# Patient Record
Sex: MALE | Race: WHITE | Employment: OTHER | URBAN - METROPOLITAN AREA
[De-identification: names, ages, dates, MRNs, and addresses within clinical notes are randomized per-mention and may not be internally consistent; named-entity substitution may affect disease eponyms.]

---

## 2018-04-30 ENCOUNTER — OFFICE VISIT (OUTPATIENT)
Dept: FAMILY MEDICINE | Facility: CLINIC | Age: 83
End: 2018-04-30
Payer: COMMERCIAL

## 2018-04-30 ENCOUNTER — TELEPHONE (OUTPATIENT)
Dept: FAMILY MEDICINE | Facility: CLINIC | Age: 83
End: 2018-04-30

## 2018-04-30 ENCOUNTER — RADIANT APPOINTMENT (OUTPATIENT)
Dept: GENERAL RADIOLOGY | Facility: CLINIC | Age: 83
End: 2018-04-30
Attending: INTERNAL MEDICINE
Payer: COMMERCIAL

## 2018-04-30 VITALS
OXYGEN SATURATION: 96 % | HEIGHT: 75 IN | TEMPERATURE: 97.8 F | SYSTOLIC BLOOD PRESSURE: 101 MMHG | BODY MASS INDEX: 21.26 KG/M2 | DIASTOLIC BLOOD PRESSURE: 58 MMHG | WEIGHT: 171 LBS | HEART RATE: 74 BPM

## 2018-04-30 DIAGNOSIS — Z95.0 STATUS CARDIAC PACEMAKER: ICD-10-CM

## 2018-04-30 DIAGNOSIS — I48.20 CHRONIC ATRIAL FIBRILLATION (H): ICD-10-CM

## 2018-04-30 DIAGNOSIS — E53.8 VITAMIN B12 DEFICIENCY (NON ANEMIC): ICD-10-CM

## 2018-04-30 DIAGNOSIS — Z79.01 LONG TERM CURRENT USE OF ANTICOAGULANT THERAPY: ICD-10-CM

## 2018-04-30 DIAGNOSIS — R07.81 RIB PAIN ON LEFT SIDE: ICD-10-CM

## 2018-04-30 DIAGNOSIS — F33.9 EPISODE OF RECURRENT MAJOR DEPRESSIVE DISORDER, UNSPECIFIED DEPRESSION EPISODE SEVERITY (H): ICD-10-CM

## 2018-04-30 DIAGNOSIS — Z76.89 ESTABLISHING CARE WITH NEW DOCTOR, ENCOUNTER FOR: ICD-10-CM
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
## 2018-04-30 DIAGNOSIS — E78.5 HYPERLIPIDEMIA LDL GOAL <100: ICD-10-CM

## 2018-04-30 DIAGNOSIS — K21.9 GASTROESOPHAGEAL REFLUX DISEASE, ESOPHAGITIS PRESENCE NOT SPECIFIED: Primary | ICD-10-CM

## 2018-04-30 DIAGNOSIS — I10 BENIGN ESSENTIAL HYPERTENSION: ICD-10-CM

## 2018-04-30 PROCEDURE — 99204 OFFICE O/P NEW MOD 45 MIN: CPT | Performed by: INTERNAL MEDICINE

## 2018-04-30 PROCEDURE — 71101 X-RAY EXAM UNILAT RIBS/CHEST: CPT | Mod: LT

## 2018-04-30 RX ORDER — SIMVASTATIN 5 MG
10 TABLET ORAL DAILY
Qty: 90 TABLET | Refills: 3 | Status: SHIPPED | OUTPATIENT
Start: 2018-04-30 | End: 2019-03-04

## 2018-04-30 RX ORDER — LISINOPRIL 20 MG/1
20 TABLET ORAL 2 TIMES DAILY
Qty: 180 TABLET | Refills: 3 | Status: SHIPPED | OUTPATIENT
Start: 2018-04-30 | End: 2018-06-15

## 2018-04-30 RX ORDER — HYDROCHLOROTHIAZIDE 25 MG/1
25 TABLET ORAL DAILY
Qty: 90 TABLET | Refills: 3 | Status: ON HOLD | OUTPATIENT
Start: 2018-04-30 | End: 2018-05-29

## 2018-04-30 RX ORDER — SERTRALINE HYDROCHLORIDE 25 MG/1
50 TABLET, FILM COATED ORAL DAILY
Qty: 180 TABLET | Refills: 3 | Status: SHIPPED | OUTPATIENT
Start: 2018-04-30 | End: 2018-05-02

## 2018-04-30 NOTE — PROGRESS NOTES
SUBJECTIVE:   Santosh Robledo is a 88 year old male who presents to clinic today for the following health issues:      Gastrointestinal symptoms      Duration: ongoing HX of GI issues    Description:           REFLUX SYMPTOMS - belching, nausea and loose stools      Intensity:  moderate    Accompanying signs and symptoms:  loose stools, bloating and poor appetite    History  Previous similar problem: YES - HX of colitis   Previous evaluation:  Nothing recently    Aggravating factors: unsure    Alleviating factors: omeprazole slight relief.    Other Therapies tried: None    Fall      Duration: x 6 weeks ago    Description (location/character/radiation): left sided pain and left sided back pain     Intensity:  mild    Accompanying signs and symptoms: spasming    History (similar episodes/previous evaluation): None    Precipitating or alleviating factors: None    Therapies tried and outcome: rest     Current Medications:     Current Outpatient Prescriptions   Medication Sig Dispense Refill     Cyanocobalamin (B-12) 1000 MCG CAPS Take 1 tablet by mouth daily       HYDROCHLOROTHIAZIDE PO Take 25 mg by mouth daily       LISINOPRIL PO Take 40 mg by mouth daily       OMEPRAZOLE PO Take 20 mg by mouth every morning       SERTRALINE HCL PO Take 50 mg by mouth daily       SIMVASTATIN PO Take 10 mg by mouth daily       Warfarin Sodium (COUMADIN PO) Take 5 mg by mouth daily Varies based on INR           Allergies:      Allergies   Allergen Reactions     Penicillins             Past Medical History:     Past Medical History:   Diagnosis Date     Atrial fibrillation (H)      Cardiac pacemaker      GERD (gastroesophageal reflux disease)          Past Surgical History:   No past surgical history on file.      Family Medical History:     Family History   Problem Relation Age of Onset     Influenza/Pneumonia Mother      Prostate Cancer Father          Social History:     Social History     Social History     Marital status:  "     Spouse name: N/A     Number of children: N/A     Years of education: N/A     Occupational History     Not on file.     Social History Main Topics     Smoking status: Never Smoker     Smokeless tobacco: Never Used     Alcohol use Yes     Drug use: No     Sexual activity: Yes     Partners: Female     Other Topics Concern     Not on file     Social History Narrative     No narrative on file           Review of System:     Constitutional: Negative for fever or chills  Skin: Negative for rashes  Ears/Nose/Throat: Negative for nasal congestion, sore throat  Respiratory: No shortness of breath, dyspnea on exertion, cough, or hemoptysis  Cardiovascular: Negative for chest pain  Gastrointestinal: Negative for nausea, vomiting  Genitourinary: Negative for dysuria, hematuria  Musculoskeletal: Positive for left sided rib pains after recent mechanical fall  Neurologic: Negative for headaches  Psychiatric: Negative for depression, anxiety  Hematologic/Lymphatic/Immunologic: Negative  Endocrine: Negative  Behavioral: Negative for tobacco use       Physical Exam:   /58 (BP Location: Right arm, Patient Position: Sitting, Cuff Size: Adult Regular)  Pulse 74  Temp 97.8  F (36.6  C) (Tympanic)  Ht 6' 3\" (1.905 m)  Wt 171 lb (77.6 kg)  SpO2 96%  BMI 21.37 kg/m2    GENERAL: alert and no distress  EYES: eyes grossly normal to inspection, and conjunctivae and sclerae normal  HENT: Normocephalic atraumatic. Nose and mouth without ulcers or lesions  NECK: supple  RESP: lungs clear to auscultation   CV: regular rate and rhythm, normal S1 S2  LYMPH: no peripheral edema   ABDOMEN: nondistended  MS: left sided rib pains noted  SKIN: no suspicious lesions or rashes  NEURO: Alert & Oriented x 3.   PSYCH: mentation appears normal, affect normal        Diagnostic Test Results:     I have ordered XR Ribs Left 2 Views today to rule out acute rib fractures after recent mechanical fall. Xray result is pending at this " time.      ASSESSMENT/PLAN:     (Z76.89) Establishing care with new doctor, encounter for  (K21.9) Gastroesophageal reflux disease, esophagitis presence not specified  (primary encounter diagnosis)  Comment: patient presents to clinic to establish care. Patient's GERD is currently stable on Omeprazole. He is due for a refill of his Omeprazole medication.  Plan: I have refilled the patient's omeprazole (PRILOSEC) 20 MG CR capsule GERD medication today.      (I48.2) Chronic atrial fibrillation (H)  (Z95.0) Status cardiac pacemaker  (Z79.01) Long term current use of anticoagulant therapy  Comment: chronic atrial fibrillation status post previous cardiac pacemaker implantation.  Plan: I have ordered CARDIO  ADULT REFERRAL, INR CLINIC REFERRAL for the patient to establish cardiology and anticoagulation clinic follow up going forward.      (W19.XXXA) Fall, initial encounter  (R07.81) Rib pain on left side  Comment: recent new mechanical fall resulting in left rib pains  Plan: I have ordered XR Ribs Left 2 Views to screen for acute fracture.      (I10) Benign essential hypertension  Comment: chronic HTN, patient's BP is at an acceptable range for his age.  Plan: I have refilled the patient's hydrochlorothiazide (HYDRODIURIL) 25 MG tablet, lisinopril (PRINIVIL/ZESTRIL) 20 MG tablet BP medications today.      (E78.5) Hyperlipidemia LDL goal <100  Comment: stable on Simvastatin medication.  Plan: I have ordered refill of simvastatin (ZOCOR) 5 MG tablet medication today.      (F33.9) Episode of recurrent major depressive disorder, unspecified depression episode severity (H)  Comment: stable on chronic Zoloft antidepressant medication.  Plan: I have refilled the patient's sertraline (ZOLOFT) 25 MG tablet medication today.      (E53.8) Vitamin B12 deficiency (non anemic)  Comment: stable on chronic Vitamin B12 supplementation medication  Plan: I have refilled the patient's Cyanocobalamin (B-12) 1000 MCG CAPS      Follow  Up Plan:     Patient is instructed to return to Internal Medicine clinic for follow-up visit in 1 month.        Lilly Zepeda MD  Internal Medicine  [facility]

## 2018-04-30 NOTE — TELEPHONE ENCOUNTER
Fax received from [facility] pharmacy stating: Plan limits to 1.5 tablets per day. Please send in new RX for 50MG tabs taking 1 tablet daily.      Last Written Prescription Date:  4/30/18  Last Fill Quantity: 180 tablet,  # refills: 3   Last office visit: 4/30/2018 with prescribing provider:  Duane German Office Visit:     [facility]     Tel: [phone]      Fax: [phone]      Debbie ALEXR)

## 2018-04-30 NOTE — PATIENT INSTRUCTIONS
At your visit today, we discussed your risk for falls and preventive options.    Fall Prevention  Falls often occur due to slipping, tripping or losing your balance. Millions of people fall every year and injure themselves. Here are ways to reduce your risk of falling again.    Think about your fall, was there anything that caused your fall that can be fixed, removed, or replaced?    Make your home safe by keeping walkways clear of objects you may trip over.    Use non-slip pads under rugs. Do not use area rugs or small throw rugs.    Use non-slip mats in bathtubs and showers.    Install handrails and lights on staircases.    Do not walk in poorly lit areas.    Do not stand on chairs or wobbly ladders.    Use caution when reaching overhead or looking upward. This position can cause a loss of balance.    Be sure your shoes fit properly, have non-slip bottoms and are in good condition.     Wear shoes both inside and out. Avoid going barefoot or wearing slippers.    Be cautious when going up and down stairs, curbs, and when walking on uneven sidewalks.    If your balance is poor, consider using a cane or walker.    If your fall was related to alcohol use, stop or limit alcohol intake.     If your fall was related to use of sleeping medicines, talk to your doctor about this. You may need to reduce your dosage at bedtime if you awaken during the night to go to the bathroom.      To reduce the need for nighttime bathroom trips:  ? Avoid drinking fluids for several hours before going to bed  ? Empty your bladder before going to bed  ? Men can keep a urinal at the bedside    Stay as active as you can. Balance, flexibility, strength, and endurance all come from exercise. They all play a role in preventing falls. Ask your healthcare provider which types of activity are right for you.    Get your vision checked on a regular basis.    If you have pets, know where they are before you stand up or walk so you don't trip over  them.    Use night lights.  Date Last Reviewed: 11/5/2015 2000-2017 [employer]. [address] [place] [address], [place] [address]. All rights reserved. This information is not intended as a substitute for professional medical care. Always follow your healthcare professional's instructions.

## 2018-04-30 NOTE — MR AVS SNAPSHOT
After Visit Summary   4/30/2018    Santosh Robledo    MRN: [id number]           Patient Information     Date Of Birth          7/20/1929        Visit Information        Provider Department      4/30/2018 2:00 PM Lilly Zepeda MD [facility]        Today's Diagnoses     Gastroesophageal reflux disease, esophagitis presence not specified    -  1    Chronic atrial fibrillation (H)        Status cardiac pacemaker        Long term current use of anticoagulant therapy        Establishing care with new doctor, encounter for        Fall, initial encounter        Rib pain on left side          Care Instructions      At your visit today, we discussed your risk for falls and preventive options.    Fall Prevention  Falls often occur due to slipping, tripping or losing your balance. Millions of people fall every year and injure themselves. Here are ways to reduce your risk of falling again.    Think about your fall, was there anything that caused your fall that can be fixed, removed, or replaced?    Make your home safe by keeping walkways clear of objects you may trip over.    Use non-slip pads under rugs. Do not use area rugs or small throw rugs.    Use non-slip mats in bathtubs and showers.    Install handrails and lights on staircases.    Do not walk in poorly lit areas.    Do not stand on chairs or wobbly ladders.    Use caution when reaching overhead or looking upward. This position can cause a loss of balance.    Be sure your shoes fit properly, have non-slip bottoms and are in good condition.     Wear shoes both inside and out. Avoid going barefoot or wearing slippers.    Be cautious when going up and down stairs, curbs, and when walking on uneven sidewalks.    If your balance is poor, consider using a cane or walker.    If your fall was related to alcohol use, stop or limit alcohol intake.     If your fall was related to use of sleeping medicines, talk to your doctor about this. You may  need to reduce your dosage at bedtime if you awaken during the night to go to the bathroom.      To reduce the need for nighttime bathroom trips:  ? Avoid drinking fluids for several hours before going to bed  ? Empty your bladder before going to bed  ? Men can keep a urinal at the bedside    Stay as active as you can. Balance, flexibility, strength, and endurance all come from exercise. They all play a role in preventing falls. Ask your healthcare provider which types of activity are right for you.    Get your vision checked on a regular basis.    If you have pets, know where they are before you stand up or walk so you don't trip over them.    Use night lights.  Date Last Reviewed: 11/5/2015 2000-2017 [employer]. [address] [place] [address], [place] [address]. All rights reserved. This information is not intended as a substitute for professional medical care. Always follow your healthcare professional's instructions.                Follow-ups after your visit        Additional Services     CARDIO [facility] ADULT REFERRAL       [facility] is referring you to Cardiology Services.       The  Representative will assist you in the coordination of your Cardiology care as prescribed by your physician.    The  Representative will call you within 24 hours to help schedule your appointment, or you may contact the  Representative at: [phone].         Type of Referral: New Cardiology Referral            Timeframe requested: within 4 weeks       Coverage of these services is subject to the terms and limitations of your health insurance plan.  Please call member services at your health plan with any benefit or coverage questions.      If X-rays, CT or MRI's have been performed, please contact the facility where they were done to arrange for , prior to your scheduled appointment.  Please bring this referral request to your appointment and present it to your  "specialist.            INR CLINIC REFERRAL       Your provider has referred you to INR Services.    Please be aware that coverage of these services is subject to the terms and limitations of your health insurance plan.  Call member services at your health plan with any benefit or coverage questions.    Indication for Anticoagulation: Atrial Fibrillation  If nonstandard INR is desired, indicate goal range and explanation:   Expected Duration of Therapy: Lifetime                  Future tests that were ordered for you today     Open Future Orders        Priority Expected Expires Ordered    XR Ribs Left 2 Views Routine 4/30/2018 4/30/2019 4/30/2018            Who to contact     If you have questions or need follow up information about today's clinic visit or your schedule please contact [facility] directly at [phone].  Normal or non-critical lab and imaging results will be communicated to you by [employer], letter or phone within 4 business days after the clinic has received the results. If you do not hear from us within 7 days, please contact the clinic through [employer] or phone. If you have a critical or abnormal lab result, we will notify you by phone as soon as possible.  Submit refill requests through [facility] or call your pharmacy and they will forward the refill request to us. Please allow 3 business days for your refill to be completed.          Additional Information About Your Visit        [employer] Information     [facility] lets you send messages to your doctor, view your test results, renew your prescriptions, schedule appointments and more. To sign up, go to [email or web address] . Click on \"Log in\" on the left side of the screen, which will take you to the Welcome page. Then click on \"Sign up Now\" on the right side of the page.     You will be asked to enter the access code listed below, as well as some personal information. Please follow the directions to create your username and password.   " "  Your access code is: [email or web address]  Expires: 2018  2:26 PM     Your access code will  in 90 days. If you need help or a new code, please call your [place] clinic or [phone].        Care EveryWhere ID     This is your Care EveryWhere ID. This could be used by other organizations to access your [place] medical records  [id number]        Your Vitals Were     Pulse Temperature Height Pulse Oximetry BMI (Body Mass Index)       74 97.8  F (36.6  C) (Tympanic) 6' 3\" (1.905 m) 96% 21.37 kg/m2        Blood Pressure from Last 3 Encounters:   18 101/58    Weight from Last 3 Encounters:   18 171 lb (77.6 kg)              We Performed the Following     CARDIO  ADULT REFERRAL     [facility] REFERRAL        Primary Care Provider Office Phone # Fax #    Lilly Leonel Zepeda [phone] [address] [facility] [address]  [place] [address]        Equal Access to Services     [facility]: Hadii aad ku hadasho Soomaali, waaxda luqadaha, qaybta kaalmada adeegyada, waxay idiin hayaan minna garcia . So [facility] [phone].    ATENCIÓN: Si habla español, tiene a gonzalez disposición servicios gratuitos de asistencia lingüística. Llame al [phone].    We comply with applicable federal civil rights laws and [place] laws. We do not discriminate on the basis of race, color, national origin, age, disability, sex, sexual orientation, or gender identity.            Thank you!     Thank you for choosing [facility]  for your care. Our goal is always to provide you with excellent care. Hearing back from our patients is one way we can continue to improve our services. Please take a few minutes to complete the written survey that you may receive in the mail after your visit with us. Thank you!             Your Updated Medication List - Protect others around you: Learn how to safely use, store and throw away your medicines at www.disposemymeds.org.          This list is accurate as " of 4/30/18  2:26 PM.  Always use your most recent med list.                   Brand Name Dispense Instructions for use Diagnosis    B-12 1000 MCG Caps      Take 1 tablet by mouth daily        COUMADIN PO      Take 5 mg by mouth daily Varies based on INR        HYDROCHLOROTHIAZIDE PO      Take 25 mg by mouth daily        LISINOPRIL PO      Take 40 mg by mouth daily        OMEPRAZOLE PO      Take 20 mg by mouth every morning        SERTRALINE HCL PO      Take 50 mg by mouth daily        SIMVASTATIN PO      Take 10 mg by mouth daily

## 2018-04-30 NOTE — NURSING NOTE
"Chief Complaint   Patient presents with     Fall     Gastrointestinal Problem       Initial /58 (BP Location: Right arm, Patient Position: Sitting, Cuff Size: Adult Regular)  Pulse 74  Temp 97.8  F (36.6  C) (Tympanic)  Ht 6' 3\" (1.905 m)  Wt 171 lb (77.6 kg)  SpO2 96%  BMI 21.37 kg/m2 Estimated body mass index is 21.37 kg/(m^2) as calculated from the following:    Height as of this encounter: 6' 3\" (1.905 m).    Weight as of this encounter: 171 lb (77.6 kg).  Medication Reconciliation: complete   Dilma Lawrenceing- CMA      "

## 2018-05-02 ENCOUNTER — ANTICOAGULATION THERAPY VISIT (OUTPATIENT)
Dept: NURSING | Facility: CLINIC | Age: 83
End: 2018-05-02
Payer: COMMERCIAL

## 2018-05-02 DIAGNOSIS — Z79.01 LONG TERM CURRENT USE OF ANTICOAGULANT THERAPY: ICD-10-CM

## 2018-05-02 DIAGNOSIS — I48.20 CHRONIC ATRIAL FIBRILLATION (H): ICD-10-CM

## 2018-05-02 LAB — INR POINT OF CARE: 6.2 (ref 0.86–1.14)

## 2018-05-02 PROCEDURE — 85610 PROTHROMBIN TIME: CPT | Mod: QW

## 2018-05-02 PROCEDURE — 36416 COLLJ CAPILLARY BLOOD SPEC: CPT

## 2018-05-02 PROCEDURE — 99207 ZZC NO CHARGE NURSE ONLY: CPT

## 2018-05-02 NOTE — PROGRESS NOTES
"  ANTICOAGULATION FOLLOW-UP CLINIC VISIT    Patient Name:  Santosh KENNY Hjelmstad  Date:  5/2/2018  Contact Type:  Face to Face    SUBJECTIVE:     Patient Findings     Comments New to [place]. Long time warfarin; previously managed in [place]           OBJECTIVE    INR Protime   Date Value Ref Range Status   05/02/2018 6.2 (A) 0.86 - 1.14 Final       ASSESSMENT / PLAN  INR assessment SUPRA    Recheck INR In: 5 DAYS    INR Location Clinic      Anticoagulation Summary as of 5/2/2018     INR goal 2.0-3.0   Today's INR 6.2!   Maintenance plan 7.5 mg (5 mg x 1.5) on Mon, Wed, Sat; 5 mg (5 mg x 1) all other days   Full instructions 5/2: Hold; 5/3: Hold; Otherwise 7.5 mg on Mon, Wed, Sat; 5 mg all other days   Weekly total 42.5 mg   Plan last modified Bryanna Heredia RN (5/2/2018)   Next INR check 5/7/2018   Target end date     Indications   Chronic atrial fibrillation (H) [I48.2]  Long term current use of anticoagulant therapy [Z79.01]         Anticoagulation Episode Summary     INR check location     Preferred lab     Send INR reminders to [facility] ANTICOAGULATION    Comments       Anticoagulation Care Providers     Provider Role Specialty Phone number    Duane Lilly Castaneda MD Responsible Internal Medicine [phone]            See the Encounter Report to view Anticoagulation Flowsheet and Dosing Calendar (Go to Encounters tab in chart review, and find the Anticoagulation Therapy Visit)    Dosage adjustment made based on physician directed care plan. Patient is not new to warfarin therapy, but is new to [facility].  Saw  on 4/30/18.  Has been having GI issues and very surprised that his INR today is 6.2.  Wife present. States his appetite has been \"terrible\". Now improving.  Hold warfarin W,Th, then resume previous dosing and recheck in 2 days. Patient and wife confirmed that patient has 5 mg tablets. His current dosing regimen is 2.5 mg MWSa, 5 mg ROW. After discussion, patient has appt in 5 days with PCP.  Rescheduled next " INR for 5/7/18.    Bryanna Heredia RN

## 2018-05-02 NOTE — MR AVS SNAPSHOT
Santosh KENNY Hjelmstad   5/2/2018 9:45 AM   Anticoagulation Therapy Visit    Description:  88 year old male   Provider:   ANTICOAGULATION CLINIC   Department:   Nurse           INR as of 5/2/2018     Today's INR 6.2!      Anticoagulation Summary as of 5/2/2018     INR goal 2.0-3.0   Today's INR 6.2!   Full instructions 5/2: Hold; 5/3: Hold; Otherwise 2.5 mg on Mon, Wed, Sat; 5 mg all other days   Next INR check 5/7/2018    Indications   Chronic atrial fibrillation (H) [I48.2]  Long term current use of anticoagulant therapy [Z79.01]         Your next Anticoagulation Clinic appointment(s)     May 07, 2018  1:45 PM CDT   Anticoagulation Visit with  ANTICOAGULATION CLINIC   [facility] ([facility])    [address]  [place] [address]   [phone]              Contact Numbers     Clinic Number:         May 2018 Details    Sun Mon Tue Wed Thu Fri Sat       1               2      Hold   See details      3      Hold         4      5 mg         5      2.5 mg           6      5 mg         7            8               9               10               11               12                 13               14               15               16               17               18               19                 20               21               22               23               24               25               26                 27               28               29               30               31                  Date Details   05/02 This INR check       Date of next INR:  5/7/2018         How to take your warfarin dose     To take:  2.5 mg Take 0.5 of a 5 mg tablet.    To take:  5 mg Take 1 of the 5 mg tablets.    Hold Do not take your warfarin dose. See the Details table to the right for additional instructions.

## 2018-05-07 ENCOUNTER — ANTICOAGULATION THERAPY VISIT (OUTPATIENT)
Dept: NURSING | Facility: CLINIC | Age: 83
End: 2018-05-07
Payer: COMMERCIAL

## 2018-05-07 ENCOUNTER — OFFICE VISIT (OUTPATIENT)
Dept: FAMILY MEDICINE | Facility: CLINIC | Age: 83
End: 2018-05-07
Payer: COMMERCIAL
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
## 2018-05-07 DIAGNOSIS — Z79.01 LONG TERM CURRENT USE OF ANTICOAGULANT THERAPY: ICD-10-CM

## 2018-05-07 DIAGNOSIS — R14.3 FLATULENCE, ERUCTATION AND GAS PAIN: Primary | ICD-10-CM

## 2018-05-07 DIAGNOSIS — R14.2 FLATULENCE, ERUCTATION AND GAS PAIN: Primary | ICD-10-CM

## 2018-05-07 DIAGNOSIS — I48.20 CHRONIC ATRIAL FIBRILLATION (H): ICD-10-CM

## 2018-05-07 DIAGNOSIS — R07.81 RIB PAIN ON LEFT SIDE: ICD-10-CM

## 2018-05-07 DIAGNOSIS — E78.5 HYPERLIPIDEMIA LDL GOAL <100: ICD-10-CM

## 2018-05-07 DIAGNOSIS — R14.1 FLATULENCE, ERUCTATION AND GAS PAIN: Primary | ICD-10-CM

## 2018-05-07 LAB — INR POINT OF CARE: 3.1 (ref 0.86–1.14)

## 2018-05-07 PROCEDURE — 99214 OFFICE O/P EST MOD 30 MIN: CPT | Performed by: INTERNAL MEDICINE

## 2018-05-07 PROCEDURE — 99207 ZZC NO CHARGE NURSE ONLY: CPT

## 2018-05-07 PROCEDURE — 85610 PROTHROMBIN TIME: CPT | Mod: QW

## 2018-05-07 PROCEDURE — 36416 COLLJ CAPILLARY BLOOD SPEC: CPT

## 2018-05-07 RX ORDER — SIMETHICONE 125 MG
1 CAPSULE ORAL 2 TIMES DAILY PRN
Qty: 60 CAPSULE | Refills: 11 | Status: SHIPPED | OUTPATIENT
Start: 2018-05-07 | End: 2020-05-20

## 2018-05-07 NOTE — PROGRESS NOTES
SUBJECTIVE:   Santosh Robledo is a 88 year old male who presents to clinic today for the following health issues:    Chief Complaint   Patient presents with     Follow Up For     Fall, GI issues - 1 week follow up       HPI:   Patient Santosh Robledo is a very pleasant 88 year old male with history of chronic flatulence, burping and abdominal gas who presents to Internal Medicine clinic today brought in by his wife from home for follow up of multiple concerns including recent mechanical fall resulting in left rib pains. Regarding the patient's fall resulting in left rib pains, the patient denies any new falls since last clinic visit. He reports that his left rib pains have fully resolved at this time. His chronic flatulence, burping and abdominal gas symptoms are still not well controlled despite the omeprazole medication. He denies any chest pain, headaches, fever or chills.          Current Medications:     Current Outpatient Prescriptions   Medication Sig Dispense Refill     Cyanocobalamin (B-12) 1000 MCG CAPS Take 1 tablet by mouth daily 90 capsule 3     hydrochlorothiazide (HYDRODIURIL) 25 MG tablet Take 1 tablet (25 mg) by mouth daily 90 tablet 3     lisinopril (PRINIVIL/ZESTRIL) 20 MG tablet Take 1 tablet (20 mg) by mouth 2 times daily 180 tablet 3     omeprazole (PRILOSEC) 20 MG CR capsule Take 1 capsule (20 mg) by mouth daily 90 capsule 3     sertraline (ZOLOFT) 50 MG tablet Take 1 tablet (50 mg) by mouth daily 90 tablet 3     Simethicone (GAS-X EXTRA STRENGTH) 125 MG CAPS Take 1 capsule by mouth 2 times daily as needed 60 capsule 11     simvastatin (ZOCOR) 5 MG tablet Take 2 tablets (10 mg) by mouth daily 90 tablet 3     Warfarin Sodium (COUMADIN PO) Take 5 mg by mouth daily Varies based on INR           Allergies:      Allergies   Allergen Reactions     Penicillins Rash            Past Medical History:     Past Medical History:   Diagnosis Date     Atrial fibrillation (H)      Cardiac pacemaker   "    GERD (gastroesophageal reflux disease)          Past Surgical History:   No past surgical history on file.      Family Medical History:     Family History   Problem Relation Age of Onset     Influenza/Pneumonia Mother      Prostate Cancer Father          Social History:     Social History     Social History     Marital status:      Spouse name: N/A     Number of children: N/A     Years of education: N/A     Occupational History     Not on file.     Social History Main Topics     Smoking status: Never Smoker     Smokeless tobacco: Never Used     Alcohol use Yes     Drug use: No     Sexual activity: Yes     Partners: Female     Other Topics Concern     Not on file     Social History Narrative           Review of System:     Constitutional: Negative for fever or chills  Skin: Negative for rashes  Ears/Nose/Throat: Negative for nasal congestion, sore throat  Respiratory: No shortness of breath, dyspnea on exertion, cough, or hemoptysis  Cardiovascular: Negative for chest pain  Gastrointestinal: Negative for nausea, vomiting. Positive for GERD, chronic flatulence, burping due to abdominal gas  Genitourinary: Negative for dysuria, hematuria  Musculoskeletal: Negative for myalgias, positive for resolution of previous left rib pains  Neurologic: Negative for headaches  Psychiatric: Negative for depression, anxiety  Hematologic/Lymphatic/Immunologic: Negative  Endocrine: Negative  Behavioral: Negative for tobacco use       Physical Exam:   BP (P) 129/69 (BP Location: Left arm, Cuff Size: Adult Regular)  Pulse (P) 64  Temp (P) 97.9  F (36.6  C) (Tympanic)  Ht (P) 6' 3\" (1.905 m)  Wt (P) 176 lb (79.8 kg)  SpO2 (P) 100%  BMI (P) 22 kg/m2    GENERAL: chronically ill appearing elderly male, alert and no distress  EYES: eyes grossly normal to inspection, and conjunctivae and sclerae normal  HENT: Normocephalic atraumatic. Nose and mouth without ulcers or lesions  NECK: supple  RESP: lungs clear to auscultation "   CV: regular rate and rhythm, normal S1 S2  LYMPH: no peripheral edema   ABDOMEN: nondistended, frequent burping and flatulence symptoms present, unchanged from prior baseline  MS: no gross musculoskeletal defects noted, previous left rib pains have resolved since last clinic visit  SKIN: no suspicious lesions or rashes  NEURO: Alert & Oriented x 3.   PSYCH: mentation appears normal, affect normal        Diagnostic Test Results:     RIBS UNILATERAL THREE VIEWS LEFT April 30, 2018 2:50 PM     HISTORY: Fall, initial encounter. Rib pain on left side.     COMPARISON: None.     FINDINGS: Oblique views of the left hemithorax demonstrate a possible  nondisplaced fracture of the left seventh rib. This could be an  overlying lucency. Otherwise no pneumothorax. Moderately extensive  interstitial changes and/or infiltrate are seen on the left.     DARRICK VASQUEZ MD    ASSESSMENT/PLAN:       (R14.3,  R14.1,  R14.2) Flatulence, eructation and gas pain  (primary encounter diagnosis)  Comment: chronic flatulence, burping due to abdominal gas.  Plan: I have prescribed Simethicone (GAS-X EXTRA STRENGTH) 125 MG CAPS for treatment.      (R07.81) Rib pain on left side  (W19.XXXD) Fall, subsequent encounter  Comment: recent left sided rib pains have resolved at this time. No new falls recently since his last clinic visit.  Plan: No further Xray imaging is necessary at this time.      (E78.5) Hyperlipidemia LDL goal <100  Comment: stable on current Zocor medication  Plan: Continue current Zocor medication for hyperlipidemia treatment going forward.        Follow Up Plan:     Patient is instructed to return to Internal Medicine clinic for follow-up visit in 1 month.        Lilly Zepeda MD  Internal Medicine  [facility]

## 2018-05-07 NOTE — MR AVS SNAPSHOT
Santosh KENNY Hjelmstad   5/7/2018 1:45 PM   Anticoagulation Therapy Visit    Description:  88 year old male   Provider:   ANTICOAGULATION CLINIC   Department:   Nurse           INR as of 5/7/2018     Today's INR 3.1!      Anticoagulation Summary as of 5/7/2018     INR goal 2.0-3.0   Today's INR 3.1!   Full instructions 2.5 mg on Mon, Wed, Sat; 5 mg all other days   Next INR check 5/14/2018    Indications   Chronic atrial fibrillation (H) [I48.2]  Long term current use of anticoagulant therapy [Z79.01]         Your next Anticoagulation Clinic appointment(s)     May 14, 2018 10:15 AM CDT   Anticoagulation Visit with  ANTICOAGULATION CLINIC   [facility] ([facility]    [address]  [place] [address]   [phone]              Contact Numbers     Clinic Number:         May 2018 Details    Sun Mon Tue Wed Thu Fri Sat       1               2               3               4               5                 6               7      2.5 mg   See details      8      5 mg         9      2.5 mg         10      5 mg         11      5 mg         12      2.5 mg           13      5 mg         14            15               16               17               18               19                 20               21               22               23               24               25               26                 27               28               29               30               31                  Date Details   05/07 This INR check       Date of next INR:  5/14/2018         How to take your warfarin dose     To take:  2.5 mg Take 0.5 of a 5 mg tablet.    To take:  5 mg Take 1 of the 5 mg tablets.

## 2018-05-07 NOTE — MR AVS SNAPSHOT
After Visit Summary   5/7/2018    Santosh Robledo    MRN: [id number]           Patient Information     Date Of Birth          7/20/1929        Visit Information        Provider Department      5/7/2018 2:00 PM Lilly Zepeda MD [facility]        Today's Diagnoses     Flatulence, eructation and gas pain    -  1    Rib pain on left side        Fall, subsequent encounter        Hyperlipidemia LDL goal <100           Follow-ups after your visit        Your next 10 appointments already scheduled     May 14, 2018 10:15 AM CDT   Anticoagulation Visit with [facility]   [facility] ([facility])    [address] [facility] [address]   [phone]            May 23, 2018 10:15 AM CDT   EP NEW with Francis Block MD   [facility] ([facility])    [address] [place] [address]   [phone] OPT 2            Jul 30, 2018 11:00 AM CDT   Office Visit with Lilly Zepeda MD   [facility] ([facility])    [address] [facility] [address] [phone]           Bring a current list of meds and any records pertaining to this visit. For Physicals, please bring immunization records and any forms needing to be filled out. Please arrive 10 minutes early to complete paperwork.              Who to contact     If you have questions or need follow up information about today's clinic visit or your schedule please contact [facility] directly at [phone].  Normal or non-critical lab and imaging results will be communicated to you by MyChart, letter or phone within 4 business days after the clinic has received the results. If you do not hear from us within 7 days, please contact the clinic through MyChart or phone. If you have a critical or abnormal lab result, we will notify you by phone as soon as possible.  Submit refill requests through [employer] or  "call your pharmacy and they will forward the refill request to us. Please allow 3 business days for your refill to be completed.          Additional Information About Your Visit        MyChart Information     [employer] lets you send messages to your doctor, view your test results, renew your prescriptions, schedule appointments and more. To sign up, go to [email or web address] . Click on \"Log in\" on the left side of the screen, which will take you to the Welcome page. Then click on \"Sign up Now\" on the right side of the page.     You will be asked to enter the access code listed below, as well as some personal information. Please follow the directions to create your username and password.     Your access code is: [email or web address]  Expires: 2018  2:26 PM     Your access code will  in 90 days. If you need help or a new code, please call your [place] clinic or [phone].        Care EveryWhere ID     This is your Care EveryWhere ID. This could be used by other organizations to access your [place] medical records  [id number]         Blood Pressure from Last 3 Encounters:   18 (P) 129/69   18 101/58    Weight from Last 3 Encounters:   18 (P) 176 lb (79.8 kg)   18 171 lb (77.6 kg)              Today, you had the following     No orders found for display         Today's Medication Changes          These changes are accurate as of 18  3:53 PM.  If you have any questions, ask your nurse or doctor.               Start taking these medicines.        Dose/Directions    Simethicone 125 MG Caps   Commonly known as:  GAS-X EXTRA STRENGTH   Used for:  Flatulence, eructation and gas pain   Started by:  Lilly Zepeda MD        Dose:  1 capsule   Take 1 capsule by mouth 2 times daily as needed   Quantity:  60 capsule   Refills:  11            Where to get your medicines      These medications were sent to [facility] [id number] - [place], [place] - [address]  [address] [place] [address] "     Phone:  [phone]     Simethicone 125 MG Caps                Primary Care Provider Office Phone # Fax #    Lilly Leonel Zepeda [phone] [address] [address]        Equal Access to Services     [facility] : Hadii aad ku hadasho Soomaali, waaxda luqadaha, qaybta kaalmada adeegyada, waxay naein haythuyn minna wooddarcyjoby garcia . So [facility] [phone].    ATENCIÓN: Si habla español, tiene a gonzalez disposición servicios gratuitos de asistencia lingüística. Llame al [phone].    We comply with applicable federal civil rights laws and [place] laws. We do not discriminate on the basis of race, color, national origin, age, disability, sex, sexual orientation, or gender identity.            Thank you!     Thank you for choosing [facility]  for your care. Our goal is always to provide you with excellent care. Hearing back from our patients is one way we can continue to improve our services. Please take a few minutes to complete the written survey that you may receive in the mail after your visit with us. Thank you!             Your Updated Medication List - Protect others around you: Learn how to safely use, store and throw away your medicines at www.disposemymeds.org.          This list is accurate as of 5/7/18  3:53 PM.  Always use your most recent med list.                   Brand Name Dispense Instructions for use Diagnosis    B-12 1000 MCG Caps     90 capsule    Take 1 tablet by mouth daily    Vitamin B12 deficiency (non anemic)       COUMADIN PO      Take 5 mg by mouth daily Varies based on INR        hydrochlorothiazide 25 MG tablet    HYDRODIURIL    90 tablet    Take 1 tablet (25 mg) by mouth daily    Benign essential hypertension       lisinopril 20 MG tablet    PRINIVIL/ZESTRIL    180 tablet    Take 1 tablet (20 mg) by mouth 2 times daily    Benign essential hypertension       omeprazole 20 MG CR capsule    priLOSEC    90 capsule    Take 1 capsule (20 mg)  by mouth daily    Gastroesophageal reflux disease, esophagitis presence not specified       sertraline 50 MG tablet    ZOLOFT    90 tablet    Take 1 tablet (50 mg) by mouth daily    Episode of recurrent major depressive disorder, unspecified depression episode severity (H)       Simethicone 125 MG Caps    GAS-X EXTRA STRENGTH    60 capsule    Take 1 capsule by mouth 2 times daily as needed    Flatulence, eructation and gas pain       simvastatin 5 MG tablet    ZOCOR    90 tablet    Take 2 tablets (10 mg) by mouth daily    Hyperlipidemia LDL goal <100

## 2018-05-07 NOTE — PROGRESS NOTES
ANTICOAGULATION FOLLOW-UP CLINIC VISIT    Patient Name:  Santosh KENNY Hjelmstad  Date:  5/7/2018  Contact Type:  Face to Face    SUBJECTIVE:        OBJECTIVE    INR Protime   Date Value Ref Range Status   05/07/2018 3.1 (A) 0.86 - 1.14 Final       ASSESSMENT / PLAN  INR assessment THER    Recheck INR In: 1 WEEK    INR Location Clinic      Anticoagulation Summary as of 5/7/2018     INR goal 2.0-3.0   Today's INR 3.1!   Maintenance plan 2.5 mg (5 mg x 0.5) on Mon, Wed, Sat; 5 mg (5 mg x 1) all other days   Full instructions 2.5 mg on Mon, Wed, Sat; 5 mg all other days   Weekly total 27.5 mg   Plan last modified Bryanna Heredia RN (5/2/2018)   Next INR check 5/14/2018   Target end date     Indications   Chronic atrial fibrillation (H) [I48.2]  Long term current use of anticoagulant therapy [Z79.01]         Anticoagulation Episode Summary     INR check location     Preferred lab     Send INR reminders to [facility] ANTICOAGULATION    Comments       Anticoagulation Care Providers     Provider Role Specialty Phone number    Lilly Zepeda MD [facility] Internal Medicine [phone]            See the Encounter Report to view Anticoagulation Flowsheet and Dosing Calendar (Go to Encounters tab in chart review, and find the Anticoagulation Therapy Visit)    Patient is here with his wife.  He is new to clinic but not new to INR.  His diet is better but not back to his baseline yet. Dosing based on [facility] Protocol and Provider directed care plan.      Rosemary Sauceda RN

## 2018-05-08 ASSESSMENT — PATIENT HEALTH QUESTIONNAIRE - PHQ9: SUM OF ALL RESPONSES TO PHQ QUESTIONS 1-9: 7

## 2018-05-13 ENCOUNTER — HOSPITAL ENCOUNTER (INPATIENT)
Facility: CLINIC | Age: 83
LOS: 2 days | Discharge: HOME | DRG: 373 | End: 2018-05-15
Attending: EMERGENCY MEDICINE | Admitting: INTERNAL MEDICINE
Payer: MEDICARE

## 2018-05-13 ENCOUNTER — APPOINTMENT (OUTPATIENT)
Dept: CT IMAGING | Facility: CLINIC | Age: 83
DRG: 373 | End: 2018-05-13
Attending: EMERGENCY MEDICINE
Payer: MEDICARE

## 2018-05-13 DIAGNOSIS — K52.9 COLITIS: ICD-10-CM

## 2018-05-13 LAB
ANION GAP SERPL CALCULATED.3IONS-SCNC: 8 MMOL/L (ref 3–14)
BASOPHILS # BLD AUTO: 0 10E9/L (ref 0–0.2)
BASOPHILS NFR BLD AUTO: 0.1 %
BUN SERPL-MCNC: 22 MG/DL (ref 7–30)
C DIFF TOX B STL QL: POSITIVE
CALCIUM SERPL-MCNC: 8.5 MG/DL (ref 8.5–10.1)
CHLORIDE SERPL-SCNC: 102 MMOL/L (ref 94–109)
CO2 SERPL-SCNC: 25 MMOL/L (ref 20–32)
CREAT SERPL-MCNC: 0.99 MG/DL (ref 0.66–1.25)
CRP SERPL-MCNC: 135 MG/L (ref 0–8)
DIFFERENTIAL METHOD BLD: ABNORMAL
EOSINOPHIL # BLD AUTO: 0.1 10E9/L (ref 0–0.7)
EOSINOPHIL NFR BLD AUTO: 0.2 %
ERYTHROCYTE [DISTWIDTH] IN BLOOD BY AUTOMATED COUNT: 14.8 % (ref 10–15)
GFR SERPL CREATININE-BSD FRML MDRD: 71 ML/MIN/1.7M2
GLUCOSE SERPL-MCNC: 124 MG/DL (ref 70–99)
HCT VFR BLD AUTO: 38.1 % (ref 40–53)
HGB BLD-MCNC: 12.8 G/DL (ref 13.3–17.7)
IMM GRANULOCYTES # BLD: 0.1 10E9/L (ref 0–0.4)
IMM GRANULOCYTES NFR BLD: 0.4 %
INR PPP: 1.77 (ref 0.86–1.14)
LACTATE BLD-SCNC: 1.1 MMOL/L (ref 0.7–2)
LYMPHOCYTES # BLD AUTO: 0.8 10E9/L (ref 0.8–5.3)
LYMPHOCYTES NFR BLD AUTO: 2.6 %
MCH RBC QN AUTO: 27.2 PG (ref 26.5–33)
MCHC RBC AUTO-ENTMCNC: 33.6 G/DL (ref 31.5–36.5)
MCV RBC AUTO: 81 FL (ref 78–100)
MONOCYTES # BLD AUTO: 2.8 10E9/L (ref 0–1.3)
MONOCYTES NFR BLD AUTO: 9.4 %
NEUTROPHILS # BLD AUTO: 26 10E9/L (ref 1.6–8.3)
NEUTROPHILS NFR BLD AUTO: 87.3 %
NRBC # BLD AUTO: 0 10*3/UL
NRBC BLD AUTO-RTO: 0 /100
PLATELET # BLD AUTO: 375 10E9/L (ref 150–450)
POTASSIUM SERPL-SCNC: 3.4 MMOL/L (ref 3.4–5.3)
RBC # BLD AUTO: 4.71 10E12/L (ref 4.4–5.9)
SODIUM SERPL-SCNC: 135 MMOL/L (ref 133–144)
SPECIMEN SOURCE: ABNORMAL
WBC # BLD AUTO: 29.8 10E9/L (ref 4–11)

## 2018-05-13 PROCEDURE — 85610 PROTHROMBIN TIME: CPT | Performed by: EMERGENCY MEDICINE

## 2018-05-13 PROCEDURE — 25000132 ZZH RX MED GY IP 250 OP 250 PS 637: Mod: GY | Performed by: EMERGENCY MEDICINE

## 2018-05-13 PROCEDURE — 87493 C DIFF AMPLIFIED PROBE: CPT | Performed by: EMERGENCY MEDICINE

## 2018-05-13 PROCEDURE — 96360 HYDRATION IV INFUSION INIT: CPT

## 2018-05-13 PROCEDURE — 74177 CT ABD & PELVIS W/CONTRAST: CPT

## 2018-05-13 PROCEDURE — 85025 COMPLETE CBC W/AUTO DIFF WBC: CPT | Performed by: EMERGENCY MEDICINE

## 2018-05-13 PROCEDURE — 80048 BASIC METABOLIC PNL TOTAL CA: CPT | Performed by: EMERGENCY MEDICINE

## 2018-05-13 PROCEDURE — 99223 1ST HOSP IP/OBS HIGH 75: CPT | Mod: AI | Performed by: INTERNAL MEDICINE

## 2018-05-13 PROCEDURE — 25000128 H RX IP 250 OP 636: Performed by: EMERGENCY MEDICINE

## 2018-05-13 PROCEDURE — 96361 HYDRATE IV INFUSION ADD-ON: CPT

## 2018-05-13 PROCEDURE — 87506 IADNA-DNA/RNA PROBE TQ 6-11: CPT | Performed by: EMERGENCY MEDICINE

## 2018-05-13 PROCEDURE — 25000128 H RX IP 250 OP 636: Performed by: INTERNAL MEDICINE

## 2018-05-13 PROCEDURE — 25000132 ZZH RX MED GY IP 250 OP 250 PS 637: Mod: GY | Performed by: INTERNAL MEDICINE

## 2018-05-13 PROCEDURE — A9270 NON-COVERED ITEM OR SERVICE: HCPCS | Mod: GY | Performed by: INTERNAL MEDICINE

## 2018-05-13 PROCEDURE — 25000125 ZZHC RX 250: Performed by: EMERGENCY MEDICINE

## 2018-05-13 PROCEDURE — 99285 EMERGENCY DEPT VISIT HI MDM: CPT | Mod: 25

## 2018-05-13 PROCEDURE — 83605 ASSAY OF LACTIC ACID: CPT | Performed by: EMERGENCY MEDICINE

## 2018-05-13 PROCEDURE — 12000000 ZZH R&B MED SURG/OB

## 2018-05-13 PROCEDURE — 86140 C-REACTIVE PROTEIN: CPT | Performed by: EMERGENCY MEDICINE

## 2018-05-13 RX ORDER — LISINOPRIL 20 MG/1
20 TABLET ORAL 2 TIMES DAILY
Status: DISCONTINUED | OUTPATIENT
Start: 2018-05-13 | End: 2018-05-15 | Stop reason: HOSPADM

## 2018-05-13 RX ORDER — POTASSIUM CHLORIDE 7.45 MG/ML
10 INJECTION INTRAVENOUS
Status: DISCONTINUED | OUTPATIENT
Start: 2018-05-13 | End: 2018-05-15 | Stop reason: HOSPADM

## 2018-05-13 RX ORDER — SERTRALINE HYDROCHLORIDE 100 MG/1
100 TABLET, FILM COATED ORAL DAILY
Status: DISCONTINUED | OUTPATIENT
Start: 2018-05-14 | End: 2018-05-15 | Stop reason: HOSPADM

## 2018-05-13 RX ORDER — POTASSIUM CHLORIDE 29.8 MG/ML
20 INJECTION INTRAVENOUS
Status: DISCONTINUED | OUTPATIENT
Start: 2018-05-13 | End: 2018-05-15 | Stop reason: HOSPADM

## 2018-05-13 RX ORDER — ACETAMINOPHEN 650 MG/1
650 SUPPOSITORY RECTAL EVERY 4 HOURS PRN
Status: DISCONTINUED | OUTPATIENT
Start: 2018-05-13 | End: 2018-05-15 | Stop reason: HOSPADM

## 2018-05-13 RX ORDER — ONDANSETRON 2 MG/ML
4 INJECTION INTRAMUSCULAR; INTRAVENOUS EVERY 6 HOURS PRN
Status: DISCONTINUED | OUTPATIENT
Start: 2018-05-13 | End: 2018-05-15 | Stop reason: HOSPADM

## 2018-05-13 RX ORDER — SIMETHICONE 80 MG
80 TABLET,CHEWABLE ORAL 2 TIMES DAILY PRN
Status: DISCONTINUED | OUTPATIENT
Start: 2018-05-13 | End: 2018-05-15 | Stop reason: HOSPADM

## 2018-05-13 RX ORDER — POTASSIUM CHLORIDE 1.5 G/1.58G
20-40 POWDER, FOR SOLUTION ORAL
Status: DISCONTINUED | OUTPATIENT
Start: 2018-05-13 | End: 2018-05-15 | Stop reason: HOSPADM

## 2018-05-13 RX ORDER — NALOXONE HYDROCHLORIDE 0.4 MG/ML
.1-.4 INJECTION, SOLUTION INTRAMUSCULAR; INTRAVENOUS; SUBCUTANEOUS
Status: DISCONTINUED | OUTPATIENT
Start: 2018-05-13 | End: 2018-05-15 | Stop reason: HOSPADM

## 2018-05-13 RX ORDER — POTASSIUM CHLORIDE 1500 MG/1
20-40 TABLET, EXTENDED RELEASE ORAL
Status: DISCONTINUED | OUTPATIENT
Start: 2018-05-13 | End: 2018-05-15 | Stop reason: HOSPADM

## 2018-05-13 RX ORDER — LIDOCAINE 40 MG/G
CREAM TOPICAL
Status: DISCONTINUED | OUTPATIENT
Start: 2018-05-13 | End: 2018-05-15 | Stop reason: HOSPADM

## 2018-05-13 RX ORDER — SODIUM CHLORIDE 9 MG/ML
1000 INJECTION, SOLUTION INTRAVENOUS CONTINUOUS
Status: DISCONTINUED | OUTPATIENT
Start: 2018-05-13 | End: 2018-05-13

## 2018-05-13 RX ORDER — HYDROCODONE BITARTRATE AND ACETAMINOPHEN 5; 325 MG/1; MG/1
1 TABLET ORAL EVERY 4 HOURS PRN
Status: DISCONTINUED | OUTPATIENT
Start: 2018-05-13 | End: 2018-05-15 | Stop reason: HOSPADM

## 2018-05-13 RX ORDER — SODIUM CHLORIDE 9 MG/ML
INJECTION, SOLUTION INTRAVENOUS CONTINUOUS
Status: DISCONTINUED | OUTPATIENT
Start: 2018-05-13 | End: 2018-05-15 | Stop reason: HOSPADM

## 2018-05-13 RX ORDER — ONDANSETRON 4 MG/1
4 TABLET, ORALLY DISINTEGRATING ORAL EVERY 6 HOURS PRN
Status: DISCONTINUED | OUTPATIENT
Start: 2018-05-13 | End: 2018-05-15 | Stop reason: HOSPADM

## 2018-05-13 RX ORDER — POTASSIUM CL/LIDO/0.9 % NACL 10MEQ/0.1L
10 INTRAVENOUS SOLUTION, PIGGYBACK (ML) INTRAVENOUS
Status: DISCONTINUED | OUTPATIENT
Start: 2018-05-13 | End: 2018-05-15 | Stop reason: HOSPADM

## 2018-05-13 RX ORDER — SIMVASTATIN 10 MG
10 TABLET ORAL AT BEDTIME
Status: DISCONTINUED | OUTPATIENT
Start: 2018-05-13 | End: 2018-05-15 | Stop reason: HOSPADM

## 2018-05-13 RX ORDER — WARFARIN SODIUM 5 MG/1
5 TABLET ORAL ONCE
Status: COMPLETED | OUTPATIENT
Start: 2018-05-13 | End: 2018-05-13

## 2018-05-13 RX ORDER — IOPAMIDOL 755 MG/ML
86 INJECTION, SOLUTION INTRAVASCULAR ONCE
Status: COMPLETED | OUTPATIENT
Start: 2018-05-13 | End: 2018-05-13

## 2018-05-13 RX ORDER — HYDRALAZINE HYDROCHLORIDE 20 MG/ML
10 INJECTION INTRAMUSCULAR; INTRAVENOUS EVERY 4 HOURS PRN
Status: DISCONTINUED | OUTPATIENT
Start: 2018-05-13 | End: 2018-05-15 | Stop reason: HOSPADM

## 2018-05-13 RX ORDER — ACETAMINOPHEN 325 MG/1
650 TABLET ORAL EVERY 4 HOURS PRN
Status: DISCONTINUED | OUTPATIENT
Start: 2018-05-13 | End: 2018-05-15 | Stop reason: HOSPADM

## 2018-05-13 RX ADMIN — LISINOPRIL 20 MG: 20 TABLET ORAL at 21:34

## 2018-05-13 RX ADMIN — SODIUM CHLORIDE 1000 ML: 9 INJECTION, SOLUTION INTRAVENOUS at 20:30

## 2018-05-13 RX ADMIN — IOPAMIDOL 86 ML: 755 INJECTION, SOLUTION INTRAVENOUS at 17:52

## 2018-05-13 RX ADMIN — SODIUM CHLORIDE 1000 ML: 9 INJECTION, SOLUTION INTRAVENOUS at 17:15

## 2018-05-13 RX ADMIN — Medication 125 MG: at 20:31

## 2018-05-13 RX ADMIN — SODIUM CHLORIDE 66 ML: 9 INJECTION, SOLUTION INTRAVENOUS at 17:52

## 2018-05-13 RX ADMIN — OMEPRAZOLE 20 MG: 20 CAPSULE, DELAYED RELEASE ORAL at 21:34

## 2018-05-13 RX ADMIN — WARFARIN SODIUM 5 MG: 5 TABLET ORAL at 21:35

## 2018-05-13 RX ADMIN — SODIUM CHLORIDE: 9 INJECTION, SOLUTION INTRAVENOUS at 20:38

## 2018-05-13 RX ADMIN — SIMVASTATIN 10 MG: 10 TABLET, FILM COATED ORAL at 21:35

## 2018-05-13 ASSESSMENT — ACTIVITIES OF DAILY LIVING (ADL)
RETIRED_COMMUNICATION: 0-->UNDERSTANDS/COMMUNICATES WITHOUT DIFFICULTY
DRESS: 0-->INDEPENDENT
COGNITION: 0 - NO COGNITION ISSUES REPORTED
TOILETING: 1-->ASSISTIVE EQUIPMENT
NUMBER_OF_TIMES_PATIENT_HAS_FALLEN_WITHIN_LAST_SIX_MONTHS: 1
RETIRED_EATING: 0-->INDEPENDENT
BATHING: 1-->ASSISTIVE EQUIPMENT
AMBULATION: 1-->ASSISTIVE EQUIPMENT
FALL_HISTORY_WITHIN_LAST_SIX_MONTHS: YES
TRANSFERRING: 1-->ASSISTIVE EQUIPMENT
SWALLOWING: 0-->SWALLOWS FOODS/LIQUIDS WITHOUT DIFFICULTY

## 2018-05-13 ASSESSMENT — ENCOUNTER SYMPTOMS
NAUSEA: 0
RHINORRHEA: 1
BLOOD IN STOOL: 0
FEVER: 0
DIARRHEA: 1
VOMITING: 0
APPETITE CHANGE: 1
ABDOMINAL PAIN: 0

## 2018-05-13 NOTE — IP AVS SNAPSHOT
[facility] [address] [facility]    [address] [place] [address]    Phone:  [phone]                                       After Visit Summary   5/13/2018    Santosh Robledo    MRN: [id number]           After Visit Summary Signature Page     I have received my discharge instructions, and my questions have been answered. I have discussed any challenges I see with this plan with the nurse or doctor.    ..........................................................................................................................................  Patient/Patient Representative Signature      ..........................................................................................................................................  Patient Representative Print Name and Relationship to Patient    ..................................................               ................................................  Date                                            Time    ..........................................................................................................................................  Reviewed by Signature/Title    ...................................................              ..............................................  Date                                                            Time

## 2018-05-13 NOTE — ED NOTES
"[facility]  ED Nurse Handoff Report    ED Chief complaint: Diarrhea (recent bout of colitis when they were in [place], having diarrhea again per wife and not eating or drinking. )      ED Diagnosis:   Final diagnoses:   Colitis       Code Status: Full Code    Allergies:   Allergies   Allergen Reactions     Penicillins Rash       Activity level - Baseline/Home:  Independent with cane    Activity Level - Current:   Stand with Assist     Needed?: No    Isolation: Yes  Infection: C-Diff Ordered (patient has not provided sample in ED)    Bariatric?: No    Vital Signs:   Vitals:    05/13/18 1654 05/13/18 1715 05/13/18 1730 05/13/18 1800   BP: 144/69  120/60 131/60   Resp: 16      Temp: 98.3  F (36.8  C)      TempSrc: Oral      SpO2: 94% 93% 93% 93%   Weight: 78 kg (172 lb)      Height: 1.905 m (6' 3\")          Cardiac Rhythm: ,        Pain level: 0-10 Pain Scale: 0    Is this patient confused?: No     Patient Report: Initial Complaint: diarrhea   Focused Assessment: Pt presents to ED with loose stools since Feb.  Reports was dx with colitis and treated but continues having stools without blood.  Spouse concerned for dehydration.  WBC elevated. CRP elevated.  1L nS bolus given.  CT obtained, no new changes.  C diff ordered - patient has not provided stool sample in ED.  On coumadin for a fib.  A/o x3.   Tests Performed: blood work/CT  Abnormal Results:  INR 1.77, , WBC 29.8  Treatments provided: 1L NS bolus    Family Comments: spouse @ bedside    OBS brochure/video discussed/provided to patient: N/A    ED Medications:   Medications   0.9% sodium chloride BOLUS (1,000 mLs Intravenous New Bag 5/13/18 1715)     Followed by   sodium chloride 0.9% infusion (not administered)   100mL saline flush (66 mLs Intravenous Given 5/13/18 1752)   iopamidol (ISOVUE-370) solution 86 mL (86 mLs Intravenous Given 5/13/18 1752)       Drips infusing?:  Yes    For the majority of the shift this patient was Green. "   Interventions performed were comfort measures.    Severe Sepsis OR Septic Shock Diagnosis Present: No      ED NURSE PHONE NUMBER: *[phone]

## 2018-05-13 NOTE — IP AVS SNAPSHOT
MRN:[id number]                      After Visit Summary   5/13/2018    Santosh Robledo    MRN: [id number]           Thank you!     Thank you for choosing [facility] for your care. Our goal is always to provide you with excellent care. Hearing back from our patients is one way we can continue to improve our services. Please take a few minutes to complete the written survey that you may receive in the mail after you visit with us. Thank you!        Patient Information     Date Of Birth          7/20/1929        Designated Caregiver       Most Recent Value    Caregiver    Will someone help with your care after discharge? yes    Name of designated caregiver Candida Robledo    Phone number of caregiver [phone]    Caregiver address [address] [place] [address] [address], [place] [address]      About your hospital stay     You were admitted on:  May 13, 2018 [facility]    You were discharged on:  May 15, 2018        Reason for your hospital stay       c diff colitis                  Who to Call     For medical emergencies, please call 911.  For non-urgent questions about your medical care, please call your primary care provider or clinic, [phone]          Attending Provider     Provider Specialty    Tru Chakraborty MD Emergency Medicine    Nistor, Jennie Arenas MD Internal Medicine       Primary Care Provider Office Phone # Fax #    Lilly Leonel Zepeda [phone]      After Care Instructions     Activity       Your activity upon discharge: activity as tolerated            Diet       Follow this diet upon discharge:   Ensure Clear at 10, Geltatein at 2; Between Meals.  Regular Diet Adult                  Follow-up Appointments     Follow-up and recommended labs and tests        Follow up with primary care provider, Lilly Zepeda, within 7 days.  Check CBC in clinic            Follow-up and recommended labs and tests         Appointment with   Marilynn Srivastava   ([place] [facility]) 5/29 at 2;55pm  Address : info will be sent to you                  Your next 10 appointments already scheduled     May 18, 2018  9:40 AM CDT   Office Visit with Lilly Zepeda MD   [facility] ([facility]    [address] [facility] [facility] [address] [phone]           Bring a current list of meds and any records pertaining to this visit. For Physicals, please bring immunization records and any forms needing to be filled out. Please arrive 10 minutes early to complete paperwork.            May 23, 2018 10:15 AM CDT   EP NEW with Francis Block MD   [facility] ([facility])    [address] [place] [id number] [phone] OPT 2            May 23, 2018 11:15 AM CDT   Pacemaker Check with NAYELY CHICAS   [facility] ([facility])    [address] [place] [id number] [phone] OPT 2            Jul 30, 2018 11:00 AM CDT   Office Visit with Lilly Zepeda MD   [facility] ([facility])    [address] [address] [phone]           Bring a current list of meds and any records pertaining to this visit. For Physicals, please bring immunization records and any forms needing to be filled out. Please arrive 10 minutes early to complete paperwork.              Warfarin Instruction     You have started taking a medicine called warfarin. This is a blood-thinning medicine (anticoagulant). It helps prevent and treat blood clots.      Before leaving the hospital, make sure you know how much to take and how long to take it.      You will need regular blood tests to make sure your blood is clotting safely. It is very important to see your doctor for regular blood tests.    Talk to your doctor before taking any new medicine (this includes over-the-counter drugs and herbal  "products). Many medicines can interact with warfarin. This may cause more bleeding or too much clotting.     Eating a lot of vitamin K--found in green, leafy vegetables--can change the way warfarin works in your body. Do NOT avoid these foods. Instead, try to eat the same amount each day.     Bleeding is the most common side-effect of warfarin. You may notice bleeding gums, a bloody nose, bruises and bleeding longer when you cut yourself. See a doctor at once if:   o You cough up blood  o You find blood in your stool (poop)  o You have a deep cut, or a cut that bleeds longer than 10 minutes   o You have a bad cut, hard fall, accident or hit your head (go to urgent care or the emergency room).    For women who can get pregnant: This medicine can harm an unborn baby. Be very careful not to get pregnant while taking this medicine. If you think you might be pregnant, call your doctor right away.    For more information, read \"Guide to Warfarin Therapy,  the booklet you received in the hospital.        Pending Results     No orders found from 5/11/2018 to 5/14/2018.            Statement of Approval     Ordered          05/15/18 1443  I have reviewed and agree with all the recommendations and orders detailed in this document.  EFFECTIVE NOW     Approved and electronically signed by:  Kasie Merchant MD             Admission Information     Date & Time Provider Department Dept. Phone    5/13/2018 Jennie Perry MD [place] [address] [facility] [phone]      Your Vitals Were     Blood Pressure Temperature Respirations Height Weight Pulse Oximetry    134/60 (BP Location: Left arm) 97.4  F (36.3  C) (Oral) 16 1.905 m (6' 3\") 81.7 kg (180 lb 1.9 oz) 97%    BMI (Body Mass Index)                   22.51 kg/m2           MyChart Information     [employer] lets you send messages to your doctor, view your test results, renew your prescriptions, schedule appointments and more. To sign up, go to " "[email or web address] . Click on \"Log in\" on the left side of the screen, which will take you to the Welcome page. Then click on \"Sign up Now\" on the right side of the page.     You will be asked to enter the access code listed below, as well as some personal information. Please follow the directions to create your username and password.     Your access code is: [email or web address]  Expires: 2018  2:26 PM     Your access code will  in 90 days. If you need help or a new code, please call your [facility] clinic or [phone].        Care EveryWhere ID     This is your Care EveryWhere ID. This could be used by other organizations to access your [facility] medical records  [id number]        Equal Access to Services     MAGALY CASTANEDA : Lila Mata, tank rucker, bertram coughlin, kingston garcia . So [facility] [phone].    ATENCIÓN: Si habla español, tiene a gonzalez disposición servicios gratuitos de asistencia lingüística. Serena al [phone].    We comply with applicable federal civil rights laws and [place] laws. We do not discriminate on the basis of race, color, national origin, age, disability, sex, sexual orientation, or gender identity.               Review of your medicines      START taking        Dose / Directions    order for DME   Notes to Patient:  Given at hospital        Equipment being ordered: Walker Wheels () and Walker () Treatment Diagnosis: Difficulty ambulating   Quantity:  1 each   Refills:  0       order for DME        Equipment being ordered: Walker Wheels () Treatment Diagnosis:  Weakness,colitis.   Quantity:  1 Device   Refills:  0       vancomycin 50 MG/ML Soln   Indication:  Clostridium difficile Bacteria        Dose:  125 mg   Take 2.5 mLs (125 mg) by mouth 4 times daily   Quantity:  120 mL   Refills:  0         CONTINUE these medicines which have NOT CHANGED        Dose / Directions    B-12 1000 MCG Caps   Used for:  " Vitamin B12 deficiency (non anemic)        Dose:  1 tablet   Take 1 tablet by mouth daily   Quantity:  90 capsule   Refills:  3       hydrochlorothiazide 25 MG tablet   Commonly known as:  HYDRODIURIL   Used for:  Benign essential hypertension        Dose:  25 mg   Take 1 tablet (25 mg) by mouth daily   Quantity:  90 tablet   Refills:  3       lisinopril 20 MG tablet   Commonly known as:  PRINIVIL/ZESTRIL   Used for:  Benign essential hypertension        Dose:  20 mg   Take 1 tablet (20 mg) by mouth 2 times daily   Quantity:  180 tablet   Refills:  3       omeprazole 20 MG CR capsule   Commonly known as:  priLOSEC   Indication:  Gastroesophageal Reflux Disease   Used for:  Gastroesophageal reflux disease, esophagitis presence not specified        Dose:  20 mg   Take 1 capsule (20 mg) by mouth daily   Quantity:  90 capsule   Refills:  3       Simethicone 125 MG Caps   Commonly known as:  GAS-X EXTRA STRENGTH   Used for:  Flatulence, eructation and gas pain        Dose:  1 capsule   Take 1 capsule by mouth 2 times daily as needed   Quantity:  60 capsule   Refills:  11       simvastatin 5 MG tablet   Commonly known as:  ZOCOR   Indication:  Cerebrovascular Accident or Stroke   Used for:  Hyperlipidemia LDL goal <100        Dose:  10 mg   Take 2 tablets (10 mg) by mouth daily   Quantity:  90 tablet   Refills:  3       * WARFARIN SODIUM PO        Dose:  5 mg   Take 5 mg by mouth daily On Tuesday, Thursday, Friday and Sunday   Refills:  0       * WARFARIN SODIUM PO        Dose:  2.5 mg   Take 2.5 mg by mouth daily On Monday, Wednesday, and Saturday   Refills:  0       ZOLOFT PO        Dose:  100 mg   Take 100 mg by mouth daily   Refills:  0       * Notice:  This list has 2 medication(s) that are the same as other medications prescribed for you. Read the directions carefully, and ask your doctor or other care provider to review them with you.         Where to get your medicines      These medications were sent to [facility] [place], [place] - [address]  [address], [place] [address]     Phone:  [phone]     vancomycin 50 MG/ML Soln         Some of these will need a paper prescription and others can be bought over the counter. Ask your nurse if you have questions.     Bring a paper prescription for each of these medications     order for DME    order for DME                Protect others around you: Learn how to safely use, store and throw away your medicines at www.disposemymeds.org.        ANTIBIOTIC INSTRUCTION     You've Been Prescribed an Antibiotic - Now What?  Your healthcare team thinks that you or your loved one might have an infection. Some infections can be treated with antibiotics, which are powerful, life-saving drugs. Like all medications, antibiotics have side effects and should only be used when necessary. There are some important things you should know about your antibiotic treatment.      Your healthcare team may run tests before you start taking an antibiotic.    Your team may take samples (e.g., from your blood, urine or other areas) to run tests to look for bacteria. These test can be important to determine if you need an antibiotic at all and, if you do, which antibiotic will work best.      Within a few days, your healthcare team might change or even stop your antibiotic.    Your team may start you on an antibiotic while they are working to find out what is making you sick.    Your team might change your antibiotic because test results show that a different antibiotic would be better to treat your infection.    In some cases, once your team has more information, they learn that you do not need an antibiotic at all. They may find out that you don't have an infection, or that the antibiotic you're taking won't work against your infection. For example, an infection caused by a virus can't be treated with antibiotics. Staying on an antibiotic when you don't need it is more likely  to be harmful than helpful.      You may experience side effects from your antibiotic.    Like all medications, antibiotics have side effects. Some of these can be serious.    Let you healthcare team know if you have any known allergies when you are admitted to the hospital.    One significant side effect of nearly all antibiotics is the risk of severe and sometimes deadly diarrhea caused by Clostridium difficile (C. Difficile). This occurs when a person takes antibiotics because some good germs are destroyed. Antibiotic use allows C. diificile to take over, putting patients at high risk for this serious infection.    As a patient or caregiver, it is important to understand your or your loved one's antibiotic treatment. It is especially important for caregivers to speak up when patients can't speak for themselves. Here are some important questions to ask your healthcare team.    What infection is this antibiotic treating and how do you know I have that infection?    What side effects might occur from this antibiotic?    How long will I need to take this antibiotic?    Is it safe to take this antibiotic with other medications or supplements (e.g., vitamins) that I am taking?     Are there any special directions I need to know about taking this antibiotic? For example, should I take it with food?    How will I be monitored to know whether my infection is responding to the antibiotic?    What tests may help to make sure the right antibiotic is prescribed for me?      Information provided by:  www.cdc.gov/getsmart  U.S. Department of Health and Human Services  Centers for disease Control and Prevention  National Center for Emerging and Zoonotic Infectious Diseases  Division of Healthcare Quality Promotion             Medication List: This is a list of all your medications and when to take them. Check marks below indicate your daily home schedule. Keep this list as a reference.      Medications           Morning  Afternoon Evening Bedtime As Needed    B-12 1000 MCG Caps   Take 1 tablet by mouth daily                                   hydrochlorothiazide 25 MG tablet   Commonly known as:  HYDRODIURIL   Take 1 tablet (25 mg) by mouth daily   Last time this was given:  25 mg on 5/15/2018  8:49 AM   Next Dose Due:  5/16/18                                   lisinopril 20 MG tablet   Commonly known as:  PRINIVIL/ZESTRIL   Take 1 tablet (20 mg) by mouth 2 times daily   Last time this was given:  20 mg on 5/15/2018  8:49 AM                                   omeprazole 20 MG CR capsule   Commonly known as:  priLOSEC   Take 1 capsule (20 mg) by mouth daily   Last time this was given:  20 mg on 5/14/2018  7:39 PM                                   order for DME   Equipment being ordered: Walker Wheels () and Walker () Treatment Diagnosis: Difficulty ambulating   Notes to Patient:  Given at hospital                                order for DME   Equipment being ordered: Walker Wheels () Treatment Diagnosis:  Weakness,colitis.                                Simethicone 125 MG Caps   Commonly known as:  GAS-X EXTRA STRENGTH   Take 1 capsule by mouth 2 times daily as needed                                   simvastatin 5 MG tablet   Commonly known as:  ZOCOR   Take 2 tablets (10 mg) by mouth daily   Last time this was given:  10 mg on 5/14/2018  9:41 PM                                   vancomycin 50 MG/ML Soln   Take 2.5 mLs (125 mg) by mouth 4 times daily   Last time this was given:  125 mg on 5/15/2018  1:22 PM                                      * WARFARIN SODIUM PO   Take 5 mg by mouth daily On Tuesday, Thursday, Friday and Sunday   Last time this was given:  2.5 mg on 5/14/2018  6:26 PM                                   * WARFARIN SODIUM PO   Take 2.5 mg by mouth daily On Monday, Wednesday, and Saturday   Last time this was given:  2.5 mg on 5/14/2018  6:26 PM                                   ZOLOFT PO   Take 100 mg  by mouth daily   Last time this was given:  100 mg on 5/15/2018  8:49 AM   Next Dose Due:  5/16/18                                   * Notice:  This list has 2 medication(s) that are the same as other medications prescribed for you. Read the directions carefully, and ask your doctor or other care provider to review them with you.

## 2018-05-13 NOTE — LETTER
Transition Communication Hand-off for Care Transitions to Next Level of Care Provider    Name: Santosh KENNY Hjelmstad  : 1929  MRN #: [id number]  Primary Care Provider: Lilly Zepeda     Primary Clinic: [address] [place] [address] [facility] [address]  [place] [address]     Reason for Hospitalization:  Colitis [K52.9]  Admit Date/Time: 2018  4:52 PM  Discharge Date: 5/15  Payor Source: Payor: BCBS / Plan: BCBS PLATINUM BLUE / Product Type: PPO /     Readmission Assessment Measure (DEBORAH) Risk Score/category: Average but escalating to High       Reason for Communication Hand-off Referral: Fragility Avoidable readmission    Discharge Plan: Home  Concern for non-adherence with plan of care:   Y/N No  Discharge Needs Assessment:  Needs       Most Recent Value    Equipment Currently Used at Home cane, straight      Follow-up plan:  Future Appointments  Date Time Provider Department Center   2018 9:40 AM [facility]   2018 10:15 AM Francis Negron MD [facility]   2018 11:15 AM NAYELY CHICAS [facility]   2018 11:00 AM Lilly Zepeda MD [facility]   [place] Recommendations:   Admitted with Cdiff Colitis,stools lessened,insistent on discharge today,appetite poor,wbc count still elevated. Patient was instructed to stay 1 more night but declined. Decline HC services stating they are not home bound,appointment with primary made for this week and GI in 2 weeks,was instructed on cdiff precautions. Wife capable of caring for him. Patient would benefit from a follow up call.     Zandra Hernandez    AVS/Discharge Summary is the source of truth; this is a helpful guide for improved communication of patient story

## 2018-05-13 NOTE — ED PROVIDER NOTES
"  History     Chief Complaint:  Diarrhea     HPI   Santosh Robledo is a 88 year old male with a history of atrial fibrillation with a pacemaker anticoagulated on Coumadin who presents accompanied by his wife for evaluation of diarrhea. Approximately a month ago the patient started to develop diarrhea, and since then he has had a reduced appetite with about 10 pounds of unintentional weight loss. He describes his bowel movements as watery and somewhat formed and \"ribbon-like\" in appearance. Over the past three days, the patient's appetite has been significantly reduced and he has not eaten much. Due to his worsening appetite and diarrhea, he came into the ED today for evaluation. He also complains of some rhinorrhea and post nasal drip, which he attributes to seasonal allergies. Otherwise, the patient has not had any fever, abdominal pain, nausea, vomiting, or bloody stools recently. His wife adds that he was diagnosed with colitis while in [place] in February. They report that he had a CT scan at that time and was treated with a course of antibiotics, which he only partially completed, but they do not think that any stool cultures were done.     Allergies:  Penicillins      Medications:    Cyanocobalamin (B-12) 1000 MCG CAPS  hydrochlorothiazide (HYDRODIURIL) 25 MG tablet  lisinopril (PRINIVIL/ZESTRIL) 20 MG tablet  omeprazole (PRILOSEC) 20 MG CR capsule  sertraline (ZOLOFT) 50 MG tablet  Simethicone (GAS-X EXTRA STRENGTH) 125 MG CAPS  simvastatin (ZOCOR) 5 MG tablet  Warfarin Sodium (COUMADIN PO)    Past Medical History:    Atrial fibrillation  Cardiac pacemaker   GERD   Hypertension  Hyperlipidemia     Past Surgical History:    Pacemaker placement     Family History:    Cancer, prostate - Father     Social History:  Tobacco use:    Never smoker  Alcohol use:    Positive  Marital status:       Accompanied to ED by:  Wife      Review of Systems   Constitutional: Positive for appetite change. Negative for " "fever.   HENT: Positive for postnasal drip and rhinorrhea.    Gastrointestinal: Positive for diarrhea. Negative for abdominal pain, blood in stool, nausea and vomiting.   All other systems reviewed and are negative.    Physical Exam   First Vitals:  BP: 144/69  Heart Rate: 104  Temp: 98.3  F (36.8  C)  Resp: 16  Height: 190.5 cm (6' 3\")  Weight: 78 kg (172 lb)  SpO2: 94 %      Physical Exam   Constitutional: He is oriented to person, place, and time. He appears well-developed.   HENT:   Head: Normocephalic and atraumatic.   Right Ear: External ear normal.   Mouth/Throat: Oropharynx is clear and moist.   Eyes: Conjunctivae and EOM are normal. Pupils are equal, round, and reactive to light.   Neck: Normal range of motion. Neck supple. No JVD present.   Cardiovascular: Normal rate, regular rhythm and normal heart sounds.    Pulmonary/Chest: Effort normal and breath sounds normal.   Abdominal: Soft. Bowel sounds are normal. He exhibits no distension. There is tenderness in the right lower quadrant and left lower quadrant. There is no rebound and no CVA tenderness. No hernia.   Musculoskeletal: Normal range of motion.   Lymphadenopathy:     He has no cervical adenopathy.   Neurological: He is alert and oriented to person, place, and time. He displays normal reflexes. No cranial nerve deficit. He exhibits normal muscle tone. Coordination normal.   Skin: Skin is warm and dry.   Psychiatric: He has a normal mood and affect. His behavior is normal. Judgment normal.   Nursing note and vitals reviewed.      Emergency Department Course     Imaging:  Radiographic findings were communicated with the patient and family who voiced understanding of the findings.    CT Abdomen Pelvis w Contrast:  IMPRESSION: Acute uncomplicated left colitis. Infectious or inflammatory etiologies are favored.    Preliminary report per radiology.     Laboratory:  CBC: WBC 29.8 high, HGB 12.8 low, o/w WNL ()  BMP: Glucose 124 high, o/w WNL " (Creatinine 0.99)  CRP inflammation: 135.0 high   Lactic acid whole blood: 1.1   INR: 1.77 high     Clostridium difficile toxin B PCR: Pending  Enteric bacteria and virus panel by [facility] stool: Pending     Interventions:  1715 NS 1,000 mL IV     Emergency Department Course:  Nursing notes and vitals reviewed.  1659: I performed an exam of the patient as documented above.     1813: I updated and reassessed the patient.     1834: I spoke with Dr. Perry of the hospitalist service regarding patient's presentation, findings, and plan of care.     Findings and plan explained to the Patient and spouse who consents to admission. Discussed the patient with Dr. Perry, who will admit the patient to a med bed for further monitoring, evaluation, and treatment.     Impression & Plan      Medical Decision Making:  Patient presents with ongoing diarrhea and abdominal pain.  Due to age extensive evaluation was undertaken.  CT does show colitis again.  Patient's lab tests are quite concerning for infectious colitis due to elevated white blood cell count of 29,000 and elevated CRP.  Due to patient's age will recommend admission for IV hydration and stool testing.  Patient is likely been on antibiotics and this could be C. difficile.  C. difficile precautions were taken.  Cared it was discussed with Dr. Huntley will admit to a medical bed.  Diagnosis:    ICD-10-CM   1. Colitis K52.9       Disposition:  Admitted to Dr. Perry.       I, Jack Connell, am serving as a scribe at 4:59 PM on 5/13/2018 to document services personally performed by Dr. Chakraborty, based on my observations and the provider's statements to me.     EMERGENCY DEPARTMENT       Tru Chakraborty MD  05/20/18 0959

## 2018-05-14 LAB
ALBUMIN SERPL-MCNC: 2.3 G/DL (ref 3.4–5)
ALP SERPL-CCNC: 64 U/L (ref 40–150)
ALT SERPL W P-5'-P-CCNC: 13 U/L (ref 0–70)
ANION GAP SERPL CALCULATED.3IONS-SCNC: 9 MMOL/L (ref 3–14)
AST SERPL W P-5'-P-CCNC: 10 U/L (ref 0–45)
BILIRUB DIRECT SERPL-MCNC: 0.2 MG/DL (ref 0–0.2)
BILIRUB SERPL-MCNC: 0.7 MG/DL (ref 0.2–1.3)
BUN SERPL-MCNC: 19 MG/DL (ref 7–30)
C COLI+JEJUNI+LARI FUSA STL QL NAA+PROBE: NOT DETECTED
CALCIUM SERPL-MCNC: 8.2 MG/DL (ref 8.5–10.1)
CHLORIDE SERPL-SCNC: 104 MMOL/L (ref 94–109)
CO2 SERPL-SCNC: 24 MMOL/L (ref 20–32)
CREAT SERPL-MCNC: 0.88 MG/DL (ref 0.66–1.25)
EC STX1 GENE STL QL NAA+PROBE: NOT DETECTED
EC STX2 GENE STL QL NAA+PROBE: NOT DETECTED
ENTERIC PATHOGEN COMMENT: NORMAL
ERYTHROCYTE [DISTWIDTH] IN BLOOD BY AUTOMATED COUNT: 14.9 % (ref 10–15)
GFR SERPL CREATININE-BSD FRML MDRD: 82 ML/MIN/1.7M2
GLUCOSE SERPL-MCNC: 109 MG/DL (ref 70–99)
HCT VFR BLD AUTO: 34.3 % (ref 40–53)
HGB BLD-MCNC: 11.3 G/DL (ref 13.3–17.7)
INR PPP: 2.06 (ref 0.86–1.14)
MCH RBC QN AUTO: 26.6 PG (ref 26.5–33)
MCHC RBC AUTO-ENTMCNC: 32.9 G/DL (ref 31.5–36.5)
MCV RBC AUTO: 81 FL (ref 78–100)
NOROV GI+II ORF1-ORF2 JNC STL QL NAA+PR: NOT DETECTED
PLATELET # BLD AUTO: 327 10E9/L (ref 150–450)
POTASSIUM SERPL-SCNC: 3 MMOL/L (ref 3.4–5.3)
POTASSIUM SERPL-SCNC: 3.5 MMOL/L (ref 3.4–5.3)
PROT SERPL-MCNC: 6.2 G/DL (ref 6.8–8.8)
RBC # BLD AUTO: 4.25 10E12/L (ref 4.4–5.9)
RVA NSP5 STL QL NAA+PROBE: NOT DETECTED
SALMONELLA SP RPOD STL QL NAA+PROBE: NOT DETECTED
SHIGELLA SP+EIEC IPAH STL QL NAA+PROBE: NOT DETECTED
SODIUM SERPL-SCNC: 137 MMOL/L (ref 133–144)
V CHOL+PARA RFBL+TRKH+TNAA STL QL NAA+PR: NOT DETECTED
WBC # BLD AUTO: 27 10E9/L (ref 4–11)
Y ENTERO RECN STL QL NAA+PROBE: NOT DETECTED

## 2018-05-14 PROCEDURE — 36415 COLL VENOUS BLD VENIPUNCTURE: CPT | Performed by: INTERNAL MEDICINE

## 2018-05-14 PROCEDURE — 85027 COMPLETE CBC AUTOMATED: CPT | Performed by: INTERNAL MEDICINE

## 2018-05-14 PROCEDURE — A9270 NON-COVERED ITEM OR SERVICE: HCPCS | Mod: GY | Performed by: INTERNAL MEDICINE

## 2018-05-14 PROCEDURE — 25000128 H RX IP 250 OP 636: Performed by: INTERNAL MEDICINE

## 2018-05-14 PROCEDURE — 25000128 H RX IP 250 OP 636: Performed by: PHYSICIAN ASSISTANT

## 2018-05-14 PROCEDURE — 80048 BASIC METABOLIC PNL TOTAL CA: CPT | Performed by: INTERNAL MEDICINE

## 2018-05-14 PROCEDURE — 85610 PROTHROMBIN TIME: CPT | Performed by: INTERNAL MEDICINE

## 2018-05-14 PROCEDURE — 12000000 ZZH R&B MED SURG/OB

## 2018-05-14 PROCEDURE — 99232 SBSQ HOSP IP/OBS MODERATE 35: CPT | Performed by: INTERNAL MEDICINE

## 2018-05-14 PROCEDURE — 80076 HEPATIC FUNCTION PANEL: CPT | Performed by: INTERNAL MEDICINE

## 2018-05-14 PROCEDURE — 25000132 ZZH RX MED GY IP 250 OP 250 PS 637: Mod: GY | Performed by: INTERNAL MEDICINE

## 2018-05-14 PROCEDURE — 84132 ASSAY OF SERUM POTASSIUM: CPT | Performed by: INTERNAL MEDICINE

## 2018-05-14 RX ORDER — HYDROCHLOROTHIAZIDE 25 MG/1
25 TABLET ORAL DAILY
Status: DISCONTINUED | OUTPATIENT
Start: 2018-05-14 | End: 2018-05-15 | Stop reason: HOSPADM

## 2018-05-14 RX ORDER — WARFARIN SODIUM 2.5 MG/1
2.5 TABLET ORAL
Status: COMPLETED | OUTPATIENT
Start: 2018-05-14 | End: 2018-05-14

## 2018-05-14 RX ADMIN — Medication 125 MG: at 08:30

## 2018-05-14 RX ADMIN — OMEPRAZOLE 20 MG: 20 CAPSULE, DELAYED RELEASE ORAL at 19:39

## 2018-05-14 RX ADMIN — WARFARIN SODIUM 2.5 MG: 2.5 TABLET ORAL at 18:26

## 2018-05-14 RX ADMIN — LISINOPRIL 20 MG: 20 TABLET ORAL at 08:24

## 2018-05-14 RX ADMIN — SIMETHICONE CHEW TAB 80 MG 80 MG: 80 TABLET ORAL at 02:35

## 2018-05-14 RX ADMIN — SERTRALINE HYDROCHLORIDE 100 MG: 100 TABLET ORAL at 08:24

## 2018-05-14 RX ADMIN — HYDROCHLOROTHIAZIDE 25 MG: 25 TABLET ORAL at 15:41

## 2018-05-14 RX ADMIN — SODIUM CHLORIDE: 9 INJECTION, SOLUTION INTRAVENOUS at 20:02

## 2018-05-14 RX ADMIN — SODIUM CHLORIDE: 9 INJECTION, SOLUTION INTRAVENOUS at 09:46

## 2018-05-14 RX ADMIN — Medication 125 MG: at 13:23

## 2018-05-14 RX ADMIN — POTASSIUM CHLORIDE 20 MEQ: 1500 TABLET, EXTENDED RELEASE ORAL at 15:41

## 2018-05-14 RX ADMIN — SIMETHICONE CHEW TAB 80 MG 80 MG: 80 TABLET ORAL at 19:57

## 2018-05-14 RX ADMIN — LISINOPRIL 20 MG: 20 TABLET ORAL at 20:00

## 2018-05-14 RX ADMIN — POTASSIUM CHLORIDE 40 MEQ: 1500 TABLET, EXTENDED RELEASE ORAL at 14:01

## 2018-05-14 RX ADMIN — Medication 125 MG: at 18:26

## 2018-05-14 RX ADMIN — SIMVASTATIN 10 MG: 10 TABLET, FILM COATED ORAL at 21:41

## 2018-05-14 NOTE — PLAN OF CARE
Problem: Patient Care Overview  Goal: Plan of Care/Patient Progress Review  Pt alert and oriented x 4. VSS on RA. LS clear bilaterally. Up with assist of 1, GB, and cane. Denies pain, nausea, sob. Enteric isolation to r/o c,diff. Stool sample sent; results pending. Diarrhea x 1 this shift. Tolerating clear liquid diet. Gastroenterology consult ordered. NS infusing at 75ml/hr. Nursing will continue to monitor.

## 2018-05-14 NOTE — PLAN OF CARE
Problem: Patient Care Overview  Goal: Plan of Care/Patient Progress Review  Outcome: No Change  A+Ox4 up with 1 assist GB + cane. Tolerating regular diet, poor appetite. K+ 3.0,  replaced x1 dose orally, 20meq due @ 1600. PO vanco. IVF increased to 150. VSS, on RA, denies pain, nursing will continue to monitor. C.Diff +, Enteric precautions maintained. GI following. Discharge pending. MD has yet to round.

## 2018-05-14 NOTE — H&P
Admitted:     05/13/2018      PRIMARY CARE PHYSICIAN:  Dr. Zepeda.      CHIEF COMPLAINT:  Diarrhea.      HISTORY OF PRESENT ILLNESS:  Had been obtained from the patient who is a relatively good historian, and I also discussed with his wife who was present at the time of my examination and with the ER attending.      Mr. Santosh Robledo is a very pleasant 88-year-old gentleman with past medical history of hypertension, dyslipidemia, atrial fibrillation, status post pacemaker, on chronic anticoagulation with warfarin, history of gastroesophageal reflux disease, small-bowel obstruction who came in for evaluation of ongoing diarrhea.      He states he started having diarrhea back in 02/2018.  At that time he was in [place] in [place].  Apparently he did use some antibiotics for some sinus problems before he started having diarrhea.  Apparently he did not even finish the antibiotics course.  Anyway, he started having this diarrhea in February.  He thinks that he was having 4-5 bowel movements daily.  He went to [facility].  In the ER apparently he did have a CAT scan of the abdomen.  We do not have records of that ER visit.  The wife states that he was put on some antibiotics.  She does not remember exactly the name, but Cipro might sound familiar.  He did finish the antibiotic course at that time with apparently some improvement of diarrhea, but did not resolve completely.  He returned back to [place].  He states that starting in April, he started having poor appetite and he actually lost 10 pounds in the last month and a half.  He started having worsening diarrhea in the last week, now having 3-4 watery bowel movements daily.  He denies seeing any blood in his stool.  He also reports a feeling that his abdomen is distended.  He passes a lot of gas and he is burping a lot.  He reports that for the last few days he felt slightly nauseated and he even had an episode of emesis yesterday.  He denies any  fevers, although he does report having chills sometimes.  Otherwise, he denies any headache, no chest pain or shortness of breath.  No real abdominal pain.  He denies leg swelling to me, although I do notice some 1+ pitting edema bilateral lower extremities, more obvious on right lower extremity though.      In ER, the patient was seen by Dr. Chakraborty.  His initial vitals showed a blood pressure of 144/69.  He was mildly tachycardic initially, heart rate 104, temperature 98.3, respiratory rate 16, oxygen saturation 94% on room air.  He did have basic blood work done in the ER which showed unremarkable BMP.  His CRP was elevated at 135.  Lactic acid 1.1.  Glucose was 124.  CBC with elevated white blood cells of 29.8, hemoglobin was 12.8, hematocrit 38.1, normal platelet count.  INR was 1.77.  Stool sample for C. diff and stool culture were sent to the lab.  CT of the abdomen and pelvis was done in the ER, which showed evidence of acute uncomplicated left colitis, infectious or inflammatory etiologies are favored.      In the ER, the patient received a bolus of normal saline and Hospitalist Service was called regarding the admission.      PAST MEDICAL HISTORY:   1. Hypertension.   2. Dyslipidemia.   3. Atrial fibrillation on chronic anticoagulation with warfarin.   4. History of small-bowel obstruction.   5. Gastroesophageal reflux disease.      PAST SURGICAL HISTORY:   1. Status post pacemaker placement in 08/2015.   2. Cholecystectomy.   3. Prostate surgery.      SOCIAL HISTORY:  The patient never smoked.  He denies alcohol drinking and he denies illicit drug abuse.      FAMILY HISTORY:  Reviewed with the patient and is noncontributory to the current admission.      PRIOR TO ADMISSION MEDICATIONS:   1. Hydrochlorothiazide 25 mg p.o. daily.   2. Vitamin B12 1 tablet p.o. daily.   3. Lisinopril 20 mg p.o. twice daily.   4. Omeprazole 20 mg p.o. daily.   5. Sertraline 100 mg p.o. daily.   6. Zocor 10 mg p.o. daily.   7.  Warfarin.   8. Simethicone 1 capsule by mouth twice daily p.r.n.      ALLERGIES:  HE IS ALLERGIC TO PENICILLIN.      REVIEW OF SYSTEMS:  A 10-point review of systems was conducted and it was negative except for pertinent positives mentioned in history of present illness.      PHYSICAL EXAMINATION:   VITAL SIGNS:  Blood pressure 127/56, heart rate 93, respiratory rate 16, oxygen saturation 93% on room air, temperature 99 degrees Fahrenheit.   GENERAL:  The patient is awake, alert, no acute distress at the time of my examination.   HEENT:  Head is normocephalic, atraumatic.  Pupils are equally round and reactive to light.  Oral mucosa is mildly dry.   NECK:  Supple.  No cervical lymphadenopathy, no thyromegaly.   CHEST:  There is bilateral air entry, no wheezing, no rales, no crackles.   CARDIOVASCULAR:  There is normal S1, S2, regular rate and rhythm, no murmurs, no rubs.   ABDOMEN:  Soft, mildly distended, nontender to palpation, no rebound, no guarding.  Bowel sounds are present.   EXTREMITIES:  There is 1+ pitting edema bilateral lower extremities, more obvious on right lower extremity, no calf tenderness, 2+ peripheral pulses are palpable.   SKIN:  Intact, no rash, no cyanosis.   NEUROLOGIC:  The patient is awake, alert, oriented to self, place and time.  There are no focal neurological deficits.   PSYCHIATRIC:  Normal mood, normal affect.      LABORATORY DATA:  BMP with a sodium 135, potassium 3.4, chloride 102, bicarb 25, BUN 22, creatinine 0.99, calcium is 8.5, anion gap of 8.  Lactic acid 1.1.  .  Glucose 124.  White blood cells 21.8, hemoglobin 12.8, hematocrit 38.1, platelet count 375.  INR 1.77.      IMAGING:  CT of the abdomen and pelvis showed evidence of acute uncomplicated left colitis, infectious or inflammatory etiologies are in differential.      ASSESSMENT:  Mr. Santosh Robledo is a very pleasant 88-year-old gentleman with past medical history of hypertension, dyslipidemia, atrial  fibrillation on chronic anticoagulation with warfarin, gastroesophageal reflux disease and history of small-bowel obstruction who came in for evaluation of ongoing diarrhea.      PLAN:   1.  Persistent diarrhea associated with more recent poor appetite and weight loss of 10 pounds in the last 1 month and a half.  He states that he started having diarrhea back in 02/2018.  Apparently he did use some antibiotics before that for some sinus problems.  He was seen in [facility] sometime in February, apparently had a CAT scan of the abdomen.  He was given some antibiotics to take by mouth.  He was not admitted to the hospital.  One antibiotic might by Flagyl, the other one might be Cipro.  Apparently diarrhea improved for a while, although not completely resolved, but started again for the last week or so.  He did not think he had fever at home, although he did report having chills.  His CT of the abdomen and pelvis showed a left-sided colitis.  He has elevated inflammatory markers with white blood cells of 29.8 and a CRP of 135.  His vitals are otherwise stable.  Lactic acid is 1.1.  Wondering if this might represent a C. diff colitis given the fact that he was on antibiotics prior to onset of diarrhea.  He was also apparently on Cipro and Flagyl only with temporary relief of his diarrhea.  Plan for now is to start him empirically on oral vancomycin 125 mg p.o. 4 times daily.  Stool sample for C. diff and stool cultures are pending at this time.  We will let him have clear liquids for tonight.  We will mildly hydrate him with normal saline at 75 mL per hour, and a GI consult was requested.   2.  History of hypertension.  His blood pressure seems to be reasonably controlled.  Will continue his prior to admission lisinopril 10 mg p.o. twice daily.  His prior to admission hydrochlorothiazide is on hold given his ongoing diarrhea in order to avoid dehydration.  Hydralazine IV p.r.n. had been ordered for elevated  blood pressures.   3.  History of dyslipidemia:  We will continue his prior to admission Zocor for now.   4.  History of atrial fibrillation, status post pacemaker placement, on anticoagulation with warfarin.  His INR is slightly subtherapeutic at 1.77.  Will order warfarin as per Pharmacy dosing.   5.  History of gastroesophageal reflux disease.  We will continue his prior to admission omeprazole.   6.  DVT prophylaxis:  The patient is on warfarin.      CODE STATUS:  Discussed with the patient and his wife.  The patient is DNR/DNI.      DISPOSITION:  Admit inpatient.  Anticipate at least 2 days of hospitalization.         SKY BENAVIDES MD             D: 2018   T: 2018   MT: CHARLY      Name:     TRUE MATTHEWS   MRN:      [id number]        Account:      [id number]   :      1929        Admitted:     2018                   Document: [id number]

## 2018-05-14 NOTE — PHARMACY-ANTICOAGULATION SERVICE
Clinical Pharmacy - Warfarin Dosing Consult     Pharmacy has been consulted to manage this patient s warfarin therapy.  Indication: Atrial Fibrillation  Therapy Goal: INR 2-3  Warfarin Prior to Admission: Yes  Warfarin PTA Regimen: 2.5mg MW Sat,  5mg ROW  Dose Comments: Sub therapeutic     INR   Date Value Ref Range Status   05/13/2018 1.77 (H) 0.86 - 1.14 Final     INR Protime   Date Value Ref Range Status   05/07/2018 3.1 (A) 0.86 - 1.14 Final       Recommend warfarin 5 mg today.  Pharmacy will monitor Santosh Robledo daily and order warfarin doses to achieve specified goal.      Please contact pharmacy as soon as possible if the warfarin needs to be held for a procedure or if the warfarin goals change.

## 2018-05-14 NOTE — PROVIDER NOTIFICATION
MD Notification    Notified Person: MD     Notified Person Name: Dr. Saba     Notification Date/Time: 5/14/18 at 0013     Notification Interaction: spoke with MD     Purpose of Notification: positive C. Diff      Orders Received:      Comments:

## 2018-05-14 NOTE — PLAN OF CARE
Admission    Patient arrives to room 608 via cart from ED.  Care plan note: Pt alert and oriented x 4. VSS on RA. LS clear bilaterally. Up with assist of 1, GB, and cane. Denies pain, nausea, sob. Enteric isolation to r/o c,diff. Stool sample sent; results pending. Diarrhea x 1 this shift. Tolerating clear liquid diet. Gastroenterology consult ordered. NS infusing at 75ml/hr. Nursing will continue to monitor.    Inpatient nursing criteria listed below were met:    PCD's Documented: Yes  Skin issues/needs documented :Yes  Isolation education started/completed Yes  Fall Prevention: Care plan updated, Education given and documented Yes  Care Plan initiated: Yes  Home medications documented in belongings flowsheet: NA  Patient belongings documented in belongings flowsheet: Yes  Reminder note (belongings/ medications) placed in discharge instructions:Yes  Admission profile/ required documentation complete: Yes

## 2018-05-14 NOTE — PROGRESS NOTES
"[facility]  [facility]  Gastroenterology Progress note    Interval History:      Patient seen.  Feels slightly better today.  I discussed that he has C. Difficile colitis and that is causing his symptoms.        Vital Signs:      /61 (BP Location: Left arm)  Temp 97.8  F (36.6  C) (Oral)  Resp 16  Ht 1.905 m (6' 3\")  Wt 78 kg (172 lb)  SpO2 94%  BMI 21.5 kg/m2  Temp (24hrs), Av.2  F (36.8  C), Min:97.7  F (36.5  C), Max:99  F (37.2  C)    Patient Vitals for the past 72 hrs:   Weight   18 1654 78 kg (172 lb)       Intake/Output Summary (Last 24 hours) at 18 1156  Last data filed at 18 1140   Gross per 24 hour   Intake            777.5 ml   Output              400 ml   Net            377.5 ml         Constitutional: NAD, comfortable      Additional Comments:  ROS, FH, SH: See initial GI consult for details.    Laboratory Data:  Recent Labs   Lab Test  18   0830  18   1709 18   WBC  27.0*  29.8*   --    HGB  11.3*  12.8*   --    MCV  81  81   --    PLT  327  375   --    INR  2.06*  1.77*  3.1*     Recent Labs   Lab Test  18   0830  18   1709   NA  137  135   POTASSIUM  3.0*  3.4   CHLORIDE  104  102   CO2  24  25   BUN  19  22   CR  0.88  0.99   ANIONGAP  9  8   ANGELITA  8.2*  8.5     Recent Labs   Lab Test  18   0830   ALBUMIN  2.3*   BILITOTAL  0.7   DBIL  0.2   ALT  13   AST  10   ALKPHOS  64         Assessment:    1. Diarrhea  2.  C. Difficile colitis  Plan:    -Continue Vancomycin 125mg QID x 14 days  -Outpatient follow up at our [facility] in 14 days to see if patient improving.    -Regular diet    Call if questions.            JOSE Joshua  [facility]  Office:  [phone] call if needed after 5PM  Cell:  [phone], not available after 5PM at this number    "

## 2018-05-14 NOTE — CONSULTS
"CLINICAL NUTRITION SERVICES  -  ASSESSMENT NOTE      Recommendations Ordered by Registered Dietitian (RD):   Will send Boost Breeze and Gelatein supplements between meals     Malnutrition:   % Weight Loss:  Up to 5% in 1 month (non-severe malnutrition)  % Intake:  </= 50% for >/= 1 month (severe malnutrition)  Subcutaneous Fat Loss:  None observed  Muscle Loss:  None observed  Fluid Retention:  Moderate - unsure if nutrition-related    Malnutrition Diagnosis: Non-Severe malnutrition  In Context of:  Acute illness or injury        REASON FOR ASSESSMENT  Santosh Robledo is a 88 year old male seen by Registered Dietitian for Admission Nutrition Risk Screen - Unintentional weight loss of 10# or more in past 2 months    NUTRITION HISTORY  - Information obtained from the EMR and pt's wife - pt is a poor historian.  Pt has had diarrhea on and off since February. His intake has been poor for the past 1.5 months, see wt loss below.  He's eaten ice cream, dry cereal, \"a little dinner\". He also took supplements such as Lancing and Boost but only once/day.  Pt had nausea for a few days PTA      CURRENT NUTRITION ORDERS  Diet Order:     Clear Liquid     Current Intake/Tolerance:  Pt has not ordered anything today, had a few bites of jello. I instructed his wife on how to order meals and we also discussed supplements.      PHYSICAL FINDINGS  Observed  No nutrition-related physical findings observed  Obtained from Chart/Interdisciplinary Team  Edema - per H&P, 1+ pitting edema BLE    ANTHROPOMETRICS  Height: 6' 3\"  Weight: 172 lbs 0 oz (78 kg)  Body mass index is 21.5 kg/(m^2).  Weight Status:  Normal BMI  IBW: 89.1 kg +/- 10%  % IBW: 87%  Weight History: Wife reports 10# ((5%) wt loss in the past 1.5 months, says his usual wt is>182#.  However, the records below do not support wt loss.   Wt Readings from Last 10 Encounters:   05/13/18 78 kg (172 lb)   05/07/18 (P) 79.8 kg (176 lb)   04/30/18 77.6 kg (171 lb) "         LABS  Labs reviewed    MEDICATIONS  Medications reviewed      ASSESSED NUTRITION NEEDS PER APPROVED PRACTICE GUIDELINES:  Dosing Weight 78 kg - admit wt  Estimated Energy Needs: 9317-3102 kcals (25-30 Kcal/Kg)  Justification: maintenance  Estimated Protein Needs:  grams protein (1.2-1.5 g pro/Kg)  Justification: Repletion    MALNUTRITION:  % Weight Loss:  Up to 5% in 1 month (non-severe malnutrition)  % Intake:  </= 50% for >/= 1 month (severe malnutrition)  Subcutaneous Fat Loss:  None observed  Muscle Loss:  None observed  Fluid Retention:  Moderate - unsure if nutrition-related    Malnutrition Diagnosis: Non-Severe malnutrition  In Context of:  Acute illness or injury    NUTRITION DIAGNOSIS:  Inadequate oral intake related to altered GI function as evidenced by diet hx and 5% wt loss x 1.5 months      NUTRITION INTERVENTIONS  Recommendations / Nutrition Prescription  ADAT per MD.      Implementation  Nutrition education: Provided education on nutritional supplements and menu ordering  Medical Food Supplement - will send Boost Breeze and Gelatein supplements between meals      Nutrition Goals  Pt will advance to regular diet in 2-3 days.      MONITORING AND EVALUATION:  Progress towards goals will be monitored and evaluated per protocol and Practice Guidelines    Alvina Mason RD  Pager [phone] (M-F)            [phone] (W/[facility] Hol)

## 2018-05-14 NOTE — PROGRESS NOTES
RECEIVING UNIT ED HANDOFF REVIEW    ED Nurse Handoff Report was reviewed by: Chika Short on May 13, 2018 at 7:22 PM

## 2018-05-14 NOTE — PLAN OF CARE
Problem: Patient Care Overview  Goal: Plan of Care/Patient Progress Review  Outcome: No Change  AAOx4. VSS. Up w/ 1-assist w/ cane. Pt c/o bloating w/ relief with simethicone. Enteric precautions maintained for + C diff. PO vanco ordered. NSS 75 mL/hr. GI consult ordered. Continue to monitor.

## 2018-05-14 NOTE — PHARMACY-ADMISSION MEDICATION HISTORY
Admission medication history interview status for the 5/13/2018  admission is complete. See EPIC admission navigator for prior to admission medications     Medication history source reliability:Good    Actions taken by pharmacist (provider contacted, etc):None     Additional medication history information not noted on PTA med list :None    Medication reconciliation/reorder completed by provider prior to medication history? No    Time spent in this activity: 20 minutes    He was prescribed for sertraline 50 mg QD, but he takes 100 mg QD.  He was prescribed simethicone on 5/7/18, has not started yet.  INR was checked one week ago.     Prior to Admission medications    Medication Sig Last Dose Taking? Auth Provider   Cyanocobalamin (B-12) 1000 MCG CAPS Take 1 tablet by mouth daily 5/13/2018 at am Yes Lilly Zepeda MD   hydrochlorothiazide (HYDRODIURIL) 25 MG tablet Take 1 tablet (25 mg) by mouth daily 5/12/2018 at pm Yes Lilly Zepeda MD   lisinopril (PRINIVIL/ZESTRIL) 20 MG tablet Take 1 tablet (20 mg) by mouth 2 times daily 5/13/2018 at am Yes Lilly Zepeda MD   omeprazole (PRILOSEC) 20 MG CR capsule Take 1 capsule (20 mg) by mouth daily 5/12/2018 at pm Yes Lilly Zepeda MD   Sertraline HCl (ZOLOFT PO) Take 100 mg by mouth daily 5/13/2018 at am Yes Unknown, Entered By History   simvastatin (ZOCOR) 5 MG tablet Take 2 tablets (10 mg) by mouth daily 5/12/2018 at pm Yes Lilly Zepeda MD   WARFARIN SODIUM PO Take 5 mg by mouth daily On Tuesday, Thursday, Friday and Sunday 5/11/2018 at pm Yes Unknown, Entered By History   WARFARIN SODIUM PO Take 2.5 mg by mouth daily On Monday, Wednesday, and Saturday 5/12/2018 at pm Yes Unknown, Entered By History   Simethicone (GAS-X EXTRA STRENGTH) 125 MG CAPS Take 1 capsule by mouth 2 times daily as needed   Lilly Zepeda MD

## 2018-05-14 NOTE — PROGRESS NOTES
[facility]    Hospitalist Progress Note    Date of Service (when I saw the patient): 05/14/2018    Assessment & Plan   Santosh Robledo is a 88 year old male who was admitted on 5/13/2018.  87 yo gentleman w/ h/o HTN, HLD, PAF, PM insitu and GERD.  He was exposed to abx in 2/18 after which he developed intermittent diarrhea.  He presented to [facility] ED on 5/13 w/ a week h/o diarrhea.  CT showed left sided colitis.  Stool c diff toxin +.      1)   C diff colitis:   H/o abx exposure twice over the last 3 months.  He presented w/ a one wk h/o diarrhea.  Stool c diff toxin +.  CT abd/pel showed left sided colitis.  Continue vanco 125 mg po qid x 14 days.      2)   HTN:   Controlled.  C/w  PTA lisinopril and HCTZ.  contine prn hydralazine.    3)   PAF:   Has PM in situ.  In NSR during this eval.  Continue coumadin for stroke prophylaxis.  INR  2.06.    4)   Leukocytosis:   Due to c diff colitis.  Trending down.    DVT Prophylaxis:  Coumadin.  Code Status:  DNR/DNI    Disposition: Expected discharge in am if diarrhea improves.      Kasie Merchant MD.   Hospitalist.  [phone], pager.      Interval History   Diarrhea better but abd feels distended.  Denied N/V.  Denied abd pain.    -Data reviewed today: I reviewed all new labs and imaging results over the last 24 hours.     Physical Exam   Temp: 97.8  F (36.6  C) Temp src: Oral BP: 135/61   Heart Rate: 78 Resp: 16 SpO2: 94 % O2 Device: None (Room air)    Vitals:    05/13/18 1654   Weight: 78 kg (172 lb)     Vital Signs with Ranges  Temp:  [97.7  F (36.5  C)-99  F (37.2  C)] 97.8  F (36.6  C)  Heart Rate:  [] 78  Resp:  [16] 16  BP: (117-144)/(53-88) 135/61  SpO2:  [92 %-95 %] 94 %  I/O last 3 completed shifts:  In: -   Out: 200 [Urine:200]    General: aao x 3, NAD.  HEENT:  NC/AT, PERRL, EOMI, neck supple, no thyromegaly, op clear, mmm.  CVS:  NL s 1 and s2, no m/r/g.  Lungs:  CTA B/L.   Abd:  Soft, distended, + bs, NT, no rebound or gaurding, no  fluid shift.  Ext:  Chronic venous stasis dermatitis skin change noted.  Lymph:  No edema.  Neuro:  Nonfocal.  Musculoskeletal: No calf tenderness to palpation.    Skin:  No rash.  Psychiatry:  Mood and affect appropriate.    Medications     - MEDICATION INSTRUCTIONS -       sodium chloride 150 mL/hr at 05/14/18 1318     Warfarin Therapy Reminder         lisinopril  20 mg Oral BID     omeprazole  20 mg Oral QPM     sertraline (ZOLOFT) tablet 100 mg  100 mg Oral Daily     simvastatin  10 mg Oral At Bedtime     sodium chloride (PF)  3 mL Intracatheter Q8H     vancomycin  125 mg Oral 4x Daily     warfarin  2.5 mg Oral ONCE at 18:00       Data     Recent Labs  Lab 05/14/18  0830 05/13/18  1709   WBC 27.0* 29.8*   HGB 11.3* 12.8*   MCV 81 81    375   INR 2.06* 1.77*    135   POTASSIUM 3.0* 3.4   CHLORIDE 104 102   CO2 24 25   BUN 19 22   CR 0.88 0.99   ANIONGAP 9 8   ANGELITA 8.2* 8.5   * 124*   ALBUMIN 2.3*  --    PROTTOTAL 6.2*  --    BILITOTAL 0.7  --    ALKPHOS 64  --    ALT 13  --    AST 10  --        Recent Results (from the past 24 hour(s))   CT Abdomen Pelvis w Contrast    Narrative    CT ABDOMEN AND PELVIS WITH CONTRAST 5/13/2018 5:59 PM     HISTORY: Weight loss, abdominal pain, diarrhea, history of  questionable colitis, WBC 29.8, .     COMPARISON: None.    TECHNIQUE: Volumetric helical acquisition of CT images from the lung  bases through the symphysis pubis after the administration of 86mL  Isouve-370  intravenous contrast. Radiation dose for this scan was  reduced using automated exposure control, adjustment of the mA and/or  kV according to patient size, or iterative reconstruction technique.    FINDINGS: Moderate to severe inflammatory changes of the colon  extending from the distal transverse colon to the rectum. This would  be an atypical distribution of ischemic colitis, infectious or  inflammatory etiologies are favored. Low-attenuation subcentimeter  liver lesion(s) compatible  with benign cysts or other benign lesions.  No specific evaluation or follow-up is recommended in a low risk  patient. Nonobstructing stone measuring 5 mm seen in the upper left  kidney. No other renal, ureteral, or bladder stones. No  hydronephrosis. Adrenal glands, spleen, pancreas unremarkable. There  are no dilated loops of small intestine or large bowel to suggest  ileus or obstruction. No free fluid. No free air. There are no lymph  nodes that are abnormal by size criteria. Mild prostatomegaly.  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 Minimal scattered peripheral atelectasis and/or fibrosis at  the lung bases.      Impression    IMPRESSION: Acute uncomplicated left colitis. Infectious or  inflammatory etiologies are favored.      DARRICK VASQUEZ MD

## 2018-05-15 ENCOUNTER — APPOINTMENT (OUTPATIENT)
Dept: PHYSICAL THERAPY | Facility: CLINIC | Age: 83
DRG: 373 | End: 2018-05-15
Attending: INTERNAL MEDICINE
Payer: MEDICARE

## 2018-05-15 VITALS
RESPIRATION RATE: 16 BRPM | HEIGHT: 75 IN | OXYGEN SATURATION: 97 % | SYSTOLIC BLOOD PRESSURE: 134 MMHG | BODY MASS INDEX: 22.4 KG/M2 | DIASTOLIC BLOOD PRESSURE: 60 MMHG | WEIGHT: 180.12 LBS | TEMPERATURE: 97.4 F

## 2018-05-15 LAB
ANION GAP SERPL CALCULATED.3IONS-SCNC: 8 MMOL/L (ref 3–14)
BUN SERPL-MCNC: 18 MG/DL (ref 7–30)
CALCIUM SERPL-MCNC: 8.2 MG/DL (ref 8.5–10.1)
CHLORIDE SERPL-SCNC: 106 MMOL/L (ref 94–109)
CO2 SERPL-SCNC: 24 MMOL/L (ref 20–32)
CREAT SERPL-MCNC: 0.82 MG/DL (ref 0.66–1.25)
ERYTHROCYTE [DISTWIDTH] IN BLOOD BY AUTOMATED COUNT: 15 % (ref 10–15)
GFR SERPL CREATININE-BSD FRML MDRD: 88 ML/MIN/1.7M2
GLUCOSE SERPL-MCNC: 112 MG/DL (ref 70–99)
HCT VFR BLD AUTO: 35.6 % (ref 40–53)
HGB BLD-MCNC: 11.9 G/DL (ref 13.3–17.7)
INR PPP: 2.72 (ref 0.86–1.14)
MAGNESIUM SERPL-MCNC: 1.8 MG/DL (ref 1.6–2.3)
MCH RBC QN AUTO: 27.2 PG (ref 26.5–33)
MCHC RBC AUTO-ENTMCNC: 33.4 G/DL (ref 31.5–36.5)
MCV RBC AUTO: 81 FL (ref 78–100)
PLATELET # BLD AUTO: 361 10E9/L (ref 150–450)
POTASSIUM SERPL-SCNC: 3.2 MMOL/L (ref 3.4–5.3)
POTASSIUM SERPL-SCNC: 4.1 MMOL/L (ref 3.4–5.3)
RBC # BLD AUTO: 4.38 10E12/L (ref 4.4–5.9)
SODIUM SERPL-SCNC: 138 MMOL/L (ref 133–144)
WBC # BLD AUTO: 26.5 10E9/L (ref 4–11)

## 2018-05-15 PROCEDURE — A9270 NON-COVERED ITEM OR SERVICE: HCPCS | Mod: GY | Performed by: INTERNAL MEDICINE

## 2018-05-15 PROCEDURE — 40000193 ZZH STATISTIC PT WARD VISIT

## 2018-05-15 PROCEDURE — 80048 BASIC METABOLIC PNL TOTAL CA: CPT | Performed by: INTERNAL MEDICINE

## 2018-05-15 PROCEDURE — 25000132 ZZH RX MED GY IP 250 OP 250 PS 637: Mod: GY | Performed by: INTERNAL MEDICINE

## 2018-05-15 PROCEDURE — 36415 COLL VENOUS BLD VENIPUNCTURE: CPT | Performed by: INTERNAL MEDICINE

## 2018-05-15 PROCEDURE — 83735 ASSAY OF MAGNESIUM: CPT | Performed by: INTERNAL MEDICINE

## 2018-05-15 PROCEDURE — 25000128 H RX IP 250 OP 636: Performed by: PHYSICIAN ASSISTANT

## 2018-05-15 PROCEDURE — 99239 HOSP IP/OBS DSCHRG MGMT >30: CPT | Performed by: INTERNAL MEDICINE

## 2018-05-15 PROCEDURE — 85027 COMPLETE CBC AUTOMATED: CPT | Performed by: INTERNAL MEDICINE

## 2018-05-15 PROCEDURE — 84132 ASSAY OF SERUM POTASSIUM: CPT | Performed by: INTERNAL MEDICINE

## 2018-05-15 PROCEDURE — 85610 PROTHROMBIN TIME: CPT | Performed by: INTERNAL MEDICINE

## 2018-05-15 PROCEDURE — 97161 PT EVAL LOW COMPLEX 20 MIN: CPT | Mod: GP

## 2018-05-15 RX ADMIN — SODIUM CHLORIDE: 9 INJECTION, SOLUTION INTRAVENOUS at 02:47

## 2018-05-15 RX ADMIN — LISINOPRIL 20 MG: 20 TABLET ORAL at 08:49

## 2018-05-15 RX ADMIN — Medication 125 MG: at 02:50

## 2018-05-15 RX ADMIN — HYDROCHLOROTHIAZIDE 25 MG: 25 TABLET ORAL at 08:49

## 2018-05-15 RX ADMIN — POTASSIUM CHLORIDE 20 MEQ: 1500 TABLET, EXTENDED RELEASE ORAL at 11:52

## 2018-05-15 RX ADMIN — SODIUM CHLORIDE: 9 INJECTION, SOLUTION INTRAVENOUS at 09:35

## 2018-05-15 RX ADMIN — POTASSIUM CHLORIDE 20 MEQ: 1500 TABLET, EXTENDED RELEASE ORAL at 16:28

## 2018-05-15 RX ADMIN — POTASSIUM CHLORIDE 40 MEQ: 1500 TABLET, EXTENDED RELEASE ORAL at 09:41

## 2018-05-15 RX ADMIN — Medication 125 MG: at 13:22

## 2018-05-15 RX ADMIN — SERTRALINE HYDROCHLORIDE 100 MG: 100 TABLET ORAL at 08:49

## 2018-05-15 RX ADMIN — Medication 125 MG: at 08:49

## 2018-05-15 NOTE — PROGRESS NOTES
05/15/18 1424   Quick Adds   Type of Visit Initial PT Evaluation   Living Environment   Lives With spouse   Living Arrangements apartment;independent living facility   Home Accessibility no concerns   Number of Stairs to Enter Home 0   Number of Stairs Within Home 0   Stair Railings at Home none   Self-Care   Usual Activity Tolerance moderate   Current Activity Tolerance fair   Regular Exercise no   Equipment Currently Used at Home cane, straight   Functional Level Prior   Ambulation 1-->assistive equipment   Transferring 1-->assistive equipment   Fall history within last six months yes   Number of times patient has fallen within last six months 1   Which of the above functional risks had a recent onset or change? ambulation;transferring   General Information   Onset of Illness/Injury or Date of Surgery - Date 05/14/18   Referring Physician Kasie Merchant MD   Patient/Family Goals Statement return home   Pertinent History of Current Problem (include personal factors and/or comorbidities that impact the POC) Admitted with diarrhea, cdiff colitis. PMH: HTN, afib, pacemaker, SBO.   Precautions/Limitations fall precautions   Weight-Bearing Status - LLE full weight-bearing   Weight-Bearing Status - RLE full weight-bearing   General Observations Spouse present   Cognitive Status Examination   Orientation orientation to person, place and time   Level of Consciousness alert   Follows Commands and Answers Questions 100% of the time;able to follow multistep instructions   Personal Safety and Judgment intact   Memory intact   Pain Assessment   Patient Currently in Pain No   Posture    Posture Forward head position;Protracted shoulders   Range of Motion (ROM)   ROM Quick Adds No deficits were identified   Strength   Manual Muscle Testing Quick Adds No deficits were identified   Bed Mobility   Bed Mobility Comments independent   Transfer Skills   Transfer Comments Sit to stand wtih SBA and SEC   Gait   Gait Comments Pt amb 550  "ft with SEC and SBA with fast pace and quick steps at times. Cues to slow down for safety and pt demos improved balance.    Balance   Balance Comments Balance mildly unsteady with fast gait but improved to normal with normal pace of gait   General Therapy Interventions   Intervention Comments None needed   Clinical Impression   Criteria for Skilled Therapeutic Intervention evaluation only   Clinical Presentation Stable/Uncomplicated   Clinical Presentation Rationale medically stable   Clinical Decision Making (Complexity) Low complexity   Predicted Duration of Therapy Intervention (days/wks) eval only   Anticipated Discharge Disposition Home   Risk & Benefits of therapy have been explained Yes   Patient, Family & other staff in agreement with plan of care Yes   [facility] TM \"6 Clicks\"   2016, Trustees of [employer], under license to [employer].  All rights reserved.   6 Clicks Short Forms Basic Mobility Inpatient Short Form   [facility]  \"6 Clicks\" V.2 Basic Mobility Inpatient Short Form   1. Turning from your back to your side while in a flat bed without using bedrails? 4 - None   2. Moving from lying on your back to sitting on the side of a flat bed without using bedrails? 4 - None   3. Moving to and from a bed to a chair (including a wheelchair)? 4 - None   4. Standing up from a chair using your arms (e.g., wheelchair, or bedside chair)? 4 - None   5. To walk in hospital room? 4 - None   6. Climbing 3-5 steps with a railing? 3 - A Little   Basic Mobility Raw Score (Score out of 24.Lower scores equate to lower levels of function) 23   Total Evaluation Time   Total Evaluation Time (Minutes) 15     "

## 2018-05-15 NOTE — PLAN OF CARE
Problem: Patient Care Overview  Goal: Plan of Care/Patient Progress Review  Outcome: No Change  3-7p. Pt A/O#4, calm/cooperative, denies pain, up w/1/GB/cane to BR, having loose stools, voiding per urinal, VSS on RA, afibrile, fair po, K-3.0-replaced, recheck at 2000, IVF infusing at 150cc/h, continues on Vanco po, Enteric ISO maintained, will monitor.

## 2018-05-15 NOTE — PLAN OF CARE
Problem: Patient Care Overview  Goal: Plan of Care/Patient Progress Review  Outcome: Improving  A+Ox4 up with SBA + cane in room and halls. Tolerating regular diet, appetite improving. IVF @ 150. K+ 3.2, orally replaced recheck @ 1600. WBC trending down. PO vanco, Enteric precautions maintained. VSS, on RA, denies pain. Anticipate possible discharge tomorrow 5/16/18.    1440: Will discharge today 5/15. Verbal order from MD to schedule K+ recheck for 1530 and give 20 meq before discharge.

## 2018-05-15 NOTE — PLAN OF CARE
Problem: Patient Care Overview  Goal: Plan of Care/Patient Progress Review  Outcome: Improving  Pt A&Ox4, VSS, up with assist of 1 & cane/gb. Voiding in urinal. Reports loose stool, none on this shift so far. IVF running 150ml/hr, abx tx tolerated well. Enteric precautions maintained for C Diff. Discharge plans TBD.     [id number] update: pt slept well, IVF running 150ml, 1 episode of incontinence of bowel, denies pain. PO abx tolerated well.

## 2018-05-15 NOTE — PLAN OF CARE
Problem: Patient Care Overview  Goal: Plan of Care/Patient Progress Review  PT:  Discharge Planner PT   Patient plan for discharge: Return home  Current status: Orders received, eval completed. Pt admitted with diarrhea and cdiff colitis. Prior to admit pt was living with his wife in an independent apartment, uses a cane, independent with mobility. Currently requires SBA for bed mobility, transfers, gait of 550 ft with SEC with fast pace and dec balance at times when ambulating quickly. When pt slowed down, balance improved back to normal and was steadier. Pt does not have any skilled PT needs at this time and is safe to return home.  Barriers to return to prior living situation: None  Recommendations for discharge: Return home  Rationale for recommendations: Pt has no skilled PT needs at this time, safe to return home.       Entered by: Michelle Pina 05/15/2018 2:54 PM

## 2018-05-16 ENCOUNTER — TELEPHONE (OUTPATIENT)
Dept: FAMILY MEDICINE | Facility: CLINIC | Age: 83
End: 2018-05-16

## 2018-05-16 ENCOUNTER — PATIENT OUTREACH (OUTPATIENT)
Dept: CARE COORDINATION | Facility: CLINIC | Age: 83
End: 2018-05-16

## 2018-05-16 ENCOUNTER — DOCUMENTATION ONLY (OUTPATIENT)
Dept: OTHER | Facility: CLINIC | Age: 83
End: 2018-05-16

## 2018-05-16 PROBLEM — Z71.89 ADVANCED DIRECTIVES, COUNSELING/DISCUSSION: Chronic | Status: ACTIVE | Noted: 2018-05-16

## 2018-05-16 NOTE — LETTER
[facility]  [address]  [place] [address]    May 16, 2018    Santosh KENNY Hjelmstad  [address]   [facility] [address]      Dear Santosh,    I am a clinic care coordinator who works with Lilly Zepeda MD at [facility]. I wanted to thank you for spending the time to talk with me.  I wanted to provide you with my contact information so that you can call me with questions or concerns about your health care. Below is a description of clinic care coordination and how I can further assist you.     The clinic care coordinator is a registered nurse and/or  who understand the health care system. The goal of clinic care coordination is to help you manage your health and improve access to the [facility] system in the most efficient manner. The registered nurse can assist you in meeting your health care goals by providing education, coordinating services, and strengthening the communication among your providers. The  can assist you with financial, behavioral, psychosocial, chemical dependency, counseling, and/or psychiatric resources.    Please feel free to contact me at [phone], with any questions or concerns. We at [facility] are focused on providing you with the highest-quality healthcare experience possible and that all starts with you.     Sincerely,     Carmenza Khan RN  Clinic Care Coordinator  [phone]  [email or web address]

## 2018-05-16 NOTE — TELEPHONE ENCOUNTER
"Hospital/TCU/ED for chronic condition Discharge Protocol    \"Hi, my name is Lynda Kwon, a registered nurse, and I am calling from [facility].  I am calling to follow up and see how things are going for you after your recent emergency visit/hospital/TCU stay.\"    Tell me how you are doing now that you are home?\" Feeling much better      Discharge Instructions    \"Let's review your discharge instructions.  What is/are the follow-up recommendations?  Pt. Response: OV with [facility]/Abx    \"Has an appointment with your primary care provider been scheduled?\"   Yes. (confirm)    \"When you see the provider, I would recommend that you bring your medications with you.\"    Medications    \"Tell me what changed about your medicines when you discharged?\"    Changes to chronic meds?    0-1    \"What questions do you have about your medications?\"    None     New diagnoses of heart failure, COPD, diabetes, or MI?    No          On warfarin: \"Were you given any recommendations for follow-up with the anticoagulation clinic?\" Yes - need to schedule/reschedule Anticoagulation clinic appointment Scheduled for after [facility] visit    Medication reconciliation completed? Yes  Was MTM referral placed (*Make sure to put transitions as reason for referral)?   No    Call Summary    \"What questions or concerns do you have about your recent visit and your follow-up care?\"     none    \"If you have questions or things don't continue to improve, we encourage you contact us through the main clinic number (give number).  Even if the clinic is not open, triage nurses are available 24/7 to help you.     We would like you to know that our clinic has extended hours (provide information).  We also have urgent care (provide details on closest location and hours/contact info)\"      \"Thank you for your time and take care!\"             "

## 2018-05-18 ENCOUNTER — OFFICE VISIT (OUTPATIENT)
Dept: FAMILY MEDICINE | Facility: CLINIC | Age: 83
End: 2018-05-18
Payer: COMMERCIAL

## 2018-05-18 ENCOUNTER — ANTICOAGULATION THERAPY VISIT (OUTPATIENT)
Dept: NURSING | Facility: CLINIC | Age: 83
End: 2018-05-18
Payer: COMMERCIAL

## 2018-05-18 VITALS
WEIGHT: 183.5 LBS | HEIGHT: 75 IN | HEART RATE: 78 BPM | SYSTOLIC BLOOD PRESSURE: 138 MMHG | OXYGEN SATURATION: 100 % | BODY MASS INDEX: 22.81 KG/M2 | DIASTOLIC BLOOD PRESSURE: 67 MMHG | TEMPERATURE: 96.9 F

## 2018-05-18 DIAGNOSIS — Z79.01 LONG TERM CURRENT USE OF ANTICOAGULANT THERAPY: ICD-10-CM

## 2018-05-18 DIAGNOSIS — I10 BENIGN ESSENTIAL HYPERTENSION: ICD-10-CM

## 2018-05-18 DIAGNOSIS — E78.5 HYPERLIPIDEMIA LDL GOAL <100: ICD-10-CM

## 2018-05-18 DIAGNOSIS — D72.829 LEUKOCYTOSIS, UNSPECIFIED TYPE: ICD-10-CM

## 2018-05-18 DIAGNOSIS — A04.72 C. DIFFICILE COLITIS: Primary | ICD-10-CM

## 2018-05-18 DIAGNOSIS — K52.9 COLITIS: ICD-10-CM

## 2018-05-18 DIAGNOSIS — I48.20 CHRONIC ATRIAL FIBRILLATION (H): ICD-10-CM

## 2018-05-18 LAB
BASOPHILS # BLD AUTO: 0 10E9/L (ref 0–0.2)
BASOPHILS NFR BLD AUTO: 0.3 %
DIFFERENTIAL METHOD BLD: ABNORMAL
EOSINOPHIL # BLD AUTO: 0.5 10E9/L (ref 0–0.7)
EOSINOPHIL NFR BLD AUTO: 3 %
ERYTHROCYTE [DISTWIDTH] IN BLOOD BY AUTOMATED COUNT: 14.9 % (ref 10–15)
HCT VFR BLD AUTO: 38.5 % (ref 40–53)
HGB BLD-MCNC: 12.5 G/DL (ref 13.3–17.7)
INR POINT OF CARE: 5.1 (ref 0.86–1.14)
LYMPHOCYTES # BLD AUTO: 0.8 10E9/L (ref 0.8–5.3)
LYMPHOCYTES NFR BLD AUTO: 5.2 %
MCH RBC QN AUTO: 26.8 PG (ref 26.5–33)
MCHC RBC AUTO-ENTMCNC: 32.5 G/DL (ref 31.5–36.5)
MCV RBC AUTO: 83 FL (ref 78–100)
MONOCYTES # BLD AUTO: 2.2 10E9/L (ref 0–1.3)
MONOCYTES NFR BLD AUTO: 15 %
NEUTROPHILS # BLD AUTO: 11.3 10E9/L (ref 1.6–8.3)
NEUTROPHILS NFR BLD AUTO: 76.5 %
PLATELET # BLD AUTO: 499 10E9/L (ref 150–450)
RBC # BLD AUTO: 4.66 10E12/L (ref 4.4–5.9)
WBC # BLD AUTO: 14.8 10E9/L (ref 4–11)

## 2018-05-18 PROCEDURE — 99207 ZZC NO CHARGE NURSE ONLY: CPT

## 2018-05-18 PROCEDURE — 85025 COMPLETE CBC W/AUTO DIFF WBC: CPT | Performed by: INTERNAL MEDICINE

## 2018-05-18 PROCEDURE — 99214 OFFICE O/P EST MOD 30 MIN: CPT | Performed by: INTERNAL MEDICINE

## 2018-05-18 PROCEDURE — 36416 COLLJ CAPILLARY BLOOD SPEC: CPT

## 2018-05-18 PROCEDURE — 85610 PROTHROMBIN TIME: CPT | Mod: QW

## 2018-05-18 RX ORDER — WARFARIN SODIUM 5 MG/1
2.5-5 TABLET ORAL DAILY
Qty: 90 TABLET | Refills: 0 | Status: SHIPPED | OUTPATIENT
Start: 2018-05-18 | End: 2018-05-23

## 2018-05-18 NOTE — PROGRESS NOTES
SUBJECTIVE:   Santosh Robledo is a 88 year old male who presents to clinic today for the following health issues:    Hospital Follow-up Visit:    Hospital/Nursing Home/IP Rehab Facility: [facility]  Date of Admission: 05/13/2018  Date of Discharge: 05/15/2018  Reason(s) for Admission: Colitis             Problems taking medications regularly:  None       Medication changes since discharge: None       Problems adhering to non-medication therapy:  None    Summary of hospitalization:  [facility] discharge summary reviewed  Diagnostic Tests/Treatments reviewed.  Follow up needed: cardiology clinic  Other Healthcare Providers Involved in Patient s Care:         Specialist appointment - cardiology  Update since discharge: improved.     Post Discharge Medication Reconciliation: discharge medications reconciled and changed, per note/orders (see AVS).  Plan of care communicated with patient and family     Coding guidelines for this visit:  Type of Medical   Decision Making Face-to-Face Visit       within 7 Days of discharge Face-to-Face Visit        within 14 days of discharge   Moderate Complexity [id number]   High Complexity [id number]            HPI:   Patient Santosh Robledo is a very pleasant 88 year old male with history of atrial fibrillation who presents to Internal Medicine clinic today for post hospital discharge follow up of multiple concerns including C diff colitis. Regarding the patient's recent hospitalization for c. diff colitis infection, the patient has been treated with Vancomycin antibiotics therapy. He reports that his diarrhea symptoms have improved significantly although not fully resolved. He is also due for a follow up repeat CBC lab to monitor for improvement of his recent leukocytosis symptoms due to C diff colitis. Regarding the patient's chronic atrial fibrilaltion, the patient is due for follow up with his cardiology referral appointment currently scheduled  for 5/23/2018 at the [facility]. Patient denies any abdominal pains, chest pain, fever or chills.             Current Medications:     Current Outpatient Prescriptions   Medication Sig Dispense Refill     Cyanocobalamin (B-12) 1000 MCG CAPS Take 1 tablet by mouth daily 90 capsule 3     hydrochlorothiazide (HYDRODIURIL) 25 MG tablet Take 1 tablet (25 mg) by mouth daily 90 tablet 3     lisinopril (PRINIVIL/ZESTRIL) 20 MG tablet Take 1 tablet (20 mg) by mouth 2 times daily 180 tablet 3     omeprazole (PRILOSEC) 20 MG CR capsule Take 1 capsule (20 mg) by mouth daily 90 capsule 3     Sertraline HCl (ZOLOFT PO) Take 100 mg by mouth daily       simvastatin (ZOCOR) 5 MG tablet Take 2 tablets (10 mg) by mouth daily 90 tablet 3     vancomycin (FIRST) 50 MG/ML SOLN Take 2.5 mLs (125 mg) by mouth 4 times daily 120 mL 0     WARFARIN SODIUM PO Take 2.5 mg by mouth daily On Monday, Wednesday, and Saturday       [DISCONTINUED] WARFARIN SODIUM PO Take 5 mg by mouth daily On Tuesday, Thursday, Friday and Sunday       order for DME Equipment being ordered: Walker Wheels () and Walker ()  Treatment Diagnosis: Difficulty ambulating 1 each 0     order for DME Equipment being ordered: Walker Wheels ()  Treatment Diagnosis:  Weakness,colitis. 1 Device 0     Simethicone (GAS-X EXTRA STRENGTH) 125 MG CAPS Take 1 capsule by mouth 2 times daily as needed 60 capsule 11     warfarin (COUMADIN) 5 MG tablet Take 0.5-1 tablets (2.5-5 mg) by mouth daily As directed by your INR clinic 90 tablet 0         Allergies:      Allergies   Allergen Reactions     Penicillins Rash            Past Medical History:     Past Medical History:   Diagnosis Date     Atrial fibrillation (H)      Cardiac pacemaker      GERD (gastroesophageal reflux disease)          Past Surgical History:   No past surgical history on file.      Family Medical History:     Family History   Problem Relation Age of Onset     Influenza/Pneumonia Mother      Prostate  "Cancer Father          Social History:     Social History     Social History     Marital status:      Spouse name: N/A     Number of children: N/A     Years of education: N/A     Occupational History     Not on file.     Social History Main Topics     Smoking status: Never Smoker     Smokeless tobacco: Never Used     Alcohol use Yes     Drug use: No     Sexual activity: Yes     Partners: Female     Other Topics Concern     Not on file     Social History Narrative           Review of System:     Constitutional: Negative for fever or chills  Skin: Negative for rashes  Ears/Nose/Throat: Negative for nasal congestion, sore throat  Respiratory: No shortness of breath, dyspnea on exertion, cough, or hemoptysis  Cardiovascular: Negative for chest pain, positive for cardiac pacemaker status  Gastrointestinal: Positive for diarrhea due to c. Diff colitis infection.  Genitourinary: Negative for dysuria, hematuria  Musculoskeletal: Negative for myalgias  Neurologic: Negative for headaches  Psychiatric: Negative for depression, anxiety  Hematologic/Lymphatic/Immunologic: Negative  Endocrine: Negative  Behavioral: Negative for tobacco use       Physical Exam:   /67  Pulse 78  Temp 96.9  F (36.1  C) (Oral)  Ht 6' 3\" (1.905 m)  Wt 183 lb 8 oz (83.2 kg)  SpO2 100%  BMI 22.94 kg/m2    GENERAL: chronically ill appearing elderly male, alert and no acute distress  EYES: eyes grossly normal to inspection, and conjunctivae and sclerae normal  HENT: Normocephalic atraumatic. Nose and mouth without ulcers or lesions  NECK: supple  RESP: lungs clear to auscultation   CV: cardiac pacemaker rhythm  LYMPH: no peripheral edema   ABDOMEN: nondistended  MS: no gross musculoskeletal defects noted  SKIN: no suspicious lesions or rashes  NEURO: Alert & Oriented x 3.   PSYCH: mentation appears normal, affect normal        Diagnostic Test Results:     Diagnostic Test Results:  Results for orders placed or performed in visit on " 05/18/18   CBC with platelets and differential   Result Value Ref Range    WBC 14.8 (H) 4.0 - 11.0 10e9/L    RBC Count 4.66 4.4 - 5.9 10e12/L    Hemoglobin 12.5 (L) 13.3 - 17.7 g/dL    Hematocrit 38.5 (L) 40.0 - 53.0 %    MCV 83 78 - 100 fl    MCH 26.8 26.5 - 33.0 pg    MCHC 32.5 31.5 - 36.5 g/dL    RDW 14.9 10.0 - 15.0 %    Platelet Count 499 (H) 150 - 450 10e9/L    Diff Method Automated Method     % Neutrophils 76.5 %    % Lymphocytes 5.2 %    % Monocytes 15.0 %    % Eosinophils 3.0 %    % Basophils 0.3 %    Absolute Neutrophil 11.3 (H) 1.6 - 8.3 10e9/L    Absolute Lymphocytes 0.8 0.8 - 5.3 10e9/L    Absolute Monocytes 2.2 (H) 0.0 - 1.3 10e9/L    Absolute Eosinophils 0.5 0.0 - 0.7 10e9/L    Absolute Basophils 0.0 0.0 - 0.2 10e9/L       ASSESSMENT/PLAN:       (A04.72) C. difficile colitis  (primary encounter diagnosis)  (D72.829) Leukocytosis, unspecified type  Comment: post hospital ER follow up for C diff colitis. Diarrhea symptoms have improved. Leukocytosis has improved, although not fully resolved.  Plan: I have ordered CBC with platelets and differential lab which shows improved in previous leukocytosis symtpoms. I have extended and refilled the patient's vancomycin (FIRST) 50 MG/ML SOLN medicaiton for 1 more week.       (I10) Benign essential hypertension  Comment: chronic HTN, well controlled  Plan: continue current BP medication regimen.      (E78.5) Hyperlipidemia LDL goal <100  Comment: stable on current simvastatin medication  Plan: continue simvastatin medication going forward.      (I48.2) Chronic atrial fibrillation (H)  (Z79.01) Long term current use of anticoagulant therapy  Comment: patient has been compliant with his warfarin therapy.  Plan: patient is schedued for a repeat INR check with the Anticoagulation clinic later today. He is also scheduled for a follow up appointment with cardiology clinic on 5/23/2018.            Follow Up Plan:     Patient is instructed to return to Internal Medicine  clinic for follow-up visit in 1 month.        Lilly Zepeda MD  Internal Medicine  [facility]

## 2018-05-18 NOTE — PROGRESS NOTES
ANTICOAGULATION FOLLOW-UP CLINIC VISIT    Patient Name:  Santosh KENNY Hjelmstad  Date:  5/18/2018  Contact Type:  Face to Face    SUBJECTIVE:     Patient Findings     Positives Change in diet/appetite           OBJECTIVE    INR Protime   Date Value Ref Range Status   05/18/2018 5.1 (A) 0.86 - 1.14 Final       ASSESSMENT / PLAN  INR assessment SUPRA    Recheck INR In: 5 DAYS    INR Location Clinic      Anticoagulation Summary as of 5/18/2018     INR goal 2.0-3.0   Today's INR 5.1!   Maintenance plan 2.5 mg (5 mg x 0.5) on Mon, Wed, Sat; 5 mg (5 mg x 1) all other days   Full instructions 5/18: Hold; 5/20: 2.5 mg; Otherwise 2.5 mg on Mon, Wed, Sat; 5 mg all other days   Weekly total 27.5 mg   Plan last modified Bryanna Heredia RN (5/2/2018)   Next INR check 5/23/2018   Target end date     Indications   Chronic atrial fibrillation (H) [I48.2]  Long term current use of anticoagulant therapy [Z79.01]         Anticoagulation Episode Summary     INR check location     Preferred lab     Send INR reminders to [facility]    Comments       Anticoagulation Care Providers     Provider Role Specialty Phone number    Lilly Zepeda MD Responsible Internal Medicine [phone]            See the Encounter Report to view Anticoagulation Flowsheet and Dosing Calendar (Go to Encounters tab in chart review, and find the Anticoagulation Therapy Visit)    Patient was here seeing PCP for hospital follow up.  His wife reports that patient is still not eating well.  Activity level is still reduced.  He denies signs of increased bruising or bleeding. He will have INR drawn at cardiology appt for ease.  Dosing based on [facility] Protocol and Provider directed care plan.      Rosemary Sauceda RN

## 2018-05-18 NOTE — MR AVS SNAPSHOT
Santosh KENNY Hjelmstad   5/18/2018 10:00 AM   Anticoagulation Therapy Visit    Description:  88 year old male   Provider:  [facility]   Department:  [facility]           INR as of 5/18/2018     Today's INR 5.1!      Anticoagulation Summary as of 5/18/2018     INR goal 2.0-3.0   Today's INR 5.1!   Full instructions 5/18: Hold; 5/20: 2.5 mg; Otherwise 2.5 mg on Mon, Wed, Sat; 5 mg all other days   Next INR check 5/23/2018    Indications   Chronic atrial fibrillation (H) [I48.2]  Long term current use of anticoagulant therapy [Z79.01]         Description     Will have INR drawn at Cardiology      Contact Numbers     Clinic Number:         May 2018 Details    Sun Mon Tue Wed Thu Fri Sat       1               2               3               4               5                 6               7               8               9               10               11               12                 13               14               15               16               17               18      Hold   See details      19      2.5 mg           20      2.5 mg         21      2.5 mg         22      5 mg         23            24               25               26                 27               28               29               30               31                  Date Details   05/18 This INR check       Date of next INR:  5/23/2018         How to take your warfarin dose     To take:  2.5 mg Take 0.5 of a 5 mg tablet.    To take:  5 mg Take 1 of the 5 mg tablets.    Hold Do not take your warfarin dose. See the Details table to the right for additional instructions.

## 2018-05-18 NOTE — MR AVS SNAPSHOT
After Visit Summary   5/18/2018    Santosh Robledo    MRN: [id number]           Patient Information     Date Of Birth          7/20/1929        Visit Information        Provider Department      5/18/2018 9:40 AM Lilly Zepeda MD [facility]        Today's Diagnoses     C. difficile colitis    -  1    Colitis        Leukocytosis, unspecified type        Benign essential hypertension        Hyperlipidemia LDL goal <100        Chronic atrial fibrillation (H)        Long term current use of anticoagulant therapy           Follow-ups after your visit        Your next 10 appointments already scheduled     May 23, 2018 10:15 AM CDT   EP NEW with Francis Block MD   [facility] ([facility])    [id number]  [facility] [address] [phone] OPT 2            May 23, 2018 11:15 AM CDT   Pacemaker Check with NAYELY CHICAS   [facility] ([facility])    [address] [id number] [phone] OPT 2            Jul 30, 2018 11:00 AM CDT   Office Visit with Lilly Zepeda MD   [facility] ([facility])    [address] [facility] [id number] [phone]           Bring a current list of meds and any records pertaining to this visit. For Physicals, please bring immunization records and any forms needing to be filled out. Please arrive 10 minutes early to complete paperwork.              Future tests that were ordered for you today     Open Future Orders        Priority Expected Expires Ordered    INR Routine 5/23/2018 5/18/2019 5/18/2018            Who to contact     If you have questions or need follow up information about today's clinic visit or your schedule please contact [facility] directly at [phone].  Normal or non-critical lab and imaging results will be communicated to you by MyChart, letter or phone  "within 4 business days after the clinic has received the results. If you do not hear from us within 7 days, please contact the clinic through [employer] or phone. If you have a critical or abnormal lab result, we will notify you by phone as soon as possible.  Submit refill requests through [employer] or call your pharmacy and they will forward the refill request to us. Please allow 3 business days for your refill to be completed.          Additional Information About Your Visit        [employer] Information     [employer] lets you send messages to your doctor, view your test results, renew your prescriptions, schedule appointments and more. To sign up, go to [email or web address] . Click on \"Log in\" on the left side of the screen, which will take you to the Welcome page. Then click on \"Sign up Now\" on the right side of the page.     You will be asked to enter the access code listed below, as well as some personal information. Please follow the directions to create your username and password.     Your access code is: [email or web address]  Expires: 2018  2:26 PM     Your access code will  in 90 days. If you need help or a new code, please call your [place] clinic or [phone].        Care EveryWhere ID     This is your Care EveryWhere ID. This could be used by other organizations to access your [place] medical records  [id number]        Your Vitals Were     Pulse Temperature Height Pulse Oximetry BMI (Body Mass Index)       78 96.9  F (36.1  C) (Oral) 6' 3\" (1.905 m) 100% 22.94 kg/m2        Blood Pressure from Last 3 Encounters:   18 138/67   05/15/18 134/60   18 (P) 129/69    Weight from Last 3 Encounters:   18 183 lb 8 oz (83.2 kg)   05/15/18 180 lb 1.9 oz (81.7 kg)   18 (P) 176 lb (79.8 kg)              We Performed the Following     CBC with platelets and differential          Today's Medication Changes          These changes are accurate as of 18  3:01 PM.  If you have any questions, " ask your nurse or doctor.               These medicines have changed or have updated prescriptions.        Dose/Directions    * WARFARIN SODIUM PO   This may have changed:  Another medication with the same name was changed. Make sure you understand how and when to take each.        Dose:  2.5 mg   Take 2.5 mg by mouth daily On Monday, Wednesday, and Saturday   Refills:  0       * warfarin 5 MG tablet   Commonly known as:  COUMADIN   This may have changed:    - medication strength  - how much to take  - additional instructions   Used for:  Chronic atrial fibrillation (H), Long term current use of anticoagulant therapy        Dose:  2.5-5 mg   Take 0.5-1 tablets (2.5-5 mg) by mouth daily As directed by your INR clinic   Quantity:  90 tablet   Refills:  0       * Notice:  This list has 2 medication(s) that are the same as other medications prescribed for you. Read the directions carefully, and ask your doctor or other care provider to review them with you.         Where to get your medicines      These medications were sent to [place] [id number] - [place], [place] - [address]  [address] [facility] [address]     Phone:  [phone]     warfarin 5 MG tablet         Some of these will need a paper prescription and others can be bought over the counter.  Ask your nurse if you have questions.     Bring a paper prescription for each of these medications     vancomycin 50 MG/ML Soln                Primary Care Provider Office Phone # Fax #    Lilly Leonel Zepeda [phone] [address] [facility] [address]  [facility] [address]        Equal Access to Services     TIFF CASTANEDA : Hadcindy carroll Sosarah, waaxda luqadaha, qaybta kaalmada madyson, [place] maradiaga. So [facility] [phone].    ATENCIÓN: Si habla español, tiene a gonzalez disposición servicios gratuitos de asistencia lingüística. Llame al [phone].    We comply with applicable federal civil rights laws and [place] laws. We do  not discriminate on the basis of race, color, national origin, age, disability, sex, sexual orientation, or gender identity.            Thank you!     Thank you for choosing [facility]  for your care. Our goal is always to provide you with excellent care. Hearing back from our patients is one way we can continue to improve our services. Please take a few minutes to complete the written survey that you may receive in the mail after your visit with us. Thank you!             Your Updated Medication List - Protect others around you: Learn how to safely use, store and throw away your medicines at www.disposemymeds.org.          This list is accurate as of 5/18/18  3:01 PM.  Always use your most recent med list.                   Brand Name Dispense Instructions for use Diagnosis    B-12 1000 MCG Caps     90 capsule    Take 1 tablet by mouth daily    Vitamin B12 deficiency (non anemic)       hydrochlorothiazide 25 MG tablet    HYDRODIURIL    90 tablet    Take 1 tablet (25 mg) by mouth daily    Benign essential hypertension       lisinopril 20 MG tablet    PRINIVIL/ZESTRIL    180 tablet    Take 1 tablet (20 mg) by mouth 2 times daily    Benign essential hypertension       omeprazole 20 MG CR capsule    priLOSEC    90 capsule    Take 1 capsule (20 mg) by mouth daily    Gastroesophageal reflux disease, esophagitis presence not specified       order for DME     1 each    Equipment being ordered: Walker Wheels () and Walker () Treatment Diagnosis: Difficulty ambulating    Colitis       order for DME     1 Device    Equipment being ordered: Walker Wheels () Treatment Diagnosis:  Weakness,colitis.    Colitis       Simethicone 125 MG Caps    GAS-X EXTRA STRENGTH    60 capsule    Take 1 capsule by mouth 2 times daily as needed    Flatulence, eructation and gas pain       simvastatin 5 MG tablet    ZOCOR    90 tablet    Take 2 tablets (10 mg) by mouth daily    Hyperlipidemia LDL goal <100        vancomycin 50 MG/ML Soln     120 mL    Take 2.5 mLs (125 mg) by mouth 4 times daily    Colitis       * WARFARIN SODIUM PO      Take 2.5 mg by mouth daily On Monday, Wednesday, and Saturday        * warfarin 5 MG tablet    COUMADIN    90 tablet    Take 0.5-1 tablets (2.5-5 mg) by mouth daily As directed by your INR clinic    Chronic atrial fibrillation (H), Long term current use of anticoagulant therapy       ZOLOFT PO      Take 100 mg by mouth daily        * Notice:  This list has 2 medication(s) that are the same as other medications prescribed for you. Read the directions carefully, and ask your doctor or other care provider to review them with you.

## 2018-05-23 ENCOUNTER — APPOINTMENT (OUTPATIENT)
Dept: GENERAL RADIOLOGY | Facility: CLINIC | Age: 83
DRG: 291 | End: 2018-05-23
Attending: EMERGENCY MEDICINE
Payer: MEDICARE

## 2018-05-23 ENCOUNTER — HOSPITAL ENCOUNTER (INPATIENT)
Facility: CLINIC | Age: 83
LOS: 6 days | Discharge: NURSING, REHAB OR LONG-TERM CARE | DRG: 291 | End: 2018-05-29
Attending: EMERGENCY MEDICINE | Admitting: INTERNAL MEDICINE
Payer: MEDICARE

## 2018-05-23 ENCOUNTER — TELEPHONE (OUTPATIENT)
Dept: FAMILY MEDICINE | Facility: CLINIC | Age: 83
End: 2018-05-23

## 2018-05-23 ENCOUNTER — APPOINTMENT (OUTPATIENT)
Dept: CARDIOLOGY | Facility: CLINIC | Age: 83
DRG: 291 | End: 2018-05-23
Attending: PHYSICIAN ASSISTANT
Payer: MEDICARE

## 2018-05-23 DIAGNOSIS — Z79.01 LONG TERM CURRENT USE OF ANTICOAGULANT THERAPY: ICD-10-CM

## 2018-05-23 DIAGNOSIS — D72.825 BANDEMIA: ICD-10-CM

## 2018-05-23 DIAGNOSIS — J18.9 COMMUNITY ACQUIRED PNEUMONIA OF RIGHT LOWER LOBE OF LUNG: ICD-10-CM

## 2018-05-23 DIAGNOSIS — I48.20 CHRONIC ATRIAL FIBRILLATION (H): ICD-10-CM

## 2018-05-23 DIAGNOSIS — I10 BENIGN ESSENTIAL HYPERTENSION: ICD-10-CM

## 2018-05-23 DIAGNOSIS — E87.6 HYPOKALEMIA: ICD-10-CM

## 2018-05-23 DIAGNOSIS — B37.0 ORAL THRUSH: Primary | ICD-10-CM

## 2018-05-23 DIAGNOSIS — R06.02 SOB (SHORTNESS OF BREATH): ICD-10-CM

## 2018-05-23 DIAGNOSIS — J96.01 ACUTE RESPIRATORY FAILURE WITH HYPOXIA (H): ICD-10-CM

## 2018-05-23 DIAGNOSIS — I50.9 ACUTE CONGESTIVE HEART FAILURE, UNSPECIFIED CONGESTIVE HEART FAILURE TYPE: ICD-10-CM

## 2018-05-23 DIAGNOSIS — K52.9 COLITIS: ICD-10-CM

## 2018-05-23 LAB
ANION GAP SERPL CALCULATED.3IONS-SCNC: 10 MMOL/L (ref 3–14)
BASE EXCESS BLDA CALC-SCNC: 3.1 MMOL/L
BASOPHILS # BLD AUTO: 0 10E9/L (ref 0–0.2)
BASOPHILS NFR BLD AUTO: 0.1 %
BUN SERPL-MCNC: 13 MG/DL (ref 7–30)
CALCIUM SERPL-MCNC: 8.5 MG/DL (ref 8.5–10.1)
CHLORIDE SERPL-SCNC: 103 MMOL/L (ref 94–109)
CO2 SERPL-SCNC: 26 MMOL/L (ref 20–32)
CREAT SERPL-MCNC: 0.76 MG/DL (ref 0.66–1.25)
DIFFERENTIAL METHOD BLD: ABNORMAL
EOSINOPHIL # BLD AUTO: 0 10E9/L (ref 0–0.7)
EOSINOPHIL NFR BLD AUTO: 0.1 %
ERYTHROCYTE [DISTWIDTH] IN BLOOD BY AUTOMATED COUNT: 14.7 % (ref 10–15)
GFR SERPL CREATININE-BSD FRML MDRD: >90 ML/MIN/1.7M2
GLUCOSE SERPL-MCNC: 143 MG/DL (ref 70–99)
HCO3 BLD-SCNC: 26 MMOL/L (ref 21–28)
HCT VFR BLD AUTO: 36.1 % (ref 40–53)
HGB BLD-MCNC: 12.2 G/DL (ref 13.3–17.7)
IMM GRANULOCYTES # BLD: 0.5 10E9/L (ref 0–0.4)
IMM GRANULOCYTES NFR BLD: 2 %
INR PPP: 6.86 (ref 0.86–1.14)
INTERPRETATION ECG - MUSE: NORMAL
LACTATE BLD-SCNC: 0.9 MMOL/L (ref 0.4–1.9)
LACTATE SERPL-SCNC: 1 MMOL/L (ref 0.4–2)
LYMPHOCYTES # BLD AUTO: 1 10E9/L (ref 0.8–5.3)
LYMPHOCYTES NFR BLD AUTO: 4.4 %
MCH RBC QN AUTO: 26.6 PG (ref 26.5–33)
MCHC RBC AUTO-ENTMCNC: 33.8 G/DL (ref 31.5–36.5)
MCV RBC AUTO: 79 FL (ref 78–100)
MONOCYTES # BLD AUTO: 1.3 10E9/L (ref 0–1.3)
MONOCYTES NFR BLD AUTO: 5.5 %
NEUTROPHILS # BLD AUTO: 19.8 10E9/L (ref 1.6–8.3)
NEUTROPHILS NFR BLD AUTO: 87.9 %
NRBC # BLD AUTO: 0 10*3/UL
NRBC BLD AUTO-RTO: 0 /100
NT-PROBNP SERPL-MCNC: ABNORMAL PG/ML (ref 0–1800)
OXYHGB MFR BLD: 98 % (ref 92–100)
PCO2 BLD: 34 MM HG (ref 35–45)
PH BLD: 7.5 PH (ref 7.35–7.45)
PLATELET # BLD AUTO: 517 10E9/L (ref 150–450)
PO2 BLD: 108 MM HG (ref 80–105)
POTASSIUM SERPL-SCNC: 2.9 MMOL/L (ref 3.4–5.3)
POTASSIUM SERPL-SCNC: 3.1 MMOL/L (ref 3.4–5.3)
POTASSIUM SERPL-SCNC: 3.3 MMOL/L (ref 3.4–5.3)
PROCALCITONIN SERPL-MCNC: 0.12 NG/ML
RBC # BLD AUTO: 4.59 10E12/L (ref 4.4–5.9)
SODIUM SERPL-SCNC: 139 MMOL/L (ref 133–144)
TROPONIN I SERPL-MCNC: 0.02 UG/L (ref 0–0.04)
TROPONIN I SERPL-MCNC: <0.015 UG/L (ref 0–0.04)
WBC # BLD AUTO: 22.5 10E9/L (ref 4–11)

## 2018-05-23 PROCEDURE — 80048 BASIC METABOLIC PNL TOTAL CA: CPT | Performed by: EMERGENCY MEDICINE

## 2018-05-23 PROCEDURE — 96374 THER/PROPH/DIAG INJ IV PUSH: CPT

## 2018-05-23 PROCEDURE — 84484 ASSAY OF TROPONIN QUANT: CPT | Performed by: EMERGENCY MEDICINE

## 2018-05-23 PROCEDURE — 84132 ASSAY OF SERUM POTASSIUM: CPT | Performed by: INTERNAL MEDICINE

## 2018-05-23 PROCEDURE — 93306 TTE W/DOPPLER COMPLETE: CPT | Mod: 26 | Performed by: INTERNAL MEDICINE

## 2018-05-23 PROCEDURE — 25000132 ZZH RX MED GY IP 250 OP 250 PS 637: Mod: GY | Performed by: EMERGENCY MEDICINE

## 2018-05-23 PROCEDURE — A9270 NON-COVERED ITEM OR SERVICE: HCPCS | Mod: GY | Performed by: EMERGENCY MEDICINE

## 2018-05-23 PROCEDURE — 36415 COLL VENOUS BLD VENIPUNCTURE: CPT | Performed by: PHYSICIAN ASSISTANT

## 2018-05-23 PROCEDURE — 40000275 ZZH STATISTIC RCP TIME EA 10 MIN

## 2018-05-23 PROCEDURE — 99285 EMERGENCY DEPT VISIT HI MDM: CPT | Mod: 25

## 2018-05-23 PROCEDURE — 82805 BLOOD GASES W/O2 SATURATION: CPT | Performed by: PHYSICIAN ASSISTANT

## 2018-05-23 PROCEDURE — 94660 CPAP INITIATION&MGMT: CPT

## 2018-05-23 PROCEDURE — 40000885 ZZH STATISTIC STEP DOWN HRS EVENING

## 2018-05-23 PROCEDURE — 93005 ELECTROCARDIOGRAM TRACING: CPT

## 2018-05-23 PROCEDURE — 21000000 ZZH R&B IMCU HEART CARE

## 2018-05-23 PROCEDURE — 84484 ASSAY OF TROPONIN QUANT: CPT | Performed by: PHYSICIAN ASSISTANT

## 2018-05-23 PROCEDURE — 40000884 ZZH STATISTIC STEP DOWN HRS NIGHT

## 2018-05-23 PROCEDURE — 25500064 ZZH RX 255 OP 636: Performed by: INTERNAL MEDICINE

## 2018-05-23 PROCEDURE — 93306 TTE W/DOPPLER COMPLETE: CPT

## 2018-05-23 PROCEDURE — 83880 ASSAY OF NATRIURETIC PEPTIDE: CPT | Performed by: EMERGENCY MEDICINE

## 2018-05-23 PROCEDURE — 83605 ASSAY OF LACTIC ACID: CPT | Performed by: EMERGENCY MEDICINE

## 2018-05-23 PROCEDURE — 71046 X-RAY EXAM CHEST 2 VIEWS: CPT

## 2018-05-23 PROCEDURE — 84132 ASSAY OF SERUM POTASSIUM: CPT | Performed by: PHYSICIAN ASSISTANT

## 2018-05-23 PROCEDURE — 36600 WITHDRAWAL OF ARTERIAL BLOOD: CPT

## 2018-05-23 PROCEDURE — 85610 PROTHROMBIN TIME: CPT | Performed by: EMERGENCY MEDICINE

## 2018-05-23 PROCEDURE — 25000128 H RX IP 250 OP 636: Performed by: PHYSICIAN ASSISTANT

## 2018-05-23 PROCEDURE — 83605 ASSAY OF LACTIC ACID: CPT | Performed by: PHYSICIAN ASSISTANT

## 2018-05-23 PROCEDURE — 84145 PROCALCITONIN (PCT): CPT | Performed by: EMERGENCY MEDICINE

## 2018-05-23 PROCEDURE — 36415 COLL VENOUS BLD VENIPUNCTURE: CPT | Performed by: INTERNAL MEDICINE

## 2018-05-23 PROCEDURE — 25000128 H RX IP 250 OP 636: Performed by: EMERGENCY MEDICINE

## 2018-05-23 PROCEDURE — 99223 1ST HOSP IP/OBS HIGH 75: CPT | Mod: AI | Performed by: PHYSICIAN ASSISTANT

## 2018-05-23 PROCEDURE — 85025 COMPLETE CBC W/AUTO DIFF WBC: CPT | Performed by: EMERGENCY MEDICINE

## 2018-05-23 RX ORDER — LISINOPRIL 20 MG/1
20 TABLET ORAL 2 TIMES DAILY
Status: DISCONTINUED | OUTPATIENT
Start: 2018-05-23 | End: 2018-05-26

## 2018-05-23 RX ORDER — ACETAMINOPHEN 650 MG/1
650 SUPPOSITORY RECTAL EVERY 4 HOURS PRN
Status: DISCONTINUED | OUTPATIENT
Start: 2018-05-23 | End: 2018-05-29 | Stop reason: HOSPADM

## 2018-05-23 RX ORDER — ACETAMINOPHEN 325 MG/1
650 TABLET ORAL EVERY 4 HOURS PRN
Status: DISCONTINUED | OUTPATIENT
Start: 2018-05-23 | End: 2018-05-29 | Stop reason: HOSPADM

## 2018-05-23 RX ORDER — POTASSIUM CHLORIDE 1500 MG/1
40 TABLET, EXTENDED RELEASE ORAL ONCE
Status: COMPLETED | OUTPATIENT
Start: 2018-05-23 | End: 2018-05-23

## 2018-05-23 RX ORDER — ONDANSETRON 4 MG/1
4 TABLET, ORALLY DISINTEGRATING ORAL EVERY 6 HOURS PRN
Status: DISCONTINUED | OUTPATIENT
Start: 2018-05-23 | End: 2018-05-29 | Stop reason: HOSPADM

## 2018-05-23 RX ORDER — POTASSIUM CHLORIDE 7.45 MG/ML
10 INJECTION INTRAVENOUS
Status: DISCONTINUED | OUTPATIENT
Start: 2018-05-23 | End: 2018-05-29 | Stop reason: HOSPADM

## 2018-05-23 RX ORDER — PROCHLORPERAZINE MALEATE 5 MG
5 TABLET ORAL EVERY 6 HOURS PRN
Status: DISCONTINUED | OUTPATIENT
Start: 2018-05-23 | End: 2018-05-29 | Stop reason: HOSPADM

## 2018-05-23 RX ORDER — POTASSIUM CHLORIDE 1.5 G/1.58G
20-40 POWDER, FOR SOLUTION ORAL
Status: DISCONTINUED | OUTPATIENT
Start: 2018-05-23 | End: 2018-05-29 | Stop reason: HOSPADM

## 2018-05-23 RX ORDER — LIDOCAINE 40 MG/G
CREAM TOPICAL
Status: DISCONTINUED | OUTPATIENT
Start: 2018-05-23 | End: 2018-05-29 | Stop reason: HOSPADM

## 2018-05-23 RX ORDER — ALUMINA, MAGNESIA, AND SIMETHICONE 2400; 2400; 240 MG/30ML; MG/30ML; MG/30ML
30 SUSPENSION ORAL EVERY 4 HOURS PRN
Status: DISCONTINUED | OUTPATIENT
Start: 2018-05-23 | End: 2018-05-29 | Stop reason: HOSPADM

## 2018-05-23 RX ORDER — POTASSIUM CHLORIDE 29.8 MG/ML
20 INJECTION INTRAVENOUS
Status: DISCONTINUED | OUTPATIENT
Start: 2018-05-23 | End: 2018-05-29 | Stop reason: HOSPADM

## 2018-05-23 RX ORDER — PROCHLORPERAZINE 25 MG
12.5 SUPPOSITORY, RECTAL RECTAL EVERY 12 HOURS PRN
Status: DISCONTINUED | OUTPATIENT
Start: 2018-05-23 | End: 2018-05-29 | Stop reason: HOSPADM

## 2018-05-23 RX ORDER — FUROSEMIDE 10 MG/ML
40 INJECTION INTRAMUSCULAR; INTRAVENOUS ONCE
Status: COMPLETED | OUTPATIENT
Start: 2018-05-23 | End: 2018-05-23

## 2018-05-23 RX ORDER — MAGNESIUM SULFATE HEPTAHYDRATE 40 MG/ML
4 INJECTION, SOLUTION INTRAVENOUS EVERY 4 HOURS PRN
Status: DISCONTINUED | OUTPATIENT
Start: 2018-05-23 | End: 2018-05-29 | Stop reason: HOSPADM

## 2018-05-23 RX ORDER — SIMVASTATIN 10 MG
10 TABLET ORAL AT BEDTIME
Status: DISCONTINUED | OUTPATIENT
Start: 2018-05-24 | End: 2018-05-29 | Stop reason: HOSPADM

## 2018-05-23 RX ORDER — POTASSIUM CHLORIDE 1500 MG/1
20-40 TABLET, EXTENDED RELEASE ORAL
Status: DISCONTINUED | OUTPATIENT
Start: 2018-05-23 | End: 2018-05-29 | Stop reason: HOSPADM

## 2018-05-23 RX ORDER — NALOXONE HYDROCHLORIDE 0.4 MG/ML
.1-.4 INJECTION, SOLUTION INTRAMUSCULAR; INTRAVENOUS; SUBCUTANEOUS
Status: DISCONTINUED | OUTPATIENT
Start: 2018-05-23 | End: 2018-05-29 | Stop reason: HOSPADM

## 2018-05-23 RX ORDER — NITROGLYCERIN 0.4 MG/1
0.4 TABLET SUBLINGUAL EVERY 5 MIN PRN
Status: DISCONTINUED | OUTPATIENT
Start: 2018-05-23 | End: 2018-05-23

## 2018-05-23 RX ORDER — POTASSIUM CL/LIDO/0.9 % NACL 10MEQ/0.1L
10 INTRAVENOUS SOLUTION, PIGGYBACK (ML) INTRAVENOUS
Status: DISCONTINUED | OUTPATIENT
Start: 2018-05-23 | End: 2018-05-29 | Stop reason: HOSPADM

## 2018-05-23 RX ORDER — SERTRALINE HYDROCHLORIDE 100 MG/1
100 TABLET, FILM COATED ORAL DAILY
Status: DISCONTINUED | OUTPATIENT
Start: 2018-05-24 | End: 2018-05-29 | Stop reason: HOSPADM

## 2018-05-23 RX ORDER — ONDANSETRON 2 MG/ML
4 INJECTION INTRAMUSCULAR; INTRAVENOUS EVERY 6 HOURS PRN
Status: DISCONTINUED | OUTPATIENT
Start: 2018-05-23 | End: 2018-05-29 | Stop reason: HOSPADM

## 2018-05-23 RX ORDER — POTASSIUM CL/LIDO/0.9 % NACL 10MEQ/0.1L
10 INTRAVENOUS SOLUTION, PIGGYBACK (ML) INTRAVENOUS
Status: DISCONTINUED | OUTPATIENT
Start: 2018-05-23 | End: 2018-05-23

## 2018-05-23 RX ORDER — NITROGLYCERIN 0.4 MG/1
0.4 TABLET SUBLINGUAL EVERY 5 MIN PRN
Status: DISCONTINUED | OUTPATIENT
Start: 2018-05-23 | End: 2018-05-29 | Stop reason: HOSPADM

## 2018-05-23 RX ORDER — HYDRALAZINE HYDROCHLORIDE 20 MG/ML
10 INJECTION INTRAMUSCULAR; INTRAVENOUS EVERY 4 HOURS PRN
Status: DISCONTINUED | OUTPATIENT
Start: 2018-05-23 | End: 2018-05-29 | Stop reason: HOSPADM

## 2018-05-23 RX ADMIN — Medication 10 MEQ: at 22:26

## 2018-05-23 RX ADMIN — HUMAN ALBUMIN MICROSPHERES AND PERFLUTREN 3 ML: 10; .22 INJECTION, SOLUTION INTRAVENOUS at 13:15

## 2018-05-23 RX ADMIN — Medication 10 MEQ: at 11:38

## 2018-05-23 RX ADMIN — Medication 10 MEQ: at 12:49

## 2018-05-23 RX ADMIN — Medication 10 MEQ: at 23:36

## 2018-05-23 RX ADMIN — NITROGLYCERIN 0.4 MG: 0.4 TABLET SUBLINGUAL at 09:49

## 2018-05-23 RX ADMIN — FUROSEMIDE 40 MG: 10 INJECTION, SOLUTION INTRAVENOUS at 17:47

## 2018-05-23 RX ADMIN — Medication 10 MEQ: at 21:11

## 2018-05-23 RX ADMIN — POTASSIUM CHLORIDE 40 MEQ: 1500 TABLET, EXTENDED RELEASE ORAL at 10:36

## 2018-05-23 ASSESSMENT — ENCOUNTER SYMPTOMS
VOMITING: 0
DIARRHEA: 0
CHILLS: 1
COUGH: 1
FEVER: 0
SHORTNESS OF BREATH: 1

## 2018-05-23 NOTE — IP AVS SNAPSHOT
` `     [facility] CARDIAC SPECIALTY CARE: [phone]                 INTERAGENCY TRANSFER FORM - NOTES (H&P, Discharge Summary, Consults, Procedures, Therapies)   2018                    Hospital Admission Date: 2018  TRUE MATTHEWS   : 1929  Sex: Male        Patient PCP Information     Provider PCP Type    Lilly Zepeda MD General         History & Physicals      H&P signed by Tru Chakraborty PA at 2018  3:14 PM      Author:  Tru Chakraborty PA Service:  Hospitalist Author Type:  Physician Assistant    Filed:  2018  3:14 PM Date of Service:  2018 12:59 PM Creation Time:  2018  1:54 PM    Status:  Cosign Needed :  Tru Chakraborty PA (Physician Assistant)    Cosign Required:  Yes             Admitted:     2018      PRIMARY CARE PROVIDER:  Lilly Zepdea MD      CHIEF COMPLAINT:  Shortness of breath.      HISTORY OF PRESENT ILLNESS:  True Matthews is a pleasant 88-year-old male with a past medical history of chronic atrial fibrillation on warfarin, hypertension, hyperlipidemia, obstructive sleep apnea and recent C. diff infection who presented to the Emergency Department due to complaints of worsening shortness of breath.  The patient began having progressively worsening shortness of breath starting about 4 days ago with audible breath sounds and increased productive cough.  His sputum has been white to yellowish.  Today, he had some blood-tinged sputum.  He has also had subjective fevers last night but none measured.  He was recently hospitalized here from -05/15 with Clostridium difficile colitis and is on oral vancomycin.  The patient denies any chest pain and denies any history of MI or CHF.  He does have increased bilateral leg swelling which has been present for a few days.  He denies any abdominal pain, nausea, vomiting.  His diarrhea is improving.  The patient also reports that he has had many adjustments to  his warfarin dose recently due to variable INR levels.      The patient was evaluated in the Emergency Department by Dr. Mckeon.  There he was found to have oxygen saturation of 83% on room air.  His temperature was normal at 98.6, blood pressure 185/86 and pulse 99.  He was mildly tachypneic.  EKG showed atrial fibrillation with nonspecific ST and T-wave changes.  His chest x-ray showed mildly enlarged cardiac silhouette with new mixed interstitial and airspace opacities in both lungs, most confluent in the right perihilar region with small right pleural effusion.  His lab work showed an elevated WBC of 22.5 with hemoglobin 12.2 and platelet count 517.  His troponin was 0.017, potassium was 2.9.  BMP was otherwise unremarkable.  His proBNP was very elevated at 12,789.  Procalcitonin 0.12 and lactic acid 1.0.  It is suspected he has a CHF exacerbation, but he was not yet given diuretics as they were trying to get his potassium up first in the ER.  He was placed on BiPAP while in the ER with oxygen saturations improving above 90%.  He is being admitted to the Intermediate Care Unit for further management.      PAST MEDICAL HISTORY:   1.  Hypertension.   2.  Hyperlipidemia.   3.  Chronic atrial fibrillation on warfarin.   4.  Status post pacemaker placement.   5.  History of small-bowel obstruction.   6.  GERD.   7.  Sleep apnea.      PAST SURGICAL HISTORY:   1.  Pacemaker placement in 08/2015.   2.  Cholecystectomy.   3.  Prostate surgery.      FAMILY HISTORY:  This is reviewed with the patient and noncontributory to this presentation.      SOCIAL HISTORY:  The patient is a lifelong nonsmoker.  He does not drink alcohol.  He is  and his wife is present with him here in the ER.      PRIOR TO ADMISSION MEDICATIONS:[BG1.1]    Prior to Admission medications    Medication Sig Last Dose Taking? Auth Provider   Cyanocobalamin (B-12) 1000 MCG CAPS Take 1 tablet by mouth daily 5/23/2018 at am Yes Lilly Zepeda,  MD   hydrochlorothiazide (HYDRODIURIL) 25 MG tablet Take 1 tablet (25 mg) by mouth daily 5/23/2018 at am Yes Lilly Zepeda MD   lisinopril (PRINIVIL/ZESTRIL) 20 MG tablet Take 1 tablet (20 mg) by mouth 2 times daily 5/23/2018 at am Yes Lilly Zepeda MD   omeprazole (PRILOSEC) 20 MG CR capsule Take 1 capsule (20 mg) by mouth daily 5/22/2018 at pm Yes Lilly Zepeda MD   Sertraline HCl (ZOLOFT PO) Take 100 mg by mouth daily 5/23/2018 at am Yes Unknown, Entered By History   Simethicone (GAS-X EXTRA STRENGTH) 125 MG CAPS Take 1 capsule by mouth 2 times daily as needed  at prn Yes Lilly Zepeda MD   simvastatin (ZOCOR) 5 MG tablet Take 2 tablets (10 mg) by mouth daily 5/22/2018 at pm Yes Lilly Zepeda MD   vancomycin (FIRST) 50 MG/ML SOLN Take 2.5 mLs (125 mg) by mouth 4 times daily 5/23/2018 at x 1 dose Yes Lilly Zepeda MD   WARFARIN SODIUM PO Take by mouth daily Held 5/18/18-5/20/18.  2.5 mg M/W/Sa  5 mg AOD 5/22/2018 at pm Yes Unknown, Entered By History   order for DME Equipment being ordered: Walker Wheels () and Walker ()  Treatment Diagnosis: Difficulty ambulating   Kasie Merchant MD   order for DME Equipment being ordered: Walker Wheels ()  Treatment Diagnosis:  Weakness,colitis.   Kasie Merchant MD[BG1.2]     ALLERGIES:  PENICILLINS.      REVIEW OF SYSTEMS:  A complete 10-point review of systems was performed and is negative other than the items previously mentioned above in the HPI.      PHYSICAL EXAMINATION:   VITAL SIGNS:  Blood pressure 185/86, heart rate 99, temperature 98.6, respiratory rate 24, oxygen saturation 83% on room air, improved to 99% on BiPAP.   GENERAL:  The patient is alert, oriented to person, place and situation.  He is calm but in a mild amount of distress, currently on BiPAP.   EYES:  Pupils equal and round.  Extraocular movements grossly intact.     HENT:  Head normocephalic, atraumatic.  Throat not examined as he is on BiPAP.   NECK:   Supple, no cervical adenopathy.   CARDIOVASCULAR:  Heart:  Irregular heart rate, nontachycardic, no murmurs, rubs or gallops.  Distal pulses are intact.  He has 1+ bilateral lower extremity edema to shins.   PULMONARY:  Coarse lung sounds bilaterally with more consolidation on the right side in the base.  No wheezes.  Breathing is mildly labored.  He is on BiPAP.   GASTROINTESTINAL:  Abdomen soft, nontender, nondistended with normoactive bowel sounds.   MUSCULOSKELETAL:  The patient moves all 4 extremities with normal strength.   NEUROLOGIC:  Alert, cranial nerves II-XII are grossly intact.  Motor function is grossly intact.   SKIN:  Warm, dry, nondiaphoretic.  No rashes seen on limited exam.   PSYCHIATRIC:  Normal mood and affect.      LABORATORY DATA:  CBC:  WBC 22.5, hemoglobin 12.2, hematocrit 36.1, platelet count 517, absolute neutrophil count 19.8.  BMP:  Potassium 2.9, creatinine 0.76, glucose 143, otherwise within normal limits.  ProBNP 12,789.  Lactic acid 1.0.  Procalcitonin 0.12.  Troponin 0.017.  INR 6.86.      IMAGING:  Chest x-ray personally reviewed.  Left subclavian cardiac device in place.  The cardiac silhouette is mildly enlarged.  There are new mixed interstitial and airspace opacities in both lungs, most confluent in the right perihilar region.  Small right pleural effusion.  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      EKG:  Atrial fibrillation with nonspecific ST-T wave changes.      ASSESSMENT AND PLAN:  Santosh Robledo is a pleasant 88-year-old male with a past medical history of C. difficile colitis, hypertension, hyperlipidemia, chronic atrial fibrillation on warfarin, who presented to the Emergency Department with progressive shortness of breath and leg edema beginning about 4 days prior.  He is being admitted to the hospital for acute hypoxic respiratory failure, most likely due to a CHF exacerbation.     1.  Acute hypoxic respiratory failure  secondary to possible acute congestive heart failure exacerbation:  The patient has had 4 days of progressive worsening shortness of breath with increased leg edema and productive cough.  He had some blood-tinged sputum earlier.  No chest pain.  He has no CHF history.  No recent echocardiograms.  His chest x-ray shows a mixed picture with interstitial and airspace opacities in both lungs, most confluent on the right perihilar region.  Pneumonia is not excluded, although Procalcitonin is low risk for systemic infection.   Plan to give IV diuresis once his potassium has been replaced.  He was started on BiPAP in the ER for oxygen saturation in the low 80s on room air, requiring up to 6 liters of oxygen to get him above 90%.  Will continue with BiPAP and place him in the IMC.  We will obtain an echocardiogram.  His proBNP was elevated at 12,789.  Will monitor on telemetry.  Consider Cardiology consultation.  Monitor electrolytes and renal function while being diuresed.      2.  Clostridium difficile colitis:  The patient was admitted to the hospital here from 05/13-05/15 for this.  He was started on vancomycin 125 mg q.i.d.  This will be continued once he is able to take p.o.  If he is unable to come off the BiPAP, may need to start IV Flagyl.  His diarrhea is improving.  His significant leukocytosis[BG1.1] is likely[BG1.3] from C. diff but will trend CBC daily.     3.  Chronic atrial fibrillation, status post pacemaker placement:  The patient is chronically anticoagulated with warfarin.  Reportedly, he has had variable INRs recently with warfarin dose adjustments being made.  INR today is supratherapeutic at 6.86.  He did have some blood-tinged sputum but otherwise no active signs of bleeding.  Will hold warfarin now and monitor INR daily, allowing this to drift down.  Currently, no indication for rapid reversal.  Will monitor on telemetry.     4.  Essential hypertension:  Blood pressures here are hypertensive,  150s-160s currently.  Prior to admission on hydrochlorothiazide 25 mg daily and lisinopril 20 mg b.i.d.  Will continue with lisinopril once able to take p.o.  Hold hydrochlorothiazide.  We will have IV hydralazine available p.r.n.     5.  Hypokalemia:  The patient's potassium is 2.9.  This is likely due to reduced p.o. intake lately along with his C. diff diarrhea and hydrochlorothiazide use.  We will replace per protocol.  Also have magnesium replacement protocol.  Monitor closely while undergoing diuresis.     6.  Hyperlipidemia:  Resume simvastatin when able.     7.  Gastroesophageal reflux disease:  Resume PPI when able.     8.  Deep venous thrombosis prophylaxis:  He is on warfarin.     9.  Code status:  The patient is DNR but now is stating that he would want a trial of intubation if necessary, but nothing prolonged.  This was also confirmed with his wife at time of admission.     10.  Disposition:  Admit inpatient to [facility] and will likely require at least 2 more days in the hospital.      This patient was discussed with Dr. Michelle Montgomery of the [facility].  She is in agreement with my assessment and plan of care.         MICHELLE MONTGOMERY MD       As dictated by JYOTSNA QUINTANILLA PA-C            D: 2018   T: 2018   MT: SARAH      Name:     TRUE MATTHEWS   MRN:      [id number]        Account:      [id number]   :      1929        Admitted:     2018                   Document: [id number][BG1.1]         Revision History        User Key Date/Time User Provider Type Action    > BG1.3 2018  3:14 PM Jyotsna Quintanilla PA Physician Assistant Sign     BG1.1 2018  3:12 PM Jyotsna Quintanilla PA Physician Assistant Sign     BG1.2 2018  3:09 PM Jyotsna Quintanilla PA Physician Assistant      [N/A] 2018  1:54 PM Jyotsna Quintanilla PA Physician Assistant Edit                  Discharge Summaries     No notes of this type  exist for this encounter.         Consult Notes      Consults by Jonelle Bah LICSW at 5/29/2018 12:41 PM     Author:  Jonelle Bah LICSW Service:  Social Work Author Type:      Filed:  5/29/2018 12:41 PM Date of Service:  5/29/2018 12:41 PM Creation Time:  5/29/2018 12:09 PM    Status:  Signed :  Jonelle Bah LICSW ()     Consult Orders:    1. Social Work IP Consult [[id number]] ordered by Tru Chakraborty PA at 05/28/18 1236                Care Transition Initial Assessment -   Reason For Consult: discharge planning  Met with: Patient and Family  (wife Candida)[SJ1.1]  Active Problems:    Acute exacerbation of CHF (congestive heart failure) (H)[SJ1.2]       DATA  Lives With: spouse  Living Arrangements: apartment ([address] [place] [address])  Description of Support System: Supportive, Involved  Who is your support system?: Wife  Support Assessment: Adequate family and caregiver support.   Identified issues/concerns regarding health management:      Resources List: Skilled Nursing Facility     Quality Of Family Relationships: supportive, involved  Transportation Available: family or friend will provide    Per social service protocol for discharge planning.  Patient was admitted on 5-23-18 with CHF exacerbation.  The tentative date of discharge is 5-29-18.  Reviewed chart and spoke with patient and wife regarding discharge plans.  Per patient and wife report, they live in a senior apartment at [address] [place] [address].  Patient has a straight cane, a quad cane, and a standard walker at baseline.  Patient typically uses a cane at baseline.  Patient has grab bars and a raised toilet seat in the bathroom.  Patient is independent with ADL's and IAD's.  Patient manages his own meds.  Patient's wife does most of the cooking, laundry, and cleaning.  Reviewed the therapy discharge recommendations of tcu placement on discharge and patient and wife are in agreement.  Patient's wife is asking for a  referral to be sent to [facility].  They are interested in a double room.  Referrals sent, via discharge on the double, to check bed availability.  Received a call back from [facility].  They can accept patient today.  Patient's wife has to leave the hospital for an appointment and will be back later this afternoon.  She would like to be here when patient discharges.  She is asking for a 15:00 discharge.  Updated [place] and they are in agreement.    ASSESSMENT  Cognitive Status:  awake and alert  Concerns to be addressed: discharge planning.     PLAN  Financial costs for the patient includes N/A.  Patient given options and choices for discharge TCU choices.  Patient/family is agreeable to the plan?  Yes  Patient Goals and Preferences: TCU on discharge.  Patient anticipates discharging to:  [facility].    Will continue to follow.[SJ1.1]    Jonelle Bah[SJ1.3], [facility]  [phone][SJ1.1]           Revision History        User Key Date/Time User Provider Type Action    > SJ1.3 5/29/2018 12:41 PM Jonelle Bah [facility]  Sign     SJ1.2 5/29/2018 12:10 PM Jonelle Bah, [facility]       SJ1.1 5/29/2018 12:09 PM Jonelle Bah [facility]              Consults by Lise Stephenson RN at 5/29/2018 12:07 PM     Author:  Lise Stephenson RN Service:  C.O.R.EEd Author Type:  Registered Nurse    Filed:  5/29/2018 12:07 PM Date of Service:  5/29/2018 12:07 PM Creation Time:  5/29/2018 12:02 PM    Status:  Signed :  Lise Stephenson RN (Registered Nurse)     Consult Orders:    1. [facility] Evaluation IP Consult: Patient to be seen: Routine - within 24 hours; Consultant may enter orders: Yes [[id number]] ordered by Tru Chakraborty PA at 05/23/18 1143                [facility] Referral received from EVARISTO Ellis. Patient is not currently established in the [facility]. Pt has HFpEF. HF education to be given by hospital nurse. Spoke with zeeshan Earl  coordinator at [facility]. Pt was seen by Cardiology this admission, but no follow up orders placed. Reviewed chart, pt was to see EP for new consult as outpatient on 5/23 per PCP Dr. Zepeda to establish care, as he also has PPM. Appointment ended up being canceled as patient admitted. Per Mady, plan is for patient to discharge to [facility]. Scheduled pt to see Dr. Lynn as new EP consult on 6/15 per original orders from PCP and message EP RN's to see if device check needed prior. Will have Dr. Lynn address at that visit if [facility] clinic needed. Discussed plan with Mady. ADRIANA Wilson 12:07 PM 05/29/18       Lise Stephenson RN  [facility] nurse  [phone]      [facility]: Cardiomyopathy, Optimization, Rehabilitation, Education   The [facility] is a heart failure specialty clinic within the [facility] where you will work with your cardiologist, nurse practitioners, physician assistants and registered nurses who specialize in heart failure care. They are dedicated to helping patients with heart failure to carefully adjust medications, receive education, and learn who and when to call if symptoms develop. They specialize in helping you better understand your condition, slow the progression of your disease, improve the length and quality of your life, help you detect future heart problems before they become life threatening, and avoid hospitalizations.[TM1.1]       Revision History        User Key Date/Time User Provider Type Action    > TM1.1 5/29/2018 12:07 PM Lise Stephenson RN Registered Nurse Sign            Consults by Mary Carpenter RD, LD at 5/24/2018  2:32 PM     Author:  Mary Carpenter RD, LD Service:  Nutrition Author Type:  Registered Dietitian    Filed:  5/24/2018  2:52 PM Date of Service:  5/24/2018  2:32 PM Creation Time:  5/24/2018  2:23 PM    Status:  Signed :  Mary Carpenter RD, LD (Registered Dietitian)         CLINICAL NUTRITION SERVICES  -  " ASSESSMENT NOTE      Recommendations Ordered by Registered Dietitian (RD):     Nutrition education[SJ1.1] ---> CHF diet teaching not appropriate at this time - will plan to provide prior to dc (with wife present)    Medical Food Supplement ---> will send a nutrition supplement with meals for added cals/pro[SJ1.2]     Malnutrition:[SJ1.1] (5/24)[SJ1.2]  % Weight Loss:[SJ1.1]  Up to 7.5% in 3 months (non-severe malnutrition)[SJ1.2]  % Intake:[SJ1.1]  </= 75% for >/= 1 month (severe malnutrition)[SJ1.2]  Subcutaneous Fat Loss:[SJ1.1]  None observed[SJ1.2]  Muscle Loss:[SJ1.1]  Clavicle bone region  - mild depletion and Dorsal hand region  - mild depletion[SJ1.2]  Fluid Retention:[SJ1.1]  Mild (LE edema)[SJ1.2]    Malnutrition Diagnosis:[SJ1.1] Non-Severe malnutrition[SJ1.2]  In Context of:[SJ1.1]  Acute illness or injury[SJ1.2]        REASON FOR ASSESSMENT  Santosh Robledo is a 88 year old male seen by Registered Dietitian for Admission Nutrition Risk Screen - Unintentional weight loss of 10# or more in past 2 months and Standard CHF consult for 2gm Na diet education      NUTRITION HISTORY[SJ1.1]  Pt is known from recent admit  Nutrition assessment done on (5/14):  - Information obtained from the EMR and pt's wife - pt is a poor historian.  Pt has had diarrhea on and off since February. His intake has been poor for the past 1.5 months  He's eaten ice cream, dry cereal, \"a little dinner\". He also took supplements such as [place] and Boost but only once/day.      Visited with pt this afternoon (wife and dtr gone for the day)  Pt notes that his appetite has been decreased for the past 1-2 months  Confirms that he has been drinking 1 nutrition supplement per day  Tells me that he loves ice cream!  Unable to give me a more detailed diet history[SJ1.2]      CURRENT NUTRITION ORDERS  Diet Order:[SJ1.1]     Per MD, pt may start clear liquid diet this afternoon    Pt asking for a milkshake - will send a Boost Breeze and " "Gelatein PLUS instead[SJ1.2]      PHYSICAL FINDINGS  Observed[SJ1.1]  Muscle Wasting  - clavicles, hands[SJ1.2]  Obtained from Chart/Interdisciplinary Team   1+ right foot and lower ankle edema, trace to 1+ left foot edema.[SJ1.1]    Per Cards:  \"INR is high, he is having hemoptysis, chest x-ray findings are worse greatly on the right in the face of diuresis overnight.  I would be concerned also about infection and pulmonary hemorrhage and would have low threshold for addressing these in addition to continuing diuresis.\"[SJ1.2]    ANTHROPOMETRICS  Height: 6' 3\"  Weight:(5/24) 79.8 kg / 175 lbs 14.4 oz  Body mass index is 21.99 kg/(m^2).  Weight Status:  Normal BMI  IBW: 89.1 kg  % IBW: 90%  Weight History: wife has reported that pt's usual wt is ~182# - he had recently lost ~10#[SJ1.1] (5%)[SJ1.2], wt is back up likely from fluid as po intake has been decreased[SJ1.1]  Wt Readings from Last 10 Encounters:   05/24/18 79.8 kg (175 lb 14.4 oz)   05/18/18 83.2 kg (183 lb 8 oz)   05/15/18 81.7 kg (180 lb 1.9 oz)   05/07/18 (P) 79.8 kg (176 lb)   04/30/18 77.6 kg (171 lb)[SJ1.3]     1/7/18  184 lbs    LABS  Labs reviewed    MEDICATIONS  Medications reviewed      ASSESSED NUTRITION NEEDS PER APPROVED PRACTICE GUIDELINES:    Dosing Weight: (based on 4/30 wt of 77.6 kg - wt was down and then pt gained fluid wt)    Estimated Energy Needs: 4308-8065 kcals (25-30 Kcal/Kg)  Justification: maintenance  Estimated Protein Needs:  grams protein (1.2-1.5 g pro/Kg)  Justification: preservation of lean body mass      MALNUTRITION:  % Weight Loss:[SJ1.1]  Up to 7.5% in 3 months (non-severe malnutrition)[SJ1.2]  % Intake:[SJ1.1]  </= 75% for >/= 1 month (severe malnutrition)[SJ1.2]  Subcutaneous Fat Loss:[SJ1.1]  None observed[SJ1.2]  Muscle Loss:[SJ1.1]  Clavicle bone region  - mild depletion and Dorsal hand region  - mild depletion[SJ1.2]  Fluid Retention:[SJ1.1]  Mild (LE edema)[SJ1.2]    Malnutrition Diagnosis:[SJ1.1] " Non-Severe malnutrition[SJ1.2]  In Context of:[SJ1.1]  Acute illness or injury[SJ1.2]    NUTRITION DIAGNOSIS:[SJ1.1]  Inadequate protein-energy intake[SJ1.2] related to[SJ1.1] NPO status[SJ1.2] as evidenced by[SJ1.1] pt meeting 0% est needs so far today[SJ1.2]      NUTRITION INTERVENTIONS  Recommendations / Nutrition Prescription[SJ1.1]  NPO ---> clear liquid diet  Nutrition supplements[SJ1.2]  .      Implementation  Nutrition education[SJ1.1] ---> CHF diet teaching not appropriate at this time - will plan to provide prior to dc (with wife present)  Medical Food Supplement ---> will send a nutrition supplement with meals for added cals/pro[SJ1.2]  .      Nutrition Goals[SJ1.1]  Pt to have diet advanced beyond clear liquid in the next 48 hrs[SJ1.2]  .      MONITORING AND EVALUATION:[SJ1.1]  Progress towards goals will be monitored and evaluated per protocol and Practice Guidelines[SJ1.2]                   Revision History        User Key Date/Time User Provider Type Action    > SJ1.2 5/24/2018  2:52 PM Mary Carpenter RD, [place] Registered Dietitian Sign     SJ1.3 5/24/2018  2:25 PM Mary Carpenter RD, [place] Registered Dietitian      SJ1.1 5/24/2018  2:23 PM Mary Carpenter RD, [place] Registered Dietitian             Consults by Russell Gage MD at 5/24/2018 11:52 AM     Author:  Russell Gage MD Service:  Cardiology Author Type:  Physician    Filed:  5/24/2018 12:07 PM Date of Service:  5/24/2018 11:52 AM Creation Time:  5/24/2018 11:52 AM    Status:  Signed :  Russell Gage MD (Physician)     Consult Orders:    1. Cardiology IP Consult: Patient to be seen: Routine - within 24 hours; acute chf exacerbation.; Consultant may enter orders: Yes [[id number]] ordered by Anette Montgomery MD at 05/24/18 1039                [facility]    Cardiology Consultation     Date of Admission:  5/23/2018  Date of Consult (When I saw the patient): 05/24/18    Assessment & Plan    Santosh Robledo is a 88 year old male who was admitted on 5/23/2018.    1-acute hypoxic respiratory failure.  Need to treat for a component of volume overload.  However this seems unusually prominent for just volume overload from fluid loading from his last admission.  INR is high, he is having hemoptysis, chest x-ray findings are worse greatly on the right in the face of diuresis overnight.  I would be concerned also about infection and pulmonary hemorrhage and would have low threshold for addressing these in addition to continuing diuresis.    2-congestive heart failure, possible acute diastolic.  According to his wife he received vigorous IV hydration at recent admission for C. difficile colitis.  He had lost over 10 pounds during the month prior to admission and she thinks at discharge his weight was actually up about 10 pounds which would most likely represent fluid.  In addition his weight was significantly higher at this presentation even though she states he's been eating so poorly at home that his INR has been steadily rising despite adjusting the warfarin dose.      Recommend-continue with IV diuresis until symptoms resolve, other etiology becomes the most prominent issue, or renal function starts demonstrate prerenal volume status    Russell Gage M.D.    Primary Care Physician   Lilly Zepeda    Reason for Consult   Reason for consult: I was asked by Dr. Montgomery to evaluate this patient for acute hypoxic respiratory failure.    History of Present Illness   Santosh Robledo is a 88 year old male who presents with a week of increasing dyspnea on exertion with some edema.  History is from him but also from his wife was a better historian.  He was recently admitted with C. difficile colitis.  Wife notes that for over a month before that he had chronic diarrhea and poor appetite and probably lost at least 10 pounds.  There is a report of a weight of around 170 pounds at that time and she states his  home weight used to be 180 pounds.  He was treated for C. difficile colitis and she states he got vigorous IV hydration during that time.  It appears at this presentation at home weights he went up to 178 280 pounds during that time despite eating poorly so could easily been up 10 or more pounds of fluid.  She notes shortly after discharge he was complaining of shortness of breath with any activity and did not want to do activities because he was tired and short of breath.  However he was lying down without any worsening shortness of breath and was not having PND or    Orthopnea.  He eventually developed some pedal edema just that his feet.  Because of worsening symptoms he came to the emergency room where he was found to be quite hypoxic and in respiratory distress and actually required BiPAP for a while although he did not like it.  He is back on 5 L nasal prongs as morning but his respiratory rate is significantly elevated.  He has a productive cough and is now producing bright red sputum.  INRs presentation almost 6, and giovanny to almost 10 last night.  Some IV vitamin K was given.  Repeat INR pending.    Chest x-ray reported bilateral interstitial and airspace opacities more prominent right perihilar region as well as right pleural effusion.  Atypical pneumonia versus heart failure included in the differential.  I personally reviewed this x-ray and there certainly are some diffuse airspace opacity suggesting heart failure, however there is an unusual pattern prominence in the right perihilar and right lateral lung.  On follow-up chest x-ray this morning with a left sided alveolar infiltrates appears improved but there is marked worsening on the right side.  Overall this appears very atypical to only be explained by volume overload, especially since he is diuresed some overnight and is urinating frequently.    Current line exam has quite marked coarse crackles everywhere but worse in the right anterior and upper  posterior regions.  There is diffuse crackles however everywhere.  Respiratory rate is elevated at in the 30s although somewhat shallow.  He is saturating on 5 L.  Again exam seems atypical for just heart failure/pulmonary edema.    An echocardiogram done in presentation yesterday and personally reviewed by me and shows normal left and right ventricular systolic function.  IVC size 2.3 cm is within normal limits.  E/E prime was indeterminate but relatively low at around 11.  He is in atrial fibrillation so no further evaluation of diastolic function accurate.    Patient Active Problem List   Diagnosis     Gastroesophageal reflux disease, esophagitis presence not specified     Chronic atrial fibrillation (H)     Status cardiac pacemaker     Long term current use of anticoagulant therapy     Benign essential hypertension     Hyperlipidemia LDL goal <100     Vitamin B12 deficiency (non anemic)     Episode of recurrent major depressive disorder, unspecified depression episode severity (H)     Colitis     Advance Care Planning     Near syncope     AV node dysfunction     Hyperlipidemia     MARILU (obstructive sleep apnea)     Acute exacerbation of CHF (congestive heart failure) (H)       Past Medical History   I have reviewed this patient's medical history and updated it with pertinent information if needed.[DL1.1]   Past Medical History:   Diagnosis Date     AV node dysfunction      Chronic atrial fibrillation (H) 2004     GERD (gastroesophageal reflux disease)      Hyperlipidemia      Near syncope      MARILU (obstructive sleep apnea)[DL1.2]        Past Surgical History   I have reviewed this patient's surgical history and updated it with pertinent information if needed.[DL1.1]  Past Surgical History:   Procedure Laterality Date     IMPLANT PACEMAKER  08/10/2015[DL1.2]       Prior to Admission Medications   Prior to Admission Medications   Prescriptions Last Dose Informant Patient Reported? Taking?   Cyanocobalamin (B-12)  1000 MCG CAPS 5/23/2018 at am Spouse/Significant Other No Yes   Sig: Take 1 tablet by mouth daily   Sertraline HCl (ZOLOFT PO) 5/23/2018 at am Spouse/Significant Other Yes Yes   Sig: Take 100 mg by mouth daily   Simethicone (GAS-X EXTRA STRENGTH) 125 MG CAPS  at prn Spouse/Significant Other No Yes   Sig: Take 1 capsule by mouth 2 times daily as needed   WARFARIN SODIUM PO 5/22/2018 at pm Spouse/Significant Other Yes Yes   Sig: Take by mouth daily Held 5/18/18-5/20/18.  2.5 mg M/W/Sa  5 mg AOD   hydrochlorothiazide (HYDRODIURIL) 25 MG tablet 5/23/2018 at am Spouse/Significant Other No Yes   Sig: Take 1 tablet (25 mg) by mouth daily   lisinopril (PRINIVIL/ZESTRIL) 20 MG tablet 5/23/2018 at am Spouse/Significant Other No Yes   Sig: Take 1 tablet (20 mg) by mouth 2 times daily   omeprazole (PRILOSEC) 20 MG CR capsule 5/22/2018 at pm Spouse/Significant Other No Yes   Sig: Take 1 capsule (20 mg) by mouth daily   order for DME   No No   Sig: Equipment being ordered: Walker Wheels () and Walker ()  Treatment Diagnosis: Difficulty ambulating   order for DME   No No   Sig: Equipment being ordered: Walker Wheels ()  Treatment Diagnosis:  Weakness,colitis.   simvastatin (ZOCOR) 5 MG tablet 5/22/2018 at pm Spouse/Significant Other No Yes   Sig: Take 2 tablets (10 mg) by mouth daily   vancomycin (FIRST) 50 MG/ML SOLN 5/23/2018 at x 1 dose Spouse/Significant Other No Yes   Sig: Take 2.5 mLs (125 mg) by mouth 4 times daily      Facility-Administered Medications: None     Current Facility-Administered Medications   Medication Dose Route Frequency     furosemide  40 mg Intravenous BID     lisinopril  20 mg Oral BID     metoprolol tartrate  25 mg Oral BID     nystatin  500,000 Units Swish & Spit 4x Daily     omeprazole  20 mg Oral Daily     potassium chloride SA  20 mEq Oral BID     sertraline (ZOLOFT) tablet 100 mg  100 mg Oral Daily     simvastatin  10 mg Oral At Bedtime     sodium chloride (PF)  10 mL Intracatheter  "Q8H     sodium chloride (PF)  3 mL Intracatheter Q8H     vancomycin  125 mg Oral 4x Daily     Current Facility-Administered Medications   Medication Last Rate     Continuing ACE inhibitor/ARB/ARNI from home medication list OR ACE inhibitor/ARB order already placed during this visit       - MEDICATION INSTRUCTIONS -       - MEDICATION INSTRUCTIONS -       Reason beta blocker order not selected       Allergies   Allergies   Allergen Reactions     Penicillins Rash       Social History    reports that he has never smoked. He has never used smokeless tobacco. He reports that he drinks alcohol. He reports that he does not use illicit drugs.    Family History   Family History   Problem Relation Age of Onset     Influenza/Pneumonia Mother      Prostate Cancer Father        Review of Systems   The 10 point Review of Systems is negative other than noted in the HPI or here.     Physical Exam   Vital Signs with Ranges  Temp:  [97.7  F (36.5  C)-98.9  F (37.2  C)] 98.9  F (37.2  C)  Heart Rate:  [56-85] 65  Resp:  [8-41] 41  BP: (133-192)/() 179/89  FiO2 (%):  [40 %] 40 %  SpO2:  [92 %-100 %] 95 %  Vitals:    05/23/18 1300 05/24/18 0500 05/24/18 1120   Weight: 81.6 kg (179 lb 14.4 oz) 92.5 kg (204 lb) 79.8 kg (175 lb 14.4 oz)     I/O last 3 completed shifts:  In: 400 [I.V.:400]  Out: 1050 [Urine:1050]    Vitals: /89  Pulse 99  Temp 98.9  F (37.2  C) (Oral)  Resp (!) 41  Ht 1.905 m (6' 3\")  Wt 79.8 kg (175 lb 14.4 oz)  SpO2 95%  BMI 21.99 kg/m2    Constitutional: Normally developed gentleman.  Increased respiratory rates and some effort.  Appears chronically ill    Skin: Grossly clear.  Pacer site intact and healed    Head/Eyes/ENT:  No cyanosis or icterus.  Throat grossly clear.    Neck:  Supple with normal carotid upstrokes.  No mass    Chest:  Diffuse very coarse crackles particularly right anterior more than left anterior and right mid posterior.  Respiratory rate and effort increased.  Sputum is bright red " and thick.    Cardiac: Distant heart sounds obscured by respiratory noise.  Irregular rhythm.  Difficult to assess JVD because of rapid respiratory rate does not appear significantly elevated    Abdomen:  Nontender, nondistended, no hepatomegaly or bruit    Vascular: Carotid radial and femoral pulses intact.  Pedal pulses diminished.  There is mild pedal edema    Extremities and Back:  1+ right foot and lower ankle edema, trace to 1+ left foot edema.  No cellulitis erythema cyanosis trauma     Neurological:  Alert and cooperative.  Follows requests appropriately.  Extraocular motions intact, face is symmetrical, speech lucid.  Grossly intact upper and lower motor tone and strength and on symmetrical      Recent Labs  Lab 05/23/18  1415 05/23/18  0944   TROPI <0.015 0.017         Recent Labs  Lab 05/24/18  0525 05/23/18  1835 05/23/18  1415 05/23/18  0944  05/18/18  1003 05/18/18   WBC 13.9*  --   --  22.5*  --  14.8*  --    HGB 11.4*  --   --  12.2*  --  12.5*  --    MCV 80  --   --  79  --  83  --    *  --   --  517*  --  499*  --    INR 9.55*  --   --  6.86*  --   --  5.1*     --   --  139  --   --   --    POTASSIUM 3.2* 3.1* 3.3* 2.9*  < >  --   --    CHLORIDE 105  --   --  103  --   --   --    CO2 28  --   --  26  --   --   --    BUN 14  --   --  13  --   --   --    CR 0.78  --   --  0.76  --   --   --    GFRESTIMATED >90  --   --  >90  --   --   --    GFRESTBLACK >90  --   --  >90  --   --   --    ANIONGAP 10  --   --  10  --   --   --    ANGELITA 8.1*  --   --  8.5  --   --   --    *  --   --  143*  --   --   --    TROPI  --   --  <0.015 0.017  --   --   --    < > = values in this interval not displayed.    Imaging:  Recent Results (from the past 48 hour(s))   XR Chest 2 Views    Narrative    XR CHEST 2 VW 5/23/2018 10:03 AM     HISTORY: Shortness of breath    COMPARISON: 4/30/2018      Impression    IMPRESSION: Left subclavian cardiac device in place. The cardiac  silhouette is mildly enlarged.  There are new mixed interstitial and  airspace opacities in both lungs, most confluent in the right  perihilar region. Small right pleural effusion. 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    RAQUEL COVINGTON MD   XR Chest Port 1 View    Narrative    CHEST PORTABLE ONE VIEW  2018 10:44 AM     COMPARISON: 2-view chest x-ray 2018.    HISTORY: Follow up pneumonia versus congestive heart failure.      Impression    IMPRESSION: A single lead pacemaker module implanted over the left  chest with the lead in the right ventricle is again noted without  identifiable change.    Patchy airspace opacity in the mid and upper right lung has increased  in density and extent since the comparison study consistent with  worsening of pneumonia. Small right pleural effusion again noted.  Emphysematous and fibrotic changes throughout both lungs again noted.  There is no pneumothorax on either side. There is no pleural effusion  on the left. Heart size remains within normal limits.    JHONY MAYBERRY MD       Echo:  Recent Results (from the past 4320 hour(s))   ECHO COMPLETE WITH OPTISON    Narrative    [id number]  [facility]  [id number]  [id number]^^JYOTSNA^ANGELA           [facility]  Echocardiography Laboratory  [address] [place] [address]        Name: TRUE MATTHEWS  MRN: [id number]  : 1929  Study Date: 2018 12:51 PM  Age: 88 yrs  Gender: Male  Patient Location: [facility]  Reason For Study: CHF  Ordering Physician: JYOTSNA QUINTANILLA  Referring Physician: Lilly Zepeda Johsua  Performed By: Migdalia Merlos     BSA: 2.0 m2  Height: 75 in  Weight: 170 lb  HR: 71  BP: 164/90 mmHg  _____________________________________________________________________________  __        Procedure  Complete Portable Echo Adult. Contrast Optison.  _____________________________________________________________________________  __        Interpretation Summary     Left ventricular  systolic function is normal.  The visual ejection fraction is estimated at 55-60%.  Dilated inferior vena cava  The study was technically difficult.  _____________________________________________________________________________  __        Left Ventricle  The left ventricle is normal in size. Left ventricular systolic function is  normal. The visual ejection fraction is estimated at 55-60%. Diastolic  function not assessed due to atrial fibrillation. No regional wall motion  abnormalities noted.     Right Ventricle  Borderline right ventricular enlargement. The right ventricular systolic  function is normal. The right ventricle is not well visualized. There is a  pacemaker lead in the right ventricle.     Atria  The left atrium is not well visualized. Right atrium not well visualized.     Mitral Valve  There is mild mitral annular calcification. There is trace mitral  regurgitation. There is no mitral valve stenosis.        Tricuspid Valve  The tricuspid valve is not well visualized. The right ventricular systolic  pressure is approximated at 27.0 mmHg plus the right atrial pressure.     Aortic Valve  The aortic valve is trileaflet with aortic valve sclerosis. There is trace  aortic regurgitation. No hemodynamically significant valvular aortic stenosis.     Pulmonic Valve  The pulmonic valve is not well visualized.     Vessels  Borderline aortic root dilatation. Dilated inferior vena cava.     Pericardium  There is no pericardial effusion.     _____________________________________________________________________________  __  MMode/2D Measurements & Calculations  IVSd: 1.0 cm  LVIDd: 4.4 cm  LVIDs: 3.5 cm  LVPWd: 0.89 cm  FS: 21.5 %  LV mass(C)d: 139.2 grams  LV mass(C)dI: 68.0 grams/m2     Ao root diam: 3.9 cm  LA dimension: 3.6 cm  asc Aorta Diam: 3.5 cm  LA/Ao: 0.93  LA Volume (BP): 57.3 ml  LA Volume Index (BP): 28.0 ml/m2  RWT: 0.40        Doppler Measurements & Calculations  MV E max heidy: 104.5 cm/sec  Ao V2  max: 137.7 cm/sec  Ao max P.0 mmHg     PA acc time: 0.12 sec  TR max heidy: 259.9 cm/sec  TR max P.0 mmHg  E/E' av.8  Lateral E/e': 9.9  Medial E/e': 13.7           _____________________________________________________________________________  __           Report approved by: Shy Jama 2018 01:49 PM[DL1.1]           Revision History        User Key Date/Time User Provider Type Action    > DL1.2 2018 12:07 PM Russell Gage MD Physician Sign     DL1.1 2018 11:52 AM Russell Gage MD Physician                      Progress Notes - Physician (Notes from 18 through 18)      Progress Notes by Mady Pacheco RN at 2018 11:57 AM     Author:  Mady Pacheco RN Service:  Care Coordinator Author Type:      Filed:  2018 11:59 AM Date of Service:  2018 11:57 AM Creation Time:  2018 11:57 AM    Status:  Signed :  Mday Pacheco RN ()         Contacted Lise at [facility] re: order for consult.   Lise assisted in scheduling f/u with EP MD and at that appointment the MD will decide if patient should see [facility] clinic.  Appointment added to AVS.[SS1.1]     Revision History        User Key Date/Time User Provider Type Action    > SS1.1 2018 11:59 AM Mady Pacheco RN Case Manager Sign            Progress Notes by Dora Roy at 2018 11:11 AM     Author:  Dora Roy Service:  Spiritual Health Author Type:      Filed:  2018 11:16 AM Date of Service:  2018 11:11 AM Creation Time:  2018 11:11 AM    Status:  Signed :  Dora Roy ()         SPIRITUAL HEALTH SERVICES Progress Note  [facility]    Visited pt per length of stay. Pt's wife was present and knitting hats for molly. Pt's daughter had visited earlier this morning. Pt is anticipating discharge today or tomorrow, whenever TCU can be arranged. Pt and wife attend [employer]  [employer] [facility] [employer]. Pt's wife is the daughter of a Hindu . Pt and wife have several children and grandchildren, and they feel well supported by family. Family stated no SH needs at this time. SH has no plans to follow but is available upon request.      Dora Farnsworth Resident  Pager: [phone]  Office: [phone][EW1.1]     Revision History        User Key Date/Time User Provider Type Action    > EW1.1 5/29/2018 11:16 AM Dora Roy Sign            Progress Notes by Mary Carpenter RD, LD at 5/29/2018  9:05 AM     Author:  Mary Carpenter RD, LD Service:  Nutrition Author Type:  Registered Dietitian    Filed:  5/29/2018  9:12 AM Date of Service:  5/29/2018  9:05 AM Creation Time:  5/29/2018  9:05 AM    Status:  Signed :  Mary Carpenter RD, LD (Registered Dietitian)         CLINICAL NUTRITION SERVICES - REASSESSMENT NOTE      Malnutrition: (5/24)  % Weight Loss:  Up to 7.5% in 3 months (non-severe malnutrition)  % Intake:  </= 75% for >/= 1 month (severe malnutrition)  Subcutaneous Fat Loss:  None observed  Muscle Loss:  Clavicle bone region  - mild depletion and Dorsal hand region  - mild depletion  Fluid Retention:  Mild (LE edema)     Malnutrition Diagnosis: Non-Severe malnutrition  In Context of:  Acute illness or injury       EVALUATION OF PROGRESS TOWARD GOALS   Diet:  (5/28) 2gm Na, 1500 mL fluid restriction              Strawberry PLUS2 milkshake with meals (625 stefani, 19 gm pro)    (5/25) 2400 mg Na, LSF    Chart reviewed   Visited with pt this morning as he was eating breakfast - oatmeal and the PLUS2 milkshake  Pt tells me that he is tolerating po - consuming % meals  Likes the PLUS2 shakes and is drinking 100%      ASSESSED NUTRITION NEEDS PER APPROVED PRACTICE GUIDELINES:     Dosing Weight: (based on 4/30 wt of 77.6 kg - wt was down and then pt gained fluid wt)     Estimated Energy Needs: 8666-7081 kcals (25-30 Kcal/Kg)  Justification:  maintenance  Estimated Protein Needs:  grams protein (1.2-1.5 g pro/Kg)  Justification: preservation of lean body mass      NEW FINDINGS:[SJ1.1]   Vitals:    05/23/18 0925 05/23/18 1300 05/24/18 0500 05/24/18 1120   Weight: 77.1 kg (170 lb) 81.6 kg (179 lb 14.4 oz) 92.5 kg (204 lb) 79.8 kg (175 lb 14.4 oz)    05/25/18 0500 05/26/18 0500 05/27/18 0215 05/27/18 0600   Weight: 79.2 kg (174 lb 9.7 oz) 76.5 kg (168 lb 10.4 oz) 77 kg (169 lb 12.1 oz) 77.1 kg (169 lb 14.4 oz)    05/28/18 0100 05/29/18 0554   Weight: 77.3 kg (170 lb 6.7 oz) 76.5 kg (168 lb 9.6 oz)[SJ1.2]         Previous Goals (5/24):   Pt to have diet advanced beyond clear liquid in the next 48 hrs  Evaluation: Met    Previous Nutrition Diagnosis (5/24):   Inadequate protein-energy intake related to NPO status as evidenced by pt meeting 0% est needs so far today  Evaluation: Improving          CURRENT NUTRITION DIAGNOSIS  No nutrition diagnosis identified at this time       INTERVENTIONS  Recommendations / Nutrition Prescription  2gm Na, 1500 mL fluid restriction  Nutrition supplement    Implementation  Will continue to send a strawberry PLUS2 milkshake with meals for added cals/pro    Goals  Pt to consume >50% meals      MONITORING AND EVALUATION:  Progress towards goals will be monitored and evaluated per protocol and Practice Guidelines[SJ1.1]           Revision History        User Key Date/Time User Provider Type Action    > SJ1.2 5/29/2018  9:12 AM Mary Carpenter RD, [place] Registered Dietitian Sign     SJ1.1 5/29/2018  9:05 AM Mary Carpenter RD, [place] Registered Dietitian             Progress Notes by Tru Chakraborty PA at 5/28/2018  7:55 AM     Author:  Tru Chakraborty PA Service:  Hospitalist Author Type:  Physician Assistant    Filed:  5/28/2018 12:51 PM Date of Service:  5/28/2018  7:55 AM Creation Time:  5/28/2018  7:55 AM    Status:  Attested :  Tru Chakraborty PA (Physician Assistant)    Cosigner:   Rachid Fried,  at 5/28/2018  1:59 PM        Attestation signed by Rachid Fried,  at 5/28/2018  1:59 PM        Physician Attestation   I, Rachid Fried, have reviewed and discussed with the advanced practice provider their history, physical and plan for Santosh Robledo. I did not participate in a shared visit by interviewing or examining the patient and this should be billed as an advanced practice provider only visit.    Rachid Fried  Date of Service (when I saw the patient): I did not personally see this patient today.                               [facility]    Hospitalist Progress Note    Date of Service (when I saw the patient): 05/28/2018    Assessment & Plan   Santosh Robledo is a 88 year old male who was admitted on 5/23/2018 for acute hypoxic respiratory failure.  He has a past medical history significant for C. difficile colitis, hypertension, hyperlipidemia, chronic atrial fibrillation on warfarin.     Acute hypoxic respiratory failure   - Multifactorial: Pneumonia, chronic illness (Cdiff on admission) and HFpEF  - Presented with 4 days of progressive worsening shortness of breath with increased leg edema and productive cough with some blood-tinged sputum earlier  - Admission CXR on admission showed interstitial and airspace opacities in both lungs, most confluent on the right perihilar region.   - s/p BiPAP for oxygen saturation in the low 80s on room air and a tachypnea with increased work of breathing.   - proBNP was elevated at 12,789  --ECHO: normal LVEF, diastolic function could not be assessed, no significant valvular abnormality or pericardial effusion  --Lasix 40 mg IV twice daily changed to Lasix 40mg PO BID starting 5/26 PM  --Continue strict I&O, daily weights, and fluid restriction of 1.5 L per 24 hour    --Weaned off oxygen 5/27, O2 sat 92-94%  -[BG1.1]-[BG1.2]Cardiology consulted and[BG1.1] have signed off.[BG1.2]      Right upper  and lower lobe pneumonia    - Admission chest x-ray showed bilateral interstitial prominence but has marked perihilar opacity on right side.   - Intermittent hemoptysis, but markedly supratherapeutic INR  - CT chest with contrast showed significant infiltrates right upper lobe and lower lobe, started on Rocephin and doxycycline.  - Sputum for Gram stain and culture[BG1.1] if able[BG1.2]  - Continue Ceftriaxone for 5 days and Doxy for 10 days      Clostridium difficile colitis  - s/p hospital admission 5/13-5/15.   - Vancomycin 125 mg q.i.d, continued.  Needs to be continued for 7 additional days after completing doxycycline      Chronic atrial fibrillation, status post pacemaker placement  Anticoagulation with warfarin   - Supratherapeutic INR on admission (6.86, peaked at 9.55)  - s/p 2 mg vitamin K IV given markedly elevated INR and hemoptysis. No other signs of bleeding noted.  - INR trended down, pharmacy to manage warfarin.  - Rate control A fib: Metoprolol 25 mg p.o. twice daily  - Telemetry        Essential hypertension:    - Home medications: hydrochlorothiazide 25 mg p.o. daily and lisinopril 20 mg p.o. twice daily   - Metoprolol 25 mg p.o. twice daily added       Hypokalemia (2.9 on admission)  - Likely due to reduced p.o. intake lately along with his C. diff diarrhea and hydrochlorothiazide use   - Started on scheduled potassium chloride 20 mEq p.o. twice daily  - Potassium and magnesium replacement protocol      Oral thrush[BG1.1]  -[BG1.2] Nystatin swish and swallow.      Hyperlipidemia  - Continue simvastatin        DEAN  - PPI when able.[BG1.1]     # Pain Assessment:  Current Pain Score 5/28/2018   Patient currently in pain? denies   Pain score (0-10) -   Santosh dudley pain level was assessed and he currently denies pain.[BG1.3]       DVT Prophylaxis: Warfarin  Code Status: DNR     Disposition: Expected discharge[BG1.1] 5/29 if able to remain off oxygen.  TCU placement pending.  Social work  consulted.[BG1.2]    [facility]    Interval History[BG1.1]   Patient feeling well.  He is breathing more comfortably.  Denies any fever, chills, chest pain, abdominal pain, nausea/vomiting.  He has some mild shortness of breath and generalized weakness.  Wife is present at bedside and updated.[BG1.2]    -Data reviewed today: I reviewed all new labs and imaging results over the last 24 hours.    Physical Exam   Temp: 96.5  F (35.8  C) Temp src: Oral BP: 146/74   Heart Rate: 67 Resp: 28 SpO2: 95 % O2 Device: Nasal cannula Oxygen Delivery: 2 LPM  Vitals:    05/27/18 0215 05/27/18 0600 05/28/18 0100   Weight: 77 kg (169 lb 12.1 oz) 77.1 kg (169 lb 14.4 oz) 77.3 kg (170 lb 6.7 oz)     Vital Signs with Ranges  Temp:  [96.3  F (35.7  C)-97.6  F (36.4  C)] 96.5  F (35.8  C)  Heart Rate:  [62-76] 67  Resp:  [18-30] 28  BP: (123-158)/(53-74) 146/74  SpO2:  [89 %-96 %] 95 %  I/O last 3 completed shifts:  In: 914 [P.O.:804; I.V.:110]  Out: 650 [Urine:650]    Constitutional:[BG1.1] Elderly, a[BG1.2]lert, oriented to person, place, situation.  Cooperative, lying in bed in NAD.    Respiratory:  Lungs[BG1.1] with coarse lung sounds on the right, clear on the left[BG1.2],[BG1.1] no wheezing, coughing,[BG1.2] no labored breathing.  Cardiovascular:  Heart RRR, no MRG, no edema.  GI:  Abdomen soft, NT/ND and with normoactive BS  Skin/Integumen:  Warm, dry, non-diaphoretic.  MSK: CMS x4 intact.    Medications     Continuing ACE inhibitor/ARB/ARNI from home medication list OR ACE inhibitor/ARB order already placed during this visit       - MEDICATION INSTRUCTIONS -       - MEDICATION INSTRUCTIONS -       - MEDICATION INSTRUCTIONS -       Reason beta blocker order not selected       Warfarin Therapy Reminder         cefTRIAXone  2 g Intravenous Q24H     doxycycline (VIBRAMYCIN) IV  100 mg Intravenous Q12H     furosemide  40 mg Oral BID     hydrALAZINE  25 mg Oral Q8H     lisinopril  40 mg Oral BID     metoprolol tartrate  25 mg  Oral BID     nystatin  500,000 Units Swish & Spit 4x Daily     omeprazole  20 mg Oral Daily     potassium chloride SA  20 mEq Oral BID     sertraline (ZOLOFT) tablet 100 mg  100 mg Oral Daily     simvastatin  10 mg Oral At Bedtime     sodium chloride (PF)  10 mL Intracatheter Q8H     sodium chloride (PF)  3 mL Intracatheter Q8H     vancomycin  125 mg Oral 4x Daily       Data     Recent Labs  Lab 05/28/18  0551 05/27/18  0528 05/26/18  0545  05/23/18  1415 05/23/18  0944   WBC 13.1* 13.5* 13.8*  < >  --  22.5*   HGB 11.2* 11.3* 11.5*  < >  --  12.2*   MCV 80 80 80  < >  --  79   * 450 440  < >  --  517*   INR 2.07* 1.96* 1.88*  < >  --  6.86*    141 142  < >  --  139   POTASSIUM 3.9 3.7 3.4  < > 3.3* 2.9*   CHLORIDE 107 106 105  < >  --  103   CO2 27 28 32  < >  --  26   BUN 25 32* 30  < >  --  13   CR 0.81 0.85 0.86  < >  --  0.76   ANIONGAP 7 7 5  < >  --  10   ANGELITA 8.3* 8.1* 8.2*  < >  --  8.5   * 113* 126*  < >  --  143*   TROPI  --   --   --   --  <0.015 0.017   < > = values in this interval not displayed.    No results found for this or any previous visit (from the past 24 hour(s)).[BG1.1]       Revision History        User Key Date/Time User Provider Type Action    > BG1.3 5/28/2018 12:51 PM Tru Chakraborty PA Physician Assistant Sign     BG1.2 5/28/2018 12:50 PM Tru Chakraborty PA Physician Assistant Sign     BG1.1 5/28/2018  7:55 AM Tru Chakraborty PA Physician Assistant             Progress Notes by Tara Blake MD at 5/27/2018  7:20 PM     Author:  Tara Blake MD Service:  Hospitalist Author Type:  Physician    Filed:  5/27/2018  7:22 PM Date of Service:  5/27/2018  7:20 PM Creation Time:  5/27/2018  7:20 PM    Status:  Signed :  Tara Blake MD (Physician)         [facility]    Hospitalist Progress Note    Assessment & Plan   Santosh D Venkat is a 88 year old male who was admitted on 5/23/2018 for acute hypoxic  respiratory failure.  He has a past medical history significant for C. difficile colitis, hypertension, hyperlipidemia, chronic atrial fibrillation on warfarin.        Acute hypoxic respiratory failure   - Multifactorial: Pneumonia, chronic illness (Cdiff on admission) and HFpEF  - Presented with 4 days of progressive worsening shortness of breath with increased leg edema and productive cough with some blood-tinged sputum earlier  - Admission CXR on admission showed interstitial and airspace opacities in both lungs, most confluent on the right perihilar region.   - s/p BiPAP for oxygen saturation in the low 80s on room air and a tachypnea with increased work of breathing.   - proBNP was elevated at 12,789  --ECHO: normal LVEF, diastolic function could not be assessed, no significant valvular abnormality or pericardial effusion  --Lasix 40 mg IV twice daily changed to Lasix 40mg PO BID to start 3/26 PM  -- Continue strict I&O, daily weights, and fluid restriction of 1.5 L per 24 hour    --Weaned off oxygen 5/27, O2 sat 92-94%  - Cardiology consulted and following     Right upper and lower lobe pneumonia    - Admission chest x-ray showed bilateral interstitial prominence but has marked perihilar opacity on right side.   - Intermittent hemoptysis, but markedly supratherapeutic INR  -CT chest with contrast showed significant infiltrates right upper lobe and lower lobe, started on Rocephin and doxycycline.  -Sputum for Gram stain and culture  - Continue Ceftriaxone for 5 days and Doxy for 10 days      Clostridium difficile colitis  - s/p hospital admission 5/13-05/15.   - Vancomycin 125 mg q.i.d,  continued.  Needs to be continued for 7 additional days after completing doxycycline      Chronic atrial fibrillation, status post pacemaker placement  Anticoagulation with warfarin   -Supratherapeutic INR on admission (6.86, peaked at 9.55)  - s/p 2 mg vitamin K IV given markedly elevated INR and hemoptysis. No other signs of  bleeding noted.  - INR trended down, pharmacy to manage warfarin.  - Rate control A fib: Metoprolol 25 mg p.o. twice daily  - Telemetry        Essential hypertension:    - Home medications: hydrochlorothiazide 25 mg p.o. daily and lisinopril 20 mg p.o. twice daily   - Metoprolol 25 mg p.o. twice daily added       Hypokalemia (2.9 on admission)  - Likely due to reduced p.o. intake lately along with his C. diff diarrhea and hydrochlorothiazide use   -Started on scheduled potassium chloride 20 mEq p.o. twice daily  -Potassium and magnesium replacement protocol      Oral thrush  Nystatin swish and swallow.     Hyperlipidemia  - Continue simvastatin        DEAN  - PPI when able.     DVT Prophylaxis: Warfarin  Code Status: DNR    Disposition: Expected discharge in 2 days once weaned off oxygen.    Tara Blake MD, PhD   Text Page     Interval History   Feels weak and possibly depressed and frustrated with medical situation. Encouraged him he is improving and to work with PT.     -Data reviewed today: I reviewed all new labs and imaging results over the last 24 hours.    Physical Exam   Temp: 97.6  F (36.4  C) Temp src: Oral BP: 143/54 Pulse: 94 Heart Rate: 62 Resp: 18 SpO2: 94 % O2 Device: None (Room air) Oxygen Delivery: 3 LPM  Vitals:    05/26/18 0500 05/27/18 0215 05/27/18 0600   Weight: 76.5 kg (168 lb 10.4 oz) 77 kg (169 lb 12.1 oz) 77.1 kg (169 lb 14.4 oz)     Vital Signs with Ranges  Temp:  [96.3  F (35.7  C)-97.6  F (36.4  C)] 97.6  F (36.4  C)  Pulse:  [68-94] 94  Heart Rate:  [62-75] 62  Resp:  [18-30] 18  BP: (123-149)/(53-75) 143/54  SpO2:  [88 %-96 %] 94 %  I/O last 3 completed shifts:  In: 1294 [P.O.:1294]  Out: 985 [Urine:985]    Constitutional: Awake, alert, cooperative, no apparent distress.  Eyes: Lids and lashes normal, pupils equal, extra ocular muscles intact, sclera clear, conjunctiva normal.  ENT: Normocephalic, without obvious abnormality, atraumatic, sinuses nontender on palpation, external  ears without lesions, oral pharynx with moist mucus membranes  Respiratory: No increased work of breathing, improved air exchange, + coarse breath sounds  Cardiovascular: Normal apical impulse, regular rate and rhythm, normal S1 and S2, no murmur noted.  GI: Normal bowel sounds, soft, non-distended, non-tender  Skin: No bruising or bleeding, normal skin color, texture, turgor, no redness, warmth, or swelling, no rashes, no lesions  Musculoskeletal: There is no redness, warmth, or swelling of the joints.  Full range of motion noted.    Neurologic: Awake, alert, oriented to name, place and time.  Cranial nerves II-XII are grossly intact.    Neuropsychiatric: Calm, normal eye contact, alert, normal affect, oriented to self, place, time and situation, memory for past and recent events intact and thought process normal.     Medications     Continuing ACE inhibitor/ARB/ARNI from home medication list OR ACE inhibitor/ARB order already placed during this visit       - MEDICATION INSTRUCTIONS -       - MEDICATION INSTRUCTIONS -       - MEDICATION INSTRUCTIONS -       Reason beta blocker order not selected       Warfarin Therapy Reminder         cefTRIAXone  2 g Intravenous Q24H     doxycycline (VIBRAMYCIN) IV  100 mg Intravenous Q12H     furosemide  40 mg Oral BID     hydrALAZINE  25 mg Oral Q8H     lisinopril  40 mg Oral BID     metoprolol tartrate  25 mg Oral BID     nystatin  500,000 Units Swish & Spit 4x Daily     omeprazole  20 mg Oral Daily     potassium chloride SA  20 mEq Oral BID     sertraline (ZOLOFT) tablet 100 mg  100 mg Oral Daily     simvastatin  10 mg Oral At Bedtime     sodium chloride (PF)  10 mL Intracatheter Q8H     sodium chloride (PF)  3 mL Intracatheter Q8H     vancomycin  125 mg Oral 4x Daily       Data     Recent Labs  Lab 05/27/18  0528 05/26/18  0545 05/25/18  1725 05/25/18  0510  05/23/18  1415 05/23/18  0944   WBC 13.5* 13.8*  --  14.5*  < >  --  22.5*   HGB 11.3* 11.5*  --  11.5*  < >  --  12.2*    MCV 80 80  --  80  < >  --  79    440  --  425  < >  --  517*   INR 1.96* 1.88*  --  1.64*  < >  --  6.86*    142  --  141  < >  --  139   POTASSIUM 3.7 3.4 3.5 3.2*  < > 3.3* 2.9*   CHLORIDE 106 105  --  103  < >  --  103   CO2 28 32  --  30  < >  --  26   BUN 32* 30  --  19  < >  --  13   CR 0.85 0.86  --  0.79  < >  --  0.76   ANIONGAP 7 5  --  8  < >  --  10   ANGELITA 8.1* 8.2*  --  8.3*  < >  --  8.5   * 126*  --  107*  < >  --  143*   TROPI  --   --   --   --   --  <0.015 0.017   < > = values in this interval not displayed.    Imaging:  No results found for this or any previous visit (from the past 24 hour(s)).[DP1.1]     Revision History        User Key Date/Time User Provider Type Action    > DP1.1 5/27/2018  7:22 PM Tara Blake MD Physician Sign            Progress Notes by Louise Rodas RN at 5/27/2018  6:43 PM     Author:  Louise Rodas RN Service:  (none) Author Type:  Registered Nurse    Filed:  5/27/2018  6:45 PM Date of Service:  5/27/2018  6:43 PM Creation Time:  5/27/2018  6:43 PM    Status:  Signed :  Louise Rodas RN (Registered Nurse)         Exhibited more of his shallow breathing pattern today and seems more fatigued than yesterday.  Still has infrequent but productive cough.  Needs encouragement to use IS.  Frequent oximeter checks show oxygen sats 94-95% on RA.  Voids in 50cc amounts.[JU1.1]     Revision History        User Key Date/Time User Provider Type Action    > JU1.1 5/27/2018  6:45 PM Louise Rodas RN Registered Nurse Sign            Progress Notes by Tara Blake MD at 5/26/2018  1:12 PM     Author:  Tara Blake MD Service:  Hospitalist Author Type:  Physician    Filed:  5/26/2018  1:27 PM Date of Service:  5/26/2018  1:12 PM Creation Time:  5/26/2018  1:12 PM    Status:  Signed :  Tara Blake MD (Physician)         [facility]    Hospitalist Progress Note    Assessment & Plan   Santosh KENNY  Venkat is a 88 year old male who was admitted on 5/23/2018 for acute hypoxic respiratory failure.  He has a past medical history significant for C. difficile colitis, hypertension, hyperlipidemia, chronic atrial fibrillation on warfarin.        Acute hypoxic respiratory failure   - Multifactorial: Pneumonia, chronic illness (Cdiff on admission) and HFpEF  - Presented with 4 days of progressive worsening shortness of breath with increased leg edema and productive cough with some blood-tinged sputum earlier  - Admission CXR on admission showed interstitial and airspace opacities in both lungs, most confluent on the right perihilar region.   - s/p BiPAP for oxygen saturation in the low 80s on room air and a tachypnea with increased work of breathing.   - proBNP was elevated at 12,789  --Lasix 40 mg IV twice daily changed to Lasix 40mg PO BID to start tonight  -- Continue strict I&O, daily weights, and fluid restriction of 1.5 L per 24 hour    --ECHO: normal LVEF, diastolic function could not be assessed, no significant valvular abnormality or pericardial effusion  --Still requiring oxygen  --Needs to have walking test with and without oxygen and records the sats  - Cardiology consulted and following     Right upper and lower lobe pneumonia    - Admission chest x-ray showed bilateral interstitial prominence but has marked perihilar opacity on right side.   - Intermittent hemoptysis, but markedly supratherapeutic INR  -CT chest with contrast showed significant infiltrates right upper lobe and lower lobe, started on Rocephin and doxycycline.  -Sputum for Gram stain and culture      Clostridium difficile colitis  - s/p hospital admission 5/13-05/15.   - Vancomycin 125 mg q.i.d,  continued.  Needs to be continued for 7 additional days after completing antibiotic for pneumonia.      Chronic atrial fibrillation, status post pacemaker placement  Anticoagulation with warfarin   -Supratherapeutic INR on admission (6.86,  peaked at 9.55)  - s/p 2 mg vitamin K IV given markedly elevated INR and hemoptysis. No other signs of bleeding noted.  - INR trended down, pharmacy to manage warfarin.  - Rate control A fib: Metoprolol 25 mg p.o. twice daily  - Telemetry        Essential hypertension:    - Home medications: hydrochlorothiazide 25 mg p.o. daily and lisinopril 20 mg p.o. twice daily   - Metoprolol 25 mg p.o. twice daily added       Hypokalemia (2.9 on admission)  - Likely due to reduced p.o. intake lately along with his C. diff diarrhea and hydrochlorothiazide use   -Started on scheduled potassium chloride 20 mEq p.o. twice daily  -Potassium and magnesium replacement protocol      Oral thrush  Nystatin swish and swallow.     Hyperlipidemia  - Continue simvastatin        DEAN  - PPI when able.     DVT Prophylaxis: Warfarin  Code Status: DNR    Disposition: Expected discharge in 2 days once weaned off oxygen.    Tara Blake MD, PhD   Text Page     Interval History   Feels weak and possibly depressed and frustrated with medical situation. Encouraged him he is improving and to work with PT.     -Data reviewed today: I reviewed all new labs and imaging results over the last 24 hours.    Physical Exam   Temp: 98.3  F (36.8  C) Temp src: Axillary BP: 113/56   Heart Rate: 78 Resp: 20 SpO2: 97 % O2 Device: Nasal cannula Oxygen Delivery: 2 LPM  Vitals:    05/24/18 1120 05/25/18 0500 05/26/18 0500   Weight: 79.8 kg (175 lb 14.4 oz) 79.2 kg (174 lb 9.7 oz) 76.5 kg (168 lb 10.4 oz)     Vital Signs with Ranges  Temp:  [97.9  F (36.6  C)-98.3  F (36.8  C)] 98.3  F (36.8  C)  Heart Rate:  [71-83] 78  Resp:  [20-22] 20  BP: (113-143)/(52-75) 113/56  SpO2:  [92 %-97 %] 97 %  I/O last 3 completed shifts:  In: 1630 [P.O.:1630]  Out: 875 [Urine:875]    Constitutional: Awake, alert, cooperative, no apparent distress.  Eyes: Lids and lashes normal, pupils equal, extra ocular muscles intact, sclera clear, conjunctiva normal.  ENT: Normocephalic,  without obvious abnormality, atraumatic, sinuses nontender on palpation, external ears without lesions, oral pharynx with moist mucus membranes  Respiratory: No increased work of breathing, poor air exchange, + coarse breath sounds  Cardiovascular: Normal apical impulse, regular rate and rhythm, normal S1 and S2, no murmur noted.  GI: Normal bowel sounds, soft, non-distended, non-tender  Skin: No bruising or bleeding, normal skin color, texture, turgor, no redness, warmth, or swelling, no rashes, no lesions  Musculoskeletal: There is no redness, warmth, or swelling of the joints.  Full range of motion noted.    Neurologic: Awake, alert, oriented to name, place and time.  Cranial nerves II-XII are grossly intact.    Neuropsychiatric: Calm, normal eye contact, alert, normal affect, oriented to self, place, time and situation, memory for past and recent events intact and thought process normal.     Medications     Continuing ACE inhibitor/ARB/ARNI from home medication list OR ACE inhibitor/ARB order already placed during this visit       - MEDICATION INSTRUCTIONS -       - MEDICATION INSTRUCTIONS -       - MEDICATION INSTRUCTIONS -       Reason beta blocker order not selected       Warfarin Therapy Reminder         cefTRIAXone  2 g Intravenous Q24H     doxycycline (VIBRAMYCIN) IV  100 mg Intravenous Q12H     furosemide  40 mg Oral BID     hydrALAZINE  25 mg Oral Q8H     lisinopril  40 mg Oral BID     metoprolol tartrate  25 mg Oral BID     nystatin  500,000 Units Swish & Spit 4x Daily     omeprazole  20 mg Oral Daily     potassium chloride SA  20 mEq Oral BID     sertraline (ZOLOFT) tablet 100 mg  100 mg Oral Daily     simvastatin  10 mg Oral At Bedtime     sodium chloride (PF)  10 mL Intracatheter Q8H     sodium chloride (PF)  3 mL Intracatheter Q8H     vancomycin  125 mg Oral 4x Daily     warfarin  2.5 mg Oral ONCE at 18:00       Data     Recent Labs  Lab 05/26/18  0545 05/25/18  1725 05/25/18  0510 05/24/18  1250  05/24/18  0525  05/23/18  1415 05/23/18  0944   WBC 13.8*  --  14.5*  --  13.9*  --   --  22.5*   HGB 11.5*  --  11.5*  --  11.4*  --   --  12.2*   MCV 80  --  80  --  80  --   --  79     --  425  --  463*  --   --  517*   INR 1.88*  --  1.64* 2.76* 9.55*  --   --  6.86*     --  141  --  143  --   --  139   POTASSIUM 3.4 3.5 3.2* 3.5 3.2*  < > 3.3* 2.9*   CHLORIDE 105  --  103  --  105  --   --  103   CO2 32  --  30  --  28  --   --  26   BUN 30  --  19  --  14  --   --  13   CR 0.86  --  0.79  --  0.78  --   --  0.76   ANIONGAP 5  --  8  --  10  --   --  10   ANGELITA 8.2*  --  8.3*  --  8.1*  --   --  8.5   *  --  107*  --  103*  --   --  143*   TROPI  --   --   --   --   --   --  <0.015 0.017   < > = values in this interval not displayed.    Imaging:  No results found for this or any previous visit (from the past 24 hour(s)).[DP1.1]     Revision History        User Key Date/Time User Provider Type Action    > DP1.1 5/26/2018  1:27 PM Tara Blake MD Physician Sign            Progress Notes by Gregoria Clements MD at 5/26/2018 10:44 AM     Author:  Gregoria Clements MD Service:  Cardiology Author Type:  Physician    Filed:  5/26/2018 10:55 AM Date of Service:  5/26/2018 10:44 AM Creation Time:  5/26/2018 10:44 AM    Status:  Signed :  Gregoria Clements MD (Physician)         [facility]    Cardiology Progress Note    Date of Service (when I saw the patient):[MP1.1] 05/26/2018     Assessment & Plan[MP1.2]   Santosh Robledo is a 88 year old male who was admitted on 5/23/2018.     1-acute hypoxic respiratory failure.  Pulmonary infection with possible/likely component of volume overload causing diastolic heart failure .  Significant improvement after diuresis and antibiotics.  Left lung is clearing by chest x-ray and exam, right lung continues to have prominent middle and lower lobe abnormalities on x-ray CT and exam.  White count improved from over 22K at admission,  "afebrile    2-congestive heart failure, possible acute diastolic.   received marked volume resuscitation at recent admission with C. difficile colitis with weight up, most likely mainly due to fluid, significantly.  Now diuresing with partial improvement in his hypoxia as per #1.   normal systolic function on echo this admission       At this point, blood pressure remains elevated, will attempt to reduce this. Adidtionally, he has diuresed another -620 cc yesterday. Can transition to lasix 40 mg po BID. . Multifactorial SOB from likely volume overload and underlying pneumonida.     Recommend-  1-changed to oral diuretics. Lasix 40 mg po BID, uptitrate as needed  2-continue antibiotics per hospitalist service.  3-aggressive potassium replacement    Will sign off now.     Gregoria Clements MD[MP1.1]    Interval History[MP1.2]   Needed BiPAP again last night.  Feels better today and in much less respiratory distress.  No chest discomfort.  Still coughing up some bloody sputum.  INR went almost 10, reversed with vitamin K yesterday, now slightly subtherapeutic.  No diarrhea.  Eating better at breakfast today.  Denied orthopnea last night.  But was on BiPAP.[MP1.1]    Physical Exam[MP1.2]   Vital Signs with Ranges[MP1.1]  Temp:  [97.9  F (36.6  C)-98.2  F (36.8  C)] 98.1  F (36.7  C)  Heart Rate:  [71-83] 74  Resp:  [22] 22  BP: (121-143)/(52-75) 143/75  SpO2:  [92 %-94 %] 94 %  Vitals:    05/24/18 1120 05/25/18 0500 05/26/18 0500   Weight: 79.8 kg (175 lb 14.4 oz) 79.2 kg (174 lb 9.7 oz) 76.5 kg (168 lb 10.4 oz)     I/O last 3 completed shifts:  In: 1630 [P.O.:1630]  Out: 875 [Urine:875][MP1.2]    Vitals:[MP1.1] /75 (BP Location: Left arm)  Pulse 99  Temp 98.1  F (36.7  C) (Axillary)  Resp 22  Ht 1.905 m (6' 3\")  Wt 76.5 kg (168 lb 10.4 oz)  SpO2 94%  BMI 21.08 kg/m2[MP1.2]    Constitutional: On 4 L oxygen has normal respiratory effort today.  No acute distress    Chest:  Good inspiratory effort, no wheezes " appreciated    Cardiac: No significant murmur.  No JVD or HJR.  No heave    Abdomen:  Non-distended.  Nontender    Vascular/Extremities: Now without edema.  Warm and dry.[MP1.1]      Recent Labs  Lab 05/23/18  1415 05/23/18  0944   TROPI <0.015 0.017         Recent Labs  Lab 05/26/18  0545 05/25/18  1725 05/25/18  0510 05/24/18  1250 05/24/18  0525  05/23/18  1415 05/23/18  0944   WBC 13.8*  --  14.5*  --  13.9*  --   --  22.5*   HGB 11.5*  --  11.5*  --  11.4*  --   --  12.2*   MCV 80  --  80  --  80  --   --  79     --  425  --  463*  --   --  517*   INR 1.88*  --  1.64* 2.76* 9.55*  --   --  6.86*     --  141  --  143  --   --  139   POTASSIUM 3.4 3.5 3.2* 3.5 3.2*  < > 3.3* 2.9*   CHLORIDE 105  --  103  --  105  --   --  103   CO2 32  --  30  --  28  --   --  26   BUN 30  --  19  --  14  --   --  13   CR 0.86  --  0.79  --  0.78  --   --  0.76   GFRESTIMATED 83  --  >90  --  >90  --   --  >90   GFRESTBLACK >90  --  >90  --  >90  --   --  >90   ANIONGAP 5  --  8  --  10  --   --  10   ANGELITA 8.2*  --  8.3*  --  8.1*  --   --  8.5   *  --  107*  --  103*  --   --  143*   TROPI  --   --   --   --   --   --  <0.015 0.017   < > = values in this interval not displayed.[MP1.2]    Recent Results (from the past 48 hour(s))   XR Chest Port 1 View    Narrative    CHEST PORTABLE ONE VIEW  5/24/2018 10:44 AM     COMPARISON: 2-view chest x-ray 5/23/2018.    HISTORY: Follow up pneumonia versus congestive heart failure.      Impression    IMPRESSION: A single lead pacemaker module implanted over the left  chest with the lead in the right ventricle is again noted without  identifiable change.    Patchy airspace opacity in the mid and upper right lung has increased  in density and extent since the comparison study consistent with  worsening of pneumonia. Small right pleural effusion again noted.  Emphysematous and fibrotic changes throughout both lungs again noted.  There is no pneumothorax on either side. There  is no pleural effusion  on the left. Heart size remains within normal limits.    JHONY MAYBERRY MD   CT Chest w Contrast    Narrative    CT CHEST WITH CONTRAST 5/24/2018 3:43 PM     HISTORY: Multifocal pneumonia, specially right hilar, question lung  mass.    COMPARISON: None.    TECHNIQUE: Volumetric helical acquisition of CT images of the chest  from the clavicles to the kidneys were acquired after the  administration of IV contrast. Radiation dose for this scan was  reduced using automated exposure control, adjustment of the mA and/or  kV according to patient size, or iterative reconstruction technique.    FINDINGS: Bilateral calcified pleural plaques and small partially  loculated pleural effusions are seen bilaterally. Extensive  interstitial and airspace infiltrate in the right upper lobe and to a  lesser extent the right lower lobe. Areas of bronchial wall thickening  are evident. No definite discrete masses are seen within the lungs,  although these could be partially obscured by the infiltrates. The  findings in the visualized upper abdomen. Cardiomegaly, particularly  the right side of the heart. There are moderate atherosclerotic  changes of the visualized aorta and its branches. There is no evidence  of aortic dissection or aneurysm. Small hiatal hernia.      Impression    IMPRESSION: Extensive interstitial and airspace infiltrates in the  right upper lobe and to a lesser extent the right lower lobe. Small  partially loculated pleural effusions bilaterally, some of these  collections of loculated fluid could appear masslike on chest x-ray.  No definite pulmonary masses are seen.    DARRICK VASQUEZ MD       Recent Results (from the past 4320 hour(s))   ECHO COMPLETE WITH OPTISON    Narrative    [id number]  [id number]  [id number]  [id number]^^JYOTSNA^ANGELA           [facility]  Echocardiography Laboratory  [address] [place] [address]        Name: TRUE MATTHEWS  MRN:  [id number]  : 1929  Study Date: 2018 12:51 PM  Age: 88 yrs  Gender: Male  Patient Location: [facility]  Reason For Study: CHF  Ordering Physician: JYOTSNA QUINTANILLA  Referring Physician: Lilly Zepeda Johsua  Performed By: Migdalia Merlos     BSA: 2.0 m2  Height: 75 in  Weight: 170 lb  HR: 71  BP: 164/90 mmHg  _____________________________________________________________________________  __        Procedure  Complete Portable Echo Adult. Contrast Optison.  _____________________________________________________________________________  __        Interpretation Summary     Left ventricular systolic function is normal.  The visual ejection fraction is estimated at 55-60%.  Dilated inferior vena cava  The study was technically difficult.  _____________________________________________________________________________  __        Left Ventricle  The left ventricle is normal in size. Left ventricular systolic function is  normal. The visual ejection fraction is estimated at 55-60%. Diastolic  function not assessed due to atrial fibrillation. No regional wall motion  abnormalities noted.     Right Ventricle  Borderline right ventricular enlargement. The right ventricular systolic  function is normal. The right ventricle is not well visualized. There is a  pacemaker lead in the right ventricle.     Atria  The left atrium is not well visualized. Right atrium not well visualized.     Mitral Valve  There is mild mitral annular calcification. There is trace mitral  regurgitation. There is no mitral valve stenosis.        Tricuspid Valve  The tricuspid valve is not well visualized. The right ventricular systolic  pressure is approximated at 27.0 mmHg plus the right atrial pressure.     Aortic Valve  The aortic valve is trileaflet with aortic valve sclerosis. There is trace  aortic regurgitation. No hemodynamically significant valvular aortic stenosis.     Pulmonic Valve  The pulmonic valve is not well visualized.      Vessels  Borderline aortic root dilatation. Dilated inferior vena cava.     Pericardium  There is no pericardial effusion.     _____________________________________________________________________________  __  MMode/2D Measurements & Calculations  IVSd: 1.0 cm  LVIDd: 4.4 cm  LVIDs: 3.5 cm  LVPWd: 0.89 cm  FS: 21.5 %  LV mass(C)d: 139.2 grams  LV mass(C)dI: 68.0 grams/m2     Ao root diam: 3.9 cm  LA dimension: 3.6 cm  asc Aorta Diam: 3.5 cm  LA/Ao: 0.93  LA Volume (BP): 57.3 ml  LA Volume Index (BP): 28.0 ml/m2  RWT: 0.40        Doppler Measurements & Calculations  MV E max heidy: 104.5 cm/sec  Ao V2 max: 137.7 cm/sec  Ao max P.0 mmHg     PA acc time: 0.12 sec  TR max heidy: 259.9 cm/sec  TR max P.0 mmHg  E/E' av.8  Lateral E/e': 9.9  Medial E/e': 13.7           _____________________________________________________________________________  __           Report approved by: Shy Jama 2018 01:49 PM[MP1.1]          Medications     Current Facility-Administered Medications   Medication Dose Route Frequency     cefTRIAXone  2 g Intravenous Q24H     doxycycline (VIBRAMYCIN) IV  100 mg Intravenous Q12H     furosemide  40 mg Intravenous BID     hydrALAZINE  25 mg Oral Q8H     lisinopril  20 mg Oral BID     metoprolol tartrate  25 mg Oral BID     nystatin  500,000 Units Swish & Spit 4x Daily     omeprazole  20 mg Oral Daily     potassium chloride SA  20 mEq Oral BID     sertraline (ZOLOFT) tablet 100 mg  100 mg Oral Daily     simvastatin  10 mg Oral At Bedtime     sodium chloride (PF)  10 mL Intracatheter Q8H     sodium chloride (PF)  3 mL Intracatheter Q8H     vancomycin  125 mg Oral 4x Daily         Current Facility-Administered Medications   Medication Last Rate     Continuing ACE inhibitor/ARB/ARNI from home medication list OR ACE inhibitor/ARB order already placed during this visit       - MEDICATION INSTRUCTIONS -       - MEDICATION INSTRUCTIONS -       - MEDICATION INSTRUCTIONS -        Reason beta blocker order not selected       Warfarin Therapy Reminder[MP1.2]             Revision History        User Key Date/Time User Provider Type Action    > MP1.2 5/26/2018 10:55 AM Gregoria Clements MD Physician Sign     MP1.1 5/26/2018 10:44 AM Gregoria Clements MD Physician                   Procedure Notes     No notes of this type exist for this encounter.      Progress Notes - Therapies (Notes from 05/26/18 through 05/29/18)     No notes of this type exist for this encounter.

## 2018-05-23 NOTE — ED NOTES
"[facility]  ED Nurse Handoff Report    ED Chief complaint: Shortness of Breath (SOB - hearing crackles/gurgles in throat the last couple days - pt concern about pneumonia - pt was here 4-5 days ago for c-diff. )      ED Diagnosis:   Final diagnoses:   SOB (shortness of breath)   Acute respiratory failure with hypoxia (H)   Chronic atrial fibrillation (H)   Long term current use of anticoagulant therapy   Acute congestive heart failure, unspecified congestive heart failure type (H)   Benign essential hypertension   Bandemia   Hypokalemia       Code Status: DNR / DNI    Allergies:   Allergies   Allergen Reactions     Penicillins Rash       Activity level - Baseline/Home:  Independent    Activity Level - Current:   Stand with Assist     Needed?: No    Isolation: Yes  Infection: Not Applicable  C-Diff (Clostridium Difficile) diagnosis    Bariatric?: No    Vital Signs:   Vitals:    05/23/18 0925 05/23/18 0931   BP: 185/86    Pulse: 99    Resp: 24    Temp: 98.6  F (37  C)    TempSrc: Oral    SpO2: (!) 88% (!) 83%   Weight: 77.1 kg (170 lb)    Height: 1.905 m (6' 3\")        Cardiac Rhythm: ,        Pain level: 0-10 Pain Scale: 0    Is this patient confused?: No   Suicidality: Screened negative    Patient Report: Initial Complaint: SOB  Focused Assessment: Pt came in today complaining of SOB for the last 4 days.  He was also recently diagnosised with C-diff. I have him on Enteric Iso.   He has history of A-Fib. He said that he has recently had a cough, chills and leg swelling. He is alert and oriented.  Had him on O2 per NC but his sats were not good with that so we started him on B-pap.    Tests Performed: Labs and xray.    Abnormal Results: >  Results for orders placed or performed during the hospital encounter of 05/23/18 (from the past 24 hour(s))   CBC with platelets differential   Result Value Ref Range    WBC 22.5 (H) 4.0 - 11.0 10e9/L    RBC Count 4.59 4.4 - 5.9 10e12/L    Hemoglobin 12.2 " (L) 13.3 - 17.7 g/dL    Hematocrit 36.1 (L) 40.0 - 53.0 %    MCV 79 78 - 100 fl    MCH 26.6 26.5 - 33.0 pg    MCHC 33.8 31.5 - 36.5 g/dL    RDW 14.7 10.0 - 15.0 %    Platelet Count 517 (H) 150 - 450 10e9/L    Diff Method Automated Method     % Neutrophils 87.9 %    % Lymphocytes 4.4 %    % Monocytes 5.5 %    % Eosinophils 0.1 %    % Basophils 0.1 %    % Immature Granulocytes 2.0 %    Nucleated RBCs 0 0 /100    Absolute Neutrophil 19.8 (H) 1.6 - 8.3 10e9/L    Absolute Lymphocytes 1.0 0.8 - 5.3 10e9/L    Absolute Monocytes 1.3 0.0 - 1.3 10e9/L    Absolute Eosinophils 0.0 0.0 - 0.7 10e9/L    Absolute Basophils 0.0 0.0 - 0.2 10e9/L    Abs Immature Granulocytes 0.5 (H) 0 - 0.4 10e9/L    Absolute Nucleated RBC 0.0    Basic metabolic panel   Result Value Ref Range    Sodium 139 133 - 144 mmol/L    Potassium 2.9 (L) 3.4 - 5.3 mmol/L    Chloride 103 94 - 109 mmol/L    Carbon Dioxide 26 20 - 32 mmol/L    Anion Gap 10 3 - 14 mmol/L    Glucose 143 (H) 70 - 99 mg/dL    Urea Nitrogen 13 7 - 30 mg/dL    Creatinine 0.76 0.66 - 1.25 mg/dL    GFR Estimate >90 >60 mL/min/1.7m2    GFR Estimate If Black >90 >60 mL/min/1.7m2    Calcium 8.5 8.5 - 10.1 mg/dL   Troponin I   Result Value Ref Range    Troponin I ES 0.017 0.000 - 0.045 ug/L   INR   Result Value Ref Range    INR 6.86 (HH) 0.86 - 1.14   Nt probnp inpatient (BNP)   Result Value Ref Range    N-Terminal Pro BNP Inpatient 33005 (H) 0 - 1800 pg/mL   Procalcitonin   Result Value Ref Range    Procalcitonin 0.12 ng/ml   XR Chest 2 Views    Narrative    XR CHEST 2 VW 5/23/2018 10:03 AM     HISTORY: Shortness of breath    COMPARISON: 4/30/2018      Impression    IMPRESSION: Left subclavian cardiac device in place. The cardiac  silhouette is mildly enlarged. There are new mixed interstitial and  airspace opacities in both lungs, most confluent in the right  perihilar region. Small right pleural effusion. 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    RAQUEL  MD NADYA   EKG 12-lead, tracing only   Result Value Ref Range    Interpretation ECG Click View Image link to view waveform and result    Lactic acid   Result Value Ref Range    Lactic Acid 1.0 0.4 - 2.0 mmol/L     Treatments provided: Oral potassium and will start IV potassium    Family Comments: Wife at bedside    OBS brochure/video discussed/provided to patient: No    ED Medications:   Medications   nitroGLYcerin (NITROSTAT) sublingual tablet 0.4 mg (0.4 mg Sublingual Given 5/23/18 8975)   potassium chloride 10 mEq in 100 mL intermittent infusion with 10 mg lidocaine (not administered)   potassium chloride SA (K-DUR/KLOR-CON M) CR tablet 40 mEq (40 mEq Oral Given 5/23/18 1036)       Drips infusing?:  Yes    For the majority of the shift this patient was Green.   Interventions performed were none.    Severe Sepsis OR Septic Shock Diagnosis Present: No      ED NURSE PHONE NUMBER: [phone]

## 2018-05-23 NOTE — IP AVS SNAPSHOT
` ` Patient Information     Patient Name Sex     Santosh Robledo ([id number]) Male 1929       Room Bed    252      Patient Demographics     Address Phone    [address]  [place] [address] [phone] (Home) *Preferred*  none (Work)  [phone] (Mobile)      Patient Ethnicity & Race     Ethnic Group Patient Race    American White      Emergency Contact(s)     Name Relation Home Work Mobile    Candida Robledo Spouse [phone] none [phone]    Severt, Kathryn Daughter none none [phone]      Documents on File        Status Date Received Description       Documents for the Patient    Affiliate Privacy placeholder   phase3    Consent for EHR Access Received 18     Insurance Card Received 18 [employer]    Patient ID Received 18 [place] 2020    [facility] Specified Other       Privacy Notice - [place] Received 18     Consent for Services - Hospital and Clinic Received 18     HIE Auth Received 18     Consent to Communicate Received 18     Advance Directives and Living Will Not Received  Validation of AD 2017    Advance Directives and Living Will Received 18 Health Care Directive 2017    Advance Directives and Living Will Not Received  Invalid Directive dated 2006 received- RESOLVED       Documents for the Encounter    CMS IM for Patient Signature Received 18     CMS IM for Patient Signature Received 18 2MM      Admission Information     Attending Provider Admitting Provider Admission Type Admission Date/Time    Michelle [facility] MD Lindeke, Elizabeth A, MD Emergency 18  09    Discharge Date Hospital Service Auth/Cert Status Service Area     Hospitalist Incomplete [facility] SERVICES    Unit Room/Bed Admission Status       SH CARDIAC SPEC CARE  Admission (Confirmed)       Admission     Complaint    Acute exacerbation of CHF (congestive heart failure) (H)      Hospital Account      Name Acct ID Class Status Primary Coverage    Santosh Robledo [id number] Inpatient Open MEDICARE - MEDICARE FOR HB SUPPLEMENT            Guarantor Account (for Hospital Account #[id number])     Name Relation to Pt Service Area Active? Acct Type    Santosh Robledo  FCS Yes Personal/Family    Address Phone          [address]  [place] [address] [phone](H)              Coverage Information (for Hospital Account #[id number])     1. MEDICARE/MEDICARE FOR HB SUPPLEMENT     F/O Payor/Plan Precert #    MEDICARE/MEDICARE FOR HB SUPPLEMENT     Subscriber Subscriber #    Santosh Robledo [id number]    Address Phone    [employer]  [address]  [place] [address] [phone]          2. BCBS/BCBS PLATINUM BLUE     F/O Payor/Plan Precert #    BCBS/BCBS PLATINUM BLUE     Subscriber Subscriber #    Santosh Robledo [id number]    Address Phone    [address] [address]  [place] [address] [phone]

## 2018-05-23 NOTE — IP AVS SNAPSHOT
[facility] CARDIAC SPECIALTY CARE: [phone]                                              INTERAGENCY TRANSFER FORM - PHYSICIAN ORDERS   5/23/2018                   CHF Orders/Instructions for Nursing Home/TCU     CHF Orders and Instructions for Nursing Home/TCU  1.  Have the patient get a scale to put in their room and then use at home.  2.  Post a weight chart or calendar in room next to the scale.  3.  Ask patient to weigh themselves daily first thing when they get up in the morning, before breakfast, with only their night gown on and record on the chart/calendar (if able).  4.  Record NH/TCU admission weight.  5.  Record daily am weight, with same clothes every day. If patient is in a wheelchair, note weight of wheel chair.   6.  Provide patient CHF education booklet and review with patient and family.  7.  Vital signs twice daily and prn. Check oxygen sats prn dyspnea.  8.  Apply Oxygen 2 or 3 liters/min by nasal cannula prn O2 Sat < 90%.   9.  Notify NP/MD:   1. If HR <50 bpm, SBP <90mmHg, or O2 Sat < 90%.   2. If weight is up more than 2 lbs. in one day or 5 lbs. in one week from their admission weight OR if patient has signs or symptoms of CHF, such as worsening dyspnea or leg edema.  10.  ACE-wraps or support stockings (knee high) to bilateral lower extremities prn edema. On in am and off at HS.   11.  Diet: 2 gm sodium cardiac diet, with additional diet modifications if ordered elsewhere.  12.  Fluid restriction:  1500cc/day, if hospital discharge sodium < 134.  13.  Dietician: CHF education  14.  PT/OT to Evaluate and Treat for deconditioning and CHF.  12.  Activity as tolerated   13.  PT to notify RN if wheelchair equipment has been modified (change in weight should also be noted on the patients weight calendar).    Heart Failure Follow-up Appointments and Instructions for TCU Discharge   _____An appointment to enroll the patient into [facility] may already have been made (see section  " Your next appointments already scheduled ).  If there is a question about the appointment or you would like to speak with a C.O.R.E. nurse, please call one of the following locations:     [facility]):                                                    [phone]    [facility] ([place]):                                                   [phone]    [facility] ([place]):                   [phone]    _____If no C.O.R.E. appointment was made, please call one of the locations and schedule:    NP/PA appointment 14 days after hospital discharge if still in TCU    NP/PA appointment for 3-5 days after discharge from TCU    _____Have patient bring their med list and daily vitals/weight chart with them to appointment.    _____At discharge from the TCU give the patient the \"General Recommendations to Control Heart Failure When You Get Home  information for them to take home and their scale.  _____Upon discharge from the TCU reinforce the importance of home management of CHF including follow up in [facility] Clinic.  What is the [facility] Clinic?  Cardiomyopathy  Optimization  Rehabilitation  Education  The [facility] Clinic is a heart failure specialty clinic within [facility].  It is an outpatient disease management program that is based on a phase-by-phase approach, which is tailored to each patient s individual needs.  The cardiologist, nurse practitioner, physician assistant and nurses provide an ongoing outpatient care and treatment plan that guides heart failure and cardiomyopathy patients from evaluation and education to stabilization. This team works with the current primary care doctor and cardiologist to help patients:    Avoid hospitalizations    Slow the progression of the disease    Improve length and quality of life    Know who and when to call if heart failure symptoms appear    Receive easy access to quality health care and " "advice    Better understand your condition and treatment    Decrease the tremendous cost burden of heart failure care    Detect future heart problems before they become life threatening         Hospital Admission Date: 2018  TRUE KENNY HJELMSTAD   : 1929  Sex: Male        Attending Provider: Michelle Montgomery MD     Allergies:  Penicillins    Infection:  None   Service:  HOSPITALIST    Ht:  1.905 m (6' 3\")   Wt:  76.5 kg (168 lb 9.6 oz)   Admission Wt:  77.1 kg (170 lb)    BMI:  21.07 kg/m 2   BSA:  2.01 m 2            Patient PCP Information     Provider PCP Type    Lilly Zepeda MD General      ED Clinical Impression     Diagnosis Description Comment Added By Time Added    SOB (shortness of breath) [R06.02] SOB (shortness of breath) [R06.02]  Flower Mckeon MD 2018 10:43 AM    Acute respiratory failure with hypoxia (H) [J96.01] Acute respiratory failure with hypoxia (H) [J96.01]  Flower Mckeon MD 2018 10:43 AM    Chronic atrial fibrillation (H) [I48.2] Chronic atrial fibrillation (H) [I48.2]  Flower Mckeon MD 2018 10:43 AM    Long term current use of anticoagulant therapy [Z79.01] Long term current use of anticoagulant therapy [Z79.01]  Flower Mckeon MD 2018 10:43 AM    Acute congestive heart failure, unspecified congestive heart failure type (H) [I50.9] Acute congestive heart failure, unspecified congestive heart failure type (H) [I50.9]  Flower Mckeon MD 2018 10:43 AM    Benign essential hypertension [I10] Benign essential hypertension [I10]  Flower Mckeon MD 2018 10:43 AM    Bandemia [D72.825] Bandemia [D72.825]  Flower Mckeon MD 2018 10:43 AM    Hypokalemia [E87.6] Hypokalemia [E87.6]  Flower Mckeon MD 2018 11:03 AM      Hospital Problems as of 2018              Priority Class Noted POA    Acute exacerbation of CHF (congestive heart failure) (H) Medium  2018 Yes      Non-Hospital Problems " as of 5/29/2018              Priority Class Noted    Gastroesophageal reflux disease, esophagitis presence not specified Medium  4/30/2018    Chronic atrial fibrillation (H) Medium  4/30/2018    Status cardiac pacemaker Medium  4/30/2018    Long term current use of anticoagulant therapy Medium  4/30/2018    Benign essential hypertension Medium  4/30/2018    Hyperlipidemia LDL goal <100 Medium  4/30/2018    Vitamin B12 deficiency (non anemic) Medium  4/30/2018    Episode of recurrent major depressive disorder, unspecified depression episode severity (H) Medium  4/30/2018    Colitis Medium  5/13/2018    Advance Care Planning Medium  5/16/2018    Near syncope Medium  Unknown    AV node dysfunction Medium  Unknown    Hyperlipidemia Medium  Unknown    MARILU (obstructive sleep apnea) Medium  Unknown      Code Status History     Date Active Date Inactive Code Status Order ID Comments User Context    5/29/2018 11:40 AM  DNR [id number]  Tru Chakraborty PA Outpatient    5/23/2018 12:46 PM 5/29/2018 11:40 AM DNR [id number]  Tru Chakraborty PA ED    5/13/2018  8:36 PM 5/15/2018  6:58 PM DNR/DNI [id number]  Jennie Perry MD Inpatient         Medication Review      START taking        Dose / Directions Comments    cefuroxime 500 MG tablet   Commonly known as:  CEFTIN   Used for:  Community acquired pneumonia of right lower lobe of lung (H)   Notes to Patient:  You were receiving an IV antibiotic in the hospital        Dose:  500 mg   Take 1 tablet (500 mg) by mouth 2 times daily for 5 days   Quantity:  10 tablet   Refills:  0        furosemide 40 MG tablet   Commonly known as:  LASIX   Used for:  Acute congestive heart failure, unspecified congestive heart failure type (H)        Dose:  40 mg   Take 1 tablet (40 mg) by mouth 2 times daily   Refills:  0        hydrALAZINE 25 MG tablet   Commonly known as:  APRESOLINE   Used for:  Benign essential hypertension        Dose:  25 mg   Take 1 tablet (25 mg) by mouth  3 times daily   Refills:  0        metoprolol tartrate 25 MG tablet   Commonly known as:  LOPRESSOR   Used for:  Acute congestive heart failure, unspecified congestive heart failure type (H)        Dose:  25 mg   Take 1 tablet (25 mg) by mouth 2 times daily   Refills:  0        nystatin 409571 UNIT/ML suspension   Commonly known as:  MYCOSTATIN   Used for:  Oral thrush        Dose:  904238 Units   Swish and spit 5 mLs (500,000 Units) in mouth 4 times daily   Refills:  0        potassium chloride SA 20 MEQ CR tablet   Commonly known as:  K-DUR/KLOR-CON M   Used for:  Acute congestive heart failure, unspecified congestive heart failure type (H)        Dose:  20 mEq   Take 1 tablet (20 mEq) by mouth 2 times daily   Refills:  0          CONTINUE these medications which may have CHANGED, or have new prescriptions. If we are uncertain of the size of tablets/capsules you have at home, strength may be listed as something that might have changed.        Dose / Directions Comments    vancomycin 50 MG/ML Soln   Indication:  Clostridium difficile Bacteria   This may have changed:  additional instructions   Used for:  Colitis        Dose:  125 mg   Take 2.5 mLs (125 mg) by mouth 4 times daily Continue for 7 days after Ceftin complete   Refills:  0          CONTINUE these medications which have NOT CHANGED        Dose / Directions Comments    B-12 1000 MCG Caps   Used for:  Vitamin B12 deficiency (non anemic)   Notes to Patient:  Not given in hospital        Dose:  1 tablet   Take 1 tablet by mouth daily   Quantity:  90 capsule   Refills:  3        lisinopril 20 MG tablet   Commonly known as:  PRINIVIL/ZESTRIL   Used for:  Benign essential hypertension        Dose:  20 mg   Take 1 tablet (20 mg) by mouth 2 times daily   Quantity:  180 tablet   Refills:  3        omeprazole 20 MG CR capsule   Commonly known as:  priLOSEC   Indication:  Gastroesophageal Reflux Disease   Used for:  Gastroesophageal reflux disease, esophagitis  presence not specified        Dose:  20 mg   Take 1 capsule (20 mg) by mouth daily   Quantity:  90 capsule   Refills:  3        order for DME   Used for:  Colitis        Equipment being ordered: Walker Wheels () and Walker () Treatment Diagnosis: Difficulty ambulating   Quantity:  1 each   Refills:  0        order for DME   Used for:  Colitis        Equipment being ordered: Walker Wheels () Treatment Diagnosis:  Weakness,colitis.   Quantity:  1 Device   Refills:  0        Simethicone 125 MG Caps   Commonly known as:  GAS-X EXTRA STRENGTH   Used for:  Flatulence, eructation and gas pain   Notes to Patient:  Not given in hospital        Dose:  1 capsule   Take 1 capsule by mouth 2 times daily as needed   Quantity:  60 capsule   Refills:  11        simvastatin 5 MG tablet   Commonly known as:  ZOCOR   Indication:  Cerebrovascular Accident or Stroke   Used for:  Hyperlipidemia LDL goal <100        Dose:  10 mg   Take 2 tablets (10 mg) by mouth daily   Quantity:  90 tablet   Refills:  3        WARFARIN SODIUM PO        Take by mouth daily Held 5/18/18-5/20/18. 2.5 mg M/W/Sa 5 mg AOD   Refills:  0        ZOLOFT PO        Dose:  100 mg   Take 100 mg by mouth daily   Refills:  0          STOP taking     hydrochlorothiazide 25 MG tablet   Commonly known as:  HYDRODIURIL                   Summary of Visit     Reason for your hospital stay       Acute respiratory failure due to pneumonia and heart failure.             After Care     Activity - Up with nursing assistance           Advance Diet as Tolerated       Follow this diet upon discharge: 2g salt       Daily weights       Call Provider for weight gain of more than 2 pounds per day or 5 pounds per week.       Fall precautions           General info for SNF       Length of Stay Estimate: Short Term Care: Estimated # of Days <30  Condition at Discharge: Stable  Level of care:skilled   Rehabilitation Potential: Good  Admission H&P remains valid and up-to-date:  Yes  Recent Chemotherapy: N/A  Use Nursing Home Standing Orders: Yes       Mantoux instructions       Give two-step Mantoux (PPD) Per Facility Policy Yes             Referrals     Occupational Therapy Adult Consult       Evaluate and treat as clinically indicated.    Reason:  Physical deconditioning       Physical Therapy Adult Consult       Evaluate and treat as clinically indicated.    Reason:  Physical deconditioning             Your next 10 appointments already scheduled     Juvenal 15, 2018 12:45 PM CDT   EP NEW with London Lynn MD   [facility] ([facility])    [address]  [place] [address]   [phone] OPT 2            Jul 30, 2018 11:00 AM CDT   [facility]    [address] [id number] [phone]           Bring a current list of meds and any records pertaining to this visit. For Physicals, please bring immunization records and any forms needing to be filled out. Please arrive 10 minutes early to complete paperwork.              Follow-Up Appointment Instructions     Future Labs/Procedures    Follow Up and recommended labs and tests     Comments:    Follow up with Nursing home physician within 2-3 days.  Recommend repeat INR and BMP.  Follow up with Cardiology in clinic per their recommendations.      Follow-Up Appointment Instructions     Follow Up and recommended labs and tests       Follow up with Nursing home physician within 2-3 days.  Recommend repeat INR and BMP.  Follow up with Cardiology in clinic per their recommendations.             Statement of Approval     Ordered          05/29/18 1145  I have reviewed and agree with all the recommendations and orders detailed in this document.  EFFECTIVE NOW     Approved and electronically signed by:  Tru Chakraborty PA               General Recommendations To Control Heart  Failure When You Get Home (Give at DC from [facility])       Heart Failure Instructions for Patients and Families: Please read and check off each of these important instructions as you do them when you get home.          Weight and Symptoms       ___ Put a scale in your bathroom.    ___ Post a weight chart or calendar next to your scale.    ___ Weigh yourself everyday as soon as you get up in the morning (before breakfast).  You should only be wearing your pajamas.  Write your weight on the chart/calendar.  ___ Bring your weight chart/calendar with you to all appointments.  ___ Call your doctor or nurse practitioner if you gain 2 pounds (in 1 day) or 5 pounds in (1 week) from your goal  good  weight.  Your good weight is also called your  dry  weight.  Your doctor or nurse will tell you what your good weight should be.    ___ Call your doctor or nurse practitioner if you have shortness of breath that gets worse over time, leg swelling or fatigue.       Medications and Diet       ___ Make sure to take your medication as prescribed.    ___ Bring a current list of your medication and all of your medicine bottles with you to all appointments.    ___ Limit fluids if you still have swelling or shortness of breath, or if your doctor tells you to do so.    ___ Eat less than 2000 mg of sodium (salt) every day. Read food labels, and do not add salt to meals. Remember, if you eat less salt you retain less fluid.  ___ Follow a heart healthy diet that is low in saturated fat.        Activity and Suggested Lifestyle Changes      ___ Stay active. Talk to your doctor about an exercise program that is safe for your heart.  ___ Stop smoking. Reduce alcohol use.      ___ Lose weight if you are overweight. Extra weight puts a lot of stress on the heart.                 Control for Leg Swelling     ___ Keep your legs elevated (raised) as needed for swelling. If swelling is uncomfortable or elevation doesn t help, ask your doctor about using  ACE wraps or support stockings.        What is the [facility] Clinic?  Cardiomyopathy  Optimization  Rehabilitation  Education    The [facility] Clinic is a heart failure specialty clinic within [facility].  It is an outpatient disease management program that is based on a phase-by-phase approach, which is tailored to each patient s individual needs.  The cardiologist, nurse practitioner, physician assistant and nurses provide an ongoing outpatient care and treatment plan that guides heart failure and cardiomyopathy patients from evaluation and education to stabilization. This team works with your current primary care doctor and cardiologist to help you:    - Avoid hospitalizations  - Slow the progression of the disease  - Improve length and quality of life  - Know who and when to call if heart failure symptoms appear  - Receive easy access to quality health care and advice  - Better understand your condition and treatment  - Decrease the tremendous cost burden of heart failure care  - Detect future heart problems before they become life threatening                                 Follow-up Appointments     ___ An appointment with the [facility] may already have been made for you (see section   Your next appointments already scheduled ).  If you have a question about your appointment, would like to speak with a C.O.R.E. nurse, or would like to become a [facility] Clinic patient, please call one of the following locations:     - [facility]):                                             [phone]  - [facility]):                                            [phone]  - [facility] ([place]):                 [phone]      ___ Your C.O.R[facility]E. Clinic Team will continue to educate you on your heart failure and may adjust medications based on your vital signs, lab work, and how you are feeling.  Therefore, it is very important to  bring the following to all C.O.R.E. appointments:    - An accurate list of your medications  - Your medicine bottles  - Your weight chart/calendar

## 2018-05-23 NOTE — PROGRESS NOTES
RECEIVING UNIT ED HANDOFF REVIEW    ED Nurse Handoff Report was reviewed by: Damion Rooney on May 23, 2018 at 11:42 AM

## 2018-05-23 NOTE — TELEPHONE ENCOUNTER
"Transferred from Pt Rep:     S: Pt's wife calls, she has noticed in increase in difficulty breathing since returning home from hospital     Pt has an appt with Cardiology today    B: Hospitalized for C-Diff colitis 5/13-5/15    A:   Pt's wife states she has noticed the pt has been breathing fast and raspy at night, he finally fell asleep sitting up this morning.     Weakness all over since hospital stay, denies unilateral weakness, denies facial drooping \"I don't think he has had a stroke\"     Has had a lot of post-nasal drip lately, she thought this was the cause and now is not so sure.     Feet are swollen \"they are puffy on top\" and did not notice this PTA to the hospital     Fevers- possibly last night, could not get an accurate temp    Cough- yes  Productive- at times     Denies blueness to lips/fingertips   Denies gasping for air      Candida states the pt got up and down to the BR multiple times a night without problems     Pt received \"lots\" of IV fluids in the hospital, was dehydrated on admission d/t stools.     Had Candida count his breaths for one full minute. 2 episodes of apnea, 31 total breaths in 60 seconds.     She is unsure if he has rapid breathing with activity     R: Pt was currently sleeping when Candida called clinic, advised she see how he is doing when he wakes and has to walk to the bathroom, etc. If he is struggling to breath with little activity, 911 should be called. Candida believed she could get him to him to his Cardiology appt today. Advised that if he was too weak to go, then again, 911 should be called.       Called Dr. Negron's office and spoke with an RN with above information to determine if ER now or OK to wait for appt at 10:15 with Dr. Negron. Rn confirmed that if pt can make it into the clinic, this would be an appropriate first step, and pt could be taken to ER from clinic, if necessary. But because he would need to f/u with Cardiology any ways, the clinic first is appropriate, if " pt can physically make it.       Called Candida back- was VERY clear, that if patient struggled to breath, especially with gasping breaths, 911 should be called right away as this would be unsafe breathing. Discussed symptoms (cough, inability to lie down flat, and swelling feet) which may be a concern for too much fluid. Candida states that pt would become very upset with a 911 call and ER visit, but she would call if she can not safely get him to the clinic.

## 2018-05-23 NOTE — TELEPHONE ENCOUNTER
Recalled to check on patient. No answer, checked chart and patient is in the Emergency Room.     Deepika GALICIA RN

## 2018-05-23 NOTE — IP AVS SNAPSHOT
MRN:[id number]                      After Visit Summary   5/23/2018    Santosh Robledo    MRN: [id number]           Thank you!     Thank you for choosing [facility] for your care. Our goal is always to provide you with excellent care. Hearing back from our patients is one way we can continue to improve our services. Please take a few minutes to complete the written survey that you may receive in the mail after you visit with us. Thank you!        Patient Information     Date Of Birth          7/20/1929        Designated Caregiver       Most Recent Value    Caregiver    Will someone help with your care after discharge? yes    Name of designated caregiver Candida Robledo    Phone number of caregiver [phone]    Caregiver address see demographics      About your hospital stay     You were admitted on:  May 23, 2018 You last received care in the:  [facility]    You were discharged on:  May 29, 2018        Reason for your hospital stay       Acute respiratory failure due to pneumonia and heart failure.                  Who to Call     For medical emergencies, please call 911.  For non-urgent questions about your medical care, please call your primary care provider or clinic, [phone]          Attending Provider     Provider Specialty    Flower Mckeon MD Emergency Medicine    [facility]       Primary Care Provider Office Phone # Fax #    Lilly Leonel Zepeda [phone]      After Care Instructions     Activity - Up with nursing assistance           Advance Diet as Tolerated       Follow this diet upon discharge: 2g salt            Daily weights       Call Provider for weight gain of more than 2 pounds per day or 5 pounds per week.            Fall precautions           General info for SNF       Length of Stay Estimate: Short Term Care: Estimated # of Days <30  Condition at Discharge: Stable  Level of  care:skilled   Rehabilitation Potential: Good  Admission H&P remains valid and up-to-date: Yes  Recent Chemotherapy: N/A  Use Nursing Home Standing Orders: Yes            Mantoux instructions       Give two-step Mantoux (PPD) Per Facility Policy Yes                  Follow-up Appointments     Follow Up and recommended labs and tests       Follow up with Nursing home physician within 2-3 days.  Recommend repeat INR and BMP.  Follow up with Cardiology in clinic per their recommendations.                  Your next 10 appointments already scheduled     May 30, 2018  8:30 AM CDT   Nursing Home with EUGENIO Smith CNP   [facility]    [address] [place] [address]   [phone]            Juvenal 15, 2018 12:45 PM CDT   EP NEW with London Lynn MD   [facility] ([facility])    [address] [place] [address]   [phone] OPT 2            Jul 30, 2018 11:00 AM CDT   [facility]    [address] [place] [address]   [phone]           Bring a current list of meds and any records pertaining to this visit. For Physicals, please bring immunization records and any forms needing to be filled out. Please arrive 10 minutes early to complete paperwork.              Additional Services     Occupational Therapy Adult Consult       Evaluate and treat as clinically indicated.    Reason:  Physical deconditioning            Physical Therapy Adult Consult       Evaluate and treat as clinically indicated.    Reason:  Physical deconditioning                  General Recommendations To Control Heart Failure When You Get Home       Heart Failure Instructions for Patients and Families: Please read and check off each of these important instructions as you do them when you get home.          Weight and Symptoms        ___ Put a scale in your bathroom.    ___ Post a weight chart or calendar next to your scale.    ___ Weigh yourself everyday as soon as you get up in the morning (before breakfast).  You should only be wearing your pajamas.  Write your weight on the chart/calendar.  ___ Bring your weight chart/calendar with you to all appointments.  ___ Call your doctor or nurse practitioner if you gain 2 pounds (in 1 day) or 5 pounds in (1 week) from your goal  good  weight.  Your good weight is also called your  dry  weight.  Your doctor or nurse will tell you what your good weight should be.    ___ Call your doctor or nurse practitioner if you have shortness of breath that gets worse over time, leg swelling or fatigue.       Medications and Diet       ___ Make sure to take your medication as prescribed.    ___ Bring a current list of your medication and all of your medicine bottles with you to all appointments.    ___ Limit fluids if you still have swelling or shortness of breath, or if your doctor tells you to do so.    ___ Eat less than 2000 mg of sodium (salt) every day. Read food labels, and do not add salt to meals. Remember, if you eat less salt you retain less fluid.  ___ Follow a heart healthy diet that is low in saturated fat.        Activity and Suggested Lifestyle Changes      ___ Stay active. Talk to your doctor about an exercise program that is safe for your heart.  ___ Stop smoking. Reduce alcohol use.      ___ Lose weight if you are overweight. Extra weight puts a lot of stress on the heart.                 Control for Leg Swelling     ___ Keep your legs elevated (raised) as needed for swelling. If swelling is uncomfortable or elevation doesn t help, ask your doctor about using ACE wraps or support stockings.        What is the [facility]?  Cardiomyopathy  Optimization  Rehabilitation  Education    The [facility] is a heart failure specialty clinic within [facility].  It is an outpatient  disease management program that is based on a phase-by-phase approach, which is tailored to each patient s individual needs.  The cardiologist, nurse practitioner, physician assistant and nurses provide an ongoing outpatient care and treatment plan that guides heart failure and cardiomyopathy patients from evaluation and education to stabilization. This team works with your current primary care doctor and cardiologist to help you:    - Avoid hospitalizations  - Slow the progression of the disease  - Improve length and quality of life  - Know who and when to call if heart failure symptoms appear  - Receive easy access to quality health care and advice  - Better understand your condition and treatment  - Decrease the tremendous cost burden of heart failure care  - Detect future heart problems before they become life threatening                                 Follow-up Appointments     ___ An appointment with the [facility] may already have been made for you (see section   Your next appointments already scheduled ).  If you have a question about your appointment, would like to speak with a C.O.R.E[facility] nurse, or would like to become a [facility] patient, please call one of the following locations:     - [facility]):                                             [phone]  - [facility]):                                            [phone]  - [facility]):                 [phone]      ___ Your [facility]O.R[facility] Clinic Team will continue to educate you on your heart failure and may adjust medications based on your vital signs, lab work, and how you are feeling.  Therefore, it is very important to bring the following to all C.O.R.E[facility] appointments:    - An accurate list of your medications  - Your medicine bottles  - Your weight chart/calendar                   Pending Results     No orders found from 5/21/2018 to 5/24/2018.     "        Statement of Approval     Ordered          18 1145  I have reviewed and agree with all the recommendations and orders detailed in this document.  EFFECTIVE NOW     Approved and electronically signed by:  Tru Chakraborty PA             Admission Information     Date & Time Provider Department Dept. Phone    2018 Michelle Montgomery MD [facility] [phone]      Your Vitals Were     Blood Pressure Pulse Temperature Respirations Height Weight    121/50 (BP Location: Left arm) 94 97.3  F (36.3  C) (Oral) 24 1.905 m (6' 3\") 76.5 kg (168 lb 9.6 oz)    Pulse Oximetry BMI (Body Mass Index)                95% 21.07 kg/m2          [employer] Information     [employer] lets you send messages to your doctor, view your test results, renew your prescriptions, schedule appointments and more. To sign up, go to [email or web address] . Click on \"Log in\" on the left side of the screen, which will take you to the Welcome page. Then click on \"Sign up Now\" on the right side of the page.     You will be asked to enter the access code listed below, as well as some personal information. Please follow the directions to create your username and password.     Your access code is: [email or web address]  Expires: 2018  2:26 PM     Your access code will  in 90 days. If you need help or a new code, please call your [facility] clinic or [phone].        Care EveryWhere ID     This is your Care EveryWhere ID. This could be used by other organizations to access your [facility] medical records  [id number]        Equal Access to Services     [facility] HospitalSEAN : Hadii amelie Mata, wajordon rucker, qakingston talley. So [facility] [phone].    ATENCIÓN: Si habla español, tiene a gonzalez disposición servicios gratuitos de asistencia lingüística. Llame al [phone].    We comply with applicable federal civil rights laws and [place] laws. We " do not discriminate on the basis of race, color, national origin, age, disability, sex, sexual orientation, or gender identity.               Review of your medicines      START taking        Dose / Directions    cefuroxime 500 MG tablet   Commonly known as:  CEFTIN   Used for:  Community acquired pneumonia of right lower lobe of lung (H)   Notes to Patient:  You were receiving an IV antibiotic in the hospital        Dose:  500 mg   Take 1 tablet (500 mg) by mouth 2 times daily for 5 days   Quantity:  10 tablet   Refills:  0       furosemide 40 MG tablet   Commonly known as:  LASIX   Used for:  Acute congestive heart failure, unspecified congestive heart failure type (H)        Dose:  40 mg   Take 1 tablet (40 mg) by mouth 2 times daily   Refills:  0       hydrALAZINE 25 MG tablet   Commonly known as:  APRESOLINE   Used for:  Benign essential hypertension        Dose:  25 mg   Take 1 tablet (25 mg) by mouth 3 times daily   Refills:  0       metoprolol tartrate 25 MG tablet   Commonly known as:  LOPRESSOR   Used for:  Acute congestive heart failure, unspecified congestive heart failure type (H)        Dose:  25 mg   Take 1 tablet (25 mg) by mouth 2 times daily   Refills:  0       nystatin 012725 UNIT/ML suspension   Commonly known as:  MYCOSTATIN   Used for:  Oral thrush        Dose:  689573 Units   Swish and spit 5 mLs (500,000 Units) in mouth 4 times daily   Refills:  0       potassium chloride SA 20 MEQ CR tablet   Commonly known as:  K-DUR/KLOR-CON M   Used for:  Acute congestive heart failure, unspecified congestive heart failure type (H)        Dose:  20 mEq   Take 1 tablet (20 mEq) by mouth 2 times daily   Refills:  0         CONTINUE these medicines which may have CHANGED, or have new prescriptions. If we are uncertain of the size of tablets/capsules you have at home, strength may be listed as something that might have changed.        Dose / Directions    vancomycin 50 MG/ML Soln   Indication:  Clostridium  difficile Bacteria   This may have changed:  additional instructions   Used for:  Colitis        Dose:  125 mg   Take 2.5 mLs (125 mg) by mouth 4 times daily Continue for 7 days after Ceftin complete   Refills:  0         CONTINUE these medicines which have NOT CHANGED        Dose / Directions    B-12 1000 MCG Caps   Used for:  Vitamin B12 deficiency (non anemic)   Notes to Patient:  Not given in hospital        Dose:  1 tablet   Take 1 tablet by mouth daily   Quantity:  90 capsule   Refills:  3       lisinopril 20 MG tablet   Commonly known as:  PRINIVIL/ZESTRIL   Used for:  Benign essential hypertension        Dose:  20 mg   Take 1 tablet (20 mg) by mouth 2 times daily   Quantity:  180 tablet   Refills:  3       omeprazole 20 MG CR capsule   Commonly known as:  priLOSEC   Indication:  Gastroesophageal Reflux Disease   Used for:  Gastroesophageal reflux disease, esophagitis presence not specified        Dose:  20 mg   Take 1 capsule (20 mg) by mouth daily   Quantity:  90 capsule   Refills:  3       order for DME   Used for:  Colitis        Equipment being ordered: Walker Wheels () and Walker () Treatment Diagnosis: Difficulty ambulating   Quantity:  1 each   Refills:  0       order for DME   Used for:  Colitis        Equipment being ordered: Walker Wheels () Treatment Diagnosis:  Weakness,colitis.   Quantity:  1 Device   Refills:  0       Simethicone 125 MG Caps   Commonly known as:  GAS-X EXTRA STRENGTH   Used for:  Flatulence, eructation and gas pain   Notes to Patient:  Not given in hospital        Dose:  1 capsule   Take 1 capsule by mouth 2 times daily as needed   Quantity:  60 capsule   Refills:  11       simvastatin 5 MG tablet   Commonly known as:  ZOCOR   Indication:  Cerebrovascular Accident or Stroke   Used for:  Hyperlipidemia LDL goal <100        Dose:  10 mg   Take 2 tablets (10 mg) by mouth daily   Quantity:  90 tablet   Refills:  3       WARFARIN SODIUM PO        Take by mouth daily  Held 5/18/18-5/20/18. 2.5 mg M/W/Sa 5 mg AOD   Refills:  0       ZOLOFT PO        Dose:  100 mg   Take 100 mg by mouth daily   Refills:  0         STOP taking     hydrochlorothiazide 25 MG tablet   Commonly known as:  HYDRODIURIL                Where to get your medicines      Some of these will need a paper prescription and others can be bought over the counter. Ask your nurse if you have questions.     You don't need a prescription for these medications     cefuroxime 500 MG tablet    furosemide 40 MG tablet    hydrALAZINE 25 MG tablet    metoprolol tartrate 25 MG tablet    nystatin 942566 UNIT/ML suspension    potassium chloride SA 20 MEQ CR tablet    vancomycin 50 MG/ML Soln                Protect others around you: Learn how to safely use, store and throw away your medicines at www.disposemymeds.org.        ANTIBIOTIC INSTRUCTION     You've Been Prescribed an Antibiotic - Now What?  Your healthcare team thinks that you or your loved one might have an infection. Some infections can be treated with antibiotics, which are powerful, life-saving drugs. Like all medications, antibiotics have side effects and should only be used when necessary. There are some important things you should know about your antibiotic treatment.      Your healthcare team may run tests before you start taking an antibiotic.    Your team may take samples (e.g., from your blood, urine or other areas) to run tests to look for bacteria. These test can be important to determine if you need an antibiotic at all and, if you do, which antibiotic will work best.      Within a few days, your healthcare team might change or even stop your antibiotic.    Your team may start you on an antibiotic while they are working to find out what is making you sick.    Your team might change your antibiotic because test results show that a different antibiotic would be better to treat your infection.    In some cases, once your team has more information, they learn  that you do not need an antibiotic at all. They may find out that you don't have an infection, or that the antibiotic you're taking won't work against your infection. For example, an infection caused by a virus can't be treated with antibiotics. Staying on an antibiotic when you don't need it is more likely to be harmful than helpful.      You may experience side effects from your antibiotic.    Like all medications, antibiotics have side effects. Some of these can be serious.    Let you healthcare team know if you have any known allergies when you are admitted to the hospital.    One significant side effect of nearly all antibiotics is the risk of severe and sometimes deadly diarrhea caused by Clostridium difficile (C. Difficile). This occurs when a person takes antibiotics because some good germs are destroyed. Antibiotic use allows C. diificile to take over, putting patients at high risk for this serious infection.    As a patient or caregiver, it is important to understand your or your loved one's antibiotic treatment. It is especially important for caregivers to speak up when patients can't speak for themselves. Here are some important questions to ask your healthcare team.    What infection is this antibiotic treating and how do you know I have that infection?    What side effects might occur from this antibiotic?    How long will I need to take this antibiotic?    Is it safe to take this antibiotic with other medications or supplements (e.g., vitamins) that I am taking?     Are there any special directions I need to know about taking this antibiotic? For example, should I take it with food?    How will I be monitored to know whether my infection is responding to the antibiotic?    What tests may help to make sure the right antibiotic is prescribed for me?      Information provided by:  www.cdc.gov/getsmart  U.S. Department of Health and Human Services  Centers for disease Control and Prevention  National  Center for Emerging and Zoonotic Infectious Diseases  Division of Healthcare Quality Promotion             Medication List: This is a list of all your medications and when to take them. Check marks below indicate your daily home schedule. Keep this list as a reference.      Medications           Morning Afternoon Evening Bedtime As Needed    B-12 1000 MCG Caps   Take 1 tablet by mouth daily   Notes to Patient:  Not given in hospital                                   cefuroxime 500 MG tablet   Commonly known as:  CEFTIN   Take 1 tablet (500 mg) by mouth 2 times daily for 5 days   Notes to Patient:  You were receiving an IV antibiotic in the hospital                                      furosemide 40 MG tablet   Commonly known as:  LASIX   Take 1 tablet (40 mg) by mouth 2 times daily   Last time this was given:  40 mg on 5/29/2018  8:41 AM   Next Dose Due:  Evening 5/29/18                                      hydrALAZINE 25 MG tablet   Commonly known as:  APRESOLINE   Take 1 tablet (25 mg) by mouth 3 times daily   Last time this was given:  25 mg on 5/29/2018 10:38 AM                                         lisinopril 20 MG tablet   Commonly known as:  PRINIVIL/ZESTRIL   Take 1 tablet (20 mg) by mouth 2 times daily   Last time this was given:  40 mg on 5/29/2018  8:40 AM   Next Dose Due:  5/30/18                                   metoprolol tartrate 25 MG tablet   Commonly known as:  LOPRESSOR   Take 1 tablet (25 mg) by mouth 2 times daily   Last time this was given:  25 mg on 5/29/2018  8:40 AM                                      nystatin 482061 UNIT/ML suspension   Commonly known as:  MYCOSTATIN   Swish and spit 5 mLs (500,000 Units) in mouth 4 times daily   Last time this was given:  500,000 Units on 5/29/2018  8:41 AM   Next Dose Due:  1600 5/29/18                                            omeprazole 20 MG CR capsule   Commonly known as:  priLOSEC   Take 1 capsule (20 mg) by mouth daily   Last time this was  given:  20 mg on 5/29/2018  8:39 AM   Next Dose Due:  5/30/18                                   order for DME   Equipment being ordered: Walker Wheels () and Walker () Treatment Diagnosis: Difficulty ambulating                                order for DME   Equipment being ordered: Walker Wheels () Treatment Diagnosis:  Weakness,colitis.                                potassium chloride SA 20 MEQ CR tablet   Commonly known as:  K-DUR/KLOR-CON M   Take 1 tablet (20 mEq) by mouth 2 times daily   Last time this was given:  20 mEq on 5/29/2018  8:40 AM   Next Dose Due:  Evening 5/29/18                                      Simethicone 125 MG Caps   Commonly known as:  GAS-X EXTRA STRENGTH   Take 1 capsule by mouth 2 times daily as needed   Notes to Patient:  Not given in hospital                                      simvastatin 5 MG tablet   Commonly known as:  ZOCOR   Take 2 tablets (10 mg) by mouth daily   Last time this was given:  10 mg on 5/28/2018  9:11 PM   Next Dose Due:  Evening 5/29/18                                   vancomycin 50 MG/ML Soln   Take 2.5 mLs (125 mg) by mouth 4 times daily Continue for 7 days after Ceftin complete   Last time this was given:  125 mg on 5/29/2018 12:51 PM   Next Dose Due:  Evening 5/29/18                                            WARFARIN SODIUM PO   Take by mouth daily Held 5/18/18-5/20/18. 2.5 mg M/W/Sa 5 mg AOD   Last time this was given:  2.5 mg on 5/28/2018  6:49 PM   Next Dose Due:  5/29/18                    5 mg               ZOLOFT PO   Take 100 mg by mouth daily   Last time this was given:  100 mg on 5/29/2018  8:40 AM   Next Dose Due:  5/30/18

## 2018-05-23 NOTE — PROGRESS NOTES
Pt was placed on BiPAP 10/5 and 60%, alarm setting was set at 10. RT will continue to monitor the pt.     Rambo Landeros RT.

## 2018-05-23 NOTE — H&P
Admitted:     05/23/2018      PRIMARY CARE PROVIDER:  Lilly Zepeda MD      CHIEF COMPLAINT:  Shortness of breath.      HISTORY OF PRESENT ILLNESS:  Santosh Robledo is a pleasant 88-year-old male with a past medical history of chronic atrial fibrillation on warfarin, hypertension, hyperlipidemia, obstructive sleep apnea and recent C. diff infection who presented to the Emergency Department due to complaints of worsening shortness of breath.  The patient began having progressively worsening shortness of breath starting about 4 days ago with audible breath sounds and increased productive cough.  His sputum has been white to yellowish.  Today, he had some blood-tinged sputum.  He has also had subjective fevers last night but none measured.  He was recently hospitalized here from 05/13-05/15 with Clostridium difficile colitis and is on oral vancomycin.  The patient denies any chest pain and denies any history of MI or CHF.  He does have increased bilateral leg swelling which has been present for a few days.  He denies any abdominal pain, nausea, vomiting.  His diarrhea is improving.  The patient also reports that he has had many adjustments to his warfarin dose recently due to variable INR levels.      The patient was evaluated in the Emergency Department by Dr. Mckeon.  There he was found to have oxygen saturation of 83% on room air.  His temperature was normal at 98.6, blood pressure 185/86 and pulse 99.  He was mildly tachypneic.  EKG showed atrial fibrillation with nonspecific ST and T-wave changes.  His chest x-ray showed mildly enlarged cardiac silhouette with new mixed interstitial and airspace opacities in both lungs, most confluent in the right perihilar region with small right pleural effusion.  His lab work showed an elevated WBC of 22.5 with hemoglobin 12.2 and platelet count 517.  His troponin was 0.017, potassium was 2.9.  BMP was otherwise unremarkable.  His proBNP was very elevated at 12,789.   Procalcitonin 0.12 and lactic acid 1.0.  It is suspected he has a CHF exacerbation, but he was not yet given diuretics as they were trying to get his potassium up first in the ER.  He was placed on BiPAP while in the ER with oxygen saturations improving above 90%.  He is being admitted to the Intermediate Care Unit for further management.      PAST MEDICAL HISTORY:   1.  Hypertension.   2.  Hyperlipidemia.   3.  Chronic atrial fibrillation on warfarin.   4.  Status post pacemaker placement.   5.  History of small-bowel obstruction.   6.  GERD.   7.  Sleep apnea.      PAST SURGICAL HISTORY:   1.  Pacemaker placement in 08/2015.   2.  Cholecystectomy.   3.  Prostate surgery.      FAMILY HISTORY:  This is reviewed with the patient and noncontributory to this presentation.      SOCIAL HISTORY:  The patient is a lifelong nonsmoker.  He does not drink alcohol.  He is  and his wife is present with him here in the ER.      PRIOR TO ADMISSION MEDICATIONS:    Prior to Admission medications    Medication Sig Last Dose Taking? Auth Provider   Cyanocobalamin (B-12) 1000 MCG CAPS Take 1 tablet by mouth daily 5/23/2018 at am Yes Lilly Zepeda MD   hydrochlorothiazide (HYDRODIURIL) 25 MG tablet Take 1 tablet (25 mg) by mouth daily 5/23/2018 at am Yes Lilly Zepeda MD   lisinopril (PRINIVIL/ZESTRIL) 20 MG tablet Take 1 tablet (20 mg) by mouth 2 times daily 5/23/2018 at am Yes Lilly Zepeda MD   omeprazole (PRILOSEC) 20 MG CR capsule Take 1 capsule (20 mg) by mouth daily 5/22/2018 at pm Yes Lilly Zepeda MD   Sertraline HCl (ZOLOFT PO) Take 100 mg by mouth daily 5/23/2018 at am Yes Unknown, Entered By History   Simethicone (GAS-X EXTRA STRENGTH) 125 MG CAPS Take 1 capsule by mouth 2 times daily as needed  at prn Yes Lilly Zepeda MD   simvastatin (ZOCOR) 5 MG tablet Take 2 tablets (10 mg) by mouth daily 5/22/2018 at pm Yes Lilly Zepeda MD   vancomycin (FIRST) 50 MG/ML SOLN Take 2.5 mLs (125  mg) by mouth 4 times daily 5/23/2018 at x 1 dose Yes Lilly Zepeda MD   WARFARIN SODIUM PO Take by mouth daily Held 5/18/18-5/20/18.  2.5 mg M/W/Sa  5 mg AOD 5/22/2018 at pm Yes Unknown, Entered By History   order for DME Equipment being ordered: Walker Wheels () and Walker ()  Treatment Diagnosis: Difficulty ambulating   Kasie Merchant MD   order for DME Equipment being ordered: Walker Wheels ()  Treatment Diagnosis:  Weakness,colitis.   Kasie Merchant MD     ALLERGIES:  PENICILLINS.      REVIEW OF SYSTEMS:  A complete 10-point review of systems was performed and is negative other than the items previously mentioned above in the HPI.      PHYSICAL EXAMINATION:   VITAL SIGNS:  Blood pressure 185/86, heart rate 99, temperature 98.6, respiratory rate 24, oxygen saturation 83% on room air, improved to 99% on BiPAP.   GENERAL:  The patient is alert, oriented to person, place and situation.  He is calm but in a mild amount of distress, currently on BiPAP.   EYES:  Pupils equal and round.  Extraocular movements grossly intact.     HENT:  Head normocephalic, atraumatic.  Throat not examined as he is on BiPAP.   NECK:  Supple, no cervical adenopathy.   CARDIOVASCULAR:  Heart:  Irregular heart rate, nontachycardic, no murmurs, rubs or gallops.  Distal pulses are intact.  He has 1+ bilateral lower extremity edema to shins.   PULMONARY:  Coarse lung sounds bilaterally with more consolidation on the right side in the base.  No wheezes.  Breathing is mildly labored.  He is on BiPAP.   GASTROINTESTINAL:  Abdomen soft, nontender, nondistended with normoactive bowel sounds.   MUSCULOSKELETAL:  The patient moves all 4 extremities with normal strength.   NEUROLOGIC:  Alert, cranial nerves II-XII are grossly intact.  Motor function is grossly intact.   SKIN:  Warm, dry, nondiaphoretic.  No rashes seen on limited exam.   PSYCHIATRIC:  Normal mood and affect.      LABORATORY DATA:  CBC:  WBC 22.5, hemoglobin 12.2,  hematocrit 36.1, platelet count 517, absolute neutrophil count 19.8.  BMP:  Potassium 2.9, creatinine 0.76, glucose 143, otherwise within normal limits.  ProBNP 12,789.  Lactic acid 1.0.  Procalcitonin 0.12.  Troponin 0.017.  INR 6.86.      IMAGING:  Chest x-ray personally reviewed.  Left subclavian cardiac device in place.  The cardiac silhouette is mildly enlarged.  There are new mixed interstitial and airspace opacities in both lungs, most confluent in the right perihilar region.  Small right pleural effusion.  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      EKG:  Atrial fibrillation with nonspecific ST-T wave changes.      ASSESSMENT AND PLAN:  Santosh Robledo is a pleasant 88-year-old male with a past medical history of C. difficile colitis, hypertension, hyperlipidemia, chronic atrial fibrillation on warfarin, who presented to the Emergency Department with progressive shortness of breath and leg edema beginning about 4 days prior.  He is being admitted to the hospital for acute hypoxic respiratory failure, most likely due to a CHF exacerbation.     1.  Acute hypoxic respiratory failure secondary to possible acute congestive heart failure exacerbation:  The patient has had 4 days of progressive worsening shortness of breath with increased leg edema and productive cough.  He had some blood-tinged sputum earlier.  No chest pain.  He has no CHF history.  No recent echocardiograms.  His chest x-ray shows a mixed picture with interstitial and airspace opacities in both lungs, most confluent on the right perihilar region.  Pneumonia is not excluded, although Procalcitonin is low risk for systemic infection.   Plan to give IV diuresis once his potassium has been replaced.  He was started on BiPAP in the ER for oxygen saturation in the low 80s on room air, requiring up to 6 liters of oxygen to get him above 90%.  Will continue with BiPAP and place him in the IMC.  We will obtain an  echocardiogram.  His proBNP was elevated at 12,789.  Will monitor on telemetry.  Consider Cardiology consultation.  Monitor electrolytes and renal function while being diuresed.      2.  Clostridium difficile colitis:  The patient was admitted to the hospital here from 05/13-05/15 for this.  He was started on vancomycin 125 mg q.i.d.  This will be continued once he is able to take p.o.  If he is unable to come off the BiPAP, may need to start IV Flagyl.  His diarrhea is improving.  His significant leukocytosis is likely from C. diff but will trend CBC daily.     3.  Chronic atrial fibrillation, status post pacemaker placement:  The patient is chronically anticoagulated with warfarin.  Reportedly, he has had variable INRs recently with warfarin dose adjustments being made.  INR today is supratherapeutic at 6.86.  He did have some blood-tinged sputum but otherwise no active signs of bleeding.  Will hold warfarin now and monitor INR daily, allowing this to drift down.  Currently, no indication for rapid reversal.  Will monitor on telemetry.     4.  Essential hypertension:  Blood pressures here are hypertensive, 150s-160s currently.  Prior to admission on hydrochlorothiazide 25 mg daily and lisinopril 20 mg b.i.d.  Will continue with lisinopril once able to take p.o.  Hold hydrochlorothiazide.  We will have IV hydralazine available p.r.n.     5.  Hypokalemia:  The patient's potassium is 2.9.  This is likely due to reduced p.o. intake lately along with his C. diff diarrhea and hydrochlorothiazide use.  We will replace per protocol.  Also have magnesium replacement protocol.  Monitor closely while undergoing diuresis.     6.  Hyperlipidemia:  Resume simvastatin when able.     7.  Gastroesophageal reflux disease:  Resume PPI when able.     8.  Deep venous thrombosis prophylaxis:  He is on warfarin.     9.  Code status:  The patient is DNR but now is stating that he would want a trial of intubation if necessary, but nothing  prolonged.  This was also confirmed with his wife at time of admission.     10.  Disposition:  Admit inpatient to [facility] and will likely require at least 2 more days in the hospital.      This patient [facility]  She is in agreement with my assessment and plan of care.         MICHELLE MONTGOMERY MD       As dictated by JYOTSNA QUINTANILLA PA-C            D: 2018   T: 2018   MT: SARAH      Name:     TRUE MATTHEWS   MRN:      [id number]        Account:      [id number]   :      1929        Admitted:     2018                   Document: [id number]

## 2018-05-23 NOTE — TELEPHONE ENCOUNTER
Reason for call:  Patient reporting a symptom    Symptom or request: Trouble breathing after coming home from hospital for c diff    Duration (how long have symptoms been present): transferred to triage before able to ask    Have you been treated for this before? No    Additional comments: sent to triage    Phone Number patient can be reached at:  Home number on file [phone] (home)    Best Time:  any    Can we leave a detailed message on this number:  YES    Call taken on 5/23/2018 at 7:22 AM by Briana Rios

## 2018-05-23 NOTE — IP AVS SNAPSHOT
"` `           [facility] CARDIAC SPECIALTY CARE: [phone]                                              INTERAGENCY TRANSFER FORM - NURSING   2018                    Hospital Admission Date: 2018  TRUE MATTHEWS   : 1929  Sex: Male        Attending Provider: Michelle Montgomery MD     Allergies:  Penicillins    Infection:  None   Service:  HOSPITALIST    Ht:  1.905 m (6' 3\")   Wt:  76.5 kg (168 lb 9.6 oz)   Admission Wt:  77.1 kg (170 lb)    BMI:  21.07 kg/m 2   BSA:  2.01 m 2            Patient PCP Information     Provider PCP Type    Lilly Zepeda MD General      Current Code Status     Date Active Code Status Order ID Comments User Context       Prior      Code Status History     Date Active Date Inactive Code Status Order ID Comments User Context    2018 11:40 AM  DNR [id number]  Tru Chakraborty PA Outpatient    2018 12:46 PM 2018 11:40 AM DNR [id number]  Tru Chakraborty PA ED    2018  8:36 PM 5/15/2018  6:58 PM DNR/DNI [id number]  Jennie Perry MD Inpatient      Advance Directives        Scanned docmt in ACP Activity?           Yes, scanned ACP docmt        Hospital Problems as of 2018              Priority Class Noted POA    Acute exacerbation of CHF (congestive heart failure) (H) Medium  2018 Yes      Non-Hospital Problems as of 2018              Priority Class Noted    Gastroesophageal reflux disease, esophagitis presence not specified Medium  2018    Chronic atrial fibrillation (H) Medium  2018    Status cardiac pacemaker Medium  2018    Long term current use of anticoagulant therapy Medium  2018    Benign essential hypertension Medium  2018    Hyperlipidemia LDL goal <100 Medium  2018    Vitamin B12 deficiency (non anemic) Medium  2018    Episode of recurrent major depressive disorder, unspecified depression episode severity (H) Medium  2018    Colitis Medium  2018    " Advance Care Planning Medium  5/16/2018    Near syncope Medium  Unknown    AV node dysfunction Medium  Unknown    Hyperlipidemia Medium  Unknown    MARILU (obstructive sleep apnea) Medium  Unknown      Immunizations     Name Date      Influenza (H1N1) 10/01/14     Influenza (H1N1) 10/01/11     Influenza Vaccine IM 3yrs+ 4 Valent IIV4 10/03/17     Influenza Vaccine IM 3yrs+ 4 Valent IIV4 11/01/13     Pneumo Conj 13-V (2010&after) 04/28/17     Pneumococcal 23 valent 05/01/13     Pneumococcal 23 valent 05/01/00          END      ASSESSMENT     Discharge Profile Flowsheet     EXPECTED DISCHARGE     Resources List  Skilled Nursing Facility 05/29/18 1209    Expected Discharge Date  05/28/18 05/25/18 1053   Skilled Nursing Facility  [facility] ([facility]) [phone], Fax:[phone] 05/29/18 1209    DISCHARGE NEEDS ASSESSMENT     SKIN      Equipment Currently Used at Home  cane, straight (has FWW if needed) 05/25/18 1326   Inspection of bony prominences  Full 05/29/18 1213    Transportation Available  family or friend will provide 05/25/18 1326   Skin WDL  ex 05/29/18 1213    # of Referrals Placed by CTS  Post Acute Facilities 05/29/18 1209   Skin Color/Characteristics  bruised (ecchymotic) 05/29/18 1213    [facility] Clinic Name  [facility] 05/15/18 1618   Skin Temperature  warm 05/29/18 0047    Primary Care MD Name  [facility] 05/15/18 1618   Skin Moisture  dry 05/29/18 0047    GASTROINTESTINAL (ADULT,PEDIATRIC,OB)     Skin Elasticity  slow return to original state 05/29/18 0047    GI WDL  ex (frequent burping) 05/29/18 1213   Skin Integrity  bruise(s) 05/29/18 0047    Last Bowel Movement  05/29/18 05/29/18 0404   Inspection under devices  Full 05/29/18 0047    Passing flatus  yes 05/28/18 1633   Full except areas not inspected   Hip, left;Hip, right;Buttock, left;Buttock, right;Sacrum;Coccyx 05/26/18 0314    COMMUNICATION ASSESSMENT     SAFETY      Patient's communication style  spoken language (English or Bilingual) 05/23/18  "0932   All Alarms  alarm(s) activated and audible 05/29/18 0047    FINAL RESOURCES     Safety WDL  WDL 05/29/18 1213                 Assessment WDL (Within Defined Limits) Definitions           Safety WDL     Effective: 09/28/15    Row Information: <b>WDL Definition:</b> Bed in low position, wheels locked; call light in reach; upper side rails up x 2; ID band on<br> <font color=\"gray\"><i>Item=AS safety wdl>>List=AS safety wdl>>Version=F14</i></font>      Skin WDL     Effective: 09/28/15    Row Information: <b>WDL Definition:</b> Warm; dry; intact; elastic; without discoloration; pressure points without redness<br> <font color=\"gray\"><i>Item=AS skin wdl>>List=AS skin wdl>>Version=F14</i></font>      Vitals     Vital Signs Flowsheet     VITAL SIGNS     Height  1.905 m (6' 3\") 05/23/18 0925    Temp  97.3  F (36.3  C) 05/28/18 1929   Height Method  Stated 05/23/18 0925    Temp src  Oral 05/28/18 1929   Weight  76.5 kg (168 lb 9.6 oz) 05/29/18 0554    Resp  24 05/29/18 1208   Weight Method  Standing scale 05/29/18 0554    Pulse  94 05/27/18 0740   Bed Scale  Standard (fitted sheet, draw sheet/ pad, cover/flat sheet, blanket, two pillows) 05/27/18 0222    Heart Rate  65 05/29/18 1208   BSA (Calculated - sq m)  2.02 05/23/18 0925    Pulse/Heart Rate Source  Monitor 05/29/18 1208   BMI (Calculated)  21.29 05/23/18 0925    BP  121/50 05/29/18 1208   [place] COMA SCALE      BP Location  Left arm 05/29/18 1208   Best Eye Response  4-->(E4) spontaneous 05/29/18 0043    OXYGEN THERAPY     Best Motor Response  6-->(M6) obeys commands 05/29/18 0043    SpO2  95 % 05/29/18 1208   Best Verbal Response  5-->(V5) oriented 05/29/18 0043    O2 Device  None (Room air) 05/29/18 1208   [place] Coma Scale Score  15 05/29/18 0043    FiO2 (%)  40 % 05/25/18 0544   POSITIONING      Oxygen Delivery  2 LPM 05/28/18 0808   Body Position  independently positioning 05/29/18 0809    PAIN/COMFORT     Head of Bed (HOB)  HOB at 30-45 degrees " 05/29/18 0043    Patient Currently in Pain  denies 05/29/18 1201   Positioning/Transfer Devices  pillows 05/29/18 0043    0-10 Pain Scale  0 05/26/18 0253   Chair  Upright in chair 05/29/18 1213    ANALGESIA SIDE EFFECTS MONITORING     DAILY CARE      Side Effects Monitoring: Respiratory Quality  R 05/28/18 1852   Activity Management  ambulated to bathroom;up in chair 05/29/18 0809    Side Effects Monitoring: Respiratory Depth  N 05/28/18 1852   Activity Assistance Provided  assistance, 1 person 05/29/18 0809    Side Effects Monitoring: Sedation Level  1 05/28/18 1852   ECG      HEIGHT AND WEIGHT     ECG Rhythm  Atrial fibrillation 05/29/18 0043            Patient Lines/Drains/Airways Status    Active LINES/DRAINS/AIRWAYS     Name: Placement date: Placement time: Site: Days: Last dressing change:    Peripheral IV 05/28/18 Right Lower forearm 05/28/18   0253   Lower forearm   1             Patient Lines/Drains/Airways Status    Active PICC/CVC     None            Intake/Output Detail Report     Date Intake     Output Net    Shift P.O. I.V. IV Piggyback Total Urine Total       Noc 05/27/18 2300 - 05/28/18 0659 50 100 -- 150 275 275 -125    Day 05/28/18 0700 - 05/28/18 1459 220 -- -- 220 445 445 -225    Sara 05/28/18 1500 - 05/28/18 2259 200 -- -- 200 300 300 -100    Noc 05/28/18 2300 - 05/29/18 0659 -- -- -- -- 600 600 -600    Day 05/29/18 0700 - 05/29/18 1459 -- -- -- -- 100 100 -100      Last Void/BM       Most Recent Value    Urine Occurrence 1 at 05/29/2018 0203    Stool Occurrence 1 at 05/29/2018 0245      Case Management/Discharge Planning     Case Management/Discharge Planning Flowsheet     REFERRAL INFORMATION     Description of Support System  Supportive;Involved 05/29/18 1209    Did the Initial Social Work Assessment result in a Social Work Case?  Yes 05/29/18 1209   Support Assessment  Adequate family and caregiver support 05/29/18 1209    Admission Type  inpatient 05/29/18 1209   EMPLOYMENT      Arrived  From  home or self-care 05/29/18 1209   Do you work full or part-time?  no 05/29/18 1209    Referral Source  physician 05/29/18 1209   COPING/STRESS      # of Referrals Placed by CTS  Post Acute Facilities 05/29/18 1209   Major Change/Loss/Stressor  none 05/23/18 1302    Post Acute Facilities  TCU 05/29/18 1209   EXPECTED DISCHARGE      Reason For Consult  discharge planning 05/29/18 1209   Expected Discharge Date  05/28/18 05/25/18 1053    Record Reviewed  medical record 05/29/18 1209   DISCHARGE PLANNING      CTS Assigned to Case  Jonelle Bah 05/29/18 1209   Transportation Available  family or friend will provide 05/25/18 1326    Primary [facility] Clinic Name  [facility] 05/15/18 1618   FINAL RESOURCES      Primary Care MD Name  [facility] 05/15/18 1618   Equipment Currently Used at Home  cane, straight (has FWW if needed) 05/25/18 1326    LIVING ENVIRONMENT     Resources List  Skilled Nursing Facility 05/29/18 1209    Lives With  spouse 05/29/18 1209   Skilled Nursing Facility  [facility] ([facility]) [phone], Fax:[phone] 05/29/18 1209    Living Arrangements  apartment ([address] [place] [address]) 05/29/18 1209   ABUSE RISK SCREEN      Provides Primary Care For  no one 05/29/18 1209   QUESTION TO PATIENT:  Has a member of your family or a partner(now or in the past) intimidated, hurt, manipulated, or controlled you in any way?  patient declined to answer or is unable to answer 05/23/18 0934    Quality Of Family Relationships  supportive;involved 05/29/18 1209   QUESTION TO PATIENT: Do you feel safe going back to the place where you are living?  patient declined to answer or is unable to answer 05/23/18 0934    ASSESSMENT OF FAMILY/SOCIAL SUPPORT     OBSERVATION: Is there reason to believe there has been maltreatment of a vulnerable adult (ie. Physical/Sexual/Emotional abuse, self neglect, lack of adequate food, shelter, medical care, or financial exploitation)?  no 05/23/18 0934    Marital Status   05/29/18 1209   OTHER       Who is your support system?  Wife 05/29/18 8997   Are you depressed or being treated for depression?  No 05/24/18 0458    Spouse's Name  Candida 05/29/18 1200

## 2018-05-23 NOTE — LETTER
Transition Communication Hand-off for Care Transitions to Next Level of Care Provider    Name: Santosh KENNY Hjelmstad  : 1929  MRN #: [id number]  Primary Care Provider: Lilly Zepeda     Primary Clinic: [address] [place] [address]  [facility] [place] [address]     Reason for Hospitalization:  Benign essential hypertension [I10]  Hypokalemia [E87.6]  SOB (shortness of breath) [R06.02]  Chronic atrial fibrillation (H) [I48.2]  Bandemia [D72.825]  Acute respiratory failure with hypoxia (H) [J96.01]  Long term current use of anticoagulant therapy [Z79.01]  Acute congestive heart failure, unspecified congestive heart failure type (H) [I50.9]  Admit Date/Time: 2018  9:26 AM  Discharge Date: 2018  Payor Source: Payor: BCBS / Plan: BCBS PLATINUM BLUE / Product Type: PPO /     Readmission Assessment Measure (DEBORAH) Risk Score/category: Elevated         Reason for Communication Hand-off Referral: Admission diagnoses: CHF  Avoidable readmission within 30 days    Discharge Plan:       Concern for non-adherence with plan of care:   Y/N No  Discharge Needs Assessment:  Needs       Most Recent Value    Equipment Currently Used at Home cane, straight [has FWW if needed]    Transportation Available family or friend will provide    # of Referrals Placed by [facility] Line    Skilled Nursing Facility [place] [facility] ([facility]) [phone], Fax:[phone]    PAS Number [id number]          Follow-up specialty is recommended: Yes    Follow-up plan:  Future Appointments  Date Time Provider Department Center   2018 4:30 PM Vanessa Valle, PT SHPT [facility]   2018 8:30 AM Reg Gerard, EUGENIO CNP [facility] [place] [facility]   6[facility]15/2018 12:45 PM [facility]   2018 11:00 AM Lilly Zepeda MD [facility]       Any outstanding tests or procedures:        Referrals     Future Labs/Procedures    Occupational Therapy Adult Consult     Comments:    Evaluate and treat as clinically  indicated.    Reason:  Physical deconditioning    Physical Therapy Adult Consult     Comments:    Evaluate and treat as clinically indicated.    Reason:  Physical deconditioning            Mady Pacheco    AVS/Discharge Summary is the source of truth; this is a helpful guide for improved communication of patient story

## 2018-05-23 NOTE — ED PROVIDER NOTES
History     Chief Complaint:  Shortness of breath    HPI   Santosh Robledo is a 88 year old male with a history of chronic atrial fibrillation and recently diagnosed with Clostridium difficile colitis who presents with shortness of breath.  Patient says that 4 days ago he began having shortness of breath and audible breath sounds.  He also notes that he becomes short of breath with movement and activity quicker than at baseline.  He has become more SOB over the last few days.  He also notes leg swelling worsening since his discharge for c dif.  Therefore, the patient presents to the ED for further evaluation.  Here in the ED, the patient says that he has had a cough, chills, leg swelling bilaterally, and feels warm.  However, the patient denies any vomiting, diarrhea, known fever, or chest pain.    Allergies:  Penicillins    Medications:    Cyanocobalamin (B-12)  hydrochlorothiazide (HYDRODIURIL)   lisinopril (PRINIVIL/ZESTRIL)   omeprazole (PRILOSEC)   Sertraline HCl (ZOLOFT PO)  Simethicone (GAS-X EXTRA STRENGTH)   simvastatin (ZOCOR)   vancomycin (FIRST)   warfarin (COUMADIN)     Past Medical History:    AV node dysfunction   Chronic atrial fibrillation (H)   GERD (gastroesophageal reflux disease)   Hyperlipidemia   Near syncope   MARILU (obstructive sleep apnea)  MDD  C Diff    Past Surgical History:    Implant pacemaker     Family History:    Pneumonia/influenza   Prostate cancer    Social History:  Smoking Status: Never Smoker  Alcohol Use: Yes  Patient presents with wife.  Marital Status:   [2]    Review of Systems   Constitutional: Positive for chills. Negative for fever.   Respiratory: Positive for cough and shortness of breath.    Cardiovascular: Negative for chest pain.   Gastrointestinal: Negative for diarrhea and vomiting.   Musculoskeletal:        Leg swelling   All other systems reviewed and are negative.      Physical Exam     Patient Vitals for the past 24 hrs:   BP Temp Temp src Pulse  "Heart Rate Resp SpO2 Height Weight   05/23/18 1618 - 98.3  F (36.8  C) Axillary - - - - - -   05/23/18 1300 159/75 - - - 75 (!) 37 99 % - 81.6 kg (179 lb 14.4 oz)   05/23/18 1200 164/90 - - - 75 28 98 % - -   05/23/18 1145 169/88 - - - 70 19 99 % - -   05/23/18 1130 163/90 - - - 67 (!) 32 99 % - -   05/23/18 1115 157/75 - - - 73 27 98 % - -   05/23/18 1100 161/75 - - - 73 22 98 % - -   05/23/18 1045 154/80 - - - 83 28 (!) 89 % - -   05/23/18 1030 149/74 - - - 78 27 - - -   05/23/18 1015 (!) 146/107 - - - 79 28 94 % - -   05/23/18 0931 - - - - - - (!) 83 % - -   05/23/18 0925 185/86 98.6  F (37  C) Oral 99 - 24 (!) 88 % 1.905 m (6' 3\") 77.1 kg (170 lb)       Physical Exam  Physical Exam   General: Resting on the bed.  Ears, Nose, Throat:  External ears normal.  Nose normal.    Eyes:  Conjunctivae clear.  Pupils are equal, round, and reactive.   Neck: Normal range of motion.  Neck supple.   CV: Regular rate and rhythm.  No murmurs.      Respiratory: Tachypneic with coarse breath sounds throughout, increased work of breathing, no focal crackles  Gastrointestinal: Soft.  No distension. There is no tenderness.    Musculoskeletal: pitting bilaterally edema to lower extremities   Neuro: Alert. Moving all extremities appropriately.  Normal speech.    Skin: Skin is warm and dry.  No rash noted.     Emergency Department Course   ECG (10:06:59):  Rate 78 bpm. MA interval *. QRS duration 92. QT/QTc 392/446. P-R-T axes * 38 24.  Atrial fibrillation, nonspecific ST and T-wave abnormality, abnormal ECG interpreted at 1010 by Flower Mckeon MD.    Imaging:  Radiographic findings were communicated with the patient who voiced understanding of the findings.  X-ray chest, 2 views:  Left subclavian cardiac device in place. The cardiac  silhouette is mildly enlarged. There are new mixed interstitial and  airspace opacities in both lungs, most confluent in the right  perihilar region. Small right pleural effusion. There is " calcified  pleural plaque likely due to previous asbestos exposure.  Result per radiology.     Laboratory:  Lactic Acid: 1.0  CBC: WBC: 22.5 high, HGB: 12.2 low, PLT: 517 high  BMP: Glucose 143 high, potassium 2.9 low, o/w WNL (Creatinine: 0.76)  944 troponin: 0.017  INR: 6.86 high  Nt probnp inpatient (BNP): 12,789 high  Procalcitonin: 0.12    Interventions:  0949 Nitrostat 0.4 mg sublingual  1036 potassium chloride SA 40 mEq p.o.  1249 potassium chloride SA 40 mEq p.o.  1315 Optison 3 mL IV    Emergency Department Course:  Nursing notes and vitals reviewed. 0938 I performed an exam of the patient as documented above.     IV inserted. Medicine administered as documented above. Blood drawn. This was sent to the lab for further testing, results above.    Patient had an EKG, results above.    The patient was sent for a XR while in the emergency department, findings above.     1030 I rechecked the patient.     1107  I consulted with Dr. Montgomery of the hospitalist services. They are in agreement to accept the patient for admission.    Findings and plan explained to the Patient who consents to admission. Discussed the patient with Dr. Montgomery, who will admit the patient to a [facility] bed for further monitoring, evaluation, and treatment.    Impression & Plan      Medical Decision Makin-year-old male with a history of chronic A. fib, status post pacemaker presenting with shortness of breath.  Vital signs notable for significant hypoxia to the 80s.  He also appears to be rather tachypneic.  Remainder vital signs are unremarkable.  Broad differential pursued including but not limited to CHF, PE, pneumonia, fluid overload, renal, electrolyte or metabolic abnl, acute coronary syndrome, etc.  Overall patient is well-appearing but appears uncomfortable and tachypneic.  He has coarse breath sounds throughout.  He is quite hypoxic.  Attempted to up titrate his nasal cannula but he continued to be hypoxic.  With his coarse breath  sounds and his tachypnea and hypoxia opted to start BiPAP.  Patient looks significantly improved on BiPAP.  Chest x-ray is obtained showing mildly enlarged cardiac silhouette in addition to mixed interstitial airspace opacities in both lungs.  Also small right pleural effusion.  In the setting of bilateral pitting edema to the lower extremities, BNP markedly elevated at 12,000; therefore I felt that CHF is most likely diagnosis.  His troponin was within normal range.  EKG shows atrial fibrillation without any acute changes, rate controlled.  He denies any chest pain.  Low concern for acute coronary syndrome.  INR is supratherapeutic at 6.86.  No active signs of bleeding.  Will hold off on further doses, no reversal indicated at present.  Pro-calcitonin is minimally elevated 0.12, suspect this is likely secondary to C. difficile infection.  Low suspicion for any pneumonia at this time.  He does have a leukocytosis of 22, this is likely related to his C. difficile.  Also suspect some element of stress demargination.  His lactate is normal.  Do not suspect that this is pneumonia at this time.  Will hold off on any antibiotics in the setting of his C. Difficile as do not want to worsen his condition.  Hemoglobin is stable at 12.2.  BMP was markable for hypokalemia 2.9.  Oral and IV placement started.  Held off on diuresis as patient's potassium is still low.  Patient was transferred to the [facility] secondary to his respiratory failure requiring BiPAP.   He looks markedly better after BiPAP therefore further interventions were held off at this time until he is able to be more stabilized.  No other acute events under my care.  Transfer to the floor in stable condition.    Critical Care time:  was 30 minutes for this patient excluding procedures.    Diagnosis:    ICD-10-CM   1. SOB (shortness of breath) R06.02   2. Acute respiratory failure with hypoxia (H) J96.01   3. Chronic atrial fibrillation (H) I48.2   4. Long term  current use of anticoagulant therapy Z79.01   5. Acute congestive heart failure, unspecified congestive heart failure type (H) I50.9   6. Benign essential hypertension I10   7. Bandemia D72.825   8. Hypokalemia E87.6       Disposition:  Admitted to [facility]    Hi Fuentes  5/23/2018    EMERGENCY DEPARTMENT  Hi BUTT, mey serving as a scribe at 9:38 AM on 5/23/2018 to document services personally performed by Flower Mckeon MD based on my observations and the provider's statements to me.        Flower Mckeon MD  05/23/18 [id number]

## 2018-05-23 NOTE — PHARMACY-ADMISSION MEDICATION HISTORY
Admission medication history interview status for the 5/23/2018  admission is complete. See EPIC admission navigator for prior to admission medications     Medication history source reliability:Moderate    Actions taken by pharmacist (provider contacted, etc):  Spoke w/ patient and his wife.      Additional medication history information not noted on PTA med list :None    Medication reconciliation/reorder completed by provider prior to medication history? No    Time spent in this activity: 20 minutes    Prior to Admission medications    Medication Sig Last Dose Taking? Auth Provider   Cyanocobalamin (B-12) 1000 MCG CAPS Take 1 tablet by mouth daily 5/23/2018 at am Yes Lilly Zepeda MD   hydrochlorothiazide (HYDRODIURIL) 25 MG tablet Take 1 tablet (25 mg) by mouth daily 5/23/2018 at am Yes Lilly Zepeda MD   lisinopril (PRINIVIL/ZESTRIL) 20 MG tablet Take 1 tablet (20 mg) by mouth 2 times daily 5/23/2018 at am Yes Lilly Zepeda MD   omeprazole (PRILOSEC) 20 MG CR capsule Take 1 capsule (20 mg) by mouth daily 5/22/2018 at pm Yes Lilly Zepeda MD   Sertraline HCl (ZOLOFT PO) Take 100 mg by mouth daily 5/23/2018 at am Yes Unknown, Entered By History   Simethicone (GAS-X EXTRA STRENGTH) 125 MG CAPS Take 1 capsule by mouth 2 times daily as needed  at prn Yes Lilly Zepeda MD   simvastatin (ZOCOR) 5 MG tablet Take 2 tablets (10 mg) by mouth daily 5/22/2018 at pm Yes Lilly Zeepda MD   vancomycin (FIRST) 50 MG/ML SOLN Take 2.5 mLs (125 mg) by mouth 4 times daily 5/23/2018 at x 1 dose Yes Lilly Zepeda MD   WARFARIN SODIUM PO Take by mouth daily Held 5/18/18-5/20/18.  2.5 mg M/W/Sa  5 mg AOD 5/22/2018 at pm Yes Unknown, Entered By History   order for DME Equipment being ordered: Walker Wheels () and Walker ()  Treatment Diagnosis: Difficulty ambulating   Kasie Merchant MD   order for DME Equipment being ordered: Walker Wheels ()  Treatment Diagnosis:  Weakness,colitis.    Kasie Merchant MD Carolyn Dahlman, PharmD, BCPS

## 2018-05-23 NOTE — TELEPHONE ENCOUNTER
FYI PCP:     Please see telephone conversations below. Pt is now in ED.     Thank you,   Deepika GALICIA RN

## 2018-05-23 NOTE — PROGRESS NOTES
Gamal's test performed prior to radial ABG draw. Collateral circulation confirmed.       Rambo Landeros, RT.

## 2018-05-23 NOTE — IP AVS SNAPSHOT
` `     [facility] CARDIAC SPECIALTY CARE: [phone]            Medication Administration Report for Santosh Robledo as of 05/29/18 1429   Legend:    Given Hold Not Given Due Canceled Entry Other Actions    Time Time (Time) Time  Time-Action       Inactive    Active    Linked        Medications 05/23/18 05/24/18 05/25/18 05/26/18 05/27/18 05/28/18 05/29/18    acetaminophen (TYLENOL) Suppository 650 mg  Dose: 650 mg  Freq: EVERY 4 HOURS PRN Route: RE  PRN Reason: mild pain  Start: 05/23/18 1246   Admin Instructions: Alternate ibuprofen (if ordered) with acetaminophen.  Maximum acetaminophen dose from all sources = 75 mg/kg/day not to exceed 4 grams/day.    Admin. Amount: 1 suppository (1 × 650 mg suppository)  Dispense Loc: [facility]               acetaminophen (TYLENOL) tablet 650 mg  Dose: 650 mg  Freq: EVERY 4 HOURS PRN Route: PO  PRN Reason: mild pain  Start: 05/23/18 1246   Admin Instructions: Alternate ibuprofen (if ordered) with acetaminophen.  Maximum acetaminophen dose from all sources = 75 mg/kg/day not to exceed 4 grams/day.    Admin. Amount: 2 tablet (2 × 325 mg tablet)  Dispense Loc: [facility]               alum & mag hydroxide-simethicone (MYLANTA ES/MAALOX  ES) suspension 30 mL  Dose: 30 mL  Freq: EVERY 4 HOURS PRN Route: PO  PRN Reason: indigestion  Start: 05/23/18 1246   Admin Instructions: Shake well.    Admin. Amount: 30 mL  Last Admin: 05/29/18 0245  Dispense Loc: [facility]  Volume: 30 mL           0245 (30 mL)-Given           Continuing ACE inhibitor/ARB/ARNI from home medication list OR ACE inhibitor/ARB/ARNI order already placed during this visit  Freq: DOES NOT GO TO MAR Route: XX  PRN Reason: other  Start: 05/23/18 1246   Dispense Loc:  Main Pharmacy               doxycycline (VIBRAMYCIN) 100 mg vial to attach to  mL bag  Dose: 100 mg  Freq: EVERY 12 HOURS Route: IV  Indications of Use: COMMUNITY ACQUIRED PNEUMONIA  Last Dose: 100 mg (05/28/18 0251)  Start:  05/24/18 1445   Admin. Amount: 100 mg  Last Admin: 05/29/18 0222  Dispense Loc:  [facility]  Infused Over: 1-2 Hours  Volume: 10 mL   Current Line: Peripheral IV 05/28/18 Right Lower forearm     1711 (100 mg)-New Bag        0247 (100 mg)-New Bag       1437 (100 mg)-New Bag        0255 (100 mg)-New Bag       1500 (100 mg)-New Bag        0215 (100 mg)-New Bag       1455 (100 mg)-New Bag        0251 (100 mg)-New Bag       1445 (100 mg)-New Bag        0222 (100 mg)-New Bag       [ ] 1445           furosemide (LASIX) tablet 40 mg  Dose: 40 mg  Freq: 2 TIMES DAILY. Route: PO  Start: 05/26/18 1100   Admin. Amount: 1 tablet (1 × 40 mg tablet)  Last Admin: 05/29/18 0841  Dispense Loc:  [facility]        (1100)-Not Given [C]       1749 (40 mg)-Given        0632 (40 mg)-Given [C]       (0800)-Not Given [C]       1635 (40 mg)-Given        0844 (40 mg)-Given       1618 (40 mg)-Given        0841 (40 mg)-Given       [ ] 1600           HOLD: All Oral Medications  Freq: HOLD Route: XX  Start: 05/23/18 1246   Admin Instructions: I (provider) have reviewed/adjusted the medications and it is okay to hold all oral medication doses while on BIPAP/AVAPS/AVAPS AE until patient is able to be off BIPAP/AVAPS/AVAPS AE for 2 hours with patient off BIPAP/AVAPS/AVAPS AE  for at least 30 minutes before and 30 minutes after taking the medication.  No lozenges or gum should be given while patient on BIPAP/AVAPS/AVAPS AE .    Order specific questions:  Medication(s) to hold: All oral medications  Parameter for hold (doses,days,conditions) : Hold while NPO     Dispense Loc:  Main Pharmacy               hydrALAZINE (APRESOLINE) injection 10 mg  Dose: 10 mg  Freq: EVERY 4 HOURS PRN Route: IV  PRN Reason: high blood pressure  PRN Comment: give for SBP > 170  Start: 05/23/18 1259   Admin Instructions: For ordered doses up to 40 mg, give IV Push undiluted over 1 minute.    Admin. Amount: 10 mg = 0.5 mL Conc: 20 mg/mL  Last Admin: 05/24/18  "1248  Dispense Loc: [facility]  Volume: 0.5 mL      1248 (10 mg)-Given                hydrALAZINE (APRESOLINE) tablet 25 mg  Dose: 25 mg  Freq: EVERY 8 HOURS Route: PO  Start: 05/24/18 1800   Admin. Amount: 1 tablet (1 × 25 mg tablet)  Last Admin: 05/29/18 1038  Dispense Loc: [facility]      1711 (25 mg)-Given        (0200)-Not Given [C]       0954 (25 mg)-Given       1811 (25 mg)-Given        0255 (25 mg)-Given       1048 (25 mg)-Given       1749 (25 mg)-Given        0215 (25 mg)-Given       1005 (25 mg)-Given       1817 (25 mg)-Given        0228 (25 mg)-Given       1021 (25 mg)-Given       1848 (25 mg)-Given        0222 (25 mg)-Given       1038 (25 mg)-Given       [ ] 1800           hypromellose-dextran (ARTIFICAL TEARS) 0.1-0.3 % ophthalmic solution 2-3 drop  Dose: 2-3 drop  Freq: EVERY 1 HOUR PRN Route: OP  PRN Reason: dry eyes  Start: 05/25/18 0428   Admin Instructions: To affected eye(s)    Admin. Amount: 2-3 drop  Last Admin: 05/25/18 0543  Dispense Loc: [facility]  Volume: 15 mL       0543 (3 drop)-Given               lidocaine (LMX4) cream  Freq: EVERY 1 HOUR PRN Route: Top  PRN Reason: pain  PRN Comment: with VAD insertion or accessing implanted port.  Start: 05/23/18 1246   Admin Instructions: Do NOT give if patient has a history of allergy to any local anesthetic or any \"kye\" product.   Apply 30 minutes prior to VAD insertion or port access.  MAX Dose:  2.5 g (  of 5 g tube)    Dispense Loc: Contact Rx for dose               lidocaine 1 % 1 mL  Dose: 1 mL  Freq: EVERY 1 HOUR PRN Route: OTHER  PRN Comment: mild pain with VAD insertion or accessing implanted port  Start: 05/23/18 1246   Admin Instructions: Do NOT give if patient has a history of allergy to any local anesthetic or any \"kye\" product. MAX dose 1 mL subcutaneous OR intradermal in divided doses.    Admin. Amount: 1 mL  Dispense Loc: [facility]  Volume: 20 mL               lisinopril (PRINIVIL/ZESTRIL) tablet 40 mg  Dose: 40 " mg  Freq: 2 TIMES DAILY Route: PO  Start: 05/26/18 2000   Admin Instructions: Hold for SBP <110    Admin. Amount: 1 tablet (1 × 40 mg tablet)  Last Admin: 05/29/18 0840  Dispense Loc: [facility]        2054 (40 mg)-Given        1005 (40 mg)-Given       1930 (40 mg)-Given        0844 (40 mg)-Given       2111 (40 mg)-Given        0840 (40 mg)-Given       [ ] 2000           magnesium sulfate 4 g in 100 mL sterile water (premade)  Dose: 4 g  Freq: EVERY 4 HOURS PRN Route: IV  PRN Reason: magnesium supplementation  Start: 05/23/18 1246   Admin Instructions: For serum Mg++ less than 1.6 mg/dL  Give 4 g and recheck magnesium level 2 hours after dose, and next AM.    Admin. Amount: 4 g = 100 mL Conc: 4 g/100 mL  Dispense Loc: Contact Rx for dose  Infused Over: 120 Minutes  Volume: 100 mL               melatonin tablet 1 mg  Dose: 1 mg  Freq: AT BEDTIME PRN Route: PO  PRN Reason: sleep  Start: 05/23/18 1246   Admin Instructions: Do not give unless at least 6 hours of uninterrupted sleep is expected.    Admin. Amount: 1 tablet (1 × 1 mg tablet)  Last Admin: 05/27/18 2113  Dispense Loc: [facility]        0255 (1 mg)-Given       2230 (1 mg)-Given        2113 (1 mg)-Given             metoprolol (LOPRESSOR) injection 2.5 mg  Dose: 2.5 mg  Freq: EVERY 4 HOURS PRN Route: IV  PRN Reason: other  PRN Comment: HR > 120, hold for SBP < 90  Start: 05/24/18 1013   Admin Instructions: For ordered doses up to 15 mg, give IV Push undiluted over 5-10 minutes.    Admin. Amount: 2.5 mg = 2.5 mL Conc: 1 mg/mL  Dispense Loc: [facility]  Volume: 2.5 mL               metoprolol tartrate (LOPRESSOR) tablet 25 mg  Dose: 25 mg  Freq: 2 TIMES DAILY Route: PO  Start: 05/24/18 1015   Admin Instructions: Hold for SBP < 100 or HR < 60    Admin. Amount: 1 tablet (1 × 25 mg tablet)  Last Admin: 05/29/18 0840  Dispense Loc: [facility]      1042 (25 mg)-Given       2013 (25 mg)-Given        0955 (25 mg)-Given       2045 (25 mg)-Given        1048 (25  mg)-Given       2231 (25 mg)-Given        1006 (25 mg)-Given       2113 (25 mg)-Given        0844 (25 mg)-Given       2111 (25 mg)-Given        0840 (25 mg)-Given       [ ] 2100           naloxone (NARCAN) injection 0.1-0.4 mg  Dose: 0.1-0.4 mg  Freq: EVERY 2 MIN PRN Route: IV  PRN Reason: opioid reversal  Start: 05/23/18 1246   Admin Instructions: For respiratory rate LESS than or EQUAL to 8.  Partial reversal dose:  0.1 mg titrated q 2 minutes for Analgesia Side Effects Monitoring Sedation Level of 3 (frequently drowsy, arousable, drifts to sleep during conversation).Full reversal dose:  0.4 mg bolus for Analgesia Side Effects Monitoring Sedation Level of 4 (somnolent, minimal or no response to stimulation).  For ordered doses up to 2mg give IVP. Give each 0.4mg over 15 seconds in emergency situations. For non-emergent situations further dilute in 9mL of NS to facilitate titration of response.    Admin. Amount: 0.1-0.4 mg = 0.25-1 mL Conc: 0.4 mg/mL  Dispense Loc:  [facility]  Volume: 1 mL               nitroGLYcerin (NITROSTAT) sublingual tablet 0.4 mg  Dose: 0.4 mg  Freq: EVERY 5 MIN PRN Route: SL  PRN Reason: chest pain  Start: 05/23/18 1246   Admin Instructions: Maximum 3 doses in 15 minutes.  Notify provider if no relief after 3 doses.    Do NOT give nitroglycerin SL IF the patient has taken avanafil (STENDRA), sildenafil (VIAGRA) or (REVATIO) within the last 8 hours, vardenafil (LEVITRA) or (STAXYN) within the last 18 hours, tadalafil (CIALIS) or (ADCIRCA) within the last 36 hours. Inform provider if patient has taken one of these medications.  If patient is still having acute angina requiring treatment, an alternative treatment option may be used such as: IV beta-blocker [2.5 mg - 5 mg metoprolol (LOPRESSOR)] if ordered by a provider.    Admin. Amount: 1 tablet (1 × 0.4 mg tablet)  Dispense Loc: [facility]               No lozenges or gum should be given while patient on BIPAP/AVAPS/AVAPS AE  Freq:  CONTINUOUS PRN Route: XX  Start: 05/23/18 1246   Dispense Loc: [facility]               nystatin (MYCOSTATIN) suspension 500,000 Units  Dose: 500,000 Units  Freq: 4 TIMES DAILY Route: SWISH & SPIT  Start: 05/24/18 1030   Admin. Amount: 500,000 Units = 5 mL Conc: 100,000 Units/mL  Last Admin: 05/29/18 0841  Dispense Loc:  [facility]  Volume: 5 mL      (1030)-Not Given       1248 (500,000 Units)-Given       2014 (500,000 Units)-Given       (2200)-Not Given [C]        0956 (500,000 Units)-Given       1301 (500,000 Units)-Given       1811 (500,000 Units)-Given       2208 (500,000 Units)-Given        1049 (500,000 Units)-Given       1501 (500,000 Units)-Given       1840 (500,000 Units)-Given       2231 (500,000 Units)-Given        1007 (500,000 Units)-Given       1456 (500,000 Units)-Given       1930 (500,000 Units)-Given       2113 (500,000 Units)-Given        1024 (500,000 Units)-Given       1346 (500,000 Units)-Given       1849 (500,000 Units)-Given       2111 (500,000 Units)-Given        0841 (500,000 Units)-Given       (1315)-Not Given       [ ] 1800       [ ] 2200           omeprazole (priLOSEC) CR capsule 20 mg  Dose: 20 mg  Freq: DAILY Route: PO  Indications of Use: GASTROESOPHAGEAL REFLUX DISEASE  Start: 05/24/18 0800   Admin. Amount: 1 capsule (1 × 20 mg capsule)  Last Admin: 05/29/18 0839  Dispense Loc:  [facility]      0940 (20 mg)-Given        0954 (20 mg)-Given        0919 (20 mg)-Given        0631 (20 mg)-Given [C]       0800 (20 mg)-Given by Other Clinician        0844 (20 mg)-Given        0839 (20 mg)-Given           ondansetron (ZOFRAN-ODT) ODT tab 4 mg  Dose: 4 mg  Freq: EVERY 6 HOURS PRN Route: PO  PRN Reasons: nausea,vomiting  Start: 05/23/18 1246   Admin Instructions: This is Step 1 of nausea and vomiting management.  If nausea not resolved in 15 minutes, go to Step 2 prochlorperazine (COMPAZINE). Do not push through foil backing. Peel back foil and gently remove. Place on tongue immediately.  Administration with liquid unnecessary  With dry hands, peel back foil backing and gently remove tablet; do not push oral disintegrating tablet through foil backing; administer immediately on tongue and oral disintegrating tablet dissolves in seconds; then swallow with saliva; liquid not required.    Admin. Amount: 1 tablet (1 × 4 mg tablet)  Dispense Loc:  [facility]              Or  ondansetron (ZOFRAN) injection 4 mg  Dose: 4 mg  Freq: EVERY 6 HOURS PRN Route: IV  PRN Reasons: nausea,vomiting  Start: 05/23/18 1246   Admin Instructions: This is Step 1 of nausea and vomiting management.  If nausea not resolved in 15 minutes, go to Step 2 prochlorperazine (COMPAZINE).  Irritant. For ordered doses up to 4 mg, give IV Push undiluted over 2-5 minutes.    Admin. Amount: 4 mg = 2 mL Conc: 4 mg/2 mL  Dispense Loc:  [facility]  Infused Over: 2-5 Minutes  Volume: 2 mL               Patient is already receiving anticoagulation with heparin, enoxaparin (LOVENOX), warfarin (COUMADIN)  or other anticoagulant medication  Freq: CONTINUOUS PRN Route: XX  Start: 05/23/18 1246   Dispense Loc: [facility] Floor Stock               Patient may continue current oral medications  Freq: CONTINUOUS PRN Route: XX  Start: 05/24/18 3135   Dispense Loc: [facility] Floor Stock               potassium chloride (KLOR-CON) Packet 20-40 mEq  Dose: 20-40 mEq  Freq: EVERY 2 HOURS PRN Route: ORAL OR FEED  PRN Reason: potassium supplementation  Start: 05/23/18 1246   Admin Instructions: Use if unable to tolerate tablets.  If Serum K+ 3.0-3.3, dose = 60 mEq po total dose (40 mEq x1 followed in 2 hours by 20 mEq x1). Recheck K+ level 4 hours after dose and the next AM.  If Serum K+ 2.5-2.9, dose = 80 mEq po total dose (40 mEq Q2H x2). Recheck K+ level 4 hours after dose and the next AM.  If Serum K+ less than 2.5, See IV order.  Dissolve packet contents in 4-8 ounces of cold water or juice.    Admin. Amount: 20-40 mEq  Dispense Loc:  [facility]                potassium chloride 10 mEq in 100 mL intermittent infusion with 10 mg lidocaine  Dose: 10 mEq  Freq: EVERY 1 HOUR PRN Route: IV  PRN Reason: potassium supplementation  Last Dose: 10 mEq (05/24/18 0819)  Start: 05/23/18 1246   Admin Instructions: Infuse via PERIPHERAL LINE. Use potassium with lidocaine for pain with peripheral administration.  If Serum K+ 3.0-3.3, dose = 10 mEq/hr x4 doses (40 mEq IV total dose). Recheck K+ level 2 hours after dose and the next AM.  If Serum K+ less than 3.0, dose = 10 mEq/hr x6 doses (60 mEq IV total dose). Recheck K+ level 2 hours after dose and the next AM.    Admin. Amount: 10 mEq = 100 mL Conc: 10 mEq/100 mL  Last Admin: 05/25/18 1359  Dispense Loc: Contact Rx for dose  Infused Over: 1 Hours  Volume: 100 mL     1249 (10 mEq)-New Bag       2111 (10 mEq)-New Bag [C]       2226 (10 mEq)-New Bag       2336 (10 mEq)-New Bag        0041 (10 mEq)-New Bag [C]       0641 (10 mEq)-New Bag [C]       0819 (10 mEq)-New Bag       0940 (10 mEq)-New Bag        0805 (10 mEq)-New Bag       1052 (10 mEq)-New Bag       1302 (10 mEq)-New Bag       1359 (10 mEq)-New Bag               potassium chloride 10 mEq in 100 mL sterile water intermittent infusion (premix)  Dose: 10 mEq  Freq: EVERY 1 HOUR PRN Route: IV  PRN Reason: potassium supplementation  Start: 05/23/18 1246   Admin Instructions: Infuse via PERIPHERAL LINE or CENTRAL LINE. Use for central line replacement if patient weight less than 65 kg, if patient is on TPN with high potassium content or if unit does not stock 20 mEq bags.   If Serum K+ 3.0-3.3, dose = 10 mEq/hr x4 doses (40 mEq IV total dose). Recheck K+ level 2 hours after dose and the next AM.   If Serum K+ less than 3.0, dose = 10 mEq/hr x6 doses (60 mEq IV total dose). Recheck K+ level 2 hours after dose and the next AM.    Admin. Amount: 10 mEq = 100 mL Conc: 10 mEq/100 mL  Dispense Loc: Contact Rx for dose  Infused Over: 60 Minutes  Volume: 100 mL               potassium chloride  20 mEq in 50 mL intermittent infusion  Dose: 20 mEq  Freq: EVERY 1 HOUR PRN Route: IV  PRN Reason: potassium supplementation  Start: 05/23/18 1246   Admin Instructions: Infuse via CENTRAL LINE Only. May need EKG if less than 65 kg or on TPN - Max rate is 0.3 mEq/kg/hr for patients not on EKG monitoring.   If Serum K+ 3.0-3.3, dose = 20 mEq/hr x2 doses (40 mEq IV total dose). Recheck K+ level 2 hours after dose and the next AM.  If Serum K+ less than 3.0, dose = 20 mEq/hr x3 doses (60 mEq IV total dose). Recheck K+ level 2 hours after dose and the next AM.    Admin. Amount: 20 mEq = 50 mL Conc: 20 mEq/50 mL  Dispense Loc: Contact Rx for dose  Volume: 50 mL               potassium chloride SA (K-DUR/KLOR-CON M) CR tablet 20 mEq  Dose: 20 mEq  Freq: 2 TIMES DAILY Route: PO  Start: 05/24/18 1015   Admin Instructions: DO NOT CRUSH    Admin. Amount: 1 tablet (1 × 20 mEq tablet)  Last Admin: 05/29/18 0840  Dispense Loc: [facility]      1042 (20 mEq)-Given       2014 (20 mEq)-Given        0955 (20 mEq)-Given       2046 (20 mEq)-Given        1048 (20 mEq)-Given       2232 (20 mEq)-Given [C]        1005 (20 mEq)-Given       2114 (20 mEq)-Given        0847 (20 mEq)-Given       2110 (20 mEq)-Given        0840 (20 mEq)-Given       [ ] 2100           potassium chloride SA (K-DUR/KLOR-CON M) CR tablet 20-40 mEq  Dose: 20-40 mEq  Freq: EVERY 2 HOURS PRN Route: PO  PRN Reason: potassium supplementation  Start: 05/23/18 1246   Admin Instructions: Use if able to take PO.   If Serum K+ 3.0-3.3, dose = 60 mEq po total dose (40 mEq x1 followed in 2 hours by 20 mEq x1). Recheck K+ level 4 hours after dose and the next AM.  If Serum K+ 2.5-2.9, dose = 80 mEq po total dose (40 mEq Q2H x2). Recheck K+ level 4 hours after dose and the next AM.  If Serum K+ less than 2.5, See IV order.  DO NOT CRUSH    Admin. Amount: 1-2 tablet (1-2 × 20 mEq tablet)  Dispense Loc: [facility]               prochlorperazine (COMPAZINE) injection 5 mg  Dose:  5 mg  Freq: EVERY 6 HOURS PRN Route: IV  PRN Reasons: nausea,vomiting  Start: 05/23/18 1246   Admin Instructions: This is Step 2 of nausea and vomiting management. Give if nausea not resolved 15 minutes after giving ondansetron (ZOFRAN). If nausea not resolved in 15 minutes, go to Step 3 metoclopramide (REGLAN), if ordered.  For ordered doses up to 10 mg, give IV Push undiluted. Each 5mg over 1 minute.    Admin. Amount: 5 mg = 1 mL Conc: 5 mg/mL  Dispense Loc:  [facility]  Infused Over: 1-2 Minutes  Volume: 1 mL              Or  prochlorperazine (COMPAZINE) tablet 5 mg  Dose: 5 mg  Freq: EVERY 6 HOURS PRN Route: PO  PRN Reason: vomiting  Start: 05/23/18 1246   Admin Instructions: This is Step 2 of nausea and vomiting management. Give if nausea not resolved 15 minutes after giving ondansetron (ZOFRAN). If nausea not resolved in 15 minutes, go to Step 3 metoclopramide (REGLAN), if ordered.    Admin. Amount: 1 tablet (1 × 5 mg tablet)  Dispense Loc:  [facility]              Or  prochlorperazine (COMPAZINE) Suppository 12.5 mg  Dose: 12.5 mg  Freq: EVERY 12 HOURS PRN Route: RE  PRN Reasons: nausea,vomiting  Start: 05/23/18 1246   Admin Instructions: This is Step 2 of nausea and vomiting management. Give if nausea not resolved 15 minutes after giving ondansetron (ZOFRAN). If nausea not resolved in 15 minutes, go to Step 3 metoclopramide (REGLAN), if ordered.    Admin. Amount: 0.5 suppository (0.5 × 25 mg suppository)  Dispense Loc:  Main Pharmacy               Reason beta blocker not prescribed  Freq: DOES NOT GO TO MAR Route: OTHER  PRN Reason: other  Start: 05/23/18 1246   Order specific questions:  Reason not prescribed: evidence of fluid overload or volume depletion     Dispense Loc:  Main Pharmacy               sertraline (ZOLOFT) tablet 100 mg  Dose: 100 mg  Freq: DAILY Route: PO  Start: 05/24/18 0800   Admin. Amount: 1 tablet (1 × 100 mg tablet)  Last Admin: 05/29/18 0840  Dispense Loc:  [facility]       0940 (100 mg)-Given        0955 (100 mg)-Given        1048 (100 mg)-Given        1005 (100 mg)-Given        0843 (100 mg)-Given        0840 (100 mg)-Given           simvastatin (ZOCOR) tablet 10 mg  Dose: 10 mg  Freq: AT BEDTIME Route: PO  Indications of Use: CEREBROVASCULAR ACCIDENT  Start: 05/24/18 2200   Admin. Amount: 1 tablet (1 × 10 mg tablet)  Last Admin: 05/28/18 2111  Dispense Loc:  [facility]      2025 (10 mg)-Given               2204 (10 mg)-Given        2230 (10 mg)-Given        2113 (10 mg)-Given        2111 (10 mg)-Given        [ ] 2200           sodium chloride (PF) 0.9% PF flush 10 mL  Dose: 10 mL  Freq: EVERY 8 HOURS Route: IK  Start: 05/23/18 1315   Admin. Amount: 10 mL  Last Admin: 05/27/18 2114  Dispense Loc: [facility] Floor Stock  Volume: 10 mL   Current Line: Peripheral IV 05/28/18 Right Lower forearm    1748 (10 mL)-Given       2110 (10 mL)-Given        (0500)-Not Given       0642 (10 mL)-Given       1508 (10 mL)-Given       2015 (10 mL)-Given               0543 (10 mL)-Given       (1433)-Not Given       (2204)-Not Given [C]               2233 (10 mL)-Given       (2318)-Not Given [C]        0614 (10 mL)-Given       (1315)-Not Given       2114 (10 mL)-Given        (0529)-Not Given                             [ ] 1315       [ ] 2115           sodium chloride (PF) 0.9% PF flush 3 mL  Dose: 3 mL  Freq: EVERY 8 HOURS Route: IK  Start: 05/23/18 1247   Admin Instructions: And Q1H PRN, to lock peripheral IV dormant line.    Admin. Amount: 3 mL  Last Admin: 05/29/18 0256  Dispense Loc: [facility] Floor Stock  Volume: 3 mL   Current Line: Peripheral IV 05/28/18 Right Lower forearm    1248 (3 mL)-Given       2110 (3 mL)-Given        (0400)-Not Given       1412 (3 mL)-Given       2014 (3 mL)-Given        0248 (3 mL)-Given              (1412)-Not Given       2046 (3 mL)-Given        0442 (3 mL)-Given       0921 (3 mL)-Given       2231 (3 mL)-Given       2233 (3 mL)-Given        (0614)-Not Given [C]       (1247)-Not  Given       (2114)-Not Given [C]        (0528)-Not Given       1309 (3 mL)-Given       2115 (3 mL)-Given        0256 (3 mL)-Given              [ ] 1247       [ ] 2047           sodium chloride (PF) 0.9% PF flush 3 mL  Dose: 3 mL  Freq: EVERY 1 HOUR PRN Route: IK  PRN Reason: line flush  PRN Comment: for peripheral IV flush post IV meds  Start: 05/23/18 1246   Admin. Amount: 3 mL  Dispense Loc: [facility] Stock  Volume: 3 mL               vancomycin (FIRST) solution 125 mg  Dose: 125 mg  Freq: 4 TIMES DAILY Route: PO  Indications of Use: CLOSTRIDIUM DIFFICILE  Start: 05/23/18 1247   Admin. Amount: 125 mg = 2.5 mL Conc: 50 mg/mL  Last Admin: 05/29/18 1251  Dispense Loc:  Main Pharmacy  Volume: 2.5 mL     (1307)-Not Given       (1747)-Not Given       (2000)-Not Given        0941 (125 mg)-Given       1412 (125 mg)-Given       (1748)-Not Given [C]       2013 (125 mg)-Given        1009 (125 mg)-Given       1301 (125 mg)-Given       1819 (125 mg)-Given       2204 (125 mg)-Given        (0800)-Not Given [C]       1324 (125 mg)-Given       1838 (125 mg)-Given       2054 (125 mg)-Given        1007 (125 mg)-Given       1455 (125 mg)-Given       1930 (125 mg)-Given       2113 (125 mg)-Given        0800 (125 mg)-Given       1219 (125 mg)-Given       1849 (125 mg)-Given       2118 (125 mg)-Given        0840 (125 mg)-Given       1251 (125 mg)-Given       [ ] 1800       [ ] 2000           Warfarin Therapy Reminder (Check START DATE - warfarin may be starting in the FUTURE)  Dose: 1 each  Freq: CONTINUOUS PRN Route: XX  Start: 05/24/18 1430   Admin Instructions: *Note to reorder warfarin daily*  Pharmacy Warfarin Dosing Service  Patient is on Warfarin Therapy - check for daily order    Admin. Amount: 1 each  Dispense Loc:  Main Pharmacy              Future Medications  Medications 05/23/18 05/24/18 05/25/18 05/26/18 05/27/18 05/28/18 05/29/18       warfarin (COUMADIN) tablet 2.5 mg  Dose: 2.5 mg  Freq: ONCE AT 6PM Route:  PO  Start: 18 1800   Admin. Amount: 1 tablet (1 × 2.5 mg tablet)  Dispense Loc: [facility]  Administrations Remainin           [ ] 1800          Completed Medications  Medications 18         Dose: 2.5 mg  Freq: ONCE AT 6PM Route: PO  Start: 18 1800   End: 18   Admin. Amount: 1 tablet (1 × 2.5 mg tablet)  Last Admin: 18  Dispense Loc: [facility]  Administrations Remainin          1849 (2.5 mg)-Given              Dose: 2.5 mg  Freq: ONCE AT 6PM Route: PO  Start: 18 1800   End: 18   Admin. Amount: 1 tablet (1 × 2.5 mg tablet)  Last Admin: 18  Dispense Loc: [facility]  Administrations Remainin         1817 (2.5 mg)-Given               Dose: 2.5 mg  Freq: ONCE AT 6PM Route: PO  Start: 18 1800   End: 18   Admin. Amount: 1 tablet (1 × 2.5 mg tablet)  Last Admin: 18  Dispense Loc: [facility]  Administrations Remainin        1749 (2.5 mg)-Given             Discontinued Medications  Medications 18         Dose: 2 g  Freq: EVERY 24 HOURS Route: IV  Indications of Use: COMMUNITY ACQUIRED PNEUMONIA  Start: 18 1445   End: 18 0950   Admin Instructions: Patient had Rash >40 years ago, will monitor closely.    Admin. Amount: 2 g  Last Admin: 18 161  Dispense Loc: [facility]  Infused Over: 30 Minutes  Volume: 20 mL   Current Line: Peripheral IV 18 Right Lower forearm     1554 (2 g)-New Bag        1602 (2 g)-New Bag        1734 (2 g)-New Bag [C]        1635 (2 g)-New Bag               1615 (2 g)-New Bag        0950-Med Discontinued

## 2018-05-23 NOTE — IP AVS SNAPSHOT
"    [facility] CARDIAC SPECIALTY CARE: [phone]                                              INTERAGENCY TRANSFER FORM - LAB / IMAGING / EKG / EMG RESULTS   2018                    Hospital Admission Date: 2018  TRUE MATTHEWS   : 1929  Sex: Male        Attending Provider: Michelle Montgomery MD     Allergies:  Penicillins    Infection:  None   Service:  HOSPITALIST    Ht:  1.905 m (6' 3\")   Wt:  76.5 kg (168 lb 9.6 oz)   Admission Wt:  77.1 kg (170 lb)    BMI:  21.07 kg/m 2   BSA:  2.01 m 2            Patient PCP Information     Provider PCP Type    Lilly Zepeda MD General         Lab Results - 3 Days      INR [[id number]] (Abnormal)  Resulted: 18, Result status: Final result    Ordering provider: Tru Chakraborty PA  18 0000 Resulting lab: [facility]    Specimen Information    Type Source Collected On   Blood  18 0615          Components       Value Reference Range Flag Lab   INR 2.12 0.86 - 1.14 H FrStHsLb            Basic metabolic panel [[id number]] (Abnormal)  Resulted: 18 07, Result status: Final result    Ordering provider: Tru Chakraborty PA  18 0000 Resulting lab: [facility]    Specimen Information    Type Source Collected On   Blood  1815          Components       Value Reference Range Flag Lab   Sodium 140 133 - 144 mmol/L  FrStHsLb   Potassium 4.0 3.4 - 5.3 mmol/L  FrStHsLb   Chloride 107 94 - 109 mmol/L  FrStHsLb   Carbon Dioxide 26 20 - 32 mmol/L  FrStHsLb   Anion Gap 7 3 - 14 mmol/L  FrStHsLb   Glucose 98 70 - 99 mg/dL  FrStHsLb   Urea Nitrogen 22 7 - 30 mg/dL  FrStHsLb   Creatinine 0.85 0.66 - 1.25 mg/dL  FrStHsLb   GFR Estimate 85 >60 mL/min/1.7m2  FrStHsLb   Comment:  Non  GFR Calc   GFR Estimate If Black >90 >60 mL/min/1.7m2  FrStHsLb   Comment:  African American GFR Calc   Calcium 8.4 8.5 - 10.1 mg/dL L FrStHsLb            CBC with platelets " [[id number]] (Abnormal)  Resulted: 05/29/18 0641, Result status: Final result    Ordering provider: Tru Chakraborty PA  05/29/18 0000 Resulting lab: [facility]    Specimen Information    Type Source Collected On   Blood  05/29/18 0615          Components       Value Reference Range Flag Lab   WBC 14.3 4.0 - 11.0 10e9/L H FrStHsLb   RBC Count 4.57 4.4 - 5.9 10e12/L  FrStHsLb   Hemoglobin 11.8 13.3 - 17.7 g/dL L FrStHsLb   Hematocrit 36.6 40.0 - 53.0 % L FrStHsLb   MCV 80 78 - 100 fl  FrStHsLb   MCH 25.8 26.5 - 33.0 pg L FrStHsLb   MCHC 32.2 31.5 - 36.5 g/dL  FrStHsLb   RDW 15.2 10.0 - 15.0 % H FrStHsLb   Platelet Count 492 150 - 450 10e9/L H FrStHsLb            Lactic acid level STAT for sepsis protocol [[id number]]  Resulted: 05/28/18 0911, Result status: Final result    Ordering provider: Rachid Fried DO  05/28/18 0750 Resulting lab: [facility]    Specimen Information    Type Source Collected On   Blood  05/28/18 0850          Components       Value Reference Range Flag Lab   Lactate for Sepsis Protocol 1.2 0.4 - 1.9 mmol/L  FrStHsLb            Glucose by meter [[id number]] (Abnormal)  Resulted: 05/28/18 0801, Result status: Final result    Ordering provider: Michelle Montgomery MD  05/28/18 0747 Resulting lab: POINT OF CARE TEST, GLUCOSE    Specimen Information    Type Source Collected On     05/28/18 0747          Components       Value Reference Range Flag Lab   Glucose 100 70 - 99 mg/dL H 170            Basic metabolic panel [[id number]] (Abnormal)  Resulted: 05/28/18 0646, Result status: Final result    Ordering provider: Tru Chakraborty PA  05/28/18 0000 Resulting lab: [facility]    Specimen Information    Type Source Collected On   Blood  05/28/18 0551          Components       Value Reference Range Flag Lab   Sodium 141 133 - 144 mmol/L  FrStHsLb   Potassium 3.9 3.4 - 5.3 mmol/L  FrStHsLb   Chloride 107 94 - 109 mmol/L  FrStHsLb    Carbon Dioxide 27 20 - 32 mmol/L  FrStHsLb   Anion Gap 7 3 - 14 mmol/L  FrStHsLb   Glucose 111 70 - 99 mg/dL H FrStHsLb   Urea Nitrogen 25 7 - 30 mg/dL  FrStHsLb   Creatinine 0.81 0.66 - 1.25 mg/dL  FrStHsLb   GFR Estimate >90 >60 mL/min/1.7m2  FrStHsLb   Comment:  Non  GFR Calc   GFR Estimate If Black >90 >60 mL/min/1.7m2  FrStHsLb   Comment:  African American GFR Calc   Calcium 8.3 8.5 - 10.1 mg/dL L FrStHsLb            INR [[id number]] (Abnormal)  Resulted: 05/28/18 0616, Result status: Final result    Ordering provider: Tru Chakraborty PA  05/28/18 0000 Resulting lab: [facility]    Specimen Information    Type Source Collected On   Blood  05/28/18 0551          Components       Value Reference Range Flag Lab   INR 2.07 0.86 - 1.14 H FrStHsLb            CBC with platelets [[id number]] (Abnormal)  Resulted: 05/28/18 0605, Result status: Final result    Ordering provider: Tru Chakraborty PA  05/28/18 0000 Resulting lab: [facility]    Specimen Information    Type Source Collected On   Blood  05/28/18 0551          Components       Value Reference Range Flag Lab   WBC 13.1 4.0 - 11.0 10e9/L H FrStHsLb   RBC Count 4.34 4.4 - 5.9 10e12/L L FrStHsLb   Hemoglobin 11.2 13.3 - 17.7 g/dL L FrStHsLb   Hematocrit 34.8 40.0 - 53.0 % L FrStHsLb   MCV 80 78 - 100 fl  FrStHsLb   MCH 25.8 26.5 - 33.0 pg L FrStHsLb   MCHC 32.2 31.5 - 36.5 g/dL  FrStHsLb   RDW 15.0 10.0 - 15.0 %  FrStHsLb   Platelet Count 451 150 - 450 10e9/L H FrStHsLb            INR [[id number]] (Abnormal)  Resulted: 05/27/18 0610, Result status: Final result    Ordering provider: Tru Chakraborty PA  05/27/18 0000 Resulting lab: [facility]    Specimen Information    Type Source Collected On   Blood  05/27/18 0528          Components       Value Reference Range Flag Lab   INR 1.96 0.86 - 1.14 H FrStHsLb            Basic metabolic panel [[id number]] (Abnormal)  Resulted:  05/27/18 0610, Result status: Final result    Ordering provider: Tru Chakraborty PA  05/27/18 0000 Resulting lab: [facility]    Specimen Information    Type Source Collected On   Blood  05/27/18 0528          Components       Value Reference Range Flag Lab   Sodium 141 133 - 144 mmol/L  FrStHsLb   Potassium 3.7 3.4 - 5.3 mmol/L  FrStHsLb   Chloride 106 94 - 109 mmol/L  FrStHsLb   Carbon Dioxide 28 20 - 32 mmol/L  FrStHsLb   Anion Gap 7 3 - 14 mmol/L  FrStHsLb   Glucose 113 70 - 99 mg/dL H FrStHsLb   Urea Nitrogen 32 7 - 30 mg/dL H FrStHsLb   Creatinine 0.85 0.66 - 1.25 mg/dL  FrStHsLb   GFR Estimate 85 >60 mL/min/1.7m2  FrStHsLb   Comment:  Non  GFR Calc   GFR Estimate If Black >90 >60 mL/min/1.7m2  FrStHsLb   Comment:  African American GFR Calc   Calcium 8.1 8.5 - 10.1 mg/dL L FrStHsLb            CBC with platelets [[id number]] (Abnormal)  Resulted: 05/27/18 0542, Result status: Final result    Ordering provider: Tru Chakraborty PA  05/27/18 0000 Resulting lab: [facility]    Specimen Information    Type Source Collected On   Blood  05/27/18 0528          Components       Value Reference Range Flag Lab   WBC 13.5 4.0 - 11.0 10e9/L H FrStHsLb   RBC Count 4.34 4.4 - 5.9 10e12/L L FrStHsLb   Hemoglobin 11.3 13.3 - 17.7 g/dL L FrStHsLb   Hematocrit 34.9 40.0 - 53.0 % L FrStHsLb   MCV 80 78 - 100 fl  FrStHsLb   MCH 26.0 26.5 - 33.0 pg L FrStHsLb   MCHC 32.4 31.5 - 36.5 g/dL  FrStHsLb   RDW 14.9 10.0 - 15.0 %  FrStHsLb   Platelet Count 450 150 - 450 10e9/L  FrStHsLb            Lactic acid level STAT for sepsis protocol [[id number]]  Resulted: 05/26/18 2257, Result status: Final result    Ordering provider: Michelle Montgomery MD  05/26/18 2228 Resulting lab: [facility]    Specimen Information    Type Source Collected On   Blood  05/26/18 2240          Components       Value Reference Range Flag Lab   Lactate for Sepsis Protocol 1.4 0.4 -  1.9 mmol/L  FrStHsLb            Basic metabolic panel [[id number]] (Abnormal)  Resulted: 05/26/18 0613, Result status: Final result    Ordering provider: Tru Chakraborty PA  05/26/18 0000 Resulting lab: [facility]    Specimen Information    Type Source Collected On   Blood  05/26/18 0545          Components       Value Reference Range Flag Lab   Sodium 142 133 - 144 mmol/L  FrStHsLb   Potassium 3.4 3.4 - 5.3 mmol/L  FrStHsLb   Chloride 105 94 - 109 mmol/L  FrStHsLb   Carbon Dioxide 32 20 - 32 mmol/L  FrStHsLb   Anion Gap 5 3 - 14 mmol/L  FrStHsLb   Glucose 126 70 - 99 mg/dL H FrStHsLb   Urea Nitrogen 30 7 - 30 mg/dL  FrStHsLb   Creatinine 0.86 0.66 - 1.25 mg/dL  FrStHsLb   GFR Estimate 83 >60 mL/min/1.7m2  FrStHsLb   Comment:  Non  GFR Calc   GFR Estimate If Black >90 >60 mL/min/1.7m2  FrStHsLb   Comment:  African American GFR Calc   Calcium 8.2 8.5 - 10.1 mg/dL L FrStHsLb            INR [[id number]] (Abnormal)  Resulted: 05/26/18 0609, Result status: Final result    Ordering provider: Tru Chakraborty PA  05/26/18 0000 Resulting lab: [facility]    Specimen Information    Type Source Collected On   Blood  05/26/18 0545          Components       Value Reference Range Flag Lab   INR 1.88 0.86 - 1.14 H FrStHsLb            CBC with platelets [[id number]] (Abnormal)  Resulted: 05/26/18 0559, Result status: Final result    Ordering provider: Tru Chakraborty PA  05/26/18 0000 Resulting lab: [facility]    Specimen Information    Type Source Collected On   Blood  05/26/18 0545          Components       Value Reference Range Flag Lab   WBC 13.8 4.0 - 11.0 10e9/L H FrStHsLb   RBC Count 4.36 4.4 - 5.9 10e12/L L FrStHsLb   Hemoglobin 11.5 13.3 - 17.7 g/dL L FrStHsLb   Hematocrit 34.9 40.0 - 53.0 % L FrStHsLb   MCV 80 78 - 100 fl  FrStHsLb   MCH 26.4 26.5 - 33.0 pg L FrStHsLb   MCHC 33.0 31.5 - 36.5 g/dL  FrStHsLb   RDW 14.9 10.0 - 15.0 %   [facility]   Platelet Count 440 150 - 450 10e9/L  FrStHsLb            Testing Performed By     Lab - Abbreviation Name Director Address Valid Date Range    14 - FrStHsLb [facility] Unknown [address] [place] [address] 05/08/15 1057 - Present    170 - Unknown POINT OF CARE TEST, GLUCOSE Unknown Unknown 10/31/11 1114 - Present            Unresulted Labs (24h ago through future)    Start       Ordered    05/25/18 0600  INR  (warfarin (COUMADIN) Pharmacy Consult-INITIAL ORDER)  DAILY,   Routine      05/24/18 1431    05/24/18 0600  INR  (warfarin (COUMADIN) Pharmacy Consult-INITIAL ORDER)  DAILY,   Routine      05/23/18 1246    05/24/18 0600  CBC with platelets  DAILY,   Routine     Comments:  Last Lab Result: Hemoglobin (g/dL)       Date                     Value                 05/23/2018               12.2 (L)         ----------    05/23/18 1246    05/24/18 0600  Basic metabolic panel  DAILY,   Routine      05/23/18 1246    Unscheduled  Potassium  CONDITIONAL X 1 STAT,   STAT     Comments:  RN to release order IF the pronounced diuretic diuresics response is greater than 1000 mL or if there is new onset of arrhythmia.    05/23/18 1246    Unscheduled  Blood gas blood with oxy  CONDITIONAL X 3,   Routine     Comments:  Release order if patient in respiratory distress and Notify Provider.    05/23/18 1246    Unscheduled  Glucose  CONDITIONAL X 3,   Routine     Comments:  Release order if patient experiencing hyperglycemia or hypoglycemia symptoms and Notify Provider.    05/23/18 1246    Unscheduled  Hemoglobin  CONDITIONAL X 3,   Routine     Comments:  Release order if patient experiencing active bleeding and/or hypotension and Notify Provider.    05/23/18 1246    Unscheduled  Potassium  (Potassium Replacement - \"Standard\" - For K levels less than 3.4 mmol/L - UU,UR,UA,RH,SH,PH,WY )  CONDITIONAL (SPECIFY),   Routine     Comments:  Obtain Potassium Level for these conditions:  *IF no potassium result " "within 24 hours before initiation of order set, draw potassium level with next lab collect.    *2 HOURS AFTER last IV potassium replacement dose and 4 hours after an oral replacement dose.  *Next morning after potassium dose.     Repeat Potassium Replacement if necessary.    18 1246    Unscheduled  Magnesium  (Magnesium Replacement -  Adult - \"Standard\" - Replacement for all levels less than 1.6 mg/dL )  CONDITIONAL (SPECIFY),   Routine     Comments:  Obtain Magnesium Level for these conditions:  *IF no magnesium result within 24 hrs before initiation of order set, draw magnesium level with next lab collect.    *2 HOURS AFTER last magnesium replacement dose when magnesium replacement given for level less than 1.6   *Next morning after magnesium dose.     Repeat Magnesium Replacement if necessary.    18 1246    Unscheduled  Blood gas arterial and oxyhgb  CONDITIONAL (SPECIFY),   Routine     Comments:  STAT PRN at RT/RN discretion.    18 1246    Unscheduled  Blood gas venous and oxyhgb  CONDITIONAL (SPECIFY),   Routine     Comments:  STAT PRN as needed at  RT/RN discretion.  PICC line Draw preferred    18 124         ECG/EMG Results      ECHO COMPLETE WITH OPTISON [[id number]]  Resulted: 18 1251, Result status: Edited Result - FINAL    Ordering provider: Jyotsna Quintanilla PA  18 1246 Resulted by: Ely Recinos DO    Performed: 18 1311 - 18 1312 Resulting lab: RADIOLOGY RESULTS    Narrative:       [id number]  [id number]  [id number]  [id number]^^JYOTSNA^ANGELA           [facility]  Echocardiography Laboratory  [address] [place] [address]        Name: TRUE MATTHEWS  MRN: [id number]  : 1929  Study Date: 2018 12:51 PM  Age: 88 yrs  Gender: Male  Patient Location: [facility]  Reason For Study: CHF  Ordering Physician: JYOTSNA QUINTANILLA  Referring Physician: Lilly Zepeda Johsua  Performed By: Migdalia Merlos     BSA: 2.0 " m2  Height: 75 in  Weight: 170 lb  HR: 71  BP: 164/90 mmHg  _____________________________________________________________________________  __        Procedure  Complete Portable Echo Adult. Contrast Optison.  _____________________________________________________________________________  __        Interpretation Summary     Left ventricular systolic function is normal.  The visual ejection fraction is estimated at 55-60%.  Dilated inferior vena cava  The study was technically difficult.  _____________________________________________________________________________  __        Left Ventricle  The left ventricle is normal in size. Left ventricular systolic function is  normal. The visual ejection fraction is estimated at 55-60%. Diastolic  function not assessed due to atrial fibrillation. No regional wall motion  abnormalities noted.     Right Ventricle  Borderline right ventricular enlargement. The right ventricular systolic  function is normal. The right ventricle is not well visualized. There is a  pacemaker lead in the right ventricle.     Atria  The left atrium is not well visualized. Right atrium not well visualized.     Mitral Valve  There is mild mitral annular calcification. There is trace mitral  regurgitation. There is no mitral valve stenosis.        Tricuspid Valve  The tricuspid valve is not well visualized. The right ventricular systolic  pressure is approximated at 27.0 mmHg plus the right atrial pressure.     Aortic Valve  The aortic valve is trileaflet with aortic valve sclerosis. There is trace  aortic regurgitation. No hemodynamically significant valvular aortic stenosis.     Pulmonic Valve  The pulmonic valve is not well visualized.     Vessels  Borderline aortic root dilatation. Dilated inferior vena cava.     Pericardium  There is no pericardial effusion.     _____________________________________________________________________________  __  MMode/2D Measurements & Calculations  IVSd: 1.0  cm  LVIDd: 4.4 cm  LVIDs: 3.5 cm  LVPWd: 0.89 cm  FS: 21.5 %  LV mass(C)d: 139.2 grams  LV mass(C)dI: 68.0 grams/m2     Ao root diam: 3.9 cm  LA dimension: 3.6 cm  asc Aorta Diam: 3.5 cm  LA/Ao: 0.93  LA Volume (BP): 57.3 ml  LA Volume Index (BP): 28.0 ml/m2  RWT: 0.40        Doppler Measurements & Calculations  MV E max heidy: 104.5 cm/sec  Ao V2 max: 137.7 cm/sec  Ao max P.0 mmHg     PA acc time: 0.12 sec  TR max heidy: 259.9 cm/sec  TR max P.0 mmHg  E/E' av.8  Lateral E/e': 9.9  Medial E/e': 13.7           _____________________________________________________________________________  __           Report approved by: Shy Jama 2018 01:49 PM       1    Type Source Collected On     18 1251          View Image (below)        Echocardiogram - Routine [[id number]]  Resulted: 18 1311, Result status: In process    Ordering provider: Tru Chakraborty PA  18 1246 Performed: 18 1311 - 18 1311    Resulting lab: [facility]                   Encounter-Level Documents:     There are no encounter-level documents.      Order-Level Documents:     There are no order-level documents.

## 2018-05-23 NOTE — PHARMACY-ANTICOAGULATION SERVICE
Clinical Pharmacy - Warfarin Dosing Consult     Pharmacy has been consulted to manage this patient s warfarin therapy.  Indication: Atrial Fibrillation  Therapy Goal: INR 2-3  Warfarin Prior to Admission: Yes  Warfarin PTA Regimen: 2.5mg M,W,SA and 5mg ROW    INR   Date Value Ref Range Status   05/23/2018 6.86 (HH) 0.86 - 1.14 Final     Comment:     Critical Value called to and read back by  IDA TINOCO IN ER AT 1028 BK       INR Protime   Date Value Ref Range Status   05/18/2018 5.1 (A) 0.86 - 1.14 Final       Recommend: no warfarin today.  Pharmacy will monitor Santoshjenn Poonmssonal daily and order warfarin doses to achieve specified goal.      Please contact pharmacy as soon as possible if the warfarin needs to be held for a procedure or if the warfarin goals change.

## 2018-05-24 ENCOUNTER — APPOINTMENT (OUTPATIENT)
Dept: GENERAL RADIOLOGY | Facility: CLINIC | Age: 83
DRG: 291 | End: 2018-05-24
Attending: HOSPITALIST
Payer: MEDICARE

## 2018-05-24 ENCOUNTER — APPOINTMENT (OUTPATIENT)
Dept: CT IMAGING | Facility: CLINIC | Age: 83
DRG: 291 | End: 2018-05-24
Attending: HOSPITALIST
Payer: MEDICARE

## 2018-05-24 LAB
ANION GAP SERPL CALCULATED.3IONS-SCNC: 10 MMOL/L (ref 3–14)
BUN SERPL-MCNC: 14 MG/DL (ref 7–30)
CALCIUM SERPL-MCNC: 8.1 MG/DL (ref 8.5–10.1)
CHLORIDE SERPL-SCNC: 105 MMOL/L (ref 94–109)
CO2 SERPL-SCNC: 28 MMOL/L (ref 20–32)
CREAT SERPL-MCNC: 0.78 MG/DL (ref 0.66–1.25)
ERYTHROCYTE [DISTWIDTH] IN BLOOD BY AUTOMATED COUNT: 14.8 % (ref 10–15)
GFR SERPL CREATININE-BSD FRML MDRD: >90 ML/MIN/1.7M2
GLUCOSE SERPL-MCNC: 103 MG/DL (ref 70–99)
HCT VFR BLD AUTO: 34.7 % (ref 40–53)
HGB BLD-MCNC: 11.4 G/DL (ref 13.3–17.7)
INR PPP: 2.76 (ref 0.86–1.14)
INR PPP: 9.55 (ref 0.86–1.14)
LACTATE BLD-SCNC: 1.1 MMOL/L (ref 0.4–1.9)
MAGNESIUM SERPL-MCNC: 2 MG/DL (ref 1.6–2.3)
MCH RBC QN AUTO: 26.2 PG (ref 26.5–33)
MCHC RBC AUTO-ENTMCNC: 32.9 G/DL (ref 31.5–36.5)
MCV RBC AUTO: 80 FL (ref 78–100)
PLATELET # BLD AUTO: 463 10E9/L (ref 150–450)
POTASSIUM SERPL-SCNC: 3.2 MMOL/L (ref 3.4–5.3)
POTASSIUM SERPL-SCNC: 3.5 MMOL/L (ref 3.4–5.3)
RBC # BLD AUTO: 4.35 10E12/L (ref 4.4–5.9)
SODIUM SERPL-SCNC: 143 MMOL/L (ref 133–144)
WBC # BLD AUTO: 13.9 10E9/L (ref 4–11)

## 2018-05-24 PROCEDURE — 25000132 ZZH RX MED GY IP 250 OP 250 PS 637: Mod: GY | Performed by: HOSPITALIST

## 2018-05-24 PROCEDURE — 99233 SBSQ HOSP IP/OBS HIGH 50: CPT | Performed by: HOSPITALIST

## 2018-05-24 PROCEDURE — 99233 SBSQ HOSP IP/OBS HIGH 50: CPT | Performed by: INTERNAL MEDICINE

## 2018-05-24 PROCEDURE — 40000886 ZZH STATISTIC STEP DOWN HRS DAY

## 2018-05-24 PROCEDURE — 85027 COMPLETE CBC AUTOMATED: CPT | Performed by: PHYSICIAN ASSISTANT

## 2018-05-24 PROCEDURE — 84132 ASSAY OF SERUM POTASSIUM: CPT | Performed by: HOSPITALIST

## 2018-05-24 PROCEDURE — 71260 CT THORAX DX C+: CPT

## 2018-05-24 PROCEDURE — 85610 PROTHROMBIN TIME: CPT | Performed by: PHYSICIAN ASSISTANT

## 2018-05-24 PROCEDURE — 21000000 ZZH R&B IMCU HEART CARE

## 2018-05-24 PROCEDURE — 25000132 ZZH RX MED GY IP 250 OP 250 PS 637: Mod: GY | Performed by: PHYSICIAN ASSISTANT

## 2018-05-24 PROCEDURE — 99207 ZZC APP CREDIT; MD BILLING SHARED VISIT: CPT | Performed by: INTERNAL MEDICINE

## 2018-05-24 PROCEDURE — 25000128 H RX IP 250 OP 636: Performed by: INTERNAL MEDICINE

## 2018-05-24 PROCEDURE — 25000125 ZZHC RX 250: Performed by: INTERNAL MEDICINE

## 2018-05-24 PROCEDURE — 40000275 ZZH STATISTIC RCP TIME EA 10 MIN

## 2018-05-24 PROCEDURE — 25000128 H RX IP 250 OP 636: Performed by: HOSPITALIST

## 2018-05-24 PROCEDURE — A9270 NON-COVERED ITEM OR SERVICE: HCPCS | Mod: GY | Performed by: HOSPITALIST

## 2018-05-24 PROCEDURE — 94660 CPAP INITIATION&MGMT: CPT

## 2018-05-24 PROCEDURE — 25000128 H RX IP 250 OP 636

## 2018-05-24 PROCEDURE — 71045 X-RAY EXAM CHEST 1 VIEW: CPT

## 2018-05-24 PROCEDURE — 36415 COLL VENOUS BLD VENIPUNCTURE: CPT | Performed by: PHYSICIAN ASSISTANT

## 2018-05-24 PROCEDURE — 83735 ASSAY OF MAGNESIUM: CPT | Performed by: PHYSICIAN ASSISTANT

## 2018-05-24 PROCEDURE — 25000128 H RX IP 250 OP 636: Performed by: PHYSICIAN ASSISTANT

## 2018-05-24 PROCEDURE — 83605 ASSAY OF LACTIC ACID: CPT | Performed by: HOSPITALIST

## 2018-05-24 PROCEDURE — A9270 NON-COVERED ITEM OR SERVICE: HCPCS | Mod: GY | Performed by: PHYSICIAN ASSISTANT

## 2018-05-24 PROCEDURE — 36415 COLL VENOUS BLD VENIPUNCTURE: CPT | Performed by: HOSPITALIST

## 2018-05-24 PROCEDURE — 25000125 ZZHC RX 250: Performed by: HOSPITALIST

## 2018-05-24 PROCEDURE — 85610 PROTHROMBIN TIME: CPT | Performed by: HOSPITALIST

## 2018-05-24 PROCEDURE — 80048 BASIC METABOLIC PNL TOTAL CA: CPT | Performed by: PHYSICIAN ASSISTANT

## 2018-05-24 RX ORDER — DOXYCYCLINE 100 MG/10ML
100 INJECTION, POWDER, LYOPHILIZED, FOR SOLUTION INTRAVENOUS EVERY 12 HOURS
Status: DISCONTINUED | OUTPATIENT
Start: 2018-05-24 | End: 2018-05-29 | Stop reason: HOSPADM

## 2018-05-24 RX ORDER — IOPAMIDOL 755 MG/ML
80 INJECTION, SOLUTION INTRAVASCULAR ONCE
Status: COMPLETED | OUTPATIENT
Start: 2018-05-24 | End: 2018-05-24

## 2018-05-24 RX ORDER — CEFTRIAXONE 2 G/1
2 INJECTION, POWDER, FOR SOLUTION INTRAMUSCULAR; INTRAVENOUS EVERY 24 HOURS
Status: DISCONTINUED | OUTPATIENT
Start: 2018-05-24 | End: 2018-05-29

## 2018-05-24 RX ORDER — FUROSEMIDE 10 MG/ML
40 INJECTION INTRAMUSCULAR; INTRAVENOUS 2 TIMES DAILY
Status: DISCONTINUED | OUTPATIENT
Start: 2018-05-24 | End: 2018-05-26

## 2018-05-24 RX ORDER — FUROSEMIDE 10 MG/ML
INJECTION INTRAMUSCULAR; INTRAVENOUS
Status: COMPLETED
Start: 2018-05-24 | End: 2018-05-24

## 2018-05-24 RX ORDER — METOPROLOL TARTRATE 1 MG/ML
2.5 INJECTION, SOLUTION INTRAVENOUS EVERY 4 HOURS PRN
Status: DISCONTINUED | OUTPATIENT
Start: 2018-05-24 | End: 2018-05-29 | Stop reason: HOSPADM

## 2018-05-24 RX ORDER — NYSTATIN 100000/ML
500000 SUSPENSION, ORAL (FINAL DOSE FORM) ORAL 4 TIMES DAILY
Status: DISCONTINUED | OUTPATIENT
Start: 2018-05-24 | End: 2018-05-29 | Stop reason: HOSPADM

## 2018-05-24 RX ORDER — METOPROLOL TARTRATE 25 MG/1
25 TABLET, FILM COATED ORAL 2 TIMES DAILY
Status: DISCONTINUED | OUTPATIENT
Start: 2018-05-24 | End: 2018-05-29 | Stop reason: HOSPADM

## 2018-05-24 RX ORDER — POTASSIUM CHLORIDE 1500 MG/1
20 TABLET, EXTENDED RELEASE ORAL 2 TIMES DAILY
Status: DISCONTINUED | OUTPATIENT
Start: 2018-05-24 | End: 2018-05-29 | Stop reason: HOSPADM

## 2018-05-24 RX ORDER — HYDRALAZINE HYDROCHLORIDE 25 MG/1
25 TABLET, FILM COATED ORAL EVERY 8 HOURS
Status: DISCONTINUED | OUTPATIENT
Start: 2018-05-24 | End: 2018-05-29 | Stop reason: HOSPADM

## 2018-05-24 RX ADMIN — POTASSIUM CHLORIDE 20 MEQ: 1500 TABLET, EXTENDED RELEASE ORAL at 10:42

## 2018-05-24 RX ADMIN — IOPAMIDOL 80 ML: 755 INJECTION, SOLUTION INTRAVENOUS at 15:39

## 2018-05-24 RX ADMIN — Medication 125 MG: at 09:41

## 2018-05-24 RX ADMIN — HYDRALAZINE HYDROCHLORIDE 10 MG: 20 INJECTION INTRAMUSCULAR; INTRAVENOUS at 12:48

## 2018-05-24 RX ADMIN — SERTRALINE HYDROCHLORIDE 100 MG: 100 TABLET ORAL at 09:40

## 2018-05-24 RX ADMIN — FUROSEMIDE 40 MG: 10 INJECTION, SOLUTION INTRAVENOUS at 11:01

## 2018-05-24 RX ADMIN — FUROSEMIDE: 10 INJECTION, SOLUTION INTRAVENOUS at 11:00

## 2018-05-24 RX ADMIN — Medication 125 MG: at 14:12

## 2018-05-24 RX ADMIN — NYSTATIN 500000 UNITS: 500000 SUSPENSION ORAL at 20:14

## 2018-05-24 RX ADMIN — Medication 0.5 MG: at 20:12

## 2018-05-24 RX ADMIN — Medication 125 MG: at 20:13

## 2018-05-24 RX ADMIN — Medication 10 MEQ: at 08:19

## 2018-05-24 RX ADMIN — OMEPRAZOLE 20 MG: 20 CAPSULE, DELAYED RELEASE ORAL at 09:40

## 2018-05-24 RX ADMIN — LISINOPRIL 20 MG: 20 TABLET ORAL at 09:40

## 2018-05-24 RX ADMIN — SODIUM CHLORIDE 70 ML: 9 INJECTION, SOLUTION INTRAVENOUS at 15:41

## 2018-05-24 RX ADMIN — LISINOPRIL 20 MG: 20 TABLET ORAL at 20:13

## 2018-05-24 RX ADMIN — METOPROLOL TARTRATE 25 MG: 25 TABLET ORAL at 10:42

## 2018-05-24 RX ADMIN — PHYTONADIONE 2 MG: 10 INJECTION, EMULSION INTRAMUSCULAR; INTRAVENOUS; SUBCUTANEOUS at 06:54

## 2018-05-24 RX ADMIN — FUROSEMIDE 40 MG: 10 INJECTION, SOLUTION INTRAVENOUS at 15:08

## 2018-05-24 RX ADMIN — Medication 10 MEQ: at 00:41

## 2018-05-24 RX ADMIN — FUROSEMIDE 40 MG: 10 INJECTION, SOLUTION INTRAVENOUS at 11:00

## 2018-05-24 RX ADMIN — NYSTATIN 500000 UNITS: 500000 SUSPENSION ORAL at 12:48

## 2018-05-24 RX ADMIN — HYDRALAZINE HYDROCHLORIDE 25 MG: 25 TABLET ORAL at 17:11

## 2018-05-24 RX ADMIN — Medication 10 MEQ: at 09:40

## 2018-05-24 RX ADMIN — Medication 10 MEQ: at 06:41

## 2018-05-24 RX ADMIN — SIMVASTATIN 10 MG: 10 TABLET, FILM COATED ORAL at 20:25

## 2018-05-24 RX ADMIN — POTASSIUM CHLORIDE 20 MEQ: 1500 TABLET, EXTENDED RELEASE ORAL at 20:14

## 2018-05-24 RX ADMIN — DOXYCYCLINE 100 MG: 100 INJECTION, POWDER, LYOPHILIZED, FOR SOLUTION INTRAVENOUS at 17:11

## 2018-05-24 RX ADMIN — METOPROLOL TARTRATE 25 MG: 25 TABLET ORAL at 20:13

## 2018-05-24 RX ADMIN — CEFTRIAXONE 2 G: 2 INJECTION, POWDER, FOR SOLUTION INTRAMUSCULAR; INTRAVENOUS at 15:54

## 2018-05-24 NOTE — PROGRESS NOTES
Patient was placed on BiPAP 10/5 40% this afternoon, for a BiPAP treatment. BiPAP treatments now ordered QID. Total face mask in place. Alarm volume set at 10. Pt is on a 6L NC when off of BiPAP, with SpO2 in the mid 90's.  Will cont to monitor.  5/24/2018  Mary Carmen Alvarez RRT

## 2018-05-24 NOTE — PROVIDER NOTIFICATION
BP elevated after giving hydralazine.  Reported to Dr. Montgomery, no new orders to address BP given.  Continue to monitor.

## 2018-05-24 NOTE — PROGRESS NOTES
[facility]    Hospitalist Progress Note  Date of Service (when I saw the patient): 05/24/2018    Assessment & Plan   Santosh Robledo is a pleasant 88-year-old male with a past medical history of C. difficile colitis, hypertension, hyperlipidemia, chronic atrial fibrillation on warfarin, who presented to the ED with progressive shortness of breath and leg edema beginning about 4 days prior.  He was admitted for acute hypoxic respiratory failure, most likely due to a CHF exacerbation on 5/23/2018.     Acute hypoxic respiratory failure secondary to possible acute diastolic CHF: 4 days of progressive worsening shortness of breath with increased leg edema and productive cough.  He had some blood-tinged sputum earlier. No chest pain or fever.  He has no CHF by history.  No recent echocardiograms. CXR on admission showed interstitial and airspace opacities in both lungs, most confluent on the right perihilar region. Pneumonia is not excluded.  Though had marked leukocytosis, Procalcitonin on admission indicated low risk for systemic infection. He had to be started on BiPAP in the ER for oxygen saturation in the low 80s on room air and a tachypnea and increased work of breathing. His proBNP was elevated at 12,789.  --Can you Lasix 40 mg IV twice daily, closely monitor intake and output, daily weight, fluid restriction when p.o. resumed, Ni catheter when his INR comes down.  --2D echo showed normal LVEF, diastolic function could not be assessed, no significant valvular abnormality or pericardial effusion.  --Unclear etiology of CHF, troponin negative.  Denies chest pain.  His ongoing fatigue but  dealing with C. difficile infection.  -- Wonder if this could be rate related given his atrial fibrillation and mild RVR.  He is not on any moshe blocking agent.  Will start him on metoprolol 25 p.o. twice daily.  He has pacemaker in situ.  I will consult cardiology for any further recommendation.    Right  perihilar opacity; patient has bilateral interstitial prominence but has marked perihilar opacity on right side.  Could be consolidation, underlying mass could not be ruled out.  He reported, hemoptysis on admission which was not noted this morning.  Less likely to be pulmonary hemorrhage.   - Though he has mild leukocytosis, pro calcitonin in the low risk of systemic infection range.    - He is afebrile, and leukocytosis has improved without any antibiotic.   -Pneumonia does remain a concern, especially atypical given the x-ray finding and his clinical presentation.  -I will repeat chest x-ray today, if unchanged will proceed with noncontrast CT chest, also to evaluate for any lung mass, and will consider antibiotic. Avoid antibiotic given his recent C. Difficile infection.     Clostridium difficile colitis:  The patient was admitted to the hospital here from 05/13-05/15 for this.  He was started on vancomycin 125 mg q.i.d.  This will be continued.    -Since he is able to come off BiPAP this morning, will place him on intermittent BiPAP, okay for p.o. medications, will continue to hold diet for now.       Chronic atrial fibrillation, status post pacemaker placement  Ettrick anticoagulation with warfarin with supratherapeutic INR   The patient is chronically anticoagulated with warfarin.  Reportedly, he has had variable INRs recently with warfarin dose adjustments being made.  INR on admission was supratherapeutic at 6.86 and further went up to 9.55  -Received 2 mg vitamin K IV overnight, pending repeat INR  -Warfarin for another 24 hours.  -Monitor for any sign of bleeding, not noted now.  -Rate is occasionally elevated, suspect due to underlying illness, metoprolol 25 mg twice daily added since he is hypertensive as well.  -New to monitor on telemetry.      Essential hypertension:    -Recent remains hypertensive.  -Prior to admission hydrochlorothiazide 25 mg p.o. daily and lisinopril 20 mg p.o. twice daily  resumed  -metoprolol added as above.  -Add in hydralazine IV available for elevated blood pressures with parameters.      Hypokalemia: Potassium was 2.9 on admission. This is likely due to reduced p.o. intake lately along with his C. diff diarrhea and hydrochlorothiazide use.   -Started on scheduled potassium chloride 20 mEq p.o. twice daily  -Some replacement protocol in place  -Magnesium replacement protocol ordered.   -Daily BMP.     Oral thrush  Nystatin swish and swallow.    Hyperlipidemia:  Resume simvastatin when able.      GERD Resume PPI when able.      DVT Prophylaxis: Warfarin    Code Status: DNR    Disposition: Expected discharge: Pending clinical course, once respiratory status improves.  3-5 days.  Discussed with patient his wife and daughter in the room.  Care plan was reviewed with nursing staff.    Anette Montgomery MD  Hospitalist    Interval History   Patient remained on BiPAP overnight, about a liter of urine output with a dose of Lasix 40 mg on admission, dyspnea has improved and he is off BiPAP since 8 AM today.  -Has cough no hemoptysis currently.  He denies chest pain palpitation.  Remains afebrile overnight.  -Had bowel movement overnight, no diarrhea, no reported hematochezia or melena.  Denies abdominal pain.  Has ongoing decreased appetite and poor oral intake for weeks.    -Data reviewed today: I reviewed all new labs and imaging results over the last 24 hours. I personally reviewed the EKG tracing showing Atrial fibrillation rate variable currently from 80s-110.  I reviewed 2D echo from yesterday, normal LV function, diastolic function could not be assessed.    Physical Exam   Temp: 97.7  F (36.5  C) Temp src: Oral BP: 167/79   Heart Rate: 76 Resp: 24 SpO2: 92 % O2 Device: Nasal cannula Oxygen Delivery: 5 LPM  Vitals:    05/23/18 0925 05/23/18 1300 05/24/18 0500   Weight: 77.1 kg (170 lb) 81.6 kg (179 lb 14.4 oz) 92.5 kg (204 lb)     Vital Signs with Ranges  Temp:  [97.7  F (36.5   C)-98.4  F (36.9  C)] 97.7  F (36.5  C)  Heart Rate:  [56-85] 76  Resp:  [8-37] 24  BP: (133-175)/(67-97) 167/79  FiO2 (%):  [40 %] 40 %  SpO2:  [89 %-100 %] 92 %  I/O last 3 completed shifts:  In: 400 [I.V.:400]  Out: 1050 [Urine:1050]    Constitutional: Alert, awake. Not in distress.   HEENT; PERRLA, EOMI, oral thrush noted.  Respiratory: Bilateral equal air entry, coarse bilaterally both anteriorly and posteriorly, right worse than left.  He is tachypneic, shallow breaths, appears short of breath while talking short sentences.  On BiPAP and currently on 5 L nasal cannula.     Cardiovascular: Regular s1s2, no murmur, rub or gallop.  Mild tachycardia.  Bilateral leg edema.  GI: Soft, non distended, non tender, bowel tones active.  Skin/Integumen: No rash, no blister.  Neuro: Alert oriented cranial nerves II through XII intact.    Medications     Continuing ACE inhibitor/ARB/ARNI from home medication list OR ACE inhibitor/ARB order already placed during this visit       - MEDICATION INSTRUCTIONS -       - MEDICATION INSTRUCTIONS -       Reason beta blocker order not selected         furosemide         furosemide  40 mg Intravenous BID     lisinopril  20 mg Oral BID     metoprolol tartrate  25 mg Oral BID     omeprazole  20 mg Oral Daily     potassium chloride SA  20 mEq Oral BID     sertraline (ZOLOFT) tablet 100 mg  100 mg Oral Daily     simvastatin  10 mg Oral At Bedtime     sodium chloride (PF)  10 mL Intracatheter Q8H     sodium chloride (PF)  3 mL Intracatheter Q8H     vancomycin  125 mg Oral 4x Daily       Data     Recent Labs  Lab 05/24/18  0525 05/23/18  1835 05/23/18  1415 05/23/18  0944  05/18/18  1003 05/18/18   WBC 13.9*  --   --  22.5*  --  14.8*  --    HGB 11.4*  --   --  12.2*  --  12.5*  --    MCV 80  --   --  79  --  83  --    *  --   --  517*  --  499*  --    INR 9.55*  --   --  6.86*  --   --  5.1*     --   --  139  --   --   --    POTASSIUM 3.2* 3.1* 3.3* 2.9*  < >  --   --     CHLORIDE 105  --   --  103  --   --   --    CO2 28  --   --  26  --   --   --    BUN 14  --   --  13  --   --   --    CR 0.78  --   --  0.76  --   --   --    ANIONGAP 10  --   --  10  --   --   --    ANGELITA 8.1*  --   --  8.5  --   --   --    *  --   --  143*  --   --   --    TROPI  --   --  <0.015 0.017  --   --   --    < > = values in this interval not displayed.    No results found for this or any previous visit (from the past 24 hour(s)).

## 2018-05-24 NOTE — PLAN OF CARE
Problem: ARDS (Acute Resp Distress Syndrome) (Adult)  Goal: Signs and Symptoms of Listed Potential Problems Will be Absent, Minimized or Managed (ARDS)  Signs and symptoms of listed potential problems will be absent, minimized or managed by discharge/transition of care (reference ARDS (Acute Resp Distress Syndrome) (Adult) CPG).   Outcome: Improving  Continues on Bipap.  L.A fired and awaiting results.  Weaning off slowly.  VSS, a/o, makes needs known.  Turned q 2 hours.  No complaints of pain.  Continue to monitor and wean off of bipap.

## 2018-05-24 NOTE — PLAN OF CARE
Problem: Patient Care Overview  Goal: Plan of Care/Patient Progress Review  Outcome: No Change  Patient placed back on bipap after being on 6L for 9hours.  BP slightly hypertensive.  Turned q 2 hours, skin appears intact.  Makes needs known.  ABX infusing with no signs of allergic reactions.  CT of chest done.  Continue to monitor.

## 2018-05-24 NOTE — CONSULTS
"CLINICAL NUTRITION SERVICES  -  ASSESSMENT NOTE      Recommendations Ordered by Registered Dietitian (RD):     Nutrition education ---> CHF diet teaching not appropriate at this time - will plan to provide prior to dc (with wife present)    Medical Food Supplement ---> will send a nutrition supplement with meals for added cals/pro     Malnutrition: (5/24)  % Weight Loss:  Up to 7.5% in 3 months (non-severe malnutrition)  % Intake:  </= 75% for >/= 1 month (severe malnutrition)  Subcutaneous Fat Loss:  None observed  Muscle Loss:  Clavicle bone region  - mild depletion and Dorsal hand region  - mild depletion  Fluid Retention:  Mild (LE edema)    Malnutrition Diagnosis: Non-Severe malnutrition  In Context of:  Acute illness or injury        REASON FOR ASSESSMENT  Santosh Robledo is a 88 year old male seen by Registered Dietitian for Admission Nutrition Risk Screen - Unintentional weight loss of 10# or more in past 2 months and Standard CHF consult for 2gm Na diet education      NUTRITION HISTORY  Pt is known from recent admit  Nutrition assessment done on (5/14):  - Information obtained from the EMR and pt's wife - pt is a poor historian.  Pt has had diarrhea on and off since February. His intake has been poor for the past 1.5 months  He's eaten ice cream, dry cereal, \"a little dinner\". He also took supplements such as Unadilla and Boost but only once/day.      Visited with pt this afternoon (wife and dtr gone for the day)  Pt notes that his appetite has been decreased for the past 1-2 months  Confirms that he has been drinking 1 nutrition supplement per day  Tells me that he loves ice cream!  Unable to give me a more detailed diet history      CURRENT NUTRITION ORDERS  Diet Order:     Per MD, pt may start clear liquid diet this afternoon    Pt asking for a milkshake - will send a Boost Breeze and Gelatein PLUS instead      PHYSICAL FINDINGS  Observed  Muscle Wasting  - clavicles, hands  Obtained from " "Chart/Interdisciplinary Team   1+ right foot and lower ankle edema, trace to 1+ left foot edema.    Per Cards:  \"INR is high, he is having hemoptysis, chest x-ray findings are worse greatly on the right in the face of diuresis overnight.  I would be concerned also about infection and pulmonary hemorrhage and would have low threshold for addressing these in addition to continuing diuresis.\"    ANTHROPOMETRICS  Height: 6' 3\"  Weight:(5/24) 79.8 kg / 175 lbs 14.4 oz  Body mass index is 21.99 kg/(m^2).  Weight Status:  Normal BMI  IBW: 89.1 kg  % IBW: 90%  Weight History: wife has reported that pt's usual wt is ~182# - he had recently lost ~10# (5%), wt is back up likely from fluid as po intake has been decreased  Wt Readings from Last 10 Encounters:   05/24/18 79.8 kg (175 lb 14.4 oz)   05/18/18 83.2 kg (183 lb 8 oz)   05/15/18 81.7 kg (180 lb 1.9 oz)   05/07/18 (P) 79.8 kg (176 lb)   04/30/18 77.6 kg (171 lb)     1/7/18  184 lbs    LABS  Labs reviewed    MEDICATIONS  Medications reviewed      ASSESSED NUTRITION NEEDS PER APPROVED PRACTICE GUIDELINES:    Dosing Weight: (based on 4/30 wt of 77.6 kg - wt was down and then pt gained fluid wt)    Estimated Energy Needs: 3030-6164 kcals (25-30 Kcal/Kg)  Justification: maintenance  Estimated Protein Needs:  grams protein (1.2-1.5 g pro/Kg)  Justification: preservation of lean body mass      MALNUTRITION:  % Weight Loss:  Up to 7.5% in 3 months (non-severe malnutrition)  % Intake:  </= 75% for >/= 1 month (severe malnutrition)  Subcutaneous Fat Loss:  None observed  Muscle Loss:  Clavicle bone region  - mild depletion and Dorsal hand region  - mild depletion  Fluid Retention:  Mild (LE edema)    Malnutrition Diagnosis: Non-Severe malnutrition  In Context of:  Acute illness or injury    NUTRITION DIAGNOSIS:  Inadequate protein-energy intake related to NPO status as evidenced by pt meeting 0% est needs so far today      NUTRITION INTERVENTIONS  Recommendations / Nutrition " Prescription  NPO ---> clear liquid diet  Nutrition supplements  .      Implementation  Nutrition education ---> CHF diet teaching not appropriate at this time - will plan to provide prior to dc (with wife present)  Medical Food Supplement ---> will send a nutrition supplement with meals for added cals/pro  .      Nutrition Goals  Pt to have diet advanced beyond clear liquid in the next 48 hrs  .      MONITORING AND EVALUATION:  Progress towards goals will be monitored and evaluated per protocol and Practice Guidelines

## 2018-05-24 NOTE — PROVIDER NOTIFICATION
Notified Md regarding increased work of breathing and coughing up pink tinged sputum with crackles in right lung.  Given 40 IV lasix with slow relief of sob.  VSS, hypertensive, hr stable, on 5 l NC.  Will continue to monitor.

## 2018-05-24 NOTE — CONSULTS
[facility]    Cardiology Consultation     Date of Admission:  5/23/2018  Date of Consult (When I saw the patient): 05/24/18    Assessment & Plan   Santosh Robledo is a 88 year old male who was admitted on 5/23/2018.    1-acute hypoxic respiratory failure.  Need to treat for a component of volume overload.  However this seems unusually prominent for just volume overload from fluid loading from his last admission.  INR is high, he is having hemoptysis, chest x-ray findings are worse greatly on the right in the face of diuresis overnight.  I would be concerned also about infection and pulmonary hemorrhage and would have low threshold for addressing these in addition to continuing diuresis.    2-congestive heart failure, possible acute diastolic.  According to his wife he received vigorous IV hydration at recent admission for C. difficile colitis.  He had lost over 10 pounds during the month prior to admission and she thinks at discharge his weight was actually up about 10 pounds which would most likely represent fluid.  In addition his weight was significantly higher at this presentation even though she states he's been eating so poorly at home that his INR has been steadily rising despite adjusting the warfarin dose.      Recommend-continue with IV diuresis until symptoms resolve, other etiology becomes the most prominent issue, or renal function starts demonstrate prerenal volume status    Russell Gage M.D.    Primary Care Physician   Lilly Zepeda    Reason for Consult   Reason for consult: I was asked by Dr. Montgomery to evaluate this patient for acute hypoxic respiratory failure.    History of Present Illness   Santosh Robledo is a 88 year old male who presents with a week of increasing dyspnea on exertion with some edema.  History is from him but also from his wife was a better historian.  He was recently admitted with C. difficile colitis.  Wife notes that for over a month before that he  had chronic diarrhea and poor appetite and probably lost at least 10 pounds.  There is a report of a weight of around 170 pounds at that time and she states his home weight used to be 180 pounds.  He was treated for C. difficile colitis and she states he got vigorous IV hydration during that time.  It appears at this presentation at home weights he went up to 178 280 pounds during that time despite eating poorly so could easily been up 10 or more pounds of fluid.  She notes shortly after discharge he was complaining of shortness of breath with any activity and did not want to do activities because he was tired and short of breath.  However he was lying down without any worsening shortness of breath and was not having PND or    Orthopnea.  He eventually developed some pedal edema just that his feet.  Because of worsening symptoms he came to the emergency room where he was found to be quite hypoxic and in respiratory distress and actually required BiPAP for a while although he did not like it.  He is back on 5 L nasal prongs as morning but his respiratory rate is significantly elevated.  He has a productive cough and is now producing bright red sputum.  INRs presentation almost 6, and giovanny to almost 10 last night.  Some IV vitamin K was given.  Repeat INR pending.    Chest x-ray reported bilateral interstitial and airspace opacities more prominent right perihilar region as well as right pleural effusion.  Atypical pneumonia versus heart failure included in the differential.  I personally reviewed this x-ray and there certainly are some diffuse airspace opacity suggesting heart failure, however there is an unusual pattern prominence in the right perihilar and right lateral lung.  On follow-up chest x-ray this morning with a left sided alveolar infiltrates appears improved but there is marked worsening on the right side.  Overall this appears very atypical to only be explained by volume overload, especially since he is  diuresed some overnight and is urinating frequently.    Current line exam has quite marked coarse crackles everywhere but worse in the right anterior and upper posterior regions.  There is diffuse crackles however everywhere.  Respiratory rate is elevated at in the 30s although somewhat shallow.  He is saturating on 5 L.  Again exam seems atypical for just heart failure/pulmonary edema.    An echocardiogram done in presentation yesterday and personally reviewed by me and shows normal left and right ventricular systolic function.  IVC size 2.3 cm is within normal limits.  E/E prime was indeterminate but relatively low at around 11.  He is in atrial fibrillation so no further evaluation of diastolic function accurate.    Patient Active Problem List   Diagnosis     Gastroesophageal reflux disease, esophagitis presence not specified     Chronic atrial fibrillation (H)     Status cardiac pacemaker     Long term current use of anticoagulant therapy     Benign essential hypertension     Hyperlipidemia LDL goal <100     Vitamin B12 deficiency (non anemic)     Episode of recurrent major depressive disorder, unspecified depression episode severity (H)     Colitis     Advance Care Planning     Near syncope     AV node dysfunction     Hyperlipidemia     MARILU (obstructive sleep apnea)     Acute exacerbation of CHF (congestive heart failure) (H)       Past Medical History   I have reviewed this patient's medical history and updated it with pertinent information if needed.   Past Medical History:   Diagnosis Date     AV node dysfunction      Chronic atrial fibrillation (H) 2004     GERD (gastroesophageal reflux disease)      Hyperlipidemia      Near syncope      MARILU (obstructive sleep apnea)        Past Surgical History   I have reviewed this patient's surgical history and updated it with pertinent information if needed.  Past Surgical History:   Procedure Laterality Date     IMPLANT PACEMAKER  08/10/2015       Prior to Admission  Medications   Prior to Admission Medications   Prescriptions Last Dose Informant Patient Reported? Taking?   Cyanocobalamin (B-12) 1000 MCG CAPS 5/23/2018 at am Spouse/Significant Other No Yes   Sig: Take 1 tablet by mouth daily   Sertraline HCl (ZOLOFT PO) 5/23/2018 at am Spouse/Significant Other Yes Yes   Sig: Take 100 mg by mouth daily   Simethicone (GAS-X EXTRA STRENGTH) 125 MG CAPS  at prn Spouse/Significant Other No Yes   Sig: Take 1 capsule by mouth 2 times daily as needed   WARFARIN SODIUM PO 5/22/2018 at pm Spouse/Significant Other Yes Yes   Sig: Take by mouth daily Held 5/18/18-5/20/18.  2.5 mg M/W/Sa  5 mg AOD   hydrochlorothiazide (HYDRODIURIL) 25 MG tablet 5/23/2018 at am Spouse/Significant Other No Yes   Sig: Take 1 tablet (25 mg) by mouth daily   lisinopril (PRINIVIL/ZESTRIL) 20 MG tablet 5/23/2018 at am Spouse/Significant Other No Yes   Sig: Take 1 tablet (20 mg) by mouth 2 times daily   omeprazole (PRILOSEC) 20 MG CR capsule 5/22/2018 at pm Spouse/Significant Other No Yes   Sig: Take 1 capsule (20 mg) by mouth daily   order for DME   No No   Sig: Equipment being ordered: Walker Wheels () and Walker ()  Treatment Diagnosis: Difficulty ambulating   order for DME   No No   Sig: Equipment being ordered: Walker Wheels ()  Treatment Diagnosis:  Weakness,colitis.   simvastatin (ZOCOR) 5 MG tablet 5/22/2018 at pm Spouse/Significant Other No Yes   Sig: Take 2 tablets (10 mg) by mouth daily   vancomycin (FIRST) 50 MG/ML SOLN 5/23/2018 at x 1 dose Spouse/Significant Other No Yes   Sig: Take 2.5 mLs (125 mg) by mouth 4 times daily      Facility-Administered Medications: None     Current Facility-Administered Medications   Medication Dose Route Frequency     furosemide  40 mg Intravenous BID     lisinopril  20 mg Oral BID     metoprolol tartrate  25 mg Oral BID     nystatin  500,000 Units Swish & Spit 4x Daily     omeprazole  20 mg Oral Daily     potassium chloride SA  20 mEq Oral BID      "sertraline (ZOLOFT) tablet 100 mg  100 mg Oral Daily     simvastatin  10 mg Oral At Bedtime     sodium chloride (PF)  10 mL Intracatheter Q8H     sodium chloride (PF)  3 mL Intracatheter Q8H     vancomycin  125 mg Oral 4x Daily     Current Facility-Administered Medications   Medication Last Rate     Continuing ACE inhibitor/ARB/ARNI from home medication list OR ACE inhibitor/ARB order already placed during this visit       - MEDICATION INSTRUCTIONS -       - MEDICATION INSTRUCTIONS -       Reason beta blocker order not selected       Allergies   Allergies   Allergen Reactions     Penicillins Rash       Social History    reports that he has never smoked. He has never used smokeless tobacco. He reports that he drinks alcohol. He reports that he does not use illicit drugs.    Family History   Family History   Problem Relation Age of Onset     Influenza/Pneumonia Mother      Prostate Cancer Father        Review of Systems   The 10 point Review of Systems is negative other than noted in the HPI or here.     Physical Exam   Vital Signs with Ranges  Temp:  [97.7  F (36.5  C)-98.9  F (37.2  C)] 98.9  F (37.2  C)  Heart Rate:  [56-85] 65  Resp:  [8-41] 41  BP: (133-192)/() 179/89  FiO2 (%):  [40 %] 40 %  SpO2:  [92 %-100 %] 95 %  Vitals:    05/23/18 1300 05/24/18 0500 05/24/18 1120   Weight: 81.6 kg (179 lb 14.4 oz) 92.5 kg (204 lb) 79.8 kg (175 lb 14.4 oz)     I/O last 3 completed shifts:  In: 400 [I.V.:400]  Out: 1050 [Urine:1050]    Vitals: /89  Pulse 99  Temp 98.9  F (37.2  C) (Oral)  Resp (!) 41  Ht 1.905 m (6' 3\")  Wt 79.8 kg (175 lb 14.4 oz)  SpO2 95%  BMI 21.99 kg/m2    Constitutional: Normally developed gentleman.  Increased respiratory rates and some effort.  Appears chronically ill    Skin: Grossly clear.  Pacer site intact and healed    Head/Eyes/ENT:  No cyanosis or icterus.  Throat grossly clear.    Neck:  Supple with normal carotid upstrokes.  No mass    Chest:  Diffuse very coarse crackles " particularly right anterior more than left anterior and right mid posterior.  Respiratory rate and effort increased.  Sputum is bright red and thick.    Cardiac: Distant heart sounds obscured by respiratory noise.  Irregular rhythm.  Difficult to assess JVD because of rapid respiratory rate does not appear significantly elevated    Abdomen:  Nontender, nondistended, no hepatomegaly or bruit    Vascular: Carotid radial and femoral pulses intact.  Pedal pulses diminished.  There is mild pedal edema    Extremities and Back:  1+ right foot and lower ankle edema, trace to 1+ left foot edema.  No cellulitis erythema cyanosis trauma     Neurological:  Alert and cooperative.  Follows requests appropriately.  Extraocular motions intact, face is symmetrical, speech lucid.  Grossly intact upper and lower motor tone and strength and on symmetrical      Recent Labs  Lab 05/23/18  1415 05/23/18  0944   TROPI <0.015 0.017         Recent Labs  Lab 05/24/18  0525 05/23/18  1835 05/23/18  1415 05/23/18  0944  05/18/18  1003 05/18/18   WBC 13.9*  --   --  22.5*  --  14.8*  --    HGB 11.4*  --   --  12.2*  --  12.5*  --    MCV 80  --   --  79  --  83  --    *  --   --  517*  --  499*  --    INR 9.55*  --   --  6.86*  --   --  5.1*     --   --  139  --   --   --    POTASSIUM 3.2* 3.1* 3.3* 2.9*  < >  --   --    CHLORIDE 105  --   --  103  --   --   --    CO2 28  --   --  26  --   --   --    BUN 14  --   --  13  --   --   --    CR 0.78  --   --  0.76  --   --   --    GFRESTIMATED >90  --   --  >90  --   --   --    GFRESTBLACK >90  --   --  >90  --   --   --    ANIONGAP 10  --   --  10  --   --   --    ANGELITA 8.1*  --   --  8.5  --   --   --    *  --   --  143*  --   --   --    TROPI  --   --  <0.015 0.017  --   --   --    < > = values in this interval not displayed.    Imaging:  Recent Results (from the past 48 hour(s))   XR Chest 2 Views    Narrative    XR CHEST 2 VW 5/23/2018 10:03 AM     HISTORY: Shortness of  breath    COMPARISON: 2018      Impression    IMPRESSION: Left subclavian cardiac device in place. The cardiac  silhouette is mildly enlarged. There are new mixed interstitial and  airspace opacities in both lungs, most confluent in the right  perihilar region. Small right pleural effusion. 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    RAQUEL COVINGTON MD   XR Chest Port 1 View    Narrative    CHEST PORTABLE ONE VIEW  2018 10:44 AM     COMPARISON: 2-view chest x-ray 2018.    HISTORY: Follow up pneumonia versus congestive heart failure.      Impression    IMPRESSION: A single lead pacemaker module implanted over the left  chest with the lead in the right ventricle is again noted without  identifiable change.    Patchy airspace opacity in the mid and upper right lung has increased  in density and extent since the comparison study consistent with  worsening of pneumonia. Small right pleural effusion again noted.  Emphysematous and fibrotic changes throughout both lungs again noted.  There is no pneumothorax on either side. There is no pleural effusion  on the left. Heart size remains within normal limits.    JHONY MAYBERRY MD       Echo:  Recent Results (from the past 4320 hour(s))   ECHO COMPLETE WITH OPTISON    Narrative    [id number]  [facility]73  [id number]  [id number]^^JYOTSNA^ANGELA           [facility]  Echocardiography Laboratory  [address] [place] [address]        Name: TRUE MATTHEWS  MRN: [id number]  : 1929  Study Date: 2018 12:51 PM  Age: 88 yrs  Gender: Male  Patient Location: [facility]  Reason For Study: CHF  Ordering Physician: JYOTSNA QUINTANILLA  Referring Physician: Lilly Zepeda Johsua  Performed By: Migdalia Merlos     BSA: 2.0 m2  Height: 75 in  Weight: 170 lb  HR: 71  BP: 164/90 mmHg  _____________________________________________________________________________  __        Procedure  Complete Portable Echo Adult.  Contrast Optison.  _____________________________________________________________________________  __        Interpretation Summary     Left ventricular systolic function is normal.  The visual ejection fraction is estimated at 55-60%.  Dilated inferior vena cava  The study was technically difficult.  _____________________________________________________________________________  __        Left Ventricle  The left ventricle is normal in size. Left ventricular systolic function is  normal. The visual ejection fraction is estimated at 55-60%. Diastolic  function not assessed due to atrial fibrillation. No regional wall motion  abnormalities noted.     Right Ventricle  Borderline right ventricular enlargement. The right ventricular systolic  function is normal. The right ventricle is not well visualized. There is a  pacemaker lead in the right ventricle.     Atria  The left atrium is not well visualized. Right atrium not well visualized.     Mitral Valve  There is mild mitral annular calcification. There is trace mitral  regurgitation. There is no mitral valve stenosis.        Tricuspid Valve  The tricuspid valve is not well visualized. The right ventricular systolic  pressure is approximated at 27.0 mmHg plus the right atrial pressure.     Aortic Valve  The aortic valve is trileaflet with aortic valve sclerosis. There is trace  aortic regurgitation. No hemodynamically significant valvular aortic stenosis.     Pulmonic Valve  The pulmonic valve is not well visualized.     Vessels  Borderline aortic root dilatation. Dilated inferior vena cava.     Pericardium  There is no pericardial effusion.     _____________________________________________________________________________  __  MMode/2D Measurements & Calculations  IVSd: 1.0 cm  LVIDd: 4.4 cm  LVIDs: 3.5 cm  LVPWd: 0.89 cm  FS: 21.5 %  LV mass(C)d: 139.2 grams  LV mass(C)dI: 68.0 grams/m2     Ao root diam: 3.9 cm  LA dimension: 3.6 cm  asc Aorta Diam: 3.5 cm  LA/Ao:  0.93  LA Volume (BP): 57.3 ml  LA Volume Index (BP): 28.0 ml/m2  RWT: 0.40        Doppler Measurements & Calculations  MV E max heidy: 104.5 cm/sec  Ao V2 max: 137.7 cm/sec  Ao max P.0 mmHg     PA acc time: 0.12 sec  TR max heidy: 259.9 cm/sec  TR max P.0 mmHg  E/E' av.8  Lateral E/e': 9.9  Medial E/e': 13.7           _____________________________________________________________________________  __           Report approved by: Shy Jama 2018 01:49 PM

## 2018-05-24 NOTE — PLAN OF CARE
Problem: Patient Care Overview  Goal: Plan of Care/Patient Progress Review  OT/CR: orders rec'd and chart reviewed. Pt admitted due to CHF exacerbation. Pt with critical lab of INR 9.55 and pulmonary status not stable, spoke with nursing who agrees to hold OT/CR today. MD-please set parameters when OK to see pt with INR above 4 in [facility] for therapy.

## 2018-05-24 NOTE — PROGRESS NOTES
Pt was placed on BiPAP 10/5 and 40%, alarm setting was set at 10. Pt switched to total face mask due to redness developing over the nose bridge. RT will continue to monitor the pt.   5/24/2018  Andrea Fernandez

## 2018-05-24 NOTE — PLAN OF CARE
Problem: Patient Care Overview  Goal: Plan of Care/Patient Progress Review  Outcome: No Change  A&O, remains on Bipap 10/5 40%FiO2 overnight. Productive cough with thick blood tinged sputum. Hypertensive 140-160/70's, HR 70-80's. Afebrile. Inaccurate I&O d/t pt frequently missing urinal. External catheter placed, patent. K replaced, recheck with AM labs. Q2H turn/reposition. Slept between cares. Tele: A-fib w/CVR. Plan: continue diuresing, electrolyte replacement, and wean off bipap.     **Update: INR 9.55, IV Vit K 2mg given, recheck INR @ 11AM. Potassium 3.2 this AM, 1 of 4 replacement started.**

## 2018-05-24 NOTE — PROVIDER NOTIFICATION
MD Notification    Notified Person: MD    Notified Person Name: Nistor    Notification Date/Time: 0605    Purpose of Notification: Critical lab INR 9.55 (Pt NPO on Bipap)    Orders Received: Vit K 2mg STAT, repeat INR @ 11AM.    Comments:

## 2018-05-24 NOTE — PROGRESS NOTES
Xcoverage: paged by RN because critical INR 9.55 (INR yesterday was 6.86); he is on Coumadin for Afib; he is here for CHF exacerbation, still on BiPAP; no active bleeding except blood-tinged sputum; since he is on BiPAP and cannot have po mes- will give vit K 2 mg iv now and repeat INR at 11am.    Deepa Perry MD

## 2018-05-25 ENCOUNTER — APPOINTMENT (OUTPATIENT)
Dept: PHYSICAL THERAPY | Facility: CLINIC | Age: 83
DRG: 291 | End: 2018-05-25
Attending: HOSPITALIST
Payer: MEDICARE

## 2018-05-25 ENCOUNTER — APPOINTMENT (OUTPATIENT)
Dept: OCCUPATIONAL THERAPY | Facility: CLINIC | Age: 83
DRG: 291 | End: 2018-05-25
Attending: PHYSICIAN ASSISTANT
Payer: MEDICARE

## 2018-05-25 LAB
ANION GAP SERPL CALCULATED.3IONS-SCNC: 8 MMOL/L (ref 3–14)
BUN SERPL-MCNC: 19 MG/DL (ref 7–30)
CALCIUM SERPL-MCNC: 8.3 MG/DL (ref 8.5–10.1)
CHLORIDE SERPL-SCNC: 103 MMOL/L (ref 94–109)
CO2 SERPL-SCNC: 30 MMOL/L (ref 20–32)
CREAT SERPL-MCNC: 0.79 MG/DL (ref 0.66–1.25)
ERYTHROCYTE [DISTWIDTH] IN BLOOD BY AUTOMATED COUNT: 14.8 % (ref 10–15)
GFR SERPL CREATININE-BSD FRML MDRD: >90 ML/MIN/1.7M2
GLUCOSE SERPL-MCNC: 107 MG/DL (ref 70–99)
HCT VFR BLD AUTO: 35 % (ref 40–53)
HGB BLD-MCNC: 11.5 G/DL (ref 13.3–17.7)
INR PPP: 1.64 (ref 0.86–1.14)
LACTATE BLD-SCNC: 1.4 MMOL/L (ref 0.4–1.9)
MAGNESIUM SERPL-MCNC: 1.9 MG/DL (ref 1.6–2.3)
MCH RBC QN AUTO: 26.4 PG (ref 26.5–33)
MCHC RBC AUTO-ENTMCNC: 32.9 G/DL (ref 31.5–36.5)
MCV RBC AUTO: 80 FL (ref 78–100)
PLATELET # BLD AUTO: 425 10E9/L (ref 150–450)
POTASSIUM SERPL-SCNC: 3.2 MMOL/L (ref 3.4–5.3)
POTASSIUM SERPL-SCNC: 3.5 MMOL/L (ref 3.4–5.3)
RBC # BLD AUTO: 4.36 10E12/L (ref 4.4–5.9)
SODIUM SERPL-SCNC: 141 MMOL/L (ref 133–144)
WBC # BLD AUTO: 14.5 10E9/L (ref 4–11)

## 2018-05-25 PROCEDURE — 40000193 ZZH STATISTIC PT WARD VISIT: Performed by: PHYSICAL THERAPIST

## 2018-05-25 PROCEDURE — 97530 THERAPEUTIC ACTIVITIES: CPT | Mod: GO | Performed by: OCCUPATIONAL THERAPIST

## 2018-05-25 PROCEDURE — 25000132 ZZH RX MED GY IP 250 OP 250 PS 637: Mod: GY | Performed by: PHYSICIAN ASSISTANT

## 2018-05-25 PROCEDURE — 36415 COLL VENOUS BLD VENIPUNCTURE: CPT | Performed by: PHYSICIAN ASSISTANT

## 2018-05-25 PROCEDURE — 97161 PT EVAL LOW COMPLEX 20 MIN: CPT | Mod: GP | Performed by: PHYSICAL THERAPIST

## 2018-05-25 PROCEDURE — 25000125 ZZHC RX 250: Performed by: HOSPITALIST

## 2018-05-25 PROCEDURE — 85027 COMPLETE CBC AUTOMATED: CPT | Performed by: PHYSICIAN ASSISTANT

## 2018-05-25 PROCEDURE — 99232 SBSQ HOSP IP/OBS MODERATE 35: CPT | Performed by: INTERNAL MEDICINE

## 2018-05-25 PROCEDURE — 80048 BASIC METABOLIC PNL TOTAL CA: CPT | Performed by: PHYSICIAN ASSISTANT

## 2018-05-25 PROCEDURE — 97116 GAIT TRAINING THERAPY: CPT | Mod: GP | Performed by: PHYSICAL THERAPIST

## 2018-05-25 PROCEDURE — 83605 ASSAY OF LACTIC ACID: CPT | Performed by: HOSPITALIST

## 2018-05-25 PROCEDURE — 97166 OT EVAL MOD COMPLEX 45 MIN: CPT | Mod: GO | Performed by: OCCUPATIONAL THERAPIST

## 2018-05-25 PROCEDURE — 84132 ASSAY OF SERUM POTASSIUM: CPT | Performed by: HOSPITALIST

## 2018-05-25 PROCEDURE — A9270 NON-COVERED ITEM OR SERVICE: HCPCS | Mod: GY | Performed by: PHYSICIAN ASSISTANT

## 2018-05-25 PROCEDURE — 40000886 ZZH STATISTIC STEP DOWN HRS DAY

## 2018-05-25 PROCEDURE — 40000133 ZZH STATISTIC OT WARD VISIT: Performed by: OCCUPATIONAL THERAPIST

## 2018-05-25 PROCEDURE — 94660 CPAP INITIATION&MGMT: CPT

## 2018-05-25 PROCEDURE — 36415 COLL VENOUS BLD VENIPUNCTURE: CPT | Performed by: HOSPITALIST

## 2018-05-25 PROCEDURE — A9270 NON-COVERED ITEM OR SERVICE: HCPCS | Mod: GY | Performed by: HOSPITALIST

## 2018-05-25 PROCEDURE — 99233 SBSQ HOSP IP/OBS HIGH 50: CPT | Performed by: HOSPITALIST

## 2018-05-25 PROCEDURE — 25000128 H RX IP 250 OP 636: Performed by: PHYSICIAN ASSISTANT

## 2018-05-25 PROCEDURE — 25000132 ZZH RX MED GY IP 250 OP 250 PS 637: Mod: GY | Performed by: HOSPITALIST

## 2018-05-25 PROCEDURE — 40000884 ZZH STATISTIC STEP DOWN HRS NIGHT

## 2018-05-25 PROCEDURE — 97535 SELF CARE MNGMENT TRAINING: CPT | Mod: GO | Performed by: OCCUPATIONAL THERAPIST

## 2018-05-25 PROCEDURE — 40000275 ZZH STATISTIC RCP TIME EA 10 MIN

## 2018-05-25 PROCEDURE — A9270 NON-COVERED ITEM OR SERVICE: HCPCS | Mod: GY | Performed by: INTERNAL MEDICINE

## 2018-05-25 PROCEDURE — 21000001 ZZH R&B HEART CARE

## 2018-05-25 PROCEDURE — 25000132 ZZH RX MED GY IP 250 OP 250 PS 637: Mod: GY | Performed by: INTERNAL MEDICINE

## 2018-05-25 PROCEDURE — 25000128 H RX IP 250 OP 636: Performed by: HOSPITALIST

## 2018-05-25 PROCEDURE — 85610 PROTHROMBIN TIME: CPT | Performed by: PHYSICIAN ASSISTANT

## 2018-05-25 PROCEDURE — 97530 THERAPEUTIC ACTIVITIES: CPT | Mod: GP | Performed by: PHYSICAL THERAPIST

## 2018-05-25 PROCEDURE — 83735 ASSAY OF MAGNESIUM: CPT | Performed by: PHYSICIAN ASSISTANT

## 2018-05-25 RX ORDER — WARFARIN SODIUM 2.5 MG/1
2.5 TABLET ORAL
Status: COMPLETED | OUTPATIENT
Start: 2018-05-25 | End: 2018-05-25

## 2018-05-25 RX ADMIN — NYSTATIN 500000 UNITS: 500000 SUSPENSION ORAL at 22:08

## 2018-05-25 RX ADMIN — METOPROLOL TARTRATE 25 MG: 25 TABLET ORAL at 20:45

## 2018-05-25 RX ADMIN — Medication 10 MEQ: at 08:05

## 2018-05-25 RX ADMIN — Medication 125 MG: at 18:19

## 2018-05-25 RX ADMIN — LISINOPRIL 20 MG: 20 TABLET ORAL at 09:55

## 2018-05-25 RX ADMIN — Medication 10 MEQ: at 10:52

## 2018-05-25 RX ADMIN — HYDRALAZINE HYDROCHLORIDE 25 MG: 25 TABLET ORAL at 09:54

## 2018-05-25 RX ADMIN — METOPROLOL TARTRATE 25 MG: 25 TABLET ORAL at 09:55

## 2018-05-25 RX ADMIN — POTASSIUM CHLORIDE 20 MEQ: 1500 TABLET, EXTENDED RELEASE ORAL at 20:46

## 2018-05-25 RX ADMIN — NYSTATIN 500000 UNITS: 500000 SUSPENSION ORAL at 09:56

## 2018-05-25 RX ADMIN — FUROSEMIDE 40 MG: 10 INJECTION, SOLUTION INTRAVENOUS at 14:37

## 2018-05-25 RX ADMIN — Medication 10 MEQ: at 13:02

## 2018-05-25 RX ADMIN — Medication 125 MG: at 10:09

## 2018-05-25 RX ADMIN — POTASSIUM CHLORIDE 20 MEQ: 1500 TABLET, EXTENDED RELEASE ORAL at 09:55

## 2018-05-25 RX ADMIN — Medication 10 MEQ: at 13:59

## 2018-05-25 RX ADMIN — OMEPRAZOLE 20 MG: 20 CAPSULE, DELAYED RELEASE ORAL at 09:54

## 2018-05-25 RX ADMIN — DOXYCYCLINE 100 MG: 100 INJECTION, POWDER, LYOPHILIZED, FOR SOLUTION INTRAVENOUS at 02:47

## 2018-05-25 RX ADMIN — DEXTRAN 70, AND HYPROMELLOSE 2910 3 DROP: 1; 3 SOLUTION/ DROPS OPHTHALMIC at 05:43

## 2018-05-25 RX ADMIN — DOXYCYCLINE 100 MG: 100 INJECTION, POWDER, LYOPHILIZED, FOR SOLUTION INTRAVENOUS at 14:37

## 2018-05-25 RX ADMIN — LISINOPRIL 20 MG: 20 TABLET ORAL at 20:45

## 2018-05-25 RX ADMIN — NYSTATIN 500000 UNITS: 500000 SUSPENSION ORAL at 13:01

## 2018-05-25 RX ADMIN — Medication 125 MG: at 13:01

## 2018-05-25 RX ADMIN — SIMVASTATIN 10 MG: 10 TABLET, FILM COATED ORAL at 22:04

## 2018-05-25 RX ADMIN — FUROSEMIDE 40 MG: 10 INJECTION, SOLUTION INTRAVENOUS at 09:50

## 2018-05-25 RX ADMIN — HYDRALAZINE HYDROCHLORIDE 25 MG: 25 TABLET ORAL at 18:11

## 2018-05-25 RX ADMIN — Medication 125 MG: at 22:04

## 2018-05-25 RX ADMIN — CEFTRIAXONE 2 G: 2 INJECTION, POWDER, FOR SOLUTION INTRAMUSCULAR; INTRAVENOUS at 16:02

## 2018-05-25 RX ADMIN — SERTRALINE HYDROCHLORIDE 100 MG: 100 TABLET ORAL at 09:55

## 2018-05-25 RX ADMIN — WARFARIN SODIUM 2.5 MG: 2.5 TABLET ORAL at 18:11

## 2018-05-25 RX ADMIN — NYSTATIN 500000 UNITS: 500000 SUSPENSION ORAL at 18:11

## 2018-05-25 ASSESSMENT — ACTIVITIES OF DAILY LIVING (ADL): PREVIOUS_RESPONSIBILITIES: MEDICATION MANAGEMENT

## 2018-05-25 NOTE — PROGRESS NOTES
05/25/18 1120   Quick Adds   Type of Visit Initial PT Evaluation   Living Environment   Lives With spouse   Living Arrangements apartment   Number of Stairs to Enter Home 0   Number of Stairs Within Home 0   Transportation Available family or friend will provide   Self-Care   Dominant Hand right   Usual Activity Tolerance moderate   Current Activity Tolerance fair   Regular Exercise no   Equipment Currently Used at Home cane, straight  (has FWW if needed)   Functional Level Prior   Ambulation 1-->assistive equipment   Transferring 1-->assistive equipment   Toileting 1-->assistive equipment   Bathing 1-->assistive equipment   Dressing 0-->independent   Eating 0-->independent   Communication 0-->understands/communicates without difficulty   Swallowing 0-->swallows foods/liquids without difficulty   Cognition 0 - no cognition issues reported   Fall history within last six months yes   Number of times patient has fallen within last six months 1   Which of the above functional risks had a recent onset or change? ambulation;transferring;fall history   General Information   Onset of Illness/Injury or Date of Surgery - Date 05/23/18   Referring Physician Anette Montgomery MD   Patient/Family Goals Statement Return home   Pertinent History of Current Problem (include personal factors and/or comorbidities that impact the POC) Pt admitted with Acute hypoxic respiratory failure secondary to possible acute diastolic CHF as well as pneumonia and C diff.   Precautions/Limitations fall precautions;oxygen therapy device and L/min   General Info Comments Activity: up with assist   Cognitive Status Examination   Orientation person;place   Level of Consciousness alert   Follows Commands and Answers Questions 100% of the time   Personal Safety and Judgment intact   Memory intact   Pain Assessment   Patient Currently in Pain No   Posture    Posture Forward head position   Range of Motion (ROM)   ROM Comment Grossly WFL for AROM of B LEs  "  Strength   Strength Comments proximal weakness observed with decreased eccentric control on stand > sit   Bed Mobility   Bed Mobility Comments N/A   Transfer Skills   Transfer Comments CGA-min A x 1 sit <> stand with heavy reliance on UE support   Gait   Gait Comments CGA-min A for gait with FWW, slowed gait speed, posterior balance checks and quick to fatigue with fair activity tolerance   Balance   Balance Comments Impaired dynamic balance with transfers and gait requiring min A x 1 and B UE support to prevent overt LOB   Coordination   Coordination Comments WFL   General Therapy Interventions   Planned Therapy Interventions balance training;bed mobility training;gait training;transfer training;neuromuscular re-education   Clinical Impression   Criteria for Skilled Therapeutic Intervention yes, treatment indicated   PT Diagnosis unsteady gait and transfers   Influenced by the following impairments balance impairments, deconditioning, proximal weakness   Functional limitations due to impairments fall risk with household mobility   Clinical Presentation Stable/Uncomplicated   Clinical Presentation Rationale improving medical status   Clinical Decision Making (Complexity) Low complexity   Therapy Frequency` daily   Predicted Duration of Therapy Intervention (days/wks) 4 days   Anticipated Discharge Disposition Transitional Care Facility   Risk & Benefits of therapy have been explained Yes   Patient, Family & other staff in agreement with plan of care Yes   [facility] [employer] \"6 Clicks\"   2016, Trustees of [facility], under license to [employer].  All rights reserved.   6 Clicks Short Forms Basic Mobility Inpatient Short Form   [facility] [employer]  \"6 Clicks\" V.2 Basic Mobility Inpatient Short Form   1. Turning from your back to your side while in a flat bed without using bedrails? 3 - A Little   2. Moving from lying on your back to sitting on the side of a flat bed without using bedrails? 3 - " A Little   3. Moving to and from a bed to a chair (including a wheelchair)? 3 - A Little   4. Standing up from a chair using your arms (e.g., wheelchair, or bedside chair)? 3 - A Little   5. To walk in hospital room? 3 - A Little   6. Climbing 3-5 steps with a railing? 3 - A Little   Basic Mobility Raw Score (Score out of 24.Lower scores equate to lower levels of function) 18   Total Evaluation Time   Total Evaluation Time (Minutes) 8

## 2018-05-25 NOTE — PROGRESS NOTES
" 05/25/18 1012   Quick Adds   Type of Visit Initial Occupational Therapy Evaluation   Living Environment   Lives With spouse   Living Arrangements apartment   Home Accessibility (elevator access)   Transportation Available family or friend will provide  (wife drives, pt doesn't)   Self-Care   Dominant Hand right   Usual Activity Tolerance moderate   Current Activity Tolerance fair   Regular Exercise no  (hasn't walked all winter.)   Equipment Currently Used at Home cane, straight;cane, quad;walker, standard;grab bar   Activity/Exercise/Self-Care Comment uses a cane to get around, walk in shower with grab bar, higher toilet   Functional Level Prior   Ambulation 1-->assistive equipment   Transferring 1-->assistive equipment   Toileting 1-->assistive equipment   Bathing 1-->assistive equipment   Dressing 0-->independent   Eating 0-->independent   Communication 0-->understands/communicates without difficulty   Swallowing 0-->swallows foods/liquids without difficulty   Cognition 0 - no cognition issues reported   Fall history within last six months yes   Number of times patient has fallen within last six months 1   Prior Functional Level Comment Pt I with ADL/IADL\"s, pt manages own meds and wife does the cooking, laundry and helps wiht some cleaning.    General Information   Onset of Illness/Injury or Date of Surgery - Date 05/23/18   Referring Physician Tru Chakraborty PA   Patient/Family Goals Statement return home   Additional Occupational Profile Info/Pertinent History of Current Problem Santosh Robledo is a pleasant 88-year-old male with a past medical history of C. difficile colitis, hypertension, hyperlipidemia, chronic atrial fibrillation on warfarin, who presented to the ED with progressive shortness of breath and leg edema beginning about 4 days prior.  He was admitted for acute hypoxic respiratory failure, most likely due to a CHF exacerbation on 5/23/2018 and diagnosed with Right upper and lower " lobe pneumonia     Precautions/Limitations fall precautions   Cognitive Status Examination   Orientation orientation to person, place and time  (pt with some slow processing but able to recall)   Level of Consciousness alert   Able to Follow Commands WNL/WFL   Personal Safety (Cognitive) moderate impairment  (ww safety cues)   Cognitive Comment Will continue to monitor and screen as needed   Visual Perception   Visual Perception Wears glasses   Sensory Examination   Sensory Comments numbness in B feet due to neuropathy.   Pain Assessment   Patient Currently in Pain No   Range of Motion (ROM)   ROM Comment B UE AROM WFL   Strength   Strength Comments B UE MMT 4/5   Bed Mobility Skill: Rolling/Turning   Level of [place] - Bed Mobility Skill Rolling Turning contact guard   Assistive Device:  Rolling/Turning bed rails   Bed Mobility Skill: Scooting/Bridging   Level of [place]: Scooting/Bridging contact guard   Bed Mobility Skill: Supine to Sit   Level of [place]: Supine/Sit minimum assist (75% patients effort)   Assistive Device: Supine/Sit bedrail   Transfer Skill: Bed to Chair/Chair to Bed   Level of [place]: Bed to Chair contact guard   Assistive Device - Transfer Skill Bed to Chair Chair to Bed Rehab Eval standard walker   Transfer Skill: Sit to Stand   Level of [place]: Sit/Stand minimum assist (75% patients effort)   Assistive Device for Transfer: Sit/Stand standard walker   Lower Body Dressing   Level of [place]: Dress Lower Body minimum assist (75% patients effort)   Toileting   Level of [place]: Toilet maximum assist (25% patients effort)   Grooming   Level of [place]: Grooming contact guard   Eating/Self Feeding   Level of [place]: Eating independent   Instrumental Activities of Daily Living (IADL)   Previous Responsibilities medication management   Activities of Daily Living Analysis   Impairments Contributing to Impaired Activities of Daily Living balance  "impaired;cognition impaired;strength decreased  (decreased activity tolerancwe)   General Therapy Interventions   Planned Therapy Interventions ADL retraining;cognition;transfer training;home program guidelines;progressive activity/exercise   Clinical Impression   Criteria for Skilled Therapeutic Interventions Met yes, treatment indicated   OT Diagnosis decreased ADL/IADL's and functional mobility   Influenced by the following impairments impaired balance, overall weakness, activity tolernace and safety.   Assessment of Occupational Performance 3-5 Performance Deficits   Identified Performance Deficits impaired I with dressing, toilet and shower transfer and med mgmt   Clinical Decision Making (Complexity) Moderate complexity   Therapy Frequency daily   Predicted Duration of Therapy Intervention (days/wks) 4 days   Anticipated Discharge Disposition Transitional Care Facility;Home with Assist;Home with Home Therapy  (declines TCU, home with wife A with ADL./IADL's, home PT/OT)   Risks and Benefits of Treatment have been explained. Yes   Patient, Family & other staff in agreement with plan of care Yes   [facility] AM-PAC  \"6 Clicks\" Daily Activity Inpatient Short Form   1. Putting on and taking off regular lower body clothing? 2 - A Lot   2. Bathing (including washing, rinsing, drying)? 3 - A Little   3. Toileting, which includes using toilet, bedpan or urinal? 3 - A Little   4. Putting on and taking off regular upper body clothing? 3 - A Little   5. Taking care of personal grooming such as brushing teeth? 3 - A Little   6. Eating meals? 4 - None   Daily Activity Raw Score (Score out of 24.Lower scores equate to lower levels of function) 18   Total Evaluation Time   Total Evaluation Time (Minutes) 10     "

## 2018-05-25 NOTE — PLAN OF CARE
Problem: Patient Care Overview  Goal: Plan of Care/Patient Progress Review  PT: PT eval completed and treatment initiated. Pt admitted with Acute hypoxic respiratory failure secondary to possible acute diastolic CHF as well as pneumonia and C diff. At baseline pt ambulates indep with SPC and completes all ADLs indep however wife reports he has a FWW available as needed and prior to admission was able to ambulate very short distances bed <> bathroom only.     Discharge Planner PT   Patient plan for discharge: home with home services, declines TCU  Current status: Pt requires CGA for sit > stand, min A x 1 to control descent stand > sit with B UE support and verbal cues. Pt ambulated 20 ft x 1 and 10 ft x 1 with FWW and CGA, 2 posterior balance checks requiring min A to correct forward weight shift. Fatigues with activity but all VSS on 4L O2 throughout session.  Barriers to return to prior living situation: fall risk with household mobiltiy, level of assist with transfers and gait, fair activity tolerance, balance impairments  Recommendations for discharge: TCU; if pt refuses then recommend 24/7 assist with transfers and gait using FWW and HHPT/OT   Rationale for recommendations: for daily therapies to progress functional strength, balance and endurance for increased safety and indep with transfers and gait       Entered by: Larisa Garrison 05/25/2018 12:01 PM

## 2018-05-25 NOTE — PROGRESS NOTES
Patient was placed on continues BIPAP 10/5 40% with SpO2 98, BBS clear diminished. Patient tolerated well and alarm set at 10. Will continue to monitor.    Meghna Mcrae RT  5/25/2018

## 2018-05-25 NOTE — PROGRESS NOTES
[facility]    Hospitalist Progress Note  Date of Service (when I saw the patient): 05/25/2018    Assessment & Plan   Santosh Robledo is a pleasant 88-year-old male with a past medical history of C. difficile colitis, hypertension, hyperlipidemia, chronic atrial fibrillation on warfarin, who presented to the ED with progressive shortness of breath and leg edema beginning about 4 days prior.  He was admitted for acute hypoxic respiratory failure, most likely due to a CHF exacerbation on 5/23/2018.     Acute hypoxic respiratory failure secondary to possible acute diastolic CHF: 4 days of progressive worsening shortness of breath with increased leg edema and productive cough.  He had some blood-tinged sputum earlier. No chest pain or fever.  He has no CHF by history.  No recent echocardiograms. CXR on admission showed interstitial and airspace opacities in both lungs, most confluent on the right perihilar region. Pneumonia is not excluded.  Though had marked leukocytosis, Procalcitonin on admission indicated low risk for systemic infection. He had to be started on BiPAP in the ER for oxygen saturation in the low 80s on room air and a tachypnea and increased work of breathing. His proBNP was elevated at 12,789. Unclear etiology of CHF, troponin negative. ? Rate related. Denies chest pain.  His ongoing fatigue but dealing with C. difficile infection.  --Continue Lasix 40 mg IV twice daily, strict I&O- Neg 2.3 L since admission.  Follow daily weight, fluid restriction 1.5 L per 24 hour    --2D echo showed normal LVEF, diastolic function could not be assessed, no significant valvular abnormality or pericardial effusion.  --He also remained hypertensive, was restarted on metoprolol 25 p.o. twice daily, and hydralazine 25 p.o. 3 times daily better controlled, cardiology consulted and following, appreciate recommendation.  --Off BiPAP, started on cardiac diet with fluid restriction.    Right upper and lower  lobe pneumonia    Admission chest x-ray showed bilateral interstitial prominence but has marked perihilar opacity on right side. Could be consolidation, underlying mass could not be ruled out.  Intermittent hemoptysis, but markedly supratherapeutic INR at presentation.  Less likely to be pulmonary hemorrhage. Though he resented with mild leukocytosis, pro calcitonin in the low risk of systemic infection range and given his recent C. difficile infection, antibiotic not started on admission.  -CT chest with contrast showed significant infiltrates right upper lobe and lower lobe, started on Rocephin and doxycycline.  -Sputum for Gram stain and culture  -Repeat x-ray or CT to make sure his infiltrates clear and there is no underlying mass, though CT did not show obvious mass.     Clostridium difficile colitis:  The patient was admitted to the hospital here from 05/13-05/15 for this.  He was started on vancomycin 125 mg q.i.d,  continued.  This needs to be continued for 7 additional days after completing antibiotic for pneumonia.     Chronic atrial fibrillation, status post pacemaker placement  [place] anticoagulation with warfarin with supratherapeutic INR   The patient is chronically anticoagulated with warfarin.  Reportedly, he has had variable INRs recently with warfarin dose adjustments being made.  INR on admission was supratherapeutic at 6.86 and further went up to 9.55  -Received 2 mg vitamin K IV given markedly elevated INR and hemoptysis. No other signs of bleeding noted.  -INR trended down, 2.76 after vitamin K, resumed at that point.  He is subtherapeutic at 1.64 today, pharmacy to manage warfarin.  -Metoprolol 25 mg p.o. twice daily elevated, rate remains controlled. Continue to monitor on telemetry.       Essential hypertension:   PTA is on hydrochlorothiazide 25 mg p.o. daily and lisinopril 20 mg p.o. twice daily   -Lisinopril resumed, hydralazine 25 mg p.o. 3 times daily and metoprolol 25 mg p.o. twice  daily added   -Add in hydralazine IV available for elevated blood pressures with parameters.   -Blood pressure much better controlled today.     Hypokalemia: Potassium was 2.9 on admission. This is likely due to reduced p.o. intake lately along with his C. diff diarrhea and hydrochlorothiazide use.   -Started on scheduled potassium chloride 20 mEq p.o. twice daily  -Potassium and magnesium replacement protocol in place  -Daily BMP.     Oral thrush  Nystatin swish and swallow.    Hyperlipidemia:  Resume simvastatin       DEAN PPI when able.      DVT Prophylaxis: Warfarin    Code Status: DNR    Disposition: Expected discharge: Pending clinical course, once respiratory status improves.  2-3 days.  Discussed with patient and daughter in the room.  Also discussed with his son in law who is a radiologist at [facility].    -DC [facility] [place] and [place] [facility] [place] evals, care plan was reviewed with nursing staff.    Anette Montgomery MD  Hospitalist    Interval History    Patient remained on BiPAP overnight though he was off BiPAP for several hours prior to that.  Dyspnea has improved, oxygen requirement as well has improved and he is on 4 L nasal cannula at this morning.  Continues to have cough with slightly blood-tinged pinkish sputum.  No gross blood or clots.    -Denies chest pain palpitation nausea dizziness abdominal pain or diarrhea.  -Afebrile.   Good response with diuresis, negative 2.3 L since admission, attending maintained.    -Data reviewed today: I reviewed all new labs and imaging results over the last 24 hours. I personally reviewed intermittently paced rhythm, rate controlled.  I reviewed CT scan report, attached.    Physical Exam   Temp: 98.6  F (37  C) Temp src: Axillary BP: 123/59   Heart Rate: 60 Resp: 18 SpO2: 98 % O2 Device: BiPAP/CPAP Oxygen Delivery: 5 LPM  Vitals:    05/24/18 0500 05/24/18 1120 05/25/18 0500   Weight: 92.5 kg (204 lb) 79.8 kg (175 lb 14.4 oz) 79.2 kg (174 lb 9.7 oz)     Vital Signs  with Ranges  Temp:  [98.2  F (36.8  C)-98.9  F (37.2  C)] 98.6  F (37  C)  Heart Rate:  [59-87] 60  Resp:  [10-43] 18  BP: (123-192)/() 123/59  FiO2 (%):  [40 %] 40 %  SpO2:  [91 %-99 %] 98 %  I/O last 3 completed shifts:  In: 900 [P.O.:700; I.V.:200]  Out: 2600 [Urine:2600]    Constitutional: Alert, awake. Not in distress.   HEENT; PERRLA, EOMI, oral thrush improved.  Respiratory: Bilateral equal air entry, coarse bilaterally, no wheezing or tachypnea today.  Air entry is much improved.  On 5 L nasal cannula o2  Cardiovascular: Irregular s1s2, no murmur, rub or gallop. No tachycardia.  Bilateral trace leg edema, much improved.  GI: Soft, non distended, non tender, bowel tones active.  Skin/Integumen: No rash, no blister.  Neuro: Alert oriented cranial nerves II through XII intact.    Medications     Continuing ACE inhibitor/ARB/ARNI from home medication list OR ACE inhibitor/ARB order already placed during this visit       - MEDICATION INSTRUCTIONS -       - MEDICATION INSTRUCTIONS -       - MEDICATION INSTRUCTIONS -       Reason beta blocker order not selected       Warfarin Therapy Reminder         cefTRIAXone  2 g Intravenous Q24H     doxycycline (VIBRAMYCIN) IV  100 mg Intravenous Q12H     furosemide  40 mg Intravenous BID     hydrALAZINE  25 mg Oral Q8H     lisinopril  20 mg Oral BID     metoprolol tartrate  25 mg Oral BID     nystatin  500,000 Units Swish & Spit 4x Daily     omeprazole  20 mg Oral Daily     potassium chloride SA  20 mEq Oral BID     sertraline (ZOLOFT) tablet 100 mg  100 mg Oral Daily     simvastatin  10 mg Oral At Bedtime     sodium chloride (PF)  10 mL Intracatheter Q8H     sodium chloride (PF)  3 mL Intracatheter Q8H     vancomycin  125 mg Oral 4x Daily       Data     Recent Labs  Lab 05/25/18  0510 05/24/18  1250 05/24/18  0525  05/23/18  1415 05/23/18  0944   WBC 14.5*  --  13.9*  --   --  22.5*   HGB 11.5*  --  11.4*  --   --  12.2*   MCV 80  --  80  --   --  79     --   463*  --   --  517*   INR 1.64* 2.76* 9.55*  --   --  6.86*     --  143  --   --  139   POTASSIUM 3.2* 3.5 3.2*  < > 3.3* 2.9*   CHLORIDE 103  --  105  --   --  103   CO2 30  --  28  --   --  26   BUN 19  --  14  --   --  13   CR 0.79  --  0.78  --   --  0.76   ANIONGAP 8  --  10  --   --  10   ANGELITA 8.3*  --  8.1*  --   --  8.5   *  --  103*  --   --  143*   TROPI  --   --   --   --  <0.015 0.017   < > = values in this interval not displayed.    Recent Results (from the past 24 hour(s))   XR Chest Port 1 View    Narrative    CHEST PORTABLE ONE VIEW  5/24/2018 10:44 AM     COMPARISON: 2-view chest x-ray 5/23/2018.    HISTORY: Follow up pneumonia versus congestive heart failure.      Impression    IMPRESSION: A single lead pacemaker module implanted over the left  chest with the lead in the right ventricle is again noted without  identifiable change.    Patchy airspace opacity in the mid and upper right lung has increased  in density and extent since the comparison study consistent with  worsening of pneumonia. Small right pleural effusion again noted.  Emphysematous and fibrotic changes throughout both lungs again noted.  There is no pneumothorax on either side. There is no pleural effusion  on the left. Heart size remains within normal limits.    JHONY MAYBERRY MD   CT Chest w Contrast    Narrative    CT CHEST WITH CONTRAST 5/24/2018 3:43 PM     HISTORY: Multifocal pneumonia, specially right hilar, question lung  mass.    COMPARISON: None.    TECHNIQUE: Volumetric helical acquisition of CT images of the chest  from the clavicles to the kidneys were acquired after the  administration of IV contrast. Radiation dose for this scan was  reduced using automated exposure control, adjustment of the mA and/or  kV according to patient size, or iterative reconstruction technique.    FINDINGS: Bilateral calcified pleural plaques and small partially  loculated pleural effusions are seen bilaterally.  Extensive  interstitial and airspace infiltrate in the right upper lobe and to a  lesser extent the right lower lobe. Areas of bronchial wall thickening  are evident. No definite discrete masses are seen within the lungs,  although these could be partially obscured by the infiltrates. The  findings in the visualized upper abdomen. Cardiomegaly, particularly  the right side of the heart. There are moderate atherosclerotic  changes of the visualized aorta and its branches. There is no evidence  of aortic dissection or aneurysm. Small hiatal hernia.      Impression    IMPRESSION: Extensive interstitial and airspace infiltrates in the  right upper lobe and to a lesser extent the right lower lobe. Small  partially loculated pleural effusions bilaterally, some of these  collections of loculated fluid could appear masslike on chest x-ray.  No definite pulmonary masses are seen.    DARRICK VASQUEZ MD

## 2018-05-25 NOTE — PLAN OF CARE
Problem: Patient Care Overview  Goal: Plan of Care/Patient Progress Review  Outcome: No Change  A&O, denies pain. Off Bipap for 3hours, tolerated 5L NC sating in the mid 90's. Took oral meds w/o complication. On bipap overnight. HR 60-70's, BP improved. Afebrile. Slept between cares. Tele: Paced. Continue diuresing and weaning O2 as able.

## 2018-05-25 NOTE — PROGRESS NOTES
[facility]    Cardiology Progress Note    Date of Service (when I saw the patient): 05/25/2018     Assessment & Plan   Santosh Robledo is a 88 year old male who was admitted on 5/23/2018.     1-acute hypoxic respiratory failure.  Pulmonary infection with possible/likely component of volume overload causing diastolic heart failure .  Significant improvement after diuresis and antibiotics.  Left lung is clearing by chest x-ray and exam, right lung continues to have prominent middle and lower lobe abnormalities on x-ray CT and exam.  White count improved from over 22K at admission, still elevated however.  Afebrile.    2-congestive heart failure, possible acute diastolic.   received marked volume resuscitation at recent admission with C. difficile colitis with weight up, most likely mainly due to fluid, significantly.  Now diuresing with partial improvement in his hypoxia as per #1.   normal systolic function on echo this admission     Recommend-  1-changed to oral diuretics.  2-continue antibiotics per hospitalist service.    Russell Gage M.D.    Interval History   Needed BiPAP again last night.  Feels better today and in much less respiratory distress.  No chest discomfort.  Still coughing up some bloody sputum.  INR went almost 10, reversed with vitamin K yesterday, now slightly subtherapeutic.  No diarrhea.  Eating better at breakfast today.  Denied orthopnea last night.  But was on BiPAP.    Physical Exam   Vital Signs with Ranges  Temp:  [98.2  F (36.8  C)-98.6  F (37  C)] 98.6  F (37  C)  Heart Rate:  [59-87] 67  Resp:  [10-43] 22  BP: (123-182)/(53-93) 141/66  FiO2 (%):  [40 %] 40 %  SpO2:  [91 %-99 %] 97 %  Vitals:    05/24/18 0500 05/24/18 1120 05/25/18 0500   Weight: 92.5 kg (204 lb) 79.8 kg (175 lb 14.4 oz) 79.2 kg (174 lb 9.7 oz)     I/O last 3 completed shifts:  In: 900 [P.O.:700; I.V.:200]  Out: 2600 [Urine:2600]    Vitals: /66  Pulse 99  Temp 98.6  F (37  C) (Axillary)   "Resp 22  Ht 1.905 m (6' 3\")  Wt 79.2 kg (174 lb 9.7 oz)  SpO2 97%  BMI 21.82 kg/m2    Constitutional: On 4 L oxygen has normal respiratory effort today.  No acute distress    Chest:  Marked coarse crackles right middle and lower lobes.  Left lung mostly clear except for some pan inspiratory crackles at the left base.  This is improved.    Cardiac: No significant murmur.  No JVD or HJR.  No heave    Abdomen:  Non-distended.  Nontender    Vascular/Extremities: Now without edema.  Warm and dry.      Recent Labs  Lab 05/23/18  1415 05/23/18  0944   TROPI <0.015 0.017         Recent Labs  Lab 05/25/18  0510 05/24/18  1250 05/24/18  0525  05/23/18  1415 05/23/18  0944   WBC 14.5*  --  13.9*  --   --  22.5*   HGB 11.5*  --  11.4*  --   --  12.2*   MCV 80  --  80  --   --  79     --  463*  --   --  517*   INR 1.64* 2.76* 9.55*  --   --  6.86*     --  143  --   --  139   POTASSIUM 3.2* 3.5 3.2*  < > 3.3* 2.9*   CHLORIDE 103  --  105  --   --  103   CO2 30  --  28  --   --  26   BUN 19  --  14  --   --  13   CR 0.79  --  0.78  --   --  0.76   GFRESTIMATED >90  --  >90  --   --  >90   GFRESTBLACK >90  --  >90  --   --  >90   ANIONGAP 8  --  10  --   --  10   ANGELITA 8.3*  --  8.1*  --   --  8.5   *  --  103*  --   --  143*   TROPI  --   --   --   --  <0.015 0.017   < > = values in this interval not displayed.    Recent Results (from the past 48 hour(s))   XR Chest Port 1 View    Narrative    CHEST PORTABLE ONE VIEW  5/24/2018 10:44 AM     COMPARISON: 2-view chest x-ray 5/23/2018.    HISTORY: Follow up pneumonia versus congestive heart failure.      Impression    IMPRESSION: A single lead pacemaker module implanted over the left  chest with the lead in the right ventricle is again noted without  identifiable change.    Patchy airspace opacity in the mid and upper right lung has increased  in density and extent since the comparison study consistent with  worsening of pneumonia. Small right pleural effusion " again noted.  Emphysematous and fibrotic changes throughout both lungs again noted.  There is no pneumothorax on either side. There is no pleural effusion  on the left. Heart size remains within normal limits.    JHONY MAYBERRY MD   CT Chest w Contrast    Narrative    CT CHEST WITH CONTRAST 5/24/2018 3:43 PM     HISTORY: Multifocal pneumonia, specially right hilar, question lung  mass.    COMPARISON: None.    TECHNIQUE: Volumetric helical acquisition of CT images of the chest  from the clavicles to the kidneys were acquired after the  administration of IV contrast. Radiation dose for this scan was  reduced using automated exposure control, adjustment of the mA and/or  kV according to patient size, or iterative reconstruction technique.    FINDINGS: Bilateral calcified pleural plaques and small partially  loculated pleural effusions are seen bilaterally. Extensive  interstitial and airspace infiltrate in the right upper lobe and to a  lesser extent the right lower lobe. Areas of bronchial wall thickening  are evident. No definite discrete masses are seen within the lungs,  although these could be partially obscured by the infiltrates. The  findings in the visualized upper abdomen. Cardiomegaly, particularly  the right side of the heart. There are moderate atherosclerotic  changes of the visualized aorta and its branches. There is no evidence  of aortic dissection or aneurysm. Small hiatal hernia.      Impression    IMPRESSION: Extensive interstitial and airspace infiltrates in the  right upper lobe and to a lesser extent the right lower lobe. Small  partially loculated pleural effusions bilaterally, some of these  collections of loculated fluid could appear masslike on chest x-ray.  No definite pulmonary masses are seen.    DARRICK VASQUEZ MD       Recent Results (from the past 4320 hour(s))   ECHO COMPLETE WITH OPTISON    Narrative    [id number]  [id number]  [id number]  [id number]^^JYOTSNA^ANGELA           [place]  [facility]  Echocardiography Laboratory  [address] [place] [address]        Name: TRUE MATTHEWS  MRN: [id number]  : 1929  Study Date: 2018 12:51 PM  Age: 88 yrs  Gender: Male  Patient Location: [facility]  Reason For Study: CHF  Ordering Physician: JYOTSNA QUINTANILLA  Referring Physician: Lilly Zepeda Johsua  Performed By: Migdalia eMrlos     BSA: 2.0 m2  Height: 75 in  Weight: 170 lb  HR: 71  BP: 164/90 mmHg  _____________________________________________________________________________  __        Procedure  Complete Portable Echo Adult. Contrast Optison.  _____________________________________________________________________________  __        Interpretation Summary     Left ventricular systolic function is normal.  The visual ejection fraction is estimated at 55-60%.  Dilated inferior vena cava  The study was technically difficult.  _____________________________________________________________________________  __        Left Ventricle  The left ventricle is normal in size. Left ventricular systolic function is  normal. The visual ejection fraction is estimated at 55-60%. Diastolic  function not assessed due to atrial fibrillation. No regional wall motion  abnormalities noted.     Right Ventricle  Borderline right ventricular enlargement. The right ventricular systolic  function is normal. The right ventricle is not well visualized. There is a  pacemaker lead in the right ventricle.     Atria  The left atrium is not well visualized. Right atrium not well visualized.     Mitral Valve  There is mild mitral annular calcification. There is trace mitral  regurgitation. There is no mitral valve stenosis.        Tricuspid Valve  The tricuspid valve is not well visualized. The right ventricular systolic  pressure is approximated at 27.0 mmHg plus the right atrial pressure.     Aortic Valve  The aortic valve is trileaflet with aortic valve sclerosis. There is trace  aortic regurgitation. No  hemodynamically significant valvular aortic stenosis.     Pulmonic Valve  The pulmonic valve is not well visualized.     Vessels  Borderline aortic root dilatation. Dilated inferior vena cava.     Pericardium  There is no pericardial effusion.     _____________________________________________________________________________  __  MMode/2D Measurements & Calculations  IVSd: 1.0 cm  LVIDd: 4.4 cm  LVIDs: 3.5 cm  LVPWd: 0.89 cm  FS: 21.5 %  LV mass(C)d: 139.2 grams  LV mass(C)dI: 68.0 grams/m2     Ao root diam: 3.9 cm  LA dimension: 3.6 cm  asc Aorta Diam: 3.5 cm  LA/Ao: 0.93  LA Volume (BP): 57.3 ml  LA Volume Index (BP): 28.0 ml/m2  RWT: 0.40        Doppler Measurements & Calculations  MV E max heidy: 104.5 cm/sec  Ao V2 max: 137.7 cm/sec  Ao max P.0 mmHg     PA acc time: 0.12 sec  TR max heidy: 259.9 cm/sec  TR max P.0 mmHg  E/E' av.8  Lateral E/e': 9.9  Medial E/e': 13.7           _____________________________________________________________________________  __           Report approved by: Shy Jama 2018 01:49 PM          Medications     Current Facility-Administered Medications   Medication Dose Route Frequency     cefTRIAXone  2 g Intravenous Q24H     doxycycline (VIBRAMYCIN) IV  100 mg Intravenous Q12H     furosemide  40 mg Intravenous BID     hydrALAZINE  25 mg Oral Q8H     lisinopril  20 mg Oral BID     metoprolol tartrate  25 mg Oral BID     nystatin  500,000 Units Swish & Spit 4x Daily     omeprazole  20 mg Oral Daily     potassium chloride SA  20 mEq Oral BID     sertraline (ZOLOFT) tablet 100 mg  100 mg Oral Daily     simvastatin  10 mg Oral At Bedtime     sodium chloride (PF)  10 mL Intracatheter Q8H     sodium chloride (PF)  3 mL Intracatheter Q8H     vancomycin  125 mg Oral 4x Daily         Current Facility-Administered Medications   Medication Last Rate     Continuing ACE inhibitor/ARB/ARNI from home medication list OR ACE inhibitor/ARB order already placed during this  visit       - MEDICATION INSTRUCTIONS -       - MEDICATION INSTRUCTIONS -       - MEDICATION INSTRUCTIONS -       Reason beta blocker order not selected       Warfarin Therapy Reminder

## 2018-05-25 NOTE — PLAN OF CARE
Problem: Patient Care Overview  Goal: Plan of Care/Patient Progress Review  Outcome: Improving  Pt. A&O with forgetfulness. Tele Paced. Ax-1 with walker. Removed Bipap this AM and tolerated. Diet advanced per MD lara. 4L Oxygen nc.Dyspnea with activity. Lung sounds course. Productive cough with clear/ blood tinged sputum.  IV antibiotics. Potassium replaced. Redraw this evening. Trace LE edema but almost resolved. x1 formed stool today per pt. Report. Remains on enteric precautions. Transferred to [facility].     Heart Failure Care Pathway  GOALS TO BE MET BEFORE DISCHARGE:    1. Decrease congestion and/or edema with diuretic therapy to achieve near      optimal volume status.            Dyspnea improved:  Improving, continued dyspnea with activity. Denies SOB lying flat or at rest.             Edema improved:     Yes, almost resolved. Trace       Net I/O and Weights since admission:          04/25 2300 - 05/25 2259  In: 2280 [P.O.:1680; I.V.:600]  Out: 4050 [Urine:4050]  Net: -1770            Vitals:    05/23/18 0925 05/23/18 1300 05/24/18 0500 05/24/18 1120   Weight: 77.1 kg (170 lb) 81.6 kg (179 lb 14.4 oz) 92.5 kg (204 lb) 79.8 kg (175 lb 14.4 oz)    05/25/18 0500   Weight: 79.2 kg (174 lb 9.7 oz)       2.  O2 sats > 92% on RA or at prior home O2 therapy level.          Current oxygenation status:       SpO2: 97 %         O2 Device: Nasal cannula,  Oxygen Delivery: 4 LPM         Able to wean O2 this shift to keep sats > 92%:  Yes       Does patient use Home O2? No    3.  Tolerates ambulation and mobility near baseline: No, please explain: Ax-1 walker, dyspnea         How many times did the patient ambulate with nursing staff this shift? 0, up in chair x 2 hours     Please review the Heart Failure Care Pathway for additional HF goal outcomes.    Lizeth Lunsford RN  5/25/2018         .

## 2018-05-25 NOTE — PLAN OF CARE
Problem: Patient Care Overview  Goal: Plan of Care/Patient Progress Review  Discharge Planner OT   Patient plan for discharge: home  Current status: Eval completed and treatment initiated. Pt lives in a sr apt with his wife and reports I with ADL./IADL's and functional mobility, mostly with a SEC, pt manages his own meds and wife does most all the cooking, laundry and cleaning, pt helps with some cleaning. Wife drives. Pt does not drive. Pt admitted due to possible CHF exacerbation and pneumonia. Pt requires MIN A with supine to sit with cues for tech, sit <> stand and transfer to bedside chair with CGA/MIN A and ww with cues for safe use and tech. Pt requires MAX A for LE ADL's and pt managing urinal with SBA. Pt alert and oriented x3, will continue to monitor and screen cognition as needed.   Barriers to return to prior living situation: decreased balance, activity tolerance, overall weakness and safety. Pt's has good family support, pt's wife present.   Recommendations for discharge: TCU, but pt and wife would prefer home with home OT and PT and wife to A with ADL/IADL's as needed Ie LE dress, med mgmt, etc.   Rationale for recommendations: if home, home OT for home safety eval, ADL's and further cognitive assessment as needed and PT for balance and strengthening.        Entered by: Flower Pearson 05/25/2018 10:58 AM

## 2018-05-26 LAB
ANION GAP SERPL CALCULATED.3IONS-SCNC: 5 MMOL/L (ref 3–14)
BUN SERPL-MCNC: 30 MG/DL (ref 7–30)
CALCIUM SERPL-MCNC: 8.2 MG/DL (ref 8.5–10.1)
CHLORIDE SERPL-SCNC: 105 MMOL/L (ref 94–109)
CO2 SERPL-SCNC: 32 MMOL/L (ref 20–32)
CREAT SERPL-MCNC: 0.86 MG/DL (ref 0.66–1.25)
ERYTHROCYTE [DISTWIDTH] IN BLOOD BY AUTOMATED COUNT: 14.9 % (ref 10–15)
GFR SERPL CREATININE-BSD FRML MDRD: 83 ML/MIN/1.7M2
GLUCOSE SERPL-MCNC: 126 MG/DL (ref 70–99)
HCT VFR BLD AUTO: 34.9 % (ref 40–53)
HGB BLD-MCNC: 11.5 G/DL (ref 13.3–17.7)
INR PPP: 1.88 (ref 0.86–1.14)
LACTATE BLD-SCNC: 1.4 MMOL/L (ref 0.4–1.9)
MCH RBC QN AUTO: 26.4 PG (ref 26.5–33)
MCHC RBC AUTO-ENTMCNC: 33 G/DL (ref 31.5–36.5)
MCV RBC AUTO: 80 FL (ref 78–100)
PLATELET # BLD AUTO: 440 10E9/L (ref 150–450)
POTASSIUM SERPL-SCNC: 3.4 MMOL/L (ref 3.4–5.3)
RBC # BLD AUTO: 4.36 10E12/L (ref 4.4–5.9)
SODIUM SERPL-SCNC: 142 MMOL/L (ref 133–144)
WBC # BLD AUTO: 13.8 10E9/L (ref 4–11)

## 2018-05-26 PROCEDURE — A9270 NON-COVERED ITEM OR SERVICE: HCPCS | Mod: GY | Performed by: PHYSICIAN ASSISTANT

## 2018-05-26 PROCEDURE — 99207 ZZC CDG-CODE CATEGORY CHANGED: CPT | Performed by: INTERNAL MEDICINE

## 2018-05-26 PROCEDURE — 25000128 H RX IP 250 OP 636: Performed by: HOSPITALIST

## 2018-05-26 PROCEDURE — 99232 SBSQ HOSP IP/OBS MODERATE 35: CPT | Performed by: INTERNAL MEDICINE

## 2018-05-26 PROCEDURE — A9270 NON-COVERED ITEM OR SERVICE: HCPCS | Mod: GY | Performed by: INTERNAL MEDICINE

## 2018-05-26 PROCEDURE — A9270 NON-COVERED ITEM OR SERVICE: HCPCS | Mod: GY | Performed by: HOSPITALIST

## 2018-05-26 PROCEDURE — 25000132 ZZH RX MED GY IP 250 OP 250 PS 637: Mod: GY | Performed by: HOSPITALIST

## 2018-05-26 PROCEDURE — 85610 PROTHROMBIN TIME: CPT | Performed by: PHYSICIAN ASSISTANT

## 2018-05-26 PROCEDURE — 83605 ASSAY OF LACTIC ACID: CPT | Performed by: INTERNAL MEDICINE

## 2018-05-26 PROCEDURE — 21000001 ZZH R&B HEART CARE

## 2018-05-26 PROCEDURE — 25000132 ZZH RX MED GY IP 250 OP 250 PS 637: Mod: GY | Performed by: INTERNAL MEDICINE

## 2018-05-26 PROCEDURE — 99233 SBSQ HOSP IP/OBS HIGH 50: CPT | Performed by: INTERNAL MEDICINE

## 2018-05-26 PROCEDURE — 80048 BASIC METABOLIC PNL TOTAL CA: CPT | Performed by: PHYSICIAN ASSISTANT

## 2018-05-26 PROCEDURE — 25000125 ZZHC RX 250: Performed by: HOSPITALIST

## 2018-05-26 PROCEDURE — 36415 COLL VENOUS BLD VENIPUNCTURE: CPT | Performed by: INTERNAL MEDICINE

## 2018-05-26 PROCEDURE — 36415 COLL VENOUS BLD VENIPUNCTURE: CPT | Performed by: PHYSICIAN ASSISTANT

## 2018-05-26 PROCEDURE — 25000132 ZZH RX MED GY IP 250 OP 250 PS 637: Mod: GY | Performed by: PHYSICIAN ASSISTANT

## 2018-05-26 PROCEDURE — 85027 COMPLETE CBC AUTOMATED: CPT | Performed by: PHYSICIAN ASSISTANT

## 2018-05-26 RX ORDER — FUROSEMIDE 40 MG
40 TABLET ORAL
Status: DISCONTINUED | OUTPATIENT
Start: 2018-05-26 | End: 2018-05-29 | Stop reason: HOSPADM

## 2018-05-26 RX ORDER — LISINOPRIL 40 MG/1
40 TABLET ORAL 2 TIMES DAILY
Status: DISCONTINUED | OUTPATIENT
Start: 2018-05-26 | End: 2018-05-29 | Stop reason: HOSPADM

## 2018-05-26 RX ORDER — WARFARIN SODIUM 2.5 MG/1
2.5 TABLET ORAL
Status: COMPLETED | OUTPATIENT
Start: 2018-05-26 | End: 2018-05-26

## 2018-05-26 RX ADMIN — SERTRALINE HYDROCHLORIDE 100 MG: 100 TABLET ORAL at 10:48

## 2018-05-26 RX ADMIN — LISINOPRIL 20 MG: 20 TABLET ORAL at 10:48

## 2018-05-26 RX ADMIN — NYSTATIN 500000 UNITS: 500000 SUSPENSION ORAL at 15:01

## 2018-05-26 RX ADMIN — Medication 125 MG: at 18:38

## 2018-05-26 RX ADMIN — Medication 125 MG: at 13:24

## 2018-05-26 RX ADMIN — Medication 1 MG: at 22:30

## 2018-05-26 RX ADMIN — FUROSEMIDE 40 MG: 40 TABLET ORAL at 17:49

## 2018-05-26 RX ADMIN — METOPROLOL TARTRATE 25 MG: 25 TABLET ORAL at 22:31

## 2018-05-26 RX ADMIN — HYDRALAZINE HYDROCHLORIDE 25 MG: 25 TABLET ORAL at 10:48

## 2018-05-26 RX ADMIN — METOPROLOL TARTRATE 25 MG: 25 TABLET ORAL at 10:48

## 2018-05-26 RX ADMIN — Medication 1 MG: at 02:55

## 2018-05-26 RX ADMIN — POTASSIUM CHLORIDE 20 MEQ: 1500 TABLET, EXTENDED RELEASE ORAL at 22:32

## 2018-05-26 RX ADMIN — OMEPRAZOLE 20 MG: 20 CAPSULE, DELAYED RELEASE ORAL at 09:19

## 2018-05-26 RX ADMIN — DOXYCYCLINE 100 MG: 100 INJECTION, POWDER, LYOPHILIZED, FOR SOLUTION INTRAVENOUS at 15:00

## 2018-05-26 RX ADMIN — SIMVASTATIN 10 MG: 10 TABLET, FILM COATED ORAL at 22:30

## 2018-05-26 RX ADMIN — CEFTRIAXONE 2 G: 2 INJECTION, POWDER, FOR SOLUTION INTRAMUSCULAR; INTRAVENOUS at 17:34

## 2018-05-26 RX ADMIN — NYSTATIN 500000 UNITS: 500000 SUSPENSION ORAL at 18:40

## 2018-05-26 RX ADMIN — POTASSIUM CHLORIDE 20 MEQ: 1500 TABLET, EXTENDED RELEASE ORAL at 10:48

## 2018-05-26 RX ADMIN — HYDRALAZINE HYDROCHLORIDE 25 MG: 25 TABLET ORAL at 02:55

## 2018-05-26 RX ADMIN — LISINOPRIL 40 MG: 40 TABLET ORAL at 20:54

## 2018-05-26 RX ADMIN — FUROSEMIDE 40 MG: 10 INJECTION, SOLUTION INTRAVENOUS at 09:19

## 2018-05-26 RX ADMIN — DOXYCYCLINE 100 MG: 100 INJECTION, POWDER, LYOPHILIZED, FOR SOLUTION INTRAVENOUS at 02:55

## 2018-05-26 RX ADMIN — NYSTATIN 500000 UNITS: 500000 SUSPENSION ORAL at 22:31

## 2018-05-26 RX ADMIN — NYSTATIN 500000 UNITS: 500000 SUSPENSION ORAL at 10:49

## 2018-05-26 RX ADMIN — Medication 125 MG: at 20:54

## 2018-05-26 RX ADMIN — HYDRALAZINE HYDROCHLORIDE 25 MG: 25 TABLET ORAL at 17:49

## 2018-05-26 RX ADMIN — WARFARIN SODIUM 2.5 MG: 2.5 TABLET ORAL at 17:49

## 2018-05-26 NOTE — PLAN OF CARE
Problem: Patient Care Overview  Goal: Plan of Care/Patient Progress Review  Outcome: Improving  Heart Failure Care Pathway  GOALS TO BE MET BEFORE DISCHARGE:    1. Decrease congestion and/or edema with diuretic therapy to achieve near      optimal volume status.            Dyspnea improved:  No, please explain: CALDWELL no SOB reported             Edema improved:     Yes        Net I/O and Weights since admission:          04/26 0700 - 05/26 0659  In: 2780 [P.O.:2180; I.V.:600]  Out: 4325 [Urine:4325]  Net: -1545            Vitals:    05/23/18 0925 05/23/18 1300 05/24/18 0500 05/24/18 1120   Weight: 77.1 kg (170 lb) 81.6 kg (179 lb 14.4 oz) 92.5 kg (204 lb) 79.8 kg (175 lb 14.4 oz)    05/25/18 0500   Weight: 79.2 kg (174 lb 9.7 oz)       2.  O2 sats > 92% on RA or at prior home O2 therapy level.          Current oxygenation status:       SpO2: 92 %         O2 Device: Nasal cannula,  Oxygen Delivery: 2 LPM         Able to wean O2 this shift to keep sats > 92%:  No, please explain: 2 L O2 @ 92%        Does patient use Home O2? No    3.  Tolerates ambulation and mobility near baseline: No, please explain: unable to ambulate pt over last four hours        How many times did the patient ambulate with nursing staff this shift? 0 pt refused getting out of bed     Please review the Heart Failure Care Pathway for additional HF goal outcomes.    Thelma Martin RN  5/25/2018     A&O, forgetful. Tele: A fib CVR, HR 80's. VSS on 2 L O2. Denies SOB and CP. Up w/ assist of 1. Oral ABX given. Fluid restriction of 1.5 L. Will continue to monitor.

## 2018-05-26 NOTE — PROGRESS NOTES
[facility]    Hospitalist Progress Note    Assessment & Plan   Santosh Robledo is a 88 year old male who was admitted on 5/23/2018 for acute hypoxic respiratory failure.  He has a past medical history significant for C. difficile colitis, hypertension, hyperlipidemia, chronic atrial fibrillation on warfarin.        Acute hypoxic respiratory failure   - Multifactorial: Pneumonia, chronic illness (Cdiff on admission) and HFpEF  - Presented with 4 days of progressive worsening shortness of breath with increased leg edema and productive cough with some blood-tinged sputum earlier  - Admission CXR on admission showed interstitial and airspace opacities in both lungs, most confluent on the right perihilar region.   - s/p BiPAP for oxygen saturation in the low 80s on room air and a tachypnea with increased work of breathing.   - proBNP was elevated at 12,789  --Lasix 40 mg IV twice daily changed to Lasix 40mg PO BID to start tonight  -- Continue strict I&O, daily weights, and fluid restriction of 1.5 L per 24 hour    --ECHO: normal LVEF, diastolic function could not be assessed, no significant valvular abnormality or pericardial effusion  --Still requiring oxygen  --Needs to have walking test with and without oxygen and records the sats  - Cardiology consulted and following     Right upper and lower lobe pneumonia    - Admission chest x-ray showed bilateral interstitial prominence but has marked perihilar opacity on right side.   - Intermittent hemoptysis, but markedly supratherapeutic INR  -CT chest with contrast showed significant infiltrates right upper lobe and lower lobe, started on Rocephin and doxycycline.  -Sputum for Gram stain and culture      Clostridium difficile colitis  - s/p hospital admission 5/13-05/15.   - Vancomycin 125 mg q.i.d,  continued.  Needs to be continued for 7 additional days after completing antibiotic for pneumonia.      Chronic atrial fibrillation, status post pacemaker  placement  Anticoagulation with warfarin   -Supratherapeutic INR on admission (6.86, peaked at 9.55)  - s/p 2 mg vitamin K IV given markedly elevated INR and hemoptysis. No other signs of bleeding noted.  - INR trended down, pharmacy to manage warfarin.  - Rate control A fib: Metoprolol 25 mg p.o. twice daily  - Telemetry        Essential hypertension:    - Home medications: hydrochlorothiazide 25 mg p.o. daily and lisinopril 20 mg p.o. twice daily   - Metoprolol 25 mg p.o. twice daily added       Hypokalemia (2.9 on admission)  - Likely due to reduced p.o. intake lately along with his C. diff diarrhea and hydrochlorothiazide use   -Started on scheduled potassium chloride 20 mEq p.o. twice daily  -Potassium and magnesium replacement protocol      Oral thrush  Nystatin swish and swallow.     Hyperlipidemia  - Continue simvastatin        DEAN  - PPI when able.     DVT Prophylaxis: Warfarin  Code Status: DNR    Disposition: Expected discharge in 2 days once weaned off oxygen.    Tara Blake MD, PhD   Text Page     Interval History   Feels weak and possibly depressed and frustrated with medical situation. Encouraged him he is improving and to work with PT.     -Data reviewed today: I reviewed all new labs and imaging results over the last 24 hours.    Physical Exam   Temp: 98.3  F (36.8  C) Temp src: Axillary BP: 113/56   Heart Rate: 78 Resp: 20 SpO2: 97 % O2 Device: Nasal cannula Oxygen Delivery: 2 LPM  Vitals:    05/24/18 1120 05/25/18 0500 05/26/18 0500   Weight: 79.8 kg (175 lb 14.4 oz) 79.2 kg (174 lb 9.7 oz) 76.5 kg (168 lb 10.4 oz)     Vital Signs with Ranges  Temp:  [97.9  F (36.6  C)-98.3  F (36.8  C)] 98.3  F (36.8  C)  Heart Rate:  [71-83] 78  Resp:  [20-22] 20  BP: (113-143)/(52-75) 113/56  SpO2:  [92 %-97 %] 97 %  I/O last 3 completed shifts:  In: 1630 [P.O.:1630]  Out: 875 [Urine:875]    Constitutional: Awake, alert, cooperative, no apparent distress.  Eyes: Lids and lashes normal, pupils equal, extra  ocular muscles intact, sclera clear, conjunctiva normal.  ENT: Normocephalic, without obvious abnormality, atraumatic, sinuses nontender on palpation, external ears without lesions, oral pharynx with moist mucus membranes  Respiratory: No increased work of breathing, poor air exchange, + coarse breath sounds  Cardiovascular: Normal apical impulse, regular rate and rhythm, normal S1 and S2, no murmur noted.  GI: Normal bowel sounds, soft, non-distended, non-tender  Skin: No bruising or bleeding, normal skin color, texture, turgor, no redness, warmth, or swelling, no rashes, no lesions  Musculoskeletal: There is no redness, warmth, or swelling of the joints.  Full range of motion noted.    Neurologic: Awake, alert, oriented to name, place and time.  Cranial nerves II-XII are grossly intact.    Neuropsychiatric: Calm, normal eye contact, alert, normal affect, oriented to self, place, time and situation, memory for past and recent events intact and thought process normal.     Medications     Continuing ACE inhibitor/ARB/ARNI from home medication list OR ACE inhibitor/ARB order already placed during this visit       - MEDICATION INSTRUCTIONS -       - MEDICATION INSTRUCTIONS -       - MEDICATION INSTRUCTIONS -       Reason beta blocker order not selected       Warfarin Therapy Reminder         cefTRIAXone  2 g Intravenous Q24H     doxycycline (VIBRAMYCIN) IV  100 mg Intravenous Q12H     furosemide  40 mg Oral BID     hydrALAZINE  25 mg Oral Q8H     lisinopril  40 mg Oral BID     metoprolol tartrate  25 mg Oral BID     nystatin  500,000 Units Swish & Spit 4x Daily     omeprazole  20 mg Oral Daily     potassium chloride SA  20 mEq Oral BID     sertraline (ZOLOFT) tablet 100 mg  100 mg Oral Daily     simvastatin  10 mg Oral At Bedtime     sodium chloride (PF)  10 mL Intracatheter Q8H     sodium chloride (PF)  3 mL Intracatheter Q8H     vancomycin  125 mg Oral 4x Daily     warfarin  2.5 mg Oral ONCE at 18:00       Data      Recent Labs  Lab 05/26/18  0545 05/25/18  1725 05/25/18  0510 05/24/18  1250 05/24/18  0525  05/23/18  1415 05/23/18  0944   WBC 13.8*  --  14.5*  --  13.9*  --   --  22.5*   HGB 11.5*  --  11.5*  --  11.4*  --   --  12.2*   MCV 80  --  80  --  80  --   --  79     --  425  --  463*  --   --  517*   INR 1.88*  --  1.64* 2.76* 9.55*  --   --  6.86*     --  141  --  143  --   --  139   POTASSIUM 3.4 3.5 3.2* 3.5 3.2*  < > 3.3* 2.9*   CHLORIDE 105  --  103  --  105  --   --  103   CO2 32  --  30  --  28  --   --  26   BUN 30  --  19  --  14  --   --  13   CR 0.86  --  0.79  --  0.78  --   --  0.76   ANIONGAP 5  --  8  --  10  --   --  10   ANGELITA 8.2*  --  8.3*  --  8.1*  --   --  8.5   *  --  107*  --  103*  --   --  143*   TROPI  --   --   --   --   --   --  <0.015 0.017   < > = values in this interval not displayed.    Imaging:  No results found for this or any previous visit (from the past 24 hour(s)).

## 2018-05-26 NOTE — PLAN OF CARE
Needs frequent reminders to use IS.  Tolerating RA since 1300.  No coughing noted.  Walks with walker to BR with assist of 1.

## 2018-05-26 NOTE — PROGRESS NOTES
[facility]    Cardiology Progress Note    Date of Service (when I saw the patient): 05/26/2018     Assessment & Plan   Santosh Robledo is a 88 year old male who was admitted on 5/23/2018.     1-acute hypoxic respiratory failure.  Pulmonary infection with possible/likely component of volume overload causing diastolic heart failure .  Significant improvement after diuresis and antibiotics.  Left lung is clearing by chest x-ray and exam, right lung continues to have prominent middle and lower lobe abnormalities on x-ray CT and exam.  White count improved from over 22K at admission, afebrile    2-congestive heart failure, possible acute diastolic.   received marked volume resuscitation at recent admission with C. difficile colitis with weight up, most likely mainly due to fluid, significantly.  Now diuresing with partial improvement in his hypoxia as per #1.   normal systolic function on echo this admission       At this point, blood pressure remains elevated, will attempt to reduce this. Adidtionally, he has diuresed another -620 cc yesterday. Can transition to lasix 40 mg po BID. . Multifactorial SOB from likely volume overload and underlying pneumonida.     Recommend-  1-changed to oral diuretics. Lasix 40 mg po BID, uptitrate as needed  2-continue antibiotics per hospitalist service.  3-aggressive potassium replacement    Will sign off now.     Gregoria Clements MD    Interval History   Needed BiPAP again last night.  Feels better today and in much less respiratory distress.  No chest discomfort.  Still coughing up some bloody sputum.  INR went almost 10, reversed with vitamin K yesterday, now slightly subtherapeutic.  No diarrhea.  Eating better at breakfast today.  Denied orthopnea last night.  But was on BiPAP.    Physical Exam   Vital Signs with Ranges  Temp:  [97.9  F (36.6  C)-98.2  F (36.8  C)] 98.1  F (36.7  C)  Heart Rate:  [71-83] 74  Resp:  [22] 22  BP: (121-143)/(52-75) 143/75  SpO2:  [92  "%-94 %] 94 %  Vitals:    05/24/18 1120 05/25/18 0500 05/26/18 0500   Weight: 79.8 kg (175 lb 14.4 oz) 79.2 kg (174 lb 9.7 oz) 76.5 kg (168 lb 10.4 oz)     I/O last 3 completed shifts:  In: 1630 [P.O.:1630]  Out: 875 [Urine:875]    Vitals: /75 (BP Location: Left arm)  Pulse 99  Temp 98.1  F (36.7  C) (Axillary)  Resp 22  Ht 1.905 m (6' 3\")  Wt 76.5 kg (168 lb 10.4 oz)  SpO2 94%  BMI 21.08 kg/m2    Constitutional: On 4 L oxygen has normal respiratory effort today.  No acute distress    Chest:  Good inspiratory effort, no wheezes appreciated    Cardiac: No significant murmur.  No JVD or HJR.  No heave    Abdomen:  Non-distended.  Nontender    Vascular/Extremities: Now without edema.  Warm and dry.      Recent Labs  Lab 05/23/18  1415 05/23/18  0944   TROPI <0.015 0.017         Recent Labs  Lab 05/26/18  0545 05/25/18  1725 05/25/18  0510 05/24/18  1250 05/24/18  0525  05/23/18  1415 05/23/18  0944   WBC 13.8*  --  14.5*  --  13.9*  --   --  22.5*   HGB 11.5*  --  11.5*  --  11.4*  --   --  12.2*   MCV 80  --  80  --  80  --   --  79     --  425  --  463*  --   --  517*   INR 1.88*  --  1.64* 2.76* 9.55*  --   --  6.86*     --  141  --  143  --   --  139   POTASSIUM 3.4 3.5 3.2* 3.5 3.2*  < > 3.3* 2.9*   CHLORIDE 105  --  103  --  105  --   --  103   CO2 32  --  30  --  28  --   --  26   BUN 30  --  19  --  14  --   --  13   CR 0.86  --  0.79  --  0.78  --   --  0.76   GFRESTIMATED 83  --  >90  --  >90  --   --  >90   GFRESTBLACK >90  --  >90  --  >90  --   --  >90   ANIONGAP 5  --  8  --  10  --   --  10   ANGELITA 8.2*  --  8.3*  --  8.1*  --   --  8.5   *  --  107*  --  103*  --   --  143*   TROPI  --   --   --   --   --   --  <0.015 0.017   < > = values in this interval not displayed.    Recent Results (from the past 48 hour(s))   XR Chest Port 1 View    Narrative    CHEST PORTABLE ONE VIEW  5/24/2018 10:44 AM     COMPARISON: 2-view chest x-ray 5/23/2018.    HISTORY: Follow up pneumonia " versus congestive heart failure.      Impression    IMPRESSION: A single lead pacemaker module implanted over the left  chest with the lead in the right ventricle is again noted without  identifiable change.    Patchy airspace opacity in the mid and upper right lung has increased  in density and extent since the comparison study consistent with  worsening of pneumonia. Small right pleural effusion again noted.  Emphysematous and fibrotic changes throughout both lungs again noted.  There is no pneumothorax on either side. There is no pleural effusion  on the left. Heart size remains within normal limits.    JHONY MAYBERRY MD   CT Chest w Contrast    Narrative    CT CHEST WITH CONTRAST 5/24/2018 3:43 PM     HISTORY: Multifocal pneumonia, specially right hilar, question lung  mass.    COMPARISON: None.    TECHNIQUE: Volumetric helical acquisition of CT images of the chest  from the clavicles to the kidneys were acquired after the  administration of IV contrast. Radiation dose for this scan was  reduced using automated exposure control, adjustment of the mA and/or  kV according to patient size, or iterative reconstruction technique.    FINDINGS: Bilateral calcified pleural plaques and small partially  loculated pleural effusions are seen bilaterally. Extensive  interstitial and airspace infiltrate in the right upper lobe and to a  lesser extent the right lower lobe. Areas of bronchial wall thickening  are evident. No definite discrete masses are seen within the lungs,  although these could be partially obscured by the infiltrates. The  findings in the visualized upper abdomen. Cardiomegaly, particularly  the right side of the heart. There are moderate atherosclerotic  changes of the visualized aorta and its branches. There is no evidence  of aortic dissection or aneurysm. Small hiatal hernia.      Impression    IMPRESSION: Extensive interstitial and airspace infiltrates in the  right upper lobe and to a lesser extent  the right lower lobe. Small  partially loculated pleural effusions bilaterally, some of these  collections of loculated fluid could appear masslike on chest x-ray.  No definite pulmonary masses are seen.    DARRICK VASQUEZ MD       Recent Results (from the past 4320 hour(s))   ECHO COMPLETE WITH OPTISON    Narrative    [id number]  [facility]  [id number]  [id number]^^JYOTSNA^ANGELA           [facility]  Echocardiography Laboratory  [address] [place] [address]        Name: TRUE MATTHEWS  MRN: [id number]  : 1929  Study Date: 2018 12:51 PM  Age: 88 yrs  Gender: Male  Patient Location: [facility]  Reason For Study: CHF  Ordering Physician: JYOTSNA QUINTANILLA  Referring Physician: Lilly Zepeda Johsua  Performed By: Migdalia Merlos     BSA: 2.0 m2  Height: 75 in  Weight: 170 lb  HR: 71  BP: 164/90 mmHg  _____________________________________________________________________________  __        Procedure  Complete Portable Echo Adult. Contrast Optison.  _____________________________________________________________________________  __        Interpretation Summary     Left ventricular systolic function is normal.  The visual ejection fraction is estimated at 55-60%.  Dilated inferior vena cava  The study was technically difficult.  _____________________________________________________________________________  __        Left Ventricle  The left ventricle is normal in size. Left ventricular systolic function is  normal. The visual ejection fraction is estimated at 55-60%. Diastolic  function not assessed due to atrial fibrillation. No regional wall motion  abnormalities noted.     Right Ventricle  Borderline right ventricular enlargement. The right ventricular systolic  function is normal. The right ventricle is not well visualized. There is a  pacemaker lead in the right ventricle.     Atria  The left atrium is not well visualized. Right atrium not well visualized.     Mitral Valve  There is  mild mitral annular calcification. There is trace mitral  regurgitation. There is no mitral valve stenosis.        Tricuspid Valve  The tricuspid valve is not well visualized. The right ventricular systolic  pressure is approximated at 27.0 mmHg plus the right atrial pressure.     Aortic Valve  The aortic valve is trileaflet with aortic valve sclerosis. There is trace  aortic regurgitation. No hemodynamically significant valvular aortic stenosis.     Pulmonic Valve  The pulmonic valve is not well visualized.     Vessels  Borderline aortic root dilatation. Dilated inferior vena cava.     Pericardium  There is no pericardial effusion.     _____________________________________________________________________________  __  MMode/2D Measurements & Calculations  IVSd: 1.0 cm  LVIDd: 4.4 cm  LVIDs: 3.5 cm  LVPWd: 0.89 cm  FS: 21.5 %  LV mass(C)d: 139.2 grams  LV mass(C)dI: 68.0 grams/m2     Ao root diam: 3.9 cm  LA dimension: 3.6 cm  asc Aorta Diam: 3.5 cm  LA/Ao: 0.93  LA Volume (BP): 57.3 ml  LA Volume Index (BP): 28.0 ml/m2  RWT: 0.40        Doppler Measurements & Calculations  MV E max heidy: 104.5 cm/sec  Ao V2 max: 137.7 cm/sec  Ao max P.0 mmHg     PA acc time: 0.12 sec  TR max heidy: 259.9 cm/sec  TR max P.0 mmHg  E/E' av.8  Lateral E/e': 9.9  Medial E/e': 13.7           _____________________________________________________________________________  __           Report approved by: Shy Jama 2018 01:49 PM          Medications     Current Facility-Administered Medications   Medication Dose Route Frequency     cefTRIAXone  2 g Intravenous Q24H     doxycycline (VIBRAMYCIN) IV  100 mg Intravenous Q12H     furosemide  40 mg Intravenous BID     hydrALAZINE  25 mg Oral Q8H     lisinopril  20 mg Oral BID     metoprolol tartrate  25 mg Oral BID     nystatin  500,000 Units Swish & Spit 4x Daily     omeprazole  20 mg Oral Daily     potassium chloride SA  20 mEq Oral BID     sertraline (ZOLOFT) tablet  100 mg  100 mg Oral Daily     simvastatin  10 mg Oral At Bedtime     sodium chloride (PF)  10 mL Intracatheter Q8H     sodium chloride (PF)  3 mL Intracatheter Q8H     vancomycin  125 mg Oral 4x Daily         Current Facility-Administered Medications   Medication Last Rate     Continuing ACE inhibitor/ARB/ARNI from home medication list OR ACE inhibitor/ARB order already placed during this visit       - MEDICATION INSTRUCTIONS -       - MEDICATION INSTRUCTIONS -       - MEDICATION INSTRUCTIONS -       Reason beta blocker order not selected       Warfarin Therapy Reminder

## 2018-05-26 NOTE — PLAN OF CARE
Problem: Cardiac: Heart Failure (Adult)  Goal: Signs and Symptoms of Listed Potential Problems Will be Absent, Minimized or Managed (Cardiac: Heart Failure)  Signs and symptoms of listed potential problems will be absent, minimized or managed by discharge/transition of care (reference Cardiac: Heart Failure (Adult) CPG).   Outcome: No Change  Heart Failure Care Pathway  GOALS TO BE MET BEFORE DISCHARGE:    1. Decrease congestion and/or edema with diuretic therapy to achieve near      optimal volume status.            Dyspnea improved:  No, still reports CALDWELL            Edema improved:     No change        Net I/O and Weights since admission:          04/26 0700 - 05/26 0659  In: 2930 [P.O.:2330; I.V.:600]  Out: 4425 [Urine:4425]  Net: -1495            Vitals:    05/23/18 0925 05/23/18 1300 05/24/18 0500 05/24/18 1120   Weight: 77.1 kg (170 lb) 81.6 kg (179 lb 14.4 oz) 92.5 kg (204 lb) 79.8 kg (175 lb 14.4 oz)    05/25/18 0500   Weight: 79.2 kg (174 lb 9.7 oz)       2.  O2 sats > 92% on RA or at prior home O2 therapy level.          Current oxygenation status:       SpO2: 92 %         O2 Device: None (Room air),  Oxygen Delivery: 2 LPM         Able to wean O2 this shift to keep sats > 92%:  No       Does patient use Home O2? No    3.  Tolerates ambulation and mobility near baseline: Unknown        How many times did the patient ambulate with nursing staff this shift? 0    Please review the Heart Failure Care Pathway for additional HF goal outcomes.    Pt A&O but forgetful, VSS on 2L O2, Tele- A fib CVR HR 70's. Pt denies pain, reports CALDWELL and shallow breathing, stable on 2L. Lung sounds course. IV line replaced by flying squad, given scheduled dose of IV Doxicycline. Continues on enteric precautions and 1500cc fluid restriction. C/o difficulty sleeping, given PRN melatonin. Diuretics changed from IV to oral. PT & OT eval's ordered. Plan to d/c in 2-3 days once respiratory status improves.     Reshma Fernandes  RN  5/26/2018

## 2018-05-27 ENCOUNTER — APPOINTMENT (OUTPATIENT)
Dept: PHYSICAL THERAPY | Facility: CLINIC | Age: 83
DRG: 291 | End: 2018-05-27
Attending: HOSPITALIST
Payer: MEDICARE

## 2018-05-27 LAB
ANION GAP SERPL CALCULATED.3IONS-SCNC: 7 MMOL/L (ref 3–14)
BUN SERPL-MCNC: 32 MG/DL (ref 7–30)
CALCIUM SERPL-MCNC: 8.1 MG/DL (ref 8.5–10.1)
CHLORIDE SERPL-SCNC: 106 MMOL/L (ref 94–109)
CO2 SERPL-SCNC: 28 MMOL/L (ref 20–32)
CREAT SERPL-MCNC: 0.85 MG/DL (ref 0.66–1.25)
ERYTHROCYTE [DISTWIDTH] IN BLOOD BY AUTOMATED COUNT: 14.9 % (ref 10–15)
GFR SERPL CREATININE-BSD FRML MDRD: 85 ML/MIN/1.7M2
GLUCOSE SERPL-MCNC: 113 MG/DL (ref 70–99)
HCT VFR BLD AUTO: 34.9 % (ref 40–53)
HGB BLD-MCNC: 11.3 G/DL (ref 13.3–17.7)
INR PPP: 1.96 (ref 0.86–1.14)
MCH RBC QN AUTO: 26 PG (ref 26.5–33)
MCHC RBC AUTO-ENTMCNC: 32.4 G/DL (ref 31.5–36.5)
MCV RBC AUTO: 80 FL (ref 78–100)
PLATELET # BLD AUTO: 450 10E9/L (ref 150–450)
POTASSIUM SERPL-SCNC: 3.7 MMOL/L (ref 3.4–5.3)
RBC # BLD AUTO: 4.34 10E12/L (ref 4.4–5.9)
SODIUM SERPL-SCNC: 141 MMOL/L (ref 133–144)
WBC # BLD AUTO: 13.5 10E9/L (ref 4–11)

## 2018-05-27 PROCEDURE — 40000193 ZZH STATISTIC PT WARD VISIT: Performed by: PHYSICAL THERAPIST

## 2018-05-27 PROCEDURE — 25000132 ZZH RX MED GY IP 250 OP 250 PS 637: Mod: GY | Performed by: HOSPITALIST

## 2018-05-27 PROCEDURE — 80048 BASIC METABOLIC PNL TOTAL CA: CPT | Performed by: PHYSICIAN ASSISTANT

## 2018-05-27 PROCEDURE — A9270 NON-COVERED ITEM OR SERVICE: HCPCS | Mod: GY | Performed by: INTERNAL MEDICINE

## 2018-05-27 PROCEDURE — 99232 SBSQ HOSP IP/OBS MODERATE 35: CPT | Performed by: INTERNAL MEDICINE

## 2018-05-27 PROCEDURE — 97110 THERAPEUTIC EXERCISES: CPT | Mod: GP | Performed by: PHYSICAL THERAPIST

## 2018-05-27 PROCEDURE — 25000132 ZZH RX MED GY IP 250 OP 250 PS 637: Mod: GY | Performed by: INTERNAL MEDICINE

## 2018-05-27 PROCEDURE — 85027 COMPLETE CBC AUTOMATED: CPT | Performed by: PHYSICIAN ASSISTANT

## 2018-05-27 PROCEDURE — A9270 NON-COVERED ITEM OR SERVICE: HCPCS | Mod: GY | Performed by: HOSPITALIST

## 2018-05-27 PROCEDURE — 25000132 ZZH RX MED GY IP 250 OP 250 PS 637: Mod: GY | Performed by: PHYSICIAN ASSISTANT

## 2018-05-27 PROCEDURE — 25000125 ZZHC RX 250: Performed by: HOSPITALIST

## 2018-05-27 PROCEDURE — 21000001 ZZH R&B HEART CARE

## 2018-05-27 PROCEDURE — 40000133 ZZH STATISTIC OT WARD VISIT

## 2018-05-27 PROCEDURE — 97116 GAIT TRAINING THERAPY: CPT | Mod: GP | Performed by: PHYSICAL THERAPIST

## 2018-05-27 PROCEDURE — 25000128 H RX IP 250 OP 636: Performed by: HOSPITALIST

## 2018-05-27 PROCEDURE — 97530 THERAPEUTIC ACTIVITIES: CPT | Mod: GO

## 2018-05-27 PROCEDURE — 36415 COLL VENOUS BLD VENIPUNCTURE: CPT | Performed by: PHYSICIAN ASSISTANT

## 2018-05-27 PROCEDURE — A9270 NON-COVERED ITEM OR SERVICE: HCPCS | Mod: GY | Performed by: PHYSICIAN ASSISTANT

## 2018-05-27 PROCEDURE — 85610 PROTHROMBIN TIME: CPT | Performed by: PHYSICIAN ASSISTANT

## 2018-05-27 RX ORDER — WARFARIN SODIUM 2.5 MG/1
2.5 TABLET ORAL
Status: COMPLETED | OUTPATIENT
Start: 2018-05-27 | End: 2018-05-27

## 2018-05-27 RX ADMIN — OMEPRAZOLE 20 MG: 20 CAPSULE, DELAYED RELEASE ORAL at 08:00

## 2018-05-27 RX ADMIN — Medication 125 MG: at 10:07

## 2018-05-27 RX ADMIN — METOPROLOL TARTRATE 25 MG: 25 TABLET ORAL at 21:13

## 2018-05-27 RX ADMIN — HYDRALAZINE HYDROCHLORIDE 25 MG: 25 TABLET ORAL at 10:05

## 2018-05-27 RX ADMIN — Medication 1 MG: at 21:13

## 2018-05-27 RX ADMIN — METOPROLOL TARTRATE 25 MG: 25 TABLET ORAL at 10:06

## 2018-05-27 RX ADMIN — NYSTATIN 500000 UNITS: 500000 SUSPENSION ORAL at 21:13

## 2018-05-27 RX ADMIN — POTASSIUM CHLORIDE 20 MEQ: 1500 TABLET, EXTENDED RELEASE ORAL at 21:14

## 2018-05-27 RX ADMIN — DOXYCYCLINE 100 MG: 100 INJECTION, POWDER, LYOPHILIZED, FOR SOLUTION INTRAVENOUS at 14:55

## 2018-05-27 RX ADMIN — HYDRALAZINE HYDROCHLORIDE 25 MG: 25 TABLET ORAL at 02:15

## 2018-05-27 RX ADMIN — FUROSEMIDE 40 MG: 40 TABLET ORAL at 16:35

## 2018-05-27 RX ADMIN — CEFTRIAXONE 2 G: 2 INJECTION, POWDER, FOR SOLUTION INTRAMUSCULAR; INTRAVENOUS at 16:35

## 2018-05-27 RX ADMIN — HYDRALAZINE HYDROCHLORIDE 25 MG: 25 TABLET ORAL at 18:17

## 2018-05-27 RX ADMIN — POTASSIUM CHLORIDE 20 MEQ: 1500 TABLET, EXTENDED RELEASE ORAL at 10:05

## 2018-05-27 RX ADMIN — SIMVASTATIN 10 MG: 10 TABLET, FILM COATED ORAL at 21:13

## 2018-05-27 RX ADMIN — OMEPRAZOLE 20 MG: 20 CAPSULE, DELAYED RELEASE ORAL at 06:31

## 2018-05-27 RX ADMIN — Medication 125 MG: at 14:55

## 2018-05-27 RX ADMIN — SERTRALINE HYDROCHLORIDE 100 MG: 100 TABLET ORAL at 10:05

## 2018-05-27 RX ADMIN — DOXYCYCLINE 100 MG: 100 INJECTION, POWDER, LYOPHILIZED, FOR SOLUTION INTRAVENOUS at 02:15

## 2018-05-27 RX ADMIN — NYSTATIN 500000 UNITS: 500000 SUSPENSION ORAL at 10:07

## 2018-05-27 RX ADMIN — NYSTATIN 500000 UNITS: 500000 SUSPENSION ORAL at 14:56

## 2018-05-27 RX ADMIN — FUROSEMIDE 40 MG: 40 TABLET ORAL at 06:32

## 2018-05-27 RX ADMIN — Medication 125 MG: at 19:30

## 2018-05-27 RX ADMIN — LISINOPRIL 40 MG: 40 TABLET ORAL at 19:30

## 2018-05-27 RX ADMIN — WARFARIN SODIUM 2.5 MG: 2.5 TABLET ORAL at 18:17

## 2018-05-27 RX ADMIN — NYSTATIN 500000 UNITS: 500000 SUSPENSION ORAL at 19:30

## 2018-05-27 RX ADMIN — LISINOPRIL 40 MG: 40 TABLET ORAL at 10:05

## 2018-05-27 RX ADMIN — Medication 125 MG: at 21:13

## 2018-05-27 NOTE — PLAN OF CARE
Problem: Cardiac: Heart Failure (Adult)  Goal: Signs and Symptoms of Listed Potential Problems Will be Absent, Minimized or Managed (Cardiac: Heart Failure)  Signs and symptoms of listed potential problems will be absent, minimized or managed by discharge/transition of care (reference Cardiac: Heart Failure (Adult) CPG).   Outcome: Improving  Heart Failure Care Pathway  GOALS TO BE MET BEFORE DISCHARGE:    1. Decrease congestion and/or edema with diuretic therapy to achieve near      optimal volume status.            Dyspnea improved:  No, please explain: tachypnea, resp rate in high 20's. Denies SOB, but some increased work of breathing.             Edema improved:     Yes; +2 pitting edema to lower extremities.         Net I/O and Weights since admission:          04/27 0700 - 05/27 0659  In: 3750 [P.O.:3150; I.V.:600]  Out: 5830 [Urine:5830]  Net: -2080            Vitals:    05/23/18 0925 05/23/18 1300 05/24/18 0500 05/24/18 1120   Weight: 77.1 kg (170 lb) 81.6 kg (179 lb 14.4 oz) 92.5 kg (204 lb) 79.8 kg (175 lb 14.4 oz)    05/25/18 0500 05/26/18 0500 05/27/18 0215   Weight: 79.2 kg (174 lb 9.7 oz) 76.5 kg (168 lb 10.4 oz) 77 kg (169 lb 12.1 oz)       2.  O2 sats > 92% on RA or at prior home O2 therapy level.          Current oxygenation status:       SpO2: 95 %         O2 Device: Nasal cannula,  Oxygen Delivery: 3 LPM         Able to wean O2 this shift to keep sats > 92%:  No, please explain: 89-90% RA at bedtime, placed back on 3LPM NC overnight to maintain SpO2 around 94-95%.        Does patient use Home O2? No    3.  Tolerates ambulation and mobility near baseline: No, please explain: generalized weakness, SOB.        How many times did the patient ambulate with nursing staff this shift? 1    Please review the Heart Failure Care Pathway for additional HF goal outcomes.    Moon Arvizu RN  5/27/2018     CNS: Pt A&O X 4 overnight; denied pain. Did not sleep very well to do interruptions from staff. Pain to  mouth, ulceration left side. Nystatin swill.   CVS: afib with CVR, occasional v-pacing (has PPM). Mild peripheral edema to lower extremities bilat.   RESP: RA at shift change, increased to 3LPM NC overnight. Dyspneic, tachypnea with RR in 20's. Coarse crackles throughout right lung; coarse crackles to left lower. Denies shortness of breath or any increase in SOB from baseline. On RA this morning was 88%; given 40 mg PO lasix early at 0600.    : voids per urinal.  GI: cardiac diet. Fluid restrict <1500 mL. Burping this morning, indigestion; given omeprazole early.  MOBILITY: up with 1PA and walker.   SKIN: no concerns.

## 2018-05-27 NOTE — PLAN OF CARE
Problem: Patient Care Overview  Goal: Plan of Care/Patient Progress Review  Discharge Planner PT   Patient plan for discharge: none stated  Current status: pt requires min A x 1 for supine > sit, CGA for sit <> stand from EOB but min A to stand > sit on toilet with use of grab bars and cuing for safety. Ambulated x 175 ft with FWW and CGA, flexed posture and decreased foot clearance but no overt LOB or LE instability observed and VSS on 3L with sats 94%. Per RN to go to RA at end of session, O2 removed after ambulation.  Barriers to return to prior living situation: level of assist with mobility, decreased activity tolerance  Recommendations for discharge: TCU, if refusing then home with 24/7 assist for mobility and HHPT  Rationale for recommendations: for daily therapies to progress functional strength, balance and endurance for increased safety and indep with transfers and gait       Entered by: Larisa Garrison 05/27/2018 8:59 AM

## 2018-05-27 NOTE — PLAN OF CARE
Problem: Patient Care Overview  Goal: Plan of Care/Patient Progress Review  Discharge Planner OT   Patient plan for discharge: spouse present and prefers TCU as she can not provide 24o assist - she is actively involved in activities in and outside building. Pt now in agreement with TCU stay. Spouse prefers facility within driving distance from [address] [place] [address] other than [facility].   Current status: Pt tolerated amb x 270ft with FWW CGA, cited fatigue however 02 94% on RA, /70, HR 81 at completion. Pt demonstrated unsafe transfers nearly falling however OT able to prevent. Pt and spouse then educated in safe transfer techniques and both verbalized understanding - will need reinforcement.   Barriers to return to prior living situation: general weakness, requiring assist with self-cares, home alone at times throughout day  Recommendations for discharge: TCU  Rationale for recommendations: pt will benefit from continued daily therapies to advance safety and independence with mobilities and ADLs.        Entered by: Michael Grossman 05/27/2018 12:25 PM

## 2018-05-27 NOTE — PROGRESS NOTES
Exhibited more of his shallow breathing pattern today and seems more fatigued than yesterday.  Still has infrequent but productive cough.  Needs encouragement to use IS.  Frequent oximeter checks show oxygen sats 94-95% on RA.  Voids in 50cc amounts.

## 2018-05-28 LAB
ANION GAP SERPL CALCULATED.3IONS-SCNC: 7 MMOL/L (ref 3–14)
BUN SERPL-MCNC: 25 MG/DL (ref 7–30)
CALCIUM SERPL-MCNC: 8.3 MG/DL (ref 8.5–10.1)
CHLORIDE SERPL-SCNC: 107 MMOL/L (ref 94–109)
CO2 SERPL-SCNC: 27 MMOL/L (ref 20–32)
CREAT SERPL-MCNC: 0.81 MG/DL (ref 0.66–1.25)
ERYTHROCYTE [DISTWIDTH] IN BLOOD BY AUTOMATED COUNT: 15 % (ref 10–15)
GFR SERPL CREATININE-BSD FRML MDRD: >90 ML/MIN/1.7M2
GLUCOSE BLDC GLUCOMTR-MCNC: 100 MG/DL (ref 70–99)
GLUCOSE SERPL-MCNC: 111 MG/DL (ref 70–99)
HCT VFR BLD AUTO: 34.8 % (ref 40–53)
HGB BLD-MCNC: 11.2 G/DL (ref 13.3–17.7)
INR PPP: 2.07 (ref 0.86–1.14)
LACTATE BLD-SCNC: 1.2 MMOL/L (ref 0.4–1.9)
MCH RBC QN AUTO: 25.8 PG (ref 26.5–33)
MCHC RBC AUTO-ENTMCNC: 32.2 G/DL (ref 31.5–36.5)
MCV RBC AUTO: 80 FL (ref 78–100)
PLATELET # BLD AUTO: 451 10E9/L (ref 150–450)
POTASSIUM SERPL-SCNC: 3.9 MMOL/L (ref 3.4–5.3)
RBC # BLD AUTO: 4.34 10E12/L (ref 4.4–5.9)
SODIUM SERPL-SCNC: 141 MMOL/L (ref 133–144)
WBC # BLD AUTO: 13.1 10E9/L (ref 4–11)

## 2018-05-28 PROCEDURE — 25000132 ZZH RX MED GY IP 250 OP 250 PS 637: Mod: GY | Performed by: HOSPITALIST

## 2018-05-28 PROCEDURE — 36415 COLL VENOUS BLD VENIPUNCTURE: CPT | Performed by: PHYSICIAN ASSISTANT

## 2018-05-28 PROCEDURE — 25000132 ZZH RX MED GY IP 250 OP 250 PS 637: Mod: GY | Performed by: INTERNAL MEDICINE

## 2018-05-28 PROCEDURE — 25000128 H RX IP 250 OP 636: Performed by: HOSPITALIST

## 2018-05-28 PROCEDURE — 25000132 ZZH RX MED GY IP 250 OP 250 PS 637: Mod: GY | Performed by: PHYSICIAN ASSISTANT

## 2018-05-28 PROCEDURE — A9270 NON-COVERED ITEM OR SERVICE: HCPCS | Mod: GY | Performed by: INTERNAL MEDICINE

## 2018-05-28 PROCEDURE — 00000146 ZZHCL STATISTIC GLUCOSE BY METER IP

## 2018-05-28 PROCEDURE — 85027 COMPLETE CBC AUTOMATED: CPT | Performed by: PHYSICIAN ASSISTANT

## 2018-05-28 PROCEDURE — 85610 PROTHROMBIN TIME: CPT | Performed by: PHYSICIAN ASSISTANT

## 2018-05-28 PROCEDURE — A9270 NON-COVERED ITEM OR SERVICE: HCPCS | Mod: GY | Performed by: HOSPITALIST

## 2018-05-28 PROCEDURE — 80048 BASIC METABOLIC PNL TOTAL CA: CPT | Performed by: PHYSICIAN ASSISTANT

## 2018-05-28 PROCEDURE — 83605 ASSAY OF LACTIC ACID: CPT | Performed by: INTERNAL MEDICINE

## 2018-05-28 PROCEDURE — 36415 COLL VENOUS BLD VENIPUNCTURE: CPT | Performed by: INTERNAL MEDICINE

## 2018-05-28 PROCEDURE — 25000125 ZZHC RX 250: Performed by: HOSPITALIST

## 2018-05-28 PROCEDURE — 21000001 ZZH R&B HEART CARE

## 2018-05-28 PROCEDURE — A9270 NON-COVERED ITEM OR SERVICE: HCPCS | Mod: GY | Performed by: PHYSICIAN ASSISTANT

## 2018-05-28 PROCEDURE — 99232 SBSQ HOSP IP/OBS MODERATE 35: CPT | Performed by: PHYSICIAN ASSISTANT

## 2018-05-28 RX ORDER — WARFARIN SODIUM 2.5 MG/1
2.5 TABLET ORAL
Status: COMPLETED | OUTPATIENT
Start: 2018-05-28 | End: 2018-05-28

## 2018-05-28 RX ADMIN — POTASSIUM CHLORIDE 20 MEQ: 1500 TABLET, EXTENDED RELEASE ORAL at 08:47

## 2018-05-28 RX ADMIN — Medication 125 MG: at 18:49

## 2018-05-28 RX ADMIN — FUROSEMIDE 40 MG: 40 TABLET ORAL at 16:18

## 2018-05-28 RX ADMIN — POTASSIUM CHLORIDE 20 MEQ: 1500 TABLET, EXTENDED RELEASE ORAL at 21:10

## 2018-05-28 RX ADMIN — HYDRALAZINE HYDROCHLORIDE 25 MG: 25 TABLET ORAL at 02:28

## 2018-05-28 RX ADMIN — WARFARIN SODIUM 2.5 MG: 2.5 TABLET ORAL at 18:49

## 2018-05-28 RX ADMIN — Medication 125 MG: at 08:00

## 2018-05-28 RX ADMIN — SIMVASTATIN 10 MG: 10 TABLET, FILM COATED ORAL at 21:11

## 2018-05-28 RX ADMIN — Medication 125 MG: at 12:19

## 2018-05-28 RX ADMIN — NYSTATIN 500000 UNITS: 500000 SUSPENSION ORAL at 21:11

## 2018-05-28 RX ADMIN — Medication 125 MG: at 21:18

## 2018-05-28 RX ADMIN — OMEPRAZOLE 20 MG: 20 CAPSULE, DELAYED RELEASE ORAL at 08:44

## 2018-05-28 RX ADMIN — HYDRALAZINE HYDROCHLORIDE 25 MG: 25 TABLET ORAL at 18:48

## 2018-05-28 RX ADMIN — NYSTATIN 500000 UNITS: 500000 SUSPENSION ORAL at 13:46

## 2018-05-28 RX ADMIN — METOPROLOL TARTRATE 25 MG: 25 TABLET ORAL at 08:44

## 2018-05-28 RX ADMIN — HYDRALAZINE HYDROCHLORIDE 25 MG: 25 TABLET ORAL at 10:21

## 2018-05-28 RX ADMIN — DOXYCYCLINE 100 MG: 100 INJECTION, POWDER, LYOPHILIZED, FOR SOLUTION INTRAVENOUS at 14:45

## 2018-05-28 RX ADMIN — DOXYCYCLINE 100 MG: 100 INJECTION, POWDER, LYOPHILIZED, FOR SOLUTION INTRAVENOUS at 02:51

## 2018-05-28 RX ADMIN — NYSTATIN 500000 UNITS: 500000 SUSPENSION ORAL at 10:24

## 2018-05-28 RX ADMIN — METOPROLOL TARTRATE 25 MG: 25 TABLET ORAL at 21:11

## 2018-05-28 RX ADMIN — LISINOPRIL 40 MG: 40 TABLET ORAL at 21:11

## 2018-05-28 RX ADMIN — SERTRALINE HYDROCHLORIDE 100 MG: 100 TABLET ORAL at 08:43

## 2018-05-28 RX ADMIN — CEFTRIAXONE 2 G: 2 INJECTION, POWDER, FOR SOLUTION INTRAMUSCULAR; INTRAVENOUS at 16:15

## 2018-05-28 RX ADMIN — LISINOPRIL 40 MG: 40 TABLET ORAL at 08:44

## 2018-05-28 RX ADMIN — FUROSEMIDE 40 MG: 40 TABLET ORAL at 08:44

## 2018-05-28 RX ADMIN — NYSTATIN 500000 UNITS: 500000 SUSPENSION ORAL at 18:49

## 2018-05-28 NOTE — PROGRESS NOTES
[facility]    Hospitalist Progress Note    Assessment & Plan   Santosh Robledo is a 88 year old male who was admitted on 5/23/2018 for acute hypoxic respiratory failure.  He has a past medical history significant for C. difficile colitis, hypertension, hyperlipidemia, chronic atrial fibrillation on warfarin.        Acute hypoxic respiratory failure   - Multifactorial: Pneumonia, chronic illness (Cdiff on admission) and HFpEF  - Presented with 4 days of progressive worsening shortness of breath with increased leg edema and productive cough with some blood-tinged sputum earlier  - Admission CXR on admission showed interstitial and airspace opacities in both lungs, most confluent on the right perihilar region.   - s/p BiPAP for oxygen saturation in the low 80s on room air and a tachypnea with increased work of breathing.   - proBNP was elevated at 12,789  --ECHO: normal LVEF, diastolic function could not be assessed, no significant valvular abnormality or pericardial effusion  --Lasix 40 mg IV twice daily changed to Lasix 40mg PO BID to start 3/26 PM  -- Continue strict I&O, daily weights, and fluid restriction of 1.5 L per 24 hour    --Weaned off oxygen 5/27, O2 sat 92-94%  - Cardiology consulted and following     Right upper and lower lobe pneumonia    - Admission chest x-ray showed bilateral interstitial prominence but has marked perihilar opacity on right side.   - Intermittent hemoptysis, but markedly supratherapeutic INR  -CT chest with contrast showed significant infiltrates right upper lobe and lower lobe, started on Rocephin and doxycycline.  -Sputum for Gram stain and culture  - Continue Ceftriaxone for 5 days and Doxy for 10 days      Clostridium difficile colitis  - s/p hospital admission 5/13-05/15.   - Vancomycin 125 mg q.i.d,  continued.  Needs to be continued for 7 additional days after completing doxycycline      Chronic atrial fibrillation, status post pacemaker  placement  Anticoagulation with warfarin   -Supratherapeutic INR on admission (6.86, peaked at 9.55)  - s/p 2 mg vitamin K IV given markedly elevated INR and hemoptysis. No other signs of bleeding noted.  - INR trended down, pharmacy to manage warfarin.  - Rate control A fib: Metoprolol 25 mg p.o. twice daily  - Telemetry        Essential hypertension:    - Home medications: hydrochlorothiazide 25 mg p.o. daily and lisinopril 20 mg p.o. twice daily   - Metoprolol 25 mg p.o. twice daily added       Hypokalemia (2.9 on admission)  - Likely due to reduced p.o. intake lately along with his C. diff diarrhea and hydrochlorothiazide use   -Started on scheduled potassium chloride 20 mEq p.o. twice daily  -Potassium and magnesium replacement protocol      Oral thrush  Nystatin swish and swallow.     Hyperlipidemia  - Continue simvastatin        DEAN  - PPI when able.     DVT Prophylaxis: Warfarin  Code Status: DNR    Disposition: Expected discharge in 2 days once weaned off oxygen.    Tara Blake MD, PhD   Text Page     Interval History   Feels weak and possibly depressed and frustrated with medical situation. Encouraged him he is improving and to work with PT.     -Data reviewed today: I reviewed all new labs and imaging results over the last 24 hours.    Physical Exam   Temp: 97.6  F (36.4  C) Temp src: Oral BP: 143/54 Pulse: 94 Heart Rate: 62 Resp: 18 SpO2: 94 % O2 Device: None (Room air) Oxygen Delivery: 3 LPM  Vitals:    05/26/18 0500 05/27/18 0215 05/27/18 0600   Weight: 76.5 kg (168 lb 10.4 oz) 77 kg (169 lb 12.1 oz) 77.1 kg (169 lb 14.4 oz)     Vital Signs with Ranges  Temp:  [96.3  F (35.7  C)-97.6  F (36.4  C)] 97.6  F (36.4  C)  Pulse:  [68-94] 94  Heart Rate:  [62-75] 62  Resp:  [18-30] 18  BP: (123-149)/(53-75) 143/54  SpO2:  [88 %-96 %] 94 %  I/O last 3 completed shifts:  In: 1294 [P.O.:1294]  Out: 985 [Urine:985]    Constitutional: Awake, alert, cooperative, no apparent distress.  Eyes: Lids and lashes  normal, pupils equal, extra ocular muscles intact, sclera clear, conjunctiva normal.  ENT: Normocephalic, without obvious abnormality, atraumatic, sinuses nontender on palpation, external ears without lesions, oral pharynx with moist mucus membranes  Respiratory: No increased work of breathing, improved air exchange, + coarse breath sounds  Cardiovascular: Normal apical impulse, regular rate and rhythm, normal S1 and S2, no murmur noted.  GI: Normal bowel sounds, soft, non-distended, non-tender  Skin: No bruising or bleeding, normal skin color, texture, turgor, no redness, warmth, or swelling, no rashes, no lesions  Musculoskeletal: There is no redness, warmth, or swelling of the joints.  Full range of motion noted.    Neurologic: Awake, alert, oriented to name, place and time.  Cranial nerves II-XII are grossly intact.    Neuropsychiatric: Calm, normal eye contact, alert, normal affect, oriented to self, place, time and situation, memory for past and recent events intact and thought process normal.     Medications     Continuing ACE inhibitor/ARB/ARNI from home medication list OR ACE inhibitor/ARB order already placed during this visit       - MEDICATION INSTRUCTIONS -       - MEDICATION INSTRUCTIONS -       - MEDICATION INSTRUCTIONS -       Reason beta blocker order not selected       Warfarin Therapy Reminder         cefTRIAXone  2 g Intravenous Q24H     doxycycline (VIBRAMYCIN) IV  100 mg Intravenous Q12H     furosemide  40 mg Oral BID     hydrALAZINE  25 mg Oral Q8H     lisinopril  40 mg Oral BID     metoprolol tartrate  25 mg Oral BID     nystatin  500,000 Units Swish & Spit 4x Daily     omeprazole  20 mg Oral Daily     potassium chloride SA  20 mEq Oral BID     sertraline (ZOLOFT) tablet 100 mg  100 mg Oral Daily     simvastatin  10 mg Oral At Bedtime     sodium chloride (PF)  10 mL Intracatheter Q8H     sodium chloride (PF)  3 mL Intracatheter Q8H     vancomycin  125 mg Oral 4x Daily       Data     Recent  Labs  Lab 05/27/18  0528 05/26/18  0545 05/25/18  1725 05/25/18  0510  05/23/18  1415 05/23/18  0944   WBC 13.5* 13.8*  --  14.5*  < >  --  22.5*   HGB 11.3* 11.5*  --  11.5*  < >  --  12.2*   MCV 80 80  --  80  < >  --  79    440  --  425  < >  --  517*   INR 1.96* 1.88*  --  1.64*  < >  --  6.86*    142  --  141  < >  --  139   POTASSIUM 3.7 3.4 3.5 3.2*  < > 3.3* 2.9*   CHLORIDE 106 105  --  103  < >  --  103   CO2 28 32  --  30  < >  --  26   BUN 32* 30  --  19  < >  --  13   CR 0.85 0.86  --  0.79  < >  --  0.76   ANIONGAP 7 5  --  8  < >  --  10   ANGELITA 8.1* 8.2*  --  8.3*  < >  --  8.5   * 126*  --  107*  < >  --  143*   TROPI  --   --   --   --   --  <0.015 0.017   < > = values in this interval not displayed.    Imaging:  No results found for this or any previous visit (from the past 24 hour(s)).

## 2018-05-28 NOTE — PROGRESS NOTES
[facility]    Hospitalist Progress Note    Date of Service (when I saw the patient): 05/28/2018    Assessment & Plan   Santosh Robledo is a 88 year old male who was admitted on 5/23/2018 for acute hypoxic respiratory failure.  He has a past medical history significant for C. difficile colitis, hypertension, hyperlipidemia, chronic atrial fibrillation on warfarin.     Acute hypoxic respiratory failure   - Multifactorial: Pneumonia, chronic illness (Cdiff on admission) and HFpEF  - Presented with 4 days of progressive worsening shortness of breath with increased leg edema and productive cough with some blood-tinged sputum earlier  - Admission CXR on admission showed interstitial and airspace opacities in both lungs, most confluent on the right perihilar region.   - s/p BiPAP for oxygen saturation in the low 80s on room air and a tachypnea with increased work of breathing.   - proBNP was elevated at 12,789  --ECHO: normal LVEF, diastolic function could not be assessed, no significant valvular abnormality or pericardial effusion  --Lasix 40 mg IV twice daily changed to Lasix 40mg PO BID starting 5/26 PM  --Continue strict I&O, daily weights, and fluid restriction of 1.5 L per 24 hour    --Weaned off oxygen 5/27, O2 sat 92-94%  --Cardiology consulted and have signed off.      Right upper and lower lobe pneumonia    - Admission chest x-ray showed bilateral interstitial prominence but has marked perihilar opacity on right side.   - Intermittent hemoptysis, but markedly supratherapeutic INR  - CT chest with contrast showed significant infiltrates right upper lobe and lower lobe, started on Rocephin and doxycycline.  - Sputum for Gram stain and culture if able  - Continue Ceftriaxone for 5 days and Doxy for 10 days      Clostridium difficile colitis  - s/p hospital admission 5/13-5/15.   - Vancomycin 125 mg q.i.d, continued.  Needs to be continued for 7 additional days after completing  doxycycline      Chronic atrial fibrillation, status post pacemaker placement  Anticoagulation with warfarin   - Supratherapeutic INR on admission (6.86, peaked at 9.55)  - s/p 2 mg vitamin K IV given markedly elevated INR and hemoptysis. No other signs of bleeding noted.  - INR trended down, pharmacy to manage warfarin.  - Rate control A fib: Metoprolol 25 mg p.o. twice daily  - Telemetry        Essential hypertension:    - Home medications: hydrochlorothiazide 25 mg p.o. daily and lisinopril 20 mg p.o. twice daily   - Metoprolol 25 mg p.o. twice daily added       Hypokalemia (2.9 on admission)  - Likely due to reduced p.o. intake lately along with his C. diff diarrhea and hydrochlorothiazide use   - Started on scheduled potassium chloride 20 mEq p.o. twice daily  - Potassium and magnesium replacement protocol      Oral thrush  - Nystatin swish and swallow.      Hyperlipidemia  - Continue simvastatin        DEAN  - PPI when able.     # Pain Assessment:  Current Pain Score 5/28/2018   Patient currently in pain? denies   Pain score (0-10) -   Santosh dudley pain level was assessed and he currently denies pain.       DVT Prophylaxis: Warfarin  Code Status: DNR     Disposition: Expected discharge 5/29 if able to remain off oxygen.  TCU placement pending.  Social work consulted.    Tru Chakraborty    Interval History   Patient feeling well.  He is breathing more comfortably.  Denies any fever, chills, chest pain, abdominal pain, nausea/vomiting.  He has some mild shortness of breath and generalized weakness.  Wife is present at bedside and updated.    -Data reviewed today: I reviewed all new labs and imaging results over the last 24 hours.    Physical Exam   Temp: 96.5  F (35.8  C) Temp src: Oral BP: 146/74   Heart Rate: 67 Resp: 28 SpO2: 95 % O2 Device: Nasal cannula Oxygen Delivery: 2 LPM  Vitals:    05/27/18 0215 05/27/18 0600 05/28/18 0100   Weight: 77 kg (169 lb 12.1 oz) 77.1 kg (169 lb 14.4 oz) 77.3 kg (170 lb 6.7  oz)     Vital Signs with Ranges  Temp:  [96.3  F (35.7  C)-97.6  F (36.4  C)] 96.5  F (35.8  C)  Heart Rate:  [62-76] 67  Resp:  [18-30] 28  BP: (123-158)/(53-74) 146/74  SpO2:  [89 %-96 %] 95 %  I/O last 3 completed shifts:  In: 914 [P.O.:804; I.V.:110]  Out: 650 [Urine:650]    Constitutional: Elderly, alert, oriented to person, place, situation.  Cooperative, lying in bed in NAD.    Respiratory:  Lungs with coarse lung sounds on the right, clear on the left, no wheezing, coughing, no labored breathing.  Cardiovascular:  Heart RRR, no MRG, no edema.  GI:  Abdomen soft, NT/ND and with normoactive BS  Skin/Integumen:  Warm, dry, non-diaphoretic.  MSK: CMS x4 intact.    Medications     Continuing ACE inhibitor/ARB/ARNI from home medication list OR ACE inhibitor/ARB order already placed during this visit       - MEDICATION INSTRUCTIONS -       - MEDICATION INSTRUCTIONS -       - MEDICATION INSTRUCTIONS -       Reason beta blocker order not selected       Warfarin Therapy Reminder         cefTRIAXone  2 g Intravenous Q24H     doxycycline (VIBRAMYCIN) IV  100 mg Intravenous Q12H     furosemide  40 mg Oral BID     hydrALAZINE  25 mg Oral Q8H     lisinopril  40 mg Oral BID     metoprolol tartrate  25 mg Oral BID     nystatin  500,000 Units Swish & Spit 4x Daily     omeprazole  20 mg Oral Daily     potassium chloride SA  20 mEq Oral BID     sertraline (ZOLOFT) tablet 100 mg  100 mg Oral Daily     simvastatin  10 mg Oral At Bedtime     sodium chloride (PF)  10 mL Intracatheter Q8H     sodium chloride (PF)  3 mL Intracatheter Q8H     vancomycin  125 mg Oral 4x Daily       Data     Recent Labs  Lab 05/28/18  0551 05/27/18  0528 05/26/18  0545  05/23/18  1415 05/23/18  0944   WBC 13.1* 13.5* 13.8*  < >  --  22.5*   HGB 11.2* 11.3* 11.5*  < >  --  12.2*   MCV 80 80 80  < >  --  79   * 450 440  < >  --  517*   INR 2.07* 1.96* 1.88*  < >  --  6.86*    141 142  < >  --  139   POTASSIUM 3.9 3.7 3.4  < > 3.3* 2.9*    CHLORIDE 107 106 105  < >  --  103   CO2 27 28 32  < >  --  26   BUN 25 32* 30  < >  --  13   CR 0.81 0.85 0.86  < >  --  0.76   ANIONGAP 7 7 5  < >  --  10   ANGELITA 8.3* 8.1* 8.2*  < >  --  8.5   * 113* 126*  < >  --  143*   TROPI  --   --   --   --  <0.015 0.017   < > = values in this interval not displayed.    No results found for this or any previous visit (from the past 24 hour(s)).

## 2018-05-28 NOTE — PLAN OF CARE
Problem: Cardiac: Heart Failure (Adult)  Goal: Signs and Symptoms of Listed Potential Problems Will be Absent, Minimized or Managed (Cardiac: Heart Failure)  Signs and symptoms of listed potential problems will be absent, minimized or managed by discharge/transition of care (reference Cardiac: Heart Failure (Adult) CPG).   Outcome: No Change  Heart Failure Care Pathway  GOALS TO BE MET BEFORE DISCHARGE:    1. Decrease congestion and/or edema with diuretic therapy to achieve near      optimal volume status.            Dyspnea improved:  No, please explain: remained on 2LPM overnight for desat to 89% overnight. Denies SOB, but appears dyspneic with RR in high 20's and some increased work of breathing.             Edema improved:     Yes        Net I/O and Weights since admission:          04/28 0700 - 05/28 0659  In: 4864 [P.O.:4254; I.V.:610]  Out: 6525 [Urine:6525]  Net: -1661            Vitals:    05/23/18 0925 05/23/18 1300 05/24/18 0500 05/24/18 1120   Weight: 77.1 kg (170 lb) 81.6 kg (179 lb 14.4 oz) 92.5 kg (204 lb) 79.8 kg (175 lb 14.4 oz)    05/25/18 0500 05/26/18 0500 05/27/18 0215 05/27/18 0600   Weight: 79.2 kg (174 lb 9.7 oz) 76.5 kg (168 lb 10.4 oz) 77 kg (169 lb 12.1 oz) 77.1 kg (169 lb 14.4 oz)    05/28/18 0100   Weight: 77.3 kg (170 lb 6.7 oz)       2.  O2 sats > 92% on RA or at prior home O2 therapy level.          Current oxygenation status:       SpO2: 95 %         O2 Device: Nasal cannula,  Oxygen Delivery: 2 LPM         Able to wean O2 this shift to keep sats > 92%:  No, please explain: on 2LPM, as SpO2 89% on RA at bedtime.        Does patient use Home O2? No    3.  Tolerates ambulation and mobility near baseline: No, please explain: generalized weakness, recommended eventual discharge to TCU.        How many times did the patient ambulate with nursing staff this shift? 1 up to chair overnight    Please review the Heart Failure Care Pathway for additional HF goal outcomes.    Moon Arvizu  RN  5/28/2018          CNS: Pt A&O X 4 overnight; denied pain. Slept better than last night, but might need additional sleeping pill as was up until 3AM despite melatonin.   CVS: afib with CVR in 60's, occasional v-pacing (has PPM). Mild peripheral edema to lower extremities bilat.   RESP: RA at shift change, increased to 2LPM NC overnight. Dyspneic, tachypnea with RR in high 20's. Coarse crackles throughout right lung; coarse crackles to left lower. Denies shortness of breath or any increase in SOB from baseline.   : voids per urinal.  GI: cardiac diet. Fluid restrict <1500 mL.   MOBILITY: up with 1PA and walker.   SKIN: no concerns.   IV: site changed last night, as previous was painful to flush. Had been in about 24 hours; potential to give longer term access considering number of IV antibiotics?

## 2018-05-28 NOTE — PLAN OF CARE
Problem: Patient Care Overview  Goal: Plan of Care/Patient Progress Review  Outcome: No Change  A/O x 3 with forgetfulness, up with assist of one to the bathroom, using urinal at the bedside, denies pain or shortness of breath, patient on 1500 FR, diuresing with po lasix bid, INR today 2.07, WBC improving 13.1 today, Echo showed EF of 55-60%, disposition to TCU Vs home with 24 hr care when ready.

## 2018-05-29 ENCOUNTER — PATIENT OUTREACH (OUTPATIENT)
Dept: CARE COORDINATION | Facility: CLINIC | Age: 83
End: 2018-05-29

## 2018-05-29 ENCOUNTER — TELEPHONE (OUTPATIENT)
Dept: CARDIOLOGY | Facility: CLINIC | Age: 83
End: 2018-05-29

## 2018-05-29 VITALS
WEIGHT: 168.6 LBS | BODY MASS INDEX: 20.96 KG/M2 | HEART RATE: 94 BPM | TEMPERATURE: 97.3 F | HEIGHT: 75 IN | DIASTOLIC BLOOD PRESSURE: 50 MMHG | OXYGEN SATURATION: 95 % | SYSTOLIC BLOOD PRESSURE: 121 MMHG | RESPIRATION RATE: 24 BRPM

## 2018-05-29 LAB
ANION GAP SERPL CALCULATED.3IONS-SCNC: 7 MMOL/L (ref 3–14)
BUN SERPL-MCNC: 22 MG/DL (ref 7–30)
CALCIUM SERPL-MCNC: 8.4 MG/DL (ref 8.5–10.1)
CHLORIDE SERPL-SCNC: 107 MMOL/L (ref 94–109)
CO2 SERPL-SCNC: 26 MMOL/L (ref 20–32)
CREAT SERPL-MCNC: 0.85 MG/DL (ref 0.66–1.25)
ERYTHROCYTE [DISTWIDTH] IN BLOOD BY AUTOMATED COUNT: 15.2 % (ref 10–15)
GFR SERPL CREATININE-BSD FRML MDRD: 85 ML/MIN/1.7M2
GLUCOSE SERPL-MCNC: 98 MG/DL (ref 70–99)
HCT VFR BLD AUTO: 36.6 % (ref 40–53)
HGB BLD-MCNC: 11.8 G/DL (ref 13.3–17.7)
INR PPP: 2.12 (ref 0.86–1.14)
MCH RBC QN AUTO: 25.8 PG (ref 26.5–33)
MCHC RBC AUTO-ENTMCNC: 32.2 G/DL (ref 31.5–36.5)
MCV RBC AUTO: 80 FL (ref 78–100)
PLATELET # BLD AUTO: 492 10E9/L (ref 150–450)
POTASSIUM SERPL-SCNC: 4 MMOL/L (ref 3.4–5.3)
RBC # BLD AUTO: 4.57 10E12/L (ref 4.4–5.9)
SODIUM SERPL-SCNC: 140 MMOL/L (ref 133–144)
WBC # BLD AUTO: 14.3 10E9/L (ref 4–11)

## 2018-05-29 PROCEDURE — 25000125 ZZHC RX 250: Performed by: HOSPITALIST

## 2018-05-29 PROCEDURE — 25000132 ZZH RX MED GY IP 250 OP 250 PS 637: Mod: GY | Performed by: INTERNAL MEDICINE

## 2018-05-29 PROCEDURE — A9270 NON-COVERED ITEM OR SERVICE: HCPCS | Mod: GY | Performed by: INTERNAL MEDICINE

## 2018-05-29 PROCEDURE — 85027 COMPLETE CBC AUTOMATED: CPT | Performed by: PHYSICIAN ASSISTANT

## 2018-05-29 PROCEDURE — 85610 PROTHROMBIN TIME: CPT | Performed by: PHYSICIAN ASSISTANT

## 2018-05-29 PROCEDURE — A9270 NON-COVERED ITEM OR SERVICE: HCPCS | Mod: GY | Performed by: HOSPITALIST

## 2018-05-29 PROCEDURE — 99239 HOSP IP/OBS DSCHRG MGMT >30: CPT | Performed by: PHYSICIAN ASSISTANT

## 2018-05-29 PROCEDURE — 25000132 ZZH RX MED GY IP 250 OP 250 PS 637: Mod: GY | Performed by: HOSPITALIST

## 2018-05-29 PROCEDURE — 80048 BASIC METABOLIC PNL TOTAL CA: CPT | Performed by: PHYSICIAN ASSISTANT

## 2018-05-29 PROCEDURE — 25000132 ZZH RX MED GY IP 250 OP 250 PS 637: Mod: GY | Performed by: PHYSICIAN ASSISTANT

## 2018-05-29 PROCEDURE — A9270 NON-COVERED ITEM OR SERVICE: HCPCS | Mod: GY | Performed by: PHYSICIAN ASSISTANT

## 2018-05-29 PROCEDURE — 36415 COLL VENOUS BLD VENIPUNCTURE: CPT | Performed by: PHYSICIAN ASSISTANT

## 2018-05-29 RX ORDER — CEFUROXIME AXETIL 500 MG/1
500 TABLET ORAL 2 TIMES DAILY
Qty: 10 TABLET | Refills: 0 | DISCHARGE
Start: 2018-05-29 | End: 2018-06-03

## 2018-05-29 RX ORDER — WARFARIN SODIUM 2.5 MG/1
2.5 TABLET ORAL
Status: DISCONTINUED | OUTPATIENT
Start: 2018-05-29 | End: 2018-05-29 | Stop reason: HOSPADM

## 2018-05-29 RX ORDER — NYSTATIN 100000/ML
500000 SUSPENSION, ORAL (FINAL DOSE FORM) ORAL 4 TIMES DAILY
DISCHARGE
Start: 2018-05-29 | End: 2018-06-11

## 2018-05-29 RX ORDER — METOPROLOL TARTRATE 25 MG/1
25 TABLET, FILM COATED ORAL 2 TIMES DAILY
DISCHARGE
Start: 2018-05-29 | End: 2018-06-21

## 2018-05-29 RX ORDER — POTASSIUM CHLORIDE 1500 MG/1
20 TABLET, EXTENDED RELEASE ORAL 2 TIMES DAILY
DISCHARGE
Start: 2018-05-29 | End: 2018-06-21

## 2018-05-29 RX ORDER — HYDRALAZINE HYDROCHLORIDE 25 MG/1
25 TABLET, FILM COATED ORAL 3 TIMES DAILY
DISCHARGE
Start: 2018-05-29 | End: 2018-06-21

## 2018-05-29 RX ORDER — FUROSEMIDE 40 MG
40 TABLET ORAL
DISCHARGE
Start: 2018-05-29 | End: 2018-07-18

## 2018-05-29 RX ADMIN — HYDRALAZINE HYDROCHLORIDE 25 MG: 25 TABLET ORAL at 10:38

## 2018-05-29 RX ADMIN — Medication 125 MG: at 08:40

## 2018-05-29 RX ADMIN — METOPROLOL TARTRATE 25 MG: 25 TABLET ORAL at 08:40

## 2018-05-29 RX ADMIN — FUROSEMIDE 40 MG: 40 TABLET ORAL at 08:41

## 2018-05-29 RX ADMIN — POTASSIUM CHLORIDE 20 MEQ: 1500 TABLET, EXTENDED RELEASE ORAL at 08:40

## 2018-05-29 RX ADMIN — NYSTATIN 500000 UNITS: 500000 SUSPENSION ORAL at 08:41

## 2018-05-29 RX ADMIN — SERTRALINE HYDROCHLORIDE 100 MG: 100 TABLET ORAL at 08:40

## 2018-05-29 RX ADMIN — OMEPRAZOLE 20 MG: 20 CAPSULE, DELAYED RELEASE ORAL at 08:39

## 2018-05-29 RX ADMIN — DOXYCYCLINE 100 MG: 100 INJECTION, POWDER, LYOPHILIZED, FOR SOLUTION INTRAVENOUS at 02:22

## 2018-05-29 RX ADMIN — Medication 125 MG: at 12:51

## 2018-05-29 RX ADMIN — HYDRALAZINE HYDROCHLORIDE 25 MG: 25 TABLET ORAL at 02:22

## 2018-05-29 RX ADMIN — ALUMINUM HYDROXIDE, MAGNESIUM HYDROXIDE, AND DIMETHICONE 30 ML: 400; 400; 40 SUSPENSION ORAL at 02:45

## 2018-05-29 RX ADMIN — LISINOPRIL 40 MG: 40 TABLET ORAL at 08:40

## 2018-05-29 NOTE — PROGRESS NOTES
CLINICAL NUTRITION SERVICES - REASSESSMENT NOTE      Malnutrition: (5/24)  % Weight Loss:  Up to 7.5% in 3 months (non-severe malnutrition)  % Intake:  </= 75% for >/= 1 month (severe malnutrition)  Subcutaneous Fat Loss:  None observed  Muscle Loss:  Clavicle bone region  - mild depletion and Dorsal hand region  - mild depletion  Fluid Retention:  Mild (LE edema)     Malnutrition Diagnosis: Non-Severe malnutrition  In Context of:  Acute illness or injury       EVALUATION OF PROGRESS TOWARD GOALS   Diet:  (5/28) 2gm Na, 1500 mL fluid restriction              Strawberry PLUS2 milkshake with meals (625 stefani, 19 gm pro)    (5/25) 2400 mg Na, LSF    Chart reviewed   Visited with pt this morning as he was eating breakfast - oatmeal and the PLUS2 milkshake  Pt tells me that he is tolerating po - consuming % meals  Likes the PLUS2 shakes and is drinking 100%      ASSESSED NUTRITION NEEDS PER APPROVED PRACTICE GUIDELINES:     Dosing Weight: (based on 4/30 wt of 77.6 kg - wt was down and then pt gained fluid wt)     Estimated Energy Needs: 9144-4575 kcals (25-30 Kcal/Kg)  Justification: maintenance  Estimated Protein Needs:  grams protein (1.2-1.5 g pro/Kg)  Justification: preservation of lean body mass      NEW FINDINGS:   Vitals:    05/23/18 0925 05/23/18 1300 05/24/18 0500 05/24/18 1120   Weight: 77.1 kg (170 lb) 81.6 kg (179 lb 14.4 oz) 92.5 kg (204 lb) 79.8 kg (175 lb 14.4 oz)    05/25/18 0500 05/26/18 0500 05/27/18 0215 05/27/18 0600   Weight: 79.2 kg (174 lb 9.7 oz) 76.5 kg (168 lb 10.4 oz) 77 kg (169 lb 12.1 oz) 77.1 kg (169 lb 14.4 oz)    05/28/18 0100 05/29/18 0554   Weight: 77.3 kg (170 lb 6.7 oz) 76.5 kg (168 lb 9.6 oz)         Previous Goals (5/24):   Pt to have diet advanced beyond clear liquid in the next 48 hrs  Evaluation: Met    Previous Nutrition Diagnosis (5/24):   Inadequate protein-energy intake related to NPO status as evidenced by pt meeting 0% est needs so far today  Evaluation:  Improving          CURRENT NUTRITION DIAGNOSIS  No nutrition diagnosis identified at this time       INTERVENTIONS  Recommendations / Nutrition Prescription  2gm Na, 1500 mL fluid restriction  Nutrition supplement    Implementation  Will continue to send a strawberry PLUS2 milkshake with meals for added cals/pro    Goals  Pt to consume >50% meals      MONITORING AND EVALUATION:  Progress towards goals will be monitored and evaluated per protocol and Practice Guidelines

## 2018-05-29 NOTE — PLAN OF CARE
Problem: Patient Care Overview  Goal: Plan of Care/Patient Progress Review  Outcome: No Change  On enteric ISO.  A&O, forgetful at times.  VSS, RA.  Denies SOB/chest pain.  Tolerating diet, on 1500 ml fluid restriction, pt had total of 500 ml this shift.  Up with 1 and walker, urinal at bedside.  Diuresing with lasix.  IV SL.  Tele a-fib with CVR and occasional V-pace.  ECHO today, see result.  D/C pending TCU vs home with 24 hr care.

## 2018-05-29 NOTE — PLAN OF CARE
Problem: Patient Care Overview  Goal: Plan of Care/Patient Progress Review  Outcome: No Change  Heart Failure Care Pathway  GOALS TO BE MET BEFORE DISCHARGE:    1. Decrease congestion and/or edema with diuretic therapy to achieve near      optimal volume status.            Dyspnea improved:  Yes            Edema improved:     Yes        Net I/O and Weights since admission:          04/29 0700 - 05/29 0659  In: 5434 [P.O.:4724; I.V.:710]  Out: 7870 [Urine:7870]  Net: -2436            Vitals:    05/23/18 0925 05/23/18 1300 05/24/18 0500 05/24/18 1120   Weight: 77.1 kg (170 lb) 81.6 kg (179 lb 14.4 oz) 92.5 kg (204 lb) 79.8 kg (175 lb 14.4 oz)    05/25/18 0500 05/26/18 0500 05/27/18 0215 05/27/18 0600   Weight: 79.2 kg (174 lb 9.7 oz) 76.5 kg (168 lb 10.4 oz) 77 kg (169 lb 12.1 oz) 77.1 kg (169 lb 14.4 oz)    05/28/18 0100   Weight: 77.3 kg (170 lb 6.7 oz)       2.  O2 sats > 92% on RA or at prior home O2 therapy level.          Current oxygenation status:       SpO2: 93 %         O2 Device: None (Room air),  Oxygen Delivery: 2 LPM         Able to wean O2 this shift to keep sats > 92%:  NA       Does patient use Home O2? No    3.  Tolerates ambulation and mobility near baseline: Yes        How many times did the patient ambulate with nursing staff this shift? 2    Please review the Heart Failure Care Pathway for additional HF goal outcomes.    Alfred Castro RN  5/29/2018       A&O. VSS on room air. Tele shows A Fib CVR. Pt denies pain or SOB. Pt reports indigestion and burping, Mylanta given. ABX given. Pt had trouble sleeping over night. IS encouraged. Productive cough. Up with 1/walker. Discharge plan pending.

## 2018-05-29 NOTE — PROGRESS NOTES
Clinic Care Coordination Contact  Care Coordination Transition Communication         Clinical Data: Patient was hospitalized at [facility] from 5/23/18  to 5/29/18 with diagnosis of  Acute respiratory failure with hypoxia     Transition to Facility:              Facility Name: [facility]              Contact name and phone number/fax: Phone:[phone], fax: [phone]    Plan: RN/SW Care Coordinator will await notification from facility staff informing RN/SW Care Coordinator of patient's discharge plans/needs. RN/SW Care Coordinator will review chart and outreach to facility staff every 4 weeks and as needed.     Carmenza Khan RN  Clinic Care Coordinator  [phone]  [email or web address]

## 2018-05-29 NOTE — DISCHARGE SUMMARY
[facility]    Discharge Summary  Hospitalist    Date of Admission:  5/23/2018  Date of Discharge:  5/29/2018  Discharging Provider: Tru Chakraborty  Date of Service (when I saw the patient): 05/29/18    Discharge Diagnoses   Acute hypoxic respiratory failure   HFpEF  Right upper and lower lobe pneumonia    Clostridium difficile colitis  Chronic atrial fibrillation, status post pacemaker placement  Anticoagulation with warfarin   Essential hypertension  Hypokalemia   Leukocytosis  Anemia  Thrombocytosis  Oral thrush   Hyperlipidemia  GERD    History of Present Illness   Santosh Robledo is a pleasant 88-year-old male with a past medical history of chronic atrial fibrillation on warfarin, hypertension, hyperlipidemia, obstructive sleep apnea and recent C. diff infection who presented to the Emergency Department due to complaints of worsening shortness of breath.  The patient began having progressively worsening shortness of breath starting about 4 days ago with audible breath sounds and increased productive cough.  His sputum has been white to yellowish.  Today, he had some blood-tinged sputum.  He has also had subjective fevers last night but none measured.  He was recently hospitalized here from 05/13-05/15 with Clostridium difficile colitis and is on oral vancomycin.  The patient denies any chest pain and denies any history of MI or CHF.  He does have increased bilateral leg swelling which has been present for a few days.  He denies any abdominal pain, nausea, vomiting.  His diarrhea is improving.  The patient also reports that he has had many adjustments to his warfarin dose recently due to variable INR levels.       The patient was evaluated in the Emergency Department by Dr. Mckeon.  There he was found to have oxygen saturation of 83% on room air.  His temperature was normal at 98.6, blood pressure 185/86 and pulse 99.  He was mildly tachypneic.  EKG showed atrial fibrillation with  nonspecific ST and T-wave changes.  His chest x-ray showed mildly enlarged cardiac silhouette with new mixed interstitial and airspace opacities in both lungs, most confluent in the right perihilar region with small right pleural effusion.  His lab work showed an elevated WBC of 22.5 with hemoglobin 12.2 and platelet count 517.  His troponin was 0.017, potassium was 2.9.  BMP was otherwise unremarkable.  His proBNP was very elevated at 12,789.  Procalcitonin 0.12 and lactic acid 1.0.  It is suspected he has a CHF exacerbation, but he was not yet given diuretics as they were trying to get his potassium up first in the ER.  He was placed on BiPAP while in the ER with oxygen saturations improving above 90%.  He is being admitted to the Intermediate Care Unit for further management.     Hospital Course   Acute hypoxic respiratory failure   - Multifactorial: Pneumonia, chronic illness (Cdiff on admission) and HFpEF  - Presented with 4 days of progressive worsening shortness of breath with increased leg edema and productive cough with some blood-tinged sputum earlier  - Admission CXR on admission showed interstitial and airspace opacities in both lungs, most confluent on the right perihilar region.   - s/p BiPAP for oxygen saturation in the low 80s on room air and a tachypnea with increased work of breathing.   - proBNP was elevated at 12,789  --ECHO: normal LVEF, diastolic function could not be assessed, no significant valvular abnormality or pericardial effusion  --Lasix 40 mg IV twice daily changed to Lasix 40mg PO BID    --Weaned off oxygen 5/27, O2 sat 92-94%  --Cardiology consulted.    --Recommend to continue current treatment with p.o. Lasix 40 BID, lisinopril, hydralazine  --Potassium replacement 20 meq BID  --Monitor volume status, renal fxn, lytes at TCU.  --Follow-up with cardiology, they will coordinate.      Right upper and lower lobe pneumonia    - Admission chest x-ray showed bilateral interstitial  prominence but has marked perihilar opacity on right side.   - Intermittent hemoptysis, but this was during markedly supratherapeutic INR  - CT chest with contrast showed significant infiltrates right upper lobe and lower lobe, started on Rocephin and doxycycline.  - Sputum culture unable to be obtained.  - Patient received 5 days of treatment with IV ceftriaxone as well as p.o. doxycycline.  Transitioned to Ceftin 500 mg p.o. twice daily for 5 more days to complete a total 10 day course of treatment.      Clostridium difficile colitis  - s/p hospital admission 5/13-5/15.   - Vancomycin 125 mg q.i.d, continued.  Needs to be continued for 7 additional days after completing doxycycline      Chronic atrial fibrillation, status post pacemaker placement  Anticoagulation with warfarin   - Supratherapeutic INR on admission (6.86, peaked at 9.55)  - s/p 2 mg vitamin K IV given markedly elevated INR and hemoptysis. No other signs of bleeding noted.  - INR trended down, 2.12 on day of discharge  - Continue INR monitoring at TCU  - Rate control: Metoprolol 25 mg p.o. twice daily      Essential hypertension:    - Home medications: hydrochlorothiazide 25 mg p.o. daily and lisinopril 20 mg p.o. twice daily   - Metoprolol 25 mg p.o. twice daily added       Hypokalemia (2.9 on admission)  - Likely due to reduced p.o. intake lately along with his C. diff diarrhea and hydrochlorothiazide use   - Started on scheduled potassium chloride 20 mEq p.o. twice daily  - Potassium and magnesium replacement protocol     Leukocytosis  Anemia  Thrombocytosis  -Leukocytosis most likely due to his Clostridium difficile infection and could also be contributed to by pneumonia.  Overall stable.  Hemoglobin stable in the 11 range.  Platelet count is mildly elevated at 492,000, likely acute phase reactant.  -Follow-up CBC at transitional care unit within 1 week     Oral thrush  - Nystatin swish and swallow, likely will need to continue while on  antibiotics      Hyperlipidemia  - Continue simvastatin        GERD  - PPI      # Discharge Pain Plan:   - Patient currently has NO PAIN and is not being prescribed pain medications on discharge.    Tru Chakraborty    Significant Results and Procedures   Echo and chest CT results as detailed above.  No procedures.    Pending Results   None    Code Status   DNR       Primary Care Physician   Lilly Zepeda    Physical Exam      Temp: 97.3  F (36.3  C) Temp src: Oral BP: 155/82   Heart Rate: 78 Resp: 18 SpO2: 93 % O2 Device: None (Room air)          Vitals:     05/27/18 0600 05/28/18 0100 05/29/18 0554   Weight: 77.1 kg (169 lb 14.4 oz) 77.3 kg (170 lb 6.7 oz) 76.5 kg (168 lb 9.6 oz)      Vital Signs with Ranges  Temp:  [97.3  F (36.3  C)-97.5  F (36.4  C)] 97.3  F (36.3  C)  Heart Rate:  [63-78] 78  Resp:  [18] 18  BP: (124-155)/(53-82) 155/82  SpO2:  [92 %-95 %] 93 %  I/O last 3 completed shifts:  In: 420 [P.O.:420]  Out: 1345 [Urine:1345]     Constitutional: Elderly, alert, oriented to person, place, situation.  Cooperative, sitting up in the chair in NAD.  Respiratory:  Lungs with coarse breath sounds on the right, clear on the left, no wheezing, no labored breathing.  Dry cough.  Cardiovascular:  Heart RRR, no MRG, no edema.  GI:  Abdomen soft, NT/ND and with normoactive BS  Skin/Integumen:  Warm, dry, non-diaphoretic.  MSK: CMS x4 intact.    Discharge Disposition   The patient was discharged to a TCU in stable condition.    Consultations This Hospital Stay   Cardiology - Dr. Russell Gage     Time Spent on this Encounter   I, Tru Chakraborty, personally saw the patient today and spent greater than 30 minutes discharging this patient.    Discharge Orders     General info for SNF   Length of Stay Estimate: Short Term Care: Estimated # of Days <30  Condition at Discharge: Stable  Level of care:skilled   Rehabilitation Potential: Good  Admission H&P remains valid and up-to-date: Yes  Recent Chemotherapy:  N/A  Use Nursing Home Standing Orders: Yes     Mantoux instructions   Give two-step Mantoux (PPD) Per Facility Policy Yes     Reason for your hospital stay   Acute respiratory failure due to pneumonia and heart failure.     Daily weights   Call Provider for weight gain of more than 2 pounds per day or 5 pounds per week.     Activity - Up with nursing assistance     Follow Up and recommended labs and tests   Follow up with Nursing home physician within 2-3 days.  Recommend repeat INR and BMP.  Follow up with Cardiology in clinic per their recommendations.     DNR (Do Not Resuscitate)     Physical Therapy Adult Consult   Evaluate and treat as clinically indicated.    Reason:  Physical deconditioning     Occupational Therapy Adult Consult   Evaluate and treat as clinically indicated.    Reason:  Physical deconditioning     Fall precautions     Advance Diet as Tolerated   Follow this diet upon discharge: 2g salt       Discharge Medications   Current Discharge Medication List      START taking these medications    Details   cefuroxime (CEFTIN) 500 MG tablet Take 1 tablet (500 mg) by mouth 2 times daily for 5 days  Qty: 10 tablet, Refills: 0    Associated Diagnoses: Community acquired pneumonia of right lower lobe of lung (H)      furosemide (LASIX) 40 MG tablet Take 1 tablet (40 mg) by mouth 2 times daily    Associated Diagnoses: Acute congestive heart failure, unspecified congestive heart failure type (H)      hydrALAZINE (APRESOLINE) 25 MG tablet Take 1 tablet (25 mg) by mouth 3 times daily    Associated Diagnoses: Benign essential hypertension      metoprolol tartrate (LOPRESSOR) 25 MG tablet Take 1 tablet (25 mg) by mouth 2 times daily    Associated Diagnoses: Acute congestive heart failure, unspecified congestive heart failure type (H)      nystatin (MYCOSTATIN) 463191 UNIT/ML suspension Swish and spit 5 mLs (500,000 Units) in mouth 4 times daily    Associated Diagnoses: Oral thrush      potassium chloride SA  (K-DUR/KLOR-CON M) 20 MEQ CR tablet Take 1 tablet (20 mEq) by mouth 2 times daily    Associated Diagnoses: Acute congestive heart failure, unspecified congestive heart failure type (H)         CONTINUE these medications which have CHANGED    Details   vancomycin (FIRST) 50 MG/ML SOLN Take 2.5 mLs (125 mg) by mouth 4 times daily Continue for 7 days after Ceftin complete  Refills: 0    Associated Diagnoses: Colitis         CONTINUE these medications which have NOT CHANGED    Details   Cyanocobalamin (B-12) 1000 MCG CAPS Take 1 tablet by mouth daily  Qty: 90 capsule, Refills: 3    Associated Diagnoses: Vitamin B12 deficiency (non anemic)      lisinopril (PRINIVIL/ZESTRIL) 20 MG tablet Take 1 tablet (20 mg) by mouth 2 times daily  Qty: 180 tablet, Refills: 3    Associated Diagnoses: Benign essential hypertension      omeprazole (PRILOSEC) 20 MG CR capsule Take 1 capsule (20 mg) by mouth daily  Qty: 90 capsule, Refills: 3    Associated Diagnoses: Gastroesophageal reflux disease, esophagitis presence not specified      Sertraline HCl (ZOLOFT PO) Take 100 mg by mouth daily      Simethicone (GAS-X EXTRA STRENGTH) 125 MG CAPS Take 1 capsule by mouth 2 times daily as needed  Qty: 60 capsule, Refills: 11    Associated Diagnoses: Flatulence, eructation and gas pain      simvastatin (ZOCOR) 5 MG tablet Take 2 tablets (10 mg) by mouth daily  Qty: 90 tablet, Refills: 3    Associated Diagnoses: Hyperlipidemia LDL goal <100      WARFARIN SODIUM PO Take by mouth daily Held 5/18/18-5/20/18.  2.5 mg M/W/Sa  5 mg AOD      !! order for DME Equipment being ordered: Walker Wheels () and Walker ()  Treatment Diagnosis: Difficulty ambulating  Qty: 1 each, Refills: 0    Associated Diagnoses: Colitis      !! order for DME Equipment being ordered: Walker Wheels ()  Treatment Diagnosis:  Weakness,colitis.  Qty: 1 Device, Refills: 0    Associated Diagnoses: Colitis       !! - Potential duplicate medications found. Please discuss  with provider.      STOP taking these medications       hydrochlorothiazide (HYDRODIURIL) 25 MG tablet Comments:   Reason for Stopping:             Allergies   Allergies   Allergen Reactions     Penicillins Rash     Data   Most Recent 3 CBC's:  Recent Labs   Lab Test  05/29/18   0615  05/28/18   0551  05/27/18   0528   WBC  14.3*  13.1*  13.5*   HGB  11.8*  11.2*  11.3*   MCV  80  80  80   PLT  492*  451*  450      Most Recent 3 BMP's:  Recent Labs   Lab Test  05/29/18   0615  05/28/18   0551  05/27/18   0528   NA  140  141  141   POTASSIUM  4.0  3.9  3.7   CHLORIDE  107  107  106   CO2  26  27  28   BUN  22  25  32*   CR  0.85  0.81  0.85   ANIONGAP  7  7  7   ANGELITA  8.4*  8.3*  8.1*   GLC  98  111*  113*     Most Recent 2 LFT's:  Recent Labs   Lab Test  05/14/18   0830   AST  10   ALT  13   ALKPHOS  64   BILITOTAL  0.7     Most Recent INR's and Anticoagulation Dosing History:  Anticoagulation Dose History     Recent Dosing and Labs Latest Ref Rng & Units 5/24/2018 5/24/2018 5/25/2018 5/26/2018 5/27/2018 5/28/2018 5/29/2018    Warfarin 0.5 mg - - 0.5 mg - - - - -    Warfarin 2.5 mg - - - 2.5 mg 2.5 mg 2.5 mg 2.5 mg -    INR 0.86 - 1.14 2.76(H) - 1.64(H) 1.88(H) 1.96(H) 2.07(H) 2.12(H)    INR 0.86 - 1.14 - - - - - - -        Most Recent 3 Troponin's:  Recent Labs   Lab Test  05/23/18   1415  05/23/18   0944   TROPI  <0.015  0.017     Most Recent Cholesterol Panel:No lab results found.  Most Recent TSH, T4 and A1c Labs:No lab results found.  Results for orders placed or performed during the hospital encounter of 05/23/18   XR Chest 2 Views    Narrative    XR CHEST 2 VW 5/23/2018 10:03 AM     HISTORY: Shortness of breath    COMPARISON: 4/30/2018      Impression    IMPRESSION: Left subclavian cardiac device in place. The cardiac  silhouette is mildly enlarged. There are new mixed interstitial and  airspace opacities in both lungs, most confluent in the right  perihilar region. Small right pleural effusion. There is  calcified  pleural plaque likely due to previous asbestos exposure.    RAQUEL COVINGTON MD   XR Chest Port 1 View    Narrative    CHEST PORTABLE ONE VIEW  5/24/2018 10:44 AM     COMPARISON: 2-view chest x-ray 5/23/2018.    HISTORY: Follow up pneumonia versus congestive heart failure.      Impression    IMPRESSION: A single lead pacemaker module implanted over the left  chest with the lead in the right ventricle is again noted without  identifiable change.    Patchy airspace opacity in the mid and upper right lung has increased  in density and extent since the comparison study consistent with  worsening of pneumonia. Small right pleural effusion again noted.  Emphysematous and fibrotic changes throughout both lungs again noted.  There is no pneumothorax on either side. There is no pleural effusion  on the left. Heart size remains within normal limits.    JHONY MAYBERRY MD   CT Chest w Contrast    Narrative    CT CHEST WITH CONTRAST 5/24/2018 3:43 PM     HISTORY: Multifocal pneumonia, specially right hilar, question lung  mass.    COMPARISON: None.    TECHNIQUE: Volumetric helical acquisition of CT images of the chest  from the clavicles to the kidneys were acquired after the  administration of IV contrast. Radiation dose for this scan was  reduced using automated exposure control, adjustment of the mA and/or  kV according to patient size, or iterative reconstruction technique.    FINDINGS: Bilateral calcified pleural plaques and small partially  loculated pleural effusions are seen bilaterally. Extensive  interstitial and airspace infiltrate in the right upper lobe and to a  lesser extent the right lower lobe. Areas of bronchial wall thickening  are evident. No definite discrete masses are seen within the lungs,  although these could be partially obscured by the infiltrates. The  findings in the visualized upper abdomen. Cardiomegaly, particularly  the right side of the heart. There are moderate  atherosclerotic  changes of the visualized aorta and its branches. There is no evidence  of aortic dissection or aneurysm. Small hiatal hernia.      Impression    IMPRESSION: Extensive interstitial and airspace infiltrates in the  right upper lobe and to a lesser extent the right lower lobe. Small  partially loculated pleural effusions bilaterally, some of these  collections of loculated fluid could appear masslike on chest x-ray.  No definite pulmonary masses are seen.    DARRICK VASQUEZ MD

## 2018-05-29 NOTE — CONSULTS
Care Transition Initial Assessment -   Reason For Consult: discharge planning  Met with: Patient and Family  (wife Candida)  Active Problems:    Acute exacerbation of CHF (congestive heart failure) (H)       DATA  Lives With: spouse  Living Arrangements: apartment ([address] [place] [address])  Description of Support System: Supportive, Involved  Who is your support system?: Wife  Support Assessment: Adequate family and caregiver support.   Identified issues/concerns regarding health management:      Resources List: Skilled Nursing Facility     Quality Of Family Relationships: supportive, involved  Transportation Available: family or friend will provide    Per social service protocol for discharge planning.  Patient was admitted on 5-23-18 with CHF exacerbation.  The tentative date of discharge is 5-29-18.  Reviewed chart and spoke with patient and wife regarding discharge plans.  Per patient and wife report, they live in a senior apartment at [address] [place] [address].  Patient has a straight cane, a quad cane, and a standard walker at baseline.  Patient typically uses a cane at baseline.  Patient has grab bars and a raised toilet seat in the bathroom.  Patient is independent with ADL's and IAD's.  Patient manages his own meds.  Patient's wife does most of the cooking, laundry, and cleaning.  Reviewed the therapy discharge recommendations of tcu placement on discharge and patient and wife are in agreement.  Patient's wife is asking for a referral to be sent to [facility].  They are interested in a double room.  Referrals sent, via discharge on the double, to check bed availability.  Received a call back from [facility].  They can accept patient today.  Patient's wife has to leave the hospital for an appointment and will be back later this afternoon.  She would like to be here when patient discharges.  She is asking for a 15:00 discharge.  Updated [facility] and they are in agreement.    ASSESSMENT  Cognitive Status:  awake and alert  Concerns  to be addressed: discharge planning.     PLAN  Financial costs for the patient includes N/A.  Patient given options and choices for discharge TCU choices.  Patient/family is agreeable to the plan?  Yes  Patient Goals and Preferences: TCU on discharge.  Patient anticipates discharging to:  [facility].    Will continue to follow.    TRENT Bartlett, [facility]  [phone]

## 2018-05-29 NOTE — TELEPHONE ENCOUNTER
I received an email about Santosh needing to establish care in the device clinic - he is scheduled to see Shane on 6/15 and we need to coordinate a device appointment around that time. He did not answer so I left a message asking him to return my call. SP

## 2018-05-29 NOTE — CONSULTS
[facility] Referral received from EVARISTO Ellis. Patient is not currently established in the [facility]. Pt has HFpEF. HF education to be given by hospital nurse. Spoke with Mady, care coordinator at [facility]. Pt was seen by Cardiology this admission, but no follow up orders placed. Reviewed chart, pt was to see EP for new consult as outpatient on 5/23 per PCP Dr. Zepeda to establish care, as he also has PPM. Appointment ended up being canceled as patient admitted. Per Mady, plan is for patient to discharge to [facility]. Scheduled pt to see Dr. Lynn as new EP consult on 6/15 per original orders from PCP and message EP RN's to see if device check needed prior. Will have Dr. Lynn address at that visit if C[facility] clinic needed. Discussed plan with Mady. ADRIANA Wilson 12:07 PM 05/29/18       Lise Stephenson RN  [facility] Clinic nurse  [phone]      [facility]: Cardiomyopathy, Optimization, Rehabilitation, Education   The [facility] is a heart failure specialty clinic within the [facility] where you will work with your cardiologist, nurse practitioners, physician assistants and registered nurses who specialize in heart failure care. They are dedicated to helping patients with heart failure to carefully adjust medications, receive education, and learn who and when to call if symptoms develop. They specialize in helping you better understand your condition, slow the progression of your disease, improve the length and quality of your life, help you detect future heart problems before they become life threatening, and avoid hospitalizations.

## 2018-05-29 NOTE — PLAN OF CARE
Problem: Patient Care Overview  Goal: Plan of Care/Patient Progress Review  Occupational Therapy Discharge Summary    Reason for therapy discharge:    Discharged to transitional care facility.    Progress towards therapy goal(s). See goals on Care Plan in [facility] electronic health record for goal details.  Goals partially met.  Barriers to achieving goals:   discharge from facility.    Therapy recommendation(s):    Continued therapy is recommended.  Rationale/Recommendations:  Recommend continued therapy at TCU to increase endurance and independence for ADLS.  .

## 2018-05-29 NOTE — PROGRESS NOTES
SPIRITUAL HEALTH SERVICES Progress Note  [facility]    [place]ited pt per length of stay. Pt's wife was present and knitting hats for molly. Pt's daughter had visited earlier this morning. Pt is anticipating discharge today or tomorrow, whenever TCU can be arranged. Pt and wife attend [facility]. Pt's wife is the daughter of a [employer] . Pt and wife have several children and grandchildren, and they feel well supported by family. Family stated no SH needs at this time. SH has no plans to follow but is available upon request.      Dora Roy  Chaplain Resident  Pager: [phone]  Office: [phone]

## 2018-05-29 NOTE — PLAN OF CARE
Problem: ARDS (Acute Resp Distress Syndrome) (Adult)  Goal: Signs and Symptoms of Listed Potential Problems Will be Absent, Minimized or Managed (ARDS)  Signs and symptoms of listed potential problems will be absent, minimized or managed by discharge/transition of care (reference ARDS (Acute Resp Distress Syndrome) (Adult) CPG).   Outcome: Adequate for Discharge Date Met: 05/29/18  Fatigued today due to insomnia last night.  Has been able to maintain oxygen sats on RA.  Transferred per w/c to [facility].  Wife accompanying pt.  Copy of AVS given to wife.  Info packet for [facility] with pt.

## 2018-05-29 NOTE — PLAN OF CARE
Problem: Patient Care Overview  Goal: Plan of Care/Patient Progress Review  Physical Therapy Discharge Summary    Reason for therapy discharge:    Discharged to transitional care facility.    Progress towards therapy goal(s). See goals on Care Plan in [facility] electronic health record for goal details.  Goals not met.  Barriers to achieving goals:   discharge from facility.    Therapy recommendation(s):    Continued therapy is recommended.  Rationale/Recommendations:  PT at TCU to progress mobility.

## 2018-05-29 NOTE — PROGRESS NOTES
SW:    PAS-RR    D: Per DHS regulation, SW completed and submitted PAS-RR to [place] [employer] Direct Connect via the Senior LinkAge Line.  PAS-RR confirmation # is : [id number].    I: SW spoke with patient and wife and they are aware a PAS-RR has been submitted.  SW reviewed with patient and wife that they may be contacted for a follow up appointment within 10 days of hospital discharge if their SNF stay is < 30 days.  Contact information for [facility] LinkAge Line was also provided.    A: Patient and wife verbalized understanding.    P: Further questions may be directed to [facility] LinkAge Line at #[phone], option #4 for PAS-RR staff.

## 2018-05-29 NOTE — PROGRESS NOTES
Contacted Lise at [facility] re: order for consult.   Lise assisted in scheduling f/u with ANGELICA SOW and at that appointment the MD will decide if patient should see CORE clinic.  Appointment added to AVS.

## 2018-05-30 ENCOUNTER — NURSING HOME VISIT (OUTPATIENT)
Dept: GERIATRICS | Facility: CLINIC | Age: 83
End: 2018-05-30
Payer: COMMERCIAL

## 2018-05-30 VITALS
BODY MASS INDEX: 20.76 KG/M2 | TEMPERATURE: 98.4 F | HEART RATE: 68 BPM | WEIGHT: 167 LBS | SYSTOLIC BLOOD PRESSURE: 153 MMHG | DIASTOLIC BLOOD PRESSURE: 62 MMHG | HEIGHT: 75 IN | RESPIRATION RATE: 26 BRPM | OXYGEN SATURATION: 92 %

## 2018-05-30 DIAGNOSIS — A04.72 C. DIFFICILE COLITIS: ICD-10-CM

## 2018-05-30 DIAGNOSIS — J96.01 ACUTE RESPIRATORY FAILURE WITH HYPOXIA (H): Primary | ICD-10-CM

## 2018-05-30 DIAGNOSIS — J18.9 PNEUMONIA OF RIGHT UPPER LOBE DUE TO INFECTIOUS ORGANISM: ICD-10-CM

## 2018-05-30 DIAGNOSIS — Z71.89 ADVANCED DIRECTIVES, COUNSELING/DISCUSSION: Chronic | ICD-10-CM

## 2018-05-30 DIAGNOSIS — R53.81 PHYSICAL DECONDITIONING: ICD-10-CM

## 2018-05-30 DIAGNOSIS — I10 BENIGN ESSENTIAL HYPERTENSION: ICD-10-CM

## 2018-05-30 DIAGNOSIS — I50.33 ACUTE ON CHRONIC DIASTOLIC CONGESTIVE HEART FAILURE (H): ICD-10-CM

## 2018-05-30 DIAGNOSIS — D72.829 LEUKOCYTOSIS, UNSPECIFIED TYPE: ICD-10-CM

## 2018-05-30 DIAGNOSIS — B37.0 ORAL THRUSH: ICD-10-CM

## 2018-05-30 DIAGNOSIS — D75.839 THROMBOCYTOSIS: ICD-10-CM

## 2018-05-30 DIAGNOSIS — I48.20 CHRONIC ATRIAL FIBRILLATION (H): ICD-10-CM

## 2018-05-30 PROCEDURE — 99310 SBSQ NF CARE HIGH MDM 45: CPT | Performed by: NURSE PRACTITIONER

## 2018-05-30 NOTE — PROGRESS NOTES
[facility]  PRIMARY CARE PROVIDER AND CLINIC:  Lilly Zepeda [address] / [place] [address]  Chief Complaint   Patient presents with     Hospital F/U     [facility] Medical Record Number:  [id number]    HPI:    Santosh Robledo is a 88 year old  (7/20/1929),admitted to the [facility]CU from [facility].  Hospital stay 05/23/2018 through 05/29/2018.  Admitted to this facility for  rehab, medical management and nursing care.  HPI information obtained from: facility chart records, facility staff, patient report, [facility] Epic chart review and family/first contact wife report.    He has a medical history significant for afib on coumadin, pacemaker, HTN, hyperlipidemia, MARILU and was hospitalized after presenting to the ED with worsening shortness of breath,  productive cough that was blood tinged at times and LE edema.  He had been hospitalized 5/13-5/15/2018 with C diff and was on vancomycin. He was hypoxic at 83% and tachypneic. EKG showed afib. CXR showed mixed interstitial and airspace opacities in both lungs, most confluent in the right perihilar region with small right pleural effusion. WBC 22.5, platelet 517,000,  K 2.9. BNP 12,789. INR was 6.8 and vitamin K was given. He required BiPAP in the ED. ECHO 5/23/2108: EF 55-60%, normal LV systolic function.  No previous history of CHF.  Cardiology consulted and he was diuresed with IV lasix, then transitioned to oral. Potassium was replaced. Metoprolol was started for BP and rate control.   CT chest showed infiltrates in right upper and lower lobe. He was treated with ceftriaxone and doxycycline. Discharged on ceftin for 5 more days. Oral vancomycin for C diff continued for 12 more days.   At discharge: WBC 14.3, platelet 492, K 4.0.       Current issues are:         Acute respiratory failure with hypoxia (H)-reports his breathing has improved, remains short of breath with exertion. Wife feels he's  getting better. Productive cough for white sputum. Afebrile. Very fatigued.   Pneumonia of right upper lobe due to infectious organism (H)  Acute on chronic diastolic congestive heart failure (H)  Chronic atrial fibrillation (H)-INR 3.0 today, had coumadin 2.5 mg last night. No bleeding. HR: 67-69  C. difficile colitis-reports stools are less frequent, remain loose. Poor appetite, though ate well at breakfast today.  Denies nausea or abdominal pain.   Benign essential hypertension-BPs: 153/62, 142/59, 133/58     Oral thrush-denies mouth pain  Thrombocytosis (H)  Leukocytosis, unspecified type  Physical deconditioning-ambulating short distances with walker and assist. Requires assist of 1 with cares. Per wife, he has been independent with cares at home.       CODE STATUS/ADVANCE DIRECTIVES DISCUSSION:   DNR/DNI  Patient's living condition: lives with spouse at [address] [place] [address].     ALLERGIES:Penicillins  PAST MEDICAL HISTORY:  has a past medical history of AV node dysfunction; Chronic atrial fibrillation (H) (2004); GERD (gastroesophageal reflux disease); Hyperlipidemia; Near syncope; and MARILU (obstructive sleep apnea).  PAST SURGICAL HISTORY:  has a past surgical history that includes Implant pacemaker (08/10/2015).  FAMILY HISTORY: family history includes Influenza/Pneumonia in his mother; Prostate Cancer in his father.  SOCIAL HISTORY:  reports that he has never smoked. He has never used smokeless tobacco. He reports that he drinks alcohol. He reports that he does not use illicit drugs.    Post Discharge Medication Reconciliation Status: discharge medications reconciled and changed, per note/orders (see AVS).  Current Outpatient Prescriptions   Medication Sig Dispense Refill     cefuroxime (CEFTIN) 500 MG tablet Take 1 tablet (500 mg) by mouth 2 times daily for 5 days 10 tablet 0     Cyanocobalamin (B-12) 1000 MCG CAPS Take 1 tablet by mouth daily 90 capsule 3     furosemide (LASIX) 40 MG tablet Take 1 tablet (40 mg) by  "mouth 2 times daily       hydrALAZINE (APRESOLINE) 25 MG tablet Take 1 tablet (25 mg) by mouth 3 times daily       lisinopril (PRINIVIL/ZESTRIL) 20 MG tablet Take 1 tablet (20 mg) by mouth 2 times daily 180 tablet 3     metoprolol tartrate (LOPRESSOR) 25 MG tablet Take 1 tablet (25 mg) by mouth 2 times daily       nystatin (MYCOSTATIN) 702651 UNIT/ML suspension Swish and spit 5 mLs (500,000 Units) in mouth 4 times daily       omeprazole (PRILOSEC) 20 MG CR capsule Take 1 capsule (20 mg) by mouth daily 90 capsule 3     order for DME Equipment being ordered: Walker Wheels () and Walker ()  Treatment Diagnosis: Difficulty ambulating 1 each 0     order for DME Equipment being ordered: Walker Wheels ()  Treatment Diagnosis:  Weakness,colitis. 1 Device 0     potassium chloride SA (K-DUR/KLOR-CON M) 20 MEQ CR tablet Take 1 tablet (20 mEq) by mouth 2 times daily       Sertraline HCl (ZOLOFT PO) Take 100 mg by mouth daily       Simethicone (GAS-X EXTRA STRENGTH) 125 MG CAPS Take 1 capsule by mouth 2 times daily as needed 60 capsule 11     simvastatin (ZOCOR) 5 MG tablet Take 2 tablets (10 mg) by mouth daily 90 tablet 3     vancomycin (FIRST) 50 MG/ML SOLN Take 2.5 mLs (125 mg) by mouth 4 times daily Continue for 7 days after Ceftin complete  0     WARFARIN SODIUM PO Take by mouth daily Held 5/18/18-5/20/18.  2.5 mg M/W/Sa  5 mg AOD         ROS:  10 point ROS of systems including Constitutional, Eyes, Respiratory, Cardiovascular, Gastroenterology, Genitourinary, Integumentary, Muscularskeletal, Psychiatric were all negative except for pertinent positives noted in my HPI.    Exam:  /62  Pulse 68  Temp 98.4  F (36.9  C)  Resp 26  Ht 6' 3\" (1.905 m)  Wt 167 lb (75.8 kg)  SpO2 92%  BMI 20.87 kg/m2  GENERAL APPEARANCE:  Alert, in no distress  ENT:  Mouth and posterior oropharynx normal, moist mucous membranes, [place]  EYES:  EOM normal, conjunctiva and lids normal, PERRL  NECK:  No adenopathy,masses or " thyromegaly  RESP:  respiratory effort and palpation of chest normal, no respiratory distress, diminished breath sounds right side, no crackles or wheezes. Left side is clear  CV:  Palpation and auscultation of heart done , regular rate and rhythm, 2/6 murmur, no edema, +2 pedal pulses  ABDOMEN:  normal bowel sounds, soft, nontender, no hepatosplenomegaly or other masses  M/S:   sitting in recliner. STEWART with good strength. No joint inflammation  SKIN:  no rashes or open areas  PSYCH:  oriented X 3, memory impaired , affect and mood normal    Lab/Diagnostic data:  Last Basic Metabolic Panel:  Lab Results   Component Value Date     05/29/2018      Lab Results   Component Value Date    POTASSIUM 4.0 05/29/2018     Lab Results   Component Value Date    CHLORIDE 107 05/29/2018     Lab Results   Component Value Date    ANGELITA 8.4 05/29/2018     Lab Results   Component Value Date    CO2 26 05/29/2018     Lab Results   Component Value Date    BUN 22 05/29/2018     Lab Results   Component Value Date    CR 0.85 05/29/2018     Lab Results   Component Value Date    GLC 98 05/29/2018     Lab Results   Component Value Date    WBC 14.3 05/29/2018     Lab Results   Component Value Date    RBC 4.57 05/29/2018     Lab Results   Component Value Date    HGB 11.8 05/29/2018     Lab Results   Component Value Date    HCT 36.6 05/29/2018     Lab Results   Component Value Date    MCV 80 05/29/2018     Lab Results   Component Value Date    MCH 25.8 05/29/2018     Lab Results   Component Value Date    MCHC 32.2 05/29/2018     Lab Results   Component Value Date    RDW 15.2 05/29/2018     Lab Results   Component Value Date     05/29/2018      ASSESSMENT / PLAN:  (J96.01) Acute respiratory failure with hypoxia (H)  (primary encounter diagnosis)  (J18.1) Pneumonia of right upper lobe due to infectious organism (H)  Comment: respiratory status slowly improving.   Plan: complete 5 more days of ceftin. Monitor respiratory status. CBC, BMP.  Melatonin for sleep.     (I50.33) Acute on chronic diastolic congestive heart failure (H)  Comment: volume status improving  Plan: continue lasix, lisinopril, metoprolol. Daily weight. Low sodium diet. BMP. Establish Cardiology care with Dr Lynn 6/15/2018 as scheduled.     (I48.2) Chronic atrial fibrillation (H)  Pacemaker  Comment: rate controlled. INR therapeutic  Plan: continue metoprolol. Coumadin 2.5 mg daily. INR 6/1/2018. Cardiology appointment as above.     (A04.72) C. difficile colitis  Comment: improving  Plan: continue vancomycin for 12 days. Monitor bowel status.     (I10) Benign essential hypertension  Comment: controlled  Plan: continue lasix, hydralazine, lisinopril, metoprolol. Monitor VS.     (B37.0) Oral thrush  Comment: improving  Plan: continue nystatin while on antibiotics    (D47.3) Thrombocytosis (H)  (D72.829) Leukocytosis, unspecified type  Anemia  Comment: mild, likely related to infection  Plan: CBC    (Z71.89) Advance Care Planning  Comment: patient and wife confirm DNR/DNI and they have a healthcare directive  Plan: POLST completed, epic orders updated    (R53.81) Physical deconditioning  Comment: due to acute illness, hospitalization, multiple comorbidities  Plan: PHYSICAL THERAPY/OT. Goal is to return home with wife and home services.         Electronically signed by:  EUGENIO Smith CNP

## 2018-05-31 ENCOUNTER — TELEPHONE (OUTPATIENT)
Dept: CARDIOLOGY | Facility: CLINIC | Age: 83
End: 2018-05-31

## 2018-05-31 ENCOUNTER — TRANSFERRED RECORDS (OUTPATIENT)
Dept: HEALTH INFORMATION MANAGEMENT | Facility: CLINIC | Age: 83
End: 2018-05-31

## 2018-05-31 LAB
ANION GAP SERPL CALCULATED.3IONS-SCNC: 8 MMOL/L (ref 3–14)
BUN SERPL-MCNC: 19 MG/DL (ref 7–30)
CALCIUM SERPL-MCNC: 8.3 MG/DL (ref 8.5–10.1)
CHLORIDE SERPLBLD-SCNC: 105 MMOL/L (ref 94–109)
CO2 SERPL-SCNC: 26 MMOL/L (ref 20–32)
CREAT SERPL-MCNC: 0.84 MG/DL (ref 0.66–1.25)
ERYTHROCYTE [DISTWIDTH] IN BLOOD BY AUTOMATED COUNT: 15.2 % (ref 10–15)
GFR SERPL CREATININE-BSD FRML MDRD: 87 ML/MIN/1.73M2
GLUCOSE SERPL-MCNC: 95 MG/DL (ref 70–99)
HCT VFR BLD AUTO: 34 % (ref 40–53)
HEMOGLOBIN: 11.1 G/DL (ref 13.3–17.7)
MCH RBC QN AUTO: 25.9 PG (ref 26.5–33)
MCHC RBC AUTO-ENTMCNC: 32.6 G/DL (ref 31.5–36.5)
MCV RBC AUTO: 79 FL (ref 78–100)
PLATELET # BLD AUTO: 451 10^9/L (ref 150–450)
POTASSIUM SERPL-SCNC: 3.9 MMOL/L (ref 3.4–5.3)
RBC # BLD AUTO: 4.28 10^12/L (ref 4.4–5.9)
SODIUM SERPL-SCNC: 139 MMOL/L (ref 133–144)
WBC # BLD AUTO: 9.4 10^9/L (ref 4–11)

## 2018-05-31 NOTE — TELEPHONE ENCOUNTER
Patient was evaluated by cardiology while inpatient for worsening shortness of breath.RN called [facility] to confirm the follow up plan for patient with Care Coordinator. RN confirmed with Care Coordinator that patient has a scheduled F/U appt on 6/15/18 with Dr. Shane LIGHT.  RN confirmed with Care Coordinator to bring last progress note, MAR, active orders, and provider order sheet to appt. JOSE Muhammad RN.

## 2018-06-01 ENCOUNTER — NURSING HOME VISIT (OUTPATIENT)
Dept: GERIATRICS | Facility: CLINIC | Age: 83
End: 2018-06-01
Payer: COMMERCIAL

## 2018-06-01 VITALS
RESPIRATION RATE: 16 BRPM | HEART RATE: 67 BPM | SYSTOLIC BLOOD PRESSURE: 122 MMHG | TEMPERATURE: 97.7 F | OXYGEN SATURATION: 94 % | WEIGHT: 162.4 LBS | BODY MASS INDEX: 20.19 KG/M2 | DIASTOLIC BLOOD PRESSURE: 61 MMHG | HEIGHT: 75 IN

## 2018-06-01 DIAGNOSIS — R53.81 PHYSICAL DECONDITIONING: Primary | ICD-10-CM

## 2018-06-01 DIAGNOSIS — I48.20 CHRONIC ATRIAL FIBRILLATION (H): ICD-10-CM

## 2018-06-01 DIAGNOSIS — I10 ESSENTIAL HYPERTENSION: ICD-10-CM

## 2018-06-01 DIAGNOSIS — I50.32 CHRONIC DIASTOLIC CONGESTIVE HEART FAILURE (H): ICD-10-CM

## 2018-06-01 DIAGNOSIS — A04.72 C. DIFFICILE COLITIS: ICD-10-CM

## 2018-06-01 PROCEDURE — 99306 1ST NF CARE HIGH MDM 50: CPT | Performed by: INTERNAL MEDICINE

## 2018-06-01 NOTE — PROGRESS NOTES
PRIMARY CARE PROVIDER AND CLINIC RESPONSIBLE:  Lilly Zepeda, [address] [place] [address]        ADMISSION HISTORY AND PHYSICAL EXAMINATION     Chief Complaint   Patient presents with     Fatigue         HISTORY OF PRESENT ILLNESS:  88 year old male, (7/20/1929), [facility] of medical care and rehab.  HPI information obtained from: facility chart records, facility staff, patient report and [facility] chart review.    Santosh Robledo is a 88 year old male with history of chronic atrial fibrillation on warfarin, hypertension, hyper, obstructive sleep apnea and recent C. difficile infection who was admitted at [facility] from 5/23/2018 to 5/29/2018 due to worsening shortness of breath.  He was recently discharged from the hospital with diagnosis of Clostridium difficile infection from 5/15 through 5/15/20, and discharged with oral vancomycin.  Arrival to the emergency room his oxygen saturation was 80%'s blood pressure was elevated and he was tachypneic. EKG showed atrial fibrillation with nonspecific ST and T-wave changes.  His chest x-ray showed mildly enlarged cardiac silhouette with new mixed interstitial and airspace opacities in both lungs, most confluent in the right perihilar region with small right pleural effusion.  Was found to have respiratory failure related to acute congestive heart failure was placed with oxygen BiPAP was also treated with IV Lasix initially switched to oral.  His echocardiogram showed normal ejection fraction and no valvular or pericardial effusion.  He was seen by cardiology team.  In addition was found to have pneumonia of the right lower lobe and right middle lobe. CT chest with contrast showed significant infiltrates right upper lobe and lower lobe, he was treated on Rocephin and doxycycline.  He is also continued on oral vancomycin for C. difficile colitis with contact isolation.  He had low potassium  related to diarrhea hydrochlorothiazide treatment, supplemented with potassium replacement.  He feels weak and tired.  He is off oxygen.  He has shortness of breath on exertion.  There is no pneumothorax leg swelling. Slept poor, appetite fair and has loose BM. Patient denies chest pain, abdominal pain, n&v, fever, chills, dysuria, leg pain or swelling. The rest of ROS is unremarkable. Patient is seen and examined by me with Kimani Gerard NP. Please see SPIKE Gerard's admit noted dated 5/30/18 for details of admission, past medical history, family history, allergies, medication list, social history and other details pertinent with this admission. Hospital admission and dc summary reviewed.    Past Medical History:   Diagnosis Date     AV node dysfunction      Chronic atrial fibrillation (H) 2004     GERD (gastroesophageal reflux disease)      Hyperlipidemia      Near syncope      MARILU (obstructive sleep apnea)        Past Surgical History:   Procedure Laterality Date     IMPLANT PACEMAKER  08/10/2015       Current Outpatient Prescriptions   Medication Sig     cefuroxime (CEFTIN) 500 MG tablet Take 1 tablet (500 mg) by mouth 2 times daily for 5 days     Cyanocobalamin (B-12) 1000 MCG CAPS Take 1 tablet by mouth daily     furosemide (LASIX) 40 MG tablet Take 1 tablet (40 mg) by mouth 2 times daily     hydrALAZINE (APRESOLINE) 25 MG tablet Take 1 tablet (25 mg) by mouth 3 times daily     lisinopril (PRINIVIL/ZESTRIL) 20 MG tablet Take 1 tablet (20 mg) by mouth 2 times daily     MELATONIN PO Take 3 mg by mouth At Bedtime     metoprolol tartrate (LOPRESSOR) 25 MG tablet Take 1 tablet (25 mg) by mouth 2 times daily     nystatin (MYCOSTATIN) 612917 UNIT/ML suspension Swish and spit 5 mLs (500,000 Units) in mouth 4 times daily     omeprazole (PRILOSEC) 20 MG CR capsule Take 1 capsule (20 mg) by mouth daily     order for DME Equipment being ordered: Walker Wheels () and Walker ()  Treatment Diagnosis: Difficulty  "ambulating     order for DME Equipment being ordered: Walker Wheels ()  Treatment Diagnosis:  Weakness,colitis.     potassium chloride SA (K-DUR/KLOR-CON M) 20 MEQ CR tablet Take 1 tablet (20 mEq) by mouth 2 times daily     Sertraline HCl (ZOLOFT PO) Take 100 mg by mouth daily     Simethicone (GAS-X EXTRA STRENGTH) 125 MG CAPS Take 1 capsule by mouth 2 times daily as needed     simvastatin (ZOCOR) 5 MG tablet Take 2 tablets (10 mg) by mouth daily     vancomycin (VANOCIN) 50 mg/mL LIQD solution Take 125 mg by mouth 4 times daily For 12 day.     WARFARIN SODIUM PO Take by mouth daily Held 5/18/18-5/20/18.  2.5 mg M/W/Sa  5 mg AOD     No current facility-administered medications for this visit.        Allergies   Allergen Reactions     Penicillins Rash       Social History     Social History     Marital status:      Spouse name: N/A     Number of children: N/A     Years of education: N/A     Occupational History     Not on file.     Social History Main Topics     Smoking status: Never Smoker     Smokeless tobacco: Never Used     Alcohol use Yes     Drug use: No     Sexual activity: Yes     Partners: Female     Other Topics Concern     Not on file     Social History Narrative          Information reviewed:  Medications, vital signs, orders, nursing notes, problem list, hospital information.     ROS: All 10 point review of system completed, those pertinent positive, please see H&P, the remaining ROS is negative.    /61  Pulse 67  Temp 97.7  F (36.5  C)  Resp 16  Ht 6' 3\" (1.905 m)  Wt 162 lb 6.4 oz (73.7 kg)  SpO2 94%  BMI 20.3 kg/m2    PHYSICAL EXAMINATION:   GENERAL:  No acute distress.  SKIN:  Dry and warm.  There is no rash at area of skin examined.  HEENT:  Head without trauma.  Pupils round, reactive. Exam of conjunctiva and lids are normal. Sclera without icterus. There is no oral thrush.  NECK:  Supple. No jugular venous distension.  CHEST: No reproducible chest tenderness.   LUNGS:  " Normal respiratory effort. Lungs reveal decreased breath sounds at bases. No rales or wheezes.  HEART:  Irregular rate and rhythm.  No murmur, gallops or rubs auscultated.  ABDOMEN:  Soft, bowel sounds positive.  There is no tenderness or guarding.   EXTREMITIES: No edema.   NEUROLOGIC:  Alert and oriented x3. Moving all ext. Gait not tested.  PSYCH: Recent and remote memory is normal. Mood and affect are normal.    Lab/Diagnostic data:  Reviewed    CBC Lab Results (Last 5 results in 365 days)       WBC RBC Hgb Hct MCV MCH MCHC RDW Plt Neut # Lymph # Eos # Baso # Immat. Gran #   05/31/18 0000 9.4 4.28 11.1 34.0 79 25.9 32.6 15.2 451 -- -- -- -- --   05/29/18 0615 14.3 4.57 11.8 36.6 80 25.8 32.2 15.2 492 -- -- -- -- --   05/28/18 0551 13.1 4.34 11.2 34.8 80 25.8 32.2 15.0 451 -- -- -- -- --   05/27/18 0528 13.5 4.34 11.3 34.9 80 26.0 32.4 14.9 450 -- -- -- -- --   05/26/18 0545 13.8 4.36 11.5 34.9 80 26.4 33.0 14.9 440 -- -- -- -- --   CMP Labs (Last 5 results in 365 days)       Na+ K+ Cl CO2 ANION GAP Glc BUN Creat GFR GFR-Black Calcium Mg ALT AST A1C TSH LDL HIV   05/31/18 0000 139 3.9 105 26 8 95 19 0.84 87 >90 8.3 -- -- -- -- -- -- --   05/29/18 0615 140 4.0 107 26 7 98 22 0.85 85 >90 8.4 -- -- -- -- -- -- --   05/28/18 0747 -- -- -- -- -- 100 -- -- -- -- -- -- -- -- -- -- -- --   05/28/18 0551 141 3.9 107 27 7 111 25 0.81 >90 >90 8.3 -- -- -- -- -- -- --   05/27/18 0528 141 3.7 106 28 7 113 32 0.85 85 >90 8.1 --           Results for orders placed or performed during the hospital encounter of 05/23/18   XR Chest 2 Views    Narrative    XR CHEST 2 VW 5/23/2018 10:03 AM     HISTORY: Shortness of breath    COMPARISON: 4/30/2018      Impression    IMPRESSION: Left subclavian cardiac device in place. The cardiac  silhouette is mildly enlarged. There are new mixed interstitial and  airspace opacities in both lungs, most confluent in the right  perihilar region. Small right pleural effusion. There is  calcified  pleural plaque likely due to previous asbestos exposure.    RAQUEL COVINGTON MD   XR Chest Port 1 View    Narrative    CHEST PORTABLE ONE VIEW  5/24/2018 10:44 AM     COMPARISON: 2-view chest x-ray 5/23/2018.    HISTORY: Follow up pneumonia versus congestive heart failure.      Impression    IMPRESSION: A single lead pacemaker module implanted over the left  chest with the lead in the right ventricle is again noted without  identifiable change.    Patchy airspace opacity in the mid and upper right lung has increased  in density and extent since the comparison study consistent with  worsening of pneumonia. Small right pleural effusion again noted.  Emphysematous and fibrotic changes throughout both lungs again noted.  There is no pneumothorax on either side. There is no pleural effusion  on the left. Heart size remains within normal limits.    JHONY MAYBERRY MD   CT Chest w Contrast    Narrative    CT CHEST WITH CONTRAST 5/24/2018 3:43 PM     HISTORY: Multifocal pneumonia, specially right hilar, question lung  mass.    COMPARISON: None.    TECHNIQUE: Volumetric helical acquisition of CT images of the chest  from the clavicles to the kidneys were acquired after the  administration of IV contrast. Radiation dose for this scan was  reduced using automated exposure control, adjustment of the mA and/or  kV according to patient size, or iterative reconstruction technique.    FINDINGS: Bilateral calcified pleural plaques and small partially  loculated pleural effusions are seen bilaterally. Extensive  interstitial and airspace infiltrate in the right upper lobe and to a  lesser extent the right lower lobe. Areas of bronchial wall thickening  are evident. No definite discrete masses are seen within the lungs,  although these could be partially obscured by the infiltrates. The  findings in the visualized upper abdomen. Cardiomegaly, particularly  the right side of the heart. There are moderate  atherosclerotic  changes of the visualized aorta and its branches. There is no evidence  of aortic dissection or aneurysm. Small hiatal hernia.      Impression    IMPRESSION: Extensive interstitial and airspace infiltrates in the  right upper lobe and to a lesser extent the right lower lobe. Small  partially loculated pleural effusions bilaterally, some of these  collections of loculated fluid could appear masslike on chest x-ray.  No definite pulmonary masses are seen.    DARRICK VASQUEZ MD         ASSESSMENT / PLAN:     Physical deconditioning  Plan: PT/OT with increase independence, self-care, strength and endurance and other cares that help return to home/facility of origin, fall precautions. Care conference with patient and family for the progress of rehab and disposition issues will be discussed as planned. Rehab evaluation and other evaluations including CPT are at rehab logs, to be reviewed separately.  Fall risk assessment as well as cognitive evaluation will be formed during rehab stay if indicated.    Chronic diastolic congestive heart failure (H)  Plan: continue current medications lasix, metoprolol and lisinopril, monitor I&O and daily weighs and labs if indicated. Follow up cardiologist and PCP as scheduled.    Chronic atrial fibrillation (H)  Plan: Continue warfarin and metorpolol. Monitor INR. Follow up cardiologist as scheduled.    Essential hypertension  Plan: Continue metoprolol and hydralazine with parameters. Blood pressure stable.    C. difficile colitis  Plan: Continue oral vancomycin and contact enteric isolation.    Other problems with same care. Primary care doctor and other specialists to address those chronic problems in next clinic appointment to be scheduled upon discharge from the TCU.      Total time spent with patient visit was 36 min including patient visit, review of past records, patients counseling and coordinating care.        Ant Ferro MD

## 2018-06-04 ENCOUNTER — NURSING HOME VISIT (OUTPATIENT)
Dept: GERIATRICS | Facility: CLINIC | Age: 83
End: 2018-06-04
Payer: COMMERCIAL

## 2018-06-04 VITALS
SYSTOLIC BLOOD PRESSURE: 122 MMHG | OXYGEN SATURATION: 96 % | BODY MASS INDEX: 20 KG/M2 | DIASTOLIC BLOOD PRESSURE: 53 MMHG | WEIGHT: 160 LBS | RESPIRATION RATE: 18 BRPM | TEMPERATURE: 96.8 F | HEART RATE: 60 BPM

## 2018-06-04 DIAGNOSIS — J18.9 PNEUMONIA OF RIGHT UPPER LOBE DUE TO INFECTIOUS ORGANISM: ICD-10-CM

## 2018-06-04 DIAGNOSIS — R53.81 PHYSICAL DECONDITIONING: ICD-10-CM

## 2018-06-04 DIAGNOSIS — I48.20 CHRONIC ATRIAL FIBRILLATION (H): Primary | ICD-10-CM

## 2018-06-04 DIAGNOSIS — J96.01 ACUTE RESPIRATORY FAILURE WITH HYPOXIA (H): ICD-10-CM

## 2018-06-04 DIAGNOSIS — A04.72 C. DIFFICILE COLITIS: ICD-10-CM

## 2018-06-04 DIAGNOSIS — I50.33 ACUTE ON CHRONIC DIASTOLIC CONGESTIVE HEART FAILURE (H): ICD-10-CM

## 2018-06-04 DIAGNOSIS — I10 BENIGN ESSENTIAL HYPERTENSION: ICD-10-CM

## 2018-06-04 PROCEDURE — 99309 SBSQ NF CARE MODERATE MDM 30: CPT | Performed by: NURSE PRACTITIONER

## 2018-06-04 NOTE — PROGRESS NOTES
[facility] [facility]    Chief Complaint   Patient presents with     ASHWINI       [facility] Medical Record Number:  [id number]    HPI:    Santosh Robledo is a 88 year old  (7/20/1929), who is being seen today for an episodic care visit at [facility]. HPI information obtained from: facility chart records, facility staff, patient report, [facility] chart review and family/first contact wife report.    He came to this facility 5/29/2018 for short term rehab and medical management following hospitalization after presenting to the ED with worsening shortness of breath,  productive cough that was blood tinged at times and LE edema.  He had been hospitalized 5/13-5/15/2018 with C diff and was on vancomycin. He was hypoxic at 83% and tachypneic. EKG showed afib. CXR showed mixed interstitial and airspace opacities in both lungs, most confluent in the right perihilar region with small right pleural effusion. WBC 22.5, platelet 517,000,  K 2.9. BNP 12,789. INR was 6.8 and vitamin K was given. He required BiPAP in the ED. ECHO 5/23/2108: EF 55-60%, normal LV systolic function.  No previous history of CHF.  Cardiology consulted and he was diuresed with IV lasix, then transitioned to oral. Potassium was replaced. Metoprolol was started for BP and rate control.   CT chest showed infiltrates in right upper and lower lobe. He was treated with ceftriaxone and doxycycline. Discharged on ceftin for 5 more days. Oral vancomycin for C diff continued for 12 more days.     Current issues are:         Chronic atrial fibrillation (H)-HR: 60-82. INR 3.0 today on 2.5 mg daily. No bleeding.   Acute on chronic diastolic congestive heart failure (H)-reports feeling stronger and would like to discharge home soon. Mild shortness of breath with exertion. Denies cough or chest pain. Weight is down 7 lbs to 160 lbs. Reports good appetite.   Acute respiratory failure with hypoxia (H)  Pneumonia of right upper lobe due to  infectious organism (H)-afebrile. Completed Ceftin 6/3/2018.   C. difficile colitis-remains on vanco. Reports 1-2 loose to semi formed stools daily. No watery stools. Denies nausea or abdominal pain.   Benign essential hypertension-BPs:  122/53, 118/66, 120/64  Physical deconditioning-ambulating short distances with walker and assist. Requires assist of 1 with cares. Per wife, he has been independent with cares at home.     ALLERGIES: Penicillins  Past Medical, Surgical, Family and Social History reviewed and [facility] in [facility].    Current Outpatient Prescriptions   Medication Sig Dispense Refill     Cyanocobalamin (B-12) 1000 MCG CAPS Take 1 tablet by mouth daily 90 capsule 3     furosemide (LASIX) 40 MG tablet Take 1 tablet (40 mg) by mouth 2 times daily       hydrALAZINE (APRESOLINE) 25 MG tablet Take 1 tablet (25 mg) by mouth 3 times daily       lisinopril (PRINIVIL/ZESTRIL) 20 MG tablet Take 1 tablet (20 mg) by mouth 2 times daily 180 tablet 3     MELATONIN PO Take 3 mg by mouth At Bedtime       metoprolol tartrate (LOPRESSOR) 25 MG tablet Take 1 tablet (25 mg) by mouth 2 times daily       nystatin (MYCOSTATIN) 803970 UNIT/ML suspension Swish and spit 5 mLs (500,000 Units) in mouth 4 times daily       omeprazole (PRILOSEC) 20 MG CR capsule Take 1 capsule (20 mg) by mouth daily 90 capsule 3     order for DME Equipment being ordered: Walker Wheels () and Walker ()  Treatment Diagnosis: Difficulty ambulating 1 each 0     order for DME Equipment being ordered: Walker Wheels ()  Treatment Diagnosis:  Weakness,colitis. 1 Device 0     potassium chloride SA (K-DUR/KLOR-CON M) 20 MEQ CR tablet Take 1 tablet (20 mEq) by mouth 2 times daily       Sertraline HCl (ZOLOFT PO) Take 100 mg by mouth daily       Simethicone (GAS-X EXTRA STRENGTH) 125 MG CAPS Take 1 capsule by mouth 2 times daily as needed 60 capsule 11     simvastatin (ZOCOR) 5 MG tablet Take 2 tablets (10 mg) by mouth daily 90 tablet 3      vancomycin (VANOCIN) 50 mg/mL LIQD solution Take 125 mg by mouth 4 times daily For 12 day.       WARFARIN SODIUM PO Take by mouth daily 2.5 mg M/W/Sa  5 mg AOD       Medications reviewed:  Medications reconciled to facility chart and changes were made to reflect current medications as identified as above med list. Below are the changes that were made:   Medications stopped since last EPIC medication reconciliation:   There are no discontinued medications.    Medications started since last [facility] medication reconciliation:  No orders of the defined types were placed in this encounter.    REVIEW OF SYSTEMS:  10 point ROS of systems including Constitutional, Eyes, Respiratory, Cardiovascular, Gastroenterology, Genitourinary, Integumentary, Muscularskeletal, Psychiatric were all negative except for pertinent positives noted in my HPI.    Physical Exam:  /53  Pulse 60  Temp 96.8  F (36  C)  Resp 18  Wt 160 lb (72.6 kg)  SpO2 96%  BMI 20 kg/m2  GENERAL APPEARANCE:  Alert, in no distress  ENT:  Mouth and posterior oropharynx normal, moist mucous membranes, [place]  EYES:  EOM normal, conjunctiva and lids normal  NECK:  No adenopathy,masses or thyromegaly  RESP:  respiratory effort and palpation of chest normal, no respiratory distress,slightly  diminished breath sounds bilaterally,  no crackles or wheezes.  CV:  Palpation and auscultation of heart done , regular rate and rhythm, 2/6 murmur, no edema, +2 pedal pulses  ABDOMEN:  soft, nontender, no hepatosplenomegaly or other masses  M/S:  Up in chair. STEWART with good strength. No joint inflammation  SKIN:  no rashes or open areas  PSYCH:  oriented X 3, memory impaired , affect and mood normal    Recent Labs:   Last Basic Metabolic Panel:  Lab Results   Component Value Date     05/31/2018      Lab Results   Component Value Date    POTASSIUM 3.9 05/31/2018     Lab Results   Component Value Date    CHLORIDE 105 05/31/2018     Lab Results   Component Value Date    ANGELITA  8.3 05/31/2018     Lab Results   Component Value Date    CO2 26 05/31/2018     Lab Results   Component Value Date    BUN 19 05/31/2018     Lab Results   Component Value Date    CR 0.84 05/31/2018     Lab Results   Component Value Date    GLC 95 05/31/2018     Lab Results   Component Value Date    WBC 9.4 05/31/2018     Lab Results   Component Value Date    RBC 4.28 05/31/2018     Lab Results   Component Value Date    HGB 11.1 05/31/2018     Lab Results   Component Value Date    HCT 34.0 05/31/2018     Lab Results   Component Value Date    MCV 79 05/31/2018     Lab Results   Component Value Date    MCH 25.9 05/31/2018     Lab Results   Component Value Date    MCHC 32.6 05/31/2018     Lab Results   Component Value Date    RDW 15.2 05/31/2018     Lab Results   Component Value Date     05/31/2018     ASSESSMENT / PLAN:  (I48.2) Chronic atrial fibrillation (H)  (primary encounter diagnosis)  Comment: rate controlled. INR at high end of goal range. Usual home dose is 2.5 mg on MWSat, 5 mg the other days of the week.   Plan: continue metoprolol. Coumadin 2.5-5 mg daily. INR 6/7/2018    (I50.33) Acute on chronic diastolic congestive heart failure (H)  Comment: volume status improved.  Plan: continue lasix, lisinopril, metoprolol. Daily weight. Low sodium diet.Hgb, BMP 6/6/2018. Follow up with  Dr Lynn 6/15/2018 as scheduled.     (J96.01) Acute respiratory failure with hypoxia (H)  (J18.1) Pneumonia of right upper lobe due to infectious organism (H)  Comment: respiratory status improving. Completed Ceftin 6/3/2018  Plan: monitor respiratory status    (A04.72) C. difficile colitis  Comment: improving  Plan: continue vancomycin through 6/10/2018    (I10) Benign essential hypertension  Comment: controlled  Plan: continue lasix, hydralazine, lisinopril, metoprolol. Monitor VS.    (R53.81) Physical deconditioning  Comment: progressing in therapies  Plan: continue PHYSICAL THERAPY/OT. Goal is to return home with wife and  services.         Electronically signed by  EUGENIO Smith CNP

## 2018-06-06 ENCOUNTER — TRANSFERRED RECORDS (OUTPATIENT)
Dept: HEALTH INFORMATION MANAGEMENT | Facility: CLINIC | Age: 83
End: 2018-06-06

## 2018-06-06 LAB
ANION GAP SERPL CALCULATED.3IONS-SCNC: 5 MMOL/L (ref 3–14)
BUN SERPL-MCNC: 21 MG/DL (ref 7–30)
CALCIUM SERPL-MCNC: 8.5 MG/DL (ref 8.5–10.1)
CHLORIDE SERPLBLD-SCNC: 106 MMOL/L (ref 94–109)
CO2 SERPL-SCNC: 29 MMOL/L (ref 20–32)
CREAT SERPL-MCNC: 0.99 MG/DL (ref 0.66–1.25)
GFR SERPL CREATININE-BSD FRML MDRD: 71 ML/MIN/1.73M2
GLUCOSE SERPL-MCNC: 90 MG/DL (ref 70–99)
HEMOGLOBIN: 11 G/DL (ref 13.3–17.7)
POTASSIUM SERPL-SCNC: 4 MMOL/L (ref 3.4–5.3)
SODIUM SERPL-SCNC: 140 MMOL/L (ref 133–144)

## 2018-06-07 ENCOUNTER — NURSING HOME VISIT (OUTPATIENT)
Dept: GERIATRICS | Facility: CLINIC | Age: 83
End: 2018-06-07
Payer: COMMERCIAL

## 2018-06-07 VITALS
WEIGHT: 161.2 LBS | DIASTOLIC BLOOD PRESSURE: 63 MMHG | OXYGEN SATURATION: 96 % | TEMPERATURE: 97.5 F | SYSTOLIC BLOOD PRESSURE: 118 MMHG | BODY MASS INDEX: 20.15 KG/M2 | HEART RATE: 62 BPM | RESPIRATION RATE: 22 BRPM

## 2018-06-07 DIAGNOSIS — Z51.81 ENCOUNTER FOR MONITORING COUMADIN THERAPY: ICD-10-CM

## 2018-06-07 DIAGNOSIS — I48.20 CHRONIC ATRIAL FIBRILLATION (H): Primary | ICD-10-CM

## 2018-06-07 DIAGNOSIS — Z79.01 LONG TERM CURRENT USE OF ANTICOAGULANT THERAPY: ICD-10-CM

## 2018-06-07 DIAGNOSIS — Z79.01 ENCOUNTER FOR MONITORING COUMADIN THERAPY: ICD-10-CM

## 2018-06-07 PROCEDURE — 99308 SBSQ NF CARE LOW MDM 20: CPT | Performed by: NURSE PRACTITIONER

## 2018-06-07 NOTE — PROGRESS NOTES
[place]    HPI:    Santosh Robledo is a 88 year old  (7/20/1929), who is being seen today [facility] [place] [facility]   HPI information obtained from: facility chart records, facility staff, patient report and [facility] chart review. Today's concern is INR/Coumadin management for A. Fib    Bleeding Signs/Symptoms:  None  Thromboembolic Signs/Symptoms:  None    Medication Changes:  No  Dietary Changes:  No  Activity Changes: No  Bacterial/Viral Infection:  Yes: on vancomycin for Cdiff. Stools improving    Missed Coumadin Doses:  None    On ASA: No    Other Concerns:  No    OBJECTIVE:    INR Today:  2.0  Current Dose:  2.5 mg M/W/Sat, 5 mg the other days of the week    ASSESSMENT:     Chronic atrial fibrillation (H)  Long term current use of anticoagulant therapy  Encounter for monitoring coumadin therapy  Therapeutic INR for goal of 2-3    PLAN:    New Dose: No Change      Next INR: 6/11/2018      Electronically signed by:  EUGENIO Smith CNP

## 2018-06-08 ENCOUNTER — NURSING HOME VISIT (OUTPATIENT)
Dept: GERIATRICS | Facility: CLINIC | Age: 83
End: 2018-06-08
Payer: COMMERCIAL

## 2018-06-08 VITALS
WEIGHT: 161.2 LBS | TEMPERATURE: 96.2 F | BODY MASS INDEX: 20.15 KG/M2 | SYSTOLIC BLOOD PRESSURE: 115 MMHG | DIASTOLIC BLOOD PRESSURE: 57 MMHG | OXYGEN SATURATION: 94 % | RESPIRATION RATE: 18 BRPM | HEART RATE: 65 BPM

## 2018-06-08 DIAGNOSIS — J18.9 PNEUMONIA OF RIGHT UPPER LOBE DUE TO INFECTIOUS ORGANISM: ICD-10-CM

## 2018-06-08 DIAGNOSIS — I48.20 CHRONIC ATRIAL FIBRILLATION (H): ICD-10-CM

## 2018-06-08 DIAGNOSIS — R53.81 PHYSICAL DECONDITIONING: ICD-10-CM

## 2018-06-08 DIAGNOSIS — I10 BENIGN ESSENTIAL HYPERTENSION: ICD-10-CM

## 2018-06-08 DIAGNOSIS — I50.32 CHRONIC DIASTOLIC CONGESTIVE HEART FAILURE (H): Primary | ICD-10-CM

## 2018-06-08 DIAGNOSIS — A04.72 C. DIFFICILE COLITIS: ICD-10-CM

## 2018-06-08 PROCEDURE — 99309 SBSQ NF CARE MODERATE MDM 30: CPT | Performed by: NURSE PRACTITIONER

## 2018-06-08 NOTE — MR AVS SNAPSHOT
After Visit Summary   6/8/2018    Santosh Spauldingelmstad    MRN: [id number]           Patient Information     Date Of Birth          7/20/1929        Visit Information        Provider Department      6/8/2018 1:00 PM Thelma Reaves APRN [facility]        Today's Diagnoses     Chronic diastolic congestive heart failure (H)    -  1    Chronic atrial fibrillation (H)        C. difficile colitis        Benign essential hypertension        Pneumonia of right upper lobe due to infectious organism (H)        Physical deconditioning           Follow-ups after your visit        Your next 10 appointments already scheduled     Juvenal 15, 2018 12:45 PM CDT   EP NEW with London Lynn MD   [facility] ([facility])    [address]  [place] [id number] [phone] OPT 2            Jul 30, 2018 11:00 AM CDT   [facility] Visit [facility]eda MD   [facility]    [address] [id number] [phone]           Bring a current list of meds and any records pertaining to this visit. For Physicals, please bring immunization records and any forms needing to be filled out. Please arrive 10 minutes early to complete paperwork.              Who to contact     If you have questions or need follow up information about today's clinic visit or your schedule please contact G[facility] directly at [phone].  Normal or non-critical lab and imaging results will be communicated to you by MyChart, letter or phone within 4 business days after the clinic has received the results. If you do not hear from us within 7 days, please contact the clinic through MyChart or phone. If you have a critical or abnormal lab result, we will notify you by phone as soon as possible.  Submit refill requests through [facility] or call your pharmacy and they will forward the refill  "request to us. Please allow 3 business days for your refill to be completed.          Additional Information About Your Visit        MyChart Information     [facility] lets you send messages to your doctor, view your test results, renew your prescriptions, schedule appointments and more. To sign up, go to [email or web address] . Click on \"Log in\" on the left side of the screen, which will take you to the Welcome page. Then click on \"Sign up Now\" on the right side of the page.     You will be asked to enter the access code listed below, as well as some personal information. Please follow the directions to create your username and password.     Your access code is: [email or web address]  Expires: 2018  2:26 PM     Your access code will  in 90 days. If you need help or a new code, please call your [place] clinic or [phone].        Care EveryWhere ID     This is your Care EveryWhere ID. This could be used by other organizations to access your [place] medical records  [id number]        Your Vitals Were     Pulse Temperature Respirations Pulse Oximetry BMI (Body Mass Index)       65 96.2  F (35.7  C) 18 94% 20.15 kg/m2        Blood Pressure from Last 3 Encounters:   18 115/57   18 118/63   18 122/53    Weight from Last 3 Encounters:   18 161 lb 3.2 oz (73.1 kg)   18 161 lb 3.2 oz (73.1 kg)   18 160 lb (72.6 kg)              Today, you had the following     No orders found for display       Primary Care Provider Office Phone # Fax #    Lilly Leonel Zepeda [phone] [address]  [place] [address]        Equal Access to Services     TIFF CASTANEDA : Lila Mata, waileanada alka, qaybta kaalmareji coughlin, [place] maradiaga. So [facility] [phone].    ATENCIÓN: Si habla español, tiene a gonzalez disposición servicios gratuitos de asistencia lingüística. Serena al [phone].    We comply with applicable federal " civil rights laws and [place] laws. We do not discriminate on the basis of race, color, national origin, age, disability, sex, sexual orientation, or gender identity.            Thank you!     Thank you for choosing G[facility]  for your care. Our goal is always to provide you with excellent care. Hearing back from our patients is one way we can continue to improve our services. Please take a few minutes to complete the written survey that you may receive in the mail after your visit with us. Thank you!             Your Updated Medication List - Protect others around you: Learn how to safely use, store and throw away your medicines at www.disposemymeds.org.          This list is accurate as of 6/8/18  2:22 PM.  Always use your most recent med list.                   Brand Name Dispense Instructions for use Diagnosis    B-12 1000 MCG Caps     90 capsule    Take 1 tablet by mouth daily    Vitamin B12 deficiency (non anemic)       furosemide 40 MG tablet    LASIX     Take 1 tablet (40 mg) by mouth 2 times daily    Acute congestive heart failure, unspecified congestive heart failure type (H)       hydrALAZINE 25 MG tablet    APRESOLINE     Take 1 tablet (25 mg) by mouth 3 times daily    Benign essential hypertension       lisinopril 20 MG tablet    PRINIVIL/ZESTRIL    180 tablet    Take 1 tablet (20 mg) by mouth 2 times daily    Benign essential hypertension       MELATONIN PO      Take 3 mg by mouth At Bedtime        metoprolol tartrate 25 MG tablet    LOPRESSOR     Take 1 tablet (25 mg) by mouth 2 times daily    Acute congestive heart failure, unspecified congestive heart failure type (H)       nystatin 947116 UNIT/ML suspension    MYCOSTATIN     Swish and spit 5 mLs (500,000 Units) in mouth 4 times daily    Oral thrush       omeprazole 20 MG CR capsule    priLOSEC    90 capsule    Take 1 capsule (20 mg) by mouth daily    Gastroesophageal reflux disease, esophagitis presence not specified        order for DME     1 each    Equipment being ordered: Walker Wheels () and Walker () Treatment Diagnosis: Difficulty ambulating    Colitis       order for DME     1 Device    Equipment being ordered: Walker Wheels () Treatment Diagnosis:  Weakness,colitis.    Colitis       potassium chloride SA 20 MEQ CR tablet    K-DUR/KLOR-CON M     Take 1 tablet (20 mEq) by mouth 2 times daily    Acute congestive heart failure, unspecified congestive heart failure type (H)       Simethicone 125 MG Caps    GAS-X EXTRA STRENGTH    60 capsule    Take 1 capsule by mouth 2 times daily as needed    Flatulence, eructation and gas pain       simvastatin 5 MG tablet    ZOCOR    90 tablet    Take 2 tablets (10 mg) by mouth daily    Hyperlipidemia LDL goal <100       vancomycin 50 mg/mL Liqd solution    VANOCIN     Take 125 mg by mouth 4 times daily For 12 day.        WARFARIN SODIUM PO      Take by mouth daily 2.5 mg M/W/Sa 5 mg AOD        ZOLOFT PO      Take 100 mg by mouth daily

## 2018-06-11 ENCOUNTER — TRANSFERRED RECORDS (OUTPATIENT)
Dept: HEALTH INFORMATION MANAGEMENT | Facility: CLINIC | Age: 83
End: 2018-06-11

## 2018-06-11 ENCOUNTER — NURSING HOME VISIT (OUTPATIENT)
Dept: GERIATRICS | Facility: CLINIC | Age: 83
End: 2018-06-11
Payer: COMMERCIAL

## 2018-06-11 VITALS
SYSTOLIC BLOOD PRESSURE: 137 MMHG | RESPIRATION RATE: 20 BRPM | DIASTOLIC BLOOD PRESSURE: 79 MMHG | BODY MASS INDEX: 20.27 KG/M2 | TEMPERATURE: 97.2 F | WEIGHT: 162.2 LBS | HEART RATE: 66 BPM | OXYGEN SATURATION: 95 %

## 2018-06-11 DIAGNOSIS — Z51.81 ENCOUNTER FOR MONITORING COUMADIN THERAPY: ICD-10-CM

## 2018-06-11 DIAGNOSIS — I48.20 CHRONIC ATRIAL FIBRILLATION (H): Primary | ICD-10-CM

## 2018-06-11 DIAGNOSIS — Z79.01 LONG TERM CURRENT USE OF ANTICOAGULANT THERAPY: ICD-10-CM

## 2018-06-11 DIAGNOSIS — Z79.01 ENCOUNTER FOR MONITORING COUMADIN THERAPY: ICD-10-CM

## 2018-06-11 LAB
ANION GAP SERPL CALCULATED.3IONS-SCNC: 8 MMOL/L (ref 3–14)
BUN SERPL-MCNC: 24 MG/DL (ref 7–30)
CALCIUM SERPL-MCNC: 8.7 MG/DL (ref 8.5–10.1)
CHLORIDE SERPLBLD-SCNC: 105 MMOL/L (ref 94–109)
CO2 SERPL-SCNC: 25 MMOL/L (ref 20–32)
CREAT SERPL-MCNC: 1.09 MG/DL (ref 0.66–1.25)
GFR SERPL CREATININE-BSD FRML MDRD: 64
GLUCOSE SERPL-MCNC: 98 MG/DL (ref 70–99)
HEMOGLOBIN: 11.9 G/DL (ref 13.3–17.7)
POTASSIUM SERPL-SCNC: 4.2 MMOL/L (ref 3.4–5.3)
SODIUM SERPL-SCNC: 138 MMOL/L (ref 133–144)

## 2018-06-11 PROCEDURE — 99308 SBSQ NF CARE LOW MDM 20: CPT | Performed by: NURSE PRACTITIONER

## 2018-06-11 NOTE — PROGRESS NOTES
[place] [facility]    HPI:    Santosh Robledo is a 88 year old  (7/20/1929), who is being seen today for an episodic care visit [facility] [place] [facility]   HPI information obtained from: facility chart records, facility staff, patient report and [facility] chart review. Today's concern is INR/Coumadin management for A. Fib    Bleeding Signs/Symptoms:  None  Thromboembolic Signs/Symptoms:  None    Medication Changes:  No  Dietary Changes:  No  Activity Changes: No  Bacterial/Viral Infection:  No    Missed Coumadin Doses:  None    On ASA: No    Other Concerns:  No    OBJECTIVE:    INR Today:  1.8  Current Dose:  2.5-5 mg daily    ASSESSMENT:     Chronic atrial fibrillation (H)  Long term current use of anticoagulant therapy  Encounter for monitoring coumadin therapy  Subtherapeutic INR for goal of 2-3    PLAN:    New Dose: 5 mg daily      Next INR: 6/13/2018        Electronically signed by:  EUGENIO Smith CNP

## 2018-06-13 ENCOUNTER — NURSING HOME VISIT (OUTPATIENT)
Dept: GERIATRICS | Facility: CLINIC | Age: 83
End: 2018-06-13
Payer: COMMERCIAL

## 2018-06-13 VITALS
WEIGHT: 164 LBS | RESPIRATION RATE: 16 BRPM | BODY MASS INDEX: 20.5 KG/M2 | DIASTOLIC BLOOD PRESSURE: 63 MMHG | HEART RATE: 68 BPM | SYSTOLIC BLOOD PRESSURE: 118 MMHG | OXYGEN SATURATION: 96 % | TEMPERATURE: 97.6 F

## 2018-06-13 DIAGNOSIS — I10 BENIGN ESSENTIAL HYPERTENSION: ICD-10-CM

## 2018-06-13 DIAGNOSIS — I50.33 ACUTE ON CHRONIC DIASTOLIC CONGESTIVE HEART FAILURE (H): ICD-10-CM

## 2018-06-13 DIAGNOSIS — A04.72 C. DIFFICILE COLITIS: ICD-10-CM

## 2018-06-13 DIAGNOSIS — R41.89 COGNITIVE IMPAIRMENT: ICD-10-CM

## 2018-06-13 DIAGNOSIS — J18.9 PNEUMONIA OF RIGHT UPPER LOBE DUE TO INFECTIOUS ORGANISM: ICD-10-CM

## 2018-06-13 DIAGNOSIS — I48.20 CHRONIC ATRIAL FIBRILLATION (H): Primary | ICD-10-CM

## 2018-06-13 DIAGNOSIS — R53.81 PHYSICAL DECONDITIONING: ICD-10-CM

## 2018-06-13 PROCEDURE — 99309 SBSQ NF CARE MODERATE MDM 30: CPT | Performed by: NURSE PRACTITIONER

## 2018-06-13 NOTE — PROGRESS NOTES
[facility]    Chief Complaint   Patient presents with     ASHWINI       [place] Medical Record Number:  [id number]    HPI:    Santosh Robledo is a 88 year old  (7/20/1929), who is being seen today for an episodic care visit at [facility].HPI information obtained from: facility chart records, facility staff, patient report, [facility] chart review and family/first contact wife report.    He came to this facility 5/29/2018 for short term rehab and medical management following hospitalization after presenting to the ED with worsening shortness of breath,  productive cough that was blood tinged at times and LE edema.  He had been hospitalized 5/13-5/15/2018 with C diff and was on vancomycin. He was hypoxic at 83% and tachypneic. EKG showed afib. CXR showed mixed interstitial and airspace opacities in both lungs, most confluent in the right perihilar region with small right pleural effusion. WBC 22.5, platelet 517,000,  K 2.9. BNP 12,789. INR was 6.8 and vitamin K was given. He required BiPAP in the ED. ECHO 5/23/2108: EF 55-60%, normal LV systolic function.  No previous history of CHF.  Cardiology consulted and he was diuresed with IV lasix, then transitioned to oral. Potassium was replaced. Metoprolol was started for BP and rate control.   CT chest showed infiltrates in right upper and lower lobe. He was treated with ceftriaxone and doxycycline. Discharged on ceftin for 5 more days. Oral vancomycin for C diff continued for 12 more days.     Current issues are:         Chronic atrial fibrillation (H)-INR 2.2 today on coumadin 2.5-5 mg daily. No bleeding. HR: 64-69  Acute on chronic diastolic congestive heart failure (H)-reports feeling well and wants to discharge home . Denies cough, shortness of breath or chest pain. Weight is 164 lbs. He was 167 on admission,dropped down to 160 lbs. No edema.   Pneumonia of right upper lobe due to infectious organism (H)-afebrile.   C. difficile  colitis-reports formed stools. Appetite has improved.   Benign essential hypertension-BPs: 118/63, 126/61, 132/61  Cognitive impairment-conversation appropriate. SLUMS 26/30, CPT 4.9/5.6  Physical deconditioning-ambulating 400 ft with walker and  stand by assist. Requires supervision to stand by assist with cares.       ALLERGIES: Penicillins  Past Medical, Surgical, Family and Social History reviewed and updated in [facility].    Current Outpatient Prescriptions   Medication Sig Dispense Refill     Cyanocobalamin (B-12) 1000 MCG CAPS Take 1 tablet by mouth daily 90 capsule 3     furosemide (LASIX) 40 MG tablet Take 1 tablet (40 mg) by mouth 2 times daily       hydrALAZINE (APRESOLINE) 25 MG tablet Take 1 tablet (25 mg) by mouth 3 times daily       MELATONIN PO Take 3 mg by mouth At Bedtime       metoprolol tartrate (LOPRESSOR) 25 MG tablet Take 1 tablet (25 mg) by mouth 2 times daily       omeprazole (PRILOSEC) 20 MG CR capsule Take 1 capsule (20 mg) by mouth daily 90 capsule 3     order for DME Equipment being ordered: Walker Wheels () and Walker ()  Treatment Diagnosis: Difficulty ambulating 1 each 0     order for DME Equipment being ordered: Walker Wheels ()  Treatment Diagnosis:  Weakness,colitis. 1 Device 0     potassium chloride SA (K-DUR/KLOR-CON M) 20 MEQ CR tablet Take 1 tablet (20 mEq) by mouth 2 times daily       Sertraline HCl (ZOLOFT PO) Take 100 mg by mouth daily       Simethicone (GAS-X EXTRA STRENGTH) 125 MG CAPS Take 1 capsule by mouth 2 times daily as needed 60 capsule 11     simvastatin (ZOCOR) 5 MG tablet Take 2 tablets (10 mg) by mouth daily 90 tablet 3     WARFARIN SODIUM PO Take by mouth daily 2.5 mg M/W/Sa  5 mg AOD       lisinopril (PRINIVIL/ZESTRIL) 20 MG tablet Take 1 tablet (20 mg) by mouth daily 180 tablet 3     Medications reviewed:  Medications reconciled to facility chart and changes were made to reflect current medications as identified as above med list. Below are the  changes that were made:   Medications stopped since last EPIC medication reconciliation:   There are no discontinued medications.    Medications started since last [facility] medication reconciliation:  No orders of the defined types were placed in this encounter.    REVIEW OF SYSTEMS:  10 point ROS of systems including Constitutional, Eyes, Respiratory, Cardiovascular, Gastroenterology, Genitourinary, Integumentary, Muscularskeletal, Psychiatric were all negative except for pertinent positives noted in my HPI.    Physical Exam:  /63  Pulse 68  Temp 97.6  F (36.4  C)  Resp 16  Wt 164 lb (74.4 kg)  SpO2 96%  BMI 20.5 kg/m2  GENERAL APPEARANCE:  Alert, in no distress  ENT:  Mouth and posterior oropharynx normal, moist mucous membranes, [place]  EYES:  EOM normal, conjunctiva and lids normal  NECK:  No adenopathy,masses or thyromegaly  RESP:  respiratory effort and palpation of chest normal, no respiratory distress,slightly  diminished breath sounds bilaterally,  no crackles or wheezes.  CV:  Palpation and auscultation of heart done , regular rate and rhythm, 2/6 murmur, no edema, +2 pedal pulses  ABDOMEN:  soft, nontender, no hepatosplenomegaly or other masses  M/S:  Gait steady with walker. STEWART with good strength. No joint inflammation  SKIN:  no rashes or open areas  PSYCH:  oriented X 3, memory impaired , affect and mood normal      Recent Labs:   Last Basic Metabolic Panel:  Lab Results   Component Value Date     06/11/2018      Lab Results   Component Value Date    POTASSIUM 4.2 06/11/2018     Lab Results   Component Value Date    CHLORIDE 105 06/11/2018     Lab Results   Component Value Date    ANGELITA 8.7 06/11/2018     Lab Results   Component Value Date    CO2 25 06/11/2018     Lab Results   Component Value Date    BUN 24 06/11/2018     Lab Results   Component Value Date    CR 1.09 06/11/2018     Lab Results   Component Value Date    GLC 98 06/11/2018     Lab Results   Component Value Date    WBC 9.4  05/31/2018     Lab Results   Component Value Date    RBC 4.28 05/31/2018     Lab Results   Component Value Date    HGB 11.9 06/11/2018     Lab Results   Component Value Date    HCT 34.0 05/31/2018     Lab Results   Component Value Date    MCV 79 05/31/2018     Lab Results   Component Value Date    MCH 25.9 05/31/2018     Lab Results   Component Value Date    MCHC 32.6 05/31/2018     Lab Results   Component Value Date    RDW 15.2 05/31/2018     Lab Results   Component Value Date     05/31/2018     ASSESSMENT / PLAN:  (I48.2) Chronic atrial fibrillation (H)  (primary encounter diagnosis)  Comment: rate controlled. INR therapeutic  Plan: continue metoprolol. Coumadin 5 mg daily. INR 6/15/2018. Cardiology follow up 6/15/2018    (I50.33) Acute on chronic diastolic congestive heart failure (H)  Comment: compensated  Plan: continue lasix, lisinopril, metoprolol. Daily weight. Low sodium diet. Hgb, BMP    (J18.1) Pneumonia of right upper lobe due to infectious organism (H)  Comment: appears resolved. Completed Ceftin 6/3/2018  Plan: monitor respiratory status    (A04.72) C. difficile colitis  Comment: appears resolved. Completed vancomycin 6/10/2018  Plan: monitor for recurrent symptoms    (I10) Benign essential hypertension  Comment: controlled  Plan: continue lasix, hydralazine, lisinopril, metoprolol. Monitor VS.    (R41.89) Cognitive impairment  Comment: mild deficits on cognitive testing  Plan: supportive care    (R53.81) Physical deconditioning  Comment: progressing in therapies  Plan: continue PHYSICAL THERAPY/OT. Goal is to return home with his wife and services.       Electronically signed by  EUGENIO Smith CNP

## 2018-06-15 ENCOUNTER — NURSING HOME VISIT (OUTPATIENT)
Dept: GERIATRICS | Facility: CLINIC | Age: 83
End: 2018-06-15
Payer: COMMERCIAL

## 2018-06-15 ENCOUNTER — ALLIED HEALTH/NURSE VISIT (OUTPATIENT)
Dept: CARDIOLOGY | Facility: CLINIC | Age: 83
End: 2018-06-15
Payer: COMMERCIAL

## 2018-06-15 ENCOUNTER — OFFICE VISIT (OUTPATIENT)
Dept: CARDIOLOGY | Facility: CLINIC | Age: 83
End: 2018-06-15
Payer: COMMERCIAL

## 2018-06-15 ENCOUNTER — CARE COORDINATION (OUTPATIENT)
Dept: CARDIOLOGY | Facility: CLINIC | Age: 83
End: 2018-06-15

## 2018-06-15 VITALS
WEIGHT: 168 LBS | HEIGHT: 75 IN | OXYGEN SATURATION: 97 % | BODY MASS INDEX: 20.89 KG/M2 | SYSTOLIC BLOOD PRESSURE: 106 MMHG | HEART RATE: 63 BPM | DIASTOLIC BLOOD PRESSURE: 44 MMHG

## 2018-06-15 VITALS
TEMPERATURE: 98.5 F | DIASTOLIC BLOOD PRESSURE: 53 MMHG | SYSTOLIC BLOOD PRESSURE: 120 MMHG | RESPIRATION RATE: 16 BRPM | BODY MASS INDEX: 20.65 KG/M2 | OXYGEN SATURATION: 95 % | HEART RATE: 73 BPM | WEIGHT: 165.2 LBS

## 2018-06-15 DIAGNOSIS — I10 BENIGN ESSENTIAL HYPERTENSION: Primary | ICD-10-CM

## 2018-06-15 DIAGNOSIS — Z79.01 ENCOUNTER FOR MONITORING COUMADIN THERAPY: ICD-10-CM

## 2018-06-15 DIAGNOSIS — I48.20 CHRONIC ATRIAL FIBRILLATION (H): Primary | ICD-10-CM

## 2018-06-15 DIAGNOSIS — Z79.01 LONG TERM CURRENT USE OF ANTICOAGULANT THERAPY: ICD-10-CM

## 2018-06-15 DIAGNOSIS — I49.5 SSS (SICK SINUS SYNDROME) (H): ICD-10-CM

## 2018-06-15 DIAGNOSIS — Z95.0 STATUS CARDIAC PACEMAKER: Primary | ICD-10-CM

## 2018-06-15 DIAGNOSIS — Z51.81 ENCOUNTER FOR MONITORING COUMADIN THERAPY: ICD-10-CM

## 2018-06-15 PROCEDURE — 99214 OFFICE O/P EST MOD 30 MIN: CPT | Performed by: INTERNAL MEDICINE

## 2018-06-15 PROCEDURE — 93279 PRGRMG DEV EVAL PM/LDLS PM: CPT | Performed by: INTERNAL MEDICINE

## 2018-06-15 PROCEDURE — 99308 SBSQ NF CARE LOW MDM 20: CPT | Performed by: NURSE PRACTITIONER

## 2018-06-15 RX ORDER — LISINOPRIL 20 MG/1
20 TABLET ORAL DAILY
Qty: 180 TABLET | Refills: 3 | COMMUNITY
Start: 2018-06-15 | End: 2019-06-04

## 2018-06-15 NOTE — PROGRESS NOTES
Received return call from Alicia, nurse manager at [facility], who reports she is unsure if pt will still be at [facility] on 6/26 and 6/27 and will plan to update CORE next week once d/c plan known.     ADRIANA Rivas 2:52 PM 6/15/2018

## 2018-06-15 NOTE — LETTER
6/15/2018      Lilly Zepeda MD  [address] [facility] [address]  [place] [address]      RE: Santosh KENNY Jassonchrissonal       Dear Colleague,    I had the pleasure of seeing Santosh Robledo in the [facility].    Service Date: 06/15/2018      REASON FOR VISIT:  Establish device care.      HISTORY OF PRESENT ILLNESS:  Mr. Robledo is a pleasant 88-year-old gentleman with a history of hypertension, hyperlipidemia, preserved EF heart failure, paroxysmal atrial fibrillation and tachybrady syndrome (previous pacemaker implantation) who is here to establish care.      The patient was recently admitted in the hospital in May with pneumonia.  Hospital stay was complicated by preserved EF heart failure and C. diff colitis.  He was discharged to the [facility].  He is here today to establish device care with us, as he has switched care as he moved close to [place].      Today he denies any symptoms of chest pain, shortness of breath, lightheadedness, near-syncope or syncopal episode.      Device was interrogated today.  Lead parameters are stable.  He is ventricular paced 35% of the time.      Echocardiogram obtained on 05/23/2018 revealed an EF of 55%-60%.  EKG obtained 05/23 revealed atrial fibrillation with controlled ventricular response.      ASSESSMENT AND PLAN:   1.  Device care.  Continue device checks q.3 months.   2.  Paroxysmal AFib, asymptomatic.  Heart rate seems to be well controlled.  Continue therapy with metoprolol.   3.  Embolic prevention.  CHADS-VASc score of 3.  Continue coagulation of Coumadin indefinitely.   4.  Hypertension.  Blood pressure is actually running low.  He informs that on [facility] they have been holding some of his medications.  I recommend decreasing lisinopril dose from 20 mg b.i.d. to 20 mg daily.   5.  Preserved EF heart failure.  Mildly fluid overloaded on physical exam; however, he seems to be doing well.  I will refer him to [facility] for closer  followup.         LONDON MEDEL MD             D: 06/15/2018   T: 06/15/2018   MT: NATAN      Name:     TRUE MATTHEWS   MRN:      [id number]        Account:      [id number]   :      1929           Service Date: 06/15/2018      Document: [id number]         Outpatient Encounter Prescriptions as of 6/15/2018   Medication Sig Dispense Refill     Cyanocobalamin (B-12) 1000 MCG CAPS Take 1 tablet by mouth daily 90 capsule 3     furosemide (LASIX) 40 MG tablet Take 1 tablet (40 mg) by mouth 2 times daily       hydrALAZINE (APRESOLINE) 25 MG tablet Take 1 tablet (25 mg) by mouth 3 times daily       lisinopril (PRINIVIL/ZESTRIL) 20 MG tablet Take 1 tablet (20 mg) by mouth daily 180 tablet 3     MELATONIN PO Take 3 mg by mouth At Bedtime       metoprolol tartrate (LOPRESSOR) 25 MG tablet Take 1 tablet (25 mg) by mouth 2 times daily       omeprazole (PRILOSEC) 20 MG CR capsule Take 1 capsule (20 mg) by mouth daily 90 capsule 3     order for DME Equipment being ordered: Walker Wheels () and Walker ()  Treatment Diagnosis: Difficulty ambulating 1 each 0     order for DME Equipment being ordered: Walker Wheels ()  Treatment Diagnosis:  Weakness,colitis. 1 Device 0     potassium chloride SA (K-DUR/KLOR-CON M) 20 MEQ CR tablet Take 1 tablet (20 mEq) by mouth 2 times daily       Sertraline HCl (ZOLOFT PO) Take 100 mg by mouth daily       Simethicone (GAS-X EXTRA STRENGTH) 125 MG CAPS Take 1 capsule by mouth 2 times daily as needed 60 capsule 11     simvastatin (ZOCOR) 5 MG tablet Take 2 tablets (10 mg) by mouth daily 90 tablet 3     WARFARIN SODIUM PO Take by mouth daily 2.5 mg M/W/Sa  5 mg AOD       [DISCONTINUED] lisinopril (PRINIVIL/ZESTRIL) 20 MG tablet Take 1 tablet (20 mg) by mouth 2 times daily 180 tablet 3     No facility-administered encounter medications on file as of 6/15/2018.        Again, thank you for allowing me to participate in the care of your patient.      Sincerely,    [place]  Zack Lynn MD     [facility]

## 2018-06-15 NOTE — PROGRESS NOTES
Medtronic Advisa (S) Pacemaker Device Check  AP: N/A% : 35 %  Mode: VVIR 60        Underlying Rhythm: Permanent AF with a controlled/variable  V response  Heart Rate: Stable with good variability  Sensing: WNL    Pacing Threshold: WNL   Impedance: WNL  Battery Status: Estimated at 8 years and 5 months remainig  Device Site: Well healed, barley visible scar.  Atrial Arrhythmia: Permanent AF- On warfarin  Ventricular Arrhythmia: 1 VHR lasting 16 beats- irregular suggestive of a short burst of RVR  Setting Change: None    Care Plan: Begin Q 3 month remote transmission. Confirmed pt has remtoe monitor. Seeing Dr Lynn today as well to establish care here after recent move. ADRIANA Martinez

## 2018-06-15 NOTE — PROGRESS NOTES
[facility]    HPI:    Santosh Robledo is a 88 year old  (7/20/1929), who is being seen today for an episodic [facility] visit [facility] [place]   HPI information obtained from: facility chart records, facility staff, patient report and [facility] chart review. Today's concern is INR/Coumadin management for A. Fib    Bleeding Signs/Symptoms:  None  Thromboembolic Signs/Symptoms:  None    Medication Changes:  No  Dietary Changes:  No  Activity Changes: No  Bacterial/Viral Infection:  No    Missed Coumadin Doses:  None    On ASA: No    Other Concerns:  No    OBJECTIVE:    INR Today:  2.5  Current Dose:  2.5-5 mg daily    ASSESSMENT:     Chronic atrial fibrillation (H)  Long term current use of anticoagulant therapy  Encounter for monitoring coumadin therapy  Therapeutic INR for goal of 2-3    PLAN:    New Dose:  2.5 mg MWSat, 5 mg the other days of the week    Next INR: 6/19/2018        Electronically signed by:  EUGENIO Smith CNP

## 2018-06-15 NOTE — MR AVS SNAPSHOT
After Visit Summary   6/15/2018    Santosh Robledo    MRN: [id number]           Patient Information     Date Of Birth          7/20/1929        Visit Information        Provider Department      6/15/2018 12:30 PM AD[facility]ER DCR2 [facility]        Today's Diagnoses     Status cardiac pacemaker    -  1    SSS (sick sinus syndrome) (H)           Follow-ups after your visit        Your next 10 appointments already scheduled     Jul 30, 2018 11:00 AM CDT   [facility] Visit with Lilly Zepeda MD   [facility]    [address] [facility] [address] [id number] [phone]           Bring a current list of meds and any records pertaining to this visit. For Physicals, please bring immunization records and any forms needing to be filled out. Please arrive 10 minutes early to complete paperwork.            Sep 20, 2018  4:30 PM CDT   Remote PPM Check with ADLER TECH1   [facility] ([facility])    [address]  [place] [address] [phone] OPT 2           This appointment is for a remote check of your pacemaker.  This is not an appointment at the office.              Who to contact     If you have questions or need follow up information about today's clinic visit or your schedule please contact [facility] directly at [phone].  Normal or non-critical lab and imaging results will be communicated to you by MyChart, letter or phone within 4 business days after the clinic has received the results. If you do not hear from us within 7 days, please contact the clinic through MyChart or phone. If you have a critical or abnormal lab result, we will notify you by phone as soon as possible.  Submit refill requests through [employer] or call your pharmacy and they will forward the refill request to us. Please allow 3 business days for your refill to  "be completed.          Additional Information About Your Visit        [employer] Information     [employer] lets you send messages to your doctor, view your test results, renew your prescriptions, schedule appointments and more. To sign up, go to [email or web address] . Click on \"Log in\" on the left side of the screen, which will take you to the Welcome page. Then click on \"Sign up Now\" on the right side of the page.     You will be asked to enter the access code listed below, as well as some personal information. Please follow the directions to create your username and password.     Your access code is: [email or web address]  Expires: 2018  2:26 PM     Your access code will  in 90 days. If you need help or a new code, please call your [place] clinic or [phone].        Care EveryWhere ID     This is your Care EveryWhere ID. This could be used by other organizations to access your [facility] [place] medical records  [id number]         Blood Pressure from Last 3 Encounters:   06/15/18 106/44   06/15/18 120/53   18 118/63    Weight from Last 3 Encounters:   06/15/18 76.2 kg (168 lb)   06/15/18 74.9 kg (165 lb 3.2 oz)   18 74.4 kg (164 lb)              We Performed the Following     PM DEVICE PROGRAMMING EVAL, SINGLE LEAD PACER ([id number])        Primary Care Provider Office Phone # Fax #    Lilly Leonel Zepeda [phone] [address] [facility] [address]  [facility] [address]        Equal Access to Services     [facility]: Hadii aad diana hadasho Sosarah, waaxda luqadaha, qaybta kaalmareji coughlin, kingston garcia . So [facility] [phone].    ATENCIÓN: Si habla español, tiene a gonzalez disposición servicios gratuitos de asistencia lingüística. Llame al [phone].    We comply with applicable federal civil rights laws and [place] laws. We do not discriminate on the basis of race, color, national origin, age, disability, sex, sexual orientation, or gender identity.            Thank you! "     Thank you for choosing [facility]  for your care. Our goal is always to provide you with excellent care. Hearing back from our patients is one way we can continue to improve our services. Please take a few minutes to complete the written survey that you may receive in the mail after your visit with us. Thank you!             Your Updated Medication List - Protect others around you: Learn how to safely use, store and throw away your medicines at www.disposemymeds.org.          This list is accurate as of 6/15/18  1:06 PM.  Always use your most recent med list.                   Brand Name Dispense Instructions for use Diagnosis    B-12 1000 MCG Caps     90 capsule    Take 1 tablet by mouth daily    Vitamin B12 deficiency (non anemic)       furosemide 40 MG tablet    LASIX     Take 1 tablet (40 mg) by mouth 2 times daily    Acute congestive heart failure, unspecified congestive heart failure type (H)       hydrALAZINE 25 MG tablet    APRESOLINE     Take 1 tablet (25 mg) by mouth 3 times daily    Benign essential hypertension       lisinopril 20 MG tablet    PRINIVIL/ZESTRIL    180 tablet    Take 1 tablet (20 mg) by mouth 2 times daily    Benign essential hypertension       MELATONIN PO      Take 3 mg by mouth At Bedtime        metoprolol tartrate 25 MG tablet    LOPRESSOR     Take 1 tablet (25 mg) by mouth 2 times daily    Acute congestive heart failure, unspecified congestive heart failure type (H)       omeprazole 20 MG CR capsule    priLOSEC    90 capsule    Take 1 capsule (20 mg) by mouth daily    Gastroesophageal reflux disease, esophagitis presence not specified       order for DME     1 each    Equipment being ordered: Walker Wheels () and Walker () Treatment Diagnosis: Difficulty ambulating    Colitis       order for DME     1 Device    Equipment being ordered: Walker Wheels () Treatment Diagnosis:  Weakness,colitis.    Colitis       potassium chloride  SA 20 MEQ CR tablet    K-DUR/KLOR-CON M     Take 1 tablet (20 mEq) by mouth 2 times daily    Acute congestive heart failure, unspecified congestive heart failure type (H)       Simethicone 125 MG Caps    GAS-X EXTRA STRENGTH    60 capsule    Take 1 capsule by mouth 2 times daily as needed    Flatulence, eructation and gas pain       simvastatin 5 MG tablet    ZOCOR    90 tablet    Take 2 tablets (10 mg) by mouth daily    Hyperlipidemia LDL goal <100       WARFARIN SODIUM PO      Take by mouth daily 2.5 mg M/W/Sa 5 mg AOD        ZOLOFT PO      Take 100 mg by mouth daily

## 2018-06-15 NOTE — PROGRESS NOTES
"[id number]    Electrophysiology/ Clinic Note         H&P and Plan:           London Lynn MD    Physical Exam:  Vitals: /44 (BP Location: Right arm, Patient Position: Chair, Cuff Size: Adult Regular)  Pulse 63  Ht 1.905 m (6' 3\")  Wt 76.2 kg (168 lb)  SpO2 97%  BMI 21 kg/m2    Constitutional:  AAO x3.  Pt is in NAD.  HEAD: normocephalic.  SKIN: Skin normal color, texture and turgor with no lesions or eruptions.  Eyes: PERRL, EOMI.  ENT:  Supple, normal JVP. No lymphadenopathy or thyroid enlargement.  Chest:  CTAB.  Cardiac:  RRR, normal  S1 and S2.  No murmurs rubs or gallop.    Abdomen:  Normal BS.  Soft, non-tender and non-distended.  No rebound or guarding.    Extremities:  Pedious pulses palpable B/L.   LE edema noticed (1+).   Neurological: Strength and sensation grossly symmetric and intact throughout.       CURRENT MEDICATIONS:  Current Outpatient Prescriptions   Medication Sig Dispense Refill     Cyanocobalamin (B-12) 1000 MCG CAPS Take 1 tablet by mouth daily 90 capsule 3     furosemide (LASIX) 40 MG tablet Take 1 tablet (40 mg) by mouth 2 times daily       hydrALAZINE (APRESOLINE) 25 MG tablet Take 1 tablet (25 mg) by mouth 3 times daily       lisinopril (PRINIVIL/ZESTRIL) 20 MG tablet Take 1 tablet (20 mg) by mouth daily 180 tablet 3     MELATONIN PO Take 3 mg by mouth At Bedtime       metoprolol tartrate (LOPRESSOR) 25 MG tablet Take 1 tablet (25 mg) by mouth 2 times daily       omeprazole (PRILOSEC) 20 MG CR capsule Take 1 capsule (20 mg) by mouth daily 90 capsule 3     order for DME Equipment being ordered: Walker Wheels () and Walker ()  Treatment Diagnosis: Difficulty ambulating 1 each 0     order for DME Equipment being ordered: Walker Wheels ()  Treatment Diagnosis:  Weakness,colitis. 1 Device 0     potassium chloride SA (K-DUR/KLOR-CON M) 20 MEQ CR tablet Take 1 tablet (20 mEq) by mouth 2 times daily       Sertraline HCl (ZOLOFT PO) Take 100 mg by mouth daily       " Simethicone (GAS-X EXTRA STRENGTH) 125 MG CAPS Take 1 capsule by mouth 2 times daily as needed 60 capsule 11     simvastatin (ZOCOR) 5 MG tablet Take 2 tablets (10 mg) by mouth daily 90 tablet 3     WARFARIN SODIUM PO Take by mouth daily 2.5 mg M/W/Sa  5 mg AOD       [DISCONTINUED] lisinopril (PRINIVIL/ZESTRIL) 20 MG tablet Take 1 tablet (20 mg) by mouth 2 times daily 180 tablet 3       ALLERGIES     Allergies   Allergen Reactions     Penicillins Rash       PAST MEDICAL HISTORY:  Past Medical History:   Diagnosis Date     AV node dysfunction      Chronic atrial fibrillation (H) 2004     GERD (gastroesophageal reflux disease)      Hyperlipidemia      Near syncope      MARILU (obstructive sleep apnea)        PAST SURGICAL HISTORY:  Past Surgical History:   Procedure Laterality Date     IMPLANT PACEMAKER  08/10/2015       FAMILY HISTORY:  Family History   Problem Relation Age of Onset     Influenza/Pneumonia Mother      Prostate Cancer Father        SOCIAL HISTORY:  Social History     Social History     Marital status:      Spouse name: N/A     Number of children: N/A     Years of education: N/A     Social History Main Topics     Smoking status: Never Smoker     Smokeless tobacco: Never Used     Alcohol use Yes     Drug use: No     Sexual activity: Yes     Partners: Female     Other Topics Concern     None     Social History Narrative       Review of Systems:  Skin:  Negative     Eyes:  Positive for glasses  ENT:  Negative    Respiratory:  Positive for shortness of breath;dyspnea on exertion;dyspnea at rest;sleep apnea  Cardiovascular:  Negative;palpitations;chest pain edema;Positive for;fatigue;lightheadedness;dizziness;syncope or near-syncope  Gastroenterology: Positive for nausea  Genitourinary:  Negative    Musculoskeletal:  Negative    Neurologic:  Positive for numbness or tingling of feet  Psychiatric:  Negative    Heme/Lymph/Imm:  Negative    Endocrine:  Negative        Recent Lab Results:  LIPID  RESULTS:  No results found for: CHOL, HDL, LDL, TRIG, CHOLHDLRATIO    LIVER ENZYME RESULTS:  Lab Results   Component Value Date    AST 10 2018    ALT 13 2018       CBC RESULTS:  Lab Results   Component Value Date    WBC 9.4 2018    RBC 4.28 (L) 2018    HGB 11.9 (A) 2018    HCT 34.0 (L) 2018    MCV 79 2018    MCH 25.9 (L) 2018    MCHC 32.6 2018    RDW 15.2 (H) 2018     (H) 2018       BMP RESULTS:  Lab Results   Component Value Date     2018    POTASSIUM 4.2 2018    CHLORIDE 105 2018    CO2 25 2018    ANIONGAP 8 2018    GLC 98 2018    BUN 24 2018    CR 1.09 2018    GFRESTIMATED 64 2018    GFRESTBLACK 77 2018    ANGELITA 8.7 2018        A1C RESULTS:  No results found for: A1C    INR RESULTS:  Lab Results   Component Value Date    INR 2.12 (H) 2018    INR 2.07 (H) 2018         ECHOCARDIOGRAM  Recent Results (from the past 8760 hour(s))   ECHO COMPLETE WITH OPTISON    Narrative    [id number]  Pending sale to [facility]  [id number]  [id number]^^JYOTSNA^[facility]  Echocardiography Laboratory  [address] [place] [address]        Name: TRUE MATTHEWS  MRN: [id number]  : 1929  Study Date: 2018 12:51 PM  Age: 88 yrs  Gender: Male  Patient Location: [facility]  Reason For Study: CHF  Ordering Physician: JYOTSNA QUINTANILLA  Referring Physician: Lilly Zepeda Johsua  Performed By: Migdalia Merlos     BSA: 2.0 m2  Height: 75 in  Weight: 170 lb  HR: 71  BP: 164/90 mmHg  _____________________________________________________________________________  __        Procedure  Complete Portable Echo Adult. Contrast Optison.  _____________________________________________________________________________  __        Interpretation Summary     Left ventricular systolic function is normal.  The visual ejection fraction is estimated at 55-60%.  Dilated inferior  vena cava  The study was technically difficult.  _____________________________________________________________________________  __        Left Ventricle  The left ventricle is normal in size. Left ventricular systolic function is  normal. The visual ejection fraction is estimated at 55-60%. Diastolic  function not assessed due to atrial fibrillation. No regional wall motion  abnormalities noted.     Right Ventricle  Borderline right ventricular enlargement. The right ventricular systolic  function is normal. The right ventricle is not well visualized. There is a  pacemaker lead in the right ventricle.     Atria  The left atrium is not well visualized. Right atrium not well visualized.     Mitral Valve  There is mild mitral annular calcification. There is trace mitral  regurgitation. There is no mitral valve stenosis.        Tricuspid Valve  The tricuspid valve is not well visualized. The right ventricular systolic  pressure is approximated at 27.0 mmHg plus the right atrial pressure.     Aortic Valve  The aortic valve is trileaflet with aortic valve sclerosis. There is trace  aortic regurgitation. No hemodynamically significant valvular aortic stenosis.     Pulmonic Valve  The pulmonic valve is not well visualized.     Vessels  Borderline aortic root dilatation. Dilated inferior vena cava.     Pericardium  There is no pericardial effusion.     _____________________________________________________________________________  __  MMode/2D Measurements & Calculations  IVSd: 1.0 cm  LVIDd: 4.4 cm  LVIDs: 3.5 cm  LVPWd: 0.89 cm  FS: 21.5 %  LV mass(C)d: 139.2 grams  LV mass(C)dI: 68.0 grams/m2     Ao root diam: 3.9 cm  LA dimension: 3.6 cm  asc Aorta Diam: 3.5 cm  LA/Ao: 0.93  LA Volume (BP): 57.3 ml  LA Volume Index (BP): 28.0 ml/m2  RWT: 0.40        Doppler Measurements & Calculations  MV E max heidy: 104.5 cm/sec  Ao V2 max: 137.7 cm/sec  Ao max P.0 mmHg     PA acc time: 0.12 sec  TR max heidy: 259.9 cm/sec  TR max PG:  27.0 mmHg  E/E' av.8  Lateral E/e': 9.9  Medial E/e': 13.7           _____________________________________________________________________________  __           Report approved by: Shy Jama 2018 01:49 PM            Orders Placed This Encounter   Procedures     Follow-Up with [facility] - [facility] visit     Orders Placed This Encounter   Medications     lisinopril (PRINIVIL/ZESTRIL) 20 MG tablet     Sig: Take 1 tablet (20 mg) by mouth daily     Dispense:  180 tablet     Refill:  3     Medications Discontinued During This Encounter   Medication Reason     lisinopril (PRINIVIL/ZESTRIL) 20 MG tablet Reorder         Encounter Diagnosis   Name Primary?     Benign essential hypertension Yes         CC  Lilly Zepeda MD  [address]  [place] [address]

## 2018-06-15 NOTE — PROGRESS NOTES
Received update from scheduling that Dr. Lynn put in order after OV today for pt to see [facility] and pt has been scheduled for [facility] with DEENA Wills on 6/27/18 at 8:10am.      Called Alicia, nurse manager at [facility] reviewing pt's upcoming appt date/time and requested return call to confirm VM received and to know if they can do labs (BMP, BNP and TSH) on 6/26.      ADRIANA Rivas 2:14 PM 6/15/2018

## 2018-06-15 NOTE — LETTER
"6/15/2018    Lilly Zepeda MD  [address]  [place] [address]    RE: Santosh Robledo       Dear Colleague,    I had the pleasure of seeing Santosh Robledo in the [facility].    [id number]    Electrophysiology/ Clinic Note         H&P and Plan:           London Lnyn MD    Physical Exam:  Vitals: /44 (BP Location: Right arm, Patient Position: Chair, Cuff Size: Adult Regular)  Pulse 63  Ht 1.905 m (6' 3\")  Wt 76.2 kg (168 lb)  SpO2 97%  BMI 21 kg/m2    Constitutional:  AAO x3.  Pt is in NAD.  HEAD: normocephalic.  SKIN: Skin normal color, texture and turgor with no lesions or eruptions.  Eyes: PERRL, EOMI.  ENT:  Supple, normal JVP. No lymphadenopathy or thyroid enlargement.  Chest:  CTAB.  Cardiac:  RRR, normal  S1 and S2.  No murmurs rubs or gallop.    Abdomen:  Normal BS.  Soft, non-tender and non-distended.  No rebound or guarding.    Extremities:  Pedious pulses palpable B/L.   LE edema noticed (1+).   Neurological: Strength and sensation grossly symmetric and intact throughout.       CURRENT MEDICATIONS:  Current Outpatient Prescriptions   Medication Sig Dispense Refill     Cyanocobalamin (B-12) 1000 MCG CAPS Take 1 tablet by mouth daily 90 capsule 3     furosemide (LASIX) 40 MG tablet Take 1 tablet (40 mg) by mouth 2 times daily       hydrALAZINE (APRESOLINE) 25 MG tablet Take 1 tablet (25 mg) by mouth 3 times daily       lisinopril (PRINIVIL/ZESTRIL) 20 MG tablet Take 1 tablet (20 mg) by mouth daily 180 tablet 3     MELATONIN PO Take 3 mg by mouth At Bedtime       metoprolol tartrate (LOPRESSOR) 25 MG tablet Take 1 tablet (25 mg) by mouth 2 times daily       omeprazole (PRILOSEC) 20 MG CR capsule Take 1 capsule (20 mg) by mouth daily 90 capsule 3     order for DME Equipment being ordered: Walker Wheels () and Walker ()  Treatment Diagnosis: Difficulty ambulating 1 each 0     order for DME Equipment being ordered: Walker Wheels " ()  Treatment Diagnosis:  Weakness,colitis. 1 Device 0     potassium chloride SA (K-DUR/KLOR-CON M) 20 MEQ CR tablet Take 1 tablet (20 mEq) by mouth 2 times daily       Sertraline HCl (ZOLOFT PO) Take 100 mg by mouth daily       Simethicone (GAS-X EXTRA STRENGTH) 125 MG CAPS Take 1 capsule by mouth 2 times daily as needed 60 capsule 11     simvastatin (ZOCOR) 5 MG tablet Take 2 tablets (10 mg) by mouth daily 90 tablet 3     WARFARIN SODIUM PO Take by mouth daily 2.5 mg M/W/Sa  5 mg AOD       [DISCONTINUED] lisinopril (PRINIVIL/ZESTRIL) 20 MG tablet Take 1 tablet (20 mg) by mouth 2 times daily 180 tablet 3       ALLERGIES     Allergies   Allergen Reactions     Penicillins Rash       PAST MEDICAL HISTORY:  Past Medical History:   Diagnosis Date     AV node dysfunction      Chronic atrial fibrillation (H) 2004     GERD (gastroesophageal reflux disease)      Hyperlipidemia      Near syncope      MARILU (obstructive sleep apnea)        PAST SURGICAL HISTORY:  Past Surgical History:   Procedure Laterality Date     IMPLANT PACEMAKER  08/10/2015       FAMILY HISTORY:  Family History   Problem Relation Age of Onset     Influenza/Pneumonia Mother      Prostate Cancer Father        SOCIAL HISTORY:  Social History     Social History     Marital status:      Spouse name: N/A     Number of children: N/A     Years of education: N/A     Social History Main Topics     Smoking status: Never Smoker     Smokeless tobacco: Never Used     Alcohol use Yes     Drug use: No     Sexual activity: Yes     Partners: Female     Other Topics Concern     None     Social History Narrative       Review of Systems:  Skin:  Negative     Eyes:  Positive for glasses  ENT:  Negative    Respiratory:  Positive for shortness of breath;dyspnea on exertion;dyspnea at rest;sleep apnea  Cardiovascular:  Negative;palpitations;chest pain edema;Positive for;fatigue;lightheadedness;dizziness;syncope or near-syncope  Gastroenterology: Positive for  nausea  Genitourinary:  Negative    Musculoskeletal:  Negative    Neurologic:  Positive for numbness or tingling of feet  Psychiatric:  Negative    Heme/Lymph/Imm:  Negative    Endocrine:  Negative        Recent Lab Results:  LIPID RESULTS:  No results found for: CHOL, HDL, LDL, TRIG, CHOLHDLRATIO    LIVER ENZYME RESULTS:  Lab Results   Component Value Date    AST 10 2018    ALT 13 2018       CBC RESULTS:  Lab Results   Component Value Date    WBC 9.4 2018    RBC 4.28 (L) 2018    HGB 11.9 (A) 2018    HCT 34.0 (L) 2018    MCV 79 2018    MCH 25.9 (L) 2018    MCHC 32.6 2018    RDW 15.2 (H) 2018     (H) 2018       BMP RESULTS:  Lab Results   Component Value Date     2018    POTASSIUM 4.2 2018    CHLORIDE 105 2018    CO2 25 2018    ANIONGAP 8 2018    GLC 98 2018    BUN 24 2018    CR 1.09 2018    GFRESTIMATED 64 2018    GFRESTBLACK 77 2018    ANGELITA 8.7 2018        A1C RESULTS:  No results found for: A1C    INR RESULTS:  Lab Results   Component Value Date    INR 2.12 (H) 2018    INR 2.07 (H) 2018         ECHOCARDIOGRAM  Recent Results (from the past 8760 hour(s))   ECHO COMPLETE WITH OPTISON    Narrative    [id number]  Formerly [facility]  [id number]  [id number]^^JYOTSNA^ANGELA           [facility]  Echocardiography Laboratory  [address] [place] [address]        Name: TRUE MATTHEWS  MRN: [id number]  : 1929  Study Date: 2018 12:51 PM  Age: 88 yrs  Gender: Male  Patient Location: [facility]  Reason For Study: CHF  Ordering Physician: JYOTSNA QUINTANILLA  Referring Physician: Lilly Zepeda Johsua  Performed By: Migdalia Merlos     BSA: 2.0 m2  Height: 75 in  Weight: 170 lb  HR: 71  BP: 164/90 mmHg  _____________________________________________________________________________  __        Procedure  Complete Portable Echo Adult. Contrast  Optison.  _____________________________________________________________________________  __        Interpretation Summary     Left ventricular systolic function is normal.  The visual ejection fraction is estimated at 55-60%.  Dilated inferior vena cava  The study was technically difficult.  _____________________________________________________________________________  __        Left Ventricle  The left ventricle is normal in size. Left ventricular systolic function is  normal. The visual ejection fraction is estimated at 55-60%. Diastolic  function not assessed due to atrial fibrillation. No regional wall motion  abnormalities noted.     Right Ventricle  Borderline right ventricular enlargement. The right ventricular systolic  function is normal. The right ventricle is not well visualized. There is a  pacemaker lead in the right ventricle.     Atria  The left atrium is not well visualized. Right atrium not well visualized.     Mitral Valve  There is mild mitral annular calcification. There is trace mitral  regurgitation. There is no mitral valve stenosis.        Tricuspid Valve  The tricuspid valve is not well visualized. The right ventricular systolic  pressure is approximated at 27.0 mmHg plus the right atrial pressure.     Aortic Valve  The aortic valve is trileaflet with aortic valve sclerosis. There is trace  aortic regurgitation. No hemodynamically significant valvular aortic stenosis.     Pulmonic Valve  The pulmonic valve is not well visualized.     Vessels  Borderline aortic root dilatation. Dilated inferior vena cava.     Pericardium  There is no pericardial effusion.     _____________________________________________________________________________  __  MMode/2D Measurements & Calculations  IVSd: 1.0 cm  LVIDd: 4.4 cm  LVIDs: 3.5 cm  LVPWd: 0.89 cm  FS: 21.5 %  LV mass(C)d: 139.2 grams  LV mass(C)dI: 68.0 grams/m2     Ao root diam: 3.9 cm  LA dimension: 3.6 cm  asc Aorta Diam: 3.5 cm  LA/Ao: 0.93  LA  Volume (BP): 57.3 ml  LA Volume Index (BP): 28.0 ml/m2  RWT: 0.40        Doppler Measurements & Calculations  MV E max heidy: 104.5 cm/sec  Ao V2 max: 137.7 cm/sec  Ao max P.0 mmHg     PA acc time: 0.12 sec  TR max heidy: 259.9 cm/sec  TR max P.0 mmHg  E/E' av.8  Lateral E/e': 9.9  Medial E/e': 13.7           _____________________________________________________________________________  __           Report approved by: Shy Jama 2018 01:49 PM            Orders Placed This Encounter   Procedures     Follow-Up with [facility] - NADEGE visit     Orders Placed This Encounter   Medications     lisinopril (PRINIVIL/ZESTRIL) 20 MG tablet     Sig: Take 1 tablet (20 mg) by mouth daily     Dispense:  180 tablet     Refill:  3     Medications Discontinued During This Encounter   Medication Reason     lisinopril (PRINIVIL/ZESTRIL) 20 MG tablet Reorder         Encounter Diagnosis   Name Primary?     Benign essential hypertension Yes         CC  Lilly Zepeda MD  [address]  [place] [address]                Thank you for allowing me to participate in the care of your patient.      Sincerely,     London Lynn MD     [facility]    cc:   Lilly Zepeda MD  [address]  [place] [address]

## 2018-06-15 NOTE — MR AVS SNAPSHOT
After Visit Summary   6/15/2018    Santosh Robledo    MRN: [id number]           Patient Information     Date Of Birth          7/20/1929        Visit Information        Provider Department      6/15/2018 12:45 PM London Lynn MD [facility]        Today's Diagnoses     Benign essential hypertension    -  1       Follow-ups after your visit        Additional Services     Follow-Up with [facility] visit                 Your next 10 appointments already scheduled     Jun 27, 2018  8:10 AM CDT   CORE Enrollment with EUGENIO Lynch CNP   [facility] ([facility])    [address] [place] [id number] [phone] OPT 2            Jul 30, 2018 11:00 AM CDT   Office Visit with Lilly Zepeda MD   [facility]    [address] [facility] [id number] [phone]           Bring a current list of meds and any records pertaining to this visit. For Physicals, please bring immunization records and any forms needing to be filled out. Please arrive 10 minutes early to complete paperwork.            Sep 20, 2018  4:30 PM CDT   Remote PPM Check with ADLER TECH1   [facility] ([facility])    [address] [place] [id number] [phone] OPT 2           This appointment is for a remote check of your pacemaker.  This is not an appointment at the office.              Future tests that were ordered for you today     Open Future Orders        Priority Expected Expires Ordered    Follow-Up with [facility] visit Routine 6/29/2018 6/15/2019 6/15/2018            Who to contact     If you have questions or need follow up information about today's clinic visit or your schedule please contact [facility] directly at [phone].  Normal or  "non-critical lab and imaging results will be communicated to you by MyChart, letter or phone within 4 business days after the clinic has received the results. If you do not hear from us within 7 days, please contact the clinic through [employer] [facility] or phone. If you have a critical or abnormal lab result, we will notify you by phone as soon as possible.  Submit refill requests through Telller or call your pharmacy and they will forward the refill request to us. Please allow 3 business days for your refill to be completed.          Additional Information About Your Visit        Telller Information     Telller lets you send messages to your doctor, view your test results, renew your prescriptions, schedule appointments and more. To sign up, go to [email or web address] . Click on \"Log in\" on the left side of the screen, which will take you to the Welcome page. Then click on \"Sign up Now\" on the right side of the page.     You will be asked to enter the access code listed below, as well as some personal information. Please follow the directions to create your username and password.     Your access code is: [email or web address]  Expires: 2018  2:26 PM     Your access code will  in 90 days. If you need help or a new code, please call your [facility] clinic or [phone].        Care EveryWhere ID     This is your Care EveryWhere ID. This could be used by other organizations to access your [facility] medical records  [id number]        Your Vitals Were     Pulse Height Pulse Oximetry BMI (Body Mass Index)          63 1.905 m (6' 3\") 97% 21 kg/m2         Blood Pressure from Last 3 Encounters:   06/15/18 106/44   06/15/18 120/53   18 118/63    Weight from Last 3 Encounters:   06/15/18 76.2 kg (168 lb)   06/15/18 74.9 kg (165 lb 3.2 oz)   18 74.4 kg (164 lb)                 Today's Medication Changes          These changes are accurate as of 6/15/18  1:32 PM.  If you have any questions, ask your nurse or doctor.    "            These medicines have changed or have updated prescriptions.        Dose/Directions    lisinopril 20 MG tablet   Commonly known as:  PRINIVIL/ZESTRIL   This may have changed:  when to take this   Used for:  Benign essential hypertension   Changed by:  London Lynn MD        Dose:  20 mg   Take 1 tablet (20 mg) by mouth daily   Quantity:  180 tablet   Refills:  3                Primary Care Provider Office Phone # Fax #    Lilly Leonel Zepeda [phone] [address]  [facility] [address]        Equal Access to Services     [facility]: Hadii aad ku hadasho Soomaali, waaxda luqadaha, qaybta kaalmada adeegyada, waxapolinar garcia . So [facility] [phone].    ATENCIÓN: Si habla español, tiene a gonzalez disposición servicios gratuitos de asistencia lingüística. [facility] [phone].    We comply with applicable federal civil rights laws and [place] laws. We do not discriminate on the basis of race, color, national origin, age, disability, sex, sexual orientation, or gender identity.            Thank you!     Thank you for choosing [facility]  for your care. Our goal is always to provide you with excellent care. Hearing back from our patients is one way we can continue to improve our services. Please take a few minutes to complete the written survey that you may receive in the mail after your visit with us. Thank you!             Your Updated Medication List - Protect others around you: Learn how to safely use, store and throw away your medicines at www.disposemymeds.org.          This list is accurate as of 6/15/18  1:32 PM.  Always use your most recent med list.                   Brand Name Dispense Instructions for use Diagnosis    B-12 1000 MCG Caps     90 capsule    Take 1 tablet by mouth daily    Vitamin B12 deficiency (non anemic)       furosemide 40 MG tablet    LASIX     Take 1 tablet (40 mg) by mouth 2 times  daily    Acute congestive heart failure, unspecified congestive heart failure type (H)       hydrALAZINE 25 MG tablet    APRESOLINE     Take 1 tablet (25 mg) by mouth 3 times daily    Benign essential hypertension       lisinopril 20 MG tablet    PRINIVIL/ZESTRIL    180 tablet    Take 1 tablet (20 mg) by mouth daily    Benign essential hypertension       MELATONIN PO      Take 3 mg by mouth At Bedtime        metoprolol tartrate 25 MG tablet    LOPRESSOR     Take 1 tablet (25 mg) by mouth 2 times daily    Acute congestive heart failure, unspecified congestive heart failure type (H)       omeprazole 20 MG CR capsule    priLOSEC    90 capsule    Take 1 capsule (20 mg) by mouth daily    Gastroesophageal reflux disease, esophagitis presence not specified       order for DME     1 each    Equipment being ordered: Walker Wheels () and Walker () Treatment Diagnosis: Difficulty ambulating    Colitis       order for DME     1 Device    Equipment being ordered: Walker Wheels () Treatment Diagnosis:  Weakness,colitis.    Colitis       potassium chloride SA 20 MEQ CR tablet    K-DUR/KLOR-CON M     Take 1 tablet (20 mEq) by mouth 2 times daily    Acute congestive heart failure, unspecified congestive heart failure type (H)       Simethicone 125 MG Caps    GAS-X EXTRA STRENGTH    60 capsule    Take 1 capsule by mouth 2 times daily as needed    Flatulence, eructation and gas pain       simvastatin 5 MG tablet    ZOCOR    90 tablet    Take 2 tablets (10 mg) by mouth daily    Hyperlipidemia LDL goal <100       WARFARIN SODIUM PO      Take by mouth daily 2.5 mg M/W/Sa 5 mg AOD        ZOLOFT PO      Take 100 mg by mouth daily

## 2018-06-16 NOTE — PROGRESS NOTES
Service Date: 06/15/2018      REASON FOR VISIT:  Establish device care.      HISTORY OF PRESENT ILLNESS:  Mr. Robledo is a pleasant 88-year-old gentleman with a history of hypertension, hyperlipidemia, preserved EF heart failure, paroxysmal atrial fibrillation and tachybrady syndrome (previous pacemaker implantation), who is here to establish care.      The patient was recently admitted in the hospital in May with pneumonia.  Hospital stay was complicated by preserved EF heart failure and C. diff colitis.  He was discharged to the [facility] and is here today to establish device care with us.    Today he denies any symptoms of chest pain, shortness of breath, lightheadedness, near-syncope or syncopal episode.      Device was interrogated today.  Lead parameters are stable.  He is ventricular paced 35% of the time.      Echocardiogram obtained on 2018 revealed an EF of 55%-60%.  EKG obtained  revealed atrial fibrillation with controlled ventricular response.      ASSESSMENT AND PLAN:   1.  Device care.  Continue device checks q.3 months.   2.  Paroxysmal AFib, asymptomatic.  Heart rate seems to be well controlled.  Continue therapy with metoprolol.   3.  Embolic prevention.  CHADS-VASc score of 3.  Continue coagulation of Coumadin indefinitely.   4.  Hypertension.  Blood pressure is running low.  He informs that on [facility] care they have been holding some of his medications.  I recommend decreasing lisinopril dose from 20 mg b.i.d. to 20 mg daily.   5.  Preserved EF heart failure.  Mildly fluid overloaded on physical exam; however, he seems to be doing well.  I will refer him to [facility] for closer followup.         MIKI MEDEL MD             D: 06/15/2018   T: 06/15/2018   MT: NATAN      Name:     TRUE ROBLEDO   MRN:      [id number]        Account:      [id number]   :      1929           Service Date: 06/15/2018      Document: [id number]

## 2018-06-18 ENCOUNTER — TRANSFERRED RECORDS (OUTPATIENT)
Dept: HEALTH INFORMATION MANAGEMENT | Facility: CLINIC | Age: 83
End: 2018-06-18

## 2018-06-18 LAB
ANION GAP SERPL CALCULATED.3IONS-SCNC: 3 MMOL/L (ref 3–14)
BUN SERPL-MCNC: 24 MG/DL (ref 7–30)
CALCIUM SERPL-MCNC: 8.8 MG/DL (ref 8.5–10.1)
CHLORIDE SERPLBLD-SCNC: 107 MMOL/L (ref 94–109)
CO2 SERPL-SCNC: 31 MMOL/L (ref 20–32)
CREAT SERPL-MCNC: 1.07 MG/DL (ref 0.66–1.25)
GFR SERPL CREATININE-BSD FRML MDRD: 65 ML/MIN/1.73M2
GLUCOSE SERPL-MCNC: 109 MG/DL (ref 70–99)
HEMOGLOBIN: 11.2 G/DL (ref 13.3–17.7)
POTASSIUM SERPL-SCNC: 3.9 MMOL/L (ref 3.4–5.3)
SODIUM SERPL-SCNC: 141 MMOL/L (ref 133–144)

## 2018-06-19 ENCOUNTER — DISCHARGE SUMMARY NURSING HOME (OUTPATIENT)
Dept: GERIATRICS | Facility: CLINIC | Age: 83
End: 2018-06-19
Payer: COMMERCIAL

## 2018-06-19 VITALS
HEART RATE: 66 BPM | HEIGHT: 75 IN | DIASTOLIC BLOOD PRESSURE: 77 MMHG | TEMPERATURE: 97.6 F | RESPIRATION RATE: 18 BRPM | WEIGHT: 165.2 LBS | OXYGEN SATURATION: 97 % | BODY MASS INDEX: 20.54 KG/M2 | SYSTOLIC BLOOD PRESSURE: 125 MMHG

## 2018-06-19 DIAGNOSIS — I48.20 CHRONIC ATRIAL FIBRILLATION (H): ICD-10-CM

## 2018-06-19 DIAGNOSIS — B37.0 ORAL THRUSH: ICD-10-CM

## 2018-06-19 DIAGNOSIS — R41.89 COGNITIVE IMPAIRMENT: ICD-10-CM

## 2018-06-19 DIAGNOSIS — A04.72 C. DIFFICILE COLITIS: ICD-10-CM

## 2018-06-19 DIAGNOSIS — R53.81 PHYSICAL DECONDITIONING: ICD-10-CM

## 2018-06-19 DIAGNOSIS — I10 BENIGN ESSENTIAL HYPERTENSION: ICD-10-CM

## 2018-06-19 DIAGNOSIS — I50.33 ACUTE ON CHRONIC DIASTOLIC CONGESTIVE HEART FAILURE (H): Primary | ICD-10-CM

## 2018-06-19 DIAGNOSIS — J18.9 PNEUMONIA OF RIGHT UPPER LOBE DUE TO INFECTIOUS ORGANISM: ICD-10-CM

## 2018-06-19 PROCEDURE — 99316 NF DSCHRG MGMT 30 MIN+: CPT | Performed by: NURSE PRACTITIONER

## 2018-06-19 NOTE — PROGRESS NOTES
[facility] DISCHARGE SUMMARY    PATIENT'S NAME: Santosh Robledo  YOB: 1929  MEDICAL RECORD NUMBER:  [id number]    PRIMARY CARE PROVIDER AND CLINIC RESPONSIBLE AFTER TRANSFER: Lilly Zepeda [address] / [place] [address]     CODE STATUS/ADVANCE DIRECTIVES DISCUSSION:   DNR / DNI       Allergies   Allergen Reactions     Penicillins Rash       TRANSFERRING PROVIDERS: EUGENIO Smith CNP, Ant Ferro MD  DATE OF SNF ADMISSION:  May / 29 / 2018  DATE OF SNF (anticipated) DISCHARGE: June / 20 / 2018  DISCHARGE DISPOSITION: FMG Provider   Nursing Facility: [facility] stay 5/23/2018 to 5/29/2018.     Condition on Discharge:  Improving.  Cognitive Scores: SLUMS 26/30 and CPT 4.9/56    Equipment: walker    DISCHARGE DIAGNOSIS:   1. Acute on chronic diastolic congestive heart failure (H)    2. Chronic atrial fibrillation (H)    3. Pneumonia of right upper lobe due to infectious organism (H)    4. C. difficile colitis    5. Benign essential hypertension    6. Oral thrush    7. Cognitive impairment    8. Physical deconditioning        HPI Nursing Facility Course:  HPI information obtained from: facility chart records, facility staff, patient report and [facility] chart review.He came to this facility  for short term rehab and medical management following hospitalization after presenting to the ED with worsening shortness of breath,  productive cough that was blood tinged at times and LE edema.  He had been hospitalized 5/13-5/15/2018 with C diff and was on vancomycin. He was hypoxic at 83% and tachypneic. EKG showed afib. CXR showed mixed interstitial and airspace opacities in both lungs, most confluent in the right perihilar region with small right pleural effusion. WBC 22.5, platelet 517,000,  K 2.9. BNP 12,789. INR was 6.8 and vitamin K was given. He required BiPAP in the ED. ECHO 5/23/2108: EF 55-60%,  normal LV systolic function.  No previous history of CHF.Cardiology consulted and he was diuresed with IV lasix, then transitioned to oral. Potassium was replaced. Metoprolol was started for BP and rate control. CT chest showed infiltrates in right upper and lower lobe. He was treated with ceftriaxone and doxycycline. Discharged on ceftin for 5 more days. Oral vancomycin for C diff continued for 12 more days.     He has worked with PHYSICAL THERAPY and OT with good progress. Ambulating 400 ft with walker and supervision. Cruz 31/56, TUG 18.62, indicating fair balance. Requires supervision to stand by assist with toileting and dressing, min assist with bathing.   ASSESSMENT / PLAN:  (I50.33) Acute on chronic diastolic congestive heart failure (H)  (primary encounter diagnosis)  Comment: compensated. Weight stable at 165 lbs. He has intermittent mild shortness of breath with exertion that is slowly improving. No hypoxia.   Plan: discharge home with his wife. Continue lasix, lisinopril, metoprolol. Home services and follow up as below.     (I48.2) Chronic atrial fibrillation (H)  Comment: rate controlled: 64-7.  INR 2.9 today.   Plan: continue metoprolol. Continue coumadin 2.5 mg MWSat and 5 mg the other days of the week. Homecare nurse to draw INR 6/21 or 6/22/2018 with results to PCP    (J18.1) Pneumonia of right upper lobe due to infectious organism (H)  Comment: resolved. Completed Ceftin 6/3/2018  Plan: monitor respiratory status    (A04.72) C. difficile colitis  Comment: appears resolved. Completed vancomycin 6/10/2018  Plan: monitor for recurrent symptoms.     (I10) Benign essential hypertension  Comment: controlled with BPs: 125/77, 156/59, 107/53, 126/56  Plan: continue lasix, lisinopril, hydralazine, metoprolol.     (B37.0) Oral thrush  Comment: resolved  Plan: monitor     (R41.89) Cognitive impairment  Comment: mild deficits  Plan: supportive care. Wife assists at home.     (R53.81) Physical  deconditioning  Comment: progress in therapies as above.   Plan: home therapies for ongoing gait training, strengthening and ADL safety    PAST MEDICAL HISTORY:  has a past medical history of AV node dysfunction; Chronic atrial fibrillation (H) (2004); GERD (gastroesophageal reflux disease); Hyperlipidemia; Near syncope; and MARILU (obstructive sleep apnea).    DISCHARGE MEDICATIONS:  Current Outpatient Prescriptions   Medication Sig Dispense Refill     Cyanocobalamin (B-12) 1000 MCG CAPS Take 1 tablet by mouth daily 90 capsule 3     furosemide (LASIX) 40 MG tablet Take 1 tablet (40 mg) by mouth 2 times daily       hydrALAZINE (APRESOLINE) 25 MG tablet Take 1 tablet (25 mg) by mouth 3 times daily       lisinopril (PRINIVIL/ZESTRIL) 20 MG tablet Take 1 tablet (20 mg) by mouth daily 180 tablet 3     MELATONIN PO Take 3 mg by mouth At Bedtime       metoprolol tartrate (LOPRESSOR) 25 MG tablet Take 1 tablet (25 mg) by mouth 2 times daily       omeprazole (PRILOSEC) 20 MG CR capsule Take 1 capsule (20 mg) by mouth daily 90 capsule 3     order for DME Equipment being ordered: Walker Wheels () and Walker ()  Treatment Diagnosis: Difficulty ambulating 1 each 0     order for DME Equipment being ordered: Walker Wheels ()  Treatment Diagnosis:  Weakness,colitis. 1 Device 0     potassium chloride SA (K-DUR/KLOR-CON M) 20 MEQ CR tablet Take 1 tablet (20 mEq) by mouth 2 times daily       Sertraline HCl (ZOLOFT PO) Take 100 mg by mouth daily       Simethicone (GAS-X EXTRA STRENGTH) 125 MG CAPS Take 1 capsule by mouth 2 times daily as needed 60 capsule 11     simvastatin (ZOCOR) 5 MG tablet Take 2 tablets (10 mg) by mouth daily 90 tablet 3     WARFARIN SODIUM PO Take by mouth daily 2.5 mg M/W/Sa  5 mg AOD         MEDICATION CHANGES/RATIONALE:   Coumadin dosed for goal INR 2-3  Ceftin completed 6/3/2018  Vancomycin completed 6/10/2018  Nystatin solution completed 6/10/2018  Melatonin for sleep.   Lisinopril decreased per  "Cardiology  Controlled medications sent with patient:   not applicable/none     ROS:    4 point ROS including Respiratory, CV, GI and , other than that noted in the HPI,  is negative    Physical Exam:   Vitals: /77  Pulse 66  Temp 97.6  F (36.4  C)  Resp 18  Ht 6' 3\" (1.905 m)  Wt 165 lb 3.2 oz (74.9 kg)  SpO2 97%  BMI 20.65 kg/m2  BMI= Body mass index is 20.65 kg/(m^2).    GENERAL APPEARANCE:  Alert, in no distress  ENT:  Mouth and posterior oropharynx normal, moist mucous membranes, [place]  EYES:  EOM normal, conjunctiva and lids normal  NECK:  No adenopathy,masses or thyromegaly  RESP:  respiratory effort and palpation of chest normal, no respiratory distress,slightly  diminished breath sounds bilaterally,  no crackles or wheezes.  CV:  Palpation and auscultation of heart done , regular rate and rhythm, 2/6 murmur, no edema, +2 pedal pulses  ABDOMEN:  soft, nontender, no hepatosplenomegaly or other masses  M/S:  Gait steady with walker. STEWART with good strength. No joint inflammation  SKIN:  no rashes or open areas  PSYCH:  oriented X 3, memory impaired , affect and mood normal    DISCHARGE PLAN:  Occupational Therapy, Physical Therapy, Registered Nurse, Home Health Aide and From:  [facility]  Patient instructed to follow-up with:  PCP in 7 days      Current [facility] scheduled appointments:  Future Appointments  Date Time Provider Department Center   6/27/2018 8:10 AM [facility]   7/30/2018 11:00 AM Lilly Zepeda MD [facility]    9/20/2018 4:30 PM ADLER TECH1 [facility]       MTM referral needed and placed by this provider: No    Pending labs: None  SNF labs   Last Basic Metabolic Panel:  Lab Results   Component Value Date     06/18/2018      Lab Results   Component Value Date    POTASSIUM 3.9 06/18/2018     Lab Results   Component Value Date    CHLORIDE 107 06/18/2018     Lab Results   Component Value Date    ANGELITA 8.8 06/18/2018     Lab Results "   Component Value Date    CO2 31 06/18/2018     Lab Results   Component Value Date    BUN 24 06/18/2018     Lab Results   Component Value Date    CR 1.07 06/18/2018     Lab Results   Component Value Date     06/18/2018     Lab Results   Component Value Date    WBC 9.4 05/31/2018     Lab Results   Component Value Date    RBC 4.28 05/31/2018     Lab Results   Component Value Date    HGB 11.2 06/18/2018     Lab Results   Component Value Date    HCT 34.0 05/31/2018     Lab Results   Component Value Date    MCV 79 05/31/2018     Lab Results   Component Value Date    MCH 25.9 05/31/2018     Lab Results   Component Value Date    MCHC 32.6 05/31/2018     Lab Results   Component Value Date    RDW 15.2 05/31/2018     Lab Results   Component Value Date     05/31/2018     Discharge Treatments: Daily weight. Low sodium diet.     TOTAL DISCHARGE TIME:   Greater than 30 minutes  Electronically signed by:  EUGENIO Smith CNP

## 2018-06-21 ENCOUNTER — TELEPHONE (OUTPATIENT)
Dept: FAMILY MEDICINE | Facility: CLINIC | Age: 83
End: 2018-06-21

## 2018-06-21 ENCOUNTER — ANTICOAGULATION THERAPY VISIT (OUTPATIENT)
Dept: FAMILY MEDICINE | Facility: CLINIC | Age: 83
End: 2018-06-21
Payer: COMMERCIAL

## 2018-06-21 ENCOUNTER — PATIENT OUTREACH (OUTPATIENT)
Dept: CARE COORDINATION | Facility: CLINIC | Age: 83
End: 2018-06-21

## 2018-06-21 DIAGNOSIS — I50.9 ACUTE CONGESTIVE HEART FAILURE, UNSPECIFIED CONGESTIVE HEART FAILURE TYPE: ICD-10-CM

## 2018-06-21 DIAGNOSIS — Z79.01 LONG TERM CURRENT USE OF ANTICOAGULANT THERAPY: ICD-10-CM

## 2018-06-21 DIAGNOSIS — I48.20 CHRONIC ATRIAL FIBRILLATION (H): ICD-10-CM

## 2018-06-21 DIAGNOSIS — I10 BENIGN ESSENTIAL HYPERTENSION: ICD-10-CM

## 2018-06-21 LAB — INR PPP: 2.3

## 2018-06-21 PROCEDURE — 99207 ZZC NO CHARGE NURSE ONLY: CPT | Performed by: INTERNAL MEDICINE

## 2018-06-21 RX ORDER — METOPROLOL TARTRATE 25 MG/1
25 TABLET, FILM COATED ORAL 2 TIMES DAILY
Qty: 60 TABLET | Refills: 0 | Status: SHIPPED | OUTPATIENT
Start: 2018-06-21 | End: 2018-06-29

## 2018-06-21 RX ORDER — POTASSIUM CHLORIDE 1500 MG/1
20 TABLET, EXTENDED RELEASE ORAL 2 TIMES DAILY
Qty: 60 TABLET | Refills: 0 | Status: SHIPPED | OUTPATIENT
Start: 2018-06-21 | End: 2018-07-20

## 2018-06-21 RX ORDER — HYDRALAZINE HYDROCHLORIDE 25 MG/1
25 TABLET, FILM COATED ORAL 3 TIMES DAILY
Qty: 90 TABLET | Refills: 0 | Status: SHIPPED | OUTPATIENT
Start: 2018-06-21 | End: 2018-06-29

## 2018-06-21 NOTE — TELEPHONE ENCOUNTER
Acute On Chronic Diastolic Congestive Heart Failure (H) 06/20/2018 6 mo ED/IP 0/2  [phone] (home) none (work)    CC status & date- not a candidate 5/16/18    ER follow 6/29/18 with PCP

## 2018-06-21 NOTE — TELEPHONE ENCOUNTER
Please see pended medications   Home care is requesting refill on Hydralazine, Metoprolol and KCl    Maura SARGENT RN

## 2018-06-21 NOTE — PROGRESS NOTES
Clinic Care Coordination Contact  Care Coordination Communication    Referral Source: SNF/TCU    Clinical Data: Patient was hospitalized at [facility] from 5/23/2018 to 5/29/2018 with diagnosis of ARF and CDiff.     Home Care Contact:              Home Care Agency: [facility]               Contact name () and phone number: Rosemary Fofana @ [phone]               Care Coordination contacted home care: Yes              Anticipated start of care date: 6/21/2018    Patient Contact:               Introduced self and role of care coordination.               Discharge instructions were reviewed with patient/caregiver.               Do you have any questions about your medications? No               Follow up appointment is scheduled for 6/29/2018.              Provided 24 Hour Nurse Line and/or 24 Hour Appointment Scheduling: Yes              Home care has contacted patient: Yes              Patient questions/concerns: No     Plan: RN/SW Care Coordinator will await notification from home care staff informing RN/SW Care Coordinator of patients discharge plans/needs. RN/SW Care Coordinator will review chart and outreach to home care every 4 weeks and as needed.      Carmenza Khan RN  Clinic Care Coordinator  [phone]  [email or web address]

## 2018-06-21 NOTE — TELEPHONE ENCOUNTER
Spoke with [facility]   Gave verbal on below request for orders    INR drawn today 2.3     Per TCU Discharge notes:   (I48.2) Chronic atrial fibrillation (H)  Comment: rate controlled: 64-7.  INR 2.9 today.   Plan: continue metoprolol. Continue coumadin 2.5 mg MWSat and 5 mg the other days of the week. Homecare nurse to draw INR 6/21 or 6/22/2018 with results to PCP    Also states patient needs refills of the following medications (verified dosing with home care nurse)   Hydralazine  Metoprolol  Potassium Chloride     [facility] is pharmacy     Routing refill request to provider for review/approval because:  Drug not active on patient's medication list - on med list as historical       Maura SARGENT RN

## 2018-06-21 NOTE — TELEPHONE ENCOUNTER
Reason for Call:  Home Health Care    [facility] called regarding (reason for call): Orders    Orders are needed for this patient.  PT,OT, Skilled Nursing    PT: Eval    OT: Eval    Skilled Nursin x week for 1 week, 2 x week for 2 weeks, 1 x week for 4 weeks, & 2 PRN's  For Dizzy and Medication Mgmt & INR    Pt Provider:     Phone Number Homecare Nurse can be reached at: [phone]    Can we leave a detailed message on this number? YES      Call taken on 2018 at 11:52 AM by Morales Jorge

## 2018-06-21 NOTE — PROGRESS NOTES
ANTICOAGULATION FOLLOW-UP CLINIC VISIT    Patient Name:  Santosh Spauldingelmstad  Date:  6/21/2018  Contact Type:  Telephone    SUBJECTIVE:     Patient Findings     Positives Hospital admission (Recently discharged from  TCU and now has home care )           OBJECTIVE    INR   Date Value Ref Range Status   06/21/2018 2.3  Final       ASSESSMENT / PLAN  INR assessment THER    Recheck INR In: 4 DAYS    INR Location Homecare INR      Anticoagulation Summary as of 6/21/2018     INR goal 2.0-3.0   Today's INR 2.3   Warfarin maintenance plan 2.5 mg (5 mg x 0.5) on Mon, Wed, Sat; 5 mg (5 mg x 1) all other days   Full warfarin instructions 2.5 mg on Mon, Wed, Sat; 5 mg all other days   Weekly warfarin total 27.5 mg   No change documented Maura Hawkins RN   Plan last modified Bryanna Heredia RN (5/2/2018)   Next INR check 6/25/2018   Target end date     Indications   Chronic atrial fibrillation (H) [I48.2]  Long term current use of anticoagulant therapy [Z79.01]         Anticoagulation Episode Summary     INR check location     Preferred lab     Send INR reminders to  ANTICOAGULATION    Comments       Anticoagulation Care Providers     Provider Role Specialty Phone number    Lilly Zepeda MD Responsible Internal Medicine [phone]            See the Encounter Report to view Anticoagulation Flowsheet and Dosing Calendar (Go to Encounters tab in chart review, and find the Anticoagulation Therapy Visit)    Spoke with [facility] #[phone]  See OV notes, patient recently discharged from  [facility]  On same dosage of warfarin as last dosed by INR clinic   Pt will continue same warfarin dosing and recheck INR on Monday     Maura Hawkins, ADRIANA

## 2018-06-21 NOTE — MR AVS SNAPSHOT
Santosh KENNY Hjelmstad   6/21/2018   Anticoagulation Therapy Visit    Description:  88 year old male   Provider:  Lilly Zepeda MD   Department:  [facility]           INR as of 6/21/2018     Today's INR 2.3      Anticoagulation Summary as of 6/21/2018     INR goal 2.0-3.0   Today's INR 2.3   Full warfarin instructions 2.5 mg on Mon, Wed, Sat; 5 mg all other days   Next INR check 6/25/2018    Indications   Chronic atrial fibrillation (H) [I48.2]  Long term current use of anticoagulant therapy [Z79.01]         June 2018 Details    Sun Mon Tue Wed Thu Fri Sat          1               2                 3               4               5               6               7               8               9                 10               11               12               13               14               15               16                 17               18               19               20               21      5 mg   See details      22      5 mg         23      2.5 mg           24      5 mg         25            26               27               28               29               30                Date Details   06/21 This INR check       Date of next INR:  6/25/2018         How to take your warfarin dose     To take:  2.5 mg Take 0.5 of a 5 mg tablet.    To take:  5 mg Take 1 of the 5 mg tablets.

## 2018-06-21 NOTE — TELEPHONE ENCOUNTER
Reason for Call:  INR    Who is calling?  Home Care: [facility]    Phone number:  [phone]    Name of caller: Annetta    INR Value:  2.3 via Finger Stick  He is taking Warfarin as Ordered in EPIC, he does have a Medication Interaction  With no side effects with Sertraline & Simvastatin with taking his Warfarin  He is not taking Metoprolol, Potassium, or Hydralazine, please send these to his Pharmacy  [id number] [place] [address]      Are there any other concerns:  No    Can we leave a detailed message on this number? YES          Call taken on 6/21/2018 at 11:54 AM by Morales Jorge

## 2018-06-25 ENCOUNTER — TELEPHONE (OUTPATIENT)
Dept: FAMILY MEDICINE | Facility: CLINIC | Age: 83
End: 2018-06-25

## 2018-06-25 ENCOUNTER — ANTICOAGULATION THERAPY VISIT (OUTPATIENT)
Dept: FAMILY MEDICINE | Facility: CLINIC | Age: 83
End: 2018-06-25
Payer: COMMERCIAL

## 2018-06-25 DIAGNOSIS — I48.20 CHRONIC ATRIAL FIBRILLATION (H): ICD-10-CM

## 2018-06-25 DIAGNOSIS — Z79.01 LONG TERM CURRENT USE OF ANTICOAGULANT THERAPY: ICD-10-CM

## 2018-06-25 LAB — INR PPP: 1.8

## 2018-06-25 PROCEDURE — 99207 ZZC NO CHARGE NURSE ONLY: CPT | Performed by: INTERNAL MEDICINE

## 2018-06-25 NOTE — MR AVS SNAPSHOT
Santosh KENNY Hjelmstad   6/25/2018   Anticoagulation Therapy Visit    Description:  88 year old male   Provider:  Lilly Zepeda MD   Department:  [facility]           INR as of 6/25/2018     Today's INR 1.8!      Anticoagulation Summary as of 6/25/2018     INR goal 2.0-3.0   Today's INR 1.8!   Full warfarin instructions 6/25: 5 mg; 6/27: 5 mg; Otherwise 2.5 mg on Mon, Wed, Sat; 5 mg all other days   Next INR check 6/29/2018    Indications   Chronic atrial fibrillation (H) [I48.2]  Long term current use of anticoagulant therapy [Z79.01]         June 2018 Details    Sun Mon Tue Wed Thu Fri Sat          1               2                 3               4               5               6               7               8               9                 10               11               12               13               14               15               16                 17               18               19               20               21               22               23                 24               25      5 mg   See details      26      5 mg         27      5 mg         28      5 mg         29            30                Date Details   06/25 This INR check       Date of next INR:  6/29/2018         How to take your warfarin dose     To take:  5 mg Take 1 of the 5 mg tablets.

## 2018-06-25 NOTE — TELEPHONE ENCOUNTER
INR via fingerstick, 1.8 today  Denies missing doses, recited correct current dose.   NO added greens. NO change in meds.     Thanks  Rosemary Fofana RN BSN   [facility] Case manage  [phone] no need to call, I check EPIC daily.

## 2018-06-25 NOTE — PROGRESS NOTES
ANTICOAGULATION FOLLOW-UP CLINIC VISIT    Patient Name:  Santosh Poonmstad  Date:  6/25/2018  Contact Type:  Telephone HC nurse.      SUBJECTIVE:     Patient Findings     Positives Unexplained INR or factor level change    Comments NR via fingerstick, 1.8 today  Denies missing doses, recited correct current dose.   NO added greens. NO change in meds.     Thanks  Rosemary Fofana RN BSN   [facility] Case manage  [phone] no need to call, I check EPIC daily.              OBJECTIVE    INR   Date Value Ref Range Status   06/25/2018 1.8  Final   06/25/2018 1.8  Final       ASSESSMENT / PLAN  INR assessment SUB      Anticoagulation Summary as of 6/25/2018     INR goal 2.0-3.0   Today's INR 1.8!   Warfarin maintenance plan 2.5 mg (5 mg x 0.5) on Mon, Wed, Sat; 5 mg (5 mg x 1) all other days   Full warfarin instructions 6/25: 5 mg; 6/27: 5 mg; Otherwise 2.5 mg on Mon, Wed, Sat; 5 mg all other days   Weekly warfarin total 27.5 mg   Plan last modified Bryanna Heredia RN (5/2/2018)   Next INR check 6/29/2018   Target end date     Indications   Chronic atrial fibrillation (H) [I48.2]  Long term current use of anticoagulant therapy [Z79.01]         Anticoagulation Episode Summary     INR check location     Preferred lab     Send INR reminders to [facility]    Comments       Anticoagulation Care Providers     Provider Role Specialty Phone number    Zeke Zepedajelani Castaneda MD [facility] Internal Medicine [phone]            See the Encounter Report to view Anticoagulation Flowsheet and Dosing Calendar (Go to Encounters tab in chart review, and find the Anticoagulation Therapy Visit)    Dosage adjustment made based on physician directed care plan.    See Flowsheet   Plan---Warfarin 5mg M,T,W,Th and recheck INR Friday 6-29-18.       Susie Hunter RN

## 2018-06-25 NOTE — TELEPHONE ENCOUNTER
See Anticoagulation Therapy Visit: 06/25/18    Plan--Warfarin 5mg Mon,Tue,Wed, Thur and recheck INR Friday 6-29-18.      Susie Hunter RN, BSN

## 2018-06-26 ENCOUNTER — TELEPHONE (OUTPATIENT)
Dept: CARE COORDINATION | Facility: CLINIC | Age: 83
End: 2018-06-26

## 2018-06-27 NOTE — TELEPHONE ENCOUNTER
[facility] now requests orders and shares plan of care/discharge summaries for some patients through [employer].  Please REPLY TO THIS MESSAGE OR ROUTE BACK TO THE AUTHOR in order to give authorization for orders when needed.  This is considered a verbal order, you will still receive a faxed copy of orders for signature.  Thank you for your assistance in improving collaboration for our patients.    ORDER  Skilled PT 2xwk for 2wks, 1xwk for 1wk to begin week of 7/1 for gait training, endurance and strengthening activities, balance training, HEP education and AD recommendations.    Thank You!  Jade Albrecht PT, DPT  [email or web address]  [phone]

## 2018-06-29 ENCOUNTER — ANTICOAGULATION THERAPY VISIT (OUTPATIENT)
Dept: NURSING | Facility: CLINIC | Age: 83
End: 2018-06-29
Payer: COMMERCIAL

## 2018-06-29 ENCOUNTER — OFFICE VISIT (OUTPATIENT)
Dept: FAMILY MEDICINE | Facility: CLINIC | Age: 83
End: 2018-06-29
Payer: COMMERCIAL

## 2018-06-29 VITALS
WEIGHT: 169.4 LBS | OXYGEN SATURATION: 97 % | BODY MASS INDEX: 21.06 KG/M2 | TEMPERATURE: 96.9 F | HEART RATE: 73 BPM | SYSTOLIC BLOOD PRESSURE: 109 MMHG | HEIGHT: 75 IN | DIASTOLIC BLOOD PRESSURE: 54 MMHG

## 2018-06-29 DIAGNOSIS — E87.6 HYPOKALEMIA: ICD-10-CM

## 2018-06-29 DIAGNOSIS — I48.20 CHRONIC ATRIAL FIBRILLATION (H): ICD-10-CM

## 2018-06-29 DIAGNOSIS — J18.9 PNEUMONIA OF RIGHT UPPER LOBE DUE TO INFECTIOUS ORGANISM: Primary | ICD-10-CM

## 2018-06-29 DIAGNOSIS — I10 BENIGN ESSENTIAL HYPERTENSION: ICD-10-CM

## 2018-06-29 DIAGNOSIS — Z79.01 LONG TERM CURRENT USE OF ANTICOAGULANT THERAPY: ICD-10-CM

## 2018-06-29 DIAGNOSIS — G47.33 OSA (OBSTRUCTIVE SLEEP APNEA): ICD-10-CM

## 2018-06-29 DIAGNOSIS — I50.9 ACUTE CONGESTIVE HEART FAILURE, UNSPECIFIED CONGESTIVE HEART FAILURE TYPE: ICD-10-CM

## 2018-06-29 DIAGNOSIS — I50.33 ACUTE ON CHRONIC DIASTOLIC CONGESTIVE HEART FAILURE (H): ICD-10-CM

## 2018-06-29 LAB — INR POINT OF CARE: 1.6 (ref 0.86–1.14)

## 2018-06-29 PROCEDURE — 36416 COLLJ CAPILLARY BLOOD SPEC: CPT

## 2018-06-29 PROCEDURE — 99495 TRANSJ CARE MGMT MOD F2F 14D: CPT | Performed by: INTERNAL MEDICINE

## 2018-06-29 PROCEDURE — 85610 PROTHROMBIN TIME: CPT | Mod: QW

## 2018-06-29 PROCEDURE — 82565 ASSAY OF CREATININE: CPT | Performed by: INTERNAL MEDICINE

## 2018-06-29 PROCEDURE — 84132 ASSAY OF SERUM POTASSIUM: CPT | Performed by: INTERNAL MEDICINE

## 2018-06-29 RX ORDER — HYDRALAZINE HYDROCHLORIDE 25 MG/1
25 TABLET, FILM COATED ORAL 3 TIMES DAILY
Qty: 90 TABLET | Refills: 11 | Status: SHIPPED | OUTPATIENT
Start: 2018-06-29 | End: 2019-01-21

## 2018-06-29 RX ORDER — METOPROLOL TARTRATE 25 MG/1
25 TABLET, FILM COATED ORAL 2 TIMES DAILY
Qty: 180 TABLET | Refills: 3 | Status: SHIPPED | OUTPATIENT
Start: 2018-06-29 | End: 2019-01-21

## 2018-06-29 NOTE — MR AVS SNAPSHOT
After Visit Summary   6/29/2018    Santosh Robledo    MRN: [id number]           Patient Information     Date Of Birth          7/20/1929        Visit Information        Provider Department      6/29/2018 1:20 PM Lilly Zepeda MD [facility]        Today's Diagnoses     Hypokalemia    -  1    Chronic atrial fibrillation (H)        MARILU (obstructive sleep apnea)           Follow-ups after your visit        Additional Services     SLEEP EVALUATION & MANAGEMENT REFERRAL - ADULT -[facility] [phone]  (Age 18 and up)       Please be aware that coverage of these services is subject to the terms and limitations of your health insurance plan.  Call member services at your health plan with any benefit or coverage questions.      Please bring the following to your appointment:    >>   List of current medications   >>   This referral request   >>   Any documents/labs given to you for this referral                      Your next 10 appointments already scheduled     Jul 18, 2018 10:50 AM CDT   CORE Enrollment with [facility])    [address] [place] [id number] [phone] OPT 2            Jul 30, 2018 11:00 AM CDT   Office Visit with Lilly Zepeda MD   [facility]    [address] [facility] [id number] [phone]           Bring a current list of meds and any records pertaining to this visit. For Physicals, please bring immunization records and any forms needing to be filled out. Please arrive 10 minutes early to complete paperwork.            Sep 20, 2018  4:30 PM CDT   Remote PPM Check with ADLER TECH1   [facility] ([facility])    [id number]  [facility] [id number] [phone] OPT 2           This appointment is for a remote check of your  "pacemaker.  This is not an appointment at the office.              Future tests that were ordered for you today     Open Future Orders        Priority Expected Expires Ordered    SLEEP EVALUATION & MANAGEMENT REFERRAL - ADULT -[facility]  [place] [phone]  (Age 18 and up) Routine  6/29/2019 6/29/2018            Who to contact     If you have questions or need follow up information about today's clinic visit or your schedule please contact [facility] directly at [phone].  Normal or non-critical lab and imaging results will be communicated to you by MyChart, letter or phone within 4 business days after the clinic has received the results. If you do not hear from us within 7 days, please contact the clinic through MyChart or phone. If you have a critical or abnormal lab result, we will notify you by phone as soon as possible.  Submit refill requests through [email or web address] or call your pharmacy and they will forward the refill request to us. Please allow 3 business days for your refill to be completed.          Additional Information About Your Visit        Care EveryWhere ID     This is your Care EveryWhere ID. This could be used by other organizations to access your [facility] medical records  [id number]        Your Vitals Were     Pulse Temperature Height Pulse Oximetry BMI (Body Mass Index)       73 96.9  F (36.1  C) (Oral) 6' 3\" (1.905 m) 97% 21.17 kg/m2        Blood Pressure from Last 3 Encounters:   06/29/18 109/54   06/19/18 125/77   06/15/18 106/44    Weight from Last 3 Encounters:   06/29/18 169 lb 6.4 oz (76.8 kg)   06/19/18 165 lb 3.2 oz (74.9 kg)   06/15/18 168 lb (76.2 kg)              We Performed the Following     Creatinine     Potassium        Primary Care Provider Office Phone # Fax #    Lilly Leonel Zepeda [phone] [address]  [place] [address]        Equal Access to Services     MAGALY CASTANEDA AH: Hadii tank Valenzuela " alka chanaingridsukhjinder godwinkingston javier ah. So [facility] [phone].    ATENCIÓN: Si yamilet bojorquez, tiene a gonzalez disposición servicios gratuitos de asistencia lingüística. Serena al [phone].    We comply with applicable federal civil rights laws and [place] laws. We do not discriminate on the basis of race, color, national origin, age, disability, sex, sexual orientation, or gender identity.            Thank you!     Thank you for choosing [facility]  for your care. Our goal is always to provide you with excellent care. Hearing back from our patients is one way we can continue to improve our services. Please take a few minutes to complete the written survey that you may receive in the mail after your visit with us. Thank you!             Your Updated Medication List - Protect others around you: Learn how to safely use, store and throw away your medicines at www.disposemymeds.org.          This list is accurate as of 6/29/18  1:45 PM.  Always use your most recent med list.                   Brand Name Dispense Instructions for use Diagnosis    B-12 1000 MCG Caps     90 capsule    Take 1 tablet by mouth daily    Vitamin B12 deficiency (non anemic)       furosemide 40 MG tablet    LASIX     Take 1 tablet (40 mg) by mouth 2 times daily    Acute congestive heart failure, unspecified congestive heart failure type (H)       hydrALAZINE 25 MG tablet    APRESOLINE    90 tablet    Take 1 tablet (25 mg) by mouth 3 times daily    Benign essential hypertension       lisinopril 20 MG tablet    PRINIVIL/ZESTRIL    180 tablet    Take 1 tablet (20 mg) by mouth daily    Benign essential hypertension       metoprolol tartrate 25 MG tablet    LOPRESSOR    60 tablet    Take 1 tablet (25 mg) by mouth 2 times daily    Acute congestive heart failure, unspecified congestive heart failure type (H)       omeprazole 20 MG CR capsule    priLOSEC    90 capsule    Take 1 capsule (20 mg) by mouth  daily    Gastroesophageal reflux disease, esophagitis presence not specified       order for DME     1 each    Equipment being ordered: Walker Wheels () and Walker () Treatment Diagnosis: Difficulty ambulating    Colitis       order for DME     1 Device    Equipment being ordered: Walker Wheels () Treatment Diagnosis:  Weakness,colitis.    Colitis       potassium chloride SA 20 MEQ CR tablet    K-DUR/KLOR-CON M    60 tablet    Take 1 tablet (20 mEq) by mouth 2 times daily    Acute congestive heart failure, unspecified congestive heart failure type (H)       Simethicone 125 MG Caps    GAS-X EXTRA STRENGTH    60 capsule    Take 1 capsule by mouth 2 times daily as needed    Flatulence, eructation and gas pain       simvastatin 5 MG tablet    ZOCOR    90 tablet    Take 2 tablets (10 mg) by mouth daily    Hyperlipidemia LDL goal <100       WARFARIN SODIUM PO      Take by mouth daily 2.5 mg M/W/Sa 5 mg AOD        ZOLOFT PO      Take 100 mg by mouth daily

## 2018-06-29 NOTE — PROGRESS NOTES
SUBJECTIVE:   Santosh Robledo is a 88 year old male who presents to clinic today for the following health issues:          Hospital Follow-up Visit:    Hospital/Nursing Home/IP Rehab Facility: [facility]  Date of Admission: 5-  Date of Discharge: 6-  Reason(s) for Admission: Right lung pneumonia            Problems taking medications regularly:  None       Medication changes since discharge: None       Problems adhering to non-medication therapy:  None    Summary of hospitalization:  [facility] discharge summary reviewed  Diagnostic Tests/Treatments reviewed.  Follow up needed: cardiology  Other Healthcare Providers Involved in Patient s Care:         Specialist appointment - cardiology  Update since discharge: improved.     Post Discharge Medication Reconciliation: discharge medications reconciled and changed, per note/orders (see AVS).  Plan of care communicated with patient and family     Coding guidelines for this visit:  Type of Medical   Decision Making Face-to-Face Visit       within 7 Days of discharge Face-to-Face Visit        within 14 days of discharge   Moderate Complexity [id number]   High Complexity 70695 73313            HPI:   Patient Santosh Robledo is a very pleasant 88 year old male with history of recent pneumonia related hospitalization who presents to Internal Medicine clinic today for post hospital discharge follow up of recent hospitalization for pneumonia. Regarding the patient's pneumonia, the patient's pneumonia symptoms have resolved with antibiotics therapy. Patient denies any chest pain, headaches, fever or chills. Regarding the patient's chronic hypertension, the patient is due for a refill of his metoprolol and hydralazine medications. Patient is also due for an update of his chronic CPAP therapy for his MARILU going forward. Patient is also due for a repeat potassium and creatinine labs at this time for monitoring of recent hypokalemia while on  lasix diuretic therapy.    Current Medications:     Current Outpatient Prescriptions   Medication Sig Dispense Refill     Cyanocobalamin (B-12) 1000 MCG CAPS Take 1 tablet by mouth daily 90 capsule 3     furosemide (LASIX) 40 MG tablet Take 1 tablet (40 mg) by mouth 2 times daily       hydrALAZINE (APRESOLINE) 25 MG tablet Take 1 tablet (25 mg) by mouth 3 times daily 90 tablet 0     lisinopril (PRINIVIL/ZESTRIL) 20 MG tablet Take 1 tablet (20 mg) by mouth daily 180 tablet 3     metoprolol tartrate (LOPRESSOR) 25 MG tablet Take 1 tablet (25 mg) by mouth 2 times daily 60 tablet 0     omeprazole (PRILOSEC) 20 MG CR capsule Take 1 capsule (20 mg) by mouth daily 90 capsule 3     order for DME Equipment being ordered: Walker Wheels () and Walker ()  Treatment Diagnosis: Difficulty ambulating 1 each 0     order for DME Equipment being ordered: Walker Wheels ()  Treatment Diagnosis:  Weakness,colitis. 1 Device 0     potassium chloride SA (K-DUR/KLOR-CON M) 20 MEQ CR tablet Take 1 tablet (20 mEq) by mouth 2 times daily 60 tablet 0     Sertraline HCl (ZOLOFT PO) Take 100 mg by mouth daily       Simethicone (GAS-X EXTRA STRENGTH) 125 MG CAPS Take 1 capsule by mouth 2 times daily as needed 60 capsule 11     simvastatin (ZOCOR) 5 MG tablet Take 2 tablets (10 mg) by mouth daily 90 tablet 3     WARFARIN SODIUM PO Take by mouth daily 2.5 mg M/W/Sa  5 mg AOD           Allergies:      Allergies   Allergen Reactions     Penicillins Rash            Past Medical History:     Past Medical History:   Diagnosis Date     AV node dysfunction      Chronic atrial fibrillation (H) 2004     GERD (gastroesophageal reflux disease)      Hyperlipidemia      Near syncope      MARILU (obstructive sleep apnea)          Past Surgical History:     Past Surgical History:   Procedure Laterality Date     IMPLANT PACEMAKER  08/10/2015         Family Medical History:     Family History   Problem Relation Age of Onset     Influenza/Pneumonia Mother  "     Prostate Cancer Father          Social History:     Social History     Social History     Marital status:      Spouse name: N/A     Number of children: N/A     Years of education: N/A     Occupational History     Not on file.     Social History Main Topics     Smoking status: Never Smoker     Smokeless tobacco: Never Used     Alcohol use No     Drug use: No     Sexual activity: Yes     Partners: Female     Other Topics Concern     Not on file     Social History Narrative           Review of System:     Constitutional: Negative for fever or chills  Skin: Negative for rashes  Ears/Nose/Throat: Negative for nasal congestion, sore throat  Respiratory: No shortness of breath, dyspnea on exertion, cough, or hemoptysis, positive for resolution of recent pneumonia.  Cardiovascular: Negative for chest pain  Gastrointestinal: Negative for nausea, vomiting  Genitourinary: Negative for dysuria, hematuria  Musculoskeletal: Negative for myalgias  Neurologic: Negative for headaches  Psychiatric: Negative for depression, anxiety  Hematologic/Lymphatic/Immunologic: Negative  Endocrine: Negative  Behavioral: Negative for tobacco use       Physical Exam:   /54 (BP Location: Right arm, Cuff Size: Adult Regular)  Pulse 73  Temp 96.9  F (36.1  C) (Oral)  Ht 6' 3\" (1.905 m)  Wt 169 lb 6.4 oz (76.8 kg)  SpO2 97%  BMI 21.17 kg/m2    GENERAL: chronically ill appearing elderly male, alert and no acute distress  EYES: eyes grossly normal to inspection, and conjunctivae and sclerae normal  HENT: Normocephalic atraumatic. Nose and mouth without ulcers or lesions  NECK: supple  RESP: lungs clear to auscultation   CV: regular rate and rhythm, normal S1 S2  LYMPH: trace bilateral peripheral leg edema present  ABDOMEN: nondistended  MS: no gross musculoskeletal defects noted  SKIN: no suspicious lesions or rashes  NEURO: Alert & Oriented x 3.   PSYCH: mentation appears normal, affect normal        Diagnostic Test Results: "     Diagnostic Test Results:  Results for orders placed or performed in visit on 06/25/18   INR   Result Value Ref Range    INR 1.8        ASSESSMENT/PLAN:       (J18.1) Pneumonia of right upper lobe due to infectious organism (H)  (primary encounter diagnosis)  Comment: post hospital discharge follow up of recent pneumonia  Plan: patient's pneumonia symptoms have resolved following antibiotics treatment at the hospital.      (I10) Benign essential hypertension  Comment: patient is due for a refill of his BP medications with metoprolol and hydralazine.  Plan: hydrALAZINE (APRESOLINE) 25 MG tablet and metoprolol tartrate (LOPRESSOR) 25 MG tablet      (I50.33) Acute on chronic diastolic congestive heart failure (H)  (E87.6) Hypokalemia  (primary encounter diagnosis)  Comment: Acute CHF exacerbation symptoms have resolved with treatment during hospitalization. patient is due for a repeat labs for creatinine and potassium at this time. Patient has been compliant with taking his daily potassium supplementation medication with his lasix diuretic therapy.  Plan: I have ordered labs for Potassium, Creatinine      (I48.2) Chronic atrial fibrillation (H)  Comment: heart rate is well controlled. Patient is due for recheck of INR today.  Plan: I have advised the patient to follow up with the anticoagulation clinic later today for a repeat INR check.      (G47.33) MARILU (obstructive sleep apnea)  Comment: chronic MARILU, patient needs update of his chronic CPAP therapy  Plan: I have orderedSLEEP EVALUATION & MANAGEMENT REFERRAL - ADULT -[facility] [phone]  (Age 18 and up) for further evaluation and management going forward      Follow Up Plan:     Patient is instructed to return to Internal Medicine clinic for follow-up visit in 1 month.        Lilly Zepeda MD  Internal Medicine  [facility]

## 2018-06-30 LAB
CREAT SERPL-MCNC: 1.02 MG/DL (ref 0.66–1.25)
GFR SERPL CREATININE-BSD FRML MDRD: 69 ML/MIN/1.7M2
POTASSIUM SERPL-SCNC: 4.7 MMOL/L (ref 3.4–5.3)

## 2018-07-03 ENCOUNTER — ANTICOAGULATION THERAPY VISIT (OUTPATIENT)
Dept: FAMILY MEDICINE | Facility: CLINIC | Age: 83
End: 2018-07-03
Payer: COMMERCIAL

## 2018-07-03 ENCOUNTER — TELEPHONE (OUTPATIENT)
Dept: FAMILY MEDICINE | Facility: CLINIC | Age: 83
End: 2018-07-03

## 2018-07-03 LAB — INR PPP: 2.5

## 2018-07-03 PROCEDURE — 99207 ZZC NO CHARGE NURSE ONLY: CPT | Performed by: INTERNAL MEDICINE

## 2018-07-03 NOTE — PROGRESS NOTES
ANTICOAGULATION FOLLOW-UP CLINIC VISIT    Patient Name:  Santosh Spauldingelmstad  Date:  7/3/2018  Contact Type:  Telephone    SUBJECTIVE:     Patient Findings     Positives No Problem Findings           OBJECTIVE    INR   Date Value Ref Range Status   07/03/2018 2.5  Final       ASSESSMENT / PLAN  INR assessment THER    Recheck INR In: 5 DAYS    INR Location Homecare INR      Anticoagulation Summary as of 7/3/2018     INR goal 2.0-3.0   Today's INR 2.5   Warfarin maintenance plan 2.5 mg (5 mg x 0.5) on Mon, Wed, Sat; 5 mg (5 mg x 1) all other days   Full warfarin instructions 7/4: 5 mg; 7/7: 5 mg; Otherwise 2.5 mg on Mon, Wed, Sat; 5 mg all other days   Weekly warfarin total 27.5 mg   Plan last modified Bryanna Heredia RN (5/2/2018)   Next INR check 7/9/2018   Target end date     Indications   Chronic atrial fibrillation (H) [I48.2]  Long term current use of anticoagulant therapy [Z79.01]         Anticoagulation Episode Summary     INR check location     Preferred lab     Send INR reminders to [facility] ANTICOAGULATION    Comments       Anticoagulation Care Providers     Provider Role Specialty Phone number    Lilly Zepeda MD [facility] Responsible Internal Medicine [phone]            See the Encounter Report to view Anticoagulation Flowsheet and Dosing Calendar (Go to Encounters tab in chart review, and find the Anticoagulation Therapy Visit)    Dosage adjustment made based on physician directed care plan. INR 2.5 today after weekly increase. PATIENT DRINKS TWO CANS OF BOOST DAILY. Advised 5 mg daily and recheck INR Monday. Spoke to home care nurse. (see phone encounter for name/contact information)    Bryanna Heredia RN

## 2018-07-03 NOTE — TELEPHONE ENCOUNTER
Spoke with Alvina with [facility] she is calling back regarding INR result for PT   Transferred to triage

## 2018-07-03 NOTE — TELEPHONE ENCOUNTER
Reason for Call:  INR    Who is calling?  Home Care:     Phone number:      Fax number:      Name of caller:     INR Value:  2.5     Are there any other concerns:  No    Can we leave a detailed message on this number? YES    Phone number patient can be reached at: Other phone number:        Call taken on 7/3/2018 at 3:24 PM by Messi Santamaria

## 2018-07-03 NOTE — MR AVS SNAPSHOT
Santosh KENNY Hjelmstad   7/3/2018   Anticoagulation Therapy Visit    Description:  88 year old male   Provider:  Lilly Zepeda MD   Department:  [facility]           INR as of 7/3/2018     Today's INR 2.5      Anticoagulation Summary as of 7/3/2018     INR goal 2.0-3.0   Today's INR 2.5   Full warfarin instructions 7/4: 5 mg; 7/7: 5 mg; Otherwise 2.5 mg on Mon, Wed, Sat; 5 mg all other days   Next INR check 7/9/2018    Indications   Chronic atrial fibrillation (H) [I48.2]  Long term current use of anticoagulant therapy [Z79.01]         Description     Drinks Boost daily (am and pm)      July 2018 Details    Sun Mon Tue Wed Thu Fri Sat     1               2               3      5 mg   See details      4      5 mg         5      5 mg         6      5 mg         7      5 mg           8      5 mg         9            10               11               12               13               14                 15               16               17               18               19               20               21                 22               23               24               25               26               27               28                 29               30               31                    Date Details   07/03 This INR check       Date of next INR:  7/9/2018         How to take your warfarin dose     To take:  2.5 mg Take 0.5 of a 5 mg tablet.    To take:  5 mg Take 1 of the 5 mg tablets.

## 2018-07-09 ENCOUNTER — TELEPHONE (OUTPATIENT)
Dept: FAMILY MEDICINE | Facility: CLINIC | Age: 83
End: 2018-07-09

## 2018-07-09 ENCOUNTER — ANTICOAGULATION THERAPY VISIT (OUTPATIENT)
Dept: FAMILY MEDICINE | Facility: CLINIC | Age: 83
End: 2018-07-09
Payer: COMMERCIAL

## 2018-07-09 DIAGNOSIS — Z79.01 LONG TERM CURRENT USE OF ANTICOAGULANT THERAPY: ICD-10-CM

## 2018-07-09 DIAGNOSIS — I48.20 CHRONIC ATRIAL FIBRILLATION (H): ICD-10-CM

## 2018-07-09 LAB — INR PPP: 3.4

## 2018-07-09 PROCEDURE — 99207 ZZC NO CHARGE NURSE ONLY: CPT | Performed by: INTERNAL MEDICINE

## 2018-07-09 NOTE — TELEPHONE ENCOUNTER
INR via fingerstick 3.4 today,   Denies changes, NO ETOH, NO diarrhea, meds the same. NO change in diet.   Wife reports 5mg everyday.       Rosemary Fofana RN BSN  [facility]   [phone], no need to call, I check EPIC daily for routine orders.

## 2018-07-09 NOTE — MR AVS SNAPSHOT
Santosh KENNY Hjelmstad   7/9/2018   Anticoagulation Therapy Visit    Description:  88 year old male   Provider:  Lilly Zepeda MD   Department:  [facility]           INR as of 7/9/2018     Today's INR 3.4!      Anticoagulation Summary as of 7/9/2018     INR goal 2.0-3.0   Today's INR 3.4!   Full warfarin instructions 2.5 mg on Mon; 5 mg all other days   Next INR check 7/13/2018    Indications   Chronic atrial fibrillation (H) [I48.2]  Long term current use of anticoagulant therapy [Z79.01]         July 2018 Details    Sun Mon Tue Wed Thu Fri Sat     1               2               3               4               5               6               7                 8               9      2.5 mg   See details      10      5 mg         11      5 mg         12      5 mg         13            14                 15               16               17               18               19               20               21                 22               23               24               25               26               27               28                 29               30               31                    Date Details   07/09 This INR check       Date of next INR:  7/13/2018         How to take your warfarin dose     To take:  2.5 mg Take 0.5 of a 5 mg tablet.    To take:  5 mg Take 1 of the 5 mg tablets.

## 2018-07-09 NOTE — PROGRESS NOTES
ANTICOAGULATION FOLLOW-UP CLINIC VISIT    Patient Name:  Santosh KENNY Hjelmstad  Date:  7/9/2018  Contact Type:  Telephone    SUBJECTIVE:     Patient Findings     Positives No Problem Findings    Comments INR via fingerstick 3.4 today,   Denies changes, NO ETOH, NO diarrhea, meds the same. NO change in diet.   Wife reports 5mg everyday.         Rosemary Fofana RN BSN  [facility]            OBJECTIVE    INR   Date Value Ref Range Status   07/09/2018 3.4  Final       ASSESSMENT / PLAN  INR assessment SUPRA    Recheck INR In: 4 DAYS    INR Location Home[facility] INR      Anticoagulation Summary as of 7/9/2018     INR goal 2.0-3.0   Today's INR 3.4!   Warfarin maintenance plan 2.5 mg (5 mg x 0.5) on Mon; 5 mg (5 mg x 1) all other days   Full warfarin instructions 2.5 mg on Mon; 5 mg all other days   Weekly warfarin total 32.5 mg   Plan last modified Maura Hawkins RN (7/9/2018)   Next INR check 7/13/2018   Target end date     Indications   Chronic atrial fibrillation (H) [I48.2]  Long term current use of anticoagulant therapy [Z79.01]         Anticoagulation Episode Summary     INR check location     Preferred lab     Send INR reminders to  ANTICOAGULATION    Comments       Anticoagulation Care Providers     Provider Role Specialty Phone number    Duane, Lilly Castaneda MD Responsible Internal Medicine [phone]            See the Encounter Report to view Anticoagulation Flowsheet and Dosing Calendar (Go to Encounters tab in chart review, and find the Anticoagulation Therapy Visit)    Home care nurse sent telephone encounter with warfarin dosing - pt denies any changes in meds/diet/health. Has taken 5mg daily since last INR. Sent telephone encounter back to [facility] nurse (per telephone encounter she will be checking her basket in Epic) - advise pt take 2.5mg Monday and 5mg ROW and recheck INR again on Friday.     Maura Hawkins, ADRIANA

## 2018-07-09 NOTE — TELEPHONE ENCOUNTER
See anticoagulation encounter     Advise patient take 2.5mg Warfarin on Mondays and 5mg the rest of the week.     Recheck INR on Friday 7/13/18    Please let us know if any questions. ([phone])    Thank you,   Maura SARGENT RN

## 2018-07-13 ENCOUNTER — ANTICOAGULATION THERAPY VISIT (OUTPATIENT)
Dept: FAMILY MEDICINE | Facility: CLINIC | Age: 83
End: 2018-07-13
Payer: COMMERCIAL

## 2018-07-13 ENCOUNTER — TELEPHONE (OUTPATIENT)
Dept: FAMILY MEDICINE | Facility: CLINIC | Age: 83
End: 2018-07-13

## 2018-07-13 DIAGNOSIS — I48.20 CHRONIC ATRIAL FIBRILLATION (H): ICD-10-CM

## 2018-07-13 DIAGNOSIS — Z79.01 LONG TERM CURRENT USE OF ANTICOAGULANT THERAPY: ICD-10-CM

## 2018-07-13 LAB — INR POINT OF CARE: 3.7 (ref 0.86–1.14)

## 2018-07-13 PROCEDURE — 85610 PROTHROMBIN TIME: CPT | Mod: QW | Performed by: INTERNAL MEDICINE

## 2018-07-13 PROCEDURE — 99207 ZZC NO CHARGE NURSE ONLY: CPT | Performed by: INTERNAL MEDICINE

## 2018-07-13 PROCEDURE — 36416 COLLJ CAPILLARY BLOOD SPEC: CPT | Performed by: INTERNAL MEDICINE

## 2018-07-13 NOTE — PROGRESS NOTES
ANTICOAGULATION FOLLOW-UP CLINIC VISIT    Patient Name:  Santosh KENNY Hjelmstad  Date:  7/13/2018  Contact Type:  Epic documentation from Homecare nurse Rosemary Fofana    SUBJECTIVE:     Patient Findings     Positives Change in diet/appetite    Comments Changed shake from boost to premier = lower vit K.           OBJECTIVE    INR Protime   Date Value Ref Range Status   07/13/2018 3.7 (A) 0.86 - 1.14 Final       ASSESSMENT / PLAN  INR assessment SUPRA    Recheck INR In: 1 WEEK    INR Location [facility] INR      Anticoagulation Summary as of 7/13/2018     INR goal 2.0-3.0   Today's INR 3.7!   Warfarin maintenance plan 2.5 mg (5 mg x 0.5) on Mon, Fri; 5 mg (5 mg x 1) all other days   Full warfarin instructions 2.5 mg on Mon, Fri; 5 mg all other days   Weekly warfarin total 30 mg   Plan last modified Lynda Kwon, RN (7/13/2018)   Next INR check 7/20/2018   Target end date     Indications   Chronic atrial fibrillation (H) [I48.2]  Long term current use of anticoagulant therapy [Z79.01]         Anticoagulation Episode Summary     INR check location     Preferred lab     Send INR reminders to [facility]    Comments       Anticoagulation Care Providers     Provider Role Specialty Phone number    Lilly Zepeda MD [facility] Internal Medicine [phone]            See the Encounter Report to view Anticoagulation Flowsheet and Dosing Calendar (Go to Encounters tab in chart review, and find the Anticoagulation Therapy Visit)    Dosage adjustment made based on physician directed care plan.  Education needed on changing supplements, notified Rosemary Fofana of [facility].    Lynda Kwon RN

## 2018-07-13 NOTE — MR AVS SNAPSHOT
Santosh KENNY Hjelmstad   7/13/2018   Anticoagulation Therapy Visit    Description:  88 year old male   Provider:  Lilly Zepeda MD   Department:  [facility]           INR as of 7/13/2018     Today's INR 3.7!      Anticoagulation Summary as of 7/13/2018     INR goal 2.0-3.0   Today's INR 3.7!   Full warfarin instructions 2.5 mg on Mon, Fri; 5 mg all other days   Next INR check 7/20/2018    Indications   Chronic atrial fibrillation (H) [I48.2]  Long term current use of anticoagulant therapy [Z79.01]         July 2018 Details    Sun Mon Tue Wed Thu Fri Sat     1               2               3               4               5               6               7                 8               9               10               11               12               13      2.5 mg   See details      14      5 mg           15      5 mg         16      2.5 mg         17      5 mg         18      5 mg         19      5 mg         20            21                 22               23               24               25               26               27               28                 29               30               31                    Date Details   07/13 This INR check       Date of next INR:  7/20/2018         How to take your warfarin dose     To take:  2.5 mg Take 0.5 of a 5 mg tablet.    To take:  5 mg Take 1 of the 5 mg tablets.

## 2018-07-13 NOTE — TELEPHONE ENCOUNTER
INR 3.7 today via fingerstick  Confirmed dosing, pt has NO med changes. NO bleeding. Pt did change protein shake, from boost to Premier Protein. NO ETOH, NO diarrhea.     Rosemary Fofana RN BSN  [facility]    [phone], no need to call for routine orders, I check EPIC daily.

## 2018-07-13 NOTE — TELEPHONE ENCOUNTER
Boost has more Vitamin K than premier Protein.  Please educate pt to stay consistent.  Can stay on Premier but let us know if it changes again.   Change dosing to 2.5 mg warfarin on Mondays and Fridays starting with 2.5mg today, then 5mg all other days.  Recheck INR with first scheduled Homecare visit next week.  Lynda Kwon RN

## 2018-07-16 ENCOUNTER — TELEPHONE (OUTPATIENT)
Dept: FAMILY MEDICINE | Facility: CLINIC | Age: 83
End: 2018-07-16

## 2018-07-16 DIAGNOSIS — I50.33 ACUTE ON CHRONIC DIASTOLIC CONGESTIVE HEART FAILURE (H): Primary | ICD-10-CM

## 2018-07-16 NOTE — TELEPHONE ENCOUNTER
Can I get 2 addt PRN visits through my current orders 8/3/18, I have needed PRNs for INRs and labs.     Thanks   Rosemary Fofana RN BSN  [facility]   [phone], no need to call for routine orders, I check EPIC daily.

## 2018-07-16 NOTE — TELEPHONE ENCOUNTER
OK for extra PRN visits.  Verbal approval given per request below. Homecare/Hospice agency to fax orders for provider signature.  Lynda Kwon RN

## 2018-07-16 NOTE — PROGRESS NOTES
Called pt's [facility] RN Case Manager Rosemary ([phone]) to request that home care draw BMP, pro BNP tomorrow 7/17, prior to CORE f/u w/ Rosie Helton CNP on 7/18.    LVM w/ request labs and call back to confirm plan.     If home care not able to draw labs-will contact pt to schedule previsit lab appt.    Jina Abebe [facility] RN 3:24 PM 07/16/18  [phone]

## 2018-07-17 LAB
ANION GAP SERPL CALCULATED.3IONS-SCNC: 6 MMOL/L (ref 3–14)
BUN SERPL-MCNC: 31 MG/DL (ref 7–30)
CALCIUM SERPL-MCNC: 8.7 MG/DL (ref 8.5–10.1)
CHLORIDE SERPL-SCNC: 111 MMOL/L (ref 94–109)
CO2 SERPL-SCNC: 27 MMOL/L (ref 20–32)
CREAT SERPL-MCNC: 1.04 MG/DL (ref 0.66–1.25)
GFR SERPL CREATININE-BSD FRML MDRD: 67 ML/MIN/1.7M2
GLUCOSE SERPL-MCNC: 126 MG/DL (ref 70–99)
NT-PROBNP SERPL-MCNC: 1623 PG/ML (ref 0–450)
POTASSIUM SERPL-SCNC: 4 MMOL/L (ref 3.4–5.3)
SODIUM SERPL-SCNC: 144 MMOL/L (ref 133–144)

## 2018-07-17 PROCEDURE — 83880 ASSAY OF NATRIURETIC PEPTIDE: CPT | Performed by: INTERNAL MEDICINE

## 2018-07-17 PROCEDURE — 80048 BASIC METABOLIC PNL TOTAL CA: CPT | Performed by: INTERNAL MEDICINE

## 2018-07-17 NOTE — PROGRESS NOTES
Received VM from Rosemary WRIGHT-recording quality was broken and I was not able to hear full msg.     Sent Epic msg to [facility] ADRIANA Riley to confirm that BMP, pro BNP can be drawn prior to CORE f/u w/ Rosie Helton CNP 7/18. Pt's cardiologist is Dr. Lynn.    Jina Abebe [facility] RN 9:48 AM 07/17/18  [phone]

## 2018-07-17 NOTE — PROGRESS NOTES
Received msg from [facility] RN that previsit labs would be drawn today. Appreciate her help!    Jina Abebe [facility] RN 1:19 PM 07/17/18  [phone]

## 2018-07-18 ENCOUNTER — OFFICE VISIT (OUTPATIENT)
Dept: CARDIOLOGY | Facility: CLINIC | Age: 83
End: 2018-07-18
Attending: INTERNAL MEDICINE
Payer: COMMERCIAL

## 2018-07-18 VITALS
OXYGEN SATURATION: 91 % | HEIGHT: 75 IN | BODY MASS INDEX: 21.34 KG/M2 | HEART RATE: 86 BPM | WEIGHT: 171.6 LBS | DIASTOLIC BLOOD PRESSURE: 58 MMHG | SYSTOLIC BLOOD PRESSURE: 112 MMHG

## 2018-07-18 DIAGNOSIS — I10 BENIGN ESSENTIAL HYPERTENSION: ICD-10-CM

## 2018-07-18 DIAGNOSIS — I50.9 ACUTE CONGESTIVE HEART FAILURE, UNSPECIFIED CONGESTIVE HEART FAILURE TYPE: ICD-10-CM

## 2018-07-18 PROCEDURE — 99213 OFFICE O/P EST LOW 20 MIN: CPT | Performed by: NURSE PRACTITIONER

## 2018-07-18 RX ORDER — FUROSEMIDE 40 MG
TABLET ORAL
Qty: 90 TABLET | Refills: 1 | Status: SHIPPED | OUTPATIENT
Start: 2018-07-18 | End: 2018-11-13

## 2018-07-18 RX ORDER — FUROSEMIDE 40 MG
40 TABLET ORAL
Qty: 90 TABLET | Refills: 1 | Status: SHIPPED | OUTPATIENT
Start: 2018-07-18 | End: 2018-07-18

## 2018-07-18 NOTE — NURSING NOTE
The After Visit Summary was reviewed with the patient and his wife following their office visit with Rosie Helton NP. Patient was educated about any medication changes, the importance of following a low sodium diet, importance of recording daily weights, and when to call CORE clinic. Patient verbalized understanding of the information and agrees to call with any questions or concerns.     Labs: Lab results from today were reviewed with the patient during the office visit. A copy of the results were provided on the After Visit Summary.     Return appointment: Patient was given instructions on when and how to schedule their next office visit with the CORE clinic. Patient to follow up in 2 months with any CORE NADEGE, as Rosie will be retired.     Medication changes: Furosemide decreased to 40mg in am and 20mg in pm.      Lise Stephenson, RN  [facility] RN Care Coordinator  [facility]  [phone]

## 2018-07-18 NOTE — PROGRESS NOTES
Service Date: 07/18/2018      REASON FOR VISIT:  To Establish care in the CORE clinic for management of his HFpEF.     HISTORY OF PRESENT ILLNESS:  Mr. Robledo is a pleasant 88-year-old gentleman with a history of hypertension, hyperlipidemia, HFpEF, paroxysmal atrial fibrillation and tachybrady syndrome and  previous pacemaker implantation.     The patient was recently admitted in the hospital in May with pneumonia.  Hospital stay was complicated by preserved EF heart failure and C. diff colitis.  He was discharged to the [facility] and was discharged back to [address] [place] [address] on 6/20/18.      Lab work today reviewed: Iv=758, K+4.0,BUN= 31 Pro=BnP=1,623 (22,000) in his last hospital.    At his visit with Dr. Lynn his lisinopril was reduced to 20 mg per day.    Today he denies any symptoms of chest pain, shortness of breath, lightheadedness, near-syncope or syncopal episode.      His last device interrogation demonstrated:  Lead parameters are stable.  He is ventricular paced 35% of the time.      Echocardiogram obtained on 05/23/2018 revealed an EF of 55%-60%.  EKG obtained 05/23 revealed atrial fibrillation with controlled ventricular response.     PHYSICAL EXAM:    Blood pressure 112/58- 86 Weight: 166.6 pounds    Constitutional:  cooperative, alert and oriented, well developed, well nourished, in no acute diistress, tall with stooped shoulders     Skin:  warm and dry to the touch, no apparent skin lesions or masses noted       Pace maker incision well healed     Head:  normocephalic,    Eyes:  pupils equal and round;conjunctivae and lids unremarkable;sclera white;no xanthalasma         Lymph:       ENT:  no pallor or cyanosis         Neck:  carotid pulses are full and equal bilaterally, JVP normal, no carotid bruit         Respiratory:  normal breath sounds, clear to auscultation, normal A-P diameter, normal symmetry, normal respiratory excursion, no use of accessory muscles;      Cardiac: regular  rhythm;normal S1 and S2;no murmurs, gallops or rubs detected   S4  pulses full and equal, no bruits auscultated                                         GI:  abdomen soft, non-tender, BS normoactive, no mass, no HSM, no bruits obese       Extremities and Muscular Skeletal:  no deformities, clubbing, cyanosis, erythema observed       Neurological:  no gross motor deficits;affect appropriate   uses a walker     Psych:  Alert and Oriented x 3         ASSESSMENT:   1.  Preserved EF heart failure.  Appears euvolemic on physical exam- with slow reductions to his daily                                                         weights.   2.  Paroxysmal AFib, asymptomatic.  Heart rate seems to be well controlled.   3.  Embolic prevention.  CHADS-VASc score of 3.  Continue coagulation of Coumadin indefinitely.    4.  Hypertension: Looks good today with current medications.     PLAN:    1. Reduce your  furosemide 40 mg in the morning, and 20 mg in the afternoon, you can cut the 40 mg in    half.    2. Weigh yourself daily and write it down.    3. Call CORE nurse if your weight is up more than 2 pounds in one day or 5 pounds in one week.    4. Call CORE nurse if you feel more short of breath, have more abdominal bloating, or leg swelling.                5. Continue device checks q.3 months.        Rosie BECKER

## 2018-07-18 NOTE — LETTER
7/18/2018    Lilly Zepeda MD  [address] [facility] [address]  [facility] [address]    RE: Sanotsh Robledo       Dear Colleague,    I had the pleasure of seeing Santosh Robledo in the [facility].    Service Date: 07/18/2018      REASON FOR VISIT:  To Establish care in the [facility] clinic for management of his HFpEF.     HISTORY OF PRESENT ILLNESS:  Mr. Robledo is a pleasant 88-year-old gentleman with a history of hypertension, hyperlipidemia, HFpEF, paroxysmal atrial fibrillation and tachybrady syndrome and  previous pacemaker implantation.     The patient was recently admitted in the hospital in May with pneumonia.  Hospital stay was complicated by preserved EF heart failure and C. diff colitis.  He was discharged to the [facility] and was discharged back to [address] [place] [address] on 6/20/18.      Lab work today reviewed: Gi=922, K+4.0,BUN= 31 Pro=BnP=1,623 (22,000) in his last hospital.    At his visit with Dr. Lynn his lisinopril was reduced to 20 mg per day.    Today he denies any symptoms of chest pain, shortness of breath, lightheadedness, near-syncope or syncopal episode.      His last device interrogation demonstrated:  Lead parameters are stable.  He is ventricular paced 35% of the time.      Echocardiogram obtained on 05/23/2018 revealed an EF of 55%-60%.  EKG obtained 05/23 revealed atrial fibrillation with controlled ventricular response.      ASSESSMENT:   1.  Preserved EF heart failure.  Appears euvolemic on physical exam- with slow reductions to his daily                                                         weights.   2.  Paroxysmal AFib, asymptomatic.  Heart rate seems to be well controlled.   3.  Embolic prevention.  CHADS-VASc score of 3.  Continue coagulation of Coumadin indefinitely.    4.  Hypertension: Looks good today with current medications.     PLAN:    1. Reduce your  furosemide 40 mg in the morning, and 20 mg in the afternoon, you can cut the 40 mg in     half.    2. Weigh yourself daily and write it down.    3. Call CORE nurse if your weight is up more than 2 pounds in one day or 5 pounds in one week.    4. Call CORE nurse if you feel more short of breath, have more abdominal bloating, or leg swelling.                5. Continue device checks q.3 months.       Thank you for allowing me to participate in the care of your patient.    Sincerely,     EUGENIO Mulligan [facility] [place]

## 2018-07-18 NOTE — LETTER
7/18/2018    Lilly Zepeda MD  [address] [facility] [address]  [facility] [address]    RE: Santosh Robledo       Dear Colleague,    I had the pleasure of seeing Santosh Robledo in the [facility].    Service Date: 07/18/2018      REASON FOR VISIT:  To Establish care in the [facility] clinic for management of his HFpEF.     HISTORY OF PRESENT ILLNESS:  Mr. Robledo is a pleasant 88-year-old gentleman with a history of hypertension, hyperlipidemia, HFpEF, paroxysmal atrial fibrillation and tachybrady syndrome and  previous pacemaker implantation.     The patient was recently admitted in the hospital in May with pneumonia.  Hospital stay was complicated by preserved EF heart failure and C. diff colitis.  He was discharged to the [facility] and was discharged back to [address] [place] [address] on 6/20/18.      Lab work today reviewed: Hy=614, K+4.0,BUN= 31 Pro=BnP=1,623 (22,000) in his last hospital.    At his visit with Dr. Lynn his lisinopril was reduced to 20 mg per day.    Today he denies any symptoms of chest pain, shortness of breath, lightheadedness, near-syncope or syncopal episode.      His last device interrogation demonstrated:  Lead parameters are stable.  He is ventricular paced 35% of the time.      Echocardiogram obtained on 05/23/2018 revealed an EF of 55%-60%.  EKG obtained 05/23 revealed atrial fibrillation with controlled ventricular response.      ASSESSMENT:   1.  Preserved EF heart failure.  Appears euvolemic on physical exam- with slow reductions to his daily                                                         weights.   2.  Paroxysmal AFib, asymptomatic.  Heart rate seems to be well controlled.   3.  Embolic prevention.  CHADS-VASc score of 3.  Continue coagulation of Coumadin indefinitely.    4.  Hypertension: Looks good today with current medications.     PLAN:    1. Reduce your  furosemide 40 mg in the morning, and 20 mg in the afternoon, you can cut the 40 mg in     half.    2. Weigh yourself daily and write it down.    3. Call CORE nurse if your weight is up more than 2 pounds in one day or 5 pounds in one week.    4. Call CORE nurse if you feel more short of breath, have more abdominal bloating, or leg swelling.                5. Continue device checks q.3 months.        Rosie BECKER    Thank you for allowing me to participate in the care of your patient.      Sincerely,     EUGENIO Mulligan [facility]    cc:   Lilly Zepeda MD  [address] [address]

## 2018-07-18 NOTE — MR AVS SNAPSHOT
After Visit Summary   7/18/2018    Santosh Robledo    MRN: [id number]           Patient Information     Date Of Birth          7/20/1929        Visit Information        Provider Department      7/18/2018 10:50 AM Rosie Helton, EUGENIO GRIER [facility]   [place]        Today's Diagnoses     Benign essential hypertension        Acute congestive heart failure, unspecified congestive heart failure type (H)          Care Instructions    Call CORE nurse for any questions or concerns:  [phone]   *If you have concerns after hours, please call [phone], option 2 to speak with on call Cardiologist.    1. Medication changes from today:  Furosemide 40 mg in the morning, and 20 mg in the afternoon, you can cut the 40 mg in half.     2. Weigh yourself daily and write it down.     3. Call CORE nurse if your weight is up more than 2 pounds in one day or 5 pounds in one week.     4. Call CORE nurse if you feel more short of breath, have more abdominal bloating, or leg swelling.     5. Continue low sodium diet (less than 2000 mg daily). If you eat less salt, you will retain less fluid.     6. Alcohol can weaken your heart further. You should avoid alcohol or limit its use to special times, such as a holiday or birthday.      7. Do NOT take Aleve or ibuprofen without talking to your doctor first.      8. Lab Results: **     Component      Latest Ref Rng & Units 6/18/2018 7/17/2018   Sodium      133 - 144 mmol/L 141 144   Potassium      3.4 - 5.3 mmol/L 3.9 4.0   Chloride      94 - 109 mmol/L 107 111 (H)   Carbon Dioxide      20 - 32 mmol/L 31 27   Anion Gap      3 - 14 mmol/L 3 6   Glucose      70 - 99 mg/dL 109 (A) 126 (H)   Urea Nitrogen      7 - 30 mg/dL 24 31 (H)   Creatinine      0.66 - 1.25 mg/dL 1.07 1.04   Calcium      8.5 - 10.1 mg/dL 8.8 8.7   GFR Estimate      >60 mL/min/1.7m2 65 67   GFR Estimate If Black      >60 mL/min/1.7m2 79 81   N-Terminal Pro Bnp      0 - 450  pg/mL  1623 (H)     [facility]: Cardiomyopathy, Optimization, Rehabilitation, Education  The [facility] is a heart failure specialty clinic within the [facility] where you will work with specialized nurse practitioners, physician assistants, doctors, and registered nurses. They are dedicated to helping patients with heart failure to carefully adjust medications, receive education, and learn who and when to call if symptoms develop. They specialize in helping you better understand your condition, slow the progression of your disease, improve the length and quality of your life, help you detect future heart problems before they become life threatening, and avoid hospitalizations.          Follow-ups after your visit        Additional Services     Follow-Up with [facility]                 Your next 10 appointments already scheduled     Jul 19, 2018 11:00 AM CDT   New Sleep Patient with Danish Lopez MD   [place] [facility] ([facility])    [address]  [place] [facility] [address] [phone]            Jul 30, 2018 11:00 AM CDT   [facility]    [address] [address] [phone]           Bring a current list of meds and any records pertaining to this visit. For Physicals, please bring immunization records and any forms needing to be filled out. Please arrive 10 minutes early to complete paperwork.            Sep 20, 2018  4:30 PM CDT   Remote PPM Check with ADLER TECH1   [facility] ([facility])    [address]  [place] [address] [phone] OPT 2           This appointment is for a remote check of your pacemaker.  This is not an appointment at the office.              Future tests that were ordered for you today     Open Future Orders        Priority Expected Expires Ordered     "Follow-Up with [facility] Clinic Routine 9/16/2018 7/19/2019 7/18/2018    Basic metabolic panel Routine 9/16/2018 7/18/2019 7/18/2018            Who to contact     If you have questions or need follow up information about today's clinic visit or your schedule please contact [facility] directly at [phone].  Normal or non-critical lab and imaging results will be communicated to you by MyChart, letter or phone within 4 business days after the clinic has received the results. If you do not hear from us within 7 days, please contact the clinic through MyChart or phone. If you have a critical or abnormal lab result, we will notify you by phone as soon as possible.  Submit refill requests through [employer] or call your pharmacy and they will forward the refill request to us. Please allow 3 business days for your refill to be completed.          Additional Information About Your Visit        Care EveryWhere ID     This is your Care EveryWhere ID. This could be used by other organizations to access your [place] medical records  [id number]        Your Vitals Were     Pulse Height Pulse Oximetry BMI (Body Mass Index)          86 1.905 m (6' 3\") 91% 21.45 kg/m2         Blood Pressure from Last 3 Encounters:   07/18/18 112/58   06/29/18 109/54   06/19/18 125/77    Weight from Last 3 Encounters:   07/18/18 77.8 kg (171 lb 9.6 oz)   06/29/18 76.8 kg (169 lb 6.4 oz)   06/19/18 74.9 kg (165 lb 3.2 oz)              We Performed the Following     Follow-Up with [facility] - NADEGE visit          Today's Medication Changes          These changes are accurate as of 7/18/18 11:38 AM.  If you have any questions, ask your nurse or doctor.               Start taking these medicines.        Dose/Directions    furosemide 40 MG tablet   Commonly known as:  LASIX   Used for:  Acute congestive heart failure, unspecified congestive heart failure type (H)   Started by:  Rosie Helton, APRN CNP        Take 40 " mg in the morning, 20 mg in the evening   Quantity:  90 tablet   Refills:  1            Where to get your medicines      These medications were sent to [facility] - [place], [place] - [address]  [address] [place] [address]     Phone:  [phone]     furosemide 40 MG tablet                Primary Care Provider Office Phone # Fax #    Lilly Leonel Zepeda [phone] [address]  [place] [address]        Equal Access to Services     [facility]: Hadii aad ku hadasho Soomaali, waaxda luqadaha, qaybta kaalmada adeegyada, waxapolinar tobias hayivan garcia . So [facility] [phone].    ATENCIÓN: Si habla español, tiene a gonzalez disposición servicios gratuitos de asistencia lingüística. [facility] [phone].    We comply with applicable federal civil rights laws and [place] laws. We do not discriminate on the basis of race, color, national origin, age, disability, sex, sexual orientation, or gender identity.            Thank you!     Thank you for choosing [facility]  for your care. Our goal is always to provide you with excellent care. Hearing back from our patients is one way we can continue to improve our services. Please take a few minutes to complete the written survey that you may receive in the mail after your visit with us. Thank you!             Your Updated Medication List - Protect others around you: Learn how to safely use, store and throw away your medicines at www.disposemymeds.org.          This list is accurate as of 7/18/18 11:38 AM.  Always use your most recent med list.                   Brand Name Dispense Instructions for use Diagnosis    B-12 1000 MCG Caps     90 capsule    Take 1 tablet by mouth daily    Vitamin B12 deficiency (non anemic)       furosemide 40 MG tablet    LASIX    90 tablet    Take 40 mg in the morning, 20 mg in the evening    Acute congestive heart failure, unspecified congestive heart failure  type (H)       hydrALAZINE 25 MG tablet    APRESOLINE    90 tablet    Take 1 tablet (25 mg) by mouth 3 times daily    Benign essential hypertension       lisinopril 20 MG tablet    PRINIVIL/ZESTRIL    180 tablet    Take 1 tablet (20 mg) by mouth daily    Benign essential hypertension       metoprolol tartrate 25 MG tablet    LOPRESSOR    180 tablet    Take 1 tablet (25 mg) by mouth 2 times daily    Acute congestive heart failure, unspecified congestive heart failure type (H)       omeprazole 20 MG CR capsule    priLOSEC    90 capsule    Take 1 capsule (20 mg) by mouth daily    Gastroesophageal reflux disease, esophagitis presence not specified       order for DME     1 each    Equipment being ordered: Walker Wheels () and Walker () Treatment Diagnosis: Difficulty ambulating    Colitis       order for DME     1 Device    Equipment being ordered: Walker Wheels () Treatment Diagnosis:  Weakness,colitis.    Colitis       potassium chloride SA 20 MEQ CR tablet    K-DUR/KLOR-CON M    60 tablet    Take 1 tablet (20 mEq) by mouth 2 times daily    Acute congestive heart failure, unspecified congestive heart failure type (H)       Simethicone 125 MG Caps    GAS-X EXTRA STRENGTH    60 capsule    Take 1 capsule by mouth 2 times daily as needed    Flatulence, eructation and gas pain       simvastatin 5 MG tablet    ZOCOR    90 tablet    Take 2 tablets (10 mg) by mouth daily    Hyperlipidemia LDL goal <100       WARFARIN SODIUM PO      Take by mouth daily 2.5 mg M/W/Sa 5 mg AOD        ZOLOFT PO      Take 100 mg by mouth daily

## 2018-07-18 NOTE — PATIENT INSTRUCTIONS
Call CORE nurse for any questions or concerns:  [phone]   *If you have concerns after hours, please call [phone], option 2 to speak with on call Cardiologist.    1. Medication changes from today:  Furosemide 40 mg in the morning, and 20 mg in the afternoon, you can cut the 40 mg in half.     2. Weigh yourself daily and write it down.     3. Call CORE nurse if your weight is up more than 2 pounds in one day or 5 pounds in one week.     4. Call CORE nurse if you feel more short of breath, have more abdominal bloating, or leg swelling.     5. Continue low sodium diet (less than 2000 mg daily). If you eat less salt, you will retain less fluid.     6. Alcohol can weaken your heart further. You should avoid alcohol or limit its use to special times, such as a holiday or birthday.      7. Do NOT take Aleve or ibuprofen without talking to your doctor first.      8. Lab Results: **     Component      Latest Ref Rng & Units 6/18/2018 7/17/2018   Sodium      133 - 144 mmol/L 141 144   Potassium      3.4 - 5.3 mmol/L 3.9 4.0   Chloride      94 - 109 mmol/L 107 111 (H)   Carbon Dioxide      20 - 32 mmol/L 31 27   Anion Gap      3 - 14 mmol/L 3 6   Glucose      70 - 99 mg/dL 109 (A) 126 (H)   Urea Nitrogen      7 - 30 mg/dL 24 31 (H)   Creatinine      0.66 - 1.25 mg/dL 1.07 1.04   Calcium      8.5 - 10.1 mg/dL 8.8 8.7   GFR Estimate      >60 mL/min/1.7m2 65 67   GFR Estimate If Black      >60 mL/min/1.7m2 79 81   N-Terminal Pro Bnp      0 - 450 pg/mL  1623 (H)     [facility]: Cardiomyopathy, Optimization, Rehabilitation, Education  The [facility] is a heart failure specialty clinic within the [facility] where you will work with specialized nurse practitioners, physician assistants, doctors, and registered nurses. They are dedicated to helping patients with heart failure to carefully adjust medications, receive education, and learn who and when to call if symptoms develop. They specialize in helping you  better understand your condition, slow the progression of your disease, improve the length and quality of your life, help you detect future heart problems before they become life threatening, and avoid hospitalizations.

## 2018-07-19 ENCOUNTER — OFFICE VISIT (OUTPATIENT)
Dept: SLEEP MEDICINE | Facility: CLINIC | Age: 83
End: 2018-07-19
Attending: INTERNAL MEDICINE
Payer: COMMERCIAL

## 2018-07-19 VITALS
RESPIRATION RATE: 16 BRPM | HEIGHT: 75 IN | WEIGHT: 171.8 LBS | SYSTOLIC BLOOD PRESSURE: 100 MMHG | BODY MASS INDEX: 21.36 KG/M2 | OXYGEN SATURATION: 95 % | HEART RATE: 65 BPM | DIASTOLIC BLOOD PRESSURE: 57 MMHG

## 2018-07-19 DIAGNOSIS — I48.20 CHRONIC ATRIAL FIBRILLATION (H): Primary | ICD-10-CM

## 2018-07-19 DIAGNOSIS — G47.33 OSA (OBSTRUCTIVE SLEEP APNEA): ICD-10-CM

## 2018-07-19 DIAGNOSIS — R06.3 CHEYNE-STOKES BREATHING: ICD-10-CM

## 2018-07-19 PROCEDURE — 99204 OFFICE O/P NEW MOD 45 MIN: CPT | Performed by: INTERNAL MEDICINE

## 2018-07-19 NOTE — LETTER
7/19/2018         RE: Santosh Robledo  [address]  [place] [address]        Dear Colleague,    Thank you for referring your patient, Santosh Robledo, to the [facility]. Please see a copy of my visit note below.    Sleep Consultation:    Date on this visit: 7/19/2018    Santosh Robledo is sent by Lilly Zepeda for a sleep consultation regarding untreated sleep apnea.    Primary Physician: Lilly Zepeda     Diagnosed through [facility].  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 Reports he was told he needed CPAP or to sleep lateral (however, diagnostic study showed significant sleep apnea even when lateral).  Titration Polysomnography ended up on 11/7 and prescribed bilevel 12 / 8 cmH2O.  Reports he tried for a few months but eventually stopped using it.     Was hospitalized in the Spring with pneumonia and put on Bilevel for a bit. Felt it helped breathing but overall he didn't like it.  Per wife and patient, he was on a over the face mask (like FitLife from [employer]).      15 lbs lighter than when he was diagnosed in 2012.    We tried out his new nasal pillows interface today with his Bilevel 12/8.  Patient could not keep mouth closed even while upright.  At rest he has pursed lip breathing.      Allergies:    Allergies   Allergen Reactions     Penicillins Rash       Medications:    Current Outpatient Prescriptions   Medication Sig Dispense Refill     Cyanocobalamin (B-12) 1000 MCG CAPS Take 1 tablet by mouth daily 90 capsule 3     furosemide (LASIX) 40 MG tablet Take 40 mg in the morning, 20 mg in the evening 90 tablet 1     hydrALAZINE (APRESOLINE) 25 MG tablet Take 1 tablet (25 mg) by mouth 3 times daily 90 tablet 11     lisinopril (PRINIVIL/ZESTRIL) 20 MG tablet Take 1 tablet (20 mg) by mouth daily 180 tablet 3     metoprolol tartrate (LOPRESSOR) 25 MG tablet Take 1 tablet (25 mg) by  mouth 2 times daily 180 tablet 3     omeprazole (PRILOSEC) 20 MG CR capsule Take 1 capsule (20 mg) by mouth daily 90 capsule 3     order for DME Equipment being ordered: Walker Wheels () and Walker ()  Treatment Diagnosis: Difficulty ambulating 1 each 0     order for DME Equipment being ordered: Walker Wheels ()  Treatment Diagnosis:  Weakness,colitis. 1 Device 0     potassium chloride SA (K-DUR/KLOR-CON M) 20 MEQ CR tablet Take 1 tablet (20 mEq) by mouth 2 times daily 60 tablet 0     Sertraline HCl (ZOLOFT PO) Take 100 mg by mouth daily       Simethicone (GAS-X EXTRA STRENGTH) 125 MG CAPS Take 1 capsule by mouth 2 times daily as needed 60 capsule 11     simvastatin (ZOCOR) 5 MG tablet Take 2 tablets (10 mg) by mouth daily 90 tablet 3     WARFARIN SODIUM PO Take by mouth daily 2.5 mg M/W/Sa  5 mg AOD         Problem List:  Patient Active Problem List    Diagnosis Date Noted     Acute on chronic diastolic congestive heart failure (H) 06/13/2018     Priority: Medium     Cognitive impairment 06/13/2018     Priority: Medium     Encounter for monitoring coumadin therapy 06/07/2018     Priority: Medium     Acute respiratory failure with hypoxia (H) 05/30/2018     Priority: Medium     Pneumonia 05/30/2018     Priority: Medium     C. difficile colitis 05/30/2018     Priority: Medium     Oral thrush 05/30/2018     Priority: Medium     Physical deconditioning 05/30/2018     Priority: Medium     Leukocytosis 05/30/2018     Priority: Medium     Thrombocytosis (H) 05/30/2018     Priority: Medium     Acute exacerbation of CHF (congestive heart failure) (H) 05/23/2018     Priority: Medium     Near syncope      Priority: Medium     AV node dysfunction      Priority: Medium     Hyperlipidemia      Priority: Medium     MARILU (obstructive sleep apnea)      Priority: Medium     Advance Care Planning 05/16/2018     Priority: Medium     Colitis 05/13/2018     Priority: Medium     Gastroesophageal reflux disease, esophagitis  "presence not specified 04/30/2018     Priority: Medium     Chronic atrial fibrillation (H) 04/30/2018     Priority: Medium     Status cardiac pacemaker 04/30/2018     Priority: Medium     Long term current use of anticoagulant therapy 04/30/2018     Priority: Medium     Benign essential hypertension 04/30/2018     Priority: Medium     Hyperlipidemia LDL goal <100 04/30/2018     Priority: Medium     Vitamin B12 deficiency (non anemic) 04/30/2018     Priority: Medium     Episode of recurrent major depressive disorder, unspecified depression episode severity (H) 04/30/2018     Priority: Medium        Past Medical/Surgical History:  Past Medical History:   Diagnosis Date     AV node dysfunction      Chronic atrial fibrillation (H) 2004     GERD (gastroesophageal reflux disease)      Hyperlipidemia      Near syncope      MARILU (obstructive sleep apnea)      Past Surgical History:   Procedure Laterality Date     IMPLANT PACEMAKER  08/10/2015       Social History:  Social History     Social History     Marital status:      Spouse name: N/A     Number of children: N/A     Years of education: N/A     Occupational History     Not on file.     Social History Main Topics     Smoking status: Never Smoker     Smokeless tobacco: Never Used     Alcohol use No     Drug use: No     Sexual activity: Yes     Partners: Female     Other Topics Concern     Not on file     Social History Narrative       Family History:  Family History   Problem Relation Age of Onset     Influenza/Pneumonia Mother      Prostate Cancer Father      Physical Examination:  Vitals: /57  Pulse 65  Resp 16  Ht 1.905 m (6' 3\")  Wt 77.9 kg (171 lb 12.8 oz)  SpO2 95%  BMI 21.47 kg/m2  BMI= Body mass index is 21.47 kg/(m^2).    Neck Cir (cm): 35 cm    No flowsheet data found.    WILLIAN Total Score: 10 (07/19/18 1052)    Impression/Plan:  1. MARILU (obstructive sleep apnea)  2. Cheyne-Love breathing  Despite likely ongoing central sleep apnea, he has been " unable to tolerate Bilevel repeatedly.  CPAP was not tolerated in the past either.  We could try a full face mask with the bilevel to accommodate his mouth breathing.  Alternatively (and my preference), given weight loss he may no longer have obstructive sleep apnea and only cheyne-rodriguez/csa.  In that case, a Polysomnography would allow us to determine if Obstructive Sleep Apnea is gone and if supplemental oxygen would be appropriate and covered.  - Comprehensive Sleep Study; Future  - SLEEP EVALUATION & MANAGEMENT REFERRAL - ADULT -[place] [facility] [phone]  (Age 18 and up)    3. Chronic atrial fibrillation (H)  Discussed link between atrial fibrillation and Sleep Apnea, both in terms of Obstructive Sleep Apnea as a risk factor for atrial fibrillation occurrence, recurrence, and persistence, and in terms of atrial fibrillation as a risk factor for Central Sleep Apnea.     Literature provided regarding sleep apnea.      He will follow up with me in approximately two weeks after his sleep study has been competed to review the results and discuss plan of care.       Polysomnography reviewed.  Obstructive sleep apnea reviewed.  Complications of untreated sleep apnea were reviewed.    Total time of care was 40 minutes, with > 50% of time spent on counseling and coordination of care.      Danish Lopez MD, Sleep Physician  Jul 19, 2018     CC: Lilly Zepeda          Again, thank you for allowing me to participate in the care of your patient.        Sincerely,        Danish Lopez MD

## 2018-07-19 NOTE — PATIENT INSTRUCTIONS

## 2018-07-19 NOTE — MR AVS SNAPSHOT
After Visit Summary   7/19/2018    Santosh Robledo    MRN: [id number]           Patient Information     Date Of Birth          7/20/1929        Visit Information        Provider Department      7/19/2018 11:00 AM Danish Lopez MD [facility]        Today's Diagnoses     Chronic atrial fibrillation (H)    -  1    MARILU (obstructive sleep apnea)        Cheyne-Love breathing          Care Instructions      Your BMI is Body mass index is 21.47 kg/(m^2).  Weight management is a personal decision.  If you are interested in exploring weight loss strategies, the following discussion covers the approaches that may be successful. Body mass index (BMI) is one way to tell whether you are at a healthy weight, overweight, or obese. It measures your weight in relation to your height.  A BMI of 18.5 to 24.9 is in the healthy range. A person with a BMI of 25 to 29.9 is considered overweight, and someone with a BMI of 30 or greater is considered obese. More than two-thirds of American adults are considered overweight or obese.  Being overweight or obese increases the risk for further weight gain. Excess weight may lead to heart disease and diabetes.  Creating and following plans for healthy eating and physical activity may help you improve your health.  Weight control is part of healthy lifestyle and includes exercise, emotional health, and healthy eating habits. Careful eating habits lifelong are the mainstay of weight control. Though there are significant health benefits from weight loss, long-term weight loss with diet alone may be very difficult to achieve- studies show long-term success with dietary management in less than 10% of people. Attaining a healthy weight may be especially difficult to achieve in those with severe obesity. In some cases, medications, devices and surgical management might be considered.  What can you do?  If you are overweight or obese and are interested in  methods for weight loss, you should discuss this with your provider.     Consider reducing daily calorie intake by 500 calories.     Keep a food journal.     Avoiding skipping meals, consider cutting portions instead.    Diet combined with exercise helps maintain muscle while optimizing fat loss. Strength training is particularly important for building and maintaining muscle mass. Exercise helps reduce stress, increase energy, and improves fitness. Increasing exercise without diet control, however, may not burn enough calories to loose weight.       Start walking three days a week 10-20 minutes at a time    Work towards walking thirty minutes five days a week     Eventually, increase the speed of your walking for 1-2 minutes at time    In addition, we recommend that you review healthy lifestyles and methods for weight loss available through the National Institutes of Health patient information sites:  http://win.niddk.nih.gov/publications/index.htm    And look into health and wellness programs that may be available through your health insurance provider, employer, local community center, or howie club.    Weight management plan Patient was referred to their PCP to discuss a diet and exercise plan.            Follow-ups after your visit        Your next 10 appointments already scheduled     Jul 30, 2018 11:00 AM CDT   Office Visit with Lilly Zepeda MD   [facility] ([facility])    [address] [id number] [phone]           Bring a current list of meds and any records pertaining to this visit. For Physicals, please bring immunization records and any forms needing to be filled out. Please arrive 10 minutes early to complete paperwork.            Sep 20, 2018  4:30 PM CDT   Remote PPM Check with ADLER TECH1   [facility] ([facility])    [address] [id number]  [facility] [id number] [phone] OPT 2           This  appointment is for a remote check of your pacemaker.  This is not an appointment at the office.            Sep 28, 2018  2:10 PM CDT   LAB with ADLER LAB   [facility] [place] ([facility])    [address] [place], [place] [address]  [place]  [place] [address]   [phone]           Please do not eat 10-12 hours before your appointment if you are coming in fasting for labs on lipids, cholesterol, or glucose (sugar). This does not apply to pregnant women. Water, hot tea and black coffee (with nothing added) are okay. Do not drink other fluids, diet soda or chew gum.            Sep 28, 2018  3:10 PM CDT   Core Return with Rea Dinero PA-C   [facility] ([facility])    [address] [place], [place] [address]  [place] [address] [phone] OPT 2              Future tests that were ordered for you today     Open Future Orders        Priority Expected Expires Ordered    Comprehensive Sleep Study Routine  1/15/2019 7/19/2018    Follow-Up with [facility] Routine 9/16/2018 7/19/2019 7/18/2018    Basic metabolic panel Routine 9/16/2018 7/18/2019 7/18/2018            Who to contact     If you have questions or need follow up information about today's clinic visit or your schedule please contact [facility] directly at [phone].  Normal or non-critical lab and imaging results will be communicated to you by MyChart, letter or phone within 4 business days after the clinic has received the results. If you do not hear from us within 7 days, please contact the clinic through MyChart or phone. If you have a critical or abnormal lab result, we will notify you by phone as soon as possible.  Submit refill requests through [employer] or call your pharmacy and they will forward the refill request to us. Please allow 3 business days for your refill to be completed.          Additional Information About Your Visit        Care EveryWhere ID   "   This is your Care EveryWhere ID. This could be used by other organizations to access your [place] medical records  [id number]        Your Vitals Were     Pulse Respirations Height Pulse Oximetry BMI (Body Mass Index)       65 16 1.905 m (6' 3\") 95% 21.47 kg/m2        Blood Pressure from Last 3 Encounters:   07/19/18 100/57   07/18/18 112/58   06/29/18 109/54    Weight from Last 3 Encounters:   07/19/18 77.9 kg (171 lb 12.8 oz)   07/18/18 77.8 kg (171 lb 9.6 oz)   06/29/18 76.8 kg (169 lb 6.4 oz)              We Performed the Following     SLEEP EVALUATION & MANAGEMENT REFERRAL - ADULT -[facility] [phone]  (Age 18 and up)        Primary Care Provider Office Phone # Fax #    Lilly Leonel Zepeda [phone] [address] [address]        Equal Access to Services     [facility]: Hadii aad ku hadasho Soomaali, waaxda luqadaha, qaybta kaalmada adeegyareji, kingston garcia . So [facility] [phone].    ATENCIÓN: Si habla español, tiene a gonzalez disposición servicios gratuitos de asistencia lingüística. Llame al [phone].    We comply with applicable federal civil rights laws and [place] laws. We do not discriminate on the basis of race, color, national origin, age, disability, sex, sexual orientation, or gender identity.            Thank you!     Thank you for choosing [facility]  for your care. Our goal is always to provide you with excellent care. Hearing back from our patients is one way we can continue to improve our services. Please take a few minutes to complete the written survey that you may receive in the mail after your visit with us. Thank you!             Your Updated Medication List - Protect others around you: Learn how to safely use, store and throw away your medicines at www.disposemymeds.org.          This list is accurate as of 7/19/18 11:53 AM.  Always use your most recent med list.       "             Brand Name Dispense Instructions for use Diagnosis    B-12 1000 MCG Caps     90 capsule    Take 1 tablet by mouth daily    Vitamin B12 deficiency (non anemic)       furosemide 40 MG tablet    LASIX    90 tablet    Take 40 mg in the morning, 20 mg in the evening    Acute congestive heart failure, unspecified congestive heart failure type (H)       hydrALAZINE 25 MG tablet    APRESOLINE    90 tablet    Take 1 tablet (25 mg) by mouth 3 times daily    Benign essential hypertension       lisinopril 20 MG tablet    PRINIVIL/ZESTRIL    180 tablet    Take 1 tablet (20 mg) by mouth daily    Benign essential hypertension       metoprolol tartrate 25 MG tablet    LOPRESSOR    180 tablet    Take 1 tablet (25 mg) by mouth 2 times daily    Acute congestive heart failure, unspecified congestive heart failure type (H)       omeprazole 20 MG CR capsule    priLOSEC    90 capsule    Take 1 capsule (20 mg) by mouth daily    Gastroesophageal reflux disease, esophagitis presence not specified       order for DME     1 each    Equipment being ordered: Walker Wheels () and Walker () Treatment Diagnosis: Difficulty ambulating    Colitis       order for DME     1 Device    Equipment being ordered: Walker Wheels () Treatment Diagnosis:  Weakness,colitis.    Colitis       potassium chloride SA 20 MEQ CR tablet    K-DUR/KLOR-CON M    60 tablet    Take 1 tablet (20 mEq) by mouth 2 times daily    Acute congestive heart failure, unspecified congestive heart failure type (H)       Simethicone 125 MG Caps    GAS-X EXTRA STRENGTH    60 capsule    Take 1 capsule by mouth 2 times daily as needed    Flatulence, eructation and gas pain       simvastatin 5 MG tablet    ZOCOR    90 tablet    Take 2 tablets (10 mg) by mouth daily    Hyperlipidemia LDL goal <100       WARFARIN SODIUM PO      Take by mouth daily 2.5 mg M/W/Sa 5 mg AOD        ZOLOFT PO      Take 100 mg by mouth daily

## 2018-07-19 NOTE — PROGRESS NOTES
Sleep Consultation:    Date on this visit: 7/19/2018    Santosh Robledo is sent by Lilly Zepeda for a sleep consultation regarding untreated sleep apnea.    Primary Physician: Lilly Zepeda     Diagnosed through [facility].  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 Reports he was told he needed CPAP or to sleep lateral (however, diagnostic study showed significant sleep apnea even when lateral).  Titration Polysomnography ended up on 11/7 and prescribed bilevel 12 / 8 cmH2O.  Reports he tried for a few months but eventually stopped using it.     Was hospitalized in the Spring with pneumonia and put on Bilevel for a bit. Felt it helped breathing but overall he didn't like it.  Per wife and patient, he was on a over the face mask (like FitLife from [employer]).      15 lbs lighter than when he was diagnosed in 2012.    We tried out his new nasal pillows interface today with his Bilevel 12/8.  Patient could not keep mouth closed even while upright.  At rest he has pursed lip breathing.      Allergies:    Allergies   Allergen Reactions     Penicillins Rash       Medications:    Current Outpatient Prescriptions   Medication Sig Dispense Refill     Cyanocobalamin (B-12) 1000 MCG CAPS Take 1 tablet by mouth daily 90 capsule 3     furosemide (LASIX) 40 MG tablet Take 40 mg in the morning, 20 mg in the evening 90 tablet 1     hydrALAZINE (APRESOLINE) 25 MG tablet Take 1 tablet (25 mg) by mouth 3 times daily 90 tablet 11     lisinopril (PRINIVIL/ZESTRIL) 20 MG tablet Take 1 tablet (20 mg) by mouth daily 180 tablet 3     metoprolol tartrate (LOPRESSOR) 25 MG tablet Take 1 tablet (25 mg) by mouth 2 times daily 180 tablet 3     omeprazole (PRILOSEC) 20 MG CR capsule Take 1 capsule (20 mg) by mouth daily 90 capsule 3     order for DME Equipment being ordered: Walker Wheels () and Walker ()  Treatment Diagnosis: Difficulty  ambulating 1 each 0     order for DME Equipment being ordered: Walker Wheels ()  Treatment Diagnosis:  Weakness,colitis. 1 Device 0     potassium chloride SA (K-DUR/KLOR-CON M) 20 MEQ CR tablet Take 1 tablet (20 mEq) by mouth 2 times daily 60 tablet 0     Sertraline HCl (ZOLOFT PO) Take 100 mg by mouth daily       Simethicone (GAS-X EXTRA STRENGTH) 125 MG CAPS Take 1 capsule by mouth 2 times daily as needed 60 capsule 11     simvastatin (ZOCOR) 5 MG tablet Take 2 tablets (10 mg) by mouth daily 90 tablet 3     WARFARIN SODIUM PO Take by mouth daily 2.5 mg M/W/Sa  5 mg AOD         Problem List:  Patient Active Problem List    Diagnosis Date Noted     Acute on chronic diastolic congestive heart failure (H) 06/13/2018     Priority: Medium     Cognitive impairment 06/13/2018     Priority: Medium     Encounter for monitoring coumadin therapy 06/07/2018     Priority: Medium     Acute respiratory failure with hypoxia (H) 05/30/2018     Priority: Medium     Pneumonia 05/30/2018     Priority: Medium     C. difficile colitis 05/30/2018     Priority: Medium     Oral thrush 05/30/2018     Priority: Medium     Physical deconditioning 05/30/2018     Priority: Medium     Leukocytosis 05/30/2018     Priority: Medium     Thrombocytosis (H) 05/30/2018     Priority: Medium     Acute exacerbation of CHF (congestive heart failure) (H) 05/23/2018     Priority: Medium     Near syncope      Priority: Medium     AV node dysfunction      Priority: Medium     Hyperlipidemia      Priority: Medium     MARILU (obstructive sleep apnea)      Priority: Medium     Advance Care Planning 05/16/2018     Priority: Medium     Colitis 05/13/2018     Priority: Medium     Gastroesophageal reflux disease, esophagitis presence not specified 04/30/2018     Priority: Medium     Chronic atrial fibrillation (H) 04/30/2018     Priority: Medium     Status cardiac pacemaker 04/30/2018     Priority: Medium     Long term current use of anticoagulant therapy  "04/30/2018     Priority: Medium     Benign essential hypertension 04/30/2018     Priority: Medium     Hyperlipidemia LDL goal <100 04/30/2018     Priority: Medium     Vitamin B12 deficiency (non anemic) 04/30/2018     Priority: Medium     Episode of recurrent major depressive disorder, unspecified depression episode severity (H) 04/30/2018     Priority: Medium        Past Medical/Surgical History:  Past Medical History:   Diagnosis Date     AV node dysfunction      Chronic atrial fibrillation (H) 2004     GERD (gastroesophageal reflux disease)      Hyperlipidemia      Near syncope      MARILU (obstructive sleep apnea)      Past Surgical History:   Procedure Laterality Date     IMPLANT PACEMAKER  08/10/2015       Social History:  Social History     Social History     Marital status:      Spouse name: N/A     Number of children: N/A     Years of education: N/A     Occupational History     Not on file.     Social History Main Topics     Smoking status: Never Smoker     Smokeless tobacco: Never Used     Alcohol use No     Drug use: No     Sexual activity: Yes     Partners: Female     Other Topics Concern     Not on file     Social History Narrative       Family History:  Family History   Problem Relation Age of Onset     Influenza/Pneumonia Mother      Prostate Cancer Father      Physical Examination:  Vitals: /57  Pulse 65  Resp 16  Ht 1.905 m (6' 3\")  Wt 77.9 kg (171 lb 12.8 oz)  SpO2 95%  BMI 21.47 kg/m2  BMI= Body mass index is 21.47 kg/(m^2).    Neck Cir (cm): 35 cm    No flowsheet data found.    WILLIAN Total Score: 10 (07/19/18 1052)    Impression/Plan:  1. MARILU (obstructive sleep apnea)  2. Cheyne-Love breathing  Despite likely ongoing central sleep apnea, he has been unable to tolerate Bilevel repeatedly.  CPAP was not tolerated in the past either.  We could try a full face mask with the bilevel to accommodate his mouth breathing.  Alternatively (and my preference), given weight loss he may no " longer have obstructive sleep apnea and only cheyne-rodriguez/csa.  In that case, a Polysomnography would allow us to determine if Obstructive Sleep Apnea is gone and if supplemental oxygen would be appropriate and covered.  - Comprehensive Sleep Study; Future  - SLEEP EVALUATION & MANAGEMENT REFERRAL - ADULT -[facility] [place] [phone]  (Age 18 and up)    3. Chronic atrial fibrillation (H)  Discussed link between atrial fibrillation and Sleep Apnea, both in terms of Obstructive Sleep Apnea as a risk factor for atrial fibrillation occurrence, recurrence, and persistence, and in terms of atrial fibrillation as a risk factor for Central Sleep Apnea.     Literature provided regarding sleep apnea.      He will follow up with me in approximately two weeks after his sleep study has been competed to review the results and discuss plan of care.       Polysomnography reviewed.  Obstructive sleep apnea reviewed.  Complications of untreated sleep apnea were reviewed.    Total time of care was 40 minutes, with > 50% of time spent on counseling and coordination of care.      Danish Lopez MD, Sleep Physician  Jul 19, 2018     CC: Lilly Zepeda

## 2018-07-20 ENCOUNTER — ANTICOAGULATION THERAPY VISIT (OUTPATIENT)
Dept: FAMILY MEDICINE | Facility: CLINIC | Age: 83
End: 2018-07-20
Payer: COMMERCIAL

## 2018-07-20 ENCOUNTER — TELEPHONE (OUTPATIENT)
Dept: FAMILY MEDICINE | Facility: CLINIC | Age: 83
End: 2018-07-20

## 2018-07-20 DIAGNOSIS — I50.9 ACUTE CONGESTIVE HEART FAILURE, UNSPECIFIED CONGESTIVE HEART FAILURE TYPE: ICD-10-CM

## 2018-07-20 LAB — INR PPP: 3.1

## 2018-07-20 PROCEDURE — 99207 ZZC NO CHARGE NURSE ONLY: CPT | Performed by: INTERNAL MEDICINE

## 2018-07-20 NOTE — MR AVS SNAPSHOT
Santosh KENNY Hjelmstad   7/20/2018   Anticoagulation Therapy Visit    Description:  89 year old male   Provider:  Lilly Zepeda MD   Department:  [facility]           INR as of 7/20/2018     Today's INR 3.1!      Anticoagulation Summary as of 7/20/2018     INR goal 2.0-3.0   Today's INR 3.1!   Full warfarin instructions 2.5 mg on Mon, Fri; 5 mg all other days   Next INR check 7/27/2018    Indications   Chronic atrial fibrillation (H) [I48.2]  Long term current use of anticoagulant therapy [Z79.01]         July 2018 Details    Sun Mon Tue Wed Thu Fri Sat     1               2               3               4               5               6               7                 8               9               10               11               12               13               14                 15               16               17               18               19               20      2.5 mg   See details      21      5 mg           22      5 mg         23      2.5 mg         24      5 mg         25      5 mg         26      5 mg         27            28                 29               30               31                    Date Details   07/20 This INR check       Date of next INR:  7/27/2018         How to take your warfarin dose     To take:  2.5 mg Take 0.5 of a 5 mg tablet.    To take:  5 mg Take 1 of the 5 mg tablets.

## 2018-07-20 NOTE — TELEPHONE ENCOUNTER
See anticoagulation therapy encounter.  Continue 2.5 mg MF, 5 mg rest of week and recheck INR 1 week.  Bryanna Heredia RN

## 2018-07-20 NOTE — TELEPHONE ENCOUNTER
INR fingerstick 3.1 today  Wife/patient verified correct dosing. Denies diarrhea. NO ETOH   Cut back on diuretic in last 2 days, per [facility].     Rosemary Fofana RN BSN  [facility]   [phone], no need to call, I check EPIC daily.

## 2018-07-20 NOTE — PROGRESS NOTES
ANTICOAGULATION FOLLOW-UP CLINIC VISIT    Patient Name:  Santosh Poonmstad  Date:  7/20/2018  Contact Type:  Telephone    SUBJECTIVE:     Patient Findings     Positives No Problem Findings           OBJECTIVE    INR   Date Value Ref Range Status   07/20/2018 3.1  Final       ASSESSMENT / PLAN  INR assessment THER    Recheck INR In: 1 WEEK    INR Location [facility]      Anticoagulation Summary as of 7/20/2018     INR goal 2.0-3.0   Today's INR 3.1!   Warfarin maintenance plan 2.5 mg (5 mg x 0.5) on Mon, Fri; 5 mg (5 mg x 1) all other days   Full warfarin instructions 2.5 mg on Mon, Fri; 5 mg all other days   Weekly warfarin total 30 mg   No change documented Bryanna Heredia RN   Plan last modified Lynda Kwon RN (7/13/2018)   Next INR check 7/27/2018   Target end date     Indications   Chronic atrial fibrillation (H) [I48.2]  Long term current use of anticoagulant therapy [Z79.01]         Anticoagulation Episode Summary     INR check location     Preferred lab     Send INR reminders to [facility]    Comments       Anticoagulation Care Providers     Provider Role Specialty Phone number    Duane Lilly Castaneda MD Responsible Internal Medicine [phone]            See the Encounter Report to view Anticoagulation Flowsheet and Dosing Calendar (Go to Encounters tab in chart review, and find the Anticoagulation Therapy Visit)    Dosage adjustment made based on physician directed care plan.  INR fingerstick 3.1 today  Wife/patient verified correct dosing. Denies diarrhea. NO ETOH   Cut back on diuretic in last 2 days, per [facility].     Rosemary Fofana RN BSN  [facility]   [phone], no need to call, I check EPIC daily.     Continue 2.5 mg MF, 5 mg ROW and recheck INR in 1 week.   Bryanna Heredia, RN

## 2018-07-23 RX ORDER — POTASSIUM CHLORIDE 1500 MG/1
20 TABLET, EXTENDED RELEASE ORAL 2 TIMES DAILY
Qty: 60 TABLET | Refills: 0 | Status: SHIPPED | OUTPATIENT
Start: 2018-07-23 | End: 2018-08-20

## 2018-07-27 ENCOUNTER — ANTICOAGULATION THERAPY VISIT (OUTPATIENT)
Dept: FAMILY MEDICINE | Facility: CLINIC | Age: 83
End: 2018-07-27
Payer: COMMERCIAL

## 2018-07-27 ENCOUNTER — TELEPHONE (OUTPATIENT)
Dept: FAMILY MEDICINE | Facility: CLINIC | Age: 83
End: 2018-07-27

## 2018-07-27 DIAGNOSIS — Z79.01 LONG TERM CURRENT USE OF ANTICOAGULANT THERAPY: ICD-10-CM

## 2018-07-27 DIAGNOSIS — I48.20 CHRONIC ATRIAL FIBRILLATION (H): ICD-10-CM

## 2018-07-27 LAB — INR PPP: 2.6

## 2018-07-27 PROCEDURE — 99207 ZZC NO CHARGE NURSE ONLY: CPT | Performed by: INTERNAL MEDICINE

## 2018-07-27 NOTE — MR AVS SNAPSHOT
Santosh KENNY Hjelmstad   7/27/2018   Anticoagulation Therapy Visit    Description:  89 year old male   Provider:  Lilly Zepeda MD   Department:  [facility]           INR as of 7/27/2018     Today's INR 2.6      Anticoagulation Summary as of 7/27/2018     INR goal 2.0-3.0   Today's INR 2.6   Full warfarin instructions 2.5 mg on Mon, Fri; 5 mg all other days   Next INR check 8/3/2018    Indications   Chronic atrial fibrillation (H) [I48.2]  Long term current use of anticoagulant therapy [Z79.01]         July 2018 Details    Sun Mon Tue Wed Thu Fri Sat     1               2               3               4               5               6               7                 8               9               10               11               12               13               14                 15               16               17               18               19               20               21                 22               23               24               25               26               27      2.5 mg   See details      28      5 mg           29      5 mg         30      2.5 mg         31      5 mg              Date Details   07/27 This INR check               How to take your warfarin dose     To take:  2.5 mg Take 0.5 of a 5 mg tablet.    To take:  5 mg Take 1 of the 5 mg tablets.           August 2018 Details    Sun Mon Tue Wed Thu Fri Sat        1      5 mg         2      5 mg         3            4                 5               6               7               8               9               10               11                 12               13               14               15               16               17               18                 19               20               21               22               23               24               25                 26               27               28               29               30               31                 Date Details   No additional  details    Date of next INR:  8/3/2018         How to take your warfarin dose     To take:  2.5 mg Take 0.5 of a 5 mg tablet.    To take:  5 mg Take 1 of the 5 mg tablets.

## 2018-07-27 NOTE — TELEPHONE ENCOUNTER
INR fingerstick 2.6 today.   No changes.     Rosemary Fofana RN BSN  [facility]   [phone], no need to call, I check EPIC daily.

## 2018-07-27 NOTE — PROGRESS NOTES
ANTICOAGULATION FOLLOW-UP CLINIC VISIT    Patient Name:  Santosh Spauldingelmstad  Date:  7/27/2018  Contact Type:  Telephone    SUBJECTIVE:     Patient Findings     Positives No Problem Findings    Comments INR fingerstick 2.6 today.   No changes.      Rosemary Fofana RN BSN  [facility]   [phone], no need to call, I check EPIC daily.                   OBJECTIVE    INR   Date Value Ref Range Status   07/27/2018 2.6  Final       ASSESSMENT / PLAN  INR assessment THER    Recheck INR In: 1 WEEK    INR Location [facility] INR      Anticoagulation Summary as of 7/27/2018     INR goal 2.0-3.0   Today's INR 2.6   Warfarin maintenance plan 2.5 mg (5 mg x 0.5) on Mon, Fri; 5 mg (5 mg x 1) all other days   Full warfarin instructions 2.5 mg on Mon, Fri; 5 mg all other days   Weekly warfarin total 30 mg   No change documented Maura Hawkins RN   Plan last modified Lynda Kwon RN (7/13/2018)   Next INR check 8/3/2018   Target end date     Indications   Chronic atrial fibrillation (H) [I48.2]  Long term current use of anticoagulant therapy [Z79.01]         Anticoagulation Episode Summary     INR check location     Preferred lab     Send INR reminders to [facility]    Comments       Anticoagulation Care Providers     Provider Role Specialty Phone number    Lilly Zepeda MD Responsible Internal Medicine [phone]            See the Encounter Report to view Anticoagulation Flowsheet and Dosing Calendar (Go to Encounters tab in chart review, and find the Anticoagulation Therapy Visit)    Dosage adjustment made based on physician directed care plan. Home care nurse denies any changes with health/meds/diet and no bleeding/bruising concerns. Pt to continue same maintenance warfarin dosing and recheck INR in 1 week.     Maura Hawkins, ADRIANA

## 2018-07-27 NOTE — TELEPHONE ENCOUNTER
Continue same warfarin maintenance dosin.5 mg on Mon, Fri; 5 mg all other days.   Recheck INR in 1 week on 8/3/18, sooner if any concerns     If any signs of clotting (pain, tenderness, swelling, color change in leg or arm, SOB) or bleeding occur (blood in stool, urine, large bruising, bleeding gums, nosebleeds) have INR check sooner. If sx severe report to ER or concerned for stroke call 911. If general questions or concerns arise, call clinic ([phone])    Thank you   Maura SARGENT RN

## 2018-08-03 ENCOUNTER — TELEPHONE (OUTPATIENT)
Dept: FAMILY MEDICINE | Facility: CLINIC | Age: 83
End: 2018-08-03

## 2018-08-03 ENCOUNTER — ANTICOAGULATION THERAPY VISIT (OUTPATIENT)
Dept: FAMILY MEDICINE | Facility: CLINIC | Age: 83
End: 2018-08-03
Payer: COMMERCIAL

## 2018-08-03 LAB — INR PPP: 2.4

## 2018-08-03 PROCEDURE — 99207 ZZC NO CHARGE NURSE ONLY: CPT | Performed by: INTERNAL MEDICINE

## 2018-08-03 NOTE — MR AVS SNAPSHOT
Santosh EFRAÍN Hjelmstad   8/3/2018   Anticoagulation Therapy Visit    Description:  89 year old male   Provider:  Lilly Zepeda MD   Department:  [facility]           INR as of 8/3/2018     Today's INR 2.4      Anticoagulation Summary as of 8/3/2018     INR goal 2.0-3.0   Today's INR 2.4   Full warfarin instructions 2.5 mg on Mon, Fri; 5 mg all other days   Next INR check 8/15/2018    Indications   Chronic atrial fibrillation (H) [I48.2]  Long term current use of anticoagulant therapy [Z79.01]         Your next Anticoagulation Clinic appointment(s)     Aug 15, 2018 11:00 AM CDT   Anticoagulation Visit with  ANTICOAGULATION CLINIC   [facility]    [address]  [place] [address]   [phone]              August 2018 Details    Sun Mon Tue Wed Thu Fri Sat        1               2               3      2.5 mg   See details      4      5 mg           5      5 mg         6      2.5 mg         7      5 mg         8      5 mg         9      5 mg         10      2.5 mg         11      5 mg           12      5 mg         13      2.5 mg         14      5 mg         15            16               17               18                 19               20               21               22               23               24               25                 26               27               28               29               30               31                 Date Details   08/03 This INR check       Date of next INR:  8/15/2018         How to take your warfarin dose     To take:  2.5 mg Take 0.5 of a 5 mg tablet.    To take:  5 mg Take 1 of the 5 mg tablets.

## 2018-08-03 NOTE — TELEPHONE ENCOUNTER
INR fingerstick today 2.4 in the home.   Pt is discharged today, will need clinic f/u for recheck. Call patient/wife.     Thanks  Rosemary Fofana RN BSN   [facility]   [phone]

## 2018-08-03 NOTE — PROGRESS NOTES
ANTICOAGULATION FOLLOW-UP CLINIC VISIT    Patient Name:  Santosh Spauldingelmstad  Date:  8/3/2018  Contact Type:  Telephone    SUBJECTIVE:     Patient Findings     Positives No Problem Findings           OBJECTIVE    INR   Date Value Ref Range Status   08/03/2018 2.4  Final       ASSESSMENT / PLAN  INR assessment THER    Recheck INR In: 2 WEEKS    INR Location [facility]      Anticoagulation Summary as of 8/3/2018     INR goal 2.0-3.0   Today's INR 2.4   Warfarin maintenance plan 2.5 mg (5 mg x 0.5) on Mon, Fri; 5 mg (5 mg x 1) all other days   Full warfarin instructions 2.5 mg on Mon, Fri; 5 mg all other days   Weekly warfarin total 30 mg   Plan last modified Lynda Kwon RN (7/13/2018)   Next INR check 8/15/2018   Target end date     Indications   Chronic atrial fibrillation (H) [I48.2]  Long term current use of anticoagulant therapy [Z79.01]         Anticoagulation Episode Summary     INR check location     Preferred lab     Send INR reminders to [facility]    Comments       Anticoagulation Care Providers     Provider Role Specialty Phone number    Zeke Zepedajelani Castaneda MD Responsible Internal Medicine [phone]            See the Encounter Report to view Anticoagulation Flowsheet and Dosing Calendar (Go to Encounters tab in chart review, and find the Anticoagulation Therapy Visit)    Dosage adjustment made based on physician directed care plan.  INR fingerstick today 2.4 in the home.   Pt is discharged today, will need clinic f/u for recheck. Call patient/wife.   Thanks  Rosemary Fofana RN BSN   [facility]   [phone]    I called patient's home. Spoke to patient who confirms that his current dose is 2.5 mg MF, 5 mg ROW.  Advised continuing same.  Scheduled INR visit on 8/15/18 at [facility] at 11 am.  He verbalized understanding.  Bryanna Heredia RN

## 2018-08-03 NOTE — TELEPHONE ENCOUNTER
See anticoagulation therapy encounter. Left message for Rosemary, below. Called patient/spouse with dosing and next INR check.  Bryanna Heredia RN

## 2018-08-14 DIAGNOSIS — I48.20 CHRONIC ATRIAL FIBRILLATION (H): ICD-10-CM

## 2018-08-14 DIAGNOSIS — Z79.01 LONG TERM CURRENT USE OF ANTICOAGULANT THERAPY: ICD-10-CM

## 2018-08-14 NOTE — TELEPHONE ENCOUNTER
"Warfarin 5 mg  Medication is listed as historical on patient's med list    Last Written Prescription Date:  ?  Last Fill Quantity: ?,  # refills: ?   Last office visit: 6/29/2018 with prescribing provider:  Duane   Future Office Visit:      Requested Prescriptions   Pending Prescriptions Disp Refills     warfarin (COUMADIN) 5 MG tablet 30 tablet      Sig: Take 0.5-1 tablets (2.5-5 mg) by mouth daily as directed by your INR clinic.    Vitamin K Antagonists Failed    8/14/2018  2:56 PM       Failed - INR is within goal in the past 6 weeks    Confirm INR is within goal in the past 6 weeks.     Recent Labs   Lab Test 08/03/18   INR  2.4                      Passed - Recent (12 mo) or future (30 days) visit within the authorizing provider's specialty    Patient had office visit in the last 12 months or has a visit in the next 30 days with authorizing provider or within the authorizing provider's specialty.  See \"Patient Info\" tab in inbasket, or \"Choose Columns\" in Meds & Orders section of the refill encounter.           Passed - Patient is 18 years of age or older          "

## 2018-08-15 ENCOUNTER — ANTICOAGULATION THERAPY VISIT (OUTPATIENT)
Dept: NURSING | Facility: CLINIC | Age: 83
End: 2018-08-15
Payer: COMMERCIAL

## 2018-08-15 DIAGNOSIS — I48.20 CHRONIC ATRIAL FIBRILLATION (H): ICD-10-CM

## 2018-08-15 DIAGNOSIS — Z79.01 LONG TERM CURRENT USE OF ANTICOAGULANT THERAPY: ICD-10-CM

## 2018-08-15 LAB — INR POINT OF CARE: 1.8 (ref 0.86–1.14)

## 2018-08-15 PROCEDURE — 85610 PROTHROMBIN TIME: CPT | Mod: QW

## 2018-08-15 PROCEDURE — 99207 ZZC NO CHARGE NURSE ONLY: CPT

## 2018-08-15 PROCEDURE — 36416 COLLJ CAPILLARY BLOOD SPEC: CPT

## 2018-08-15 NOTE — MR AVS SNAPSHOT
Santosh KENNY Hjelmstad   8/15/2018 11:00 AM   Anticoagulation Therapy Visit    Description:  89 year old male   Provider:   ANTICOAGULATION CLINIC   Department:   Nurse           INR as of 8/15/2018     Today's INR 1.8!      Anticoagulation Summary as of 8/15/2018     INR goal 2.0-3.0   Today's INR 1.8!   Full warfarin instructions 8/15: 7.5 mg; Otherwise 2.5 mg on Mon, Fri; 5 mg all other days   Next INR check 8/22/2018    Indications   Chronic atrial fibrillation (H) [I48.2]  Long term current use of anticoagulant therapy [Z79.01]         Your next Anticoagulation Clinic appointment(s)     Aug 22, 2018  3:45 PM CDT   Anticoagulation Visit with  ANTICOAGULATION CLINIC   [facility] [place] ([facility])    [address]  [place] [address]   [phone]              Contact Numbers     Clinic Number:         August 2018 Details    Sun Mon Tue Wed Thu Fri Sat        1               2               3               4                 5               6               7               8               9               10               11                 12               13               14               15      7.5 mg   See details      16      5 mg         17      2.5 mg         18      5 mg           19      5 mg         20      2.5 mg         21      5 mg         22            23               24               25                 26               27               28               29               30               31                 Date Details   08/15 This INR check       Date of next INR:  8/22/2018         How to take your warfarin dose     To take:  2.5 mg Take 0.5 of a 5 mg tablet.    To take:  5 mg Take 1 of the 5 mg tablets.    To take:  7.5 mg Take 1.5 of the 5 mg tablets.

## 2018-08-15 NOTE — PROGRESS NOTES
ANTICOAGULATION FOLLOW-UP CLINIC VISIT    Patient Name:  Santosh KENNY Hjelmstad  Date:  8/15/2018  Contact Type:  Face to Face    SUBJECTIVE:     Patient Findings     Positives No Problem Findings    Comments Discharged from home care after last INR   Continues to do 2 Boost daily since hospital discharge            OBJECTIVE    INR Protime   Date Value Ref Range Status   08/15/2018 1.8 (A) 0.86 - 1.14 Final       ASSESSMENT / PLAN  INR assessment SUB    Recheck INR In: 1 WEEK    INR Location Clinic      Anticoagulation Summary as of 8/15/2018     INR goal 2.0-3.0   Today's INR 1.8!   Warfarin maintenance plan 2.5 mg (5 mg x 0.5) on Mon, Fri; 5 mg (5 mg x 1) all other days   Full warfarin instructions 8/15: 7.5 mg; Otherwise 2.5 mg on Mon, Fri; 5 mg all other days   Weekly warfarin total 30 mg   Plan last modified Lynda Kwon RN (7/13/2018)   Next INR check 8/22/2018   Target end date     Indications   Chronic atrial fibrillation (H) [I48.2]  Long term current use of anticoagulant therapy [Z79.01]         Anticoagulation Episode Summary     INR check location     Preferred lab     Send INR reminders to [facility]    Comments       Anticoagulation Care Providers     Provider Role Specialty Phone number    Duane, Lilly Castaneda MD Responsible Internal Medicine [phone]            See the Encounter Report to view Anticoagulation Flowsheet and Dosing Calendar (Go to Encounters tab in chart review, and find the Anticoagulation Therapy Visit)    No changes in meds/diet. No missed/extra warfarin doses. No unusual bruising/bleeding or other changes. Pt reports doing 2 Boost drinks daily since recent hospitalization in May. Discharged from home care after last INR.     Pt aware if signs of clotting (pain, tenderness, swelling, color change in leg or arm, SOB) and bleeding occur (blood in stool, urine, large bruising, bleeding gums, nosebleeds) to have INR check sooner. If sx severe report to ER or concerned for  stroke call 911. If general questions or concerns arise, call clinic.    Maura Hawkins RN

## 2018-08-16 RX ORDER — WARFARIN SODIUM 5 MG/1
TABLET ORAL
Qty: 90 TABLET | Refills: 0 | Status: SHIPPED | OUTPATIENT
Start: 2018-08-16 | End: 2018-11-12

## 2018-08-20 DIAGNOSIS — I50.9 ACUTE CONGESTIVE HEART FAILURE, UNSPECIFIED CONGESTIVE HEART FAILURE TYPE: ICD-10-CM

## 2018-08-20 NOTE — TELEPHONE ENCOUNTER
"potassium chloride SA (K-DUR/KLOR-CON M) 20 MEQ CR tablet 60 tablet 0 7/23/2018         Last Written Prescription Date:  07/23/2018  Last Fill Quantity: 60,  # refills: 0   Last office visit: 6/29/2018 with prescribing provider:     Future Office Visit:  Unknown    Requested Prescriptions   Pending Prescriptions Disp Refills     potassium chloride SA (K-DUR/KLOR-CON M) 20 MEQ CR tablet 60 tablet 0     Sig: Take 1 tablet (20 mEq) by mouth 2 times daily    Potassium Supplements Protocol Passed    8/20/2018  1:39 PM       Passed - Recent (12 mo) or future (30 days) visit within the authorizing provider's specialty    Patient had office visit in the last 12 months or has a visit in the next 30 days with authorizing provider or within the authorizing provider's specialty.  See \"Patient Info\" tab in inbasket, or \"Choose Columns\" in Meds & Orders section of the refill encounter.           Passed - Patient is age 18 or older       Passed - Normal serum potassium in past 12 months    Recent Labs   Lab Test  07/17/18   1305   POTASSIUM  4.0                      "

## 2018-08-22 ENCOUNTER — ANTICOAGULATION THERAPY VISIT (OUTPATIENT)
Dept: NURSING | Facility: CLINIC | Age: 83
End: 2018-08-22
Payer: COMMERCIAL

## 2018-08-22 DIAGNOSIS — I48.20 CHRONIC ATRIAL FIBRILLATION (H): ICD-10-CM

## 2018-08-22 DIAGNOSIS — Z79.01 LONG TERM CURRENT USE OF ANTICOAGULANT THERAPY: ICD-10-CM

## 2018-08-22 LAB — INR POINT OF CARE: 1.7 (ref 0.86–1.14)

## 2018-08-22 PROCEDURE — 99207 ZZC NO CHARGE NURSE ONLY: CPT

## 2018-08-22 PROCEDURE — 85610 PROTHROMBIN TIME: CPT | Mod: QW

## 2018-08-22 PROCEDURE — 36416 COLLJ CAPILLARY BLOOD SPEC: CPT

## 2018-08-22 RX ORDER — POTASSIUM CHLORIDE 1500 MG/1
20 TABLET, EXTENDED RELEASE ORAL 2 TIMES DAILY
Qty: 180 TABLET | Refills: 0 | Status: SHIPPED | OUTPATIENT
Start: 2018-08-22 | End: 2018-11-12

## 2018-08-22 NOTE — PROGRESS NOTES
ANTICOAGULATION FOLLOW-UP CLINIC VISIT    Patient Name:  Santosh KENNY Hjelmstad  Date:  8/22/2018  Contact Type:  Face to Face    SUBJECTIVE:     Patient Findings     Positives No Problem Findings    Comments Per wife previous dose prior to hospitalization  was 2.5 mg x 3 and 5 mg x 4.  Drinking Boost everyday which he didn't drink before. Wife would like to come back in 2 week.  Past week took 32.5 mg. Will give 7.5 mg x 1 today then patient will take 5 mg daily ( 8% increase)           OBJECTIVE    INR Protime   Date Value Ref Range Status   08/22/2018 1.7 (A) 0.86 - 1.14 Final       ASSESSMENT / PLAN  INR assessment SUB    Recheck INR In: 2 WEEKS    INR Location Clinic      Anticoagulation Summary as of 8/22/2018     INR goal 2.0-3.0   Today's INR 1.7!   Warfarin maintenance plan 5 mg (5 mg x 1) every day   Full warfarin instructions 8/22: 7.5 mg; 8/24: 5 mg; 8/27: 5 mg; 8/31: 5 mg; Otherwise 5 mg every day   Weekly warfarin total 35 mg   Plan last modified Michelle Helms RN (8/22/2018)   Next INR check 9/5/2018   Target end date     Indications   Chronic atrial fibrillation (H) [I48.2]  Long term current use of anticoagulant therapy [Z79.01]         Anticoagulation Episode Summary     INR check location     Preferred lab     Send INR reminders to [facility]    Comments       Anticoagulation Care Providers     Provider Role Specialty Phone number    Lilly Zepeda MD [facility] [phone]            See the Encounter Report to view Anticoagulation Flowsheet and Dosing Calendar (Go to Encounters tab in chart review, and find the Anticoagulation Therapy Visit)    Dosage adjustment made based on physician directed care plan.    Michelle Helms RN

## 2018-08-22 NOTE — MR AVS SNAPSHOT
Santosh KENNY Hjelmstad   8/22/2018 3:45 PM   Anticoagulation Therapy Visit    Description:  89 year old male   Provider:   ANTICOAGULATION CLINIC   Department:  [facility] Nurse           INR as of 8/22/2018     Today's INR 1.7!      Anticoagulation Summary as of 8/22/2018     INR goal 2.0-3.0   Today's INR 1.7!   Full warfarin instructions 8/22: 7.5 mg; 8/24: 5 mg; 8/27: 5 mg; 8/31: 5 mg; Otherwise 5 mg every day   Next INR check 9/5/2018    Indications   Chronic atrial fibrillation (H) [I48.2]  Long term current use of anticoagulant therapy [Z79.01]         Your next Anticoagulation Clinic appointment(s)     Sep 05, 2018 11:15 AM CDT   Anticoagulation Visit with  ANTICOAGULATION CLINIC   [facility]    [address]  [place] [address]   [phone]              Contact Numbers     Clinic Number:         August 2018 Details    Sun Mon Tue Wed Thu Fri Sat        1               2               3               4                 5               6               7               8               9               10               11                 12               13               14               15               16               17               18                 19               20               21               22      7.5 mg   See details      23      5 mg         24      5 mg         25      5 mg           26      5 mg         27      5 mg         28      5 mg         29      5 mg         30      5 mg         31      5 mg           Date Details   08/22 This INR check               How to take your warfarin dose     To take:  5 mg Take 1 of the 5 mg tablets.    To take:  7.5 mg Take 1.5 of the 5 mg tablets.           September 2018 Details    Sun Mon Tue Wed Thu Fri Sat           1      5 mg           2      5 mg         3      5 mg         4      5 mg         5            6               7               8                 9               10               11               12                13               14               15                 16               17               18               19               20               21               22                 23               24               25               26               27               28               29                 30                      Date Details   No additional details    Date of next INR:  9/5/2018         How to take your warfarin dose     To take:  5 mg Take 1 of the 5 mg tablets.

## 2018-08-29 ENCOUNTER — PATIENT OUTREACH (OUTPATIENT)
Dept: CARE COORDINATION | Facility: CLINIC | Age: 83
End: 2018-08-29

## 2018-08-29 NOTE — PROGRESS NOTES
Clinic Care Coordination Contact    Situation: Patient chart reviewed by care coordinator.    Background: Patient was recently discharged from Home Care. Patient [facility] care with [facility] on 7/18. Patient is to weigh himself daily and call the clinic with any variances by 2 lbs.    Assessment:  Patient seem stable at this time per the chart note.   Patient will continue to have good support thru the [facility] clinic.     Plan/Recommendations:     No further outreaches will be made at this time. RN CC will mail care coordination letter for any future concerns.     Carmenza Khan RN  Clinic Care Coordinator  [phone]  [email or web address]

## 2018-08-29 NOTE — LETTER
[place] CARE COORDINATION    August 29, 2018    Santosh KENNY Hjelmstad  [address]   [facility] [address]      Dear Santosh,    I am a clinic care coordinator who works with Lilly Zepeda MD at [facility].  I wanted to update you and provide you with contact information so that you can call  with questions or concerns about your health care. I will be transitioning out of this role on September. Please feel free to call the clinic and ask to speak with a care coordinator if you have concerns or questions. Below is a description of clinic care coordination and how we can further assist you.     The clinic care coordinator is a registered nurse and/or  who understand the health care system. The goal of clinic care coordination is to help you manage your health and improve access to the [facility] system in the most efficient manner. The registered nurse can assist you in meeting your health care goals by providing education, coordinating services, and strengthening the communication among your providers. The  can assist you with financial, behavioral, psychosocial, chemical dependency, counseling, and/or psychiatric resources.    Please call the clinic at [phone] with any questions or concerns. We at [facility] are focused on providing you with the highest-quality healthcare experience possible and that all starts with you.     Sincerely,     Britt Mccrary RN Care Coordinator

## 2018-09-05 ENCOUNTER — ANTICOAGULATION THERAPY VISIT (OUTPATIENT)
Dept: NURSING | Facility: CLINIC | Age: 83
End: 2018-09-05
Payer: COMMERCIAL

## 2018-09-05 DIAGNOSIS — Z79.01 LONG TERM CURRENT USE OF ANTICOAGULANT THERAPY: ICD-10-CM

## 2018-09-05 DIAGNOSIS — I48.20 CHRONIC ATRIAL FIBRILLATION (H): ICD-10-CM

## 2018-09-05 LAB — INR POINT OF CARE: 3.7 (ref 0.86–1.14)

## 2018-09-05 PROCEDURE — 36416 COLLJ CAPILLARY BLOOD SPEC: CPT

## 2018-09-05 PROCEDURE — 85610 PROTHROMBIN TIME: CPT | Mod: QW

## 2018-09-05 PROCEDURE — 99207 ZZC NO CHARGE NURSE ONLY: CPT

## 2018-09-05 NOTE — PROGRESS NOTES
ANTICOAGULATION FOLLOW-UP CLINIC VISIT    Patient Name:  Santosh KENNY Hjelmstad  Date:  9/5/2018  Contact Type:  Face to Face    SUBJECTIVE:     Patient Findings     Positives Herbal/Supplements (Drinks 2 Ensure daily; Ran out yesterday. )           OBJECTIVE    INR Protime   Date Value Ref Range Status   09/05/2018 3.7 (A) 0.86 - 1.14 Final       ASSESSMENT / PLAN  INR assessment SUPRA    Recheck INR In: 2 WEEKS    INR Location Clinic      Anticoagulation Summary as of 9/5/2018     INR goal 2.0-3.0   Today's INR 3.7!   Warfarin maintenance plan 2.5 mg (5 mg x 0.5) on Wed; 5 mg (5 mg x 1) all other days   Full warfarin instructions 9/5: 2.5 mg; Otherwise 2.5 mg on Wed; 5 mg all other days   Weekly warfarin total 32.5 mg   Plan last modified Bryanna Heredia, RN (9/5/2018)   Next INR check 9/19/2018   Target end date     Indications   Chronic atrial fibrillation (H) [I48.2]  Long term current use of anticoagulant therapy [Z79.01]         Anticoagulation Episode Summary     INR check location     Preferred lab     Send INR reminders to [facility]    Comments       Anticoagulation Care Providers     Provider Role Specialty Phone number    Lilly Zepeda MD Responsible Internal Medicine [phone]            See the Encounter Report to view Anticoagulation Flowsheet and Dosing Calendar (Go to Encounters tab in chart review, and find the Anticoagulation Therapy Visit)    Dosage adjustment made based on physician directed care plan. INR 3.7.  Typically drinks 2 cans of Ensure daily. Ran out yesterday and only had one can.  Begin 2.5 mg Wed, 5 mg ROW.      Bryanna Heredia, RN

## 2018-09-05 NOTE — MR AVS SNAPSHOT
Santosh KENNY Hjelmstad   9/5/2018 11:15 AM   Anticoagulation Therapy Visit    Description:  89 year old male   Provider:   ANTICOAGULATION CLINIC   Department:   Nurse           INR as of 9/5/2018     Today's INR 3.7!      Anticoagulation Summary as of 9/5/2018     INR goal 2.0-3.0   Today's INR 3.7!   Full warfarin instructions 9/5: 2.5 mg; Otherwise 2.5 mg on Wed; 5 mg all other days   Next INR check 9/19/2018    Indications   Chronic atrial fibrillation (H) [I48.2]  Long term current use of anticoagulant therapy [Z79.01]         Your next Anticoagulation Clinic appointment(s)     Sep 19, 2018 11:00 AM CDT   Anticoagulation Visit with  ANTICOAGULATION CLINIC   [facility]    [address]  [place] [address]   [phone]              Contact Numbers     Clinic Number:         September 2018 Details    Sun Mon Tue Wed Thu Fri Sat           1                 2               3               4               5      2.5 mg   See details      6      5 mg         7      5 mg         8      5 mg           9      5 mg         10      5 mg         11      5 mg         12      2.5 mg         13      5 mg         14      5 mg         15      5 mg           16      5 mg         17      5 mg         18      5 mg         19            20               21               22                 23               24               25               26               27               28               29                 30                      Date Details   09/05 This INR check       Date of next INR:  9/19/2018         How to take your warfarin dose     To take:  2.5 mg Take 0.5 of a 5 mg tablet.    To take:  5 mg Take 1 of the 5 mg tablets.

## 2018-09-11 NOTE — TELEPHONE ENCOUNTER
"Last Written Prescription Date:  6/21/18  Last Fill Quantity: 60 tablet,  # refills: 0   Last office visit: 6/29/2018 with prescribing provider:  Duane   Future Office Visit:   Next 5 appointments (look out 90 days)     Jul 30, 2018 11:00 AM CDT   [facility]    [address] [facility] [address] [phone]                 Requested Prescriptions   Pending Prescriptions Disp Refills     potassium chloride SA (K-DUR/KLOR-CON M) 20 MEQ CR tablet 60 tablet 0     Sig: Take 1 tablet (20 mEq) by mouth 2 times daily    Potassium Supplements Protocol Passed    7/20/2018  7:48 PM       Passed - Recent (12 mo) or future (30 days) visit within the authorizing provider's specialty    Patient had office visit in the last 12 months or has a visit in the next 30 days with authorizing provider or within the authorizing provider's specialty.  See \"Patient Info\" tab in inbasket, or \"Choose Columns\" in Meds & Orders section of the refill encounter.           Passed - Patient is age 18 or older       Passed - Normal serum potassium in past 12 months    Recent Labs   Lab Test  07/17/18   1305   POTASSIUM  4.0                      "
Medication is being filled for 1 time refill only due to:  Has upcoming appt Lynda Kwon RN      
suprapubic region

## 2018-09-19 ENCOUNTER — ANTICOAGULATION THERAPY VISIT (OUTPATIENT)
Dept: NURSING | Facility: CLINIC | Age: 83
End: 2018-09-19
Payer: COMMERCIAL

## 2018-09-19 DIAGNOSIS — I48.20 CHRONIC ATRIAL FIBRILLATION (H): ICD-10-CM

## 2018-09-19 DIAGNOSIS — Z79.01 LONG TERM CURRENT USE OF ANTICOAGULANT THERAPY: ICD-10-CM

## 2018-09-19 LAB — INR POINT OF CARE: 2.7 (ref 0.86–1.14)

## 2018-09-19 PROCEDURE — 36416 COLLJ CAPILLARY BLOOD SPEC: CPT

## 2018-09-19 PROCEDURE — 99207 ZZC NO CHARGE NURSE ONLY: CPT

## 2018-09-19 PROCEDURE — 85610 PROTHROMBIN TIME: CPT | Mod: QW

## 2018-09-19 NOTE — MR AVS SNAPSHOT
Santosh KENNY Hjelmstad   9/19/2018 11:00 AM   Anticoagulation Therapy Visit    Description:  89 year old male   Provider:   ANTICOAGULATION CLINIC   Department:   Nurse           INR as of 9/19/2018     Today's INR 2.7      Anticoagulation Summary as of 9/19/2018     INR goal 2.0-3.0   Today's INR 2.7   Full warfarin instructions 2.5 mg on Wed; 5 mg all other days   Next INR check 10/10/2018    Indications   Chronic atrial fibrillation (H) [I48.2]  Long term current use of anticoagulant therapy [Z79.01]         Your next Anticoagulation Clinic appointment(s)     Oct 10, 2018 11:00 AM CDT   Anticoagulation Visit with  ANTICOAGULATION CLINIC   [facility] [place] ([facility])    [address]  [place] [address] [phone]              Contact Numbers     Clinic Number:         September 2018 Details    Sun Mon Tue Wed Thu Fri Sat           1                 2               3               4               5               6               7               8                 9               10               11               12               13               14               15                 16               17               18               19      2.5 mg   See details      20      5 mg         21      5 mg         22      5 mg           23      5 mg         24      5 mg         25      5 mg         26      2.5 mg         27      5 mg         28      5 mg         29      5 mg           30      5 mg                Date Details   09/19 This INR check               How to take your warfarin dose     To take:  2.5 mg Take 0.5 of a 5 mg tablet.    To take:  5 mg Take 1 of the 5 mg tablets.           October 2018 Details    Sun Mon Tue Wed Thu Fri Sat      1      5 mg         2      5 mg         3      2.5 mg         4      5 mg         5      5 mg         6      5 mg           7      5 mg         8      5 mg         9      5 mg         10            11               12               13                  14               15               16               17               18               19               20                 21               22               23               24               25               26               27                 28               29               30               31                   Date Details   No additional details    Date of next INR:  10/10/2018         How to take your warfarin dose     To take:  2.5 mg Take 0.5 of a 5 mg tablet.    To take:  5 mg Take 1 of the 5 mg tablets.

## 2018-09-19 NOTE — PROGRESS NOTES
ANTICOAGULATION FOLLOW-UP CLINIC VISIT    Patient Name:  Santosh KENNY Hjelmstad  Date:  9/19/2018  Contact Type:  Face to Face    SUBJECTIVE:     Patient Findings     Positives No Problem Findings           OBJECTIVE    INR Protime   Date Value Ref Range Status   09/19/2018 2.7 (A) 0.86 - 1.14 Final       ASSESSMENT / PLAN  INR assessment THER    Recheck INR In: 3 WEEKS    INR Location Clinic      Anticoagulation Summary as of 9/19/2018     INR goal 2.0-3.0   Today's INR 2.7   Warfarin maintenance plan 2.5 mg (5 mg x 0.5) on Wed; 5 mg (5 mg x 1) all other days   Full warfarin instructions 2.5 mg on Wed; 5 mg all other days   Weekly warfarin total 32.5 mg   No change documented Bryanna Heredia RN   Plan last modified Bryanna Heredia RN (9/5/2018)   Next INR check 10/10/2018   Target end date     Indications   Chronic atrial fibrillation (H) [I48.2]  Long term current use of anticoagulant therapy [Z79.01]         Anticoagulation Episode Summary     INR check location     Preferred lab     Send INR reminders to CS ANTICOAGULATION    Comments       Anticoagulation Care Providers     Provider Role Specialty Phone number    Duane, Lillyjelani Castaneda MD Responsible Internal Medicine [phone]            See the Encounter Report to view Anticoagulation Flowsheet and Dosing Calendar (Go to Encounters tab in chart review, and find the Anticoagulation Therapy Visit)    Dosage adjustment made based on physician directed care plan. INR 2.7 after recent weekly decrease.  Continue same and recheck 3 weeks.    Bryanna Heredia RN

## 2018-09-20 ENCOUNTER — ALLIED HEALTH/NURSE VISIT (OUTPATIENT)
Dept: CARDIOLOGY | Facility: CLINIC | Age: 83
End: 2018-09-20
Payer: COMMERCIAL

## 2018-09-20 DIAGNOSIS — Z95.0 CARDIAC PACEMAKER IN SITU: Primary | ICD-10-CM

## 2018-09-20 PROCEDURE — 93296 REM INTERROG EVL PM/IDS: CPT | Performed by: INTERNAL MEDICINE

## 2018-09-20 PROCEDURE — 93294 REM INTERROG EVL PM/LDLS PM: CPT | Performed by: INTERNAL MEDICINE

## 2018-09-20 NOTE — MR AVS SNAPSHOT
After Visit Summary   9/20/2018    Santosh Robledo    MRN: [id number]           Patient Information     Date Of Birth          7/20/1929        Visit Information        Provider Department      9/20/2018 4:30 PM ADLER TECH1 [facility]        Today's Diagnoses     Cardiac pacemaker in situ    -  1       Follow-ups after your visit        Your next 10 appointments already scheduled     Sep 20, 2018  4:30 PM CDT   Remote PPM Check with ADLER TECH1   [facility] ([facility])    [address] [place] [id number] [phone] OPT 2           This appointment is for a remote check of your pacemaker.  This is not an appointment at the office.            Oct 05, 2018  9:10 AM CDT   LAB with ADLER LAB   [facility] ([facility])    [address] [place] [id number] [phone]           Please do not eat 10-12 hours before your appointment if you are coming in fasting for labs on lipids, cholesterol, or glucose (sugar). This does not apply to pregnant women. Water, hot tea and black coffee (with nothing added) are okay. Do not drink other fluids, diet soda or chew gum.            Oct 05, 2018 10:10 AM CDT   Core Return with Rea Dinero PA-C   [facility] ([facility])    [address] [place] [id number] [phone] OPT 2            Oct 10, 2018 11:00 AM CDT   Anticoagulation Visit with CS ANTICOAGULATION CLINIC   [facility] ([facility])    [address]  [facility] [id number] [phone]              Who to contact     If you have questions or need follow up information about today's clinic visit or your schedule please contact [facility] directly at [phone].  Normal or non-critical lab and  imaging results will be communicated to you by MyChart, letter or phone within 4 business days after the clinic has received the results. If you do not hear from us within 7 days, please contact the clinic through MyChart or phone. If you have a critical or abnormal lab result, we will notify you by phone as soon as possible.  Submit refill requests through [employer] or call your pharmacy and they will forward the refill request to us. Please allow 3 business days for your refill to be completed.          Additional Information About Your Visit        Care EveryWhere ID     This is your Care EveryWhere ID. This could be used by other organizations to access your [facility] medical records  [id number]         Blood Pressure from Last 3 Encounters:   07/19/18 100/57   07/18/18 112/58   06/29/18 109/54    Weight from Last 3 Encounters:   07/19/18 77.9 kg (171 lb 12.8 oz)   07/18/18 77.8 kg (171 lb 9.6 oz)   06/29/18 76.8 kg (169 lb 6.4 oz)              We Performed the Following     INTERROGATION DEVICE EVAL REMOTE, PACER/ICD (85116)     PM DEVICE INTERROGATE REMOTE (88500)        Primary Care Provider Office Phone # Fax #    Lilly Leonel Zepeda [phone] [address] [address]        Equal Access to Services     MAGALY CASTANEDA : Hadii aad ku hadasho Soomaali, waaxda luqadaha, qaybta kaalmada adenapoleonyada, kingston garcia . So [facility] [phone].    ATENCIÓN: Si habla español, tiene a gonzalez disposición servicios gratuitos de asistencia lingüística. Llame al [phone].    We comply with applicable federal civil rights laws and [place] laws. We do not discriminate on the basis of race, color, national origin, age, disability, sex, sexual orientation, or gender identity.            Thank you!     Thank you for choosing [facility]  for your care. Our goal is always to provide you with excellent care. Hearing back from our  patients is one way we can continue to improve our services. Please take a few minutes to complete the written survey that you may receive in the mail after your visit with us. Thank you!             Your Updated Medication List - Protect others around you: Learn how to safely use, store and throw away your medicines at www.disposemymeds.org.          This list is accurate as of 9/20/18  2:00 PM.  Always use your most recent med list.                   Brand Name Dispense Instructions for use Diagnosis    B-12 1000 MCG Caps     90 capsule    Take 1 tablet by mouth daily    Vitamin B12 deficiency (non anemic)       furosemide 40 MG tablet    LASIX    90 tablet    Take 40 mg in the morning, 20 mg in the evening    Acute congestive heart failure, unspecified congestive heart failure type (H)       hydrALAZINE 25 MG tablet    APRESOLINE    90 tablet    Take 1 tablet (25 mg) by mouth 3 times daily    Benign essential hypertension       lisinopril 20 MG tablet    PRINIVIL/ZESTRIL    180 tablet    Take 1 tablet (20 mg) by mouth daily    Benign essential hypertension       metoprolol tartrate 25 MG tablet    LOPRESSOR    180 tablet    Take 1 tablet (25 mg) by mouth 2 times daily    Acute congestive heart failure, unspecified congestive heart failure type (H)       omeprazole 20 MG CR capsule    priLOSEC    90 capsule    Take 1 capsule (20 mg) by mouth daily    Gastroesophageal reflux disease, esophagitis presence not specified       order for DME     1 each    Equipment being ordered: Walker Wheels () and Walker () Treatment Diagnosis: Difficulty ambulating    Colitis       order for DME     1 Device    Equipment being ordered: Walker Wheels () Treatment Diagnosis:  Weakness,colitis.    Colitis       potassium chloride SA 20 MEQ CR tablet    K-DUR/KLOR-CON M    180 tablet    Take 1 tablet (20 mEq) by mouth 2 times daily    Acute congestive heart failure, unspecified congestive heart failure type (H)        Simethicone 125 MG Caps    GAS-X EXTRA STRENGTH    60 capsule    Take 1 capsule by mouth 2 times daily as needed    Flatulence, eructation and gas pain       simvastatin 5 MG tablet    ZOCOR    90 tablet    Take 2 tablets (10 mg) by mouth daily    Hyperlipidemia LDL goal <100       * WARFARIN SODIUM PO      Take by mouth daily 2.5 mg M/W/Sa 5 mg AOD        * warfarin 5 MG tablet    COUMADIN    90 tablet    Take 0.5-1 tablets (2.5-5 mg) by mouth daily as directed by your INR clinic.    Chronic atrial fibrillation (H), Long term current use of anticoagulant therapy       ZOLOFT PO      Take 100 mg by mouth daily        * Notice:  This list has 2 medication(s) that are the same as other medications prescribed for you. Read the directions carefully, and ask your doctor or other care provider to review them with you.

## 2018-09-20 NOTE — PROGRESS NOTES
[employer] Advisa (S) Remote PPM Device Check  : 80 %, Chronic AFib, taking Warfarin  Mode: VVIR        Presenting Rhythm:   Heart Rate: Adequate rates per histogram  Sensing: Stable    Pacing Threshold: Stable    Impedance: Stable  Battery Status: 6.5-10 years  Atrial Arrhythmia: N/A  Ventricular Arrhythmia: 2 ventricular high rates. EGMs show VS events lasting 8-10 beats, rates 175-195bpm. EF 55-60% (2018). Reviewed with ADRIANA Adan. Patient is DNR/DNI.     Care Plan: F/u PPM Carelink q 3 months. Gave patient results over the phone. TaeCVT

## 2018-10-10 ENCOUNTER — ANTICOAGULATION THERAPY VISIT (OUTPATIENT)
Dept: NURSING | Facility: CLINIC | Age: 83
End: 2018-10-10
Payer: COMMERCIAL

## 2018-10-10 DIAGNOSIS — I48.20 CHRONIC ATRIAL FIBRILLATION (H): ICD-10-CM

## 2018-10-10 DIAGNOSIS — Z79.01 LONG TERM CURRENT USE OF ANTICOAGULANT THERAPY: ICD-10-CM

## 2018-10-10 LAB — INR POINT OF CARE: 3 (ref 0.86–1.14)

## 2018-10-10 PROCEDURE — 85610 PROTHROMBIN TIME: CPT | Mod: QW

## 2018-10-10 PROCEDURE — 99207 ZZC NO CHARGE NURSE ONLY: CPT

## 2018-10-10 PROCEDURE — 36416 COLLJ CAPILLARY BLOOD SPEC: CPT

## 2018-10-10 NOTE — PROGRESS NOTES
ANTICOAGULATION FOLLOW-UP CLINIC VISIT    Patient Name:  Santosh KENNY Hjelmstad  Date:  10/10/2018  Contact Type:  Face to Face    SUBJECTIVE:     Patient Findings     Positives No Problem Findings           OBJECTIVE    INR Protime   Date Value Ref Range Status   10/10/2018 3.0 (A) 0.86 - 1.14 Final       ASSESSMENT / PLAN  INR assessment THER    Recheck INR In: 3 WEEKS    INR Location Clinic      Anticoagulation Summary as of 10/10/2018     INR goal 2.0-3.0   Today's INR 3.0   Warfarin maintenance plan 2.5 mg (5 mg x 0.5) on Wed; 5 mg (5 mg x 1) all other days   Full warfarin instructions 2.5 mg on Wed; 5 mg all other days   Weekly warfarin total 32.5 mg   No change documented Sigrid Brown RN   Plan last modified Bryanna Heredia RN (9/5/2018)   Next INR check 10/31/2018   Target end date     Indications   Chronic atrial fibrillation (H) [I48.2]  Long term current use of anticoagulant therapy [Z79.01]         Anticoagulation Episode Summary     INR check location     Preferred lab     Send INR reminders to  ANTICOAGULATION    Comments       Anticoagulation Care Providers     Provider Role Specialty Phone number    Lilly Zepeda MD Leonel Responsible Internal Medicine [phone]            See the Encounter Report to view Anticoagulation Flowsheet and Dosing Calendar (Go to Encounters tab in chart review, and find the Anticoagulation Therapy Visit)    Pt is 3.0 today. Will continue same dose 2.5 mg on Wednesday and 5 mg all the other days.Recheck in 3 weeks. Pt is here with his wife. They deny any changes in medication,diet,activity or health.    Sigrid Brown, ADRIANA

## 2018-10-10 NOTE — MR AVS SNAPSHOT
Santosh KENNY Hjelmstad   10/10/2018 11:00 AM   Anticoagulation Therapy Visit    Description:  89 year old male   Provider:   ANTICOAGULATION CLINIC   Department:   Nurse           INR as of 10/10/2018     Today's INR 3.0      Anticoagulation Summary as of 10/10/2018     INR goal 2.0-3.0   Today's INR 3.0   Full warfarin instructions 2.5 mg on Wed; 5 mg all other days   Next INR check 10/31/2018    Indications   Chronic atrial fibrillation (H) [I48.2]  Long term current use of anticoagulant therapy [Z79.01]         Your next Anticoagulation Clinic appointment(s)     Oct 31, 2018 11:15 AM CDT   Anticoagulation Visit with  ANTICOAGULATION CLINIC   [facility] [place] ([facility])    [address]  [place] [address]   [phone]              Contact Numbers     Clinic Number:         October 2018 Details    Sun Mon Tue Wed Thu Fri Sat      1               2               3               4               5               6                 7               8               9               10      2.5 mg   See details      11      5 mg         12      5 mg         13      5 mg           14      5 mg         15      5 mg         16      5 mg         17      2.5 mg         18      5 mg         19      5 mg         20      5 mg           21      5 mg         22      5 mg         23      5 mg         24      2.5 mg         25      5 mg         26      5 mg         27      5 mg           28      5 mg         29      5 mg         30      5 mg         31                Date Details   10/10 This INR check       Date of next INR:  10/31/2018         How to take your warfarin dose     To take:  2.5 mg Take 0.5 of a 5 mg tablet.    To take:  5 mg Take 1 of the 5 mg tablets.

## 2018-10-12 ENCOUNTER — OFFICE VISIT (OUTPATIENT)
Dept: CARDIOLOGY | Facility: CLINIC | Age: 83
End: 2018-10-12
Attending: NURSE PRACTITIONER
Payer: COMMERCIAL

## 2018-10-12 VITALS
BODY MASS INDEX: 21.93 KG/M2 | OXYGEN SATURATION: 97 % | WEIGHT: 176.4 LBS | DIASTOLIC BLOOD PRESSURE: 70 MMHG | HEIGHT: 75 IN | HEART RATE: 82 BPM | SYSTOLIC BLOOD PRESSURE: 134 MMHG

## 2018-10-12 DIAGNOSIS — I50.9 ACUTE CONGESTIVE HEART FAILURE (H): ICD-10-CM

## 2018-10-12 DIAGNOSIS — I50.32 CHRONIC DIASTOLIC (CONGESTIVE) HEART FAILURE (H): ICD-10-CM

## 2018-10-12 DIAGNOSIS — E78.5 HYPERLIPIDEMIA LDL GOAL <100: Primary | ICD-10-CM

## 2018-10-12 LAB
ANION GAP SERPL CALCULATED.3IONS-SCNC: 11.8 MMOL/L (ref 6–17)
BUN SERPL-MCNC: 22 MG/DL (ref 7–30)
CALCIUM SERPL-MCNC: 9.7 MG/DL (ref 8.5–10.5)
CHLORIDE SERPL-SCNC: 106 MMOL/L (ref 98–107)
CO2 SERPL-SCNC: 30 MMOL/L (ref 23–29)
CREAT SERPL-MCNC: 1.14 MG/DL (ref 0.7–1.3)
GFR SERPL CREATININE-BSD FRML MDRD: 60 ML/MIN/1.7M2
GLUCOSE SERPL-MCNC: 88 MG/DL (ref 70–105)
POTASSIUM SERPL-SCNC: 3.8 MMOL/L (ref 3.5–5.1)
SODIUM SERPL-SCNC: 144 MMOL/L (ref 136–145)

## 2018-10-12 PROCEDURE — 36415 COLL VENOUS BLD VENIPUNCTURE: CPT | Performed by: INTERNAL MEDICINE

## 2018-10-12 PROCEDURE — 99214 OFFICE O/P EST MOD 30 MIN: CPT | Performed by: PHYSICIAN ASSISTANT

## 2018-10-12 PROCEDURE — 80048 BASIC METABOLIC PNL TOTAL CA: CPT | Performed by: INTERNAL MEDICINE

## 2018-10-12 RX ORDER — SERTRALINE HYDROCHLORIDE 100 MG/1
100 TABLET, FILM COATED ORAL DAILY
COMMUNITY
End: 2019-09-06

## 2018-10-12 NOTE — PROGRESS NOTES
Cardiology Progress Note    Date of Service: 10/12/2018  Patient seen today in follow up of: CORE follow up  Primary cardiologist: Dr. Lynn    HPI:  Santosh Robledo is a very pleasant 89 year old male with a history of hypertension, hyperlipidemia, heart failure with preserved ejection fraction, paroxysmal atrial fibrillation and tachybradycardia syndrome status post pacemaker implantation in 2015 who suffers care with Dr. Lynn in June of this year.    In May of this year, he was admitted with pneumonia.  His hospital stay was complicated by C. difficile colitis as well asheart failure with preserved ejection fraction.  Fortunately, he seems to have recovered from this well.    His last echocardiogram in May of 2018 showed an LVEF of 55 - 60% with normal LV size and normal LV systolic function. There were no regional wall motion abnormalities.    He saw EUGENIO Mulligan on July/18/18.  He was doing well at that visit.  His furosemide was reduced to 40 mg in the morning and 20 mg in the afternoon.      His last device interrogation in September showed he was 80% ventricularly paced.    He is here today for routine CORE clinic evaluation with his wife.  Since his last visit in July, he tells me he has been doing quite well.  He denies any increased shortness of breath.  He has no comments of chest discomfort, lightheadedness, near syncope, palpitations, orthopnea, or PND.  He sleeps in a recliner at baseline because he finds it more comfortable.  His weights have trended up a bit.  His wife notes he has been trying to gain back some caloric weight.    ASSESSMENT/PLAN:  1.  Chronic heart failure with preserved ejection fraction.  Maintained on furosemide 40 mg in the morning and 20 mg in the afternoon.  He appears euvolemic on exam.  He has gained some weight since his last visit but I suspect this is caloric as he has been trying to gain back some weight loss previously.  He has no complaints consistent with  congestive heart failure.  His basic metabolic panel today shows normal electrolytes and stable renal function.  2.  Chronic atrial fibrillation.  Rates are controlled on metoprolol 25 mg twice daily.  He is anticoagulated with warfarin.  Last INR was therapeutic.    3.  Tachybradycardia syndrome status post pacemaker placement in 2015.  4.  Hypertension.  Blood pressure is reasonable today on his current regimen.  5.  Likely central sleep apnea and Cheyne-Love breathing.  He has not tolerated BiPAP or CPAP in the past.    Santosh seems to be doing quite well on his current regimen.  I made no changes to his medications today.  I will have him return to see us in the CORE clinic in about 3 months with a repeat basic metabolic panel at that time.  Of course, asked he and his wife to contact us with any symptoms or concerns prior to that.      This note was completed in part using Dragon voice recognition software. Although reviewed after completion, some word and grammatical errors may occur.    Orders this Visit:  No orders of the defined types were placed in this encounter.    Orders Placed This Encounter   Medications     sertraline (ZOLOFT) 100 MG tablet     Sig: Take 100 mg by mouth daily     Medications Discontinued During This Encounter   Medication Reason     Sertraline HCl (ZOLOFT PO) Stopped by Patient       CURRENT MEDICATIONS:  Current Outpatient Prescriptions   Medication Sig Dispense Refill     Cyanocobalamin (B-12) 1000 MCG CAPS Take 1 tablet by mouth daily 90 capsule 3     furosemide (LASIX) 40 MG tablet Take 40 mg in the morning, 20 mg in the evening 90 tablet 1     hydrALAZINE (APRESOLINE) 25 MG tablet Take 1 tablet (25 mg) by mouth 3 times daily (Patient taking differently: Take 25 mg by mouth 3 times daily PER WIFE TAKES 1 tab QAM & QPM) 90 tablet 11     lisinopril (PRINIVIL/ZESTRIL) 20 MG tablet Take 1 tablet (20 mg) by mouth daily 180 tablet 3     metoprolol tartrate (LOPRESSOR) 25 MG tablet  Take 1 tablet (25 mg) by mouth 2 times daily 180 tablet 3     omeprazole (PRILOSEC) 20 MG CR capsule Take 1 capsule (20 mg) by mouth daily 90 capsule 3     order for DME Equipment being ordered: Walker Wheels () and Walker ()  Treatment Diagnosis: Difficulty ambulating 1 each 0     order for DME Equipment being ordered: Walker Wheels ()  Treatment Diagnosis:  Weakness,colitis. 1 Device 0     potassium chloride SA (K-DUR/KLOR-CON M) 20 MEQ CR tablet Take 1 tablet (20 mEq) by mouth 2 times daily 180 tablet 0     sertraline (ZOLOFT) 100 MG tablet Take 100 mg by mouth daily       Simethicone (GAS-X EXTRA STRENGTH) 125 MG CAPS Take 1 capsule by mouth 2 times daily as needed 60 capsule 11     simvastatin (ZOCOR) 5 MG tablet Take 2 tablets (10 mg) by mouth daily 90 tablet 3     warfarin (COUMADIN) 5 MG tablet Take 0.5-1 tablets (2.5-5 mg) by mouth daily as directed by your INR clinic. 90 tablet 0     WARFARIN SODIUM PO Take by mouth daily 2.5 mg M/W/Sa  5 mg AOD       [DISCONTINUED] Sertraline HCl (ZOLOFT PO) Take 100 mg by mouth daily       ALLERGIES  Allergies   Allergen Reactions     Penicillins Rash     PAST MEDICAL HISTORY:  Past Medical History:   Diagnosis Date     AV node dysfunction      Chronic atrial fibrillation (H) 2004     GERD (gastroesophageal reflux disease)      Hyperlipidemia      Near syncope      MARILU (obstructive sleep apnea)      PAST SURGICAL HISTORY:  Past Surgical History:   Procedure Laterality Date     IMPLANT PACEMAKER  08/10/2015     FAMILY HISTORY:  Family History   Problem Relation Age of Onset     Influenza/Pneumonia Mother      Prostate Cancer Father      SOCIAL HISTORY:  Social History     Social History     Marital status:      Spouse name: N/A     Number of children: N/A     Years of education: N/A     Social History Main Topics     Smoking status: Never Smoker     Smokeless tobacco: Never Used     Alcohol use No     Drug use: No     Sexual activity: Yes      "Partners: Female     Other Topics Concern     None     Social History Narrative     Review of Systems:  Skin:  Negative     Eyes:  Positive for glasses  ENT:  Positive for postnasal drainage  Respiratory:  Positive for mucoid expectorant;dyspnea on exertion;shortness of breath  Cardiovascular:    Positive for;fatigue;edema  Gastroenterology: Positive for nausea  Genitourinary:  Negative    Musculoskeletal:  Negative    Neurologic:  Positive for numbness or tingling of feet  Psychiatric:  Negative    Heme/Lymph/Imm:  Negative    Endocrine:  Negative       Physical Exam:  Vitals: /70  Pulse 82  Ht 1.905 m (6' 3\")  Wt 80 kg (176 lb 6.4 oz)  SpO2 97%  BMI 22.05 kg/m2   Wt Readings from Last 4 Encounters:   10/12/18 80 kg (176 lb 6.4 oz)   07/19/18 77.9 kg (171 lb 12.8 oz)   07/18/18 77.8 kg (171 lb 9.6 oz)   06/29/18 76.8 kg (169 lb 6.4 oz)     GEN: well nourished, in no acute distress.  HEENT:  Pupils equal, round. Sclerae nonicteric.   NECK: Supple, no masses appreciated. Neck veins are flat at 30 degrees  C/V:  Regular rate and rhythm, no murmur, rub or gallop.    RESP: Respirations are unlabored. Clear to auscultation bilaterally without wheezing, rales, or rhonchi.  GI: Abdomen soft, nontender.  EXTREM: No LE edema.  NEURO: Alert and oriented, cooperative.  SKIN: Warm and dry.     Recent Lab Results:  LIPID RESULTS:  No results found for: CHOL, HDL, LDL, TRIG, CHOLHDLRATIO  LIVER ENZYME RESULTS:  Lab Results   Component Value Date    AST 10 05/14/2018    ALT 13 05/14/2018     CBC RESULTS:  Lab Results   Component Value Date    WBC 9.4 05/31/2018    RBC 4.28 (L) 05/31/2018    HGB 11.2 (A) 06/18/2018    HCT 34.0 (L) 05/31/2018    MCV 79 05/31/2018    MCH 25.9 (L) 05/31/2018    MCHC 32.6 05/31/2018    RDW 15.2 (H) 05/31/2018     (H) 05/31/2018     BMP RESULTS:  Lab Results   Component Value Date     10/12/2018    POTASSIUM 3.8 10/12/2018    CHLORIDE 106 10/12/2018    CO2 30 (H) 10/12/2018    " ANIONGAP 11.8 10/12/2018    GLC 88 10/12/2018    BUN 22 10/12/2018    CR 1.14 10/12/2018    GFRESTIMATED 60 (L) 10/12/2018    GFRESTBLACK 73 10/12/2018    ANGELITA 9.7 10/12/2018      A1C RESULTS:  No results found for: A1C  INR RESULTS:  Lab Results   Component Value Date    INR 3.0 (A) 10/10/2018    INR 2.7 (A) 09/19/2018    INR 2.4 08/03/2018    INR 2.6 07/27/2018       New/Pertinent imaging results since last visit:  None    Rea Dinero PA-C  P Heart

## 2018-10-12 NOTE — MR AVS SNAPSHOT
After Visit Summary   10/12/2018    Santosh Robledo    MRN: [id number]           Patient Information     Date Of Birth          7/20/1929        Visit Information        Provider Department      10/12/2018 2:30 PM Rea Dinero PA-C [facility]   [place]        Today's Diagnoses     Hyperlipidemia LDL goal <100    -  1    Chronic diastolic (congestive) heart failure (H)          Care Instructions    Call CORE nurse for any questions or concerns:  [phone]   *If you have concerns after hours, please call [phone], option 2 to speak with on call Cardiologist.    1. Medication changes from today:  No medication changes today.      2. Weigh yourself daily and write it down.     3. Call CORE nurse if your weight is up more than 2 pounds in one day or 5 pounds in one week.     4. Call CORE nurse if you feel more short of breath, have more abdominal bloating, or leg swelling.     5. Continue low sodium diet (less than 2000 mg daily). If you eat less salt, you will retain less fluid.     6. Alcohol can weaken your heart further. You should avoid alcohol or limit its use to special times, such as a holiday or birthday.      7. Do NOT take Aleve or ibuprofen without talking to your doctor first.      8. Lab Results: your labs today look stable.      Component      Latest Ref Rng & Units 7/17/2018 10/12/2018   Sodium      136 - 145 mmol/L 144 144   Potassium      3.5 - 5.1 mmol/L 4.0 3.8   Chloride      98 - 107 mmol/L 111 (H) 106   Carbon Dioxide      23 - 29 mmol/L 27 30 (H)   Anion Gap      6 - 17 mmol/L 6 11.8   Glucose      70 - 105 mg/dL 126 (H) 88   Urea Nitrogen      7 - 30 mg/dL 31 (H) 22   Creatinine      0.70 - 1.30 mg/dL 1.04 1.14   GFR Estimate      >60 mL/min/1.7m2 67 60 (L)   GFR Estimate If Black      >60 mL/min/1.7m2 81 73   Calcium      8.5 - 10.5 mg/dL 8.7 9.7     [facility] Clinic: Cardiomyopathy, Optimization, Rehabilitation, Education  The CORE  Clinic is a heart failure specialty clinic within the [facility] where you will work with specialized nurse practitioners, physician assistants, doctors, and registered nurses. They are dedicated to helping patients with heart failure to carefully adjust medications, receive education, and learn who and when to call if symptoms develop. They specialize in helping you better understand your condition, slow the progression of your disease, improve the length and quality of your life, help you detect future heart problems before they become life threatening, and avoid hospitalizations.              Follow-ups after your visit        Additional Services     Follow-Up with CORE Clinic - NADEGE visit                 Your next 10 appointments already scheduled     Oct 31, 2018 11:15 AM CDT   Anticoagulation Visit with  ANTICOAGULATION CLINIC   [facility]    [address] [id number] [phone]            Dec 28, 2018  1:00 PM CST   Remote PPM Check with ADLER TECH1   [facility]   [place] ([facility])    [address]  [place] [id number] [phone] OPT 2           This appointment is for a remote check of your pacemaker.  This is not an appointment at the office.              Future tests that were ordered for you today     Open Future Orders        Priority Expected Expires Ordered    Follow-Up with CORE Clinic - NADEGE visit Routine 1/10/2019 10/12/2019 10/12/2018    Basic metabolic panel Routine 1/10/2019 10/12/2019 10/12/2018            Who to contact     If you have questions or need follow up information about today's clinic visit or your schedule please contact [facility] directly at [phone].  Normal or non-critical lab and imaging results will be communicated to you by MyChart, letter or phone within 4 business days after the clinic has received the results.  "If you do not hear from us within 7 days, please contact the clinic through [employer] or phone. If you have a critical or abnormal lab result, we will notify you by phone as soon as possible.  Submit refill requests through [employer] or call your pharmacy and they will forward the refill request to us. Please allow 3 business days for your refill to be completed.          Additional Information About Your Visit        Care EveryWhere ID     This is your Care EveryWhere ID. This could be used by other organizations to access your [place] medical records  [id number]        Your Vitals Were     Pulse Height Pulse Oximetry BMI (Body Mass Index)          82 1.905 m (6' 3\") 97% 22.05 kg/m2         Blood Pressure from Last 3 Encounters:   10/12/18 134/70   07/19/18 100/57   07/18/18 112/58    Weight from Last 3 Encounters:   10/12/18 80 kg (176 lb 6.4 oz)   07/19/18 77.9 kg (171 lb 12.8 oz)   07/18/18 77.8 kg (171 lb 9.6 oz)              We Performed the Following     Follow-Up with CORE Clinic          Today's Medication Changes          These changes are accurate as of 10/12/18  3:06 PM.  If you have any questions, ask your nurse or doctor.               These medicines have changed or have updated prescriptions.        Dose/Directions    hydrALAZINE 25 MG tablet   Commonly known as:  APRESOLINE   This may have changed:  additional instructions   Used for:  Benign essential hypertension        Dose:  25 mg   Take 1 tablet (25 mg) by mouth 3 times daily   Quantity:  90 tablet   Refills:  11                Primary Care Provider Office Phone # Fax #    Lilly Leonel Zepeda [phone] [address] [address]  [place] [address]        Equal Access to Services     [facility] Vista[facility] : Lila Mata, tank rucker, bertram connellyalkingston lombardi. So [facility] [phone].    ATENCIÓN: Si habla español, tiene a gonzalez disposición servicios gratuitos de asistencia " lingüística. Serena al [phone].    We comply with applicable federal civil rights laws and [place] laws. We do not discriminate on the basis of race, color, national origin, age, disability, sex, sexual orientation, or gender identity.            Thank you!     Thank you for choosing [facility]  for your care. Our goal is always to provide you with excellent care. Hearing back from our patients is one way we can continue to improve our services. Please take a few minutes to complete the written survey that you may receive in the mail after your visit with us. Thank you!             Your Updated Medication List - Protect others around you: Learn how to safely use, store and throw away your medicines at www.disposemymeds.org.          This list is accurate as of 10/12/18  3:06 PM.  Always use your most recent med list.                   Brand Name Dispense Instructions for use Diagnosis    B-12 1000 MCG Caps     90 capsule    Take 1 tablet by mouth daily    Vitamin B12 deficiency (non anemic)       furosemide 40 MG tablet    LASIX    90 tablet    Take 40 mg in the morning, 20 mg in the evening    Acute congestive heart failure, unspecified congestive heart failure type       hydrALAZINE 25 MG tablet    APRESOLINE    90 tablet    Take 1 tablet (25 mg) by mouth 3 times daily    Benign essential hypertension       lisinopril 20 MG tablet    PRINIVIL/ZESTRIL    180 tablet    Take 1 tablet (20 mg) by mouth daily    Benign essential hypertension       metoprolol tartrate 25 MG tablet    LOPRESSOR    180 tablet    Take 1 tablet (25 mg) by mouth 2 times daily    Acute congestive heart failure, unspecified congestive heart failure type       omeprazole 20 MG CR capsule    priLOSEC    90 capsule    Take 1 capsule (20 mg) by mouth daily    Gastroesophageal reflux disease, esophagitis presence not specified       order for DME     1 each    Equipment being ordered: Walker Wheels  () and Walker () Treatment Diagnosis: Difficulty ambulating    Colitis       order for DME     1 Device    Equipment being ordered: Walker Wheels () Treatment Diagnosis:  Weakness,colitis.    Colitis       potassium chloride SA 20 MEQ CR tablet    K-DUR/KLOR-CON M    180 tablet    Take 1 tablet (20 mEq) by mouth 2 times daily    Acute congestive heart failure, unspecified congestive heart failure type       sertraline 100 MG tablet    ZOLOFT     Take 100 mg by mouth daily        Simethicone 125 MG Caps    GAS-X EXTRA STRENGTH    60 capsule    Take 1 capsule by mouth 2 times daily as needed    Flatulence, eructation and gas pain       simvastatin 5 MG tablet    ZOCOR    90 tablet    Take 2 tablets (10 mg) by mouth daily    Hyperlipidemia LDL goal <100       * WARFARIN SODIUM PO      Take by mouth daily 2.5 mg M/W/Sa 5 mg AOD        * warfarin 5 MG tablet    COUMADIN    90 tablet    Take 0.5-1 tablets (2.5-5 mg) by mouth daily as directed by your INR clinic.    Chronic atrial fibrillation (H), Long term current use of anticoagulant therapy       * Notice:  This list has 2 medication(s) that are the same as other medications prescribed for you. Read the directions carefully, and ask your doctor or other care provider to review them with you.

## 2018-10-12 NOTE — PATIENT INSTRUCTIONS
Call CORE nurse for any questions or concerns:  [phone]   *If you have concerns after hours, please call [phone], option 2 to speak with on call Cardiologist.    1. Medication changes from today:  No medication changes today.      2. Weigh yourself daily and write it down.     3. Call CORE nurse if your weight is up more than 2 pounds in one day or 5 pounds in one week.     4. Call CORE nurse if you feel more short of breath, have more abdominal bloating, or leg swelling.     5. Continue low sodium diet (less than 2000 mg daily). If you eat less salt, you will retain less fluid.     6. Alcohol can weaken your heart further. You should avoid alcohol or limit its use to special times, such as a holiday or birthday.      7. Do NOT take Aleve or ibuprofen without talking to your doctor first.      8. Lab Results: your labs today look stable.      Component      Latest Ref Rng & Units 7/17/2018 10/12/2018   Sodium      136 - 145 mmol/L 144 144   Potassium      3.5 - 5.1 mmol/L 4.0 3.8   Chloride      98 - 107 mmol/L 111 (H) 106   Carbon Dioxide      23 - 29 mmol/L 27 30 (H)   Anion Gap      6 - 17 mmol/L 6 11.8   Glucose      70 - 105 mg/dL 126 (H) 88   Urea Nitrogen      7 - 30 mg/dL 31 (H) 22   Creatinine      0.70 - 1.30 mg/dL 1.04 1.14   GFR Estimate      >60 mL/min/1.7m2 67 60 (L)   GFR Estimate If Black      >60 mL/min/1.7m2 81 73   Calcium      8.5 - 10.5 mg/dL 8.7 9.7     [facility]: Cardiomyopathy, Optimization, Rehabilitation, Education  The CORE Clinic is a heart failure specialty clinic within the [facility] where you will work with specialized nurse practitioners, physician assistants, doctors, and registered nurses. They are dedicated to helping patients with heart failure to carefully adjust medications, receive education, and learn who and when to call if symptoms develop. They specialize in helping you better understand your condition, slow the progression of your disease, improve  the length and quality of your life, help you detect future heart problems before they become life threatening, and avoid hospitalizations.

## 2018-10-12 NOTE — LETTER
10/12/2018    Lilly Zepeda MD  [address]  [facility] [address]    RE: Santosh Robledo       Dear Colleague,    I had the pleasure of seeing Santosh Robledo in the [facility].      Cardiology Progress Note    Date of Service: 10/12/2018  Patient seen today in follow up of: CORE follow up  Primary cardiologist: Dr. Lynn    HPI:  Santosh Robledo is a very pleasant 89 year old male with a history of hypertension, hyperlipidemia, heart failure with preserved ejection fraction, paroxysmal atrial fibrillation and tachybradycardia syndrome status post pacemaker implantation in 2015 who suffers care with Dr. Lynn in June of this year.    In May of this year, he was admitted with pneumonia.  His hospital stay was complicated by C. difficile colitis as well asheart failure with preserved ejection fraction.  Fortunately, he seems to have recovered from this well.    His last echocardiogram in May of 2018 showed an LVEF of 55 - 60% with normal LV size and normal LV systolic function. There were no regional wall motion abnormalities.    He saw EUGENIO Mulligan on July/18/18.  He was doing well at that visit.  His furosemide was reduced to 40 mg in the morning and 20 mg in the afternoon.      His last device interrogation in September showed he was 80% ventricularly paced.    He is here today for routine CORE clinic evaluation with his wife.  Since his last visit in July, he tells me he has been doing quite well.  He denies any increased shortness of breath.  He has no comments of chest discomfort, lightheadedness, near syncope, palpitations, orthopnea, or PND.  He sleeps in a recliner at baseline because he finds it more comfortable.  His weights have trended up a bit.  His wife notes he has been trying to gain back some caloric weight.    ASSESSMENT/PLAN:  1.  Chronic heart failure with preserved ejection fraction.  Maintained on furosemide 40 mg in the morning and 20 mg in  the afternoon.  He appears euvolemic on exam.  He has gained some weight since his last visit but I suspect this is caloric as he has been trying to gain back some weight loss previously.  He has no complaints consistent with congestive heart failure.  His basic metabolic panel today shows normal electrolytes and stable renal function.  2.  Chronic atrial fibrillation.  Rates are controlled on metoprolol 25 mg twice daily.  He is anticoagulated with warfarin.  Last INR was therapeutic.    3.  Tachybradycardia syndrome status post pacemaker placement in 2015.  4.  Hypertension.  Blood pressure is reasonable today on his current regimen.  5.  Likely central sleep apnea and Cheyne-Love breathing.  He has not tolerated BiPAP or CPAP in the past.    Santosh seems to be doing quite well on his current regimen.  I made no changes to his medications today.  I will have him return to see us in the CORE clinic in about 3 months with a repeat basic metabolic panel at that time.  Of course, asked he and his wife to contact us with any symptoms or concerns prior to that.      This note was completed in part using Dragon voice recognition software. Although reviewed after completion, some word and grammatical errors may occur.    Orders this Visit:  No orders of the defined types were placed in this encounter.    Orders Placed This Encounter   Medications     sertraline (ZOLOFT) 100 MG tablet     Sig: Take 100 mg by mouth daily     Medications Discontinued During This Encounter   Medication Reason     Sertraline HCl (ZOLOFT PO) Stopped by Patient       CURRENT MEDICATIONS:  Current Outpatient Prescriptions   Medication Sig Dispense Refill     Cyanocobalamin (B-12) 1000 MCG CAPS Take 1 tablet by mouth daily 90 capsule 3     furosemide (LASIX) 40 MG tablet Take 40 mg in the morning, 20 mg in the evening 90 tablet 1     hydrALAZINE (APRESOLINE) 25 MG tablet Take 1 tablet (25 mg) by mouth 3 times daily (Patient taking differently:  Take 25 mg by mouth 3 times daily PER WIFE TAKES 1 tab QAM & QPM) 90 tablet 11     lisinopril (PRINIVIL/ZESTRIL) 20 MG tablet Take 1 tablet (20 mg) by mouth daily 180 tablet 3     metoprolol tartrate (LOPRESSOR) 25 MG tablet Take 1 tablet (25 mg) by mouth 2 times daily 180 tablet 3     omeprazole (PRILOSEC) 20 MG CR capsule Take 1 capsule (20 mg) by mouth daily 90 capsule 3     order for DME Equipment being ordered: Walker Wheels () and Walker ()  Treatment Diagnosis: Difficulty ambulating 1 each 0     order for DME Equipment being ordered: Walker Wheels ()  Treatment Diagnosis:  Weakness,colitis. 1 Device 0     potassium chloride SA (K-DUR/KLOR-CON M) 20 MEQ CR tablet Take 1 tablet (20 mEq) by mouth 2 times daily 180 tablet 0     sertraline (ZOLOFT) 100 MG tablet Take 100 mg by mouth daily       Simethicone (GAS-X EXTRA STRENGTH) 125 MG CAPS Take 1 capsule by mouth 2 times daily as needed 60 capsule 11     simvastatin (ZOCOR) 5 MG tablet Take 2 tablets (10 mg) by mouth daily 90 tablet 3     warfarin (COUMADIN) 5 MG tablet Take 0.5-1 tablets (2.5-5 mg) by mouth daily as directed by your INR clinic. 90 tablet 0     WARFARIN SODIUM PO Take by mouth daily 2.5 mg M/W/Sa  5 mg AOD       [DISCONTINUED] Sertraline HCl (ZOLOFT PO) Take 100 mg by mouth daily       ALLERGIES  Allergies   Allergen Reactions     Penicillins Rash     PAST MEDICAL HISTORY:  Past Medical History:   Diagnosis Date     AV node dysfunction      Chronic atrial fibrillation (H) 2004     GERD (gastroesophageal reflux disease)      Hyperlipidemia      Near syncope      MARILU (obstructive sleep apnea)      PAST SURGICAL HISTORY:  Past Surgical History:   Procedure Laterality Date     IMPLANT PACEMAKER  08/10/2015     FAMILY HISTORY:  Family History   Problem Relation Age of Onset     Influenza/Pneumonia Mother      Prostate Cancer Father      SOCIAL HISTORY:  Social History     Social History     Marital status:      Spouse name: N/A  "    Number of children: N/A     Years of education: N/A     Social History Main Topics     Smoking status: Never Smoker     Smokeless tobacco: Never Used     Alcohol use No     Drug use: No     Sexual activity: Yes     Partners: Female     Other Topics Concern     None     Social History Narrative     Review of Systems:  Skin:  Negative     Eyes:  Positive for glasses  ENT:  Positive for postnasal drainage  Respiratory:  Positive for mucoid expectorant;dyspnea on exertion;shortness of breath  Cardiovascular:    Positive for;fatigue;edema  Gastroenterology: Positive for nausea  Genitourinary:  Negative    Musculoskeletal:  Negative    Neurologic:  Positive for numbness or tingling of feet  Psychiatric:  Negative    Heme/Lymph/Imm:  Negative    Endocrine:  Negative       Physical Exam:  Vitals: /70  Pulse 82  Ht 1.905 m (6' 3\")  Wt 80 kg (176 lb 6.4 oz)  SpO2 97%  BMI 22.05 kg/m2   Wt Readings from Last 4 Encounters:   10/12/18 80 kg (176 lb 6.4 oz)   07/19/18 77.9 kg (171 lb 12.8 oz)   07/18/18 77.8 kg (171 lb 9.6 oz)   06/29/18 76.8 kg (169 lb 6.4 oz)     GEN: well nourished, in no acute distress.  HEENT:  Pupils equal, round. Sclerae nonicteric.   NECK: Supple, no masses appreciated. Neck veins are flat at 30 degrees  C/V:  Regular rate and rhythm, no murmur, rub or gallop.    RESP: Respirations are unlabored. Clear to auscultation bilaterally without wheezing, rales, or rhonchi.  GI: Abdomen soft, nontender.  EXTREM: No LE edema.  NEURO: Alert and oriented, cooperative.  SKIN: Warm and dry.     Recent Lab Results:  LIPID RESULTS:  No results found for: CHOL, HDL, LDL, TRIG, CHOLHDLRATIO  LIVER ENZYME RESULTS:  Lab Results   Component Value Date    AST 10 05/14/2018    ALT 13 05/14/2018     CBC RESULTS:  Lab Results   Component Value Date    WBC 9.4 05/31/2018    RBC 4.28 (L) 05/31/2018    HGB 11.2 (A) 06/18/2018    HCT 34.0 (L) 05/31/2018    MCV 79 05/31/2018    MCH 25.9 (L) 05/31/2018    MCHC 32.6 " 05/31/2018    RDW 15.2 (H) 05/31/2018     (H) 05/31/2018     BMP RESULTS:  Lab Results   Component Value Date     10/12/2018    POTASSIUM 3.8 10/12/2018    CHLORIDE 106 10/12/2018    CO2 30 (H) 10/12/2018    ANIONGAP 11.8 10/12/2018    GLC 88 10/12/2018    BUN 22 10/12/2018    CR 1.14 10/12/2018    GFRESTIMATED 60 (L) 10/12/2018    GFRESTBLACK 73 10/12/2018    ANGELITA 9.7 10/12/2018      A1C RESULTS:  No results found for: A1C  INR RESULTS:  Lab Results   Component Value Date    INR 3.0 (A) 10/10/2018    INR 2.7 (A) 09/19/2018    INR 2.4 08/03/2018    INR 2.6 07/27/2018       New/Pertinent imaging results since last visit:  None    Rea Dinero PA-C  [facility]    Thank you for allowing me to participate in the care of your patient.      Sincerely,     Rea Dinero PA-C     [facility]

## 2018-11-01 ENCOUNTER — ANTICOAGULATION THERAPY VISIT (OUTPATIENT)
Dept: NURSING | Facility: CLINIC | Age: 83
End: 2018-11-01
Payer: COMMERCIAL

## 2018-11-01 DIAGNOSIS — I48.20 CHRONIC ATRIAL FIBRILLATION (H): ICD-10-CM

## 2018-11-01 DIAGNOSIS — Z79.01 LONG TERM CURRENT USE OF ANTICOAGULANT THERAPY: ICD-10-CM

## 2018-11-01 LAB — INR POINT OF CARE: 2 (ref 0.86–1.14)

## 2018-11-01 PROCEDURE — 36416 COLLJ CAPILLARY BLOOD SPEC: CPT

## 2018-11-01 PROCEDURE — 99207 ZZC NO CHARGE NURSE ONLY: CPT

## 2018-11-01 PROCEDURE — 85610 PROTHROMBIN TIME: CPT | Mod: QW

## 2018-11-01 NOTE — PROGRESS NOTES
ANTICOAGULATION FOLLOW-UP CLINIC VISIT    Patient Name:  Santosh KENNY Hjelmstad  Date:  11/1/2018  Contact Type:  Face to Face    SUBJECTIVE:     Patient Findings     Positives Herbal/Supplements, No Problem Findings    Comments Takes 2 Ensure daily but just decreased to 1 per day starting yesterday.  Will recheck in 2 weeks and NOT change dose yet.           OBJECTIVE    INR Protime   Date Value Ref Range Status   11/01/2018 2.0 (A) 0.86 - 1.14 Final       ASSESSMENT / PLAN  INR assessment THER    Recheck INR In: 2 WEEKS    INR Location Clinic      Anticoagulation Summary as of 11/1/2018     INR goal 2.0-3.0   Today's INR 2.0   Warfarin maintenance plan 2.5 mg (5 mg x 0.5) on Wed; 5 mg (5 mg x 1) all other days   Full warfarin instructions 2.5 mg on Wed; 5 mg all other days   Weekly warfarin total 32.5 mg   Plan last modified Bryanna Heredia RN (9/5/2018)   Next INR check 11/15/2018   Target end date     Indications   Chronic atrial fibrillation (H) [I48.2]  Long term current use of anticoagulant therapy [Z79.01]         Anticoagulation Episode Summary     INR check location     Preferred lab     Send INR reminders to CS ANTICOAGULATION    Comments       Anticoagulation Care Providers     Provider Role Specialty Phone number    Duane, Lilly Castaneda MD Responsible Internal Medicine [phone]            See the Encounter Report to view Anticoagulation Flowsheet and Dosing Calendar (Go to Encounters tab in chart review, and find the Anticoagulation Therapy Visit)    Dosage adjustment made based on physician directed care plan.    Bridgett Ordonez RN

## 2018-11-01 NOTE — MR AVS SNAPSHOT
Santosh KENNY Hjelmstad   11/1/2018 1:30 PM   Anticoagulation Therapy Visit    Description:  89 year old male   Provider:   ANTICOAGULATION CLINIC   Department:   Nurse           INR as of 11/1/2018     Today's INR 2.0      Anticoagulation Summary as of 11/1/2018     INR goal 2.0-3.0   Today's INR 2.0   Full warfarin instructions 2.5 mg on Wed; 5 mg all other days   Next INR check 11/15/2018    Indications   Chronic atrial fibrillation (H) [I48.2]  Long term current use of anticoagulant therapy [Z79.01]         Your next Anticoagulation Clinic appointment(s)     Nov 15, 2018 11:15 AM CST   Anticoagulation Visit with  ANTICOAGULATION CLINIC   [facility] [place] ([facility])    [address]  [place] [address]   [phone]              Contact Numbers     Clinic Number:         November 2018 Details    Sun Mon Tue Wed Thu Fri Sat         1      5 mg   See details      2      5 mg         3      5 mg           4      5 mg         5      5 mg         6      5 mg         7      2.5 mg         8      5 mg         9      5 mg         10      5 mg           11      5 mg         12      5 mg         13      5 mg         14      2.5 mg         15            16               17                 18               19               20               21               22               23               24                 25               26               27               28               29               30                 Date Details   11/01 This INR check       Date of next INR:  11/15/2018         How to take your warfarin dose     To take:  2.5 mg Take 0.5 of a 5 mg tablet.    To take:  5 mg Take 1 of the 5 mg tablets.

## 2018-11-12 DIAGNOSIS — I48.20 CHRONIC ATRIAL FIBRILLATION (H): ICD-10-CM

## 2018-11-12 DIAGNOSIS — Z79.01 LONG TERM CURRENT USE OF ANTICOAGULANT THERAPY: ICD-10-CM

## 2018-11-12 DIAGNOSIS — I10 BENIGN ESSENTIAL HYPERTENSION: Primary | ICD-10-CM

## 2018-11-13 DIAGNOSIS — R35.89 DIURESIS: Primary | ICD-10-CM

## 2018-11-13 RX ORDER — FUROSEMIDE 40 MG
TABLET ORAL
Qty: 135 TABLET | Refills: 3 | Status: SHIPPED | OUTPATIENT
Start: 2018-11-13 | End: 2019-01-21

## 2018-11-13 NOTE — TELEPHONE ENCOUNTER
"Last Written Prescription Date:  8/22/18  Last Fill Quantity: 180 tablet,  # refills: 0   Last office visit: 6/29/2018 with prescribing provider:  TONY Zepeda   Future Office Visit:      Requested Prescriptions   Pending Prescriptions Disp Refills     potassium chloride SA (K-DUR/KLOR-CON M) 20 MEQ CR tablet 180 tablet 0     Sig: Take 1 tablet (20 mEq) by mouth 2 times daily    Potassium Supplements Protocol Passed    11/12/2018 11:09 AM       Passed - Recent (12 mo) or future (30 days) visit within the authorizing provider's specialty    Patient had office visit in the last 12 months or has a visit in the next 30 days with authorizing provider or within the authorizing provider's specialty.  See \"Patient Info\" tab in inbasket, or \"Choose Columns\" in Meds & Orders section of the refill encounter.             Passed - Patient is age 18 or older       Passed - Normal serum potassium in past 12 months    Recent Labs   Lab Test  10/12/18   1325   POTASSIUM  3.8                      "

## 2018-11-13 NOTE — TELEPHONE ENCOUNTER
"** Requesting 90 days supply **    Last Written Prescription Date:  8/16/18  Last Fill Quantity: 90 tablet,  # refills: 0   Last office visit: 6/29/2018 with prescribing provider:  TONY Zepeda   Future Office Visit:      Requested Prescriptions   Pending Prescriptions Disp Refills     warfarin (COUMADIN) 5 MG tablet 90 tablet 0     Sig: Take 0.5-1 tablets (2.5-5 mg) by mouth daily as directed by your INR clinic.    Vitamin K Antagonists Failed    11/12/2018  6:48 PM       Failed - INR is within goal in the past 6 weeks    Confirm INR is within goal in the past 6 weeks.     Recent Labs   Lab Test 11/01/18   INR  2.0*                      Passed - Recent (12 mo) or future (30 days) visit within the authorizing provider's specialty    Patient had office visit in the last 12 months or has a visit in the next 30 days with authorizing provider or within the authorizing provider's specialty.  See \"Patient Info\" tab in inbasket, or \"Choose Columns\" in Meds & Orders section of the refill encounter.             Passed - Patient is 18 years of age or older          "

## 2018-11-14 RX ORDER — POTASSIUM CHLORIDE 1500 MG/1
20 TABLET, EXTENDED RELEASE ORAL 2 TIMES DAILY
Qty: 180 TABLET | Refills: 2 | Status: SHIPPED | OUTPATIENT
Start: 2018-11-14 | End: 2019-08-29

## 2018-11-14 RX ORDER — WARFARIN SODIUM 5 MG/1
TABLET ORAL
Qty: 90 TABLET | Refills: 0 | Status: SHIPPED | OUTPATIENT
Start: 2018-11-14 | End: 2019-02-15

## 2018-11-14 NOTE — TELEPHONE ENCOUNTER
Prescription approved per [facility] Refill Protocol.  Susie WOLFF RN,BSN

## 2018-11-15 ENCOUNTER — ANTICOAGULATION THERAPY VISIT (OUTPATIENT)
Dept: NURSING | Facility: CLINIC | Age: 83
End: 2018-11-15
Payer: COMMERCIAL

## 2018-11-15 DIAGNOSIS — I48.20 CHRONIC ATRIAL FIBRILLATION (H): ICD-10-CM

## 2018-11-15 DIAGNOSIS — Z79.01 LONG TERM CURRENT USE OF ANTICOAGULANT THERAPY: ICD-10-CM

## 2018-11-15 LAB — INR POINT OF CARE: 2.3 (ref 0.86–1.14)

## 2018-11-15 PROCEDURE — 85610 PROTHROMBIN TIME: CPT | Mod: QW

## 2018-11-15 PROCEDURE — 99207 ZZC NO CHARGE NURSE ONLY: CPT

## 2018-11-15 PROCEDURE — 36416 COLLJ CAPILLARY BLOOD SPEC: CPT

## 2018-11-15 NOTE — MR AVS SNAPSHOT
Santosh KENNY Hjelmstad   11/15/2018 11:15 AM   Anticoagulation Therapy Visit    Description:  89 year old male   Provider:   ANTICOAGULATION CLINIC   Department:  [facility] Nurse           INR as of 11/15/2018     Today's INR 2.3      Anticoagulation Summary as of 11/15/2018     INR goal 2.0-3.0   Today's INR 2.3   Full warfarin instructions 2.5 mg on Wed; 5 mg all other days   Next INR check 12/13/2018    Indications   Chronic atrial fibrillation (H) [I48.2]  Long term current use of anticoagulant therapy [Z79.01]         Your next Anticoagulation Clinic appointment(s)     Dec 13, 2018  1:30 PM CST   Anticoagulation Visit with  ANTICOAGULATION CLINIC   [facility] [place] ([facility]    [address]  [place] [address]   [phone]              Contact Numbers     Clinic Number:         November 2018 Details    Sun Mon Tue Wed Thu Fri Sat         1               2               3                 4               5               6               7               8               9               10                 11               12               13               14               15      5 mg   See details      16      5 mg         17      5 mg           18      5 mg         19      5 mg         20      5 mg         21      2.5 mg         22      5 mg         23      5 mg         24      5 mg           25      5 mg         26      5 mg         27      5 mg         28      2.5 mg         29      5 mg         30      5 mg           Date Details   11/15 This INR check               How to take your warfarin dose     To take:  2.5 mg Take 0.5 of a 5 mg tablet.    To take:  5 mg Take 1 of the 5 mg tablets.           December 2018 Details    Sun Mon Tue Wed Thu Fri Sat           1      5 mg           2      5 mg         3      5 mg         4      5 mg         5      2.5 mg         6      5 mg         7      5 mg         8      5 mg           9      5 mg         10      5 mg         11      5 mg         12       2.5 mg         13            14               15                 16               17               18               19               20               21               22                 23               24               25               26               27               28               29                 30               31                     Date Details   No additional details    Date of next INR:  12/13/2018         How to take your warfarin dose     To take:  2.5 mg Take 0.5 of a 5 mg tablet.    To take:  5 mg Take 1 of the 5 mg tablets.

## 2018-11-15 NOTE — PROGRESS NOTES
ANTICOAGULATION FOLLOW-UP CLINIC VISIT    Patient Name:  Santosh KENNY Hjelmstad  Date:  11/15/2018  Contact Type:  Face to Face    SUBJECTIVE:     Patient Findings     Positives No Problem Findings           OBJECTIVE    INR Protime   Date Value Ref Range Status   11/15/2018 2.3 (A) 0.86 - 1.14 Final       ASSESSMENT / PLAN  INR assessment THER    Recheck INR In: 4 WEEKS    INR Location Clinic      Anticoagulation Summary as of 11/15/2018     INR goal 2.0-3.0   Today's INR 2.3   Warfarin maintenance plan 2.5 mg (5 mg x 0.5) on Wed; 5 mg (5 mg x 1) all other days   Full warfarin instructions 2.5 mg on Wed; 5 mg all other days   Weekly warfarin total 32.5 mg   No change documented Susie Hunter RN   Plan last modified Bryanna Heredia RN (9/5/2018)   Next INR check 12/13/2018   Target end date     Indications   Chronic atrial fibrillation (H) [I48.2]  Long term current use of anticoagulant therapy [Z79.01]         Anticoagulation Episode Summary     INR check location     Preferred lab     Send INR reminders to [facility] ANTICOAGULATION    Comments       Anticoagulation Care Providers     Provider Role Specialty Phone number    DuaneZeke palomaresjelani Castaneda MD Responsible Internal Medicine [phone]            See the Encounter Report to view Anticoagulation Flowsheet and Dosing Calendar (Go to Encounters tab in chart review, and find the Anticoagulation Therapy Visit)    Dosage adjustment made based on physician directed care plan.    Susie Hunter RN

## 2018-12-13 ENCOUNTER — ANTICOAGULATION THERAPY VISIT (OUTPATIENT)
Dept: NURSING | Facility: CLINIC | Age: 83
End: 2018-12-13
Payer: COMMERCIAL

## 2018-12-13 DIAGNOSIS — I48.20 CHRONIC ATRIAL FIBRILLATION (H): ICD-10-CM

## 2018-12-13 DIAGNOSIS — Z79.01 LONG TERM CURRENT USE OF ANTICOAGULANT THERAPY: ICD-10-CM

## 2018-12-13 LAB — INR POINT OF CARE: 3.2 (ref 0.86–1.14)

## 2018-12-13 PROCEDURE — 85610 PROTHROMBIN TIME: CPT | Mod: QW

## 2018-12-13 PROCEDURE — 99207 ZZC NO CHARGE NURSE ONLY: CPT

## 2018-12-13 PROCEDURE — 36416 COLLJ CAPILLARY BLOOD SPEC: CPT

## 2018-12-13 NOTE — PROGRESS NOTES
ANTICOAGULATION FOLLOW-UP CLINIC VISIT    Patient Name:  Santosh KENNY Hjelmstad  Date:  12/13/2018  Contact Type:  Face to Face    SUBJECTIVE:   Hasn't eaten his normal greens . Will resume.      OBJECTIVE    INR Protime   Date Value Ref Range Status   12/13/2018 3.2 (A) 0.86 - 1.14 Final       ASSESSMENT / PLAN  INR assessment SUPRA    Recheck INR In: 4 WEEKS    INR Location Clinic      Anticoagulation Summary  As of 12/13/2018    INR goal:   2.0-3.0   TTR:   65.4 % (6.1 mo)   INR used for dosing:   3.2! (12/13/2018)   Warfarin maintenance plan:   2.5 mg (5 mg x 0.5) every Wed; 5 mg (5 mg x 1) all other days   Full warfarin instructions:   12/13: 2.5 mg; Otherwise 2.5 mg every Wed; 5 mg all other days   Weekly warfarin total:   32.5 mg   Plan last modified:   Bryanna Heredia RN (9/5/2018)   Next INR check:   1/9/2019   Target end date:       Indications    Chronic atrial fibrillation (H) [I48.2]  Long term current use of anticoagulant therapy [Z79.01]             Anticoagulation Episode Summary     INR check location:       Preferred lab:       Send INR reminders to:   [facility]    Comments:         Anticoagulation Care Providers     Provider Role Specialty Phone number    Duane Lillyjelani Castaneda MD [facility] Internal Medicine [phone]            See the Encounter Report to view Anticoagulation Flowsheet and Dosing Calendar (Go to Encounters tab in chart review, and find the Anticoagulation Therapy Visit)    Dosage adjustment made based on physician directed care plan.    Susie Hunter RN

## 2018-12-28 ENCOUNTER — ANCILLARY PROCEDURE (OUTPATIENT)
Dept: CARDIOLOGY | Facility: CLINIC | Age: 83
End: 2018-12-28
Payer: COMMERCIAL

## 2018-12-28 DIAGNOSIS — I48.92 ATRIAL FIBRILLATION AND FLUTTER (H): ICD-10-CM

## 2018-12-28 DIAGNOSIS — I48.91 ATRIAL FIBRILLATION AND FLUTTER (H): ICD-10-CM

## 2018-12-28 LAB
ICDO DEVICE COMMENTS: NORMAL
MDC_IDC_LEAD_IMPLANT_DT: NORMAL
MDC_IDC_LEAD_LOCATION: NORMAL
MDC_IDC_LEAD_LOCATION_DETAIL_1: NORMAL
MDC_IDC_LEAD_MFG: NORMAL
MDC_IDC_LEAD_MODEL: NORMAL
MDC_IDC_LEAD_POLARITY_TYPE: NORMAL
MDC_IDC_LEAD_SERIAL: NORMAL
MDC_IDC_MSMT_BATTERY_DTM: NORMAL
MDC_IDC_MSMT_BATTERY_REMAINING_LONGEVITY: 100 MO
MDC_IDC_MSMT_BATTERY_RRT_TRIGGER: 2.83
MDC_IDC_MSMT_BATTERY_STATUS: NORMAL
MDC_IDC_MSMT_BATTERY_VOLTAGE: 3.02 V
MDC_IDC_MSMT_LEADCHNL_RV_IMPEDANCE_VALUE: 532 OHM
MDC_IDC_MSMT_LEADCHNL_RV_IMPEDANCE_VALUE: 627 OHM
MDC_IDC_MSMT_LEADCHNL_RV_PACING_THRESHOLD_AMPLITUDE: 0.5 V
MDC_IDC_MSMT_LEADCHNL_RV_PACING_THRESHOLD_PULSEWIDTH: 0.4 MS
MDC_IDC_MSMT_LEADCHNL_RV_SENSING_INTR_AMPL: 7.12 MV
MDC_IDC_MSMT_LEADCHNL_RV_SENSING_INTR_AMPL: 7.12 MV
MDC_IDC_PG_IMPLANT_DTM: NORMAL
MDC_IDC_PG_MFG: NORMAL
MDC_IDC_PG_MODEL: NORMAL
MDC_IDC_PG_SERIAL: NORMAL
MDC_IDC_PG_TYPE: NORMAL
MDC_IDC_SESS_CLINIC_NAME: NORMAL
MDC_IDC_SESS_DTM: NORMAL
MDC_IDC_SESS_TYPE: NORMAL
MDC_IDC_SET_BRADY_HYSTRATE: NORMAL
MDC_IDC_SET_BRADY_LOWRATE: 60 {BEATS}/MIN
MDC_IDC_SET_BRADY_MAX_SENSOR_RATE: 130 {BEATS}/MIN
MDC_IDC_SET_BRADY_MODE: NORMAL
MDC_IDC_SET_LEADCHNL_RV_PACING_AMPLITUDE: 1.5 V
MDC_IDC_SET_LEADCHNL_RV_PACING_ANODE_ELECTRODE_1: NORMAL
MDC_IDC_SET_LEADCHNL_RV_PACING_ANODE_LOCATION_1: NORMAL
MDC_IDC_SET_LEADCHNL_RV_PACING_CAPTURE_MODE: NORMAL
MDC_IDC_SET_LEADCHNL_RV_PACING_CATHODE_ELECTRODE_1: NORMAL
MDC_IDC_SET_LEADCHNL_RV_PACING_CATHODE_LOCATION_1: NORMAL
MDC_IDC_SET_LEADCHNL_RV_PACING_POLARITY: NORMAL
MDC_IDC_SET_LEADCHNL_RV_PACING_PULSEWIDTH: 0.4 MS
MDC_IDC_SET_LEADCHNL_RV_SENSING_ANODE_ELECTRODE_1: NORMAL
MDC_IDC_SET_LEADCHNL_RV_SENSING_ANODE_LOCATION_1: NORMAL
MDC_IDC_SET_LEADCHNL_RV_SENSING_CATHODE_ELECTRODE_1: NORMAL
MDC_IDC_SET_LEADCHNL_RV_SENSING_CATHODE_LOCATION_1: NORMAL
MDC_IDC_SET_LEADCHNL_RV_SENSING_POLARITY: NORMAL
MDC_IDC_SET_LEADCHNL_RV_SENSING_SENSITIVITY: 0.9 MV
MDC_IDC_SET_ZONE_DETECTION_INTERVAL: 360 MS
MDC_IDC_SET_ZONE_TYPE: NORMAL
MDC_IDC_STAT_BRADY_DTM_END: NORMAL
MDC_IDC_STAT_BRADY_DTM_START: NORMAL
MDC_IDC_STAT_BRADY_RV_PERCENT_PACED: 82.38 %
MDC_IDC_STAT_EPISODE_RECENT_COUNT: 0
MDC_IDC_STAT_EPISODE_RECENT_COUNT: 0
MDC_IDC_STAT_EPISODE_RECENT_COUNT_DTM_END: NORMAL
MDC_IDC_STAT_EPISODE_RECENT_COUNT_DTM_END: NORMAL
MDC_IDC_STAT_EPISODE_RECENT_COUNT_DTM_START: NORMAL
MDC_IDC_STAT_EPISODE_RECENT_COUNT_DTM_START: NORMAL
MDC_IDC_STAT_EPISODE_TOTAL_COUNT: 0
MDC_IDC_STAT_EPISODE_TOTAL_COUNT: 4
MDC_IDC_STAT_EPISODE_TOTAL_COUNT_DTM_END: NORMAL
MDC_IDC_STAT_EPISODE_TOTAL_COUNT_DTM_END: NORMAL
MDC_IDC_STAT_EPISODE_TOTAL_COUNT_DTM_START: NORMAL
MDC_IDC_STAT_EPISODE_TOTAL_COUNT_DTM_START: NORMAL
MDC_IDC_STAT_EPISODE_TYPE: NORMAL

## 2018-12-28 PROCEDURE — 93294 REM INTERROG EVL PM/LDLS PM: CPT | Performed by: INTERNAL MEDICINE

## 2018-12-28 PROCEDURE — 93296 REM INTERROG EVL PM/IDS: CPT | Performed by: INTERNAL MEDICINE

## 2019-01-14 ENCOUNTER — ANTICOAGULATION THERAPY VISIT (OUTPATIENT)
Dept: NURSING | Facility: CLINIC | Age: 84
End: 2019-01-14
Payer: MEDICARE

## 2019-01-14 DIAGNOSIS — I48.20 CHRONIC ATRIAL FIBRILLATION (H): ICD-10-CM

## 2019-01-14 DIAGNOSIS — Z79.01 LONG TERM CURRENT USE OF ANTICOAGULANT THERAPY: ICD-10-CM

## 2019-01-14 LAB — INR POINT OF CARE: 2 (ref 0.86–1.14)

## 2019-01-14 PROCEDURE — 36416 COLLJ CAPILLARY BLOOD SPEC: CPT

## 2019-01-14 PROCEDURE — 85610 PROTHROMBIN TIME: CPT | Mod: QW

## 2019-01-14 PROCEDURE — 99207 ZZC NO CHARGE NURSE ONLY: CPT

## 2019-01-14 NOTE — PROGRESS NOTES
ANTICOAGULATION FOLLOW-UP CLINIC VISIT    Patient Name:  Santosh KENNY Hjelmstad  Date:  2019  Contact Type:  Face to Face    SUBJECTIVE:     Patient Findings     Positives:   Missed doses    Comments:   1 missed dose last week but unsure which day            OBJECTIVE    INR Protime   Date Value Ref Range Status   2019 2.0 (A) 0.86 - 1.14 Final       ASSESSMENT / PLAN  INR assessment THER    Recheck INR In: 4 WEEKS    INR Location Clinic      Anticoagulation Summary  As of 2019    INR goal:   2.0-3.0   TTR:   68.1 % (7.1 mo)   INR used for dosin.0 (2019)   Warfarin maintenance plan:   2.5 mg (5 mg x 0.5) every Wed; 5 mg (5 mg x 1) all other days   Full warfarin instructions:   2.5 mg every Wed; 5 mg all other days   Weekly warfarin total:   32.5 mg   Plan last modified:   Bryanna Heredia RN (2018)   Next INR check:      Target end date:       Indications    Chronic atrial fibrillation (H) [I48.2]  Long term current use of anticoagulant therapy [Z79.01]             Anticoagulation Episode Summary     INR check location:       Preferred lab:       Send INR reminders to:   [facility]    Comments:         Anticoagulation Care Providers     Provider Role Specialty Phone number    Lilly Zepeda MD [facility] Internal Medicine [phone]            See the Encounter Report to view Anticoagulation Flowsheet and Dosing Calendar (Go to Encounters tab in chart review, and find the Anticoagulation Therapy Visit)    1 missed dose last week, but pt cannot remember which day. Dosage adjustment made based on physician directed care plan. Recheck INR 4 weeks or sooner if concerns.     Pt aware if signs of clotting (pain, tenderness, swelling, color change in leg or arm, SOB) and bleeding occur (blood in stool, urine, large bruising, bleeding gums, nosebleeds) to have INR check sooner. If sx severe report to ER or concerned for stroke call 911. If general questions or concerns arise, call  clinic.    Maura Hawkins RN

## 2019-01-18 ENCOUNTER — CARE COORDINATION (OUTPATIENT)
Dept: CARDIOLOGY | Facility: CLINIC | Age: 84
End: 2019-01-18

## 2019-01-18 DIAGNOSIS — I50.32 CHRONIC DIASTOLIC (CONGESTIVE) HEART FAILURE (H): ICD-10-CM

## 2019-01-18 LAB
ANION GAP SERPL CALCULATED.3IONS-SCNC: 9.1 MMOL/L (ref 6–17)
BUN SERPL-MCNC: 18 MG/DL (ref 7–30)
CALCIUM SERPL-MCNC: 9.3 MG/DL (ref 8.5–10.5)
CHLORIDE SERPL-SCNC: 107 MMOL/L (ref 98–107)
CO2 SERPL-SCNC: 30 MMOL/L (ref 23–29)
CREAT SERPL-MCNC: 1.17 MG/DL (ref 0.7–1.3)
GFR SERPL CREATININE-BSD FRML MDRD: 59 ML/MIN/{1.73_M2}
GLUCOSE SERPL-MCNC: 102 MG/DL (ref 70–105)
POTASSIUM SERPL-SCNC: 4.1 MMOL/L (ref 3.5–5.1)
SODIUM SERPL-SCNC: 142 MMOL/L (ref 136–145)

## 2019-01-18 PROCEDURE — 36415 COLL VENOUS BLD VENIPUNCTURE: CPT | Performed by: PHYSICIAN ASSISTANT

## 2019-01-18 PROCEDURE — 80048 BASIC METABOLIC PNL TOTAL CA: CPT | Performed by: PHYSICIAN ASSISTANT

## 2019-01-21 ENCOUNTER — OFFICE VISIT (OUTPATIENT)
Dept: CARDIOLOGY | Facility: CLINIC | Age: 84
End: 2019-01-21
Payer: MEDICARE

## 2019-01-21 VITALS
BODY MASS INDEX: 22.38 KG/M2 | OXYGEN SATURATION: 99 % | WEIGHT: 180 LBS | HEART RATE: 84 BPM | SYSTOLIC BLOOD PRESSURE: 128 MMHG | HEIGHT: 75 IN | DIASTOLIC BLOOD PRESSURE: 70 MMHG

## 2019-01-21 DIAGNOSIS — I50.32 CHRONIC DIASTOLIC (CONGESTIVE) HEART FAILURE (H): ICD-10-CM

## 2019-01-21 DIAGNOSIS — R35.89 DIURESIS: ICD-10-CM

## 2019-01-21 DIAGNOSIS — R06.09 DOE (DYSPNEA ON EXERTION): Primary | ICD-10-CM

## 2019-01-21 PROCEDURE — 99214 OFFICE O/P EST MOD 30 MIN: CPT | Performed by: PHYSICIAN ASSISTANT

## 2019-01-21 RX ORDER — METOPROLOL SUCCINATE 50 MG/1
50 TABLET, EXTENDED RELEASE ORAL AT BEDTIME
Qty: 90 TABLET | Refills: 3 | Status: SHIPPED | OUTPATIENT
Start: 2019-01-21 | End: 2020-02-11

## 2019-01-21 RX ORDER — FUROSEMIDE 40 MG
40 TABLET ORAL 2 TIMES DAILY
Qty: 180 TABLET | Refills: 3 | Status: SHIPPED | OUTPATIENT
Start: 2019-01-21 | End: 2019-02-04

## 2019-01-21 ASSESSMENT — MIFFLIN-ST. JEOR: SCORE: 1567.1

## 2019-01-21 NOTE — NURSING NOTE
The After Visit Summary was reviewed with the patient following their office visit with Nicole Marrufo PA-C. Patient was educated about any medication changes, the importance of following a low sodium diet, importance of recording daily weights, and when to call [facility] clinic. Patient verbalized understanding of the information and agrees to call with any questions or concerns.     Labs: Lab results from today were reviewed with the patient during the office visit. A copy of the results were provided on the After Visit Summary.     Return appointment: Patient was escorted to scheduling to make 3 week CORE follow up w/Nicole Marrufo. He is to schedule PFT's  and was given contact information to make this appt.    Medication changes:    Stop hydralazine   Increase lasix to 40mg bid   Stop lopressor    Start Toprol XL 50mg/day      Sigrid Sarabia, RN  [facility] Clinic RN Care Coordinator  [facility]  [phone]

## 2019-01-21 NOTE — LETTER
1/21/2019      Lilly Zepeda MD  [address]  [facility] [address]      RE: Santosh Robledo       Dear Colleague,    I had the pleasure of seeing Santosh Robledo in the [facility].    Service Date: 01/21/2019      PRIMARY CARDIOLOGIST:  London Lynn MD      HISTORY OF PRESENT ILLNESS:  Mr. 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  When he came to the [facility] in 05/2018 after he was admitted with pneumonia as well as C. diff and HFpEF.  His echocardiogram at that time showed a normal EF, but a dilated IVC.  At that point, his weight was somewhere around 168 pounds.  He did apparently well through the fall.      Today, he comes in and says that he has progressively worsened over the winter.  He is tired doing anything and he is winded doing pretty much anything.  He is short of breath getting dressed and he sleeps with his head elevated and on his side.  He thinks if he tried to lie down, it would be hard to breathe, but he does not really try.  He is constantly clearing his throat and produces a lot of mucus.  This is white.  He does not have a clear cough.  He also notes that if he sits up too quickly, he does get lightheaded or dizzy.  He denies kim PND.  He known sleep apnea that is not treated as he has not been able to find a mask that is comfortable.  He is struggling with fatigue and just going down and out of his apartment and taking the elevator 4 times yesterday made him so tired that he could not eat dinner last night.  He denies kim chest pain.  He has not had a fall for over a year.      SOCIAL HISTORY:  He comes in today with his wife, aCndida.  She notes that the trying to feed him high fat foods because his appetite is poor.  She  says he is eating very little, but does eat ice cream and has half and half on his oatmeal.  He otherwise has an Ensure.  With this though, she is not sure how he is possibly gaining weight as he still seems to be eating very little.  That being said, his feet were edematous previously with his heart failure admission.      PHYSICAL EXAMINATION:  Well-developed, well-nourished elderly gentleman in no acute distress.   Normocephalic, atraumatic.  Heart is irregularly irregular with a and a 2/6 systolic murmur heard best at the apex.  His lungs are diminished with just faint rales in the left lower lobe.  Extremities without peripheral edema.  Abdomen is soft.  I do not appreciate JVP at 90 degrees.  He is unable to climb onto exam table.        Labs done last week show creatinine 1.17, BUN 18, potassium 4.1, sodium 142.      CT of the chest done 05/2018 shows extensive interstitial and airspace infiltrates in the right upper lobe and to a lesser extent right lower lobe, loculated pleural effusions bilaterally.      ASSESSMENT AND PLAN:   1.  Dyspnea on exertion, ongoing and fairly significant with him getting winded just getting dressed.  I am sure part of this is age related?  In addition, he may have some pulmonary underlying causes.  He apparently has significant asbestos exposure.  He has never seen a pulmonologist.  He does not recall ever having PFTs done.  We will get PFTs and depending on the results of those, we will send him to Pulmonary.  In addition, he has gained about 10 pounds of weight in spite of eating very poorly and having no appetite.  We will also try an increased dose of Lasix to 40 mg b.i.d.  This may be part of the cause.  I suspect the true is his shortness of breath is likely from both.     He also complains of too many medications.  We are going to try to simplify a little bit.  He is only taking hydralazine once in the morning and once at night which actually leads to perhaps rebound  hypertension during the day.  We will discontinue this completely.  We will go up on his Lasix to 40 mg b.i.d.  He will remain on lisinopril 20 mg in the morning, but we will switch him to Toprol-XL 50 mg at night in case there is fatigue.  He will remain on potassium 20 mEq b.i.d.  We will follow this up with a BMP in 3-4 weeks and a [facility] visit.   2.  Chronic atrial fibrillation.  Rates have been controlled, pacemaker in place, on Coumadin appropriately.   3.  Hypertension.  Reasonable control.  May peak up a little bit with removal of hydralazine, but then would consider adding spironolactone and removing his potassium.   4.  Likely central sleep apnea, unable to tolerate mask, contributing to all the above as well.      Thank you for allowing me to participate in this delightful patient's care.  He should follow up in [facility] in 3-4 weeks.         WENDY LAINEZ PA-C             D: 2019   T: 2019   MT: ZOEY      Name:     TRUE MATTHEWS   MRN:      [id number]        Account:      [id number]   :      1929           Service Date: 2019      Document: [id number]         Outpatient Encounter Medications as of 2019   Medication Sig Dispense Refill     Cyanocobalamin (B-12) 1000 MCG CAPS Take 1 tablet by mouth daily 90 capsule 3     furosemide (LASIX) 40 MG tablet Take 1 tablet (40 mg) by mouth 2 times daily 180 tablet 3     lisinopril (PRINIVIL/ZESTRIL) 20 MG tablet Take 1 tablet (20 mg) by mouth daily 180 tablet 3     metoprolol succinate ER (TOPROL XL) 50 MG 24 hr tablet Take 1 tablet (50 mg) by mouth At Bedtime 90 tablet 3     omeprazole (PRILOSEC) 20 MG CR capsule Take 1 capsule (20 mg) by mouth daily 90 capsule 3     order for DME Equipment being ordered: Walker Wheels () and Walker ()  Treatment Diagnosis: Difficulty ambulating 1 each 0     order for DME Equipment being ordered: Walker Wheels ()  Treatment Diagnosis:  Weakness,colitis. 1  Device 0     potassium chloride SA (K-DUR/KLOR-CON M) 20 MEQ CR tablet Take 1 tablet (20 mEq) by mouth 2 times daily 180 tablet 2     sertraline (ZOLOFT) 100 MG tablet Take 100 mg by mouth daily       Simethicone (GAS-X EXTRA STRENGTH) 125 MG CAPS Take 1 capsule by mouth 2 times daily as needed 60 capsule 11     simvastatin (ZOCOR) 5 MG tablet Take 2 tablets (10 mg) by mouth daily 90 tablet 3     warfarin (COUMADIN) 5 MG tablet Take 0.5-1 tablets (2.5-5 mg) by mouth daily as directed by your INR clinic. 90 tablet 0     WARFARIN SODIUM PO Take by mouth daily 2.5 mg M/W/Sa  5 mg AOD       [DISCONTINUED] furosemide (LASIX) 40 MG tablet Take 40 mg in the morning, 20 mg in the evening 135 tablet 3     [DISCONTINUED] hydrALAZINE (APRESOLINE) 25 MG tablet Take 1 tablet (25 mg) by mouth 3 times daily (Patient taking differently: Take 25 mg by mouth 3 times daily PER WIFE TAKES 1 tab QAM & QPM) 90 tablet 11     [DISCONTINUED] metoprolol tartrate (LOPRESSOR) 25 MG tablet Take 1 tablet (25 mg) by mouth 2 times daily 180 tablet 3     No facility-administered encounter medications on file as of 1/21/2019.        Again, thank you for allowing me to participate in the care of your patient.      Sincerely,    Nicole Marrufo PA-C     [facility]

## 2019-01-21 NOTE — PATIENT INSTRUCTIONS
Call CORE nurse for any questions or concerns:  [phone]   *If you have concerns after hours, please call [phone], option 2 to speak with on call Cardiologist.    Schedule breathing tests to see how much shortness of breath is from your lungs.      1. Medication changes and/or recommendations from today:    Stop taking hydralazine.   Increase Lasix/ furosemide to 40 mg twice a day.    Take lisinopril in the morning.    Stop taking Lopressor/ metoprolol tartrate.   Start taking Toprol XL/ metoprolol succinate 50 mg before bedtime.    Continue other medications.      2. Follow up plan: with me in about 3 weeks.        3. Weigh yourself daily and write it down.     4. Call CORE nurse if your weight is up more than 2 pounds in one day or 5 pounds in one week.     5. Call CORE nurse if you feel more short of breath, have more abdominal bloating, or leg swelling.     6. Continue low sodium diet (less than 2000 mg daily). If you eat less salt, you will retain less fluid.     7. Alcohol can weaken your heart further. You should avoid alcohol or limit its use to special times, such as a holiday or birthday.      8. Do NOT take Aleve or ibuprofen without talking to your doctor first.      9. Lab Results:   Component      Latest Ref Rng & Units 1/18/2019   Sodium      136 - 145 mmol/L 142   Potassium      3.5 - 5.1 mmol/L 4.1   Chloride      98 - 107 mmol/L 107   Carbon Dioxide      23 - 29 mmol/L 30 (H)   Anion Gap      6 - 17 mmol/L 9.1   Glucose      70 - 105 mg/dL 102   Urea Nitrogen      7 - 30 mg/dL 18   Creatinine      0.70 - 1.30 mg/dL 1.17   GFR Estimate      >60 mL/min/1.73:m2 59 (L)   GFR Estimate If Black      >60 mL/min/1.73:m2 71   Calcium      8.5 - 10.5 mg/dL 9.3        [facility]: Cardiomyopathy, Optimization, Rehabilitation, Education  The [facility] is a heart failure specialty clinic within the [facility] where you will work with specialized nurse practitioners, physician assistants,  doctors, and registered nurses. They are dedicated to helping patients with heart failure to carefully adjust medications, receive education, and learn who and when to call if symptoms develop. They specialize in helping you better understand your condition, slow the progression of your disease, improve the length and quality of your life, help you detect future heart problems before they become life threatening, and avoid hospitalizations.

## 2019-01-21 NOTE — PROGRESS NOTES
[id number]  HPI and Plan:   See dictation    Orders this Visit:  Orders Placed This Encounter   Procedures     Basic metabolic panel     Follow-Up with [facility] - NADEGE visit     General PFT Lab (Please always keep checked)     Orders Placed This Encounter   Medications     furosemide (LASIX) 40 MG tablet     Sig: Take 1 tablet (40 mg) by mouth 2 times daily     Dispense:  180 tablet     Refill:  3     metoprolol succinate ER (TOPROL XL) 50 MG 24 hr tablet     Sig: Take 1 tablet (50 mg) by mouth At Bedtime     Dispense:  90 tablet     Refill:  3     Medications Discontinued During This Encounter   Medication Reason     furosemide (LASIX) 40 MG tablet      hydrALAZINE (APRESOLINE) 25 MG tablet      metoprolol tartrate (LOPRESSOR) 25 MG tablet          Encounter Diagnoses   Name Primary?     Chronic diastolic (congestive) heart failure (H)      Diuresis      CALDWELL (dyspnea on exertion) Yes       CURRENT MEDICATIONS:  Current Outpatient Medications   Medication Sig Dispense Refill     Cyanocobalamin (B-12) 1000 MCG CAPS Take 1 tablet by mouth daily 90 capsule 3     furosemide (LASIX) 40 MG tablet Take 1 tablet (40 mg) by mouth 2 times daily 180 tablet 3     lisinopril (PRINIVIL/ZESTRIL) 20 MG tablet Take 1 tablet (20 mg) by mouth daily 180 tablet 3     metoprolol succinate ER (TOPROL XL) 50 MG 24 hr tablet Take 1 tablet (50 mg) by mouth At Bedtime 90 tablet 3     omeprazole (PRILOSEC) 20 MG CR capsule Take 1 capsule (20 mg) by mouth daily 90 capsule 3     order for DME Equipment being ordered: Walker Wheels () and Walker ()  Treatment Diagnosis: Difficulty ambulating 1 each 0     order for DME Equipment being ordered: Walker Wheels ()  Treatment Diagnosis:  Weakness,colitis. 1 Device 0     potassium chloride SA (K-DUR/KLOR-CON M) 20 MEQ CR tablet Take 1 tablet (20 mEq) by mouth 2 times daily 180 tablet 2     sertraline (ZOLOFT) 100 MG tablet Take 100 mg by mouth daily       Simethicone (GAS-X EXTRA STRENGTH)  125 MG CAPS Take 1 capsule by mouth 2 times daily as needed 60 capsule 11     simvastatin (ZOCOR) 5 MG tablet Take 2 tablets (10 mg) by mouth daily 90 tablet 3     warfarin (COUMADIN) 5 MG tablet Take 0.5-1 tablets (2.5-5 mg) by mouth daily as directed by your INR clinic. 90 tablet 0     WARFARIN SODIUM PO Take by mouth daily 2.5 mg M/W/Sa  5 mg AOD         ALLERGIES     Allergies   Allergen Reactions     Penicillins Rash       PAST MEDICAL HISTORY:  Past Medical History:   Diagnosis Date     AV node dysfunction      Chronic atrial fibrillation (H) 2004     GERD (gastroesophageal reflux disease)      Hyperlipidemia      Near syncope      MARILU (obstructive sleep apnea)        PAST SURGICAL HISTORY:  Past Surgical History:   Procedure Laterality Date     IMPLANT PACEMAKER  08/10/2015       FAMILY HISTORY:  Family History   Problem Relation Age of Onset     Influenza/Pneumonia Mother      Prostate Cancer Father        SOCIAL HISTORY:  Social History     Socioeconomic History     Marital status:      Spouse name: None     Number of children: None     Years of education: None     Highest education level: None   Social Needs     Financial resource strain: None     Food insecurity - worry: None     Food insecurity - inability: None     Transportation needs - medical: None     Transportation needs - non-medical: None   Occupational History     None   Tobacco Use     Smoking status: Never Smoker     Smokeless tobacco: Never Used   Substance and Sexual Activity     Alcohol use: No     Drug use: No     Sexual activity: Yes     Partners: Female   Other Topics Concern     Parent/sibling w/ CABG, MI or angioplasty before 65F 55M? Not Asked   Social History Narrative     None       Review of Systems:  Skin:  Negative     Eyes:  Positive for glasses  ENT:  Positive for postnasal drainage  Respiratory:  Positive for dyspnea on exertion;shortness of breath  Cardiovascular:    Positive for;fatigue  Gastroenterology: Positive for  "nausea  Genitourinary:  Negative    Musculoskeletal:  Negative    Neurologic:  Positive for numbness or tingling of feet  Psychiatric:  Negative    Heme/Lymph/Imm:  Negative    Endocrine:  Negative      Physical Exam:  Vitals: /70   Pulse 84   Ht 1.905 m (6' 3\")   Wt 81.6 kg (180 lb)   SpO2 99%   BMI 22.50 kg/m     Please refer to dictation for physical exam    Recent Lab Results:  LIPID RESULTS:  No results found for: CHOL, HDL, LDL, TRIG, CHOLHDLRATIO    LIVER ENZYME RESULTS:  Lab Results   Component Value Date    AST 10 05/14/2018    ALT 13 05/14/2018       CBC RESULTS:  Lab Results   Component Value Date    WBC 9.4 05/31/2018    RBC 4.28 (L) 05/31/2018    HGB 11.2 (A) 06/18/2018    HCT 34.0 (L) 05/31/2018    MCV 79 05/31/2018    MCH 25.9 (L) 05/31/2018    MCHC 32.6 05/31/2018    RDW 15.2 (H) 05/31/2018     (H) 05/31/2018       BMP RESULTS:  Lab Results   Component Value Date     01/18/2019    POTASSIUM 4.1 01/18/2019    CHLORIDE 107 01/18/2019    CO2 30 (H) 01/18/2019    ANIONGAP 9.1 01/18/2019     01/18/2019    BUN 18 01/18/2019    CR 1.17 01/18/2019    GFRESTIMATED 59 (L) 01/18/2019    GFRESTBLACK 71 01/18/2019    ANGELITA 9.3 01/18/2019        A1C RESULTS:  No results found for: A1C    INR RESULTS:  Lab Results   Component Value Date    INR 2.0 (A) 01/14/2019    INR 3.2 (A) 12/13/2018    INR 2.4 08/03/2018    INR 2.6 07/27/2018           CC  No referring provider defined for this encounter.      "

## 2019-01-21 NOTE — LETTER
1/21/2019    Lilly Zepeda MD  [address]  [facility] [address]    RE: Santosh Robledo       Dear Colleague,    I had the pleasure of seeing Santosh Robledo in the [facility].    [id number]  HPI and Plan:   See dictation    Orders this Visit:  Orders Placed This Encounter   Procedures     Basic metabolic panel     [facility] with [facility] - NADEGE visit     General PFT Lab (Please always keep checked)     Orders Placed This Encounter   Medications     furosemide (LASIX) 40 MG tablet     Sig: Take 1 tablet (40 mg) by mouth 2 times daily     Dispense:  180 tablet     Refill:  3     metoprolol succinate ER (TOPROL XL) 50 MG 24 hr tablet     Sig: Take 1 tablet (50 mg) by mouth At Bedtime     Dispense:  90 tablet     Refill:  3     Medications Discontinued During This Encounter   Medication Reason     furosemide (LASIX) 40 MG tablet      hydrALAZINE (APRESOLINE) 25 MG tablet      metoprolol tartrate (LOPRESSOR) 25 MG tablet          Encounter Diagnoses   Name Primary?     Chronic diastolic (congestive) heart failure (H)      Diuresis      CALDWELL (dyspnea on exertion) Yes       CURRENT MEDICATIONS:  Current Outpatient Medications   Medication Sig Dispense Refill     Cyanocobalamin (B-12) 1000 MCG CAPS Take 1 tablet by mouth daily 90 capsule 3     furosemide (LASIX) 40 MG tablet Take 1 tablet (40 mg) by mouth 2 times daily 180 tablet 3     lisinopril (PRINIVIL/ZESTRIL) 20 MG tablet Take 1 tablet (20 mg) by mouth daily 180 tablet 3     metoprolol succinate ER (TOPROL XL) 50 MG 24 hr tablet Take 1 tablet (50 mg) by mouth At Bedtime 90 tablet 3     omeprazole (PRILOSEC) 20 MG CR capsule Take 1 capsule (20 mg) by mouth daily 90 capsule 3     order for DME Equipment being ordered: Walker Wheels () and Walker ()  Treatment Diagnosis: Difficulty ambulating 1 each 0     order for DME Equipment being ordered: Walker Wheels ()  Treatment Diagnosis:  Weakness,colitis. 1 Device 0      potassium chloride SA (K-DUR/KLOR-CON M) 20 MEQ CR tablet Take 1 tablet (20 mEq) by mouth 2 times daily 180 tablet 2     sertraline (ZOLOFT) 100 MG tablet Take 100 mg by mouth daily       Simethicone (GAS-X EXTRA STRENGTH) 125 MG CAPS Take 1 capsule by mouth 2 times daily as needed 60 capsule 11     simvastatin (ZOCOR) 5 MG tablet Take 2 tablets (10 mg) by mouth daily 90 tablet 3     warfarin (COUMADIN) 5 MG tablet Take 0.5-1 tablets (2.5-5 mg) by mouth daily as directed by your INR clinic. 90 tablet 0     WARFARIN SODIUM PO Take by mouth daily 2.5 mg M/W/Sa  5 mg AOD         ALLERGIES     Allergies   Allergen Reactions     Penicillins Rash       PAST MEDICAL HISTORY:  Past Medical History:   Diagnosis Date     AV node dysfunction      Chronic atrial fibrillation (H) 2004     GERD (gastroesophageal reflux disease)      Hyperlipidemia      Near syncope      MARILU (obstructive sleep apnea)        PAST SURGICAL HISTORY:  Past Surgical History:   Procedure Laterality Date     IMPLANT PACEMAKER  08/10/2015       FAMILY HISTORY:  Family History   Problem Relation Age of Onset     Influenza/Pneumonia Mother      Prostate Cancer Father        SOCIAL HISTORY:  Social History     Socioeconomic History     Marital status:      Spouse name: None     Number of children: None     Years of education: None     Highest education level: None   Social Needs     Financial resource strain: None     Food insecurity - worry: None     Food insecurity - inability: None     Transportation needs - medical: None     Transportation needs - non-medical: None   Occupational History     None   Tobacco Use     Smoking status: Never Smoker     Smokeless tobacco: Never Used   Substance and Sexual Activity     Alcohol use: No     Drug use: No     Sexual activity: Yes     Partners: Female   Other Topics Concern     Parent/sibling w/ CABG, MI or angioplasty before 65F 55M? Not Asked   Social History Narrative     None       Review of  "Systems:  Skin:  Negative     Eyes:  Positive for glasses  ENT:  Positive for postnasal drainage  Respiratory:  Positive for dyspnea on exertion;shortness of breath  Cardiovascular:    Positive for;fatigue  Gastroenterology: Positive for nausea  Genitourinary:  Negative    Musculoskeletal:  Negative    Neurologic:  Positive for numbness or tingling of feet  Psychiatric:  Negative    Heme/Lymph/Imm:  Negative    Endocrine:  Negative      Physical Exam:  Vitals: /70   Pulse 84   Ht 1.905 m (6' 3\")   Wt 81.6 kg (180 lb)   SpO2 99%   BMI 22.50 kg/m      Please refer to dictation for physical exam    Recent Lab Results:  LIPID RESULTS:  No results found for: CHOL, HDL, LDL, TRIG, CHOLHDLRATIO    LIVER ENZYME RESULTS:  Lab Results   Component Value Date    AST 10 05/14/2018    ALT 13 05/14/2018       CBC RESULTS:  Lab Results   Component Value Date    WBC 9.4 05/31/2018    RBC 4.28 (L) 05/31/2018    HGB 11.2 (A) 06/18/2018    HCT 34.0 (L) 05/31/2018    MCV 79 05/31/2018    MCH 25.9 (L) 05/31/2018    MCHC 32.6 05/31/2018    RDW 15.2 (H) 05/31/2018     (H) 05/31/2018       BMP RESULTS:  Lab Results   Component Value Date     01/18/2019    POTASSIUM 4.1 01/18/2019    CHLORIDE 107 01/18/2019    CO2 30 (H) 01/18/2019    ANIONGAP 9.1 01/18/2019     01/18/2019    BUN 18 01/18/2019    CR 1.17 01/18/2019    GFRESTIMATED 59 (L) 01/18/2019    GFRESTBLACK 71 01/18/2019    ANGELITA 9.3 01/18/2019        A1C RESULTS:  No results found for: A1C    INR RESULTS:  Lab Results   Component Value Date    INR 2.0 (A) 01/14/2019    INR 3.2 (A) 12/13/2018    INR 2.4 08/03/2018    INR 2.6 07/27/2018           CC  No referring provider defined for this encounter.        Thank you for allowing me to participate in the care of your patient.      Sincerely,     Nicole Marrufo PA-C     [facility]    cc:   No referring provider defined for this encounter.        "

## 2019-01-21 NOTE — PROGRESS NOTES
Service Date: 01/21/2019      PRIMARY CARDIOLOGIST:  London Lynn MD      HISTORY OF PRESENT ILLNESS:  Mr. 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  When [facility] in [facility]2018 after he was admitted with pneumonia as well as C. diff and HFpEF.  His echocardiogram at that time showed a normal EF, but a dilated IVC.  At that point, his weight was somewhere around 168 pounds.  He did apparently well through the fall.      Today, he comes in and says that he has progressively worsened over the winter.  He is tired doing anything and he is winded doing pretty much anything.  He is short of breath getting dressed and he sleeps with his head elevated and on his side.  He thinks if he tried to lie down, it would be hard to breathe, but he does not really try.  He is constantly clearing his throat and produces a lot of mucus.  This is white.  He does not have a clear cough.  He also notes that if he sits up too quickly, he does get lightheaded or dizzy.  He denies kim PND.  He known sleep apnea that is not treated as he has not been able to find a mask that is comfortable.  He is struggling with fatigue and just going down and out of his apartment and taking the elevator 4 times yesterday made him so tired that he could not eat dinner last night.  He denies kim chest pain.  He has not had a fall for over a year.      SOCIAL HISTORY:  He comes in today with his wife, Candida.  She notes that the trying to feed him high fat foods because his appetite is poor.  She says he is eating very little, but does eat ice cream and has half and half on his oatmeal.  He otherwise has an Ensure.  With this though, she is not sure how he is possibly gaining weight as he still seems to be eating very  little.  That being said, his feet were edematous previously with his heart failure admission.      PHYSICAL EXAMINATION:  Well-developed, well-nourished elderly gentleman in no acute distress.   Normocephalic, atraumatic.  Heart is irregularly irregular with a and a 2/6 systolic murmur heard best at the apex.  His lungs are diminished with just faint rales in the left lower lobe.  Extremities without peripheral edema.  Abdomen is soft.  I do not appreciate JVP at 90 degrees.  He is unable to climb onto exam table.        Labs done last week show creatinine 1.17, BUN 18, potassium 4.1, sodium 142.      CT of the chest done 05/2018 shows extensive interstitial and airspace infiltrates in the right upper lobe and to a lesser extent right lower lobe, loculated pleural effusions bilaterally.      ASSESSMENT AND PLAN:   1.  Dyspnea on exertion, ongoing and fairly significant with him getting winded just getting dressed.  I am sure part of this is age related?  In addition, he may have some pulmonary underlying causes.  He apparently has significant asbestos exposure.  He has never seen a pulmonologist.  He does not recall ever having PFTs done.  We will get PFTs and depending on the results of those, we will send him to Pulmonary.  In addition, he has gained about 10 pounds of weight in spite of eating very poorly and having no appetite.  We will also try an increased dose of Lasix to 40 mg b.i.d.  This may be part of the cause.  I suspect the true is his shortness of breath is likely from both.     He also complains of too many medications.  We are going to try to simplify a little bit.  He is only taking hydralazine once in the morning and once at night which actually leads to perhaps rebound hypertension during the day.  We will discontinue this completely.  We will go up on his Lasix to 40 mg b.i.d.  He will remain on lisinopril 20 mg in the morning, but we will switch him to Toprol-XL 50 mg at night in case there is  fatigue.  He will remain on potassium 20 mEq b.i.d.  We will follow this up with a BMP in 3-4 weeks and a [facility] visit.   2.  Chronic atrial fibrillation.  Rates have been controlled, pacemaker in place, on Coumadin appropriately.   3.  Hypertension.  Reasonable control.  May peak up a little bit with removal of hydralazine, but then would consider adding spironolactone and removing his potassium.   4.  Likely central sleep apnea, unable to tolerate mask, contributing to all the above as well.      Thank you for allowing me to participate in this delightful patient's care.  He should follow up in [facility] in 3-4 weeks.         WENDY LAINEZ PA-C             D: 2019   T: 2019   MT: [id number]      Name:     TRUE MATTHEWS   MRN:      [id number]        Account:      [id number]   :      1929           Service Date: 2019      Document: [id number]

## 2019-01-22 ENCOUNTER — HOSPITAL ENCOUNTER (OUTPATIENT)
Dept: CARDIAC REHAB | Facility: CLINIC | Age: 84
End: 2019-01-22
Attending: PHYSICIAN ASSISTANT
Payer: MEDICARE

## 2019-01-22 DIAGNOSIS — R06.09 DOE (DYSPNEA ON EXERTION): ICD-10-CM

## 2019-01-22 PROCEDURE — 94729 DIFFUSING CAPACITY: CPT

## 2019-01-22 PROCEDURE — 94726 PLETHYSMOGRAPHY LUNG VOLUMES: CPT

## 2019-01-22 PROCEDURE — 40001038 ZZH STATISTIC RESPIRATORY TESTING VISIT

## 2019-01-22 PROCEDURE — 94375 RESPIRATORY FLOW VOLUME LOOP: CPT

## 2019-02-04 ENCOUNTER — OFFICE VISIT (OUTPATIENT)
Dept: CARDIOLOGY | Facility: CLINIC | Age: 84
End: 2019-02-04
Payer: MEDICARE

## 2019-02-04 VITALS
HEIGHT: 75 IN | DIASTOLIC BLOOD PRESSURE: 68 MMHG | WEIGHT: 178 LBS | SYSTOLIC BLOOD PRESSURE: 122 MMHG | OXYGEN SATURATION: 95 % | BODY MASS INDEX: 22.13 KG/M2 | HEART RATE: 86 BPM

## 2019-02-04 DIAGNOSIS — R35.89 DIURESIS: ICD-10-CM

## 2019-02-04 DIAGNOSIS — I50.32 CHRONIC DIASTOLIC (CONGESTIVE) HEART FAILURE (H): Primary | ICD-10-CM

## 2019-02-04 DIAGNOSIS — I50.32 CHRONIC DIASTOLIC (CONGESTIVE) HEART FAILURE (H): ICD-10-CM

## 2019-02-04 LAB
ANION GAP SERPL CALCULATED.3IONS-SCNC: 15.1 MMOL/L (ref 6–17)
BUN SERPL-MCNC: 24 MG/DL (ref 7–30)
CALCIUM SERPL-MCNC: 9.9 MG/DL (ref 8.5–10.5)
CHLORIDE SERPL-SCNC: 103 MMOL/L (ref 98–107)
CO2 SERPL-SCNC: 29 MMOL/L (ref 23–29)
CREAT SERPL-MCNC: 1.35 MG/DL (ref 0.7–1.3)
DLCOUNC-%PRED-PRE: 75 %
DLCOUNC-PRE: 17.9 ML/MIN/MMHG
DLCOUNC-PRED: 23.76 ML/MIN/MMHG
ERV-%PRED-PRE: 98 %
ERV-PRE: 1.43 L
ERV-PRED: 1.46 L
EXPTIME-PRE: 3.37 SEC
FEF2575-%PRED-PRE: 174 %
FEF2575-PRE: 3.39 L/SEC
FEF2575-PRED: 1.94 L/SEC
FEFMAX-%PRED-PRE: 102 %
FEFMAX-PRE: 7.29 L/SEC
FEFMAX-PRED: 7.09 L/SEC
FEV1-%PRED-PRE: 93 %
FEV1-PRE: 2.85 L
FEV1FEV6-PRE: 88 %
FEV1FEV6-PRED: 75 %
FEV1FVC-PRE: 89 %
FEV1FVC-PRED: 70 %
FEV1SVC-PRE: 93 %
FEV1SVC-PRED: 59 %
FIFMAX-PRE: 2.72 L/SEC
FRCPLETH-%PRED-PRE: 100 %
FRCPLETH-PRE: 4.2 L
FRCPLETH-PRED: 4.17 L
FVC-%PRED-PRE: 74 %
FVC-PRE: 3.19 L
FVC-PRED: 4.26 L
GFR SERPL CREATININE-BSD FRML MDRD: 50 ML/MIN/{1.73_M2}
GLUCOSE SERPL-MCNC: 121 MG/DL (ref 70–105)
IC-%PRED-PRE: 42 %
IC-PRE: 1.59 L
IC-PRED: 3.73 L
NT-PROBNP SERPL-MCNC: 1009 PG/ML (ref 0–450)
POTASSIUM SERPL-SCNC: 4.1 MMOL/L (ref 3.5–5.1)
RVPLETH-%PRED-PRE: 84 %
RVPLETH-PRE: 2.73 L
RVPLETH-PRED: 3.23 L
SODIUM SERPL-SCNC: 143 MMOL/L (ref 136–145)
TLCPLETH-%PRED-PRE: 71 %
TLCPLETH-PRE: 5.8 L
TLCPLETH-PRED: 8.14 L
VA-%PRED-PRE: 68 %
VA-PRE: 5.35 L
VC-%PRED-PRE: 59 %
VC-PRE: 3.07 L
VC-PRED: 5.19 L

## 2019-02-04 PROCEDURE — 83880 ASSAY OF NATRIURETIC PEPTIDE: CPT | Performed by: PHYSICIAN ASSISTANT

## 2019-02-04 PROCEDURE — 36415 COLL VENOUS BLD VENIPUNCTURE: CPT | Performed by: PHYSICIAN ASSISTANT

## 2019-02-04 PROCEDURE — 80048 BASIC METABOLIC PNL TOTAL CA: CPT | Performed by: PHYSICIAN ASSISTANT

## 2019-02-04 PROCEDURE — 99214 OFFICE O/P EST MOD 30 MIN: CPT | Performed by: PHYSICIAN ASSISTANT

## 2019-02-04 RX ORDER — FUROSEMIDE 40 MG
TABLET ORAL
Qty: 180 TABLET | Refills: 3
Start: 2019-02-04 | End: 2020-02-24

## 2019-02-04 ASSESSMENT — MIFFLIN-ST. JEOR: SCORE: 1558.03

## 2019-02-04 NOTE — PROGRESS NOTES
Cardiology Progress Note    Date of Service: 02/04/2019      Reason for visit: [facility] clinic follow up, HFpEF.    Primary cardiologist: Dr. London Lynn      HPI:  Mr. Robledo is a delightful 89-year-old gentleman with a PMHx including hypertension, hyperlipidemia, paroxysmal atrial fibrillation, tachybrady syndrome with a permanent pacemaker (2015), and heart failure with preserved EF.  He was initially enrolled in [facility] in May 2018 after an admission for pneumonia and Cdiff, complicated by respiratory failure and HFpEF. His echocardiogram at that time showed a normal EF, but a dilated IVC. There was no significant RV dysfunction or valvular issues. After his hospital stay he reportedly was doing well over the next few months and maintained good volume status on daily lasix. However, at his follow up visit last month with Nicole Marrufo PA-C, he noted progressive CALDWELL. He also had complaints of frequent throat clearing with a lot of phlegm production, and fatigue. His wife was concerned that he was not gaining weight back well after his hospital stay (he had lost 20+ lbs at that time). In addition, he had requested simplification of his medical regimen if possible. At that visit, his lasix was increased to 40mg BID, his hydralazine was stopped (BP was under good control and he was only taking twice daily), and his Toprol was moved to the PM before bed. In addition, as there was felt to potentially be a pulmonary component to his dyspnea, PFTs were requested. He's back today for planned [facility] follow up.    Since last visit, he thinks overall his breathing is better. His weight is down ~5 lbs. His BP remains under good control despite discontinuation of the hydralazine. He still has a cough, productive of white phlegm but denies purulent sputum, fevers, or chills. He is not having hemoptysis, or night sweats, but still has ongoing fatigue which has not improved with the changes in his regimen. His PFTs were performed  shortly after his last visit, which showed a mild restriction. He did not have any significant obstructive disease, and his DLCO was felt to be preserved. Of note, on his chest CT performed in May 2018 during his hospital stay, showing extensive interstitial airspace infiltrates mostly on the right, and some loculated pleural effusions as well. No definite masses were seen. He also has bilateral calcified pleural plaques, consistent with his history of asbestos exposure. Labs today show a slight bump in his BUN/SCr from his baseline. I added an NT-proBNP at the end of his visit which was elevated at 1009, but down from previous in July of 2018.        ASSESSMENT/PLAN:    1. HFpEF.   --Last echocardiogram May 2018 during hospitalization for pneumonia, cdiff, and evidence of HF, with preserved EF at 55-60%. His RV was borderline dilated though function was reported as normal.    --Increased CALDWELL recently, though improved now with increase in diuretic. Weight down 5 lbs on home scale with slight decline in renal function. Will reduce lasix back to 40mg AM, 20mg PM. Continue current K supplement for now.    --If he doesn't continue to improve and/or cough does not resolve, may need to investigate pulmonary issue further with repeat chest CT to ensure resolution of prior infiltrates. He still has fatigue as well, but may be secondary to his known MARILU for which he is unable to tolerate CPAP. For completeness, will recheck CBC and TSH at follow up visit.    2. Hypertension.   --Current remains under good control, so he may remain off the hydralazine. Continue lisinopril 20mg daily, Toprol XL 50mg at bedtime without change along with his diuretics as above.     3. Chronic atrial fibrillation.   --Rates have historically remained under good control. Has pacemaker in place, continue routine device interrogations.   --Continue warfarin for CVA prophylaxis. He tells me his INRs have been under good control.     Follow up plan:   In 1 month in [facility] with myself, with labs prior to visit.       Orders this Visit:  Orders Placed This Encounter   Procedures     Basic metabolic panel     N terminal pro BNP outpatient     CBC with platelets     TSH with free T4 reflex     Follow-Up with [facility] - NADEGE visit     Orders Placed This Encounter   Medications     furosemide (LASIX) 40 MG tablet     Simg in the AM, 20mg in the PM     Dispense:  180 tablet     Refill:  3     Medications Discontinued During This Encounter   Medication Reason     furosemide (LASIX) 40 MG tablet            CURRENT MEDICATIONS:  Current Outpatient Medications   Medication Sig Dispense Refill     furosemide (LASIX) 40 MG tablet 40mg in the AM, 20mg in the  tablet 3     lisinopril (PRINIVIL/ZESTRIL) 20 MG tablet Take 1 tablet (20 mg) by mouth daily 180 tablet 3     metoprolol succinate ER (TOPROL XL) 50 MG 24 hr tablet Take 1 tablet (50 mg) by mouth At Bedtime 90 tablet 3     omeprazole (PRILOSEC) 20 MG CR capsule Take 1 capsule (20 mg) by mouth daily 90 capsule 3     order for DME Equipment being ordered: Walker Wheels () and Walker ()  Treatment Diagnosis: Difficulty ambulating 1 each 0     order for DME Equipment being ordered: Walker Wheels ()  Treatment Diagnosis:  Weakness,colitis. 1 Device 0     potassium chloride SA (K-DUR/KLOR-CON M) 20 MEQ CR tablet Take 1 tablet (20 mEq) by mouth 2 times daily 180 tablet 2     sertraline (ZOLOFT) 100 MG tablet Take 100 mg by mouth daily       Simethicone (GAS-X EXTRA STRENGTH) 125 MG CAPS Take 1 capsule by mouth 2 times daily as needed 60 capsule 11     simvastatin (ZOCOR) 5 MG tablet Take 2 tablets (10 mg) by mouth daily 90 tablet 3     warfarin (COUMADIN) 5 MG tablet Take 0.5-1 tablets (2.5-5 mg) by mouth daily as directed by your INR clinic. 90 tablet 0     WARFARIN SODIUM PO Take by mouth daily 2.5 mg M/W/Sa  5 mg AOD       Cyanocobalamin (B-12) 1000 MCG CAPS Take 1 tablet by mouth daily (Patient not  taking: Reported on 2/4/2019) 90 capsule 3       ALLERGIES     Allergies   Allergen Reactions     Penicillins Rash       PAST MEDICAL HISTORY:  Past Medical History:   Diagnosis Date     AV node dysfunction      Chronic atrial fibrillation (H) 2004     GERD (gastroesophageal reflux disease)      Hyperlipidemia      Near syncope      MARILU (obstructive sleep apnea)        PAST SURGICAL HISTORY:  Past Surgical History:   Procedure Laterality Date     IMPLANT PACEMAKER  08/10/2015       FAMILY HISTORY:  Family History   Problem Relation Age of Onset     Influenza/Pneumonia Mother      Prostate Cancer Father        SOCIAL HISTORY:  Social History     Socioeconomic History     Marital status:      Spouse name: None     Number of children: None     Years of education: None     Highest education level: None   Social Needs     Financial resource strain: None     Food insecurity - worry: None     Food insecurity - inability: None     Transportation needs - medical: None     Transportation needs - non-medical: None   Occupational History     None   Tobacco Use     Smoking status: Never Smoker     Smokeless tobacco: Never Used   Substance and Sexual Activity     Alcohol use: No     Drug use: No     Sexual activity: Yes     Partners: Female   Other Topics Concern     Parent/sibling w/ CABG, MI or angioplasty before 65F 55M? Not Asked   Social History Narrative     None       Review of Systems:  Cardiovascular: negative for chest pain, palpitations, orthopnea, LE edema  Constitutional: negative for chills, sweats, fevers. Pos fatigue.  Resp: Negative for dyspnea at rest, pos dyspnea on exertion (somewhat improved), cough, neg hemoptysis, pos known asbestos exposure.   HEENT: Negative for new visual changes, frequent headaches  Gastrointestinal: negative for abdominal pain, diarrhea, nausea, vomiting  Hematologic/lymphatic: pos for current systemic anticoagulation, neg hx of blood clots  Musculoskeletal: negative for new  "back pain, joint pain  Neurological: negative for focal weakness, LOC, seizures, syncope/presyncope      Physical Exam:  Vitals: /68   Pulse 86   Ht 1.905 m (6' 3\")   Wt 80.7 kg (178 lb)   SpO2 95%   BMI 22.25 kg/m     Wt Readings from Last 4 Encounters:   02/04/19 80.7 kg (178 lb)   01/21/19 81.6 kg (180 lb)   10/12/18 80 kg (176 lb 6.4 oz)   07/19/18 77.9 kg (171 lb 12.8 oz)       GEN:  In general, this is a well nourished elderly  male in no acute distress on room air.  Patient ambulatory, accompanied by his wife.  HEENT:  Pupils equal, round. Sclerae nonicteric.   NECK: Supple, no masses appreciated. Trachea midline. No obvious JVD while upright.  C/V:  Irregular rhythm, no obvious murmur, rub or gallop.   RESP: Respirations are unlabored. No use of accessory muscles. Clear to auscultation bilaterally without wheezing, rales, or rhonchi.  GI: Abdomen soft, nontender, nondistended.   EXTREM: No significant LE edema. No cyanosis or clubbing.  NEURO: Alert and oriented, cooperative. Gait not assessed. No obvious focal deficits.   PSYCH: Normal affect.  SKIN: Warm and dry. No rashes or petechiae appreciated.       Recent Lab Results:    BMP RESULTS:  Lab Results   Component Value Date     02/04/2019    POTASSIUM 4.1 02/04/2019    CHLORIDE 103 02/04/2019    CO2 29 02/04/2019    ANIONGAP 15.1 02/04/2019     (H) 02/04/2019    BUN 24 02/04/2019    CR 1.35 (H) 02/04/2019    GFRESTIMATED 50 (L) 02/04/2019    GFRESTBLACK 60 (L) 02/04/2019    ANGELITA 9.9 02/04/2019      Results for TRUE MATTHEWS (MRN [id number]) as of 2/4/2019 13:21   Ref. Range 5/23/2018 09:44 7/17/2018 13:05 2/4/2019 09:46   N-Terminal Pro Bnp Latest Ref Range: 0 - 450 pg/mL  1,623 (H) 1,009 (H)   N-Terminal Pro BNP Inpatient Latest Ref Range: 0 - 1,800 pg/mL 12,789 (H)           New/Pertinent imaging results since last visit:  Echo 5/23/18  Interpretation Summary     Left ventricular systolic function is normal.  The " visual ejection fraction is estimated at 55-60%.  Dilated inferior vena cava  The study was technically difficult        Moon Gross PA-C  [facility]  Pager [phone]

## 2019-02-04 NOTE — PATIENT INSTRUCTIONS
Visit Summary:    Today we discussed:   We will decrease your water pill slightly. Please call with any worsening swelling or breathing.    Medication changes:    DECREASE your Lasix to 40mg in the AM, 20mg in the PM as you were before.     Follow up:   With Moon 1 month with labs.     Please call my nurse Margarette at [phone] with any questions or concerns.

## 2019-02-04 NOTE — LETTER
2/4/2019    Lilly Zepeda MD  [address]  [facility] [address]    RE: Santosh Augustsonal       Dear Colleague,    I had the pleasure of seeing Santosh Robledo in the [facility].      Cardiology Progress Note    Date of Service: 02/04/2019      Reason for visit: [facility] clinic follow up, HFpEF.    Primary cardiologist: Dr. London Lynn      HPI:  Mr. Robledo is a delightful 89-year-old gentleman with a PMHx including hypertension, hyperlipidemia, paroxysmal atrial fibrillation, tachybrady syndrome with a permanent pacemaker (2015), and heart failure with preserved EF.   He was initially enrolled in [facility] in May 2018 after an admission for pneumonia and Cdiff, complicated by respiratory failure and HFpEF. His echocardiogram at that time showed a normal EF, but a dilated IVC. There was no significant RV dysfunction or valvular issues. After his hospital stay he reportedly was doing well over the next few months and maintained good volume status on daily lasix. However, at his follow up visit last month with Nicole Marrufo PA-C, he noted progressive CALDWELL. He also had complaints of frequent throat clearing with a lot of phlegm production, and fatigue. His wife was concerned that he was not gaining weight back well after his hospital stay (he had lost 20+ lbs at that time). In addition, he had requested simplification of his medical regimen if possible. At that visit, his lasix was increased to 40mg BID, his hydralazine was stopped (BP was under good control and he was only taking twice daily), and his Toprol was moved to the PM before bed. In addition, as there was felt to potentially be a pulmonary component to his dyspnea, PFTs were requested. He's back today for planned [facility] follow up.    Since last visit, he thinks overall his breathing is better. His weight is down ~5 lbs. His BP remains under good control despite discontinuation of the hydralazine. He still has a cough,  productive of white phlegm but denies purulent sputum, fevers, or chills. He is not having hemoptysis, or night sweats, but still has ongoing fatigue which has not improved with the changes in his regimen. His PFTs were performed shortly after his last visit, which showed a mild restriction. He did not have any significant obstructive disease, and his DLCO was felt to be preserved. Of note, on his chest CT performed in May 2018 during his hospital stay, showing extensive interstitial airspace infiltrates mostly on the right, and some loculated pleural effusions as well. No definite masses were seen. He also has bilateral calcified pleural plaques, consistent with his history of asbestos exposure. Labs today show a slight bump in his BUN/SCr from his baseline. I added an NT-proBNP at the end of his visit which was elevated at 1009, but down from previous in July of 2018.        ASSESSMENT/PLAN:    1. HFpEF.   --Last echocardiogram May 2018 during hospitalization for pneumonia, cdiff, and evidence of HF, with preserved EF at 55-60%. His RV was borderline dilated though function was reported as normal.    --Increased CALDWELL recently, though improved now with increase in diuretic. Weight down 5 lbs on home scale with slight decline in renal function. Will reduce lasix back to 40mg AM, 20mg PM. Continue current K supplement for now.    --If he doesn't continue to improve and/or cough does not resolve, may need to investigate pulmonary issue further with repeat chest CT to ensure resolution of prior infiltrates. He still has fatigue as well, but may be secondary to his known MARILU for which he is unable to tolerate CPAP. For completeness, will recheck CBC and TSH at follow up visit.    2. Hypertension.   --Current remains under good control, so he may remain off the hydralazine. Continue lisinopril 20mg daily, Toprol XL 50mg at bedtime without change along with his diuretics as above.     3. Chronic atrial  fibrillation.   --Rates have historically remained under good control. Has pacemaker in place, continue routine device interrogations.   --Continue warfarin for CVA prophylaxis. He tells me his INRs have been under good control.     Follow up plan:  In 1 month in [facility] with myself, with labs prior to visit.       Orders this Visit:  Orders Placed This Encounter   Procedures     Basic metabolic panel     N terminal pro BNP outpatient     CBC with platelets     TSH with free T4 reflex     Follow-Up with [facility] Clinic - NADEGE visit     Orders Placed This Encounter   Medications     furosemide (LASIX) 40 MG tablet     Simg in the AM, 20mg in the PM     Dispense:  180 tablet     Refill:  3     Medications Discontinued During This Encounter   Medication Reason     furosemide (LASIX) 40 MG tablet            CURRENT MEDICATIONS:  Current Outpatient Medications   Medication Sig Dispense Refill     furosemide (LASIX) 40 MG tablet 40mg in the AM, 20mg in the  tablet 3     lisinopril (PRINIVIL/ZESTRIL) 20 MG tablet Take 1 tablet (20 mg) by mouth daily 180 tablet 3     metoprolol succinate ER (TOPROL XL) 50 MG 24 hr tablet Take 1 tablet (50 mg) by mouth At Bedtime 90 tablet 3     omeprazole (PRILOSEC) 20 MG CR capsule Take 1 capsule (20 mg) by mouth daily 90 capsule 3     order for DME Equipment being ordered: Walker Wheels () and Walker ()  Treatment Diagnosis: Difficulty ambulating 1 each 0     order for DME Equipment being ordered: Walker Wheels ()  Treatment Diagnosis:  Weakness,colitis. 1 Device 0     potassium chloride SA (K-DUR/KLOR-CON M) 20 MEQ CR tablet Take 1 tablet (20 mEq) by mouth 2 times daily 180 tablet 2     sertraline (ZOLOFT) 100 MG tablet Take 100 mg by mouth daily       Simethicone (GAS-X EXTRA STRENGTH) 125 MG CAPS Take 1 capsule by mouth 2 times daily as needed 60 capsule 11     simvastatin (ZOCOR) 5 MG tablet Take 2 tablets (10 mg) by mouth daily 90 tablet 3     warfarin (COUMADIN) 5  MG tablet Take 0.5-1 tablets (2.5-5 mg) by mouth daily as directed by your INR clinic. 90 tablet 0     WARFARIN SODIUM PO Take by mouth daily 2.5 mg M/W/Sa  5 mg AOD       Cyanocobalamin (B-12) 1000 MCG CAPS Take 1 tablet by mouth daily (Patient not taking: Reported on 2/4/2019) 90 capsule 3       ALLERGIES     Allergies   Allergen Reactions     Penicillins Rash       PAST MEDICAL HISTORY:  Past Medical History:   Diagnosis Date     AV node dysfunction      Chronic atrial fibrillation (H) 2004     GERD (gastroesophageal reflux disease)      Hyperlipidemia      Near syncope      MARILU (obstructive sleep apnea)        PAST SURGICAL HISTORY:  Past Surgical History:   Procedure Laterality Date     IMPLANT PACEMAKER  08/10/2015       FAMILY HISTORY:  Family History   Problem Relation Age of Onset     Influenza/Pneumonia Mother      Prostate Cancer Father        SOCIAL HISTORY:  Social History     Socioeconomic History     Marital status:      Spouse name: None     Number of children: None     Years of education: None     Highest education level: None   Social Needs     Financial resource strain: None     Food insecurity - worry: None     Food insecurity - inability: None     Transportation needs - medical: None     Transportation needs - non-medical: None   Occupational History     None   Tobacco Use     Smoking status: Never Smoker     Smokeless tobacco: Never Used   Substance and Sexual Activity     Alcohol use: No     Drug use: No     Sexual activity: Yes     Partners: Female   Other Topics Concern     Parent/sibling w/ CABG, MI or angioplasty before 65F 55M? Not Asked   Social History Narrative     None       Review of Systems:  Cardiovascular: negative for chest pain, palpitations, orthopnea, LE edema  Constitutional: negative for chills, sweats, fevers. Pos fatigue.  Resp: Negative for dyspnea at rest, pos dyspnea on exertion (somewhat improved), cough, neg hemoptysis, pos known asbestos exposure.   HEENT:  "Negative for new visual changes, frequent headaches  Gastrointestinal: negative for abdominal pain, diarrhea, nausea, vomiting  Hematologic/lymphatic: pos for current systemic anticoagulation, neg hx of blood clots  Musculoskeletal: negative for new back pain, joint pain  Neurological: negative for focal weakness, LOC, seizures, syncope/presyncope      Physical Exam:  Vitals: /68   Pulse 86   Ht 1.905 m (6' 3\")   Wt 80.7 kg (178 lb)   SpO2 95%   BMI 22.25 kg/m      Wt Readings from Last 4 Encounters:   02/04/19 80.7 kg (178 lb)   01/21/19 81.6 kg (180 lb)   10/12/18 80 kg (176 lb 6.4 oz)   07/19/18 77.9 kg (171 lb 12.8 oz)       GEN:  In general, this is a well nourished elderly  male in no acute distress on room air.  Patient ambulatory, accompanied by his wife.  HEENT:  Pupils equal, round. Sclerae nonicteric.   NECK: Supple, no masses appreciated. Trachea midline. No obvious JVD while upright.  C/V:  Irregular rhythm, no obvious murmur, rub or gallop.   RESP: Respirations are unlabored. No use of accessory muscles. Clear to auscultation bilaterally without wheezing, rales, or rhonchi.  GI: Abdomen soft, nontender, nondistended.   EXTREM: No significant LE edema. No cyanosis or clubbing.  NEURO: Alert and oriented, cooperative. Gait not assessed. No obvious focal deficits.   PSYCH: Normal affect.  SKIN: Warm and dry. No rashes or petechiae appreciated.       Recent Lab Results:    BMP RESULTS:  Lab Results   Component Value Date     02/04/2019    POTASSIUM 4.1 02/04/2019    CHLORIDE 103 02/04/2019    CO2 29 02/04/2019    ANIONGAP 15.1 02/04/2019     (H) 02/04/2019    BUN 24 02/04/2019    CR 1.35 (H) 02/04/2019    GFRESTIMATED 50 (L) 02/04/2019    GFRESTBLACK 60 (L) 02/04/2019    ANGELITA 9.9 02/04/2019      Results for TRUE MATTHEWS (MRN [id number]) as of 2/4/2019 13:21   Ref. Range 5/23/2018 09:44 7/17/2018 13:05 2/4/2019 09:46   N-Terminal Pro Bnp Latest Ref Range: 0 - 450 " pg/mL  1,623 (H) 1,009 (H)   N-Terminal Pro BNP Inpatient Latest Ref Range: 0 - 1,800 pg/mL 12,789 (H)           New/Pertinent imaging results since last visit:  Echo 5/23/18  Interpretation Summary     Left ventricular systolic function is normal.  The visual ejection fraction is estimated at 55-60%.  Dilated inferior vena cava  The study was technically difficult        Moon Gross PA-C  [facility]  Pager [phone]          Thank you for allowing me to participate in the care of your patient.    Sincerely,     EVARISTO Solis     [facility]

## 2019-02-04 NOTE — LETTER
2/4/2019    Lilly Zepeda MD  [address]  [facility] [address]    RE: Santosh Augustsonal       Dear Colleague,    I had the pleasure of seeing Santosh Robledo in the [facility].      Cardiology Progress Note    Date of Service: 02/04/2019      Reason for visit: [facility] clinic follow up, HFpEF.    Primary cardiologist: Dr. London Lynn      HPI:  Mr. Robledo is a delightful 89-year-old gentleman with a PMHx including hypertension, hyperlipidemia, paroxysmal atrial fibrillation, tachybrady syndrome with a permanent pacemaker (2015), and heart failure with preserved EF.   He was initially enrolled in [facility] in May 2018 after an admission for pneumonia and Cdiff, complicated by respiratory failure and HFpEF. His echocardiogram at that time showed a normal EF, but a dilated IVC. There was no significant RV dysfunction or valvular issues. After his hospital stay he reportedly was doing well over the next few months and maintained good volume status on daily lasix. However, at his follow up visit last month with Nicole Marrufo PA-C, he noted progressive CALDWELL. He also had complaints of frequent throat clearing with a lot of phlegm production, and fatigue. His wife was concerned that he was not gaining weight back well after his hospital stay (he had lost 20+ lbs at that time). In addition, he had requested simplification of his medical regimen if possible. At that visit, his lasix was increased to 40mg BID, his hydralazine was stopped (BP was under good control and he was only taking twice daily), and his Toprol was moved to the PM before bed. In addition, as there was felt to potentially be a pulmonary component to his dyspnea, PFTs were requested. He's back today for planned [facility] follow up.    Since last visit, he thinks overall his breathing is better. His weight is down ~5 lbs. His BP remains under good control despite discontinuation of the hydralazine. He still has a cough,  productive of white phlegm but denies purulent sputum, fevers, or chills. He is not having hemoptysis, or night sweats, but still has ongoing fatigue which has not improved with the changes in his regimen. His PFTs were performed shortly after his last visit, which showed a mild restriction. He did not have any significant obstructive disease, and his DLCO was felt to be preserved. Of note, on his chest CT performed in May 2018 during his hospital stay, showing extensive interstitial airspace infiltrates mostly on the right, and some loculated pleural effusions as well. No definite masses were seen. He also has bilateral calcified pleural plaques, consistent with his history of asbestos exposure. Labs today show a slight bump in his BUN/SCr from his baseline. I added an NT-proBNP at the end of his visit which was elevated at 1009, but down from previous in July of 2018.        ASSESSMENT/PLAN:    1. HFpEF.   --Last echocardiogram May 2018 during hospitalization for pneumonia, cdiff, and evidence of HF, with preserved EF at 55-60%. His RV was borderline dilated though function was reported as normal.    --Increased CALDWELL recently, though improved now with increase in diuretic. Weight down 5 lbs on home scale with slight decline in renal function. Will reduce lasix back to 40mg AM, 20mg PM. Continue current K supplement for now.    --If he doesn't continue to improve and/or cough does not resolve, may need to investigate pulmonary issue further with repeat chest CT to ensure resolution of prior infiltrates. He still has fatigue as well, but may be secondary to his known MARILU for which he is unable to tolerate CPAP. For completeness, will recheck CBC and TSH at follow up visit.    2. Hypertension.   --Current remains under good control, so he may remain off the hydralazine. Continue lisinopril 20mg daily, Toprol XL 50mg at bedtime without change along with his diuretics as above.     3. Chronic atrial  fibrillation.   --Rates have historically remained under good control. Has pacemaker in place, continue routine device interrogations.   --Continue warfarin for CVA prophylaxis. He tells me his INRs have been under good control.     Follow up plan:  In 1 month in [facility] with myself, with labs prior to visit.       Orders this Visit:  Orders Placed This Encounter   Procedures     Basic metabolic panel     N terminal pro BNP outpatient     CBC with platelets     TSH with free T4 reflex     Follow-Up with [facility] - NADEGE visit     Orders Placed This Encounter   Medications     furosemide (LASIX) 40 MG tablet     Simg in the AM, 20mg in the PM     Dispense:  180 tablet     Refill:  3     Medications Discontinued During This Encounter   Medication Reason     furosemide (LASIX) 40 MG tablet            CURRENT MEDICATIONS:  Current Outpatient Medications   Medication Sig Dispense Refill     furosemide (LASIX) 40 MG tablet 40mg in the AM, 20mg in the  tablet 3     lisinopril (PRINIVIL/ZESTRIL) 20 MG tablet Take 1 tablet (20 mg) by mouth daily 180 tablet 3     metoprolol succinate ER (TOPROL XL) 50 MG 24 hr tablet Take 1 tablet (50 mg) by mouth At Bedtime 90 tablet 3     omeprazole (PRILOSEC) 20 MG CR capsule Take 1 capsule (20 mg) by mouth daily 90 capsule 3     order for DME Equipment being ordered: Walker Wheels () and Walker ()  Treatment Diagnosis: Difficulty ambulating 1 each 0     order for DME Equipment being ordered: Walker Wheels ()  Treatment Diagnosis:  Weakness,colitis. 1 Device 0     potassium chloride SA (K-DUR/KLOR-CON M) 20 MEQ CR tablet Take 1 tablet (20 mEq) by mouth 2 times daily 180 tablet 2     sertraline (ZOLOFT) 100 MG tablet Take 100 mg by mouth daily       Simethicone (GAS-X EXTRA STRENGTH) 125 MG CAPS Take 1 capsule by mouth 2 times daily as needed 60 capsule 11     simvastatin (ZOCOR) 5 MG tablet Take 2 tablets (10 mg) by mouth daily 90 tablet 3     warfarin (COUMADIN) 5  MG tablet Take 0.5-1 tablets (2.5-5 mg) by mouth daily as directed by your INR clinic. 90 tablet 0     WARFARIN SODIUM PO Take by mouth daily 2.5 mg M/W/Sa  5 mg AOD       Cyanocobalamin (B-12) 1000 MCG CAPS Take 1 tablet by mouth daily (Patient not taking: Reported on 2/4/2019) 90 capsule 3       ALLERGIES     Allergies   Allergen Reactions     Penicillins Rash       PAST MEDICAL HISTORY:  Past Medical History:   Diagnosis Date     AV node dysfunction      Chronic atrial fibrillation (H) 2004     GERD (gastroesophageal reflux disease)      Hyperlipidemia      Near syncope      MARILU (obstructive sleep apnea)        PAST SURGICAL HISTORY:  Past Surgical History:   Procedure Laterality Date     IMPLANT PACEMAKER  08/10/2015       FAMILY HISTORY:  Family History   Problem Relation Age of Onset     Influenza/Pneumonia Mother      Prostate Cancer Father        SOCIAL HISTORY:  Social History     Socioeconomic History     Marital status:      Spouse name: None     Number of children: None     Years of education: None     Highest education level: None   Social Needs     Financial resource strain: None     Food insecurity - worry: None     Food insecurity - inability: None     Transportation needs - medical: None     Transportation needs - non-medical: None   Occupational History     None   Tobacco Use     Smoking status: Never Smoker     Smokeless tobacco: Never Used   Substance and Sexual Activity     Alcohol use: No     Drug use: No     Sexual activity: Yes     Partners: Female   Other Topics Concern     Parent/sibling w/ CABG, MI or angioplasty before 65F 55M? Not Asked   Social History Narrative     None       Review of Systems:  Cardiovascular: negative for chest pain, palpitations, orthopnea, LE edema  Constitutional: negative for chills, sweats, fevers. Pos fatigue.  Resp: Negative for dyspnea at rest, pos dyspnea on exertion (somewhat improved), cough, neg hemoptysis, pos known asbestos exposure.   HEENT:  "Negative for new visual changes, frequent headaches  Gastrointestinal: negative for abdominal pain, diarrhea, nausea, vomiting  Hematologic/lymphatic: pos for current systemic anticoagulation, neg hx of blood clots  Musculoskeletal: negative for new back pain, joint pain  Neurological: negative for focal weakness, LOC, seizures, syncope/presyncope      Physical Exam:  Vitals: /68   Pulse 86   Ht 1.905 m (6' 3\")   Wt 80.7 kg (178 lb)   SpO2 95%   BMI 22.25 kg/m      Wt Readings from Last 4 Encounters:   02/04/19 80.7 kg (178 lb)   01/21/19 81.6 kg (180 lb)   10/12/18 80 kg (176 lb 6.4 oz)   07/19/18 77.9 kg (171 lb 12.8 oz)       GEN:  In general, this is a well nourished elderly  male in no acute distress on room air.  Patient ambulatory, accompanied by his wife.  HEENT:  Pupils equal, round. Sclerae nonicteric.   NECK: Supple, no masses appreciated. Trachea midline. No obvious JVD while upright.  C/V:  Irregular rhythm, no obvious murmur, rub or gallop.   RESP: Respirations are unlabored. No use of accessory muscles. Clear to auscultation bilaterally without wheezing, rales, or rhonchi.  GI: Abdomen soft, nontender, nondistended.   EXTREM: No significant LE edema. No cyanosis or clubbing.  NEURO: Alert and oriented, cooperative. Gait not assessed. No obvious focal deficits.   PSYCH: Normal affect.  SKIN: Warm and dry. No rashes or petechiae appreciated.       Recent Lab Results:    BMP RESULTS:  Lab Results   Component Value Date     02/04/2019    POTASSIUM 4.1 02/04/2019    CHLORIDE 103 02/04/2019    CO2 29 02/04/2019    ANIONGAP 15.1 02/04/2019     (H) 02/04/2019    BUN 24 02/04/2019    CR 1.35 (H) 02/04/2019    GFRESTIMATED 50 (L) 02/04/2019    GFRESTBLACK 60 (L) 02/04/2019    ANGELITA 9.9 02/04/2019      Results for TRUE MATTHEWS (MRN [id number]) as of 2/4/2019 13:21   Ref. Range 5/23/2018 09:44 7/17/2018 13:05 2/4/2019 09:46   N-Terminal Pro Bnp Latest Ref Range: 0 - 450 " pg/mL  1,623 (H) 1,009 (H)   N-Terminal Pro BNP Inpatient Latest Ref Range: 0 - 1,800 pg/mL 12,789 (H)           New/Pertinent imaging results since last visit:  Echo 5/23/18  Interpretation Summary     Left ventricular systolic function is normal.  The visual ejection fraction is estimated at 55-60%.  Dilated inferior vena cava  The study was technically difficult        Moon Gross PA-C  [facility]  Pager [phone]      Thank you for allowing me to participate in the care of your patient.      Sincerely,     EVARISTO Solis     [facility]    cc:   Nicole Marrufo PA-C  [address]  [place] [id number]

## 2019-02-11 ENCOUNTER — ANTICOAGULATION THERAPY VISIT (OUTPATIENT)
Dept: NURSING | Facility: CLINIC | Age: 84
End: 2019-02-11
Payer: MEDICARE

## 2019-02-11 LAB — INR POINT OF CARE: 3.7 (ref 0.86–1.14)

## 2019-02-11 PROCEDURE — 85610 PROTHROMBIN TIME: CPT | Mod: QW

## 2019-02-11 PROCEDURE — 99207 ZZC NO CHARGE NURSE ONLY: CPT

## 2019-02-11 PROCEDURE — 36416 COLLJ CAPILLARY BLOOD SPEC: CPT

## 2019-02-11 NOTE — PROGRESS NOTES
ANTICOAGULATION FOLLOW-UP CLINIC VISIT    Patient Name:  Santosh KENNY Hjelmstad  Date:  2/11/2019  Contact Type:  Face to Face    SUBJECTIVE:     Patient Findings     Positives:   Unexplained INR or factor level change           OBJECTIVE    INR Protime   Date Value Ref Range Status   02/11/2019 3.7 (A) 0.86 - 1.14 Final       ASSESSMENT / PLAN  INR assessment SUPRA    Recheck INR In: 1 WEEK    INR Location Clinic      Anticoagulation Summary  As of 2/11/2019    INR goal:   2.0-3.0   TTR:   67.0 % (8.1 mo)   INR used for dosing:   3.7! (2/11/2019)   Warfarin maintenance plan:   2.5 mg (5 mg x 0.5) every Wed; 5 mg (5 mg x 1) all other days   Full warfarin instructions:   2/11: 2.5 mg; 2/12: 2.5 mg; Otherwise 2.5 mg every Wed; 5 mg all other days   Weekly warfarin total:   32.5 mg   Plan last modified:   Bryanna Heredia RN (9/5/2018)   Next INR check:   2/18/2019   Target end date:       Indications    Chronic atrial fibrillation (H) [I48.2]  Long term current use of anticoagulant therapy [Z79.01]             Anticoagulation Episode Summary     INR check location:       Preferred lab:       Send INR reminders to:   [facility] ANTICOAGULATION    Comments:         Anticoagulation Care Providers     Provider Role Specialty Phone number    Duane Lilly Castaneda MD [facility] Internal Medicine [phone]            See the Encounter Report to view Anticoagulation Flowsheet and Dosing Calendar (Go to Encounters tab in chart review, and find the Anticoagulation Therapy Visit)    Pt is 3.7 today. Pt is here with his wife. Pt advised to take 2.5 mg tonight 2/11/19, 2.5 mg tomorrow on 2/12/19 then 2.5 mg on Wednesdays and 5 mg all the other days.Pt and wife deny any changes in health,diet, medication or activity. Pt states that he has been drinking Ensure daily. Recheck in 1 week. Pt's wife aware if signs of clotting (pain, tenderness, swelling, color change in leg or arm, SOB) and bleeding occur (blood in stool, urine, large bruising,  bleeding gums, nosebleeds) to have INR check sooner. If sx severe report to ER or concerned for stroke call 911. If general questions or concerns arise, call clinic.         Sigrid Brown RN

## 2019-02-13 DIAGNOSIS — Z79.01 LONG TERM CURRENT USE OF ANTICOAGULANT THERAPY: ICD-10-CM

## 2019-02-13 DIAGNOSIS — I48.20 CHRONIC ATRIAL FIBRILLATION (H): ICD-10-CM

## 2019-02-14 NOTE — TELEPHONE ENCOUNTER
"warfarin (COUMADIN) 5 MG tablet  Last Written Prescription Date:  11/14/18  Last Fill Quantity: 90,  # refills: 0   Last office visit: 6/29/2018 with prescribing provider:  ronal    Future Office Visit:          Requested Prescriptions   Pending Prescriptions Disp Refills     warfarin (COUMADIN) 5 MG tablet 90 tablet 0     Sig: Take 0.5-1 tablets (2.5-5 mg) by mouth daily as directed by your INR clinic.    Vitamin K Antagonists Failed - 2/13/2019  1:46 PM       Failed - INR is within goal in the past 6 weeks    Confirm INR is within goal in the past 6 weeks.     Recent Labs   Lab Test 02/11/19   INR 3.7*                      Passed - Recent (12 mo) or future (30 days) visit within the authorizing provider's specialty    Patient had office visit in the last 12 months or has a visit in the next 30 days with authorizing provider or within the authorizing provider's specialty.  See \"Patient Info\" tab in inbasket, or \"Choose Columns\" in Meds & Orders section of the refill encounter.             Passed - Medication is active on med list       Passed - Patient is 18 years of age or older          "

## 2019-02-15 RX ORDER — WARFARIN SODIUM 5 MG/1
TABLET ORAL
Qty: 90 TABLET | Refills: 0 | Status: SHIPPED | OUTPATIENT
Start: 2019-02-15 | End: 2019-08-30

## 2019-02-26 ENCOUNTER — ANTICOAGULATION THERAPY VISIT (OUTPATIENT)
Dept: NURSING | Facility: CLINIC | Age: 84
End: 2019-02-26
Payer: MEDICARE

## 2019-02-26 DIAGNOSIS — I48.20 CHRONIC ATRIAL FIBRILLATION (H): ICD-10-CM

## 2019-02-26 DIAGNOSIS — Z79.01 LONG TERM CURRENT USE OF ANTICOAGULANT THERAPY: ICD-10-CM

## 2019-02-26 LAB — INR POINT OF CARE: 3.5 (ref 0.86–1.14)

## 2019-02-26 PROCEDURE — 36416 COLLJ CAPILLARY BLOOD SPEC: CPT

## 2019-02-26 PROCEDURE — 99207 ZZC NO CHARGE NURSE ONLY: CPT

## 2019-02-26 PROCEDURE — 85610 PROTHROMBIN TIME: CPT | Mod: QW

## 2019-02-26 NOTE — PROGRESS NOTES
ANTICOAGULATION FOLLOW-UP CLINIC VISIT    Patient Name:  Santosh KENNY Hjelmstad  Date:  2/26/2019  Contact Type:  Face to Face    SUBJECTIVE:     Patient Findings     Positives:   No Problem Findings           OBJECTIVE    INR Protime   Date Value Ref Range Status   02/26/2019 3.5 (A) 0.86 - 1.14 Final       ASSESSMENT / PLAN  INR assessment SUPRA    Recheck INR In: 2 WEEKS    INR Location Clinic      Anticoagulation Summary  As of 2/26/2019    INR goal:   2.0-3.0   TTR:   63.1 % (8.6 mo)   INR used for dosing:   3.5! (2/26/2019)   Warfarin maintenance plan:   2.5 mg (5 mg x 0.5) every Tue, Sat; 5 mg (5 mg x 1) all other days   Full warfarin instructions:   2.5 mg every Tue, Sat; 5 mg all other days   Weekly warfarin total:   30 mg   Plan last modified:   Bryanna Heredia RN (2/26/2019)   Next INR check:      Target end date:       Indications    Chronic atrial fibrillation (H) [I48.2]  Long term current use of anticoagulant therapy [Z79.01]             Anticoagulation Episode Summary     INR check location:       Preferred lab:       Send INR reminders to:    ANTICOAGULATION    Comments:         Anticoagulation Care Providers     Provider Role Specialty Phone number    Duane Lilly Castaneda MD Responsible Internal Medicine [phone]            See the Encounter Report to view Anticoagulation Flowsheet and Dosing Calendar (Go to Encounters tab in chart review, and find the Anticoagulation Therapy Visit)    Dosage adjustment made based on physician directed care plan.  INR 3.5.  Consecutive supra readings.  Begin 2.5 mg TuSa, 5 mg ROW and recheck 2 weeks.    Bryanna Heredia RN

## 2019-03-04 ENCOUNTER — CARE COORDINATION (OUTPATIENT)
Dept: CARDIOLOGY | Facility: CLINIC | Age: 84
End: 2019-03-04

## 2019-03-04 ENCOUNTER — OFFICE VISIT (OUTPATIENT)
Dept: CARDIOLOGY | Facility: CLINIC | Age: 84
End: 2019-03-04
Payer: MEDICARE

## 2019-03-04 VITALS
OXYGEN SATURATION: 98 % | SYSTOLIC BLOOD PRESSURE: 128 MMHG | HEART RATE: 82 BPM | WEIGHT: 182.4 LBS | HEIGHT: 75 IN | DIASTOLIC BLOOD PRESSURE: 74 MMHG | BODY MASS INDEX: 22.68 KG/M2

## 2019-03-04 DIAGNOSIS — E78.5 HYPERLIPIDEMIA: Primary | ICD-10-CM

## 2019-03-04 DIAGNOSIS — I50.32 CHRONIC DIASTOLIC (CONGESTIVE) HEART FAILURE (H): ICD-10-CM

## 2019-03-04 DIAGNOSIS — E78.5 HYPERLIPIDEMIA LDL GOAL <100: ICD-10-CM

## 2019-03-04 DIAGNOSIS — I50.32 CHRONIC DIASTOLIC (CONGESTIVE) HEART FAILURE (H): Primary | ICD-10-CM

## 2019-03-04 DIAGNOSIS — I48.20 CHRONIC A-FIB (H): ICD-10-CM

## 2019-03-04 LAB
ANION GAP SERPL CALCULATED.3IONS-SCNC: 9.9 MMOL/L (ref 6–17)
BUN SERPL-MCNC: 18 MG/DL (ref 7–30)
CALCIUM SERPL-MCNC: 9.4 MG/DL (ref 8.5–10.5)
CHLORIDE SERPL-SCNC: 105 MMOL/L (ref 98–107)
CO2 SERPL-SCNC: 30 MMOL/L (ref 23–29)
CREAT SERPL-MCNC: 1.17 MG/DL (ref 0.7–1.3)
ERYTHROCYTE [DISTWIDTH] IN BLOOD BY AUTOMATED COUNT: 13.3 % (ref 10–15)
GFR SERPL CREATININE-BSD FRML MDRD: 59 ML/MIN/{1.73_M2}
GLUCOSE SERPL-MCNC: 110 MG/DL (ref 70–105)
HCT VFR BLD AUTO: 40.7 % (ref 40–53)
HGB BLD-MCNC: 13.3 G/DL (ref 13.3–17.7)
MCH RBC QN AUTO: 27.7 PG (ref 26.5–33)
MCHC RBC AUTO-ENTMCNC: 32.7 G/DL (ref 31.5–36.5)
MCV RBC AUTO: 85 FL (ref 78–100)
PLATELET # BLD AUTO: 352 10E9/L (ref 150–450)
POTASSIUM SERPL-SCNC: 3.9 MMOL/L (ref 3.5–5.1)
RBC # BLD AUTO: 4.8 10E12/L (ref 4.4–5.9)
SODIUM SERPL-SCNC: 141 MMOL/L (ref 136–145)
TSH SERPL DL<=0.005 MIU/L-ACNC: 2.2 MU/L (ref 0.4–4)
WBC # BLD AUTO: 8.1 10E9/L (ref 4–11)

## 2019-03-04 PROCEDURE — 80048 BASIC METABOLIC PNL TOTAL CA: CPT | Performed by: PHYSICIAN ASSISTANT

## 2019-03-04 PROCEDURE — 84443 ASSAY THYROID STIM HORMONE: CPT | Performed by: PHYSICIAN ASSISTANT

## 2019-03-04 PROCEDURE — 99214 OFFICE O/P EST MOD 30 MIN: CPT | Performed by: PHYSICIAN ASSISTANT

## 2019-03-04 PROCEDURE — 85027 COMPLETE CBC AUTOMATED: CPT | Performed by: PHYSICIAN ASSISTANT

## 2019-03-04 PROCEDURE — 36415 COLL VENOUS BLD VENIPUNCTURE: CPT | Performed by: PHYSICIAN ASSISTANT

## 2019-03-04 RX ORDER — SIMVASTATIN 10 MG
10 TABLET ORAL AT BEDTIME
Qty: 90 TABLET | Refills: 3 | Status: SHIPPED | OUTPATIENT
Start: 2019-03-04 | End: 2020-05-19

## 2019-03-04 ASSESSMENT — MIFFLIN-ST. JEOR: SCORE: 1577.99

## 2019-03-04 NOTE — LETTER
3/4/2019    Lilly Zepeda MD  [address]  [facility] [address]    RE: Santosh KENNY Jassonevelynzac       Dear Colleague,    I had the pleasure of seeing Santosh Robledo in the [facility].      Cardiology Progress Note    Date of Service: 03/04/2019      Reason for visit: [facility] clinic follow up, HFpEF, chronic afib.     Primary cardiologist: Dr. London Lynn      HPI:  Mr. Robledo is a delightful 89-year-old gentleman with a PMHx including hypertension, hyperlipidemia, paroxysmal atrial fibrillation, tachybrady syndrome with a permanent pacemaker (2015), and heart failure with preserved EF.  He was initially enrolled in [facility] in May 2018 after an admission for pneumonia and Cdiff, complicated by respiratory failure and HFpEF. His echocardiogram at that time showed a normal EF, but a dilated IVC. There was no significant RV dysfunction or valvular issues. After his hospital stay he reportedly was doing well over the next few months and maintained good volume status on daily lasix. However, at his follow up visit with Nicole Marrufo PA-C in mid January, he noted progressive CALDWELL. He also had complaints of frequent throat clearing with a lot of phlegm production and fatigue. In addition, he had requested simplification of his medical regimen if possible. At that visit, his lasix was increased to 40mg BID, hydralazine was stopped (BP was under good control and he was only taking twice daily), and his Toprol was moved to the PM before bed. In addition, as there was felt to potentially be a pulmonary component to his dyspnea, PFTs were requested.     I then met him in early February. Overall he was breathing better, and weight was down 5 lbs. Bp remained under good control off the hydralazine. He still had a cough with fatigue. PFTs showed a mild restriction but no obstructive disease, and his DLCO was preserved. Labs at that visit showed a bump in his BUN/SCr, and thus, we decreased his lasix  back down to 40mg AM/20mg PM which was his previous dosing. He's back today for a 1 month follow up.    Since last visit he feels he's doing well. His wife things he's essentially back to his baseline. He reports no worsening CALDWELL, and cough has improved. He is still fatigued at times, but sounds like this is a little better as well. His weight is stable at 176 lbs even with reduction in diuretic and he reports no edema. His BP remains under good control today as well. Labs show improvement in renal function back to baseline, and electrolytes are acceptable. I also checked a TSH and CBC today given his complaints of fatigue last visit, both of which were unremarkable.        ASSESSMENT/PLAN:    1. HFpEF.   --Last echocardiogram May 2018 during hospitalization for pneumonia, cdiff, and evidence of HF, with preserved EF at 55-60%. His RV was borderline dilated though function was reported as normal.    --Had recent exacerbation, that required a transient increase in Lasix. Now back down to 40mg am/20mg PM with stable weight, and normalization of renal function. Will continue without change.      2. Hypertension.   --Currently remains under good control, off hydralazine. Continue lisinopril, Toprol XL at bedtime, and his diuretics as above.     3. Chronic atrial fibrillation.   --Rates have historically remained under good control. Has pacemaker in place, continue routine device interrogations.   --Continue warfarin for CVA prophylaxis. He follows with the coumadin clinic.    4. Hyperlipidemia.   --I refilled his simvastatin for him today at his request for a 90 day supply. I do not see any recent lipids in our system or Care Everywhere. Will request records from PMD.       Follow up plan:  In  3 months with Dr. Lynn for annual visit.   Then return to [facility] as felt necessary after that visit.       Orders this Visit:  Orders Placed This Encounter   Procedures     Follow-Up with Cardiologist     Orders Placed This  Encounter   Medications     simvastatin (ZOCOR) 10 MG tablet     Sig: Take 1 tablet (10 mg) by mouth At Bedtime     Dispense:  90 tablet     Refill:  3     Medications Discontinued During This Encounter   Medication Reason     simvastatin (ZOCOR) 5 MG tablet Reorder           CURRENT MEDICATIONS:  Current Outpatient Medications   Medication Sig Dispense Refill     Cyanocobalamin (B-12) 1000 MCG CAPS Take 1 tablet by mouth daily 90 capsule 3     furosemide (LASIX) 40 MG tablet 40mg in the AM, 20mg in the  tablet 3     lisinopril (PRINIVIL/ZESTRIL) 20 MG tablet Take 1 tablet (20 mg) by mouth daily 180 tablet 3     metoprolol succinate ER (TOPROL XL) 50 MG 24 hr tablet Take 1 tablet (50 mg) by mouth At Bedtime 90 tablet 3     omeprazole (PRILOSEC) 20 MG CR capsule Take 1 capsule (20 mg) by mouth daily 90 capsule 3     order for DME Equipment being ordered: Walker Wheels () and Walker ()  Treatment Diagnosis: Difficulty ambulating 1 each 0     order for DME Equipment being ordered: Walker Wheels ()  Treatment Diagnosis:  Weakness,colitis. 1 Device 0     potassium chloride SA (K-DUR/KLOR-CON M) 20 MEQ CR tablet Take 1 tablet (20 mEq) by mouth 2 times daily 180 tablet 2     sertraline (ZOLOFT) 100 MG tablet Take 100 mg by mouth daily       Simethicone (GAS-X EXTRA STRENGTH) 125 MG CAPS Take 1 capsule by mouth 2 times daily as needed 60 capsule 11     simvastatin (ZOCOR) 10 MG tablet Take 1 tablet (10 mg) by mouth At Bedtime 90 tablet 3     warfarin (COUMADIN) 5 MG tablet Take 0.5-1 tablets (2.5-5 mg) by mouth daily as directed by your INR clinic. 90 tablet 0     WARFARIN SODIUM PO Take by mouth daily 2.5 mg M/W/Sa  5 mg AOD         ALLERGIES     Allergies   Allergen Reactions     Penicillins Rash       PAST MEDICAL HISTORY:  Past Medical History:   Diagnosis Date     AV node dysfunction      Chronic atrial fibrillation (H) 2004     GERD (gastroesophageal reflux disease)      Hyperlipidemia      Near  syncope      MARILU (obstructive sleep apnea)        PAST SURGICAL HISTORY:  Past Surgical History:   Procedure Laterality Date     IMPLANT PACEMAKER  08/10/2015       FAMILY HISTORY:  Family History   Problem Relation Age of Onset     Influenza/Pneumonia Mother      Prostate Cancer Father        SOCIAL HISTORY:  Social History     Socioeconomic History     Marital status:      Spouse name: Not on file     Number of children: Not on file     Years of education: Not on file     Highest education level: Not on file   Occupational History     Not on file   Social Needs     Financial resource strain: Not on file     Food insecurity:     Worry: Not on file     Inability: Not on file     Transportation needs:     Medical: Not on file     Non-medical: Not on file   Tobacco Use     Smoking status: Never Smoker     Smokeless tobacco: Never Used   Substance and Sexual Activity     Alcohol use: No     Drug use: No     Sexual activity: Yes     Partners: Female   Lifestyle     Physical activity:     Days per week: Not on file     Minutes per session: Not on file     Stress: Not on file   Relationships     Social connections:     Talks on phone: Not on file     Gets together: Not on file     Attends Christianity service: Not on file     Active member of club or organization: Not on file     Attends meetings of clubs or organizations: Not on file     Relationship status: Not on file     Intimate partner violence:     Fear of current or ex partner: Not on file     Emotionally abused: Not on file     Physically abused: Not on file     Forced sexual activity: Not on file   Other Topics Concern     Parent/sibling w/ CABG, MI or angioplasty before 65F 55M? Not Asked   Social History Narrative     Not on file       Review of Systems:  Cardiovascular: negative for chest pain, palpitations, orthopnea, LE edema  Constitutional: negative for chills, sweats, fevers. Pos fatigue, though better.   Resp: Negative for dyspnea at rest, neg CALDWELL,  "cough, hemoptysis, pos known asbestos exposure.   HEENT: Negative for new visual changes, frequent headaches  Gastrointestinal: negative for abdominal pain, diarrhea, nausea, vomiting  Hematologic/lymphatic: pos for current systemic anticoagulation, neg hx of blood clots  Musculoskeletal: negative for new back pain, joint pain  Neurological: negative for focal weakness, LOC, seizures, syncope/presyncope      Physical Exam:  Vitals: /74   Pulse 82   Ht 1.905 m (6' 3\")   Wt 82.7 kg (182 lb 6.4 oz)   SpO2 98%   BMI 22.80 kg/m      Wt Readings from Last 4 Encounters:   03/04/19 82.7 kg (182 lb 6.4 oz)   02/04/19 80.7 kg (178 lb)   01/21/19 81.6 kg (180 lb)   10/12/18 80 kg (176 lb 6.4 oz)       GEN:  In general, this is a well nourished elderly  male in no acute distress on room air.  Patient ambulatory, accompanied by his wife.  HEENT:  Pupils equal, round. Sclerae nonicteric.   NECK: Supple, no masses appreciated. Trachea midline. No obvious JVD while upright.  C/V:  Irregular rhythm, no murmur, rub or gallop.   RESP: Respirations are unlabored. No use of accessory muscles. Clear to auscultation bilaterally without wheezing, rales, or rhonchi.  GI: Abdomen soft, nontender, nondistended.   EXTREM: No LE edema. No cyanosis or clubbing.  NEURO: Alert and oriented, cooperative. Gait not assessed. No obvious focal deficits, though somewhat poor historian.  SKIN: Warm and dry. No rashes or petechiae appreciated.       Recent Lab Results:      Results for TRUE MATTHEWS (MRN [id number]) as of 3/4/2019 12:20   Ref. Range 3/4/2019 09:06   Sodium Latest Ref Range: 136 - 145 mmol/L 141   Potassium Latest Ref Range: 3.5 - 5.1 mmol/L 3.9   Chloride Latest Ref Range: 98 - 107 mmol/L 105   Carbon Dioxide Latest Ref Range: 23 - 29 mmol/L 30 (H)   Urea Nitrogen Latest Ref Range: 7 - 30 mg/dL 18   Creatinine Latest Ref Range: 0.70 - 1.30 mg/dL 1.17   GFR Estimate Latest Ref Range: >60 mL/min/1.73_m2 59 (L) "   GFR Estimate If Black Latest Ref Range: >60 mL/min/1.73_m2 71   Calcium Latest Ref Range: 8.5 - 10.5 mg/dL 9.4   Anion Gap Latest Ref Range: 6 - 17 mmol/L 9.9   TSH Latest Ref Range: 0.40 - 4.00 mU/L 2.20   Glucose Latest Ref Range: 70 - 105 mg/dL 110 (H)   WBC Latest Ref Range: 4.0 - 11.0 10e9/L 8.1   Hemoglobin Latest Ref Range: 13.3 - 17.7 g/dL 13.3   Hematocrit Latest Ref Range: 40.0 - 53.0 % 40.7   Platelet Count Latest Ref Range: 150 - 450 10e9/L 352   RBC Count Latest Ref Range: 4.4 - 5.9 10e12/L 4.80   MCV Latest Ref Range: 78 - 100 fl 85   MCH Latest Ref Range: 26.5 - 33.0 pg 27.7   MCHC Latest Ref Range: 31.5 - 36.5 g/dL 32.7   RDW Latest Ref Range: 10.0 - 15.0 % 13.3          Results for TRUE MATTHEWS (MRN [id number]) as of 2/4/2019 13:21   Ref. Range 5/23/2018 09:44 7/17/2018 13:05 2/4/2019 09:46   N-Terminal Pro Bnp Latest Ref Range: 0 - 450 pg/mL  1,623 (H) 1,009 (H)   N-Terminal Pro BNP Inpatient Latest Ref Range: 0 - 1,800 pg/mL 12,789 (H)           New/Pertinent imaging results since last visit:  Echo 5/23/18  Interpretation Summary     Left ventricular systolic function is normal.  The visual ejection fraction is estimated at 55-60%.  Dilated inferior vena cava  The study was technically difficult        Moon Gross PA-C  [facility]  Pager [phone]      Thank you for allowing me to participate in the care of your patient.    Sincerely,     EVARISTO Solis     [facility]

## 2019-03-04 NOTE — PROGRESS NOTES
Cardiology Progress Note    Date of Service: 03/04/2019      Reason for visit: [facility] clinic follow up, HFpEF, chronic afib.     Primary cardiologist: Dr. London Lynn      HPI:  Mr. Robledo is a delightful 89-year-old gentleman with a PMHx including hypertension, hyperlipidemia, paroxysmal atrial fibrillation, tachybrady syndrome with a permanent pacemaker (2015), and heart failure with preserved EF.  He was initially enrolled in [facility] in May 2018 after an admission for pneumonia and Cdiff, complicated by respiratory failure and HFpEF. His echocardiogram at that time showed a normal EF, but a dilated IVC. There was no significant RV dysfunction or valvular issues. After his hospital stay he reportedly was doing well over the next few months and maintained good volume status on daily lasix. However, at his follow up visit with Nicole Marrufo PA-C in mid January, he noted progressive CALDWELL. He also had complaints of frequent throat clearing with a lot of phlegm production and fatigue. In addition, he had requested simplification of his medical regimen if possible. At that visit, his lasix was increased to 40mg BID, hydralazine was stopped (BP was under good control and he was only taking twice daily), and his Toprol was moved to the PM before bed. In addition, as there was felt to potentially be a pulmonary component to his dyspnea, PFTs were requested.     I then met him in early February. Overall he was breathing better, and weight was down 5 lbs. Bp remained under good control off the hydralazine. He still had a cough with fatigue. PFTs showed a mild restriction but no obstructive disease, and his DLCO was preserved. Labs at that visit showed a bump in his BUN/SCr, and thus, we decreased his lasix back down to 40mg AM/20mg PM which was his previous dosing. He's back today for a 1 month follow up.    Since last visit he feels he's doing well. His wife things he's essentially back to his baseline. He reports no  worsening CALDWELL, and cough has improved. He is still fatigued at times, but sounds like this is a little better as well. His weight is stable at 176 lbs even with reduction in diuretic and he reports no edema. His BP remains under good control today as well. Labs show improvement in renal function back to baseline, and electrolytes are acceptable. I also checked a TSH and CBC today given his complaints of fatigue last visit, both of which were unremarkable.        ASSESSMENT/PLAN:    1. HFpEF.   --Last echocardiogram May 2018 during hospitalization for pneumonia, cdiff, and evidence of HF, with preserved EF at 55-60%. His RV was borderline dilated though function was reported as normal.    --Had recent exacerbation, that required a transient increase in Lasix. Now back down to 40mg am/20mg PM with stable weight, and normalization of renal function. Will continue without change.      2. Hypertension.   --Currently remains under good control, off hydralazine. Continue lisinopril, Toprol XL at bedtime, and his diuretics as above.     3. Chronic atrial fibrillation.   --Rates have historically remained under good control. Has pacemaker in place, continue routine device interrogations.   --Continue warfarin for CVA prophylaxis. He follows with the coumadin clinic.    4. Hyperlipidemia.   --I refilled his simvastatin for him today at his request for a 90 day supply. I do not see any recent lipids in our system or Care Everywhere. Will request records from PMD.       Follow up plan:  In  3 months with Dr. Lynn for annual visit.   Then return to [facility] as felt necessary after that visit.       Orders this Visit:  Orders Placed This Encounter   Procedures     Follow-Up with Cardiologist     Orders Placed This Encounter   Medications     simvastatin (ZOCOR) 10 MG tablet     Sig: Take 1 tablet (10 mg) by mouth At Bedtime     Dispense:  90 tablet     Refill:  3     Medications Discontinued During This Encounter   Medication Reason      simvastatin (ZOCOR) 5 MG tablet Reorder           CURRENT MEDICATIONS:  Current Outpatient Medications   Medication Sig Dispense Refill     Cyanocobalamin (B-12) 1000 MCG CAPS Take 1 tablet by mouth daily 90 capsule 3     furosemide (LASIX) 40 MG tablet 40mg in the AM, 20mg in the  tablet 3     lisinopril (PRINIVIL/ZESTRIL) 20 MG tablet Take 1 tablet (20 mg) by mouth daily 180 tablet 3     metoprolol succinate ER (TOPROL XL) 50 MG 24 hr tablet Take 1 tablet (50 mg) by mouth At Bedtime 90 tablet 3     omeprazole (PRILOSEC) 20 MG CR capsule Take 1 capsule (20 mg) by mouth daily 90 capsule 3     order for DME Equipment being ordered: Walker Wheels () and Walker ()  Treatment Diagnosis: Difficulty ambulating 1 each 0     order for DME Equipment being ordered: Walker Wheels ()  Treatment Diagnosis:  Weakness,colitis. 1 Device 0     potassium chloride SA (K-DUR/KLOR-CON M) 20 MEQ CR tablet Take 1 tablet (20 mEq) by mouth 2 times daily 180 tablet 2     sertraline (ZOLOFT) 100 MG tablet Take 100 mg by mouth daily       Simethicone (GAS-X EXTRA STRENGTH) 125 MG CAPS Take 1 capsule by mouth 2 times daily as needed 60 capsule 11     simvastatin (ZOCOR) 10 MG tablet Take 1 tablet (10 mg) by mouth At Bedtime 90 tablet 3     warfarin (COUMADIN) 5 MG tablet Take 0.5-1 tablets (2.5-5 mg) by mouth daily as directed by your INR clinic. 90 tablet 0     WARFARIN SODIUM PO Take by mouth daily 2.5 mg M/W/Sa  5 mg AOD         ALLERGIES     Allergies   Allergen Reactions     Penicillins Rash       PAST MEDICAL HISTORY:  Past Medical History:   Diagnosis Date     AV node dysfunction      Chronic atrial fibrillation (H) 2004     GERD (gastroesophageal reflux disease)      Hyperlipidemia      Near syncope      MARILU (obstructive sleep apnea)        PAST SURGICAL HISTORY:  Past Surgical History:   Procedure Laterality Date     IMPLANT PACEMAKER  08/10/2015       FAMILY HISTORY:  Family History   Problem Relation Age of  Onset     Influenza/Pneumonia Mother      Prostate Cancer Father        SOCIAL HISTORY:  Social History     Socioeconomic History     Marital status:      Spouse name: Not on file     Number of children: Not on file     Years of education: Not on file     Highest education level: Not on file   Occupational History     Not on file   Social Needs     Financial resource strain: Not on file     Food insecurity:     Worry: Not on file     Inability: Not on file     Transportation needs:     Medical: Not on file     Non-medical: Not on file   Tobacco Use     Smoking status: Never Smoker     Smokeless tobacco: Never Used   Substance and Sexual Activity     Alcohol use: No     Drug use: No     Sexual activity: Yes     Partners: Female   Lifestyle     Physical activity:     Days per week: Not on file     Minutes per session: Not on file     Stress: Not on file   Relationships     Social connections:     Talks on phone: Not on file     Gets together: Not on file     Attends Hindu service: Not on file     Active member of club or organization: Not on file     Attends meetings of clubs or organizations: Not on file     Relationship status: Not on file     Intimate partner violence:     Fear of current or ex partner: Not on file     Emotionally abused: Not on file     Physically abused: Not on file     Forced sexual activity: Not on file   Other Topics Concern     Parent/sibling w/ CABG, MI or angioplasty before 65F 55M? Not Asked   Social History Narrative     Not on file       Review of Systems:  Cardiovascular: negative for chest pain, palpitations, orthopnea, LE edema  Constitutional: negative for chills, sweats, fevers. Pos fatigue, though better.   Resp: Negative for dyspnea at rest, neg CALDWELL, cough, hemoptysis, pos known asbestos exposure.   HEENT: Negative for new visual changes, frequent headaches  Gastrointestinal: negative for abdominal pain, diarrhea, nausea, vomiting  Hematologic/lymphatic: pos for current  "systemic anticoagulation, neg hx of blood clots  Musculoskeletal: negative for new back pain, joint pain  Neurological: negative for focal weakness, LOC, seizures, syncope/presyncope      Physical Exam:  Vitals: /74   Pulse 82   Ht 1.905 m (6' 3\")   Wt 82.7 kg (182 lb 6.4 oz)   SpO2 98%   BMI 22.80 kg/m     Wt Readings from Last 4 Encounters:   03/04/19 82.7 kg (182 lb 6.4 oz)   02/04/19 80.7 kg (178 lb)   01/21/19 81.6 kg (180 lb)   10/12/18 80 kg (176 lb 6.4 oz)       GEN:  In general, this is a well nourished elderly  male in no acute distress on room air.  Patient ambulatory, accompanied by his wife.  HEENT:  Pupils equal, round. Sclerae nonicteric.   NECK: Supple, no masses appreciated. Trachea midline. No obvious JVD while upright.  C/V:  Irregular rhythm, no murmur, rub or gallop.   RESP: Respirations are unlabored. No use of accessory muscles. Clear to auscultation bilaterally without wheezing, rales, or rhonchi.  GI: Abdomen soft, nontender, nondistended.   EXTREM: No LE edema. No cyanosis or clubbing.  NEURO: Alert and oriented, cooperative. Gait not assessed. No obvious focal deficits, though somewhat poor historian.  SKIN: Warm and dry. No rashes or petechiae appreciated.       Recent Lab Results:      Results for TRUE MATTHEWS (MRN [id number]) as of 3/4/2019 12:20   Ref. Range 3/4/2019 09:06   Sodium Latest Ref Range: 136 - 145 mmol/L 141   Potassium Latest Ref Range: 3.5 - 5.1 mmol/L 3.9   Chloride Latest Ref Range: 98 - 107 mmol/L 105   Carbon Dioxide Latest Ref Range: 23 - 29 mmol/L 30 (H)   Urea Nitrogen Latest Ref Range: 7 - 30 mg/dL 18   Creatinine Latest Ref Range: 0.70 - 1.30 mg/dL 1.17   GFR Estimate Latest Ref Range: >60 mL/min/1.73_m2 59 (L)   GFR Estimate If Black Latest Ref Range: >60 mL/min/1.73_m2 71   Calcium Latest Ref Range: 8.5 - 10.5 mg/dL 9.4   Anion Gap Latest Ref Range: 6 - 17 mmol/L 9.9   TSH Latest Ref Range: 0.40 - 4.00 mU/L 2.20   Glucose Latest Ref " Range: 70 - 105 mg/dL 110 (H)   WBC Latest Ref Range: 4.0 - 11.0 10e9/L 8.1   Hemoglobin Latest Ref Range: 13.3 - 17.7 g/dL 13.3   Hematocrit Latest Ref Range: 40.0 - 53.0 % 40.7   Platelet Count Latest Ref Range: 150 - 450 10e9/L 352   RBC Count Latest Ref Range: 4.4 - 5.9 10e12/L 4.80   MCV Latest Ref Range: 78 - 100 fl 85   MCH Latest Ref Range: 26.5 - 33.0 pg 27.7   MCHC Latest Ref Range: 31.5 - 36.5 g/dL 32.7   RDW Latest Ref Range: 10.0 - 15.0 % 13.3          Results for TRUE MATTHEWS (MRN [id number]) as of 2/4/2019 13:21   Ref. Range 5/23/2018 09:44 7/17/2018 13:05 2/4/2019 09:46   N-Terminal Pro Bnp Latest Ref Range: 0 - 450 pg/mL  1,623 (H) 1,009 (H)   N-Terminal Pro BNP Inpatient Latest Ref Range: 0 - 1,800 pg/mL 12,789 (H)           New/Pertinent imaging results since last visit:  Echo 5/23/18  Interpretation Summary     Left ventricular systolic function is normal.  The visual ejection fraction is estimated at 55-60%.  Dilated inferior vena cava  The study was technically difficult        Moon Gross PA-C  [facility]  Pager [phone]

## 2019-03-04 NOTE — PROGRESS NOTES
Lise Navarro RN Kunkel, Robyn, PA Robyn   I called PMD - established with Dr Zepeda @ [facility] with visit 4/30/2018 in Epic - they confirm have not done lipids and none in Epic - PMD in his note did refill zocor at that visit   Prior this nothing in Care Everywhere   Called patient and spoke with wife - she did not know when last lipids were checked.   Margarette     Orders placed for lipids in 3 months   Previous Messages      ----- Message -----   From: Moon Gross PA   Sent: 3/4/2019  12:27 PM   To: Lise Navarro RN   Subject: lipids                                           Can you call PMD and see if he has recent lipids? I filled his simvastatin for him today but I don't see an FLP in our system or care everywhere.     Can you see if we can get a copy of results please?   If not, we will need to add FLP to visit in 3 months with primary cardiologist please.     Thanks   Moon

## 2019-03-04 NOTE — LETTER
3/4/2019    Lilly Zepeda MD  [address]  [facility] [address]    RE: Santosh KENNY Jassonevelynzac       Dear Colleague,    I had the pleasure of seeing Santosh Robledo in the [facility].      Cardiology Progress Note    Date of Service: 03/04/2019      Reason for visit: [facility] clinic follow up, HFpEF, chronic afib.     Primary cardiologist: Dr. London Lynn      HPI:  Mr. Robledo is a delightful 89-year-old gentleman with a PMHx including hypertension, hyperlipidemia, paroxysmal atrial fibrillation, tachybrady syndrome with a permanent pacemaker (2015), and heart failure with preserved EF.  He was initially enrolled in [facility] in May 2018 after an admission for pneumonia and Cdiff, complicated by respiratory failure and HFpEF. His echocardiogram at that time showed a normal EF, but a dilated IVC. There was no significant RV dysfunction or valvular issues. After his hospital stay he reportedly was doing well over the next few months and maintained good volume status on daily lasix. However, at his follow up visit with Nicole Marrufo PA-C in mid January, he noted progressive CALDWELL. He also had complaints of frequent throat clearing with a lot of phlegm production and fatigue. In addition, he had requested simplification of his medical regimen if possible. At that visit, his lasix was increased to 40mg BID, hydralazine was stopped (BP was under good control and he was only taking twice daily), and his Toprol was moved to the PM before bed. In addition, as there was felt to potentially be a pulmonary component to his dyspnea, PFTs were requested.     I then met him in early February. Overall he was breathing better, and weight was down 5 lbs. Bp remained under good control off the hydralazine. He still had a cough with fatigue. PFTs showed a mild restriction but no obstructive disease, and his DLCO was preserved. Labs at that visit showed a bump in his BUN/SCr, and thus, we decreased his lasix  back down to 40mg AM/20mg PM which was his previous dosing. He's back today for a 1 month follow up.    Since last visit he feels he's doing well. His wife things he's essentially back to his baseline. He reports no worsening CALDWELL, and cough has improved. He is still fatigued at times, but sounds like this is a little better as well. His weight is stable at 176 lbs even with reduction in diuretic and he reports no edema. His BP remains under good control today as well. Labs show improvement in renal function back to baseline, and electrolytes are acceptable. I also checked a TSH and CBC today given his complaints of fatigue last visit, both of which were unremarkable.        ASSESSMENT/PLAN:    1. HFpEF.   --Last echocardiogram May 2018 during hospitalization for pneumonia, cdiff, and evidence of HF, with preserved EF at 55-60%. His RV was borderline dilated though function was reported as normal.    --Had recent exacerbation, that required a transient increase in Lasix. Now back down to 40mg am/20mg PM with stable weight, and normalization of renal function. Will continue without change.      2. Hypertension.   --Currently remains under good control, off hydralazine. Continue lisinopril, Toprol XL at bedtime, and his diuretics as above.     3. Chronic atrial fibrillation.   --Rates have historically remained under good control. Has pacemaker in place, continue routine device interrogations.   --Continue warfarin for CVA prophylaxis. He follows with the coumadin clinic.    4. Hyperlipidemia.   --I refilled his simvastatin for him today at his request for a 90 day supply. I do not see any recent lipids in our system or Care Everywhere. Will request records from PMD.       Follow up plan:  In  3 months with Dr. Lynn for annual visit.   Then return to [facility] as felt necessary after that visit.       Orders this Visit:  Orders Placed This Encounter   Procedures     Follow-Up with Cardiologist     Orders Placed This  Encounter   Medications     simvastatin (ZOCOR) 10 MG tablet     Sig: Take 1 tablet (10 mg) by mouth At Bedtime     Dispense:  90 tablet     Refill:  3     Medications Discontinued During This Encounter   Medication Reason     simvastatin (ZOCOR) 5 MG tablet Reorder           CURRENT MEDICATIONS:  Current Outpatient Medications   Medication Sig Dispense Refill     Cyanocobalamin (B-12) 1000 MCG CAPS Take 1 tablet by mouth daily 90 capsule 3     furosemide (LASIX) 40 MG tablet 40mg in the AM, 20mg in the  tablet 3     lisinopril (PRINIVIL/ZESTRIL) 20 MG tablet Take 1 tablet (20 mg) by mouth daily 180 tablet 3     metoprolol succinate ER (TOPROL XL) 50 MG 24 hr tablet Take 1 tablet (50 mg) by mouth At Bedtime 90 tablet 3     omeprazole (PRILOSEC) 20 MG CR capsule Take 1 capsule (20 mg) by mouth daily 90 capsule 3     order for DME Equipment being ordered: Walker Wheels () and Walker ()  Treatment Diagnosis: Difficulty ambulating 1 each 0     order for DME Equipment being ordered: Walker Wheels ()  Treatment Diagnosis:  Weakness,colitis. 1 Device 0     potassium chloride SA (K-DUR/KLOR-CON M) 20 MEQ CR tablet Take 1 tablet (20 mEq) by mouth 2 times daily 180 tablet 2     sertraline (ZOLOFT) 100 MG tablet Take 100 mg by mouth daily       Simethicone (GAS-X EXTRA STRENGTH) 125 MG CAPS Take 1 capsule by mouth 2 times daily as needed 60 capsule 11     simvastatin (ZOCOR) 10 MG tablet Take 1 tablet (10 mg) by mouth At Bedtime 90 tablet 3     warfarin (COUMADIN) 5 MG tablet Take 0.5-1 tablets (2.5-5 mg) by mouth daily as directed by your INR clinic. 90 tablet 0     WARFARIN SODIUM PO Take by mouth daily 2.5 mg M/W/Sa  5 mg AOD         ALLERGIES     Allergies   Allergen Reactions     Penicillins Rash       PAST MEDICAL HISTORY:  Past Medical History:   Diagnosis Date     AV node dysfunction      Chronic atrial fibrillation (H) 2004     GERD (gastroesophageal reflux disease)      Hyperlipidemia      Near  syncope      MARILU (obstructive sleep apnea)        PAST SURGICAL HISTORY:  Past Surgical History:   Procedure Laterality Date     IMPLANT PACEMAKER  08/10/2015       FAMILY HISTORY:  Family History   Problem Relation Age of Onset     Influenza/Pneumonia Mother      Prostate Cancer Father        SOCIAL HISTORY:  Social History     Socioeconomic History     Marital status:      Spouse name: Not on file     Number of children: Not on file     Years of education: Not on file     Highest education level: Not on file   Occupational History     Not on file   Social Needs     Financial resource strain: Not on file     Food insecurity:     Worry: Not on file     Inability: Not on file     Transportation needs:     Medical: Not on file     Non-medical: Not on file   Tobacco Use     Smoking status: Never Smoker     Smokeless tobacco: Never Used   Substance and Sexual Activity     Alcohol use: No     Drug use: No     Sexual activity: Yes     Partners: Female   Lifestyle     Physical activity:     Days per week: Not on file     Minutes per session: Not on file     Stress: Not on file   Relationships     Social connections:     Talks on phone: Not on file     Gets together: Not on file     Attends Sikh service: Not on file     Active member of club or organization: Not on file     Attends meetings of clubs or organizations: Not on file     Relationship status: Not on file     Intimate partner violence:     Fear of current or ex partner: Not on file     Emotionally abused: Not on file     Physically abused: Not on file     Forced sexual activity: Not on file   Other Topics Concern     Parent/sibling w/ CABG, MI or angioplasty before 65F 55M? Not Asked   Social History Narrative     Not on file       Review of Systems:  Cardiovascular: negative for chest pain, palpitations, orthopnea, LE edema  Constitutional: negative for chills, sweats, fevers. Pos fatigue, though better.   Resp: Negative for dyspnea at rest, neg CALDWELL,  "cough, hemoptysis, pos known asbestos exposure.   HEENT: Negative for new visual changes, frequent headaches  Gastrointestinal: negative for abdominal pain, diarrhea, nausea, vomiting  Hematologic/lymphatic: pos for current systemic anticoagulation, neg hx of blood clots  Musculoskeletal: negative for new back pain, joint pain  Neurological: negative for focal weakness, LOC, seizures, syncope/presyncope      Physical Exam:  Vitals: /74   Pulse 82   Ht 1.905 m (6' 3\")   Wt 82.7 kg (182 lb 6.4 oz)   SpO2 98%   BMI 22.80 kg/m      Wt Readings from Last 4 Encounters:   03/04/19 82.7 kg (182 lb 6.4 oz)   02/04/19 80.7 kg (178 lb)   01/21/19 81.6 kg (180 lb)   10/12/18 80 kg (176 lb 6.4 oz)       GEN:  In general, this is a well nourished elderly  male in no acute distress on room air.  Patient ambulatory, accompanied by his wife.  HEENT:  Pupils equal, round. Sclerae nonicteric.   NECK: Supple, no masses appreciated. Trachea midline. No obvious JVD while upright.  C/V:  Irregular rhythm, no murmur, rub or gallop.   RESP: Respirations are unlabored. No use of accessory muscles. Clear to auscultation bilaterally without wheezing, rales, or rhonchi.  GI: Abdomen soft, nontender, nondistended.   EXTREM: No LE edema. No cyanosis or clubbing.  NEURO: Alert and oriented, cooperative. Gait not assessed. No obvious focal deficits, though somewhat poor historian.  SKIN: Warm and dry. No rashes or petechiae appreciated.       Recent Lab Results:      Results for TRUE MATTHEWS (MRN [id number]) as of 3/4/2019 12:20   Ref. Range 3/4/2019 09:06   Sodium Latest Ref Range: 136 - 145 mmol/L 141   Potassium Latest Ref Range: 3.5 - 5.1 mmol/L 3.9   Chloride Latest Ref Range: 98 - 107 mmol/L 105   Carbon Dioxide Latest Ref Range: 23 - 29 mmol/L 30 (H)   Urea Nitrogen Latest Ref Range: 7 - 30 mg/dL 18   Creatinine Latest Ref Range: 0.70 - 1.30 mg/dL 1.17   GFR Estimate Latest Ref Range: >60 mL/min/1.73_m2 59 (L) "   GFR Estimate If Black Latest Ref Range: >60 mL/min/1.73_m2 71   Calcium Latest Ref Range: 8.5 - 10.5 mg/dL 9.4   Anion Gap Latest Ref Range: 6 - 17 mmol/L 9.9   TSH Latest Ref Range: 0.40 - 4.00 mU/L 2.20   Glucose Latest Ref Range: 70 - 105 mg/dL 110 (H)   WBC Latest Ref Range: 4.0 - 11.0 10e9/L 8.1   Hemoglobin Latest Ref Range: 13.3 - 17.7 g/dL 13.3   Hematocrit Latest Ref Range: 40.0 - 53.0 % 40.7   Platelet Count Latest Ref Range: 150 - 450 10e9/L 352   RBC Count Latest Ref Range: 4.4 - 5.9 10e12/L 4.80   MCV Latest Ref Range: 78 - 100 fl 85   MCH Latest Ref Range: 26.5 - 33.0 pg 27.7   MCHC Latest Ref Range: 31.5 - 36.5 g/dL 32.7   RDW Latest Ref Range: 10.0 - 15.0 % 13.3          Results for TRUE MATTHEWS (MRN [id number]) as of 2/4/2019 13:21   Ref. Range 5/23/2018 09:44 7/17/2018 13:05 2/4/2019 09:46   N-Terminal Pro Bnp Latest Ref Range: 0 - 450 pg/mL  1,623 (H) 1,009 (H)   N-Terminal Pro BNP Inpatient Latest Ref Range: 0 - 1,800 pg/mL 12,789 (H)           New/Pertinent imaging results since last visit:  Echo 5/23/18  Interpretation Summary     Left ventricular systolic function is normal.  The visual ejection fraction is estimated at 55-60%.  Dilated inferior vena cava  The study was technically difficult        Moon Gross PA-C  [facility]  Pager [phone]      Thank you for allowing me to participate in the care of your patient.      Sincerely,     EVARISTO Solis     [facility]    cc:   EVARISTO Solis  [facility]  [address] [place] [id number]

## 2019-03-04 NOTE — PATIENT INSTRUCTIONS
Call [facility] nurse for any questions or concerns Mon-Fri 8am-4pm:                                                [phone]                                       For concerns after hours:                                                 [phone]     Medication changes: NONE  Plan from today: Return to see Dr. Lynn in June 2019 for annual visit.   Lab results:  Results for TRUE MATTHEWS (MRN [id number]) as of 3/4/2019 10:16   Ref. Range 3/4/2019 09:06   Sodium Latest Ref Range: 136 - 145 mmol/L 141   Potassium Latest Ref Range: 3.5 - 5.1 mmol/L 3.9   Chloride Latest Ref Range: 98 - 107 mmol/L 105   Carbon Dioxide Latest Ref Range: 23 - 29 mmol/L 30 (H)   Urea Nitrogen Latest Ref Range: 7 - 30 mg/dL 18   Creatinine Latest Ref Range: 0.70 - 1.30 mg/dL 1.17   GFR Estimate Latest Ref Range: >60 mL/min/1.73_m2 59 (L)   GFR Estimate If Black Latest Ref Range: >60 mL/min/1.73_m2 71   Calcium Latest Ref Range: 8.5 - 10.5 mg/dL 9.4   Anion Gap Latest Ref Range: 6 - 17 mmol/L 9.9

## 2019-03-12 ENCOUNTER — ANTICOAGULATION THERAPY VISIT (OUTPATIENT)
Dept: NURSING | Facility: CLINIC | Age: 84
End: 2019-03-12
Payer: MEDICARE

## 2019-03-12 DIAGNOSIS — I48.20 CHRONIC ATRIAL FIBRILLATION (H): ICD-10-CM

## 2019-03-12 DIAGNOSIS — Z79.01 LONG TERM CURRENT USE OF ANTICOAGULANT THERAPY: ICD-10-CM

## 2019-03-12 LAB — INR POINT OF CARE: 1.8 (ref 0.86–1.14)

## 2019-03-12 PROCEDURE — 36416 COLLJ CAPILLARY BLOOD SPEC: CPT

## 2019-03-12 PROCEDURE — 99207 ZZC NO CHARGE NURSE ONLY: CPT

## 2019-03-12 PROCEDURE — 85610 PROTHROMBIN TIME: CPT | Mod: QW

## 2019-03-12 NOTE — PROGRESS NOTES
ANTICOAGULATION FOLLOW-UP CLINIC VISIT    Patient Name:  Santosh KENNY Hjelmstad  Date:  3/12/2019  Contact Type:  Face to Face    SUBJECTIVE:     Patient Findings            OBJECTIVE    INR Protime   Date Value Ref Range Status   2019 1.8 (A) 0.86 - 1.14 Final       ASSESSMENT / PLAN  INR assessment SUB    Recheck INR In: 2 WEEKS    INR Location Clinic      Anticoagulation Summary  As of 3/12/2019    INR goal:   2.0-3.0   TTR:   62.9 % (9 mo)   INR used for dosin.8! (3/12/2019)   Warfarin maintenance plan:   2.5 mg (5 mg x 0.5) every Tue, Sat; 5 mg (5 mg x 1) all other days   Full warfarin instructions:   3/12: 5 mg; Otherwise 2.5 mg every Tue, Sat; 5 mg all other days   Weekly warfarin total:   30 mg   Plan last modified:   Bryanna Heredia RN (2019)   Next INR check:   3/26/2019   Target end date:       Indications    Chronic atrial fibrillation (H) [I48.2]  Long term current use of anticoagulant therapy [Z79.01]             Anticoagulation Episode Summary     INR check location:       Preferred lab:       Send INR reminders to:   [facility]    Comments:         Anticoagulation Care Providers     Provider Role Specialty Phone number    Lilly Zepeda MD [facility] [phone]            See the Encounter Report to view Anticoagulation Flowsheet and Dosing Calendar (Go to Encounters tab in chart review, and find the Anticoagulation Therapy Visit)    Dosage adjustment made based on physician directed care plan. INR 1.8 after recent supra and no dose adjustment. May have had more greens this week. Advised 5 mg today, then resume previous dosing and recheck 2 weeks.    Bryanna Heredia RN

## 2019-03-28 ENCOUNTER — ANTICOAGULATION THERAPY VISIT (OUTPATIENT)
Dept: NURSING | Facility: CLINIC | Age: 84
End: 2019-03-28
Payer: MEDICARE

## 2019-03-28 DIAGNOSIS — Z79.01 LONG TERM CURRENT USE OF ANTICOAGULANT THERAPY: ICD-10-CM

## 2019-03-28 DIAGNOSIS — I48.20 CHRONIC ATRIAL FIBRILLATION (H): ICD-10-CM

## 2019-03-28 LAB — INR POINT OF CARE: 3.1 (ref 0.86–1.14)

## 2019-03-28 PROCEDURE — 99207 ZZC NO CHARGE NURSE ONLY: CPT

## 2019-03-28 PROCEDURE — 36416 COLLJ CAPILLARY BLOOD SPEC: CPT

## 2019-03-28 PROCEDURE — 85610 PROTHROMBIN TIME: CPT | Mod: QW

## 2019-03-28 NOTE — PROGRESS NOTES
ANTICOAGULATION FOLLOW-UP CLINIC VISIT    Patient Name:  Santosh KENNY Hjelmstad  Date:  3/28/2019  Contact Type:  Face to Face    SUBJECTIVE:     Patient Findings     Comments:   Patient will add a few more greens to diet.            OBJECTIVE    INR Protime   Date Value Ref Range Status   03/28/2019 3.1 (A) 0.86 - 1.14 Final       ASSESSMENT / PLAN  INR assessment THER    Recheck INR In: 2 WEEKS    INR Location Clinic      Anticoagulation Summary  As of 3/28/2019    INR goal:   2.0-3.0   TTR:   63.7 % (9.6 mo)   INR used for dosing:   3.1! (3/28/2019)   Warfarin maintenance plan:   2.5 mg (5 mg x 0.5) every Tue, Sat; 5 mg (5 mg x 1) all other days   Full warfarin instructions:   2.5 mg every Tue, Sat; 5 mg all other days   Weekly warfarin total:   30 mg   No change documented:   Susie Hunter RN   Plan last modified:   Bryanna Heredia RN (2/26/2019)   Next INR check:   4/11/2019   Target end date:       Indications    Chronic atrial fibrillation (H) [I48.2]  Long term current use of anticoagulant therapy [Z79.01]             Anticoagulation Episode Summary     INR check location:       Preferred lab:       Send INR reminders to:   [facility]    Comments:         Anticoagulation Care Providers     Provider Role Specialty Phone number    Lilly Zepeda MD [facility] [phone]            See the Encounter Report to view Anticoagulation Flowsheet and Dosing Calendar (Go to Encounters tab in chart review, and find the Anticoagulation Therapy Visit)    Dosage adjustment made based on physician directed care plan.    Susie Hunter RN

## 2019-04-05 ENCOUNTER — ANCILLARY PROCEDURE (OUTPATIENT)
Dept: CARDIOLOGY | Facility: CLINIC | Age: 84
End: 2019-04-05
Attending: INTERNAL MEDICINE
Payer: MEDICARE

## 2019-04-05 DIAGNOSIS — I48.92 ATRIAL FIBRILLATION AND FLUTTER (H): ICD-10-CM

## 2019-04-05 DIAGNOSIS — I48.91 ATRIAL FIBRILLATION AND FLUTTER (H): ICD-10-CM

## 2019-04-05 LAB
MDC_IDC_LEAD_IMPLANT_DT: NORMAL
MDC_IDC_LEAD_LOCATION: NORMAL
MDC_IDC_LEAD_LOCATION_DETAIL_1: NORMAL
MDC_IDC_LEAD_MFG: NORMAL
MDC_IDC_LEAD_MODEL: NORMAL
MDC_IDC_LEAD_POLARITY_TYPE: NORMAL
MDC_IDC_LEAD_SERIAL: NORMAL
MDC_IDC_MSMT_BATTERY_DTM: NORMAL
MDC_IDC_MSMT_BATTERY_REMAINING_LONGEVITY: 99 MO
MDC_IDC_MSMT_BATTERY_RRT_TRIGGER: 2.83
MDC_IDC_MSMT_BATTERY_STATUS: NORMAL
MDC_IDC_MSMT_BATTERY_VOLTAGE: 3.02 V
MDC_IDC_MSMT_LEADCHNL_RV_IMPEDANCE_VALUE: 551 OHM
MDC_IDC_MSMT_LEADCHNL_RV_IMPEDANCE_VALUE: 646 OHM
MDC_IDC_MSMT_LEADCHNL_RV_PACING_THRESHOLD_AMPLITUDE: 0.5 V
MDC_IDC_MSMT_LEADCHNL_RV_PACING_THRESHOLD_PULSEWIDTH: 0.4 MS
MDC_IDC_MSMT_LEADCHNL_RV_SENSING_INTR_AMPL: 8.25 MV
MDC_IDC_MSMT_LEADCHNL_RV_SENSING_INTR_AMPL: 8.25 MV
MDC_IDC_PG_IMPLANT_DTM: NORMAL
MDC_IDC_PG_MFG: NORMAL
MDC_IDC_PG_MODEL: NORMAL
MDC_IDC_PG_SERIAL: NORMAL
MDC_IDC_PG_TYPE: NORMAL
MDC_IDC_SESS_CLINIC_NAME: NORMAL
MDC_IDC_SESS_DTM: NORMAL
MDC_IDC_SESS_TYPE: NORMAL
MDC_IDC_SET_BRADY_HYSTRATE: NORMAL
MDC_IDC_SET_BRADY_LOWRATE: 60 {BEATS}/MIN
MDC_IDC_SET_BRADY_MAX_SENSOR_RATE: 130 {BEATS}/MIN
MDC_IDC_SET_BRADY_MODE: NORMAL
MDC_IDC_SET_LEADCHNL_RV_PACING_AMPLITUDE: 1.5 V
MDC_IDC_SET_LEADCHNL_RV_PACING_ANODE_ELECTRODE_1: NORMAL
MDC_IDC_SET_LEADCHNL_RV_PACING_ANODE_LOCATION_1: NORMAL
MDC_IDC_SET_LEADCHNL_RV_PACING_CAPTURE_MODE: NORMAL
MDC_IDC_SET_LEADCHNL_RV_PACING_CATHODE_ELECTRODE_1: NORMAL
MDC_IDC_SET_LEADCHNL_RV_PACING_CATHODE_LOCATION_1: NORMAL
MDC_IDC_SET_LEADCHNL_RV_PACING_POLARITY: NORMAL
MDC_IDC_SET_LEADCHNL_RV_PACING_PULSEWIDTH: 0.4 MS
MDC_IDC_SET_LEADCHNL_RV_SENSING_ANODE_ELECTRODE_1: NORMAL
MDC_IDC_SET_LEADCHNL_RV_SENSING_ANODE_LOCATION_1: NORMAL
MDC_IDC_SET_LEADCHNL_RV_SENSING_CATHODE_ELECTRODE_1: NORMAL
MDC_IDC_SET_LEADCHNL_RV_SENSING_CATHODE_LOCATION_1: NORMAL
MDC_IDC_SET_LEADCHNL_RV_SENSING_POLARITY: NORMAL
MDC_IDC_SET_LEADCHNL_RV_SENSING_SENSITIVITY: 0.9 MV
MDC_IDC_SET_ZONE_DETECTION_INTERVAL: 360 MS
MDC_IDC_SET_ZONE_TYPE: NORMAL
MDC_IDC_STAT_BRADY_DTM_END: NORMAL
MDC_IDC_STAT_BRADY_DTM_START: NORMAL
MDC_IDC_STAT_BRADY_RV_PERCENT_PACED: 85.27 %
MDC_IDC_STAT_EPISODE_RECENT_COUNT: 0
MDC_IDC_STAT_EPISODE_RECENT_COUNT: 0
MDC_IDC_STAT_EPISODE_RECENT_COUNT_DTM_END: NORMAL
MDC_IDC_STAT_EPISODE_RECENT_COUNT_DTM_END: NORMAL
MDC_IDC_STAT_EPISODE_RECENT_COUNT_DTM_START: NORMAL
MDC_IDC_STAT_EPISODE_RECENT_COUNT_DTM_START: NORMAL
MDC_IDC_STAT_EPISODE_TOTAL_COUNT: 0
MDC_IDC_STAT_EPISODE_TOTAL_COUNT: 4
MDC_IDC_STAT_EPISODE_TOTAL_COUNT_DTM_END: NORMAL
MDC_IDC_STAT_EPISODE_TOTAL_COUNT_DTM_END: NORMAL
MDC_IDC_STAT_EPISODE_TOTAL_COUNT_DTM_START: NORMAL
MDC_IDC_STAT_EPISODE_TOTAL_COUNT_DTM_START: NORMAL
MDC_IDC_STAT_EPISODE_TYPE: NORMAL

## 2019-04-05 PROCEDURE — 93294 REM INTERROG EVL PM/LDLS PM: CPT | Performed by: INTERNAL MEDICINE

## 2019-04-05 PROCEDURE — 93296 REM INTERROG EVL PM/IDS: CPT | Performed by: INTERNAL MEDICINE

## 2019-04-10 ENCOUNTER — TELEPHONE (OUTPATIENT)
Dept: FAMILY MEDICINE | Facility: CLINIC | Age: 84
End: 2019-04-10

## 2019-04-10 NOTE — TELEPHONE ENCOUNTER
Reason for Call:  Other appointment    Detailed comments: Spouse, Candida (C2C signed) called in to reschedule INR appt 4/11/19.     Prefers next week Mon or Wed for late mornings or afternoon.    Please contact to discuss reschedule.    Phone Number Patient can be reached at: Home number on file [phone] (home)    Best Time: any    Can we leave a detailed message on this number? YES    Call taken on 4/10/2019 at 11:16 AM by Amos Turner

## 2019-04-15 ENCOUNTER — ANTICOAGULATION THERAPY VISIT (OUTPATIENT)
Dept: NURSING | Facility: CLINIC | Age: 84
End: 2019-04-15
Payer: MEDICARE

## 2019-04-15 DIAGNOSIS — Z79.01 LONG TERM CURRENT USE OF ANTICOAGULANT THERAPY: ICD-10-CM

## 2019-04-15 DIAGNOSIS — I48.20 CHRONIC ATRIAL FIBRILLATION (H): ICD-10-CM

## 2019-04-15 LAB — INR POINT OF CARE: 2.1 (ref 0.86–1.14)

## 2019-04-15 PROCEDURE — 85610 PROTHROMBIN TIME: CPT | Mod: QW

## 2019-04-15 PROCEDURE — 36416 COLLJ CAPILLARY BLOOD SPEC: CPT

## 2019-04-15 PROCEDURE — 99207 ZZC NO CHARGE NURSE ONLY: CPT

## 2019-04-15 NOTE — PROGRESS NOTES
ANTICOAGULATION FOLLOW-UP CLINIC VISIT    Patient Name:  Santosh KENNY Hjelmstad  Date:  4/15/2019  Contact Type:  Face to Face    SUBJECTIVE:     Patient Findings     Comments:   The patient was assessed for diet, medication, and activity level changes, missed or extra doses, bruising or bleeding, with no problem findings.             OBJECTIVE    INR Protime   Date Value Ref Range Status   04/15/2019 2.1 (A) 0.86 - 1.14 Final       ASSESSMENT / PLAN  INR assessment THER    Recheck INR In: 3 WEEKS    INR Location Clinic      Anticoagulation Summary  As of 4/15/2019    INR goal:   2.0-3.0   TTR:   65.2 % (10.2 mo)   INR used for dosin.1 (4/15/2019)   Warfarin maintenance plan:   2.5 mg (5 mg x 0.5) every Tue, Sat; 5 mg (5 mg x 1) all other days   Full warfarin instructions:   2.5 mg every Tue, Sat; 5 mg all other days   Weekly warfarin total:   30 mg   No change documented:   Bryanna Heredia RN   Plan last modified:   Bryanna Heredia RN (2019)   Next INR check:   2019   Target end date:       Indications    Chronic atrial fibrillation (H) [I48.2]  Long term current use of anticoagulant therapy [Z79.01]             Anticoagulation Episode Summary     INR check location:       Preferred lab:       Send INR reminders to:    ANTICOAGULATION    Comments:         Anticoagulation Care Providers     Provider Role Specialty Phone number    Duane Lilly Castaneda MD [facility] Internal Medicine [phone]            See the Encounter Report to view Anticoagulation Flowsheet and Dosing Calendar (Go to Encounters tab in chart review, and find the Anticoagulation Therapy Visit)    Dosage adjustment made based on physician directed care plan. INR 2.1    Bryanna Heredia RN

## 2019-05-09 ENCOUNTER — ANTICOAGULATION THERAPY VISIT (OUTPATIENT)
Dept: NURSING | Facility: CLINIC | Age: 84
End: 2019-05-09
Payer: MEDICARE

## 2019-05-09 ENCOUNTER — TELEPHONE (OUTPATIENT)
Dept: FAMILY MEDICINE | Facility: CLINIC | Age: 84
End: 2019-05-09

## 2019-05-09 DIAGNOSIS — I48.20 CHRONIC ATRIAL FIBRILLATION (H): ICD-10-CM

## 2019-05-09 DIAGNOSIS — Z79.01 LONG TERM CURRENT USE OF ANTICOAGULANT THERAPY: ICD-10-CM

## 2019-05-09 DIAGNOSIS — Z79.01 LONG TERM CURRENT USE OF ANTICOAGULANT THERAPY: Primary | ICD-10-CM

## 2019-05-09 LAB — INR POINT OF CARE: 2.1 (ref 0.86–1.14)

## 2019-05-09 PROCEDURE — 99207 ZZC NO CHARGE NURSE ONLY: CPT

## 2019-05-09 PROCEDURE — 85610 PROTHROMBIN TIME: CPT | Mod: QW

## 2019-05-09 PROCEDURE — 36416 COLLJ CAPILLARY BLOOD SPEC: CPT

## 2019-05-09 NOTE — PROGRESS NOTES
ANTICOAGULATION FOLLOW-UP CLINIC VISIT    Patient Name:  Santosh KENNY Hjelmstad  Date:  2019  Contact Type:  Face to Face    SUBJECTIVE:  Patient Findings     Comments:   The patient was assessed for diet, medication, and activity level changes, missed or extra doses, bruising or bleeding, with no problem findings.          Clinical Outcomes     Comments:   The patient was assessed for diet, medication, and activity level changes, missed or extra doses, bruising or bleeding, with no problem findings.             OBJECTIVE    INR Protime   Date Value Ref Range Status   2019 2.1 (A) 0.86 - 1.14 Final       ASSESSMENT / PLAN  INR assessment THER    Recheck INR In: 4 WEEKS    INR Location Clinic      Anticoagulation Summary  As of 2019    INR goal:   2.0-3.0   TTR:   67.7 % (11 mo)   INR used for dosin.1 (2019)   Warfarin maintenance plan:   2.5 mg (5 mg x 0.5) every Tue, Sat; 5 mg (5 mg x 1) all other days   Full warfarin instructions:   2.5 mg every Tue, Sat; 5 mg all other days   Weekly warfarin total:   30 mg   No change documented:   Susie Hunter RN   Plan last modified:   Bryanna Heredia RN (2019)   Next INR check:   2019   Target end date:       Indications    Chronic atrial fibrillation (H) [I48.2]  Long term current use of anticoagulant therapy [Z79.01]             Anticoagulation Episode Summary     INR check location:       Preferred lab:       Send INR reminders to:    ANTICOAGULATION    Comments:         Anticoagulation Care Providers     Provider Role Specialty Phone number    Duane, Lilly Castaneda MD [facility] Internal Medicine [phone]            See the Encounter Report to view Anticoagulation Flowsheet and Dosing Calendar (Go to Encounters tab in chart review, and find the Anticoagulation Therapy Visit)    Dosage adjustment made based on physician directed care plan.    Susie Hunter RN

## 2019-05-09 NOTE — TELEPHONE ENCOUNTER
Please sign annual INR Referral--pended.   Please CLOSE encounter after completed.     Thank you,  Susie WOLFF RN,BSN

## 2019-06-03 DIAGNOSIS — I10 BENIGN ESSENTIAL HYPERTENSION: ICD-10-CM

## 2019-06-03 DIAGNOSIS — K21.9 GASTROESOPHAGEAL REFLUX DISEASE, ESOPHAGITIS PRESENCE NOT SPECIFIED: ICD-10-CM

## 2019-06-04 RX ORDER — LISINOPRIL 20 MG/1
20 TABLET ORAL DAILY
Qty: 180 TABLET | Refills: 3 | Status: SHIPPED | OUTPATIENT
Start: 2019-06-04 | End: 2020-05-20

## 2019-06-04 NOTE — TELEPHONE ENCOUNTER
"lisinopril (PRINIVIL/ZESTRIL) 20 MG tablet 180 tablet 3 6/15/2018  No   Sig - Route: Take 1 tablet (20 mg) by mouth daily      Last Written Prescription Date:  06/15/2018  Last Fill Quantity: 180,  # refills: 3   Last office visit: 6/29/2018 with prescribing provider:     Future Office Visit:            omeprazole (PRILOSEC) 20 MG CR capsule 90 capsule 3 4/30/2018  --   Sig - Route: Take 1 capsule (20 mg) by mouth daily     Last Written Prescription Date:  04/30/2018  Last Fill Quantity: 90,  # refills: 3   Last office visit: 6/29/2018 with prescribing provider:     Future Office Visit:        Requested Prescriptions   Pending Prescriptions Disp Refills     omeprazole (PRILOSEC) 20 MG DR capsule 90 capsule 3     Sig: Take 1 capsule (20 mg) by mouth daily       PPI Protocol Passed - 6/4/2019 11:43 AM        Passed - Not on Clopidogrel (unless Pantoprazole ordered)        Passed - No diagnosis of osteoporosis on record        Passed - Recent (12 mo) or future (30 days) visit within the authorizing provider's specialty     Patient had office visit in the last 12 months or has a visit in the next 30 days with authorizing provider or within the authorizing provider's specialty.  See \"Patient Info\" tab in inbasket, or \"Choose Columns\" in Meds & Orders section of the refill encounter.              Passed - Medication is active on med list        Passed - Patient is age 18 or older        lisinopril (PRINIVIL/ZESTRIL) 20 MG tablet 180 tablet 3     Sig: Take 1 tablet (20 mg) by mouth daily       ACE Inhibitors (Including Combos) Protocol Passed - 6/4/2019 11:43 AM        Passed - Blood pressure under 140/90 in past 12 months     BP Readings from Last 3 Encounters:   03/04/19 128/74   02/04/19 122/68   01/21/19 128/70                 Passed - Recent (12 mo) or future (30 days) visit within the authorizing provider's specialty     Patient had office visit in the last 12 months or has a visit in the next 30 days with " "authorizing provider or within the authorizing provider's specialty.  See \"Patient Info\" tab in inbasket, or \"Choose Columns\" in Meds & Orders section of the refill encounter.              Passed - Medication is active on med list        Passed - Patient is age 18 or older        Passed - Normal serum creatinine on file in past 12 months     Recent Labs   Lab Test 03/04/19  0906   CR 1.17             Passed - Normal serum potassium on file in past 12 months     Recent Labs   Lab Test 03/04/19  0906   POTASSIUM 3.9               "

## 2019-06-04 NOTE — TELEPHONE ENCOUNTER
Omeprazole:   Prescription approved per [facility] Refill Protocol.    Lisinopril:  Routing refill request to provider for review/approval because:  Medication is reported/historical    Tayler FAYE RN

## 2019-06-07 ENCOUNTER — ANTICOAGULATION THERAPY VISIT (OUTPATIENT)
Dept: NURSING | Facility: CLINIC | Age: 84
End: 2019-06-07
Payer: MEDICARE

## 2019-06-07 LAB — INR POINT OF CARE: 2.4 (ref 0.86–1.14)

## 2019-06-07 PROCEDURE — 36416 COLLJ CAPILLARY BLOOD SPEC: CPT

## 2019-06-07 PROCEDURE — 85610 PROTHROMBIN TIME: CPT | Mod: QW

## 2019-06-07 NOTE — PROGRESS NOTES
ANTICOAGULATION FOLLOW-UP CLINIC VISIT    Patient Name:  Santosh KENNY Hjelmstad  Date:  2019  Contact Type:  Face to Face    SUBJECTIVE:  Patient Findings     Comments:   The patient was assessed for diet, medication, and activity level changes, missed or extra doses, bruising or bleeding, with no problem findings.          Clinical Outcomes     Comments:   The patient was assessed for diet, medication, and activity level changes, missed or extra doses, bruising or bleeding, with no problem findings.             OBJECTIVE    INR Protime   Date Value Ref Range Status   2019 2.4 (A) 0.86 - 1.14 Final       ASSESSMENT / PLAN  INR assessment THER    Recheck INR In: 4 WEEKS    INR Location Clinic      Anticoagulation Summary  As of 2019    INR goal:   2.0-3.0   TTR:   70.4 % (11.9 mo)   INR used for dosin.4 (2019)   Warfarin maintenance plan:   2.5 mg (5 mg x 0.5) every Tue, Sat; 5 mg (5 mg x 1) all other days   Full warfarin instructions:   2.5 mg every Tue, Sat; 5 mg all other days   Weekly warfarin total:   30 mg   Plan last modified:   Bryanna Heredia RN (2019)   Next INR check:   2019   Target end date:       Indications    Chronic atrial fibrillation (H) [I48.2]  Long term current use of anticoagulant therapy [Z79.01]             Anticoagulation Episode Summary     INR check location:       Preferred lab:       Send INR reminders to:   [facility] ANTICOAGULATION    Comments:         Anticoagulation Care Providers     Provider Role Specialty Phone number    Lilly Zepeda MD [facility] [phone]            See the Encounter Report to view Anticoagulation Flowsheet and Dosing Calendar (Go to Encounters tab in chart review, and find the Anticoagulation Therapy Visit)    Dosage adjustment made based on physician directed care plan.    Brandi Sullivan, RN

## 2019-06-11 ENCOUNTER — TELEPHONE (OUTPATIENT)
Dept: FAMILY MEDICINE | Facility: CLINIC | Age: 84
End: 2019-06-11

## 2019-06-11 NOTE — TELEPHONE ENCOUNTER
Left voice mail regarding need to reschedule July 5.      Offered to change to afternoon appointment, or change to another morning earlier that week.    Juliette Bah, PharmD BCACP  Anticoagulation Clinical Pharmacist

## 2019-06-28 ENCOUNTER — TELEPHONE (OUTPATIENT)
Dept: CARDIOLOGY | Facility: CLINIC | Age: 84
End: 2019-06-28

## 2019-06-28 DIAGNOSIS — E78.5 HYPERLIPIDEMIA: ICD-10-CM

## 2019-06-28 LAB
ALT SERPL W P-5'-P-CCNC: <5 U/L (ref 5–30)
CHOLEST SERPL-MCNC: 161 MG/DL
HDLC SERPL-MCNC: 36 MG/DL
LDLC SERPL CALC-MCNC: 88 MG/DL
NONHDLC SERPL-MCNC: 125 MG/DL
TRIGL SERPL-MCNC: 186 MG/DL

## 2019-06-28 PROCEDURE — 84460 ALANINE AMINO (ALT) (SGPT): CPT | Performed by: INTERNAL MEDICINE

## 2019-06-28 PROCEDURE — 36415 COLL VENOUS BLD VENIPUNCTURE: CPT | Performed by: INTERNAL MEDICINE

## 2019-06-28 PROCEDURE — 80061 LIPID PANEL: CPT | Performed by: INTERNAL MEDICINE

## 2019-06-28 NOTE — TELEPHONE ENCOUNTER
Received message below from EVARISTO Mustafa. Called pt, spoke with pt's wife Candida as pt was not home. Consent to communicate on file. Reviewed labs, Moon's recommendations and plan for appointment with Dr. Lynn on 7/2/19. Candida verbalized understanding and stated they will continue to try and improve pt's diet, no further questions at this time.     ----- Message from EVARISTO Solis sent at 6/28/2019  1:38 PM CDT -----  Regarding: lipids  Please let him know that his fasting cholesterol panel was ok in terms of LDL. His TG are up, remind him about diet (lower carb etc) and try to stay active.   Has appt with Shane next week if he has further questions.    Thanks  Moon

## 2019-07-02 ENCOUNTER — OFFICE VISIT (OUTPATIENT)
Dept: CARDIOLOGY | Facility: CLINIC | Age: 84
End: 2019-07-02
Attending: PHYSICIAN ASSISTANT
Payer: MEDICARE

## 2019-07-02 ENCOUNTER — ANCILLARY PROCEDURE (OUTPATIENT)
Dept: CARDIOLOGY | Facility: CLINIC | Age: 84
End: 2019-07-02
Attending: INTERNAL MEDICINE
Payer: MEDICARE

## 2019-07-02 VITALS
HEART RATE: 80 BPM | DIASTOLIC BLOOD PRESSURE: 70 MMHG | HEIGHT: 75 IN | SYSTOLIC BLOOD PRESSURE: 110 MMHG | BODY MASS INDEX: 22.01 KG/M2 | WEIGHT: 177 LBS

## 2019-07-02 DIAGNOSIS — E53.8 VITAMIN B12 DEFICIENCY (NON ANEMIC): ICD-10-CM

## 2019-07-02 DIAGNOSIS — I48.92 ATRIAL FIBRILLATION AND FLUTTER (H): ICD-10-CM

## 2019-07-02 DIAGNOSIS — I48.91 ATRIAL FIBRILLATION AND FLUTTER (H): ICD-10-CM

## 2019-07-02 DIAGNOSIS — I49.5 SSS (SICK SINUS SYNDROME) (H): ICD-10-CM

## 2019-07-02 DIAGNOSIS — I48.20 CHRONIC A-FIB (H): ICD-10-CM

## 2019-07-02 DIAGNOSIS — Z95.0 CARDIAC PACEMAKER IN SITU: Primary | ICD-10-CM

## 2019-07-02 PROCEDURE — 99214 OFFICE O/P EST MOD 30 MIN: CPT | Mod: 25 | Performed by: INTERNAL MEDICINE

## 2019-07-02 PROCEDURE — 93279 PRGRMG DEV EVAL PM/LDLS PM: CPT | Performed by: INTERNAL MEDICINE

## 2019-07-02 ASSESSMENT — MIFFLIN-ST. JEOR: SCORE: 1553.5

## 2019-07-02 NOTE — PROGRESS NOTES
"Electrophysiology/ Clinic Note         H&P and Plan:     REASON FOR VISIT:   Routine evaluation.      HISTORY OF PRESENT ILLNESS:  Mr. Robledo is a pleasant 89-year-old gentleman with a history of hypertension, hyperlipidemia, preserved EF heart failure, paroxysmal atrial fibrillation and tachybrady syndrome (previous pacemaker implantation), who is here for routine follow up.     Today he informs he has done well.  He has no complaints during this visit.  He denies any symptoms such as chest pain, shortness of breath, lightheadedness, near-syncope or syncope.      Device was interrogated today.  Lead problems are stable.  Heart rate is ranging around 50 to 70 bpm.     Echocardiogram obtained on 05/23/2018 revealed an EF of 55%-60%.  EKG obtained 05/23 revealed atrial fibrillation with controlled ventricular response.      ASSESSMENT AND PLAN:   1.  Device care.  Continue device checks q.3 months.   2.  Paroxysmal AFib, asymptomatic.  Continue therapy with metoprolol.   3.  Embolic prevention.  CHADS-VASc score of 3.  Continue coagulation of Coumadin indefinitely.   4.  Hypertension.  Blood pressure is well controlled.  Continue lisinopril and metoprolol.  5.  Preserved EF heart failure. Euvolemic on physical exam.  6. Follow up with [facility] in 1 year or earlier as needed.      London Lynn MD    Physical Exam:  Vitals: /70   Pulse 80   Ht 1.905 m (6' 3\")   Wt 80.3 kg (177 lb)   BMI 22.12 kg/m      Constitutional:  AAO x3.  Pt is in NAD.  HEAD: normocephalic.  SKIN: Skin normal color, texture and turgor with no lesions or eruptions.  Eyes: PERRL, EOMI.  ENT:  Supple, normal JVP. No lymphadenopathy or thyroid enlargement.  Chest:  CTAB.  Cardiac:    RRR, normal  S1 and S2.  No murmurs rubs or gallop.    Abdomen:  Normal BS.  Soft, non-tender and non-distended.  No rebound or guarding.    Extremities:  Pedious pulses palpable B/L.  No LE edema noticed.   Neurological: Strength and sensation grossly symmetric " and intact throughout.       CURRENT MEDICATIONS:  Current Outpatient Medications   Medication Sig Dispense Refill     Cyanocobalamin (B-12) 1000 MCG CAPS Take 1 tablet by mouth daily 90 capsule 3     furosemide (LASIX) 40 MG tablet 40mg in the AM, 20mg in the  tablet 3     lisinopril (PRINIVIL/ZESTRIL) 20 MG tablet Take 1 tablet (20 mg) by mouth daily 180 tablet 3     metoprolol succinate ER (TOPROL XL) 50 MG 24 hr tablet Take 1 tablet (50 mg) by mouth At Bedtime 90 tablet 3     omeprazole (PRILOSEC) 20 MG DR capsule Take 1 capsule (20 mg) by mouth daily 90 capsule 0     order for DME Equipment being ordered: Walker Wheels () and Walker ()  Treatment Diagnosis: Difficulty ambulating 1 each 0     order for DME Equipment being ordered: Walker Wheels ()  Treatment Diagnosis:  Weakness,colitis. 1 Device 0     potassium chloride SA (K-DUR/KLOR-CON M) 20 MEQ CR tablet Take 1 tablet (20 mEq) by mouth 2 times daily 180 tablet 2     sertraline (ZOLOFT) 100 MG tablet Take 100 mg by mouth daily       Simethicone (GAS-X EXTRA STRENGTH) 125 MG CAPS Take 1 capsule by mouth 2 times daily as needed 60 capsule 11     simvastatin (ZOCOR) 10 MG tablet Take 1 tablet (10 mg) by mouth At Bedtime 90 tablet 3     warfarin (COUMADIN) 5 MG tablet Take 0.5-1 tablets (2.5-5 mg) by mouth daily as directed by your INR clinic. 90 tablet 0     WARFARIN SODIUM PO Take by mouth daily 2.5 mg M/W/Sa  5 mg AOD         ALLERGIES     Allergies   Allergen Reactions     Penicillins Rash       PAST MEDICAL HISTORY:  Past Medical History:   Diagnosis Date     AV node dysfunction      Chronic atrial fibrillation (H) 2004     GERD (gastroesophageal reflux disease)      Hyperlipidemia      Near syncope      MARILU (obstructive sleep apnea)        PAST SURGICAL HISTORY:  Past Surgical History:   Procedure Laterality Date     IMPLANT PACEMAKER  08/10/2015       FAMILY HISTORY:  Family History   Problem Relation Age of Onset      Influenza/Pneumonia Mother      Prostate Cancer Father        SOCIAL HISTORY:  Social History     Socioeconomic History     Marital status:      Spouse name: None     Number of children: None     Years of education: None     Highest education level: None   Occupational History     None   Social Needs     Financial resource strain: None     Food insecurity:     Worry: None     Inability: None     Transportation needs:     Medical: None     Non-medical: None   Tobacco Use     Smoking status: Never Smoker     Smokeless tobacco: Never Used   Substance and Sexual Activity     Alcohol use: No     Drug use: No     Sexual activity: Yes     Partners: Female   Lifestyle     Physical activity:     Days per week: None     Minutes per session: None     Stress: None   Relationships     Social connections:     Talks on phone: None     Gets together: None     Attends Tenriism service: None     Active member of club or organization: None     Attends meetings of clubs or organizations: None     Relationship status: None     Intimate partner violence:     Fear of current or ex partner: None     Emotionally abused: None     Physically abused: None     Forced sexual activity: None   Other Topics Concern     Parent/sibling w/ CABG, MI or angioplasty before 65F 55M? Not Asked   Social History Narrative     None       Review of Systems:  Skin:        Eyes:       ENT:       Respiratory:       Cardiovascular:       Gastroenterology:      Genitourinary:       Musculoskeletal:       Neurologic:       Psychiatric:       Heme/Lymph/Imm:       Endocrine:           Recent Lab Results:  LIPID RESULTS:  Lab Results   Component Value Date    CHOL 161 06/28/2019    HDL 36 (L) 06/28/2019    LDL 88 06/28/2019    TRIG 186 (H) 06/28/2019       LIVER ENZYME RESULTS:  Lab Results   Component Value Date    AST 10 05/14/2018    ALT <5 (L) 06/28/2019       CBC RESULTS:  Lab Results   Component Value Date    WBC 8.1 03/04/2019    RBC 4.80 03/04/2019     HGB 13.3 03/04/2019    HCT 40.7 03/04/2019    MCV 85 03/04/2019    MCH 27.7 03/04/2019    MCHC 32.7 03/04/2019    RDW 13.3 03/04/2019     03/04/2019       BMP RESULTS:  Lab Results   Component Value Date     03/04/2019    POTASSIUM 3.9 03/04/2019    CHLORIDE 105 03/04/2019    CO2 30 (H) 03/04/2019    ANIONGAP 9.9 03/04/2019     (H) 03/04/2019    BUN 18 03/04/2019    CR 1.17 03/04/2019    GFRESTIMATED 59 (L) 03/04/2019    GFRESTBLACK 71 03/04/2019    ANGELITA 9.4 03/04/2019        A1C RESULTS:  No results found for: A1C    INR RESULTS:  Lab Results   Component Value Date    INR 2.4 (A) 06/07/2019    INR 2.1 (A) 05/09/2019    INR 2.4 08/03/2018    INR 2.6 07/27/2018         ECHOCARDIOGRAM  No results found for this or any previous visit (from the past 8760 hour(s)).      No orders of the defined types were placed in this encounter.    No orders of the defined types were placed in this encounter.    There are no discontinued medications.      Encounter Diagnoses   Name Primary?     Chronic a-fib (H)      Vitamin B12 deficiency (non anemic)          CC  EVARISTO Solis  [facility] HE[facility] [address] [address]

## 2019-07-02 NOTE — LETTER
"7/2/2019    Lilly Zepeda MD  [address]  [place] [address]    RE: Santosh Robledo       Dear Colleague,    I had the pleasure of seeing Santosh Robledo in the [facility].    Electrophysiology/ Clinic Note         H&P and Plan:     REASON FOR VISIT:    Routine evaluation.      HISTORY OF PRESENT ILLNESS:  Mr. Robledo is a pleasant 89-year-old gentleman with a history of hypertension, hyperlipidemia, preserved EF heart failure, paroxysmal atrial fibrillation and tachybrady syndrome (previous pacemaker implantation), who is here for routine follow up.     Today he informs he has done well.  He has no complaints during this visit.  He denies any symptoms such as chest pain, shortness of breath, lightheadedness, near-syncope or syncope.      Device was interrogated today.  Lead problems are stable.  Heart rate is ranging around 50 to 70 bpm.     Echocardiogram obtained on 05/23/2018 revealed an EF of 55%-60%.  EKG obtained 05/23 revealed atrial fibrillation with controlled ventricular response.      ASSESSMENT AND PLAN:   1.  Device care.  Continue device checks q.3 months.   2.  Paroxysmal AFib, asymptomatic.  Continue therapy with metoprolol.   3.  Embolic prevention.  CHADS-VASc score of 3.  Continue coagulation of Coumadin indefinitely.   4.  Hypertension.  Blood pressure is well controlled.  Continue lisinopril and metoprolol.  5.  Preserved EF heart failure. Euvolemic on physical exam.  6. Follow up with [facility] in 1 year or earlier as needed.      London Lynn MD    Physical Exam:  Vitals: /70   Pulse 80   Ht 1.905 m (6' 3\")   Wt 80.3 kg (177 lb)   BMI 22.12 kg/m       Constitutional:  AAO x3.  Pt is in NAD.  HEAD: normocephalic.  SKIN: Skin normal color, texture and turgor with no lesions or eruptions.  Eyes: PERRL, EOMI.  ENT:  Supple, normal JVP. No lymphadenopathy or thyroid enlargement.  Chest:  CTAB.  Cardiac:    RRR, normal  S1 and S2.  No murmurs " rubs or gallop.    Abdomen:  Normal BS.  Soft, non-tender and non-distended.  No rebound or guarding.    Extremities:  Pedious pulses palpable B/L.  No LE edema noticed.   Neurological: Strength and sensation grossly symmetric and intact throughout.       CURRENT MEDICATIONS:  Current Outpatient Medications   Medication Sig Dispense Refill     Cyanocobalamin (B-12) 1000 MCG CAPS Take 1 tablet by mouth daily 90 capsule 3     furosemide (LASIX) 40 MG tablet 40mg in the AM, 20mg in the  tablet 3     lisinopril (PRINIVIL/ZESTRIL) 20 MG tablet Take 1 tablet (20 mg) by mouth daily 180 tablet 3     metoprolol succinate ER (TOPROL XL) 50 MG 24 hr tablet Take 1 tablet (50 mg) by mouth At Bedtime 90 tablet 3     omeprazole (PRILOSEC) 20 MG DR capsule Take 1 capsule (20 mg) by mouth daily 90 capsule 0     order for DME Equipment being ordered: Walker Wheels () and Walker ()  Treatment Diagnosis: Difficulty ambulating 1 each 0     order for DME Equipment being ordered: Walker Wheels ()  Treatment Diagnosis:  Weakness,colitis. 1 Device 0     potassium chloride SA (K-DUR/KLOR-CON M) 20 MEQ CR tablet Take 1 tablet (20 mEq) by mouth 2 times daily 180 tablet 2     sertraline (ZOLOFT) 100 MG tablet Take 100 mg by mouth daily       Simethicone (GAS-X EXTRA STRENGTH) 125 MG CAPS Take 1 capsule by mouth 2 times daily as needed 60 capsule 11     simvastatin (ZOCOR) 10 MG tablet Take 1 tablet (10 mg) by mouth At Bedtime 90 tablet 3     warfarin (COUMADIN) 5 MG tablet Take 0.5-1 tablets (2.5-5 mg) by mouth daily as directed by your INR clinic. 90 tablet 0     WARFARIN SODIUM PO Take by mouth daily 2.5 mg M/W/Sa  5 mg AOD         ALLERGIES     Allergies   Allergen Reactions     Penicillins Rash       PAST MEDICAL HISTORY:  Past Medical History:   Diagnosis Date     AV node dysfunction      Chronic atrial fibrillation (H) 2004     GERD (gastroesophageal reflux disease)      Hyperlipidemia      Near syncope      MARILU  (obstructive sleep apnea)        PAST SURGICAL HISTORY:  Past Surgical History:   Procedure Laterality Date     IMPLANT PACEMAKER  08/10/2015       FAMILY HISTORY:  Family History   Problem Relation Age of Onset     Influenza/Pneumonia Mother      Prostate Cancer Father        SOCIAL HISTORY:  Social History     Socioeconomic History     Marital status:      Spouse name: None     Number of children: None     Years of education: None     Highest education level: None   Occupational History     None   Social Needs     Financial resource strain: None     Food insecurity:     Worry: None     Inability: None     Transportation needs:     Medical: None     Non-medical: None   Tobacco Use     Smoking status: Never Smoker     Smokeless tobacco: Never Used   Substance and Sexual Activity     Alcohol use: No     Drug use: No     Sexual activity: Yes     Partners: Female   Lifestyle     Physical activity:     Days per week: None     Minutes per session: None     Stress: None   Relationships     Social connections:     Talks on phone: None     Gets together: None     Attends Mu-ism service: None     Active member of club or organization: None     Attends meetings of clubs or organizations: None     Relationship status: None     Intimate partner violence:     Fear of current or ex partner: None     Emotionally abused: None     Physically abused: None     Forced sexual activity: None   Other Topics Concern     Parent/sibling w/ CABG, MI or angioplasty before 65F 55M? Not Asked   Social History Narrative     None       Review of Systems:  Skin:        Eyes:       ENT:       Respiratory:       Cardiovascular:       Gastroenterology:      Genitourinary:       Musculoskeletal:       Neurologic:       Psychiatric:       Heme/Lymph/Imm:       Endocrine:           Recent Lab Results:  LIPID RESULTS:  Lab Results   Component Value Date    CHOL 161 06/28/2019    HDL 36 (L) 06/28/2019    LDL 88 06/28/2019    TRIG 186 (H)  06/28/2019       LIVER ENZYME RESULTS:  Lab Results   Component Value Date    AST 10 05/14/2018    ALT <5 (L) 06/28/2019       CBC RESULTS:  Lab Results   Component Value Date    WBC 8.1 03/04/2019    RBC 4.80 03/04/2019    HGB 13.3 03/04/2019    HCT 40.7 03/04/2019    MCV 85 03/04/2019    MCH 27.7 03/04/2019    MCHC 32.7 03/04/2019    RDW 13.3 03/04/2019     03/04/2019       BMP RESULTS:  Lab Results   Component Value Date     03/04/2019    POTASSIUM 3.9 03/04/2019    CHLORIDE 105 03/04/2019    CO2 30 (H) 03/04/2019    ANIONGAP 9.9 03/04/2019     (H) 03/04/2019    BUN 18 03/04/2019    CR 1.17 03/04/2019    GFRESTIMATED 59 (L) 03/04/2019    GFRESTBLACK 71 03/04/2019    ANGELITA 9.4 03/04/2019        A1C RESULTS:  No results found for: A1C    INR RESULTS:  Lab Results   Component Value Date    INR 2.4 (A) 06/07/2019    INR 2.1 (A) 05/09/2019    INR 2.4 08/03/2018    INR 2.6 07/27/2018         ECHOCARDIOGRAM  No results found for this or any previous visit (from the past 8760 hour(s)).      No orders of the defined types were placed in this encounter.    No orders of the defined types were placed in this encounter.    There are no discontinued medications.      Encounter Diagnoses   Name Primary?     Chronic a-fib (H)      Vitamin B12 deficiency (non anemic)            Thank you for allowing me to participate in the care of your patient.    Sincerely,     London Lynn MD     [facility]

## 2019-07-02 NOTE — LETTER
"7/2/2019    Lilly Zepeda MD  [address]  [place] [address]    RE: Santosh Robledo       Dear Colleague,    I had the pleasure of seeing Santosh Robledo in the [facility].    Electrophysiology/ Clinic Note         H&P and Plan:     REASON FOR VISIT:    Routine evaluation.      HISTORY OF PRESENT ILLNESS:  Mr. Robledo is a pleasant 89-year-old gentleman with a history of hypertension, hyperlipidemia, preserved EF heart failure, paroxysmal atrial fibrillation and tachybrady syndrome (previous pacemaker implantation), who is here for routine follow up.     Today he informs he has done well.  He has no complaints during this visit.  He denies any symptoms such as chest pain, shortness of breath, lightheadedness, near-syncope or syncope.      Device was interrogated today.  Lead problems are stable.  Heart rate is ranging around 50 to 70 bpm.     Echocardiogram obtained on 05/23/2018 revealed an EF of 55%-60%.  EKG obtained 05/23 revealed atrial fibrillation with controlled ventricular response.      ASSESSMENT AND PLAN:   1.  Device care.  Continue device checks q.3 months.   2.  Paroxysmal AFib, asymptomatic.  Continue therapy with metoprolol.   3.  Embolic prevention.  CHADS-VASc score of 3.  Continue coagulation of Coumadin indefinitely.   4.  Hypertension.  Blood pressure is well controlled.  Continue lisinopril and metoprolol.  5.  Preserved EF heart failure. Euvolemic on physical exam.  6. Follow up with [facility] in 1 year or earlier as needed.      London Lynn MD    Physical Exam:  Vitals: /70   Pulse 80   Ht 1.905 m (6' 3\")   Wt 80.3 kg (177 lb)   BMI 22.12 kg/m       Constitutional:  AAO x3.  Pt is in NAD.  HEAD: normocephalic.  SKIN: Skin normal color, texture and turgor with no lesions or eruptions.  Eyes: PERRL, EOMI.  ENT:  Supple, normal JVP. No lymphadenopathy or thyroid enlargement.  Chest:  CTAB.  Cardiac:    RRR, normal  S1 and S2.  No murmurs " rubs or gallop.    Abdomen:  Normal BS.  Soft, non-tender and non-distended.  No rebound or guarding.    Extremities:  Pedious pulses palpable B/L.  No LE edema noticed.   Neurological: Strength and sensation grossly symmetric and intact throughout.       CURRENT MEDICATIONS:  Current Outpatient Medications   Medication Sig Dispense Refill     Cyanocobalamin (B-12) 1000 MCG CAPS Take 1 tablet by mouth daily 90 capsule 3     furosemide (LASIX) 40 MG tablet 40mg in the AM, 20mg in the  tablet 3     lisinopril (PRINIVIL/ZESTRIL) 20 MG tablet Take 1 tablet (20 mg) by mouth daily 180 tablet 3     metoprolol succinate ER (TOPROL XL) 50 MG 24 hr tablet Take 1 tablet (50 mg) by mouth At Bedtime 90 tablet 3     omeprazole (PRILOSEC) 20 MG DR capsule Take 1 capsule (20 mg) by mouth daily 90 capsule 0     order for DME Equipment being ordered: Walker Wheels () and Walker ()  Treatment Diagnosis: Difficulty ambulating 1 each 0     order for DME Equipment being ordered: Walker Wheels ()  Treatment Diagnosis:  Weakness,colitis. 1 Device 0     potassium chloride SA (K-DUR/KLOR-CON M) 20 MEQ CR tablet Take 1 tablet (20 mEq) by mouth 2 times daily 180 tablet 2     sertraline (ZOLOFT) 100 MG tablet Take 100 mg by mouth daily       Simethicone (GAS-X EXTRA STRENGTH) 125 MG CAPS Take 1 capsule by mouth 2 times daily as needed 60 capsule 11     simvastatin (ZOCOR) 10 MG tablet Take 1 tablet (10 mg) by mouth At Bedtime 90 tablet 3     warfarin (COUMADIN) 5 MG tablet Take 0.5-1 tablets (2.5-5 mg) by mouth daily as directed by your INR clinic. 90 tablet 0     WARFARIN SODIUM PO Take by mouth daily 2.5 mg M/W/Sa  5 mg AOD         ALLERGIES     Allergies   Allergen Reactions     Penicillins Rash       PAST MEDICAL HISTORY:  Past Medical History:   Diagnosis Date     AV node dysfunction      Chronic atrial fibrillation (H) 2004     GERD (gastroesophageal reflux disease)      Hyperlipidemia      Near syncope      MARILU  (obstructive sleep apnea)        PAST SURGICAL HISTORY:  Past Surgical History:   Procedure Laterality Date     IMPLANT PACEMAKER  08/10/2015       FAMILY HISTORY:  Family History   Problem Relation Age of Onset     Influenza/Pneumonia Mother      Prostate Cancer Father        SOCIAL HISTORY:  Social History     Socioeconomic History     Marital status:      Spouse name: None     Number of children: None     Years of education: None     Highest education level: None   Occupational History     None   Social Needs     Financial resource strain: None     Food insecurity:     Worry: None     Inability: None     Transportation needs:     Medical: None     Non-medical: None   Tobacco Use     Smoking status: Never Smoker     Smokeless tobacco: Never Used   Substance and Sexual Activity     Alcohol use: No     Drug use: No     Sexual activity: Yes     Partners: Female   Lifestyle     Physical activity:     Days per week: None     Minutes per session: None     Stress: None   Relationships     Social connections:     Talks on phone: None     Gets together: None     Attends Voodoo service: None     Active member of club or organization: None     Attends meetings of clubs or organizations: None     Relationship status: None     Intimate partner violence:     Fear of current or ex partner: None     Emotionally abused: None     Physically abused: None     Forced sexual activity: None   Other Topics Concern     Parent/sibling w/ CABG, MI or angioplasty before 65F 55M? Not Asked   Social History Narrative     None       Review of Systems:  Skin:        Eyes:       ENT:       Respiratory:       Cardiovascular:       Gastroenterology:      Genitourinary:       Musculoskeletal:       Neurologic:       Psychiatric:       Heme/Lymph/Imm:       Endocrine:           Recent Lab Results:  LIPID RESULTS:  Lab Results   Component Value Date    CHOL 161 06/28/2019    HDL 36 (L) 06/28/2019    LDL 88 06/28/2019    TRIG 186 (H)  06/28/2019       LIVER ENZYME RESULTS:  Lab Results   Component Value Date    AST 10 05/14/2018    ALT <5 (L) 06/28/2019       CBC RESULTS:  Lab Results   Component Value Date    WBC 8.1 03/04/2019    RBC 4.80 03/04/2019    HGB 13.3 03/04/2019    HCT 40.7 03/04/2019    MCV 85 03/04/2019    MCH 27.7 03/04/2019    MCHC 32.7 03/04/2019    RDW 13.3 03/04/2019     03/04/2019       BMP RESULTS:  Lab Results   Component Value Date     03/04/2019    POTASSIUM 3.9 03/04/2019    CHLORIDE 105 03/04/2019    CO2 30 (H) 03/04/2019    ANIONGAP 9.9 03/04/2019     (H) 03/04/2019    BUN 18 03/04/2019    CR 1.17 03/04/2019    GFRESTIMATED 59 (L) 03/04/2019    GFRESTBLACK 71 03/04/2019    ANGELITA 9.4 03/04/2019        A1C RESULTS:  No results found for: A1C    INR RESULTS:  Lab Results   Component Value Date    INR 2.4 (A) 06/07/2019    INR 2.1 (A) 05/09/2019    INR 2.4 08/03/2018    INR 2.6 07/27/2018         ECHOCARDIOGRAM  No results found for this or any previous visit (from the past 8760 hour(s)).      No orders of the defined types were placed in this encounter.    No orders of the defined types were placed in this encounter.    There are no discontinued medications.      Encounter Diagnoses   Name Primary?     Chronic a-fib (H)      Vitamin B12 deficiency (non anemic)          CC  EVARISTO Solis  [address] [address]                Thank you for allowing me to participate in the care of your patient.      Sincerely,     London Lynn MD     [facility]    cc:   EVARISTO Solis  [address] [address]

## 2019-07-09 ENCOUNTER — ANTICOAGULATION THERAPY VISIT (OUTPATIENT)
Dept: NURSING | Facility: CLINIC | Age: 84
End: 2019-07-09
Payer: MEDICARE

## 2019-07-09 LAB — INR POINT OF CARE: 4.2 (ref 0.86–1.14)

## 2019-07-09 PROCEDURE — 85610 PROTHROMBIN TIME: CPT | Mod: QW

## 2019-07-09 PROCEDURE — 36416 COLLJ CAPILLARY BLOOD SPEC: CPT

## 2019-07-09 NOTE — PROGRESS NOTES
ANTICOAGULATION FOLLOW-UP CLINIC VISIT    Patient Name:  Santosh KENNY Hjelmstad  Date:  2019  Contact Type:  Face to Face    SUBJECTIVE:  Patient Findings     Comments:   Patient denies any identifiable changes that caused the increase in INR.         Clinical Outcomes     Comments:   Patient denies any identifiable changes that caused the increase in INR.            OBJECTIVE    INR Protime   Date Value Ref Range Status   2019 4.2 (A) 0.86 - 1.14 Final       ASSESSMENT / PLAN  INR assessment SUPRA    Recheck INR In: 2 WEEKS    INR Location Clinic      Anticoagulation Summary  As of 2019    INR goal:   2.0-3.0   TTR:   67.3 % (1.1 y)   INR used for dosin.2! (2019)   Warfarin maintenance plan:   2.5 mg (5 mg x 0.5) every Tue, Sat; 5 mg (5 mg x 1) all other days   Full warfarin instructions:   : Hold; 7/10: 2.5 mg; Otherwise 2.5 mg every Tue, Sat; 5 mg all other days   Weekly warfarin total:   30 mg   Plan last modified:   Bryanna Heredia RN (2019)   Next INR check:   2019   Target end date:       Indications    Chronic atrial fibrillation (H) [I48.2]  Long term current use of anticoagulant therapy [Z79.01]             Anticoagulation Episode Summary     INR check location:       Preferred lab:       Send INR reminders to:   JOHN CALDERON    Comments:         Anticoagulation Care Providers     Provider Role Specialty Phone number    Lilly Zepeda MD [facility] Internal Medicine [phone]            See the Encounter Report to view Anticoagulation Flowsheet and Dosing Calendar (Go to Encounters tab in chart review, and find the Anticoagulation Therapy Visit)    Pt INR is 4.2 today. Pt is here with his wife. Pt's wife states that pt is not eating as much as before. He is not eating as many greens either. For now, advised pt to HOLD warfarin today then take 2.5 mg tomorrow on 7/10/19 then continue maintenance dose of 2.5 mg on ,  and 5 mg all the other days.  Recheck in 2 weeks. If pt INR still elevated, then will need to decrease maintenance dose. Pt's wife given the vitamin K hand out on foods with vitamin K in them. Encouraged pt to eat a salad today or dark green leafy vegetables like broccoli. Santosh and his wife aware if signs of clotting (pain, tenderness, swelling, color change in leg or arm, SOB) and bleeding occur (blood in stool, urine, large bruising, bleeding gums, nosebleeds) to have INR check sooner. If sx severe report to ER or concerned for stroke call 911. If general questions or concerns arise, call clinic.         Sigrid Brown RN

## 2019-07-10 LAB
MDC_IDC_LEAD_IMPLANT_DT: NORMAL
MDC_IDC_LEAD_LOCATION: NORMAL
MDC_IDC_LEAD_LOCATION_DETAIL_1: NORMAL
MDC_IDC_LEAD_MFG: NORMAL
MDC_IDC_LEAD_MODEL: NORMAL
MDC_IDC_LEAD_POLARITY_TYPE: NORMAL
MDC_IDC_LEAD_SERIAL: NORMAL
MDC_IDC_MSMT_BATTERY_DTM: NORMAL
MDC_IDC_MSMT_BATTERY_REMAINING_LONGEVITY: 100 MO
MDC_IDC_MSMT_BATTERY_RRT_TRIGGER: 2.83
MDC_IDC_MSMT_BATTERY_STATUS: NORMAL
MDC_IDC_MSMT_BATTERY_VOLTAGE: 3.02 V
MDC_IDC_MSMT_LEADCHNL_RV_IMPEDANCE_VALUE: 627 OHM
MDC_IDC_MSMT_LEADCHNL_RV_IMPEDANCE_VALUE: 722 OHM
MDC_IDC_MSMT_LEADCHNL_RV_PACING_THRESHOLD_AMPLITUDE: 0.5 V
MDC_IDC_MSMT_LEADCHNL_RV_PACING_THRESHOLD_PULSEWIDTH: 0.4 MS
MDC_IDC_MSMT_LEADCHNL_RV_SENSING_INTR_AMPL: 10 MV
MDC_IDC_MSMT_LEADCHNL_RV_SENSING_INTR_AMPL: 9.12 MV
MDC_IDC_PG_IMPLANT_DTM: NORMAL
MDC_IDC_PG_MFG: NORMAL
MDC_IDC_PG_MODEL: NORMAL
MDC_IDC_PG_SERIAL: NORMAL
MDC_IDC_PG_TYPE: NORMAL
MDC_IDC_SESS_CLINIC_NAME: NORMAL
MDC_IDC_SESS_DTM: NORMAL
MDC_IDC_SESS_TYPE: NORMAL
MDC_IDC_SET_BRADY_HYSTRATE: NORMAL
MDC_IDC_SET_BRADY_LOWRATE: 60 {BEATS}/MIN
MDC_IDC_SET_BRADY_MAX_SENSOR_RATE: 130 {BEATS}/MIN
MDC_IDC_SET_BRADY_MODE: NORMAL
MDC_IDC_SET_LEADCHNL_RV_PACING_AMPLITUDE: 1.5 V
MDC_IDC_SET_LEADCHNL_RV_PACING_ANODE_ELECTRODE_1: NORMAL
MDC_IDC_SET_LEADCHNL_RV_PACING_ANODE_LOCATION_1: NORMAL
MDC_IDC_SET_LEADCHNL_RV_PACING_CAPTURE_MODE: NORMAL
MDC_IDC_SET_LEADCHNL_RV_PACING_CATHODE_ELECTRODE_1: NORMAL
MDC_IDC_SET_LEADCHNL_RV_PACING_CATHODE_LOCATION_1: NORMAL
MDC_IDC_SET_LEADCHNL_RV_PACING_POLARITY: NORMAL
MDC_IDC_SET_LEADCHNL_RV_PACING_PULSEWIDTH: 0.4 MS
MDC_IDC_SET_LEADCHNL_RV_SENSING_ANODE_ELECTRODE_1: NORMAL
MDC_IDC_SET_LEADCHNL_RV_SENSING_ANODE_LOCATION_1: NORMAL
MDC_IDC_SET_LEADCHNL_RV_SENSING_CATHODE_ELECTRODE_1: NORMAL
MDC_IDC_SET_LEADCHNL_RV_SENSING_CATHODE_LOCATION_1: NORMAL
MDC_IDC_SET_LEADCHNL_RV_SENSING_POLARITY: NORMAL
MDC_IDC_SET_LEADCHNL_RV_SENSING_SENSITIVITY: 0.9 MV
MDC_IDC_SET_ZONE_DETECTION_INTERVAL: 360 MS
MDC_IDC_SET_ZONE_TYPE: NORMAL
MDC_IDC_STAT_BRADY_DTM_END: NORMAL
MDC_IDC_STAT_BRADY_DTM_START: NORMAL
MDC_IDC_STAT_BRADY_RV_PERCENT_PACED: 82.3 %
MDC_IDC_STAT_EPISODE_RECENT_COUNT: 0
MDC_IDC_STAT_EPISODE_RECENT_COUNT: 2
MDC_IDC_STAT_EPISODE_RECENT_COUNT_DTM_END: NORMAL
MDC_IDC_STAT_EPISODE_RECENT_COUNT_DTM_END: NORMAL
MDC_IDC_STAT_EPISODE_RECENT_COUNT_DTM_START: NORMAL
MDC_IDC_STAT_EPISODE_RECENT_COUNT_DTM_START: NORMAL
MDC_IDC_STAT_EPISODE_TOTAL_COUNT: 0
MDC_IDC_STAT_EPISODE_TOTAL_COUNT: 4
MDC_IDC_STAT_EPISODE_TOTAL_COUNT_DTM_END: NORMAL
MDC_IDC_STAT_EPISODE_TOTAL_COUNT_DTM_END: NORMAL
MDC_IDC_STAT_EPISODE_TOTAL_COUNT_DTM_START: NORMAL
MDC_IDC_STAT_EPISODE_TOTAL_COUNT_DTM_START: NORMAL
MDC_IDC_STAT_EPISODE_TYPE: NORMAL

## 2019-07-19 DIAGNOSIS — F33.9 EPISODE OF RECURRENT MAJOR DEPRESSIVE DISORDER, UNSPECIFIED DEPRESSION EPISODE SEVERITY (H): ICD-10-CM

## 2019-07-19 NOTE — TELEPHONE ENCOUNTER
Disp Refills Start End ROB   sertraline (ZOLOFT) 100 MG tablet     --   Sig - Route: Take 100 mg by mouth daily    Class: Historical       Last Written Prescription Date:  ---  Last Fill Quantity: ---,  # refills: ---   Last office visit: 6/29/2018 with prescribing provider:     Future Office Visit:         Routing refill request to provider for review/approval because:  Medication is reported/historical

## 2019-07-26 ENCOUNTER — ANTICOAGULATION THERAPY VISIT (OUTPATIENT)
Dept: NURSING | Facility: CLINIC | Age: 84
End: 2019-07-26
Payer: MEDICARE

## 2019-07-26 DIAGNOSIS — I48.20 CHRONIC ATRIAL FIBRILLATION (H): ICD-10-CM

## 2019-07-26 DIAGNOSIS — Z79.01 LONG TERM CURRENT USE OF ANTICOAGULANT THERAPY: ICD-10-CM

## 2019-07-26 LAB — INR POINT OF CARE: 4.1 (ref 0.86–1.14)

## 2019-07-26 PROCEDURE — 85610 PROTHROMBIN TIME: CPT | Mod: QW

## 2019-07-26 PROCEDURE — 99207 ZZC NO CHARGE NURSE ONLY: CPT

## 2019-07-26 PROCEDURE — 36416 COLLJ CAPILLARY BLOOD SPEC: CPT

## 2019-07-26 NOTE — PROGRESS NOTES
ANTICOAGULATION FOLLOW-UP CLINIC VISIT    Patient Name:  Santosh KENNY Hjelmstad  Date:  2019  Contact Type:  Face to Face    SUBJECTIVE:  Patient Findings     Positives:   Change in diet/appetite (lots of birthday cake!)             OBJECTIVE    INR Protime   Date Value Ref Range Status   2019 4.1 (A) 0.86 - 1.14 Final       ASSESSMENT / PLAN  No question data found.  Anticoagulation Summary  As of 2019    INR goal:   2.0-3.0   TTR:   64.5 % (1.1 y)   INR used for dosin.1! (2019)   Warfarin maintenance plan:   2.5 mg (5 mg x 0.5) every Mon, Wed, Fri; 5 mg (5 mg x 1) all other days   Full warfarin instructions:   8/3: 5 mg; Otherwise 2.5 mg every Mon, Wed, Fri; 5 mg all other days   Weekly warfarin total:   27.5 mg   Plan last modified:   Nery Yang RN (2019)   Next INR check:   2019   Target end date:       Indications    Chronic atrial fibrillation (H) [I48.2]  Long term current use of anticoagulant therapy [Z79.01]             Anticoagulation Episode Summary     INR check location:       Preferred lab:       Send INR reminders to:   JOHN CALDERON    Comments:         Anticoagulation Care Providers     Provider Role Specialty Phone number    Duane Lilly Castaneda MD [facility] Internal Medicine [phone]            See the Encounter Report to view Anticoagulation Flowsheet and Dosing Calendar (Go to Encounters tab in chart review, and find the Anticoagulation Therapy Visit)    INR: 4.1 Patient states that he has eaten few greens and a lot of birthday cake (89 years young on ) over the last 2 weeks.  Would like to try previous maintenance dose of 27.5 mg/wk as 2.5 mg M//, 5 mg ROW.  Recheck in 2 weeks. Nery Lund RN 2019 12:23 PM

## 2019-08-09 ENCOUNTER — ANTICOAGULATION THERAPY VISIT (OUTPATIENT)
Dept: NURSING | Facility: CLINIC | Age: 84
End: 2019-08-09
Payer: MEDICARE

## 2019-08-09 LAB — INR POINT OF CARE: 2.7 (ref 0.86–1.14)

## 2019-08-09 PROCEDURE — 99207 ZZC NO CHARGE NURSE ONLY: CPT

## 2019-08-09 PROCEDURE — 36416 COLLJ CAPILLARY BLOOD SPEC: CPT

## 2019-08-09 PROCEDURE — 85610 PROTHROMBIN TIME: CPT | Mod: QW

## 2019-08-09 NOTE — PROGRESS NOTES
ANTICOAGULATION FOLLOW-UP CLINIC VISIT    Patient Name:  Santosh KENNY Hjelmstad  Date:  2019  Contact Type:  Face to Face    SUBJECTIVE:  Patient Findings     Comments:   The patient was assessed for diet, medication, and activity level changes, missed or extra doses, bruising or bleeding, with no problem findings.          Clinical Outcomes     Comments:   The patient was assessed for diet, medication, and activity level changes, missed or extra doses, bruising or bleeding, with no problem findings.             OBJECTIVE    INR Protime   Date Value Ref Range Status   2019 2.7 (A) 0.86 - 1.14 Final       ASSESSMENT / PLAN  INR assessment THER    Recheck INR In: 4 WEEKS    INR Location Clinic      Anticoagulation Summary  As of 2019    INR goal:   2.0-3.0   TTR:   63.1 % (1.2 y)   INR used for dosin.7 (2019)   Warfarin maintenance plan:   2.5 mg (5 mg x 0.5) every Mon, Wed, Fri; 5 mg (5 mg x 1) all other days   Full warfarin instructions:   2.5 mg every Mon, Wed, Fri; 5 mg all other days   Weekly warfarin total:   27.5 mg   Plan last modified:   Nery Yang RN (2019)   Next INR check:   2019   Target end date:       Indications    Chronic atrial fibrillation (H) [I48.2]  Long term current use of anticoagulant therapy [Z79.01]             Anticoagulation Episode Summary     INR check location:       Preferred lab:       Send INR reminders to:   JOHN CALDERON    Comments:         Anticoagulation Care Providers     Provider Role Specialty Phone number    Duane Lillyjelani Castaneda MD [facility] Internal Medicine [phone]            See the Encounter Report to view Anticoagulation Flowsheet and Dosing Calendar (Go to Encounters tab in chart review, and find the Anticoagulation Therapy Visit)    Dosage adjustment made based on physician directed care plan.    Brandi Sullivan, RN

## 2019-08-29 DIAGNOSIS — I10 BENIGN ESSENTIAL HYPERTENSION: ICD-10-CM

## 2019-08-29 RX ORDER — POTASSIUM CHLORIDE 1500 MG/1
20 TABLET, EXTENDED RELEASE ORAL DAILY
Qty: 60 TABLET | Refills: 0 | Status: SHIPPED | OUTPATIENT
Start: 2019-08-29 | End: 2019-09-06

## 2019-08-29 NOTE — TELEPHONE ENCOUNTER
Medication is being filled for 1 time refill only due to:  Patient needs to be seen because it has been more than one year since last visit.     Patient notified.     Susie WOLFF RN,BSN

## 2019-08-29 NOTE — TELEPHONE ENCOUNTER
"Pending Prescriptions:                       Disp   Refills    KLOR-CON 20 MEQ CR tablet [Pharmacy Med N*180 ta*2            Sig: TAKE 1 TABLET (20 MEQ) BY MOUTH 2 TIMES DAILY    Last Written Prescription Date:  11/14/18  Last Fill Quantity: 180,  # refills: 2   Last office visit: 6/29/2018 with prescribing provider:     Future Office Visit:    Requested Prescriptions   Pending Prescriptions Disp Refills     KLOR-CON 20 MEQ CR tablet [Pharmacy Med Name: KLOR-CON M20 TABLET] 180 tablet 2     Sig: TAKE 1 TABLET (20 MEQ) BY MOUTH 2 TIMES DAILY       Potassium Supplements Protocol Passed - 8/29/2019  1:23 AM        Passed - Recent (12 mo) or future (30 days) visit within the authorizing provider's specialty     Patient had office visit in the last 12 months or has a visit in the next 30 days with authorizing provider or within the authorizing provider's specialty.  See \"Patient Info\" tab in inbasket, or \"Choose Columns\" in Meds & Orders section of the refill encounter.              Passed - Medication is active on med list        Passed - Patient is age 18 or older        Passed - Normal serum potassium in past 12 months     Recent Labs   Lab Test 03/04/19  0906   POTASSIUM 3.9                      "

## 2019-08-30 ENCOUNTER — TELEPHONE (OUTPATIENT)
Dept: FAMILY MEDICINE | Facility: CLINIC | Age: 84
End: 2019-08-30

## 2019-08-30 DIAGNOSIS — I48.20 CHRONIC ATRIAL FIBRILLATION (H): ICD-10-CM

## 2019-08-30 DIAGNOSIS — I10 BENIGN ESSENTIAL HYPERTENSION: ICD-10-CM

## 2019-08-30 DIAGNOSIS — Z79.01 LONG TERM CURRENT USE OF ANTICOAGULANT THERAPY: ICD-10-CM

## 2019-08-30 RX ORDER — WARFARIN SODIUM 5 MG/1
TABLET ORAL
Qty: 30 TABLET | Refills: 0 | Status: SHIPPED | OUTPATIENT
Start: 2019-08-30 | End: 2019-09-06

## 2019-08-30 RX ORDER — POTASSIUM CHLORIDE 1500 MG/1
TABLET, EXTENDED RELEASE ORAL
Qty: 180 TABLET | Refills: 2 | OUTPATIENT
Start: 2019-08-30

## 2019-08-30 NOTE — TELEPHONE ENCOUNTER
Last OV with PCP 6/29/18     Patient overdue for OV    Klor-Con Rx approved yesterday (duplicate)    Warfarin - Medication filled 1 time as pt is due for a follow-up in clinic. , Please reach out to patient to schedule appointment.    Maura SARGENT RN

## 2019-09-06 ENCOUNTER — OFFICE VISIT (OUTPATIENT)
Dept: FAMILY MEDICINE | Facility: CLINIC | Age: 84
End: 2019-09-06
Payer: MEDICARE

## 2019-09-06 ENCOUNTER — ANTICOAGULATION THERAPY VISIT (OUTPATIENT)
Dept: NURSING | Facility: CLINIC | Age: 84
End: 2019-09-06
Payer: MEDICARE

## 2019-09-06 VITALS
DIASTOLIC BLOOD PRESSURE: 70 MMHG | OXYGEN SATURATION: 96 % | HEIGHT: 75 IN | TEMPERATURE: 97 F | BODY MASS INDEX: 22.75 KG/M2 | SYSTOLIC BLOOD PRESSURE: 128 MMHG | WEIGHT: 183 LBS | HEART RATE: 62 BPM

## 2019-09-06 DIAGNOSIS — I48.20 CHRONIC ATRIAL FIBRILLATION (H): ICD-10-CM

## 2019-09-06 DIAGNOSIS — Z23 NEED FOR PROPHYLACTIC VACCINATION AND INOCULATION AGAINST INFLUENZA: Primary | ICD-10-CM

## 2019-09-06 DIAGNOSIS — I10 BENIGN ESSENTIAL HYPERTENSION: ICD-10-CM

## 2019-09-06 DIAGNOSIS — I50.32 CHRONIC DIASTOLIC (CONGESTIVE) HEART FAILURE (H): ICD-10-CM

## 2019-09-06 DIAGNOSIS — Z79.01 LONG TERM CURRENT USE OF ANTICOAGULANT THERAPY: ICD-10-CM

## 2019-09-06 DIAGNOSIS — K21.9 GASTROESOPHAGEAL REFLUX DISEASE, ESOPHAGITIS PRESENCE NOT SPECIFIED: ICD-10-CM

## 2019-09-06 DIAGNOSIS — E53.8 VITAMIN B12 DEFICIENCY (NON ANEMIC): ICD-10-CM

## 2019-09-06 LAB — INR POINT OF CARE: 1.9 (ref 0.86–1.14)

## 2019-09-06 PROCEDURE — 99214 OFFICE O/P EST MOD 30 MIN: CPT | Mod: 25 | Performed by: INTERNAL MEDICINE

## 2019-09-06 PROCEDURE — 90662 IIV NO PRSV INCREASED AG IM: CPT | Performed by: INTERNAL MEDICINE

## 2019-09-06 PROCEDURE — 85610 PROTHROMBIN TIME: CPT | Mod: QW

## 2019-09-06 PROCEDURE — G0008 ADMIN INFLUENZA VIRUS VAC: HCPCS | Performed by: INTERNAL MEDICINE

## 2019-09-06 PROCEDURE — 99207 ZZC NO CHARGE NURSE ONLY: CPT

## 2019-09-06 PROCEDURE — 36416 COLLJ CAPILLARY BLOOD SPEC: CPT

## 2019-09-06 RX ORDER — WARFARIN SODIUM 5 MG/1
TABLET ORAL
Qty: 30 TABLET | Refills: 3 | Status: SHIPPED | OUTPATIENT
Start: 2019-09-06 | End: 2020-01-03

## 2019-09-06 RX ORDER — POTASSIUM CHLORIDE 1500 MG/1
20 TABLET, EXTENDED RELEASE ORAL DAILY
Qty: 60 TABLET | Refills: 3 | Status: SHIPPED | OUTPATIENT
Start: 2019-09-06 | End: 2019-10-23

## 2019-09-06 ASSESSMENT — MIFFLIN-ST. JEOR: SCORE: 1575.71

## 2019-09-06 NOTE — PROGRESS NOTES
ANTICOAGULATION FOLLOW-UP CLINIC VISIT    Patient Name:  Santosh KENNY Hjelmstad  Date:  2019  Contact Type:  Face to Face    SUBJECTIVE:  Patient Findings     Positives:   Change in diet/appetite (Pt  drinks ensure 1 everyday and intermittently pt drinks more than 1 daily replacing drink with a meal.)             OBJECTIVE    INR Protime   Date Value Ref Range Status   2019 1.9 (A) 0.86 - 1.14 Final       ASSESSMENT / PLAN  INR assessment SUB    Recheck INR In: 3 WEEKS    INR Location Clinic      Anticoagulation Summary  As of 2019    INR goal:   2.0-3.0   TTR:   64.6 % (1.2 y)   INR used for dosin.9! (2019)   Warfarin maintenance plan:   2.5 mg (5 mg x 0.5) every Mon, Wed, Fri; 5 mg (5 mg x 1) all other days   Full warfarin instructions:   : 5 mg; Otherwise 2.5 mg every Mon, Wed, Fri; 5 mg all other days   Weekly warfarin total:   27.5 mg   Plan last modified:   Nery Yang RN (2019)   Next INR check:   2019   Target end date:       Indications    Chronic atrial fibrillation (H) [I48.2]  Long term current use of anticoagulant therapy [Z79.01]             Anticoagulation Episode Summary     INR check location:       Preferred lab:       Send INR reminders to:   JOHN CALDERON    Comments:         Anticoagulation Care Providers     Provider Role Specialty Phone number    Duane Lillyjelani Castaneda MD [facility] Internal Medicine [phone]            See the Encounter Report to view Anticoagulation Flowsheet and Dosing Calendar (Go to Encounters tab in chart review, and find the Anticoagulation Therapy Visit)    Dosage adjustment made based on physician directed care plan.    inr today 1.9, pt drinks 1 ensure everyday and at times, pt will drink a second ensure replacing it with a meal.   Pt plans to stick to 1 ensure daily, today will adjust 5mg, then will continue  maintenance dose 2.5mg MWF, 5mg AOD's  And check INR 3 weeks.  Pt aware s/s of clotting and when to seek  medical help.    Brandi Sullivan RN

## 2019-09-06 NOTE — PROGRESS NOTES
Chief Complaint:     Medication refills    HPI:   Patient Santosh Robledo is a very pleasant 89 year old male with history of hypertension, atrial fibrillation s/p pacemaker implantation who presents to Internal Medicine clinic today for medication refills. Regarding the patient's chronic atrial fibrillation s/p cardiac pacemaker implantation, the patient denies any chest pain, he is followed by the [facility] clinic in [place] on a routine basis. Regarding the patient's hypertension, the patient's BP is well controlled. No chest pain, headaches, fever or chills.      Current Medications:     Current Outpatient Medications   Medication Sig Dispense Refill     Cyanocobalamin (B-12) 1000 MCG CAPS Take 1 tablet by mouth daily 90 capsule 3     furosemide (LASIX) 40 MG tablet 40mg in the AM, 20mg in the  tablet 3     lisinopril (PRINIVIL/ZESTRIL) 20 MG tablet Take 1 tablet (20 mg) by mouth daily 180 tablet 3     metoprolol succinate ER (TOPROL XL) 50 MG 24 hr tablet Take 1 tablet (50 mg) by mouth At Bedtime 90 tablet 3     omeprazole (PRILOSEC) 20 MG DR capsule Take 1 capsule (20 mg) by mouth daily 90 capsule 3     order for DME Equipment being ordered: Walker Wheels () and Walker ()  Treatment Diagnosis: Difficulty ambulating 1 each 0     order for DME Equipment being ordered: Walker Wheels ()  Treatment Diagnosis:  Weakness,colitis. 1 Device 0     potassium chloride ER (KLOR-CON) 20 MEQ CR tablet Take 1 tablet (20 mEq) by mouth daily Due for appointment. Please schedule: [phone] 60 tablet 3     sertraline (ZOLOFT) 50 MG tablet TAKE 1 TABLET BY MOUTH EVERY DAY 90 tablet 3     Simethicone (GAS-X EXTRA STRENGTH) 125 MG CAPS Take 1 capsule by mouth 2 times daily as needed 60 capsule 11     simvastatin (ZOCOR) 10 MG tablet Take 1 tablet (10 mg) by mouth At Bedtime 90 tablet 3     warfarin (COUMADIN) 5 MG tablet TAKE 0.5-1 TABLETS (2.5-5 MG) BY MOUTH DAILY AS DIRECTED BY YOUR INR CLINIC 30  tablet 3         Allergies:      Allergies   Allergen Reactions     Penicillins Rash            Past Medical History:     Past Medical History:   Diagnosis Date     AV node dysfunction      Chronic atrial fibrillation (H) 2004     GERD (gastroesophageal reflux disease)      Hyperlipidemia      Near syncope      MARILU (obstructive sleep apnea)          Past Surgical History:     Past Surgical History:   Procedure Laterality Date     IMPLANT PACEMAKER  08/10/2015         Family Medical History:     Family History   Problem Relation Age of Onset     Influenza/Pneumonia Mother      Prostate Cancer Father          Social History:     Social History     Socioeconomic History     Marital status:      Spouse name: Not on file     Number of children: Not on file     Years of education: Not on file     Highest education level: Not on file   Occupational History     Not on file   Social Needs     Financial resource strain: Not on file     Food insecurity:     Worry: Not on file     Inability: Not on file     Transportation needs:     Medical: Not on file     Non-medical: Not on file   Tobacco Use     Smoking status: Never Smoker     Smokeless tobacco: Never Used   Substance and Sexual Activity     Alcohol use: No     Drug use: No     Sexual activity: Yes     Partners: Female   Lifestyle     Physical activity:     Days per week: Not on file     Minutes per session: Not on file     Stress: Not on file   Relationships     Social connections:     Talks on phone: Not on file     Gets together: Not on file     Attends Holiness service: Not on file     Active member of club or organization: Not on file     Attends meetings of clubs or organizations: Not on file     Relationship status: Not on file     Intimate partner violence:     Fear of current or ex partner: Not on file     Emotionally abused: Not on file     Physically abused: Not on file     Forced sexual activity: Not on file   Other Topics Concern     Parent/sibling w/  "CABG, MI or angioplasty before 65F 55M? Not Asked   Social History Narrative     Not on file           Review of System:     Constitutional: Negative for fever or chills  Skin: Negative for rashes  Ears/Nose/Throat: Negative for nasal congestion, sore throat  Respiratory: No shortness of breath, dyspnea on exertion, cough, or hemoptysis  Cardiovascular: Negative for chest pain  Gastrointestinal: Negative for nausea, vomiting  Genitourinary: Negative for dysuria, hematuria  Musculoskeletal: Negative for myalgias  Neurologic: Negative for headaches  Psychiatric: Negative for depression, anxiety  Hematologic/Lymphatic/Immunologic: Negative  Endocrine: Negative  Behavioral: Negative for tobacco use       Physical Exam:   /70 (BP Location: Right arm, Patient Position: Sitting, Cuff Size: Adult Regular)   Pulse 62   Temp 97  F (36.1  C) (Oral)   Ht 1.905 m (6' 3\")   Wt 83 kg (183 lb)   SpO2 96%   BMI 22.87 kg/m      GENERAL: alert and no distress  EYES: eyes grossly normal to inspection, and conjunctivae and sclerae normal  HENT: Normocephalic atraumatic. Nose and mouth without ulcers or lesions  NECK: supple  RESP: lungs clear to auscultation   CV: cardiac pacemaker present over the left upper anterior chest wall  LYMPH: no peripheral edema   ABDOMEN: nondistended  MS: no gross musculoskeletal defects noted  SKIN: no suspicious lesions or rashes  NEURO: Alert & Oriented x 3.   PSYCH: mentation appears normal, affect normal        Diagnostic Test Results:     Diagnostic Test Results:  Results for orders placed or performed in visit on 08/09/19   INR point of care   Result Value Ref Range    INR Protime 2.7 (A) 0.86 - 1.14       ASSESSMENT/PLAN:       (E53.8) Vitamin B12 deficiency (non anemic)  Comment: chronic vitamin b12 deficiency, the patient is due for a refill of his B12 medication at this time.  Plan: Cyanocobalamin (B-12) 1000 MCG CAPS      (I50.32) Chronic diastolic (congestive) heart failure " (H)  (I10) Benign essential hypertension  Comment: BP is well controlled  Plan: potassium chloride ER (KLOR-CON) 20 MEQ CR         tablet      (K21.9) Gastroesophageal reflux disease, esophagitis presence not specified  Comment: symptoms stable on Omeprazole  Plan: omeprazole (PRILOSEC) 20 MG DR capsule      (Z79.01) Long term current use of anticoagulant therapy  (I48.2) Chronic atrial fibrillation (H)  Comment: no chest pains  Plan: warfarin (COUMADIN) 5 MG tablet      (Z23) Need for prophylactic vaccination and inoculation against influenza  (primary encounter diagnosis)  Comment: patient is due for flu vaccine  Plan:  FLU VAC PRESRV FREE QUAD SPLIT VIR > 6         MONTHS IM [21763], Vaccine Administration,         Initial [[id number]]           Follow Up Plan:     Patient is instructed to return to Internal Medicine clinic for follow-up visit in 6 months.        Lilly Zepeda MD  Internal Medicine  [facility]

## 2019-09-20 ENCOUNTER — TELEPHONE (OUTPATIENT)
Dept: FAMILY MEDICINE | Facility: CLINIC | Age: 84
End: 2019-09-20

## 2019-09-20 DIAGNOSIS — I10 BENIGN ESSENTIAL HYPERTENSION: ICD-10-CM

## 2019-09-20 NOTE — TELEPHONE ENCOUNTER
PCP,    Spoke with pt's wife  She states the Lasix and potassium instructions have changed and no one informed them. No note of dose change either.  Pt's Lasix Rx now states 40mg BID (not on med list) Whats on medlist 40 mg in AM and 20 mg in PM is what pt is used to taking but new instructions (not sure who from) state 40 mg BID.  Potassium now states 1 tab in the morning. Pt used to take 1 tab BID. Are these new instructions correct. Pt and wife say they were not notified of new instructions    Please advise    Thank you,  Christiano PECK, RN

## 2019-09-20 NOTE — TELEPHONE ENCOUNTER
Reason for Call:  Other prescription    Detailed comments:   Wife, Candida, is calling as she is concerned about the pts prescriptions. She has C2C on file.       potassium chloride ER (KLOR-CON) 20 MEQ CR tablet 60 tablet 3     furosemide (LASIX) 40 MG tablet 180 tablet      He gets these rx from the heart clinic.  The rx's have had changes in the amount prescribed.  She is inquiring why.        Phone Number Patient can be reached at: Home number on file [phone] (home)    Best Time: Any     Can we leave a detailed message on this number? YES    Call taken on 9/20/2019 at 11:48 AM by Darleen Duggan

## 2019-09-25 NOTE — TELEPHONE ENCOUNTER
Returned call and scheduled follow up clinic appointment with me on 10/2/2019 at 2pm for further evaluation.

## 2019-09-26 ENCOUNTER — OFFICE VISIT (OUTPATIENT)
Dept: FAMILY MEDICINE | Facility: CLINIC | Age: 84
End: 2019-09-26
Payer: MEDICARE

## 2019-09-26 VITALS
DIASTOLIC BLOOD PRESSURE: 80 MMHG | RESPIRATION RATE: 18 BRPM | WEIGHT: 181 LBS | OXYGEN SATURATION: 96 % | TEMPERATURE: 97.8 F | BODY MASS INDEX: 22.5 KG/M2 | HEART RATE: 89 BPM | HEIGHT: 75 IN | SYSTOLIC BLOOD PRESSURE: 132 MMHG

## 2019-09-26 DIAGNOSIS — Z79.01 CHRONIC ANTICOAGULATION: ICD-10-CM

## 2019-09-26 DIAGNOSIS — I10 BENIGN ESSENTIAL HYPERTENSION: ICD-10-CM

## 2019-09-26 DIAGNOSIS — I48.20 CHRONIC ATRIAL FIBRILLATION (H): ICD-10-CM

## 2019-09-26 DIAGNOSIS — R73.01 IMPAIRED FASTING GLUCOSE: ICD-10-CM

## 2019-09-26 DIAGNOSIS — R31.0 GROSS HEMATURIA: Primary | ICD-10-CM

## 2019-09-26 DIAGNOSIS — R41.89 COGNITIVE IMPAIRMENT: ICD-10-CM

## 2019-09-26 DIAGNOSIS — N18.30 CKD (CHRONIC KIDNEY DISEASE) STAGE 3, GFR 30-59 ML/MIN (H): ICD-10-CM

## 2019-09-26 DIAGNOSIS — I50.32 CHRONIC DIASTOLIC (CONGESTIVE) HEART FAILURE (H): ICD-10-CM

## 2019-09-26 DIAGNOSIS — F32.5 MAJOR DEPRESSION IN COMPLETE REMISSION (H): ICD-10-CM

## 2019-09-26 DIAGNOSIS — E78.2 MIXED HYPERLIPIDEMIA: ICD-10-CM

## 2019-09-26 DIAGNOSIS — R09.02 HYPOXIA: ICD-10-CM

## 2019-09-26 PROBLEM — J96.01 ACUTE RESPIRATORY FAILURE WITH HYPOXIA (H): Status: RESOLVED | Noted: 2018-05-30 | Resolved: 2019-09-26

## 2019-09-26 PROBLEM — F33.9 EPISODE OF RECURRENT MAJOR DEPRESSIVE DISORDER, UNSPECIFIED DEPRESSION EPISODE SEVERITY (H): Status: RESOLVED | Noted: 2018-04-30 | Resolved: 2019-09-26

## 2019-09-26 PROBLEM — D75.839 THROMBOCYTOSIS: Status: RESOLVED | Noted: 2018-05-30 | Resolved: 2019-09-26

## 2019-09-26 LAB
ALBUMIN UR-MCNC: 30 MG/DL
APPEARANCE UR: CLEAR
BILIRUB UR QL STRIP: NEGATIVE
COLOR UR AUTO: YELLOW
GLUCOSE UR STRIP-MCNC: NEGATIVE MG/DL
HGB UR QL STRIP: ABNORMAL
INR PPP: 2.58 (ref 0.86–1.14)
KETONES UR STRIP-MCNC: NEGATIVE MG/DL
LEUKOCYTE ESTERASE UR QL STRIP: NEGATIVE
NITRATE UR QL: NEGATIVE
NON-SQ EPI CELLS #/AREA URNS LPF: ABNORMAL /LPF
PH UR STRIP: 7 PH (ref 5–7)
RBC #/AREA URNS AUTO: ABNORMAL /HPF
SOURCE: ABNORMAL
SP GR UR STRIP: 1.01 (ref 1–1.03)
UROBILINOGEN UR STRIP-ACNC: 0.2 EU/DL (ref 0.2–1)
WBC #/AREA URNS AUTO: ABNORMAL /HPF

## 2019-09-26 PROCEDURE — 81001 URINALYSIS AUTO W/SCOPE: CPT | Performed by: FAMILY MEDICINE

## 2019-09-26 PROCEDURE — 99214 OFFICE O/P EST MOD 30 MIN: CPT | Performed by: FAMILY MEDICINE

## 2019-09-26 PROCEDURE — 36415 COLL VENOUS BLD VENIPUNCTURE: CPT | Performed by: FAMILY MEDICINE

## 2019-09-26 PROCEDURE — 85610 PROTHROMBIN TIME: CPT | Performed by: FAMILY MEDICINE

## 2019-09-26 ASSESSMENT — PATIENT HEALTH QUESTIONNAIRE - PHQ9
SUM OF ALL RESPONSES TO PHQ QUESTIONS 1-9: 2
5. POOR APPETITE OR OVEREATING: SEVERAL DAYS

## 2019-09-26 ASSESSMENT — ANXIETY QUESTIONNAIRES
1. FEELING NERVOUS, ANXIOUS, OR ON EDGE: SEVERAL DAYS
IF YOU CHECKED OFF ANY PROBLEMS ON THIS QUESTIONNAIRE, HOW DIFFICULT HAVE THESE PROBLEMS MADE IT FOR YOU TO DO YOUR WORK, TAKE CARE OF THINGS AT HOME, OR GET ALONG WITH OTHER PEOPLE: NOT DIFFICULT AT ALL
6. BECOMING EASILY ANNOYED OR IRRITABLE: NOT AT ALL
7. FEELING AFRAID AS IF SOMETHING AWFUL MIGHT HAPPEN: NOT AT ALL
5. BEING SO RESTLESS THAT IT IS HARD TO SIT STILL: NOT AT ALL
3. WORRYING TOO MUCH ABOUT DIFFERENT THINGS: NOT AT ALL
2. NOT BEING ABLE TO STOP OR CONTROL WORRYING: NOT AT ALL
GAD7 TOTAL SCORE: 2

## 2019-09-26 ASSESSMENT — MIFFLIN-ST. JEOR: SCORE: 1566.64

## 2019-09-26 NOTE — LETTER
My Heart Failure Action Plan   Name: Santosh KENNY Hjelmstad    YOB: 1929   Date: 9/30/2019    My doctor: Lilly Zepeda     [address] [address]  [phone]  My Diagnosis: Reduced (HFr)- EF:Over 50%   My Exercise Goal: 30 minutes daily  .     My Weight Plan:   Wt Readings from Last 2 Encounters:   09/26/19 82.1 kg (181 lb)   09/06/19 83 kg (183 lb)     Weigh yourself daily using the same scale. If you gain more than 2 pounds in 24 hours or 5 pounds in a week 0      My Diet Goal: No added salt    Emergency Room Visits:    Our goal is to improve your quality of life and help you avoid a visit to the emergency room or hospital.  If we work together, we can achieve this goal. But, if you feel you need to call 911 or go to the emergency room, please do so.  If you go to the emergency room, please bring your list of medicines and your daily weight chart with you.       GREEN ZONE     Doing well today    Weight gained is no more than 2 pounds a day or 5 pounds a week.    No swelling in feet, ankles, legs or stomach.    No more swelling than usual.    No more trouble breathing than usual.    No change in my sleep.    No other problems. Actions:    I am doing fine.  I will take my medicine, follow my diet, see my doctor, exercise, and watch for symptoms.           YELLOW ZONE         Having a bad day or flare up    Weight gain of more than 2 pounds in one day or 5 pounds in one week.    New swelling in ankle, leg, knee or thigh.    Bloating in belly, pants feel tighter.    Swelling in hands or face.    Coughing or trouble breathing while walking or talking.    Harder to breathe last night.    Have trouble sleeping, wake up short of breath.    Much more tired than usual.    Not eating.    Pain in my chest or bad leg cramps.    Feel weak or dizzy. Actions:    I need to take action and call my doctor or nurse today.                  RED ZONE         Need medical care now    Weight gain of 5 pounds overnight.    Chest pain or pressure that does not go away.    Feel less alert.    Wheezing or have trouble breathing when at rest.    Cannot sleep lying down.    Cannot take my water pill.    Pass out or faint. Actions:    I need to call my doctor or nurse now!    Call 911 if I have chest pain or cannot breathe.

## 2019-09-26 NOTE — PROGRESS NOTES
Subjective     Santosh Robledo is a 89 year old male who presents to clinic today for the following health issues:    HPI   Genitourinary symptoms:Gross Hematuria       Duration: x1 day, started last night    Description:  Hematuria -- some clots    Intensity:  mild    Accompanying signs and symptoms (fever/discharge/nausea/vomiting/back or abdominal pain):  None    History (frequent UTI's/kidney stones/prostate problems): None  Sexually active: no     Precipitating or alleviating factors: on coumadin    No known renal abnormalities    Therapies tried and outcome: none     Glucose Intolerance   Follow-up      How often are you checking your blood sugar? Not at all    Hi FBS    What time of day are you checking your blood sugars (select all that apply)?      Have you had any blood sugars above 200?  No    Have you had any blood sugars below 70?  No    What symptoms do you notice when your blood sugar is low?  None    What concerns do you have today about your diabetes? None     Do you have any of these symptoms? (Select all that apply)  No numbness or tingling in feet.  No redness, sores or blisters on feet.  No complaints of excessive thirst.  No reports of blurry vision.  No significant changes to weight.     Have you had a diabetic eye exam in the last 12 months? No    BP Readings from Last 2 Encounters:   09/26/19 132/80   09/06/19 128/70     LDL Cholesterol Calculated (mg/dL)   Date Value   06/28/2019        Diabetes Management Resources  Hyperlipidemia Follow-Up      Are you having any of the following symptoms? (Select all that apply)  No complaints of shortness of breath, chest pain or pressure.  No increased sweating or nausea with activity.  No left-sided neck or arm pain.  No complaints of pain in calves when walking 1-2 blocks.-is sedentary     Are you regularly taking any medication or supplement to lower your cholesterol?   Yes- simvastatin 10mgm    Are you having muscle aches or other side  effects that you think could be caused by your cholesterol lowering medication?  No      Hypertension Follow-up      Do you check your blood pressure regularly outside of the clinic? No     Here < 140/80    Are you following a low salt diet? No    Are your blood pressures ever more than 140 on the top number (systolic) OR more   than 90 on the bottom number (diastolic), for example 140/90? No     Vascular Disease :A Fib on Chronic Anticoagulation (coumadin)]      Are you having any of the following symptoms? (Select all that apply) No complaints of shortness of breath, chest pain or pressure.  No increased sweating or nausea with activity.  No left-sided neck or arm pain.  No complaints of pain in calves when walking 1-2 blocks.    How often do you take nitroglycerin? Never    Do you take an aspirin every day? No    Heart Failure : Diastolic     Are you experiencing any shortness of breath? No    Are you experiencing any swelling in your legs or feet?  No    Are you using more pillows than usual? Yes    Do you cough at night?  Yes    Do you check your weight daily?  Yes    Have you had a weight change recently?  No    Are you having any of the following side effects from your medications? (Select all that apply)  The patient does not report symptoms of dizziness, fatigue, cough, swelling, or slow heart beat.    Since your last visit, how many times have you gone to the cardiologist, urgent care, emergency room, or hospital because of your heart failure?   None    Depression and Anxiety Follow-Up    How are you doing with your depression since your last visit? Improved to remission    How are you doing with your anxiety since your last visit?  Improved     Are you having other symptoms that might be associated with depression or anxiety? No    Have you had a significant life event? No     Do you have any concerns with your use of alcohol or other drugs? No    Social History     Tobacco Use     Smoking status: Never  "Smoker     Smokeless tobacco: Never Used   Substance Use Topics     Alcohol use: No     Drug use: No     PHQ 5/7/2018 9/26/2019   PHQ-9 Total Score 7 2   Q9: Thoughts of better off dead/self-harm past 2 weeks Not at all Not at all     JUAN J-7 SCORE 9/26/2019   Total Score 2           Suicide Assessment Five-step Evaluation and Treatment (SAFE-T)  Chronic Kidney Disease:Stage 3  Follow-up      Current NSAID use?  No    Comorbid A Fib, CHF, hiLDL ,      HYPOXIA     -hx of resp failure   -96%   -MARILU     COGNITIVE DEFICIT  And DECONDITIONING      -worsening           Reviewed and updated as needed this visit by Provider  Tobacco  Allergies  Meds  Problems  Med Hx  Surg Hx  Fam Hx         Review of Systems   ROS COMP: CONSTITUTIONAL: NEGATIVE for fever, chills, change in weight  INTEGUMENTARY/SKIN: NEGATIVE for worrisome rashes, moles or lesions  EYES: NEGATIVE for vision changes or irritation  ENT/MOUTH: NEGATIVE for ear, mouth and throat problems  RESP: NEGATIVE for significant cough or SOB  BREAST: NEGATIVE for masses, tenderness or discharge  CV: NEGATIVE for chest pain, palpitations or peripheral edema  GI: NEGATIVE for nausea, abdominal pain, heartburn, or change in bowel habits   male :negative for dysuria, hematuria, decreased urinary stream, erectile dysfunction, positive for and hematuria  MUSCULOSKELETAL: NEGATIVE for significant arthralgias or myalgia  NEURO: NEGATIVE for weakness, dizziness or paresthesias  ENDOCRINE: NEGATIVE for temperature intolerance, skin/hair changes  HEME: NEGATIVE for bleeding problems  PSYCHIATRIC: NEGATIVE for changes in mood or affect      Objective    /80 (Patient Position: Sitting, Cuff Size: Adult Regular)   Pulse 89   Temp 97.8  F (36.6  C) (Tympanic)   Resp 18   Ht 1.905 m (6' 3\")   Wt 82.1 kg (181 lb)   SpO2 96%   BMI 22.62 kg/m    Body mass index is 22.62 kg/m .  Physical Exam   GENERAL: healthy, alert and no distress  EYES: Eyes grossly normal to " inspection, PERRL and conjunctivae and sclerae normal  RESP: lungs clear to auscultation - no rales, rhonchi or wheezes  CV: regular rate and rhythm, normal S1 S2, no S3 or S4, no murmur, click or rub, no peripheral edema and peripheral pulses strong  MS: no gross musculoskeletal defects noted, no edema  SKIN: no suspicious lesions or rashes  NEURO: Normal strength and tone, mentation intact and speech normal  PSYCH: mentation appears normal, inattentive, affect normal/bright, judgement and insight impaired and appearance well groomed    Diagnostic Test Results:  Labs reviewed in Epic  Results for orders placed or performed in visit on 09/26/19   *UA reflex to Microscopic and Culture ([facility] (except [place] and [place])   Result Value Ref Range    Color Urine Yellow     Appearance Urine Clear     Glucose Urine Negative NEG^Negative mg/dL    Bilirubin Urine Negative NEG^Negative    Ketones Urine Negative NEG^Negative mg/dL    Specific Gravity Urine 1.015 1.003 - 1.035    Blood Urine Large (A) NEG^Negative    pH Urine 7.0 5.0 - 7.0 pH    Protein Albumin Urine 30 (A) NEG^Negative mg/dL    Urobilinogen Urine 0.2 0.2 - 1.0 EU/dL    Nitrite Urine Negative NEG^Negative    Leukocyte Esterase Urine Negative NEG^Negative    Source Midstream Urine    Urine Microscopic   Result Value Ref Range    WBC Urine 0 - 5 OTO5^0 - 5 /HPF    RBC Urine 10-25 (A) OTO2^O - 2 /HPF    Squamous Epithelial /LPF Urine Few FEW^Few /LPF   INR   Result Value Ref Range    INR 2.58 (H) 0.86 - 1.14           Assessment & Plan       ICD-10-CM    1. Gross hematuria R31.0 UROLOGY ADULT REFERRAL     INR   2. Chronic diastolic (congestive) heart failure (H) I50.32 HEART FAILURE ACTION PLAN   3. Chronic atrial fibrillation I48.2 INR   4. Chronic anticoagulation Z79.01    5. Hypoxia R09.02    6. 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 Impaired fasting glucose R73.01    8. Benign essential hypertension I10    9. CKD  (chronic kidney disease) stage 3, GFR 30-59 ml/min (H) N18.3    10. Mixed hyperlipidemia E78.2    11. Cognitive impairment R41.89           Patient Instructions   1. Shingrex is a 2 shot series that prevents shingles 97% of the time, as opposed to the old shingles shot that only prevented it at 40-50%  It costs less for medicare at a pharmacy  You should get it starting at 50 yrs old get the 2nd shot 5-6 mo after the first one    2. Do not take the coumadin today --until we get the INR back       Return in about 3 months (around 12/26/2019) for HEART FAILURE, impaired glucose.    Elisabet Whittington MD  [facility]

## 2019-09-26 NOTE — PATIENT INSTRUCTIONS
1. Shingrex is a 2 shot series that prevents shingles 97% of the time, as opposed to the old shingles shot that only prevented it at 40-50%  It costs less for medicare at a pharmacy  You should get it starting at 50 yrs old get the 2nd shot 5-6 mo after the first one    2. Do not take the coumadin today --until we get the INR back

## 2019-09-26 NOTE — LETTER
My Depression Action Plan  Name: Santosh KENNY Hjelmstad   Date of Birth 7/20/1929  Date: 9/30/2019    My doctor: Lilly Zepeda   My clinic: [address] [address]  [phone]          GREEN    ZONE   Good Control    What it looks like:     Things are going generally well. You have normal up s and down s. You may even feel depressed from time to time, but bad moods usually last less than a day.   What you need to do:  1. Continue to care for yourself (see self care plan)  2. Check your depression survival kit and update it as needed  3. Follow your physician s recommendations including any medication.  4. Do not stop taking medication unless you consult with your physician first.           YELLOW         ZONE Getting Worse    What it looks like:     Depression is starting to interfere with your life.     It may be hard to get out of bed; you may be starting to isolate yourself from others.    Symptoms of depression are starting to last most all day and this has happened for several days.     You may have suicidal thoughts but they are not constant.   What you need to do:     1. Call your care team, your response to treatment will improve if you keep your care team informed of your progress. Yellow periods are signs an adjustment may need to be made.     2. Continue your self-care, even if you have to fake it!    3. Talk to someone in your support network    4. Open up your depression survival kit           RED    ZONE Medical Alert - Get Help    What it looks like:     Depression is seriously interfering with your life.     You may experience these or other symptoms: You can t get out of bed most days, can t work or engage in other necessary activities, you have trouble taking care of basic hygiene, or basic responsibilities, thoughts of suicide or death that will not go away, self-injurious behavior.     What you need  to do:  1. Call your care team and request a same-day appointment. If they are not available (weekends or after hours) call your local crisis line, emergency room or 911.            Depression Self Care Plan / Survival Kit    Self-Care for Depression  Here s the deal. Your body and mind are really not as separate as most people think.  What you do and think affects how you feel and how you feel influences what you do and think. This means if you do things that people who feel good do, it will help you feel better.  Sometimes this is all it takes.  There is also a place for medication and therapy depending on how severe your depression is, so be sure to consult with your medical provider and/ or Behavioral Health Consultant if your symptoms are worsening or not improving.     In order to better manage my stress, I will:    Exercise  Get some form of exercise, every day. This will help reduce pain and release endorphins, the  feel good  chemicals in your brain. This is almost as good as taking antidepressants!  This is not the same as joining a gym and then never going! (they count on that by the way ) It can be as simple as just going for a walk or doing some gardening, anything that will get you moving.      Hygiene   Maintain good hygiene (Get out of bed in the morning, Make your bed, Brush your teeth, Take a shower, and Get dressed like you were going to work, even if you are unemployed).  If your clothes don't fit try to get ones that do.    Diet  I will strive to eat foods that are good for me, drink plenty of water, and avoid excessive sugar, caffeine, alcohol, and other mood-altering substances.  Some foods that are helpful in depression are: complex carbohydrates, B vitamins, flaxseed, fish or fish oil, fresh fruits and vegetables.    Psychotherapy  I agree to participate in Individual Therapy (if recommended).    Medication  If prescribed medications, I agree to take them.  Missing doses can result in  serious side effects.  I understand that drinking alcohol, or other illicit drug use, may cause potential side effects.  I will not stop my medication abruptly without first discussing it with my provider.    Staying Connected With Others  I will stay in touch with my friends, family members, and my primary care provider/team.    Use your imagination  Be creative.  We all have a creative side; it doesn t matter if it s oil painting, sand castles, or mud pies! This will also kick up the endorphins.    Witness Beauty  (AKA stop and smell the roses) Take a look outside, even in mid-winter. Notice colors, textures. Watch the squirrels and birds.     Service to others  Be of service to others.  There is always someone else in need.  By helping others we can  get out of ourselves  and remember the really important things.  This also provides opportunities for practicing all the other parts of the program.    Humor  Laugh and be silly!  Adjust your TV habits for less news and crime-drama and more comedy.    Control your stress  Try breathing deep, massage therapy, biofeedback, and meditation. Find time to relax each day.     My support system    Clinic Contact:  Phone number:    Contact 1:  Phone number:    Contact 2:  Phone number:    Orthodoxy/:  Phone number:    Therapist:  Phone number:    Local crisis center:    Phone number:    Other community support:  Phone number:

## 2019-09-27 ENCOUNTER — TELEPHONE (OUTPATIENT)
Dept: FAMILY MEDICINE | Facility: CLINIC | Age: 84
End: 2019-09-27

## 2019-09-27 DIAGNOSIS — Z79.01 LONG TERM CURRENT USE OF ANTICOAGULANT THERAPY: ICD-10-CM

## 2019-09-27 DIAGNOSIS — I48.20 CHRONIC ATRIAL FIBRILLATION (H): Primary | ICD-10-CM

## 2019-09-27 ASSESSMENT — ANXIETY QUESTIONNAIRES: GAD7 TOTAL SCORE: 2

## 2019-09-27 NOTE — TELEPHONE ENCOUNTER
ANTICOAGULATION MANAGEMENT     Patient Name:  Santosh KENNY Hjelmstad  Date:  9/27/2019    ASSESSMENT /SUBJECTIVE:            Today's INR result of 2.58 is Therapeutic. Goal INR of 2.0-3.0      Warfarin dose taken: Warfarin taken as previously instructed held last night as directed by provider    Diet: No new diet changes affecting INR    Medication changes/ interactions: No new medications/supplements affecting INR    Previous INR Subtherapeutic     S/S of bleeding or thromboembolism Yes: hematuria, seen in clinic yesterday    New injury or illness:  Yes: urology bleeding    Upcoming surgery, procedure or cardioversion:  No    Additional findings: Will see urologist 10/01/2019          Plan    Spoke with Candida regarding INR result and instructed       Warfarin Dosing Instructions:Continue your current warfarin dose  Drink 1 extra Ensure a day until next appointment.    Instructed patient to follow up no later than: 1 week    Education provided: Yes: S/S that would warrant a return to ER.  Discussed holding more doses or drinking extra Ensure, which Santosh enjoys.      Candida verbalizes understanding and agrees to warfarin dosing plan.    Instructed to call the Anticoagulation Clinic for any changes, questions or concerns. (#[phone])        OBJECTIVE    INR   Date Value Ref Range Status   09/26/2019 2.58 (H) 0.86 - 1.14 Final     Juliette Bah, PharmD BCACP  Anticoagulation Clinical Pharmacist

## 2019-09-27 NOTE — TELEPHONE ENCOUNTER
Reason for Call:  Request for results:    Name of test or procedure: INR    Date of test of procedure: 9/26/2019    Location of the test or procedure: [facility] to leave the result message on voice mail or with a family member? YES    Phone number Patient can be reached at:  Home number on file [phone] (home)    Call taken on 9/27/2019 at 12:46 PM by Marlene Martinez

## 2019-09-30 PROBLEM — R31.0 GROSS HEMATURIA: Status: ACTIVE | Noted: 2019-09-30

## 2019-09-30 PROBLEM — R73.01 IMPAIRED FASTING GLUCOSE: Status: ACTIVE | Noted: 2019-09-30

## 2019-10-01 ENCOUNTER — TRANSFERRED RECORDS (OUTPATIENT)
Dept: HEALTH INFORMATION MANAGEMENT | Facility: CLINIC | Age: 84
End: 2019-10-01

## 2019-10-02 ENCOUNTER — ANTICOAGULATION THERAPY VISIT (OUTPATIENT)
Dept: NURSING | Facility: CLINIC | Age: 84
End: 2019-10-02
Payer: MEDICARE

## 2019-10-02 DIAGNOSIS — I48.20 CHRONIC ATRIAL FIBRILLATION (H): ICD-10-CM

## 2019-10-02 DIAGNOSIS — Z79.01 CHRONIC ANTICOAGULATION: ICD-10-CM

## 2019-10-02 LAB — INR POINT OF CARE: 2.3 (ref 0.86–1.14)

## 2019-10-02 PROCEDURE — 85610 PROTHROMBIN TIME: CPT | Mod: QW

## 2019-10-02 PROCEDURE — 99207 ZZC NO CHARGE NURSE ONLY: CPT

## 2019-10-02 PROCEDURE — 36416 COLLJ CAPILLARY BLOOD SPEC: CPT

## 2019-10-02 NOTE — PROGRESS NOTES
ANTICOAGULATION FOLLOW-UP CLINIC VISIT    Patient Name:  Santosh KENNY Hjelmstad  Date:  10/2/2019  Contact Type:  Face to Face    SUBJECTIVE:  Patient Findings     Comments:   The patient was assessed for diet, medication, and activity level changes, missed or extra doses, bruising or bleeding, with no problem findings.  States that hematuria has resolved, is following with urology         Clinical Outcomes     Negatives:   Major bleeding event, Thromboembolic event, Anticoagulation-related hospital admission, Anticoagulation-related ED visit, Anticoagulation-related fatality    Comments:   The patient was assessed for diet, medication, and activity level changes, missed or extra doses, bruising or bleeding, with no problem findings.  States that hematuria has resolved, is following with urology            OBJECTIVE    INR Protime   Date Value Ref Range Status   10/02/2019 2.3 (A) 0.86 - 1.14 Final       ASSESSMENT / PLAN  No question data found.  Anticoagulation Summary  As of 10/2/2019    INR goal:   2.0-3.0   TTR:   65.9 % (1.3 y)   INR used for dosin.3 (10/2/2019)   Warfarin maintenance plan:   2.5 mg (5 mg x 0.5) every Mon, Wed, Fri; 5 mg (5 mg x 1) all other days   Full warfarin instructions:   2.5 mg every Mon, Wed, Fri; 5 mg all other days   Weekly warfarin total:   27.5 mg   No change documented:   Sharonda Morales RN   Plan last modified:   Nery Yang RN (2019)   Next INR check:   10/18/2019   Target end date:       Indications    Chronic atrial fibrillation [I48.20]  Chronic anticoagulation [Z79.01]             Anticoagulation Episode Summary     INR check location:       Preferred lab:       Send INR reminders to:   JOHN CALDERON    Comments:         Anticoagulation Care Providers     Provider Role Specialty Phone number    Lilly Zepeda MD [facility] Internal Medicine [phone]            See the Encounter Report to view Anticoagulation Flowsheet and Dosing Calendar (Go  to Encounters tab in chart review, and find the Anticoagulation Therapy Visit)        Sharonda Morales RN

## 2019-10-10 ENCOUNTER — TRANSFERRED RECORDS (OUTPATIENT)
Dept: HEALTH INFORMATION MANAGEMENT | Facility: CLINIC | Age: 84
End: 2019-10-10

## 2019-10-10 ENCOUNTER — ANCILLARY PROCEDURE (OUTPATIENT)
Dept: CARDIOLOGY | Facility: CLINIC | Age: 84
End: 2019-10-10
Attending: INTERNAL MEDICINE
Payer: MEDICARE

## 2019-10-10 DIAGNOSIS — I49.5 SSS (SICK SINUS SYNDROME) (H): ICD-10-CM

## 2019-10-10 DIAGNOSIS — Z95.0 CARDIAC PACEMAKER IN SITU: ICD-10-CM

## 2019-10-10 PROCEDURE — 93294 REM INTERROG EVL PM/LDLS PM: CPT | Performed by: INTERNAL MEDICINE

## 2019-10-10 PROCEDURE — 93296 REM INTERROG EVL PM/IDS: CPT | Performed by: INTERNAL MEDICINE

## 2019-10-18 ENCOUNTER — ANTICOAGULATION THERAPY VISIT (OUTPATIENT)
Dept: NURSING | Facility: CLINIC | Age: 84
End: 2019-10-18
Payer: MEDICARE

## 2019-10-18 LAB — INR POINT OF CARE: 3 (ref 0.86–1.14)

## 2019-10-18 PROCEDURE — 85610 PROTHROMBIN TIME: CPT | Mod: QW

## 2019-10-18 PROCEDURE — 36416 COLLJ CAPILLARY BLOOD SPEC: CPT

## 2019-10-18 PROCEDURE — 99207 ZZC NO CHARGE NURSE ONLY: CPT

## 2019-10-18 NOTE — PROGRESS NOTES
ANTICOAGULATION FOLLOW-UP CLINIC VISIT    Patient Name:  Santosh KENNY Hjelmstad  Date:  10/18/2019  Contact Type:  Face to Face    SUBJECTIVE:  Patient Findings     Comments:   The patient was assessed for diet, medication, and activity level changes, missed or extra doses, bruising or bleeding, with no problem findings.          Clinical Outcomes     Comments:   The patient was assessed for diet, medication, and activity level changes, missed or extra doses, bruising or bleeding, with no problem findings.             OBJECTIVE    INR Protime   Date Value Ref Range Status   10/18/2019 3.0 (A) 0.86 - 1.14 Final       ASSESSMENT / PLAN  INR assessment THER    Recheck INR In: 3 WEEKS    INR Location Clinic      Anticoagulation Summary  As of 10/18/2019    INR goal:   2.0-3.0   TTR:   67.0 % (1.3 y)   INR used for dosing:   3.0 (10/18/2019)   Warfarin maintenance plan:   2.5 mg (5 mg x 0.5) every Mon, Wed, Fri; 5 mg (5 mg x 1) all other days   Full warfarin instructions:   2.5 mg every Mon, Wed, Fri; 5 mg all other days   Weekly warfarin total:   27.5 mg   Plan last modified:   Nery Yang RN (7/26/2019)   Next INR check:   11/8/2019   Target end date:       Indications    Chronic atrial fibrillation [I48.20]  Chronic anticoagulation [Z79.01]             Anticoagulation Episode Summary     INR check location:       Preferred lab:       Send INR reminders to:   JOHN CALDERON    Comments:         Anticoagulation Care Providers     Provider Role Specialty Phone number    DuaneLilly MD [facility] Internal Medicine [phone]            See the Encounter Report to view Anticoagulation Flowsheet and Dosing Calendar (Go to Encounters tab in chart review, and find the Anticoagulation Therapy Visit)    Dosage adjustment made based on physician directed care plan.    Brandi Sullivan, RN

## 2019-10-22 NOTE — TELEPHONE ENCOUNTER
"PCP,    Pt cancelled OV for 10/2  But the question remains: what is the dose for Potassium for this pt.  Rx recently changed from BID to daily without notifying pt or wife. Is the \"daily\" dose correct?    Please advise    Thank you,  Christiano PECK RN    "

## 2019-10-22 NOTE — TELEPHONE ENCOUNTER
Patient cancelled the 10/2/19 OV with Dr Zepeda.  Fax received today from pharmacy - patient stating again that potassium chloride instructions are different.      potassium chloride ER (KLOR-CON) 20 MEQ CR tablet 60 tablet 3 9/6/2019  No   Sig - Route: Take 1 tablet (20 mEq) by mouth daily      Used to be 20meq BID.  Not sure why/when change.    11/14/2018 Rx was for BID dosing  8/29/2019 Rx was for every day dosing (refill encounter, interface request was for BID dosing, approval was for every day dosing)  9/6/2019 Rx was for every day dosing (from OV)    What is correct potassium chloride dosing?  Please clarify and if BID dosing is correct - then send new Rx to pharmacy.      Original call from patient was also questioning the Lasix - per cardiology OV 2/4/19 Lasix 40mg AM and 20mg PM.        Dalia Tuttle RT (R)

## 2019-10-23 RX ORDER — POTASSIUM CHLORIDE 1500 MG/1
20 TABLET, EXTENDED RELEASE ORAL 2 TIMES DAILY
Qty: 180 TABLET | Refills: 3 | Status: SHIPPED | OUTPATIENT
Start: 2019-10-23 | End: 2020-01-01

## 2019-10-23 NOTE — TELEPHONE ENCOUNTER
TO PCP:     Most recent script 9/6/19 - Clor Valentin 20mEq ER, #60 with 3 refills, 1 tablet daily    Script was also sent Klor-Con 20mEq daily, #60 on 8/29/19 - this was a change (looks like accidental change from triage) compared to prior Rxs of 20mEq BID

## 2019-10-23 NOTE — TELEPHONE ENCOUNTER
Dr. Zepeda -    This was a med error between pharmacy request/clinic protocol with escribing. Pt should not need follow up in clinic for this.      IT is very clear that pt has been on 20mcg twice daily for over a year. Last labs done in March and were wnl for Potassium/passed protocol.  Dose sent for once daily starting in August was error.     Potassium   Date Value Ref Range Status   03/04/2019 3.9 3.5 - 5.1 mmol/L Final     As pt was within normal range on two tablets daily in March, would not be due for refill for 6 more months as noted on your AVS.    Pended for review. Please review and sign as appropriate.    Lynda Morris RN

## 2019-10-23 NOTE — TELEPHONE ENCOUNTER
I called and spoke to patient's wife Candida. Candida feels that an error  Was made last time they were in and that patient does not need  An appointment for this.  Patient would like a nurse to call her.    Candida- [phone]

## 2019-11-04 LAB
MDC_IDC_LEAD_IMPLANT_DT: NORMAL
MDC_IDC_LEAD_LOCATION: NORMAL
MDC_IDC_LEAD_LOCATION_DETAIL_1: NORMAL
MDC_IDC_LEAD_MFG: NORMAL
MDC_IDC_LEAD_MODEL: NORMAL
MDC_IDC_LEAD_POLARITY_TYPE: NORMAL
MDC_IDC_LEAD_SERIAL: NORMAL
MDC_IDC_MSMT_BATTERY_DTM: NORMAL
MDC_IDC_MSMT_BATTERY_REMAINING_LONGEVITY: 99 MO
MDC_IDC_MSMT_BATTERY_RRT_TRIGGER: 2.83
MDC_IDC_MSMT_BATTERY_STATUS: NORMAL
MDC_IDC_MSMT_BATTERY_VOLTAGE: 3.02 V
MDC_IDC_MSMT_LEADCHNL_RV_IMPEDANCE_VALUE: 551 OHM
MDC_IDC_MSMT_LEADCHNL_RV_IMPEDANCE_VALUE: 665 OHM
MDC_IDC_MSMT_LEADCHNL_RV_PACING_THRESHOLD_AMPLITUDE: 0.5 V
MDC_IDC_MSMT_LEADCHNL_RV_PACING_THRESHOLD_PULSEWIDTH: 0.4 MS
MDC_IDC_MSMT_LEADCHNL_RV_SENSING_INTR_AMPL: 8.25 MV
MDC_IDC_MSMT_LEADCHNL_RV_SENSING_INTR_AMPL: 8.25 MV
MDC_IDC_PG_IMPLANT_DTM: NORMAL
MDC_IDC_PG_MFG: NORMAL
MDC_IDC_PG_MODEL: NORMAL
MDC_IDC_PG_SERIAL: NORMAL
MDC_IDC_PG_TYPE: NORMAL
MDC_IDC_SESS_CLINIC_NAME: NORMAL
MDC_IDC_SESS_DTM: NORMAL
MDC_IDC_SESS_TYPE: NORMAL
MDC_IDC_SET_BRADY_HYSTRATE: NORMAL
MDC_IDC_SET_BRADY_LOWRATE: 60 {BEATS}/MIN
MDC_IDC_SET_BRADY_MAX_SENSOR_RATE: 130 {BEATS}/MIN
MDC_IDC_SET_BRADY_MODE: NORMAL
MDC_IDC_SET_LEADCHNL_RV_PACING_AMPLITUDE: 1.5 V
MDC_IDC_SET_LEADCHNL_RV_PACING_ANODE_ELECTRODE_1: NORMAL
MDC_IDC_SET_LEADCHNL_RV_PACING_ANODE_LOCATION_1: NORMAL
MDC_IDC_SET_LEADCHNL_RV_PACING_CAPTURE_MODE: NORMAL
MDC_IDC_SET_LEADCHNL_RV_PACING_CATHODE_ELECTRODE_1: NORMAL
MDC_IDC_SET_LEADCHNL_RV_PACING_CATHODE_LOCATION_1: NORMAL
MDC_IDC_SET_LEADCHNL_RV_PACING_POLARITY: NORMAL
MDC_IDC_SET_LEADCHNL_RV_PACING_PULSEWIDTH: 0.4 MS
MDC_IDC_SET_LEADCHNL_RV_SENSING_ANODE_ELECTRODE_1: NORMAL
MDC_IDC_SET_LEADCHNL_RV_SENSING_ANODE_LOCATION_1: NORMAL
MDC_IDC_SET_LEADCHNL_RV_SENSING_CATHODE_ELECTRODE_1: NORMAL
MDC_IDC_SET_LEADCHNL_RV_SENSING_CATHODE_LOCATION_1: NORMAL
MDC_IDC_SET_LEADCHNL_RV_SENSING_POLARITY: NORMAL
MDC_IDC_SET_LEADCHNL_RV_SENSING_SENSITIVITY: 0.9 MV
MDC_IDC_SET_ZONE_DETECTION_INTERVAL: 360 MS
MDC_IDC_SET_ZONE_TYPE: NORMAL
MDC_IDC_STAT_BRADY_DTM_END: NORMAL
MDC_IDC_STAT_BRADY_DTM_START: NORMAL
MDC_IDC_STAT_BRADY_RV_PERCENT_PACED: 78.45 %
MDC_IDC_STAT_EPISODE_RECENT_COUNT: 0
MDC_IDC_STAT_EPISODE_RECENT_COUNT: 0
MDC_IDC_STAT_EPISODE_RECENT_COUNT_DTM_END: NORMAL
MDC_IDC_STAT_EPISODE_RECENT_COUNT_DTM_END: NORMAL
MDC_IDC_STAT_EPISODE_RECENT_COUNT_DTM_START: NORMAL
MDC_IDC_STAT_EPISODE_RECENT_COUNT_DTM_START: NORMAL
MDC_IDC_STAT_EPISODE_TOTAL_COUNT: 0
MDC_IDC_STAT_EPISODE_TOTAL_COUNT: 4
MDC_IDC_STAT_EPISODE_TOTAL_COUNT_DTM_END: NORMAL
MDC_IDC_STAT_EPISODE_TOTAL_COUNT_DTM_END: NORMAL
MDC_IDC_STAT_EPISODE_TOTAL_COUNT_DTM_START: NORMAL
MDC_IDC_STAT_EPISODE_TOTAL_COUNT_DTM_START: NORMAL
MDC_IDC_STAT_EPISODE_TYPE: NORMAL

## 2019-11-08 ENCOUNTER — ANTICOAGULATION THERAPY VISIT (OUTPATIENT)
Dept: NURSING | Facility: CLINIC | Age: 84
End: 2019-11-08
Payer: MEDICARE

## 2019-11-08 LAB — INR POINT OF CARE: 2.5 (ref 0.86–1.14)

## 2019-11-08 PROCEDURE — 85610 PROTHROMBIN TIME: CPT | Mod: QW

## 2019-11-08 PROCEDURE — 99207 ZZC NO CHARGE NURSE ONLY: CPT

## 2019-11-08 PROCEDURE — 36416 COLLJ CAPILLARY BLOOD SPEC: CPT

## 2019-11-08 NOTE — PROGRESS NOTES
ANTICOAGULATION FOLLOW-UP CLINIC VISIT    Patient Name:  Santosh Spauldingelmstad  Date:  2019  Contact Type:  Face to Face    SUBJECTIVE:  Patient Findings     Comments:   The patient was assessed for diet, medication, and activity level changes, missed or extra doses, bruising or bleeding, with no problem findings.          Clinical Outcomes     Comments:   The patient was assessed for diet, medication, and activity level changes, missed or extra doses, bruising or bleeding, with no problem findings.             OBJECTIVE    INR Protime   Date Value Ref Range Status   2019 2.5 (A) 0.86 - 1.14 Final       ASSESSMENT / PLAN  INR assessment THER    Recheck INR In: 5 WEEKS    INR Location Clinic      Anticoagulation Summary  As of 2019    INR goal:   2.0-3.0   TTR:   68.4 % (1.4 y)   INR used for dosin.5 (2019)   Warfarin maintenance plan:   2.5 mg (5 mg x 0.5) every Mon, Wed, Fri; 5 mg (5 mg x 1) all other days   Full warfarin instructions:   2.5 mg every Mon, Wed, Fri; 5 mg all other days   Weekly warfarin total:   27.5 mg   No change documented:   Brandi Sullivan, RN   Plan last modified:   Nery Yang, RN (2019)   Next INR check:   2019   Target end date:       Indications    Chronic atrial fibrillation [I48.20]  Chronic anticoagulation [Z79.01]             Anticoagulation Episode Summary     INR check location:       Preferred lab:       Send INR reminders to:   JOHN CALDERON    Comments:         Anticoagulation Care Providers     Provider Role Specialty Phone number    Lilly Zepeda MD [facility] Internal Medicine [phone]            See the Encounter Report to view Anticoagulation Flowsheet and Dosing Calendar (Go to Encounters tab in chart review, and find the Anticoagulation Therapy Visit)    Dosage adjustment made based on physician directed care plan.    Pt's wife is going to [place] in 4 weeks, asking if INR could be set up for 5 weeks, pt in  range, INR 2.5, dosing unchanged.    Brandi Sullivan RN

## 2019-12-13 ENCOUNTER — ANTICOAGULATION THERAPY VISIT (OUTPATIENT)
Dept: NURSING | Facility: CLINIC | Age: 84
End: 2019-12-13
Payer: MEDICARE

## 2019-12-13 LAB — INR POINT OF CARE: 1.7 (ref 0.86–1.14)

## 2019-12-13 PROCEDURE — 85610 PROTHROMBIN TIME: CPT | Mod: QW

## 2019-12-13 PROCEDURE — 36416 COLLJ CAPILLARY BLOOD SPEC: CPT

## 2019-12-13 PROCEDURE — 99207 ZZC NO CHARGE NURSE ONLY: CPT

## 2019-12-13 NOTE — PROGRESS NOTES
ANTICOAGULATION FOLLOW-UP CLINIC VISIT    Patient Name:  Santosh KENNY Hjelmstad  Date:  2019  Contact Type:  Face to Face    SUBJECTIVE:  Patient Findings     Positives:   Missed doses (missed warfarin dose 5 days ago), Other complaints (Wife manages pt's meals, meds, she was out of town for 1 week, pt had been on his own, eating )             OBJECTIVE    INR Protime   Date Value Ref Range Status   2019 1.7 (A) 0.86 - 1.14 Final       ASSESSMENT / PLAN  INR assessment SUB    Recheck INR In: 3 WEEKS    INR Location Clinic      Anticoagulation Summary  As of 2019    INR goal:   2.0-3.0   TTR:   69.3 % (1 y)   INR used for dosin.7! (2019)   Warfarin maintenance plan:   2.5 mg (5 mg x 0.5) every Mon, Wed, Fri; 5 mg (5 mg x 1) all other days   Full warfarin instructions:   : 5 mg; Otherwise 2.5 mg every Mon, Wed, Fri; 5 mg all other days   Weekly warfarin total:   27.5 mg   Plan last modified:   Nery Yang, RN (2019)   Next INR check:   1/3/2020   Target end date:       Indications    Chronic atrial fibrillation [I48.20]  Chronic anticoagulation [Z79.01]             Anticoagulation Episode Summary     INR check location:       Preferred lab:       Send INR reminders to:   JOHN CALDERON    Comments:         Anticoagulation Care Providers     Provider Role Specialty Phone number    Lilly Zepeda MD Responsible Internal Medicine [phone]            See the Encounter Report to view Anticoagulation Flowsheet and Dosing Calendar (Go to Encounters tab in chart review, and find the Anticoagulation Therapy Visit)    Dosage adjustment made based on physician directed care plan.    INR 1.7, pt's wife has been out of town 1 week, pt skipped meals, ate more snacking food, missed 1 even taking meds. Pt will take 5 mg today, then resume maintenance dose 2.5mg MWF, 5 mg al other days, check INR 3 weeks. Wife pt aware s/s clotting and when to seek medical help.    Brandi Roy  Laurie, RN

## 2020-01-01 ENCOUNTER — ANTICOAGULATION THERAPY VISIT (OUTPATIENT)
Dept: NURSING | Facility: CLINIC | Age: 85
End: 2020-01-01

## 2020-01-01 ENCOUNTER — ANCILLARY PROCEDURE (OUTPATIENT)
Dept: CARDIOLOGY | Facility: CLINIC | Age: 85
End: 2020-01-01
Attending: INTERNAL MEDICINE
Payer: MEDICARE

## 2020-01-01 ENCOUNTER — ANTICOAGULATION THERAPY VISIT (OUTPATIENT)
Dept: NURSING | Facility: CLINIC | Age: 85
End: 2020-01-01
Payer: MEDICARE

## 2020-01-01 DIAGNOSIS — I48.20 CHRONIC ATRIAL FIBRILLATION (H): ICD-10-CM

## 2020-01-01 DIAGNOSIS — E78.5 HYPERLIPIDEMIA LDL GOAL <100: ICD-10-CM

## 2020-01-01 DIAGNOSIS — Z79.01 CHRONIC ANTICOAGULATION: ICD-10-CM

## 2020-01-01 DIAGNOSIS — E53.8 VITAMIN B12 DEFICIENCY (NON ANEMIC): ICD-10-CM

## 2020-01-01 DIAGNOSIS — Z79.01 LONG TERM CURRENT USE OF ANTICOAGULANT THERAPY: ICD-10-CM

## 2020-01-01 DIAGNOSIS — Z95.0 PACEMAKER: ICD-10-CM

## 2020-01-01 LAB
INR PPP: 2.5 (ref 0.86–1.14)
INR PPP: 2.9 (ref 0.86–1.14)
INR PPP: 3 (ref 0.86–1.14)
MDC_IDC_LEAD_IMPLANT_DT: NORMAL
MDC_IDC_LEAD_LOCATION: NORMAL
MDC_IDC_LEAD_LOCATION_DETAIL_1: NORMAL
MDC_IDC_LEAD_MFG: NORMAL
MDC_IDC_LEAD_MODEL: NORMAL
MDC_IDC_LEAD_POLARITY_TYPE: NORMAL
MDC_IDC_LEAD_SERIAL: NORMAL
MDC_IDC_MSMT_BATTERY_DTM: NORMAL
MDC_IDC_MSMT_BATTERY_REMAINING_LONGEVITY: 89 MO
MDC_IDC_MSMT_BATTERY_RRT_TRIGGER: 2.83
MDC_IDC_MSMT_BATTERY_STATUS: NORMAL
MDC_IDC_MSMT_BATTERY_VOLTAGE: 3.01 V
MDC_IDC_MSMT_LEADCHNL_RV_IMPEDANCE_VALUE: 551 OHM
MDC_IDC_MSMT_LEADCHNL_RV_IMPEDANCE_VALUE: 665 OHM
MDC_IDC_MSMT_LEADCHNL_RV_PACING_THRESHOLD_AMPLITUDE: 0.5 V
MDC_IDC_MSMT_LEADCHNL_RV_PACING_THRESHOLD_PULSEWIDTH: 0.4 MS
MDC_IDC_MSMT_LEADCHNL_RV_SENSING_INTR_AMPL: 9.38 MV
MDC_IDC_MSMT_LEADCHNL_RV_SENSING_INTR_AMPL: 9.38 MV
MDC_IDC_PG_IMPLANT_DTM: NORMAL
MDC_IDC_PG_MFG: NORMAL
MDC_IDC_PG_MODEL: NORMAL
MDC_IDC_PG_SERIAL: NORMAL
MDC_IDC_PG_TYPE: NORMAL
MDC_IDC_SESS_CLINIC_NAME: NORMAL
MDC_IDC_SESS_DTM: NORMAL
MDC_IDC_SESS_TYPE: NORMAL
MDC_IDC_SET_BRADY_HYSTRATE: NORMAL
MDC_IDC_SET_BRADY_LOWRATE: 60 {BEATS}/MIN
MDC_IDC_SET_BRADY_MAX_SENSOR_RATE: 130 {BEATS}/MIN
MDC_IDC_SET_BRADY_MODE: NORMAL
MDC_IDC_SET_LEADCHNL_RV_PACING_AMPLITUDE: 1.5 V
MDC_IDC_SET_LEADCHNL_RV_PACING_ANODE_ELECTRODE_1: NORMAL
MDC_IDC_SET_LEADCHNL_RV_PACING_ANODE_LOCATION_1: NORMAL
MDC_IDC_SET_LEADCHNL_RV_PACING_CAPTURE_MODE: NORMAL
MDC_IDC_SET_LEADCHNL_RV_PACING_CATHODE_ELECTRODE_1: NORMAL
MDC_IDC_SET_LEADCHNL_RV_PACING_CATHODE_LOCATION_1: NORMAL
MDC_IDC_SET_LEADCHNL_RV_PACING_POLARITY: NORMAL
MDC_IDC_SET_LEADCHNL_RV_PACING_PULSEWIDTH: 0.4 MS
MDC_IDC_SET_LEADCHNL_RV_SENSING_ANODE_ELECTRODE_1: NORMAL
MDC_IDC_SET_LEADCHNL_RV_SENSING_ANODE_LOCATION_1: NORMAL
MDC_IDC_SET_LEADCHNL_RV_SENSING_CATHODE_ELECTRODE_1: NORMAL
MDC_IDC_SET_LEADCHNL_RV_SENSING_CATHODE_LOCATION_1: NORMAL
MDC_IDC_SET_LEADCHNL_RV_SENSING_POLARITY: NORMAL
MDC_IDC_SET_LEADCHNL_RV_SENSING_SENSITIVITY: 0.9 MV
MDC_IDC_SET_ZONE_DETECTION_INTERVAL: 360 MS
MDC_IDC_SET_ZONE_TYPE: NORMAL
MDC_IDC_STAT_BRADY_DTM_END: NORMAL
MDC_IDC_STAT_BRADY_DTM_START: NORMAL
MDC_IDC_STAT_BRADY_RV_PERCENT_PACED: 91.44 %
MDC_IDC_STAT_EPISODE_RECENT_COUNT: 0
MDC_IDC_STAT_EPISODE_RECENT_COUNT: 0
MDC_IDC_STAT_EPISODE_RECENT_COUNT_DTM_END: NORMAL
MDC_IDC_STAT_EPISODE_RECENT_COUNT_DTM_END: NORMAL
MDC_IDC_STAT_EPISODE_RECENT_COUNT_DTM_START: NORMAL
MDC_IDC_STAT_EPISODE_RECENT_COUNT_DTM_START: NORMAL
MDC_IDC_STAT_EPISODE_TOTAL_COUNT: 0
MDC_IDC_STAT_EPISODE_TOTAL_COUNT: 6
MDC_IDC_STAT_EPISODE_TOTAL_COUNT_DTM_END: NORMAL
MDC_IDC_STAT_EPISODE_TOTAL_COUNT_DTM_END: NORMAL
MDC_IDC_STAT_EPISODE_TOTAL_COUNT_DTM_START: NORMAL
MDC_IDC_STAT_EPISODE_TOTAL_COUNT_DTM_START: NORMAL
MDC_IDC_STAT_EPISODE_TYPE: NORMAL

## 2020-01-01 PROCEDURE — 36416 COLLJ CAPILLARY BLOOD SPEC: CPT | Performed by: INTERNAL MEDICINE

## 2020-01-01 PROCEDURE — 85610 PROTHROMBIN TIME: CPT | Performed by: INTERNAL MEDICINE

## 2020-01-01 PROCEDURE — 93294 REM INTERROG EVL PM/LDLS PM: CPT | Performed by: INTERNAL MEDICINE

## 2020-01-01 PROCEDURE — 93296 REM INTERROG EVL PM/IDS: CPT | Performed by: INTERNAL MEDICINE

## 2020-01-01 PROCEDURE — 99207 PR NO CHARGE NURSE ONLY: CPT

## 2020-01-01 RX ORDER — ACETAMINOPHEN/DIPHENHYDRAMINE 500MG-25MG
TABLET ORAL
Qty: 200 TABLET | Refills: 1 | Status: SHIPPED | OUTPATIENT
Start: 2020-01-01

## 2020-01-01 RX ORDER — POTASSIUM CHLORIDE 1500 MG/1
TABLET, EXTENDED RELEASE ORAL
Qty: 180 TABLET | Refills: 1 | Status: SHIPPED | OUTPATIENT
Start: 2020-01-01 | End: 2021-01-01

## 2020-01-01 RX ORDER — SIMVASTATIN 10 MG
TABLET ORAL
Qty: 90 TABLET | Refills: 0 | Status: SHIPPED | OUTPATIENT
Start: 2020-01-01 | End: 2021-01-01

## 2020-01-02 NOTE — TELEPHONE ENCOUNTER
"warfarin ANTICOAGULANT (COUMADIN) 5 MG tablet    Last Written Prescription Date:  09/06/2019  Last Fill Quantity: 30,  # refills: 3   Last office visit: 9/26/2019 with prescribing provider:  Pk   Future Office Visit:  09/26/2019    Requested Prescriptions   Pending Prescriptions Disp Refills     warfarin ANTICOAGULANT (COUMADIN) 5 MG tablet [Pharmacy Med Name: WARFARIN SODIUM 5 MG TABLET] 30 tablet 3     Sig: TAKE 0.5-1 TABLETS (2.5-5 MG) BY MOUTH DAILY AS DIRECTED BY YOUR INR CLINIC       Vitamin K Antagonists Failed - 1/1/2020  7:38 AM        Failed - INR is within goal in the past 6 weeks     Confirm INR is within goal in the past 6 weeks.     Recent Labs   Lab Test 12/13/19   INR 1.7*                       Failed - Medication is active on med list        Passed - Recent (12 mo) or future (30 days) visit within the authorizing provider's specialty     Patient has had an office visit with the authorizing provider or a provider within the authorizing providers department within the previous 12 mos or has a future within next 30 days. See \"Patient Info\" tab in inbasket, or \"Choose Columns\" in Meds & Orders section of the refill encounter.              Passed - Patient is 18 years of age or older          "

## 2020-01-03 ENCOUNTER — ANTICOAGULATION THERAPY VISIT (OUTPATIENT)
Dept: NURSING | Facility: CLINIC | Age: 85
End: 2020-01-03
Payer: MEDICARE

## 2020-01-03 DIAGNOSIS — I48.20 CHRONIC ATRIAL FIBRILLATION (H): Primary | ICD-10-CM

## 2020-01-03 LAB — INR POINT OF CARE: 3 (ref 0.86–1.14)

## 2020-01-03 PROCEDURE — 36416 COLLJ CAPILLARY BLOOD SPEC: CPT

## 2020-01-03 PROCEDURE — 85610 PROTHROMBIN TIME: CPT | Mod: QW

## 2020-01-03 PROCEDURE — 99207 ZZC NO CHARGE NURSE ONLY: CPT

## 2020-01-03 RX ORDER — WARFARIN SODIUM 5 MG/1
TABLET ORAL
Qty: 72 TABLET | Refills: 1 | Status: SHIPPED | OUTPATIENT
Start: 2020-01-03 | End: 2020-08-19

## 2020-01-03 NOTE — PROGRESS NOTES
ANTICOAGULATION FOLLOW-UP CLINIC VISIT    Patient Name:  Santosh KENNY Hjelmstad  Date:  1/3/2020  Contact Type:  Face to Face    SUBJECTIVE:         OBJECTIVE    INR Protime   Date Value Ref Range Status   01/03/2020 3.0 (A) 0.86 - 1.14 Final       ASSESSMENT / PLAN  INR assessment THER    Recheck INR In: 4 WEEKS    INR Location Clinic      Anticoagulation Summary  As of 1/3/2020    INR goal:   2.0-3.0   TTR:   69.4 % (1 y)   INR used for dosing:   3.0 (1/3/2020)   Warfarin maintenance plan:   2.5 mg (5 mg x 0.5) every Mon, Wed, Fri; 5 mg (5 mg x 1) all other days   Full warfarin instructions:   1/4: 2.5 mg; Otherwise 2.5 mg every Mon, Wed, Fri; 5 mg all other days   Weekly warfarin total:   27.5 mg   Plan last modified:   Nery Yang RN (7/26/2019)   Next INR check:   1/31/2020   Target end date:       Indications    Chronic atrial fibrillation [I48.20]  Chronic anticoagulation [Z79.01]             Anticoagulation Episode Summary     INR check location:       Preferred lab:       Send INR reminders to:   JOHN CALDERON    Comments:         Anticoagulation Care Providers     Provider Role Specialty Phone number    Duane Lillyjelani Castaneda MD [facility] Internal Medicine [phone]            See the Encounter Report to view Anticoagulation Flowsheet and Dosing Calendar (Go to Encounters tab in chart review, and find the Anticoagulation Therapy Visit)    Dosage adjustment made based on physician directed care plan.    INR 3.0, pt prefers low range for his diagnosis. Pt will take 2.5mg today and Jan 4, then continue 2.5mg MWF, 5mg al other days and check INR 4 weeks.    Brandi Sullivan, RN

## 2020-01-31 ENCOUNTER — ANTICOAGULATION THERAPY VISIT (OUTPATIENT)
Dept: NURSING | Facility: CLINIC | Age: 85
End: 2020-01-31
Payer: MEDICARE

## 2020-01-31 LAB — INR POINT OF CARE: 2.4 (ref 0.86–1.14)

## 2020-01-31 PROCEDURE — 36416 COLLJ CAPILLARY BLOOD SPEC: CPT

## 2020-01-31 PROCEDURE — 99207 ZZC NO CHARGE NURSE ONLY: CPT

## 2020-01-31 PROCEDURE — 85610 PROTHROMBIN TIME: CPT | Mod: QW

## 2020-01-31 NOTE — PROGRESS NOTES
ANTICOAGULATION FOLLOW-UP CLINIC VISIT    Patient Name:  Santosh KENNY Hjelmstad  Date:  2020  Contact Type:  Face to Face    SUBJECTIVE:  Patient Findings     Comments:   The patient was assessed for diet, medication, and activity level changes, missed or extra doses, bruising or bleeding, with no problem findings.          Clinical Outcomes     Negatives:   Major bleeding event, Thromboembolic event, Anticoagulation-related hospital admission, Anticoagulation-related ED visit, Anticoagulation-related fatality    Comments:   The patient was assessed for diet, medication, and activity level changes, missed or extra doses, bruising or bleeding, with no problem findings.             OBJECTIVE    INR Protime   Date Value Ref Range Status   2020 2.4 (A) 0.86 - 1.14 Final       ASSESSMENT / PLAN  INR assessment THER    Recheck INR In: 4 WEEKS    INR Location Clinic      Anticoagulation Summary  As of 2020    INR goal:   2.0-3.0   TTR:   69.6 % (1 y)   INR used for dosin.4 (2020)   Warfarin maintenance plan:   2.5 mg (5 mg x 0.5) every Mon, Wed, Fri; 5 mg (5 mg x 1) all other days   Full warfarin instructions:   2.5 mg every Mon, Wed, Fri; 5 mg all other days   Weekly warfarin total:   27.5 mg   No change documented:   Brandi Sullivan, RN   Plan last modified:   Nery Yang RN (2019)   Next INR check:   2020   Target end date:       Indications    Chronic atrial fibrillation [I48.20]  Chronic anticoagulation [Z79.01]             Anticoagulation Episode Summary     INR check location:       Preferred lab:       Send INR reminders to:   JOHN CALDERON    Comments:         Anticoagulation Care Providers     Provider Role Specialty Phone number    Lilly Zepeda MD [facility] Internal Medicine [phone]            See the Encounter Report to view Anticoagulation Flowsheet and Dosing Calendar (Go to Encounters tab in chart review, and find the Anticoagulation Therapy  Visit)    Dosage adjustment made based on physician directed care plan.    Brandi Sullivan RN

## 2020-02-11 DIAGNOSIS — I50.32 CHRONIC DIASTOLIC (CONGESTIVE) HEART FAILURE (H): ICD-10-CM

## 2020-02-11 RX ORDER — METOPROLOL SUCCINATE 50 MG/1
50 TABLET, EXTENDED RELEASE ORAL AT BEDTIME
Qty: 90 TABLET | Refills: 1 | Status: SHIPPED | OUTPATIENT
Start: 2020-02-11 | End: 2020-08-05

## 2020-02-17 ENCOUNTER — ANCILLARY PROCEDURE (OUTPATIENT)
Dept: CARDIOLOGY | Facility: CLINIC | Age: 85
End: 2020-02-17
Attending: INTERNAL MEDICINE
Payer: MEDICARE

## 2020-02-17 DIAGNOSIS — Z95.0 CARDIAC PACEMAKER IN SITU: ICD-10-CM

## 2020-02-17 PROCEDURE — 93296 REM INTERROG EVL PM/IDS: CPT | Performed by: INTERNAL MEDICINE

## 2020-02-17 PROCEDURE — 93294 REM INTERROG EVL PM/LDLS PM: CPT | Performed by: INTERNAL MEDICINE

## 2020-02-24 DIAGNOSIS — R35.89 DIURESIS: ICD-10-CM

## 2020-02-24 RX ORDER — FUROSEMIDE 40 MG
TABLET ORAL
Qty: 135 TABLET | Refills: 1 | Status: SHIPPED | OUTPATIENT
Start: 2020-02-24 | End: 2020-05-20

## 2020-02-24 NOTE — TELEPHONE ENCOUNTER
Refill request from [facility] pharmacy [facility] for Furosemide 40mg. LOV 7/2/19. 90 day supply with 1 refill sent. YEIMI Galindo RN.

## 2020-02-25 LAB
MDC_IDC_LEAD_IMPLANT_DT: NORMAL
MDC_IDC_LEAD_LOCATION: NORMAL
MDC_IDC_LEAD_LOCATION_DETAIL_1: NORMAL
MDC_IDC_LEAD_MFG: NORMAL
MDC_IDC_LEAD_MODEL: NORMAL
MDC_IDC_LEAD_POLARITY_TYPE: NORMAL
MDC_IDC_LEAD_SERIAL: NORMAL
MDC_IDC_MSMT_BATTERY_DTM: NORMAL
MDC_IDC_MSMT_BATTERY_REMAINING_LONGEVITY: 99 MO
MDC_IDC_MSMT_BATTERY_RRT_TRIGGER: 2.83
MDC_IDC_MSMT_BATTERY_STATUS: NORMAL
MDC_IDC_MSMT_BATTERY_VOLTAGE: 3.02 V
MDC_IDC_MSMT_LEADCHNL_RV_IMPEDANCE_VALUE: 513 OHM
MDC_IDC_MSMT_LEADCHNL_RV_IMPEDANCE_VALUE: 627 OHM
MDC_IDC_MSMT_LEADCHNL_RV_PACING_THRESHOLD_AMPLITUDE: 0.62 V
MDC_IDC_MSMT_LEADCHNL_RV_PACING_THRESHOLD_PULSEWIDTH: 0.4 MS
MDC_IDC_MSMT_LEADCHNL_RV_SENSING_INTR_AMPL: 7.38 MV
MDC_IDC_MSMT_LEADCHNL_RV_SENSING_INTR_AMPL: 7.38 MV
MDC_IDC_PG_IMPLANT_DTM: NORMAL
MDC_IDC_PG_MFG: NORMAL
MDC_IDC_PG_MODEL: NORMAL
MDC_IDC_PG_SERIAL: NORMAL
MDC_IDC_PG_TYPE: NORMAL
MDC_IDC_SESS_CLINIC_NAME: NORMAL
MDC_IDC_SESS_DTM: NORMAL
MDC_IDC_SESS_TYPE: NORMAL
MDC_IDC_SET_BRADY_HYSTRATE: NORMAL
MDC_IDC_SET_BRADY_LOWRATE: 60 {BEATS}/MIN
MDC_IDC_SET_BRADY_MAX_SENSOR_RATE: 130 {BEATS}/MIN
MDC_IDC_SET_BRADY_MODE: NORMAL
MDC_IDC_SET_LEADCHNL_RV_PACING_AMPLITUDE: 1.5 V
MDC_IDC_SET_LEADCHNL_RV_PACING_ANODE_ELECTRODE_1: NORMAL
MDC_IDC_SET_LEADCHNL_RV_PACING_ANODE_LOCATION_1: NORMAL
MDC_IDC_SET_LEADCHNL_RV_PACING_CAPTURE_MODE: NORMAL
MDC_IDC_SET_LEADCHNL_RV_PACING_CATHODE_ELECTRODE_1: NORMAL
MDC_IDC_SET_LEADCHNL_RV_PACING_CATHODE_LOCATION_1: NORMAL
MDC_IDC_SET_LEADCHNL_RV_PACING_POLARITY: NORMAL
MDC_IDC_SET_LEADCHNL_RV_PACING_PULSEWIDTH: 0.4 MS
MDC_IDC_SET_LEADCHNL_RV_SENSING_ANODE_ELECTRODE_1: NORMAL
MDC_IDC_SET_LEADCHNL_RV_SENSING_ANODE_LOCATION_1: NORMAL
MDC_IDC_SET_LEADCHNL_RV_SENSING_CATHODE_ELECTRODE_1: NORMAL
MDC_IDC_SET_LEADCHNL_RV_SENSING_CATHODE_LOCATION_1: NORMAL
MDC_IDC_SET_LEADCHNL_RV_SENSING_POLARITY: NORMAL
MDC_IDC_SET_LEADCHNL_RV_SENSING_SENSITIVITY: 0.9 MV
MDC_IDC_SET_ZONE_DETECTION_INTERVAL: 360 MS
MDC_IDC_SET_ZONE_TYPE: NORMAL
MDC_IDC_STAT_BRADY_DTM_END: NORMAL
MDC_IDC_STAT_BRADY_DTM_START: NORMAL
MDC_IDC_STAT_BRADY_RV_PERCENT_PACED: 79.91 %
MDC_IDC_STAT_EPISODE_RECENT_COUNT: 0
MDC_IDC_STAT_EPISODE_RECENT_COUNT: 0
MDC_IDC_STAT_EPISODE_RECENT_COUNT_DTM_END: NORMAL
MDC_IDC_STAT_EPISODE_RECENT_COUNT_DTM_END: NORMAL
MDC_IDC_STAT_EPISODE_RECENT_COUNT_DTM_START: NORMAL
MDC_IDC_STAT_EPISODE_RECENT_COUNT_DTM_START: NORMAL
MDC_IDC_STAT_EPISODE_TOTAL_COUNT: 0
MDC_IDC_STAT_EPISODE_TOTAL_COUNT: 4
MDC_IDC_STAT_EPISODE_TOTAL_COUNT_DTM_END: NORMAL
MDC_IDC_STAT_EPISODE_TOTAL_COUNT_DTM_END: NORMAL
MDC_IDC_STAT_EPISODE_TOTAL_COUNT_DTM_START: NORMAL
MDC_IDC_STAT_EPISODE_TOTAL_COUNT_DTM_START: NORMAL
MDC_IDC_STAT_EPISODE_TYPE: NORMAL

## 2020-02-28 ENCOUNTER — ANTICOAGULATION THERAPY VISIT (OUTPATIENT)
Dept: NURSING | Facility: CLINIC | Age: 85
End: 2020-02-28
Payer: MEDICARE

## 2020-02-28 LAB — INR POINT OF CARE: 2.7 (ref 0.86–1.14)

## 2020-02-28 PROCEDURE — 36416 COLLJ CAPILLARY BLOOD SPEC: CPT

## 2020-02-28 PROCEDURE — 99207 ZZC NO CHARGE NURSE ONLY: CPT

## 2020-02-28 PROCEDURE — 85610 PROTHROMBIN TIME: CPT | Mod: QW

## 2020-02-28 NOTE — PROGRESS NOTES
ANTICOAGULATION FOLLOW-UP CLINIC VISIT    Patient Name:  Santosh KENNY Hjelmstad  Date:  2020  Contact Type:  Face to Face    SUBJECTIVE:  Patient Findings     Comments:   The patient was assessed for diet, medication, and activity level changes, missed or extra doses, bruising or bleeding, with no problem findings.          Clinical Outcomes     Comments:   The patient was assessed for diet, medication, and activity level changes, missed or extra doses, bruising or bleeding, with no problem findings.             OBJECTIVE    INR Protime   Date Value Ref Range Status   2020 2.7 (A) 0.86 - 1.14 Final       ASSESSMENT / PLAN  INR assessment THER    Recheck INR In: 5 WEEKS    INR Location Clinic      Anticoagulation Summary  As of 2020    INR goal:   2.0-3.0   TTR:   77.2 % (1 y)   INR used for dosin.7 (2020)   Warfarin maintenance plan:   2.5 mg (5 mg x 0.5) every Mon, Wed, Fri; 5 mg (5 mg x 1) all other days   Full warfarin instructions:   2.5 mg every Mon, Wed, Fri; 5 mg all other days   Weekly warfarin total:   27.5 mg   Plan last modified:   Nery Yang RN (2019)   Next INR check:   4/3/2020   Target end date:       Indications    Chronic atrial fibrillation [I48.20]  Chronic anticoagulation [Z79.01]             Anticoagulation Episode Summary     INR check location:       Preferred lab:       Send INR reminders to:   JOHN CALDERON    Comments:         Anticoagulation Care Providers     Provider Role Specialty Phone number    Lilly Zepeda MD [facility] Internal Medicine [phone]            See the Encounter Report to view Anticoagulation Flowsheet and Dosing Calendar (Go to Encounters tab in chart review, and find the Anticoagulation Therapy Visit)    Dosage adjustment made based on physician directed care plan.    Brandi Sullivan, RN

## 2020-03-26 DIAGNOSIS — I48.20 CHRONIC ATRIAL FIBRILLATION (H): Primary | ICD-10-CM

## 2020-03-26 NOTE — PROGRESS NOTES
Detailed message left regarding changing INR appointment to lab appointment.  Standing orders placed.  Sharonda Morales RN  [facility]

## 2020-05-05 ENCOUNTER — TELEPHONE (OUTPATIENT)
Dept: FAMILY MEDICINE | Facility: CLINIC | Age: 85
End: 2020-05-05

## 2020-05-08 ENCOUNTER — ANTICOAGULATION THERAPY VISIT (OUTPATIENT)
Dept: ANTICOAGULATION | Facility: CLINIC | Age: 85
End: 2020-05-08
Payer: MEDICARE

## 2020-05-08 DIAGNOSIS — I48.20 CHRONIC ATRIAL FIBRILLATION (H): ICD-10-CM

## 2020-05-08 DIAGNOSIS — Z79.01 CHRONIC ANTICOAGULATION: ICD-10-CM

## 2020-05-08 LAB
CAPILLARY BLOOD COLLECTION: NORMAL
INR PPP: 3.1 (ref 0.86–1.14)

## 2020-05-08 PROCEDURE — 99207 ZZC NO CHARGE NURSE ONLY: CPT

## 2020-05-08 PROCEDURE — 36416 COLLJ CAPILLARY BLOOD SPEC: CPT | Performed by: INTERNAL MEDICINE

## 2020-05-08 PROCEDURE — 85610 PROTHROMBIN TIME: CPT | Performed by: INTERNAL MEDICINE

## 2020-05-08 NOTE — PROGRESS NOTES
ANTICOAGULATION FOLLOW-UP CLINIC VISIT    Patient Name:  Santosh Poonmstad  Date:  5/8/2020  Contact Type:  Telephone    SUBJECTIVE:  Patient Findings     Comments:   The patient was assessed for diet, medication, and activity level changes, missed or extra doses, bruising or bleeding, with no problem findings.          Clinical Outcomes     Comments:   The patient was assessed for diet, medication, and activity level changes, missed or extra doses, bruising or bleeding, with no problem findings.             OBJECTIVE    INR   Date Value Ref Range Status   05/08/2020 3.10 (H) 0.86 - 1.14 Final       ASSESSMENT / PLAN  INR assessment SUPRA    Recheck INR In: 3 WEEKS    INR Location Clinic      Anticoagulation Summary  As of 5/8/2020    INR goal:   2.0-3.0   TTR:   75.0 % (1 y)   INR used for dosing:   3.10! (5/8/2020)   Warfarin maintenance plan:   2.5 mg (5 mg x 0.5) every Mon, Wed, Fri; 5 mg (5 mg x 1) all other days   Full warfarin instructions:   5/9: 2.5 mg; Otherwise 2.5 mg every Mon, Wed, Fri; 5 mg all other days   Weekly warfarin total:   27.5 mg   Plan last modified:   Nery Yang RN (7/26/2019)   Next INR check:   5/29/2020   Priority:   High   Target end date:       Indications    Chronic atrial fibrillation [I48.20]  Chronic anticoagulation [Z79.01]             Anticoagulation Episode Summary     INR check location:       Preferred lab:       Send INR reminders to:   JOHN CALDERON    Comments:         Anticoagulation Care Providers     Provider Role Specialty Phone number    Duane Lillyjelani Castaneda MD [facility] Internal Medicine [phone]            See the Encounter Report to view Anticoagulation Flowsheet and Dosing Calendar (Go to Encounters tab in chart review, and find the Anticoagulation Therapy Visit)        Susannah Freeman RN

## 2020-05-18 DIAGNOSIS — E78.5 HYPERLIPIDEMIA LDL GOAL <100: ICD-10-CM

## 2020-05-18 NOTE — TELEPHONE ENCOUNTER
Reason for Call:  Medication or medication refill:    Do you use a [facility]?  Name of the pharmacy and phone number for the current request:     [facility] #[id number] - [place], [place] - [address]    Name of the medication requested: simvastatin (ZOCOR) 10 MG tablet     Other request:     Can we leave a detailed message on this number? YES    Phone number patient can be reached at: Cell number on file:    Telephone Information:   Mobile [phone]     Best Time: any    Call taken on 5/18/2020 at 10:42 AM by Kizzy Belle

## 2020-05-19 RX ORDER — SIMVASTATIN 10 MG
10 TABLET ORAL AT BEDTIME
Qty: 90 TABLET | Refills: 0 | Status: SHIPPED | OUTPATIENT
Start: 2020-05-19 | End: 2020-05-20

## 2020-05-20 ENCOUNTER — VIRTUAL VISIT (OUTPATIENT)
Dept: FAMILY MEDICINE | Facility: CLINIC | Age: 85
End: 2020-05-20
Payer: MEDICARE

## 2020-05-20 DIAGNOSIS — R14.1 FLATULENCE, ERUCTATION AND GAS PAIN: ICD-10-CM

## 2020-05-20 DIAGNOSIS — E78.5 HYPERLIPIDEMIA LDL GOAL <100: ICD-10-CM

## 2020-05-20 DIAGNOSIS — Z76.0 ENCOUNTER FOR MEDICATION REFILL: Primary | ICD-10-CM

## 2020-05-20 DIAGNOSIS — I10 BENIGN ESSENTIAL HYPERTENSION: ICD-10-CM

## 2020-05-20 DIAGNOSIS — E53.8 VITAMIN B12 DEFICIENCY (NON ANEMIC): ICD-10-CM

## 2020-05-20 DIAGNOSIS — R14.3 FLATULENCE, ERUCTATION AND GAS PAIN: ICD-10-CM

## 2020-05-20 DIAGNOSIS — F33.9 EPISODE OF RECURRENT MAJOR DEPRESSIVE DISORDER, UNSPECIFIED DEPRESSION EPISODE SEVERITY (H): ICD-10-CM

## 2020-05-20 DIAGNOSIS — R35.89 DIURESIS: ICD-10-CM

## 2020-05-20 DIAGNOSIS — R14.2 FLATULENCE, ERUCTATION AND GAS PAIN: ICD-10-CM

## 2020-05-20 PROCEDURE — 99443: CPT | Performed by: INTERNAL MEDICINE

## 2020-05-20 RX ORDER — LISINOPRIL 20 MG/1
20 TABLET ORAL DAILY
Qty: 180 TABLET | Refills: 3 | Status: SHIPPED | OUTPATIENT
Start: 2020-05-20 | End: 2021-01-01

## 2020-05-20 RX ORDER — SIMVASTATIN 10 MG
10 TABLET ORAL AT BEDTIME
Qty: 90 TABLET | Refills: 0 | Status: SHIPPED | OUTPATIENT
Start: 2020-05-20 | End: 2020-01-01

## 2020-05-20 RX ORDER — SIMETHICONE 125 MG
1 CAPSULE ORAL 2 TIMES DAILY
Qty: 60 CAPSULE | Refills: 11 | Status: SHIPPED | OUTPATIENT
Start: 2020-05-20

## 2020-05-20 RX ORDER — FUROSEMIDE 40 MG
TABLET ORAL
Qty: 135 TABLET | Refills: 1 | Status: SHIPPED | OUTPATIENT
Start: 2020-05-20 | End: 2021-01-01

## 2020-05-20 ASSESSMENT — PATIENT HEALTH QUESTIONNAIRE - PHQ9: SUM OF ALL RESPONSES TO PHQ QUESTIONS 1-9: 0

## 2020-05-20 NOTE — PROGRESS NOTES
"Santosh Robledo is a 89 year old male who is being evaluated via a billable telephone visit.      The patient has been notified of following:     \"This telephone visit will be conducted via a call between you and your physician/provider. We have found that certain health care needs can be provided without the need for a physical exam.  This service lets us provide the care you need with a short phone conversation.  If a prescription is necessary we can send it directly to your pharmacy.  If lab work is needed we can place an order for that and you can then stop by our lab to have the test done at a later time.    Telephone visits are billed at different rates depending on your insurance coverage. During this emergency period, for some insurers they may be billed the same as an in-person visit.  Please reach out to your insurance provider with any questions.    If during the course of the call the physician/provider feels a telephone visit is not appropriate, you will not be charged for this service.\"    Patient has given verbal consent for Telephone visit?  Yes    What phone number would you like to be contacted at? [phone]    How would you like to obtain your AVS? Fabián    Chief Complaint:   Medication refills for hypertension, hyperlipidemia, depression      HPI:   Patient Santosh Robledo is a very pleasant 89 year old male with history of hypertension, hyperlipidemia, depression today for telephone evaluation of multiple concerns including medication refills for hypertension, hyperlipidemia, depression. Regarding the patient's depression, the patient reports that his depression symptoms are currently well controlled on the Zoloft medication. He is due for a refill of the Zoloft medication at this time. No chest pain, headaches, fever or chills. Regarding the patient's hyperlipidemia, the patient is due for a refill of his Lipitor medication at this time.           Current Medications:     Current " Outpatient Medications   Medication Sig Dispense Refill     Cyanocobalamin (B-12) 1000 MCG CAPS Take 1 tablet by mouth daily 90 capsule 3     furosemide (LASIX) 40 MG tablet 20mg in the AM, 20mg in the  tablet 1     lisinopril (ZESTRIL) 20 MG tablet Take 1 tablet (20 mg) by mouth daily 180 tablet 3     metoprolol succinate ER (TOPROL XL) 50 MG 24 hr tablet Take 1 tablet (50 mg) by mouth At Bedtime 90 tablet 1     omeprazole (PRILOSEC) 20 MG DR capsule Take 1 capsule (20 mg) by mouth daily 90 capsule 3     order for DME Equipment being ordered: Walker Wheels () and Walker ()  Treatment Diagnosis: Difficulty ambulating 1 each 0     order for DME Equipment being ordered: Walker Wheels ()  Treatment Diagnosis:  Weakness,colitis. 1 Device 0     potassium chloride ER (KLOR-CON) 20 MEQ CR tablet Take 1 tablet (20 mEq) by mouth 2 times daily 180 tablet 3     sertraline (ZOLOFT) 50 MG tablet Take 1 tablet (50 mg) by mouth daily 90 tablet 3     Simethicone (GAS-X EXTRA STRENGTH) 125 MG CAPS Take 1 capsule by mouth 2 times daily 60 capsule 11     simvastatin (ZOCOR) 10 MG tablet Take 1 tablet (10 mg) by mouth At Bedtime 90 tablet 0     warfarin ANTICOAGULANT (COUMADIN) 5 MG tablet Take 1/2 tablet M/W/F and 1 tablet all other days,  AS DIRECTED BY YOUR INR CLINIC 72 tablet 1         Allergies:      Allergies   Allergen Reactions     Penicillins Rash            Past Medical History:     Past Medical History:   Diagnosis Date     AV node dysfunction      Chronic atrial fibrillation (H) 2004     GERD (gastroesophageal reflux disease)      Hyperlipidemia      Near syncope      MARILU (obstructive sleep apnea)          Past Surgical History:     Past Surgical History:   Procedure Laterality Date     IMPLANT PACEMAKER  08/10/2015         Family Medical History:     Family History   Problem Relation Age of Onset     Influenza/Pneumonia Mother      Prostate Cancer Father          Social History:     Social History      Socioeconomic History     Marital status:      Spouse name: Not on file     Number of children: Not on file     Years of education: Not on file     Highest education level: Not on file   Occupational History     Not on file   Social Needs     Financial resource strain: Not on file     Food insecurity     Worry: Not on file     Inability: Not on file     Transportation needs     Medical: Not on file     Non-medical: Not on file   Tobacco Use     Smoking status: Never Smoker     Smokeless tobacco: Never Used   Substance and Sexual Activity     Alcohol use: No     Drug use: No     Sexual activity: Yes     Partners: Female   Lifestyle     Physical activity     Days per week: Not on file     Minutes per session: Not on file     Stress: Not on file   Relationships     Social connections     Talks on phone: Not on file     Gets together: Not on file     Attends [employer] service: Not on file     Active member of club or organization: Not on file     Attends meetings of clubs or organizations: Not on file     Relationship status: Not on file     Intimate partner violence     Fear of current or ex partner: Not on file     Emotionally abused: Not on file     Physically abused: Not on file     Forced sexual activity: Not on file   Other Topics Concern     Parent/sibling w/ CABG, MI or angioplasty before 65F 55M? Not Asked   Social History Narrative     Not on file           Review of System:     Constitutional: Negative for fever or chills  Skin: Negative for rashes  Ears/Nose/Throat: Negative for nasal congestion, sore throat  Respiratory: No shortness of breath, dyspnea on exertion, cough, or hemoptysis  Cardiovascular: Negative for chest pain  Gastrointestinal: Negative for nausea, vomiting  Genitourinary: Negative for dysuria, hematuria  Musculoskeletal: Negative for myalgias  Neurologic: Negative for headaches  Psychiatric: Negative for depression, anxiety  Hematologic/Lymphatic/Immunologic:  Negative  Endocrine: positive  Behavioral: Negative for tobacco use       Physical Exam:   There were no vitals taken for this visit.        Diagnostic Test Results:       Recent Labs   Lab Test 06/28/19  0959   CHOL 161   HDL 36*   LDL 88   TRIG 186*         ASSESSMENT/PLAN:       (Z76.0) Encounter for medication refill  (primary encounter diagnosis)  (I10) Benign essential hypertension  Comment: stable according to patient's own report. Patient is due for a refill of Lisinopril, Lasix BP medications at this time.  Plan: lisinopril (ZESTRIL) 20 MG tablet, furosemide (LASIX) 40 MG tablet      (F33.9) Episode of recurrent major depressive disorder, unspecified depression episode severity (H)  Comment: symptoms stable, Patient is due for a refill of Zoloft medication at this time.  Plan: sertraline (ZOLOFT) 50 MG tablet      (E78.5) Hyperlipidemia LDL goal <100  Comment: stable. Patient is due for a refill of Zocor cholesterol medication at this time.  Plan: simvastatin (ZOCOR) 10 MG tablet      (R14.3,  R14.1,  R14.2) Flatulence, eructation and gas pain  Comment: symptoms stable on simethicone. Patient is due for a refill of simethicone medication at this time.  Plan: Simethicone (GAS-X EXTRA STRENGTH) 125 MG CAPS            (E53.8) Vitamin B12 deficiency (non anemic)  Comment: symptoms stable. Patient is due for a refill of Cyanocobalamin medication at this time.  Plan: Cyanocobalamin (B-12) 1000 MCG CAPS           Follow Up Plan:     Patient is instructed to return to Internal Medicine clinic for follow-up visit in 3 months.      Phone call duration:  25 minutes    Lilly Zepeda MD

## 2020-06-02 ENCOUNTER — ANCILLARY PROCEDURE (OUTPATIENT)
Dept: CARDIOLOGY | Facility: CLINIC | Age: 85
End: 2020-06-02
Attending: INTERNAL MEDICINE
Payer: MEDICARE

## 2020-06-02 DIAGNOSIS — Z95.0 CARDIAC PACEMAKER IN SITU: ICD-10-CM

## 2020-06-02 PROCEDURE — 93296 REM INTERROG EVL PM/IDS: CPT | Performed by: INTERNAL MEDICINE

## 2020-06-02 PROCEDURE — 93294 REM INTERROG EVL PM/LDLS PM: CPT | Performed by: INTERNAL MEDICINE

## 2020-06-05 ENCOUNTER — ANTICOAGULATION THERAPY VISIT (OUTPATIENT)
Dept: ANTICOAGULATION | Facility: CLINIC | Age: 85
End: 2020-06-05
Payer: MEDICARE

## 2020-06-05 DIAGNOSIS — Z79.01 CHRONIC ANTICOAGULATION: ICD-10-CM

## 2020-06-05 DIAGNOSIS — I48.20 CHRONIC ATRIAL FIBRILLATION (H): ICD-10-CM

## 2020-06-05 LAB
CAPILLARY BLOOD COLLECTION: NORMAL
INR PPP: 2.1 (ref 0.86–1.14)

## 2020-06-05 PROCEDURE — 36416 COLLJ CAPILLARY BLOOD SPEC: CPT | Performed by: INTERNAL MEDICINE

## 2020-06-05 PROCEDURE — 99207 ZZC NO CHARGE NURSE ONLY: CPT

## 2020-06-05 PROCEDURE — 85610 PROTHROMBIN TIME: CPT | Performed by: INTERNAL MEDICINE

## 2020-06-05 NOTE — PROGRESS NOTES
ANTICOAGULATION FOLLOW-UP CLINIC VISIT    Patient Name:  Santosh Poonmstad  Date:  2020  Contact Type:  Telephone    SUBJECTIVE:  Patient Findings     Comments:   The patient was assessed for diet, medication, and activity level changes, missed or extra doses, bruising or bleeding, with no problem findings.          Clinical Outcomes     Comments:   The patient was assessed for diet, medication, and activity level changes, missed or extra doses, bruising or bleeding, with no problem findings.             OBJECTIVE    Recent labs: (last 7 days)     20  1102   INR 2.10*       ASSESSMENT / PLAN  INR assessment THER    Recheck INR In: 4 WEEKS    INR Location Clinic      Anticoagulation Summary  As of 2020    INR goal:   2.0-3.0   TTR:   74.2 % (1 y)   INR used for dosin.10 (2020)   Warfarin maintenance plan:   2.5 mg (5 mg x 0.5) every Mon, Wed, Fri; 5 mg (5 mg x 1) all other days   Full warfarin instructions:   2.5 mg every Mon, Wed, Fri; 5 mg all other days   Weekly warfarin total:   27.5 mg   No change documented:   Susannah Freeman RN   Plan last modified:   Nery Yang RN (2019)   Next INR check:   7/3/2020   Priority:   Maintenance   Target end date:       Indications    Chronic atrial fibrillation [I48.20]  Chronic anticoagulation [Z79.01]             Anticoagulation Episode Summary     INR check location:       Preferred lab:       Send INR reminders to:   JOHN CALDERON    Comments:         Anticoagulation Care Providers     Provider Role Specialty Phone number    Lilly Zepeda MD Referring Internal Medicine [phone]            See the Encounter Report to view Anticoagulation Flowsheet and Dosing Calendar (Go to Encounters tab in chart review, and find the Anticoagulation Therapy Visit)        Susannah Freeman, RN

## 2020-06-11 LAB
MDC_IDC_EPISODE_DTM: NORMAL
MDC_IDC_EPISODE_DTM: NORMAL
MDC_IDC_EPISODE_DURATION: 1 S
MDC_IDC_EPISODE_DURATION: 1 S
MDC_IDC_EPISODE_ID: 5
MDC_IDC_EPISODE_ID: 6
MDC_IDC_EPISODE_TYPE: NORMAL
MDC_IDC_EPISODE_TYPE: NORMAL
MDC_IDC_LEAD_IMPLANT_DT: NORMAL
MDC_IDC_LEAD_LOCATION: NORMAL
MDC_IDC_LEAD_LOCATION_DETAIL_1: NORMAL
MDC_IDC_LEAD_MFG: NORMAL
MDC_IDC_LEAD_MODEL: NORMAL
MDC_IDC_LEAD_POLARITY_TYPE: NORMAL
MDC_IDC_LEAD_SERIAL: NORMAL
MDC_IDC_MSMT_BATTERY_DTM: NORMAL
MDC_IDC_MSMT_BATTERY_REMAINING_LONGEVITY: 90 MO
MDC_IDC_MSMT_BATTERY_RRT_TRIGGER: 2.83
MDC_IDC_MSMT_BATTERY_STATUS: NORMAL
MDC_IDC_MSMT_BATTERY_VOLTAGE: 3.01 V
MDC_IDC_MSMT_LEADCHNL_RV_IMPEDANCE_VALUE: 551 OHM
MDC_IDC_MSMT_LEADCHNL_RV_IMPEDANCE_VALUE: 684 OHM
MDC_IDC_MSMT_LEADCHNL_RV_PACING_THRESHOLD_AMPLITUDE: 0.5 V
MDC_IDC_MSMT_LEADCHNL_RV_PACING_THRESHOLD_PULSEWIDTH: 0.4 MS
MDC_IDC_MSMT_LEADCHNL_RV_SENSING_INTR_AMPL: 8.25 MV
MDC_IDC_MSMT_LEADCHNL_RV_SENSING_INTR_AMPL: 8.25 MV
MDC_IDC_PG_IMPLANT_DTM: NORMAL
MDC_IDC_PG_MFG: NORMAL
MDC_IDC_PG_MODEL: NORMAL
MDC_IDC_PG_SERIAL: NORMAL
MDC_IDC_PG_TYPE: NORMAL
MDC_IDC_SESS_CLINIC_NAME: NORMAL
MDC_IDC_SESS_DTM: NORMAL
MDC_IDC_SESS_TYPE: NORMAL
MDC_IDC_SET_BRADY_HYSTRATE: NORMAL
MDC_IDC_SET_BRADY_LOWRATE: 60 {BEATS}/MIN
MDC_IDC_SET_BRADY_MAX_SENSOR_RATE: 130 {BEATS}/MIN
MDC_IDC_SET_BRADY_MODE: NORMAL
MDC_IDC_SET_LEADCHNL_RV_PACING_AMPLITUDE: 1.5 V
MDC_IDC_SET_LEADCHNL_RV_PACING_ANODE_ELECTRODE_1: NORMAL
MDC_IDC_SET_LEADCHNL_RV_PACING_ANODE_LOCATION_1: NORMAL
MDC_IDC_SET_LEADCHNL_RV_PACING_CAPTURE_MODE: NORMAL
MDC_IDC_SET_LEADCHNL_RV_PACING_CATHODE_ELECTRODE_1: NORMAL
MDC_IDC_SET_LEADCHNL_RV_PACING_CATHODE_LOCATION_1: NORMAL
MDC_IDC_SET_LEADCHNL_RV_PACING_POLARITY: NORMAL
MDC_IDC_SET_LEADCHNL_RV_PACING_PULSEWIDTH: 0.4 MS
MDC_IDC_SET_LEADCHNL_RV_SENSING_ANODE_ELECTRODE_1: NORMAL
MDC_IDC_SET_LEADCHNL_RV_SENSING_ANODE_LOCATION_1: NORMAL
MDC_IDC_SET_LEADCHNL_RV_SENSING_CATHODE_ELECTRODE_1: NORMAL
MDC_IDC_SET_LEADCHNL_RV_SENSING_CATHODE_LOCATION_1: NORMAL
MDC_IDC_SET_LEADCHNL_RV_SENSING_POLARITY: NORMAL
MDC_IDC_SET_LEADCHNL_RV_SENSING_SENSITIVITY: 0.9 MV
MDC_IDC_SET_ZONE_DETECTION_INTERVAL: 360 MS
MDC_IDC_SET_ZONE_TYPE: NORMAL
MDC_IDC_STAT_BRADY_DTM_END: NORMAL
MDC_IDC_STAT_BRADY_DTM_START: NORMAL
MDC_IDC_STAT_BRADY_RV_PERCENT_PACED: 82.24 %
MDC_IDC_STAT_EPISODE_RECENT_COUNT: 0
MDC_IDC_STAT_EPISODE_RECENT_COUNT: 2
MDC_IDC_STAT_EPISODE_RECENT_COUNT_DTM_END: NORMAL
MDC_IDC_STAT_EPISODE_RECENT_COUNT_DTM_END: NORMAL
MDC_IDC_STAT_EPISODE_RECENT_COUNT_DTM_START: NORMAL
MDC_IDC_STAT_EPISODE_RECENT_COUNT_DTM_START: NORMAL
MDC_IDC_STAT_EPISODE_TOTAL_COUNT: 0
MDC_IDC_STAT_EPISODE_TOTAL_COUNT: 6
MDC_IDC_STAT_EPISODE_TOTAL_COUNT_DTM_END: NORMAL
MDC_IDC_STAT_EPISODE_TOTAL_COUNT_DTM_END: NORMAL
MDC_IDC_STAT_EPISODE_TOTAL_COUNT_DTM_START: NORMAL
MDC_IDC_STAT_EPISODE_TOTAL_COUNT_DTM_START: NORMAL
MDC_IDC_STAT_EPISODE_TYPE: NORMAL

## 2020-07-10 ENCOUNTER — ANTICOAGULATION THERAPY VISIT (OUTPATIENT)
Dept: ANTICOAGULATION | Facility: CLINIC | Age: 85
End: 2020-07-10
Payer: MEDICARE

## 2020-07-10 DIAGNOSIS — I48.20 CHRONIC ATRIAL FIBRILLATION (H): ICD-10-CM

## 2020-07-10 DIAGNOSIS — Z79.01 CHRONIC ANTICOAGULATION: ICD-10-CM

## 2020-07-10 LAB
CAPILLARY BLOOD COLLECTION: NORMAL
INR PPP: 2.8 (ref 0.86–1.14)

## 2020-07-10 PROCEDURE — 85610 PROTHROMBIN TIME: CPT | Performed by: INTERNAL MEDICINE

## 2020-07-10 PROCEDURE — 99207 ZZC NO CHARGE NURSE ONLY: CPT

## 2020-07-10 PROCEDURE — 36416 COLLJ CAPILLARY BLOOD SPEC: CPT | Performed by: INTERNAL MEDICINE

## 2020-07-10 NOTE — PROGRESS NOTES
ANTICOAGULATION FOLLOW-UP CLINIC VISIT    Patient Name:  Santosh Poonmstad  Date:  7/10/2020  Contact Type:  Telephone    SUBJECTIVE:  Patient Findings     Comments:   The patient was assessed for diet, medication, and activity level changes, missed or extra doses, bruising or bleeding, with no problem findings.          Clinical Outcomes     Comments:   The patient was assessed for diet, medication, and activity level changes, missed or extra doses, bruising or bleeding, with no problem findings.             OBJECTIVE    Recent labs: (last 7 days)     07/10/20  1038   INR 2.80*       ASSESSMENT / PLAN  INR assessment THER    Recheck INR In: 4 WEEKS    INR Location Clinic      Anticoagulation Summary  As of 7/10/2020    INR goal:   2.0-3.0   TTR:   80.6 % (1 y)   INR used for dosin.80 (7/10/2020)   Warfarin maintenance plan:   2.5 mg (5 mg x 0.5) every Mon, Wed, Fri; 5 mg (5 mg x 1) all other days   Full warfarin instructions:   2.5 mg every Mon, Wed, Fri; 5 mg all other days   Weekly warfarin total:   27.5 mg   No change documented:   Susannah Freeman RN   Plan last modified:   Nery Yang RN (2019)   Next INR check:   2020   Target end date:   Indefinite    Indications    Chronic atrial fibrillation (H) [I48.20]  Chronic anticoagulation [Z79.01]             Anticoagulation Episode Summary     INR check location:       Preferred lab:       Send INR reminders to:   JOHN CALDERON    Comments:         Anticoagulation Care Providers     Provider Role Specialty Phone number    Lilly Zepeda MD Referring Internal Medicine [phone]            See the Encounter Report to view Anticoagulation Flowsheet and Dosing Calendar (Go to Encounters tab in chart review, and find the Anticoagulation Therapy Visit)        Susannah Freeman, RN

## 2020-07-21 ENCOUNTER — APPOINTMENT (OUTPATIENT)
Dept: CT IMAGING | Facility: CLINIC | Age: 85
End: 2020-07-21
Attending: EMERGENCY MEDICINE
Payer: MEDICARE

## 2020-07-21 ENCOUNTER — HOSPITAL ENCOUNTER (EMERGENCY)
Facility: CLINIC | Age: 85
Discharge: HOME | End: 2020-07-22
Attending: EMERGENCY MEDICINE | Admitting: EMERGENCY MEDICINE
Payer: MEDICARE

## 2020-07-21 VITALS
RESPIRATION RATE: 18 BRPM | TEMPERATURE: 97.8 F | OXYGEN SATURATION: 93 % | HEART RATE: 63 BPM | BODY MASS INDEX: 23.1 KG/M2 | SYSTOLIC BLOOD PRESSURE: 165 MMHG | DIASTOLIC BLOOD PRESSURE: 80 MMHG | WEIGHT: 180 LBS | HEIGHT: 74 IN

## 2020-07-21 DIAGNOSIS — R07.89 LEFT-SIDED CHEST WALL PAIN: ICD-10-CM

## 2020-07-21 DIAGNOSIS — S01.81XA FACIAL LACERATION, INITIAL ENCOUNTER: ICD-10-CM

## 2020-07-21 DIAGNOSIS — S09.90XA INJURY OF HEAD, INITIAL ENCOUNTER: ICD-10-CM

## 2020-07-21 LAB
ANION GAP SERPL CALCULATED.3IONS-SCNC: 5 MMOL/L (ref 3–14)
BASOPHILS # BLD AUTO: 0 10E9/L (ref 0–0.2)
BASOPHILS NFR BLD AUTO: 0.4 %
BUN SERPL-MCNC: 18 MG/DL (ref 7–30)
CALCIUM SERPL-MCNC: 8.7 MG/DL (ref 8.5–10.1)
CHLORIDE SERPL-SCNC: 110 MMOL/L (ref 94–109)
CO2 SERPL-SCNC: 25 MMOL/L (ref 20–32)
CREAT SERPL-MCNC: 0.97 MG/DL (ref 0.66–1.25)
DIFFERENTIAL METHOD BLD: ABNORMAL
EOSINOPHIL # BLD AUTO: 0.4 10E9/L (ref 0–0.7)
EOSINOPHIL NFR BLD AUTO: 3.2 %
ERYTHROCYTE [DISTWIDTH] IN BLOOD BY AUTOMATED COUNT: 13.9 % (ref 10–15)
GFR SERPL CREATININE-BSD FRML MDRD: 68 ML/MIN/{1.73_M2}
GLUCOSE SERPL-MCNC: 120 MG/DL (ref 70–99)
HCT VFR BLD AUTO: 42.8 % (ref 40–53)
HGB BLD-MCNC: 14 G/DL (ref 13.3–17.7)
IMM GRANULOCYTES # BLD: 0.1 10E9/L (ref 0–0.4)
IMM GRANULOCYTES NFR BLD: 0.9 %
INR PPP: 2.73 (ref 0.86–1.14)
LYMPHOCYTES # BLD AUTO: 0.8 10E9/L (ref 0.8–5.3)
LYMPHOCYTES NFR BLD AUTO: 7.3 %
MCH RBC QN AUTO: 27.9 PG (ref 26.5–33)
MCHC RBC AUTO-ENTMCNC: 32.7 G/DL (ref 31.5–36.5)
MCV RBC AUTO: 85 FL (ref 78–100)
MONOCYTES # BLD AUTO: 1.4 10E9/L (ref 0–1.3)
MONOCYTES NFR BLD AUTO: 12.6 %
NEUTROPHILS # BLD AUTO: 8.4 10E9/L (ref 1.6–8.3)
NEUTROPHILS NFR BLD AUTO: 75.6 %
NRBC # BLD AUTO: 0 10*3/UL
NRBC BLD AUTO-RTO: 0 /100
PLATELET # BLD AUTO: 342 10E9/L (ref 150–450)
POTASSIUM SERPL-SCNC: 4 MMOL/L (ref 3.4–5.3)
RBC # BLD AUTO: 5.01 10E12/L (ref 4.4–5.9)
SODIUM SERPL-SCNC: 140 MMOL/L (ref 133–144)
WBC # BLD AUTO: 11.1 10E9/L (ref 4–11)

## 2020-07-21 PROCEDURE — 70450 CT HEAD/BRAIN W/O DYE: CPT

## 2020-07-21 PROCEDURE — 12014 RPR F/E/E/N/L/M 5.1-7.5 CM: CPT

## 2020-07-21 PROCEDURE — 85025 COMPLETE CBC W/AUTO DIFF WBC: CPT | Performed by: EMERGENCY MEDICINE

## 2020-07-21 PROCEDURE — 25000128 H RX IP 250 OP 636: Performed by: EMERGENCY MEDICINE

## 2020-07-21 PROCEDURE — 96374 THER/PROPH/DIAG INJ IV PUSH: CPT

## 2020-07-21 PROCEDURE — 85610 PROTHROMBIN TIME: CPT | Performed by: EMERGENCY MEDICINE

## 2020-07-21 PROCEDURE — 99285 EMERGENCY DEPT VISIT HI MDM: CPT | Mod: 25

## 2020-07-21 PROCEDURE — 71250 CT THORAX DX C-: CPT

## 2020-07-21 PROCEDURE — 80048 BASIC METABOLIC PNL TOTAL CA: CPT | Performed by: EMERGENCY MEDICINE

## 2020-07-21 RX ORDER — ONDANSETRON 2 MG/ML
4 INJECTION INTRAMUSCULAR; INTRAVENOUS ONCE
Status: COMPLETED | OUTPATIENT
Start: 2020-07-21 | End: 2020-07-21

## 2020-07-21 RX ADMIN — ONDANSETRON 4 MG: 2 INJECTION INTRAMUSCULAR; INTRAVENOUS at 21:38

## 2020-07-21 ASSESSMENT — ENCOUNTER SYMPTOMS
BACK PAIN: 0
PALPITATIONS: 0
ARTHRALGIAS: 1
HEADACHES: 1
FLANK PAIN: 1
NECK PAIN: 0
NAUSEA: 1
VOMITING: 1
SHORTNESS OF BREATH: 0
WOUND: 1

## 2020-07-21 ASSESSMENT — MIFFLIN-ST. JEOR: SCORE: 1541.22

## 2020-07-21 NOTE — ED AVS SNAPSHOT
Emergency Department  [address] [address]  Phone:  [phone]  Fax:  [phone]                                    Santosh Robledo   MRN: [id number]    Department:   Emergency Department   Date of Visit:  7/21/2020           After Visit Summary Signature Page    I have received my discharge instructions, and my questions have been answered. I have discussed any challenges I see with this plan with the nurse or doctor.    ..........................................................................................................................................  Patient/Patient Representative Signature      ..........................................................................................................................................  Patient Representative Print Name and Relationship to Patient    ..................................................               ................................................  Date                                   Time    ..........................................................................................................................................  Reviewed by Signature/Title    ...................................................              ..............................................  Date                                               Time          22EPIC Rev 08/18

## 2020-07-22 ENCOUNTER — DOCUMENTATION ONLY (OUTPATIENT)
Dept: FAMILY MEDICINE | Facility: CLINIC | Age: 85
End: 2020-07-22

## 2020-07-22 NOTE — PROGRESS NOTES
AANTICOAGULATION  MANAGEMENT: Discharge Review    Santosh Poonzac chart reviewed for anticoagulation continuity of care    Emergency room visit on 7/21 for a fall.    Discharge disposition: Home    Results:    Recent labs: (last 7 days)     07/21/20  2136   INR 2.73*     Anticoagulation inpatient management:     not applicable     Anticoagulation discharge instructions:     Warfarin dosing: home regimen continued   Bridging: No   INR goal change: No      Medication changes affecting anticoagulation: No    Additional factors affecting anticoagulation: No    Plan     No adjustment to anticoagulation plan needed    Patient not contacted    No adjustment to Anticoagulation Calendar was required     Patient had follow up INR on 8/7/20.  INR therapeutic at this time.     Sharonda Morales RN

## 2020-07-22 NOTE — DISCHARGE INSTRUCTIONS
Discharge Instructions  Head Injury    You have been seen today for a head injury. Your evaluation included a history and physical examination. You may have had a CT (CAT) scan performed, though most head injuries do not require a scan. Based on this evaluation, your provider today does not feel that your head injury is serious.    Generally, every Emergency Department visit should have a follow-up clinic visit with either a primary or a specialty clinic/provider. Please follow-up as instructed by your emergency provider today.  Return to the Emergency Department if:  You are confused or you are not acting right.  Your headache gets worse or you start to have a really bad headache even with your recommended treatment plan.  You vomit (throw up) more than once.  You have a seizure.  You have trouble walking.  You have weakness or paralysis (cannot move) in an arm or a leg.  You have blood or fluid coming from your ears or nose.  You have new symptoms or anything that worries you.    Sleeping:  It is okay for you to sleep, but someone should wake you up if instructed by your provider, and someone should check on you at your usual time to wake up.     Activity:  Do not drive for at least 24 hours.  Do not drive if you have dizzy spells or trouble concentrating, or remembering things.  Do not return to any contact sports until cleared by your regular provider.     MORE INFORMATION:    Concussion:  A concussion is a minor head injury that may cause temporary problems with the way the brain works. Although concussions are important, they are generally not an emergency or a reason that a person needs to be hospitalized. Some concussion symptoms include confusion, amnesia (forgetful), nausea (sick to your stomach) and vomiting (throwing up), dizziness, fatigue, memory or concentration problems, irritability and sleep problems. For most people, concussions are mild and temporary but some will have more severe and persistent  symptoms that require on-going care and treatment.  CT Scans: Your evaluation today may have included a CT scan (CAT scan) to look for things like bleeding or a skull fracture (broken bone).  CT scans involve radiation and too many CT scans can cause serious health problems like cancer, especially in children.  Because of this, your provider may not have ordered a CT scan today if they think you are at low risk for a serious or life threatening problem.    If you were given a prescription for medicine here today, be sure to read all of the information (including the package insert) that comes with your prescription.  This will include important information about the medicine, its side effects, and any warnings that you need to know about.  The pharmacist who fills the prescription can provide more information and answer questions you may have about the medicine.  If you have questions or concerns that the pharmacist cannot address, please call or return to the Emergency Department.     Remember that you can always come back to the Emergency Department if you are not able to see your regular provider in the amount of time listed above, if you get any new symptoms, or if there is anything that worries you.      Discharge Instructions  Laceration (Cut)    You were seen today for a laceration (cut).  Your provider examined your laceration for any problems such a buried foreign body (like glass, a splinter, or gravel), or injury to blood vessels, tendons, and nerves.  Your provider may have also rinsed and/or scrubbed your laceration to help prevent an infection. It may not be possible to find all problems with your laceration on the first visit; occasionally foreign bodies or a tendon injury can go undetected.    Your laceration may have been closed in one of several ways:  No closure: many wounds will heal just fine without closure.  Stitches: regular stitches that require removal.  Staples: skin staples are often used  in the scalp/head.  Wound adhesive (glue): skin glue can be used for certain lacerations and doesn t require removal.  Wound strips (aka Butterfly bandages or steri-strips): these are bandages that help to close a wound.  Absorbable stitches:  dissolving  stitches that go away on their own and usually don t require removal.    A small percentage of wounds will develop an infection regardless of how well the wound is cared for. Antibiotics are generally not indicated to prevent an infection so are only given for a small number of high-risk wounds. Some lacerations are too high risk to close, and are left open to heal because closure can increase the likelihood that an infection will develop.    Remember that all lacerations, no matter how expertly repaired, will cause scarring. We consider many factors, techniques, and materials, in our efforts to provide the best possible cosmetic outcome.    Generally, every Emergency Department visit should have a follow-up clinic visit with either a primary or a specialty clinic/provider. Please follow-up as instructed by your emergency provider today.     Return to the Emergency Department right away if:  You have more redness, swelling, pain, drainage (pus), a bad smell, or red streaking from your laceration as these symptoms could indicate an infection.  You have a fever of 100.4 F or more.  You have bleeding that you cannot stop at home. If your cut starts to bleed, hold pressure on the bleeding area with a clean cloth or put pressure over the bandage.  If the bleeding does not stop after using constant pressure for 30 minutes, you should return to the Emergency Department for further treatment.  An area past the laceration is cool, pale, or blue compared with the other side, or has a slower return of color when squeezed.  Your dressing seems too tight or starts to get uncomfortable or painful. For children, signs of a problem might be irritability or restlessness.  You have  loss of normal function or use of an area, such as being unable to straighten or bend a finger normally.  You have a numb area past the laceration.    Return to the Emergency Department or see your regular provider if:  The laceration starts to come open.   You have something coming out of the cut or a feeling that there is something in the laceration.  Your wound will not heal, or keeps breaking open. There can always be glass, wood, dirt or other things in any wound.  They will not always show up, even on x-rays.  If a wound does not heal, this may be why, and it is important to follow-up with your regular provider.    Home Care:  Take your dressing off in 12-24 hours, or as instructed by your provider, to check your laceration. Remove the dressing sooner if it seems too tight or painful, or if it is getting numb, tingly, or pale past the dressing.  Gently wash your laceration 1-2 times daily with clean water and mild soap. It is okay to shower or run clean water over the laceration, but do not let the laceration soak in water (no swimming).  If your laceration was closed with wound adhesive or strips: pat it dry and leave it open to the air. For all other repairs: after you wash your laceration, or at least 2 times a day, apply antibiotic ointment (such as Neosporin  or Bacitracin ) to the laceration, then cover it with a Band-Aid  or gauze.  Keep the laceration clean. Wear gloves or other protective clothing if you are around dirt.    Follow-up for removal:  If your wound was closed with staples or regular stitches, they need to be removed according to the instructions and timeline specified by your provider today.  If your wound was closed with absorbable ( dissolving ) sutures, they should fall out, dissolve, or not be visible in about one week. If they are still visible, then they should be removed according to the instructions and timeline specified by your provider today.    Scars:  To help minimize  scarring:  Wear sunscreen over the healed laceration when out in the sun.  Massage the area regularly once healed.  You may apply Vitamin E to the healed wound.  Wait. Scars improve in appearance over months and years.    If you were given a prescription for medicine here today, be sure to read all of the information (including the package insert) that comes with your prescription.  This will include important information about the medicine, its side effects, and any warnings that you need to know about.  The pharmacist who fills the prescription can provide more information and answer questions you may have about the medicine.  If you have questions or concerns that the pharmacist cannot address, please call or return to the Emergency Department.       Remember that you can always come back to the Emergency Department if you are not able to see your regular provider in the amount of time listed above, if you get any new symptoms, or if there is anything that worries you.

## 2020-07-22 NOTE — ED PROVIDER NOTES
History     Chief Complaint:  Fall and Head Injury     HPI   Santosh Robledo is a 89 year old male with a history of hypertension, hyperlipidemia, CHF, and atrial fibrillation currently on Coumadin who presents accompanied by his wife for evaluation of a fall and head injury. Today shortly prior to arrival the patient was using his cane and walking around a corner when he tripped and fell onto his left side striking his head sustaining a laceration to his left forehead.  He landed on his left side and has mild pain to his left lateral lower chest wall as well.  He did not lose consciousness in the fall. Since the fall the patient has developed a headache with associated nausea and vomiting as well as left flank to left lateral chest pain, and minor pain in his left hand and knee. Due to this fall the patient's wife brought him into the ED for evaluation. He has been able to ambulate since the fall without significant difficulty. He did not have any chest pain, shortness of breath, or palpitations prior to falling. He denies any neck pain or back pain since the fall.     Allergies:  Penicillins      Medications:    Lasix  Lisinopril  Omeprazole  Toprol XL   Zoloft  Zocor  Coumadin      Past Medical History:    AV node dysfunction  Chronic atrial fibrillation  GERD   Hyperlipidemia   Hypertension   Cognitive impairment   Obstructive sleep apnea   CHF     Past Surgical History:    Implant pacemaker     Family History:    Prostate cancer - Father     Social History:  Tobacco use:    Never smoker   Alcohol use:    Negative   Drug use:    Negative   Marital status:       Accompanied to ED by:  Wife      Review of Systems   Respiratory: Negative for shortness of breath.    Cardiovascular: Negative for chest pain and palpitations.   Gastrointestinal: Positive for nausea and vomiting.   Genitourinary: Positive for flank pain (left).   Musculoskeletal: Positive for arthralgias. Negative for back pain and neck  "pain.   Skin: Positive for wound.   Neurological: Positive for headaches. Negative for syncope.   All other systems reviewed and are negative.    Physical Exam   First Vitals:  BP: (!) 192/103  Pulse: 68  Heart Rate: 68  Temp: 97.8  F (36.6  C)  Resp: 18  Height: 188 cm (6' 2\")  Weight: 81.6 kg (180 lb)  SpO2: 97 %    Patient Vitals for the past 24 hrs:   BP Temp Temp src Pulse Heart Rate Resp SpO2 Height Weight   07/21/20 2308 (!) 165/80 -- -- 63 -- -- 93 % -- --   07/21/20 2114 (!) 192/103 97.8  F (36.6  C) Oral -- 68 18 96 % 1.88 m (6' 2\") 81.6 kg (180 lb)   07/21/20 2110 (!) 192/103 -- -- 68 -- -- 97 % -- --         Physical Exam  Physical Exam   General:  Sitting on bed with wife at bedside.   HENT:  No obvious trauma to head other than laceration to the left forehead measuring 6.0 cm.   Right Ear:  External ear normal.   Left Ear:  External ear normal.   Nose:  Nose normal.   Eyes:  Conjunctivae and EOM are normal. Pupils are equal, round, and reactive.   Neck: Normal range of motion. Neck supple. No tracheal deviation present. No midline cervical neck tenderness, deformity, step off or pain in the midline with ROM.   CV:  Normal heart sounds. No murmur heard.  Pulm/Chest: Effort normal and breath sounds normal. Mild left lower lateral chest wall tenderness but no crepitous.   Abd: Soft. No distension. There is no tenderness. There is no rigidity, no rebound and no guarding.   M/S: Normal range of motion. Abrasion to anterior knee.   Neuro: Alert. GCS 15 .  Skin: Skin is warm and dry. No rash noted. Not diaphoretic. Skin tears appreciated to the dorsal third and fourth finger.  Psych: Normal mood and affect. Behavior is normal.     Emergency Department Course     Imaging:  Radiographic findings were communicated with the patient and family who voiced understanding of the findings.    Head CT w/o Contrast:  IMPRESSION:   1. No acute intracranial abnormality.   2. Small left frontal scalp soft tissue contusion. "   Per radiology.      Chest CT w/o Contrast:  IMPRESSION:   1.  No acute findings in the chest. No acute fracture, pneumothorax, or pleural effusion.   2.  Calcified pleural plaques and mild peripheral fibrosis are similar to previous.   Per radiology.      Laboratory:  CBC: WBC 11.1 high, o/w WNL (HGB 14.0, )   BMP: Chloride 110 high, Glucose 120 high, o/w WNL (Creatinine 0.97)   INR: 2.73 high     Procedures:    Laceration Repair        LACERATION:  A simple clean 6 cm laceration.      LOCATION:  Left forehead       FUNCTION:  Distally sensation, circulation, motor and tendon function are intact.      ANESTHESIA:  Local using 1.0% Lidocaine with epinephrine total of 4 mLs      PREPARATION:  Irrigation with Normal Saline and Shur Clens      DEBRIDEMENT:  no debridement      CLOSURE:  Wound was closed with One Layer.  Skin closed with 7 x 6.0 Ethylon using interrupted sutures.    Interventions:  2138 Zofran 4 mg IV      Emergency Department Course:  Nursing notes and vitals reviewed.  2108: I performed an exam of the patient as documented above.     2204: I updated and reassessed the patient. We reviewed the risks and benefits of observation admission and they declined.     2244:  A laceration repair was performed as outlined in the procedure note above.  The patient tolerated well and there were no complications.     Findings and plan explained to the Patient and spouse. Patient discharged home with instructions regarding supportive care, medications, and reasons to return. The importance of close follow-up was reviewed.    Impression & Plan       Medical Decision Making:  Santosh Robledo is a very pleasant 89 year old year old patient who presents to the emergency department with concern of a purely mechanical trip and fall resulting in a mild head injury. The differential diagnosis includes skull fracture, epidural hematoma, subdural hematoma, intracerebral hemorrhage, and traumatic subarachnoid  hemorrhage.  According to [place] head CT rules, I felt that a CT scan was warranted due to the patient's age and anticoagulation status on Coumadin.  The patient did have a forehead laceration which was repaired in the ED per the procedure note above.     The CT scan did not detect an intracranial injury. Although unlikely with normal neuroimaging, the patient and wife understands the importance of returning to the ED for a repeat evaluation with the development of worrisome symptoms, in particular we discussed: headaches that get worse, increased drowsiness, strange behavior, repetitive speech, seizures, repeated vomiting, growing confusion, increased irritability, slurred speech, weakness or numbness, and loss of responsiveness. Although rare, delayed CNS bleeds can occur, and the patient was notified of this.  I did offer observation admission for continued neuro checks, but the patient and wife declined.  Post concussive syndrome is also discussed.  They will return with any concern and follow-up with her primary in 7 days for suture removal.    The treatment plan was discussed with the patient and they expressed understanding of this plan and consented to the plan.  In addition, the patient will return to the emergency department if their symptoms persist, worsen, if new symptoms arise or if there is any concern as other pathology may be present that is not evident at this time. They also understand the importance of close follow up in the clinic and if unable to do so will return to the emergency department for a reevaluation. All questions were answered.    Diagnosis:    ICD-10-CM   1. Injury of head, initial encounter  S09.90XA   2. Facial laceration, initial encounter  S01.81XA   3. Left-sided chest wall pain  R07.89      Disposition:  Discharged to home.         IJack, am serving as a scribe at 9:08 PM on 7/21/2020 to document services personally performed by Nick Fofana DO, based on my  observations and the provider's statements to me.      EMERGENCY DEPARTMENT       Dick Fofana, DO  07/21/20 3421

## 2020-07-24 ENCOUNTER — TELEPHONE (OUTPATIENT)
Dept: FAMILY MEDICINE | Facility: CLINIC | Age: 85
End: 2020-07-24

## 2020-07-24 NOTE — TELEPHONE ENCOUNTER
Reason for Call:  Other appointment    Detailed comments: Pt needs suture removal from eyebrow. Was done at a  hospital. Please call Pt to schedule with RN or PCP for removal    Phone Number Patient's wife can be reached at: Home number on file [phone] (home)  *C2C verified    Best Time: any    Can we leave a detailed message on this number? YES    Call taken on 7/24/2020 at 2:40 PM by Kizzy Belle

## 2020-07-24 NOTE — TELEPHONE ENCOUNTER
"Scheduled with PCP for hospital f/u appt- will have RN visit for suture removal     Deepika GALICIA RN      ED / Discharge Outreach Protocol    Patient Contact    Attempt # 1    Was call answered?  Yes.  \"May I please speak with <Santosh>\"  Is patient available?   Yes      ED for acute condition Discharge Protocol    \"Hi, my name is Deepika Wing RN, a registered nurse, and I am calling from [facility].  I am calling to follow up and see how things are going for you after your recent emergency visit.\"    Tell me how you are doing now that you are home?\"     Still sore and having some pain spasms- he is bruised from fall     Denies concerns with breathing or chest pains       Discharge Instructions    \"Let's review your discharge instructions.  What is/are the follow-up recommendations?  Pt. Response: f/u with PCP   Remove sutures in 7 days     \"Has an appointment with your primary care provider been scheduled?\"  Yes. (confirm and remind to bring meds)    Medications    \"Tell me what changed about your medicines when you discharged?\"    NA    \"What questions do you have about your medications?\"   None    On warfarin: \"Were you given any recommendations for follow-up with the anticoagulation clinic?\" Yes - Anticoagulation clinic appointment is already scheduled at appropriate interval    Call Summary    \"What questions or concerns do you have about your recent visit and your follow-up care?\"     Call back with any questions/concerns or worsening of symptoms.     \"If you have questions or things don't continue to improve, we encourage you contact us through the main clinic number (give number).  Even if the clinic is not open, triage nurses are available 24/7 to help you.     We would like you to know that our clinic has extended hours (provide information).  We also have urgent care (provide details on closest location and hours/contact info)\"    \"Thank you for your time and take care!\"                  "

## 2020-07-29 ENCOUNTER — OFFICE VISIT (OUTPATIENT)
Dept: FAMILY MEDICINE | Facility: CLINIC | Age: 85
End: 2020-07-29
Payer: MEDICARE

## 2020-07-29 VITALS
WEIGHT: 171 LBS | SYSTOLIC BLOOD PRESSURE: 177 MMHG | TEMPERATURE: 97.1 F | DIASTOLIC BLOOD PRESSURE: 84 MMHG | OXYGEN SATURATION: 94 % | HEART RATE: 66 BPM | BODY MASS INDEX: 21.94 KG/M2 | HEIGHT: 74 IN

## 2020-07-29 DIAGNOSIS — Z48.02 VISIT FOR SUTURE REMOVAL: Primary | ICD-10-CM

## 2020-07-29 DIAGNOSIS — R41.89 COGNITIVE IMPAIRMENT: ICD-10-CM

## 2020-07-29 DIAGNOSIS — I10 BENIGN ESSENTIAL HYPERTENSION: ICD-10-CM
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
## 2020-07-29 DIAGNOSIS — S01.81XS LACERATION OF FOREHEAD, SEQUELA: ICD-10-CM

## 2020-07-29 DIAGNOSIS — K21.9 GASTROESOPHAGEAL REFLUX DISEASE, ESOPHAGITIS PRESENCE NOT SPECIFIED: ICD-10-CM

## 2020-07-29 DIAGNOSIS — E78.5 HYPERLIPIDEMIA, UNSPECIFIED HYPERLIPIDEMIA TYPE: ICD-10-CM

## 2020-07-29 PROCEDURE — 99024 POSTOP FOLLOW-UP VISIT: CPT | Performed by: INTERNAL MEDICINE

## 2020-07-29 PROCEDURE — 99214 OFFICE O/P EST MOD 30 MIN: CPT | Performed by: INTERNAL MEDICINE

## 2020-07-29 ASSESSMENT — PATIENT HEALTH QUESTIONNAIRE - PHQ9: SUM OF ALL RESPONSES TO PHQ QUESTIONS 1-9: 12

## 2020-07-29 ASSESSMENT — MIFFLIN-ST. JEOR: SCORE: 1500.4

## 2020-07-29 NOTE — PROGRESS NOTES
Subjective     Santosh Robledo is a 89 year old male who presents to clinic today for the following health issues:    HPI       ED/UC Followup:    Facility:   ED  Date of visit: 07/21/2020  Reason for visit:     Injury of head, initial encounter   Left Forehead laceration, initial encounter       Current Status: healed, sutures are ready to be removed       HPI:   Patient Santosh Robledo is a very pleasant 89 year old male with history of hypertension, atrial fibrillation s/p pacemaker implantation who presents to Internal Medicine clinic today brought in by his wife from home for post ER discharge follow up of multiple concerns including suture removal from a recent fall related left forehead laceration wound. No recent new falls since ER discharge. The laceration wound has healed by now and the sutures are ready to be removed. No signs of acute bleeding or wound infection at this time. Regarding the patient's chronic atrial fibrillation s/p cardiac pacemaker implantation, the patient denies any chest pain, he is followed by the [facility] clinic in [place] on a routine basis. Regarding the patient's hypertension, the patient's BP is well controlled. No chest pain, headaches, fever or chills.      Current Medications:     Current Outpatient Medications   Medication Sig Dispense Refill     Cyanocobalamin (B-12) 1000 MCG CAPS Take 1 tablet by mouth daily 90 capsule 3     furosemide (LASIX) 40 MG tablet 20mg in the AM, 20mg in the  tablet 1     lisinopril (ZESTRIL) 20 MG tablet Take 1 tablet (20 mg) by mouth daily 180 tablet 3     metoprolol succinate ER (TOPROL XL) 50 MG 24 hr tablet Take 1 tablet (50 mg) by mouth At Bedtime 90 tablet 1     omeprazole (PRILOSEC) 20 MG DR capsule Take 1 capsule (20 mg) by mouth daily 90 capsule 3     potassium chloride ER (KLOR-CON) 20 MEQ CR tablet Take 1 tablet (20 mEq) by mouth 2 times daily 180 tablet 3     sertraline (ZOLOFT) 50 MG tablet Take 1 tablet (50 mg)  by mouth daily 90 tablet 3     Simethicone (GAS-X EXTRA STRENGTH) 125 MG CAPS Take 1 capsule by mouth 2 times daily 60 capsule 11     simvastatin (ZOCOR) 10 MG tablet Take 1 tablet (10 mg) by mouth At Bedtime 90 tablet 0     warfarin ANTICOAGULANT (COUMADIN) 5 MG tablet Take 1/2 tablet M/W/F and 1 tablet all other days,  AS DIRECTED BY YOUR INR CLINIC 72 tablet 1     order for DME Equipment being ordered: Walker Wheels () and Walker ()  Treatment Diagnosis: Difficulty ambulating 1 each 0     order for DME Equipment being ordered: Walker Wheels ()  Treatment Diagnosis:  Weakness,colitis. 1 Device 0         Allergies:      Allergies   Allergen Reactions     Penicillins Rash            Past Medical History:     Past Medical History:   Diagnosis Date     AV node dysfunction      Chronic atrial fibrillation (H) 2004     GERD (gastroesophageal reflux disease)      Hyperlipidemia      Near syncope      MARILU (obstructive sleep apnea)          Past Surgical History:     Past Surgical History:   Procedure Laterality Date     IMPLANT PACEMAKER  08/10/2015         Family Medical History:     Family History   Problem Relation Age of Onset     Influenza/Pneumonia Mother      Prostate Cancer Father          Social History:     Social History     Socioeconomic History     Marital status:      Spouse name: Not on file     Number of children: Not on file     Years of education: Not on file     Highest education level: Not on file   Occupational History     Not on file   Social Needs     Financial resource strain: Not on file     Food insecurity     Worry: Not on file     Inability: Not on file     Transportation needs     Medical: Not on file     Non-medical: Not on file   Tobacco Use     Smoking status: Never Smoker     Smokeless tobacco: Never Used   Substance and Sexual Activity     Alcohol use: No     Drug use: No     Sexual activity: Yes     Partners: Female   Lifestyle     Physical activity     Days per  "week: Not on file     Minutes per session: Not on file     Stress: Not on file   Relationships     Social connections     Talks on phone: Not on file     Gets together: Not on file     Attends Muslim service: Not on file     Active member of club or organization: Not on file     Attends meetings of clubs or organizations: Not on file     Relationship status: Not on file     Intimate partner violence     Fear of current or ex partner: Not on file     Emotionally abused: Not on file     Physically abused: Not on file     Forced sexual activity: Not on file   Other Topics Concern     Parent/sibling w/ CABG, MI or angioplasty before 65F 55M? Not Asked   Social History Narrative     Not on file           Review of System:     Constitutional: Negative for fever or chills  Skin: Negative for rashes  HEENT: positive for recent fall related laceration wound of the left forehead  Respiratory: No shortness of breath, dyspnea on exertion, cough, or hemoptysis  Cardiovascular: Negative for chest pain  Gastrointestinal: Negative for nausea, vomiting  Genitourinary: Negative for dysuria, hematuria  Musculoskeletal: Negative for myalgias, positive for chronic diffuse weakness  Neurologic: Negative for headaches, positive for cognitive impairement  Psychiatric: Negative for depression, anxiety  Hematologic/Lymphatic/Immunologic: Negative  Endocrine: Negative  Behavioral: Negative for tobacco use       Physical Exam:   BP (!) 177/84   Pulse 66   Temp 97.1  F (36.2  C) (Skin)   Ht 1.88 m (6' 2\")   Wt 77.6 kg (171 lb)   SpO2 94%   BMI 21.96 kg/m      GENERAL: chronically ill appearing elderly male, alert and no distress  EYES: eyes grossly normal to inspection, and conjunctivae and sclerae normal  HENT: Normocephalic atraumatic. Nose and mouth without ulcers or lesions, left forehead laceration wound noted, healed with sutures in place. No signs of acute bleeding or infection.  NECK: supple  RESP: lungs clear to auscultation "   CV: cardiac pacemaker present over the left upper anterior chest wall  LYMPH: no peripheral edema   ABDOMEN: nondistended  MS: diffuse weakness noted, patient is utilizing a wheelchair to help with transportation in clinic today  SKIN: no rashes  NEURO: patient is alert  PSYCH: memory loss symptoms noted, flat affect        Diagnostic Test Results:     Diagnostic Test Results:  No results found for any visits on 07/29/20.    ASSESSMENT/PLAN:   (S01.81XS) Laceration of forehead, sequela  (Z48.02) Visit for suture removal  (primary encounter diagnosis)  (W19.XXXS) Fall, sequela  (R41.89) Cognitive impairment  Comment: recent fall related laceration of the left forehead above the eyebrow has healed. No signs of acute wound infection or bleeding at this time. The sutures are ready to be removed. No new falls since ER discharge.  Plan: I have removed the patein's sutures with a SUTURE REMOVAL TRAY in clinic today.    (I10) Benign essential hypertension  Comment: stable  Plan: continue current BP medication regimen    (E78.5) Hyperlipidemia, unspecified hyperlipidemia type  Comment: stable  Plan: continue current Zocor medication    (K21.9) Gastroesophageal reflux disease, esophagitis presence not specified  Comment: stable  Plan: continue omeprazole.      Follow Up Plan:     Patient is instructed to return to Internal Medicine clinic for follow-up visit in 6 months.        Lilly Zepeda MD  Internal Medicine  [facility]

## 2020-08-05 DIAGNOSIS — I49.5 SSS (SICK SINUS SYNDROME) (H): Primary | ICD-10-CM

## 2020-08-05 DIAGNOSIS — I50.32 CHRONIC DIASTOLIC (CONGESTIVE) HEART FAILURE (H): ICD-10-CM

## 2020-08-05 RX ORDER — METOPROLOL SUCCINATE 50 MG/1
50 TABLET, EXTENDED RELEASE ORAL AT BEDTIME
Qty: 90 TABLET | Refills: 1 | Status: SHIPPED | OUTPATIENT
Start: 2020-08-05 | End: 2021-01-01

## 2020-08-05 NOTE — TELEPHONE ENCOUNTER
LOV 7/2/19. RN refilled rx per RN refill protocol.       RN messaged scheduling to call patient to schedule annual F/U as patient is overdue.

## 2020-08-17 DIAGNOSIS — I48.20 CHRONIC ATRIAL FIBRILLATION (H): ICD-10-CM

## 2020-08-17 DIAGNOSIS — Z79.01 LONG TERM CURRENT USE OF ANTICOAGULANT THERAPY: ICD-10-CM

## 2020-08-19 RX ORDER — WARFARIN SODIUM 5 MG/1
TABLET ORAL
Qty: 72 TABLET | Refills: 1 | Status: SHIPPED | OUTPATIENT
Start: 2020-08-19 | End: 2021-01-01

## 2020-08-19 NOTE — TELEPHONE ENCOUNTER
[facility]   Thank you,  Susie WOLFF RN,BSN

## 2020-08-21 ENCOUNTER — ANTICOAGULATION THERAPY VISIT (OUTPATIENT)
Dept: NURSING | Facility: CLINIC | Age: 85
End: 2020-08-21

## 2020-08-21 DIAGNOSIS — I48.20 CHRONIC ATRIAL FIBRILLATION (H): ICD-10-CM

## 2020-08-21 DIAGNOSIS — Z79.01 CHRONIC ANTICOAGULATION: ICD-10-CM

## 2020-08-21 LAB
CAPILLARY BLOOD COLLECTION: NORMAL
INR PPP: 3.6 (ref 0.86–1.14)

## 2020-08-21 PROCEDURE — 36416 COLLJ CAPILLARY BLOOD SPEC: CPT | Performed by: INTERNAL MEDICINE

## 2020-08-21 PROCEDURE — 85610 PROTHROMBIN TIME: CPT | Performed by: INTERNAL MEDICINE

## 2020-08-21 NOTE — PROGRESS NOTES
ANTICOAGULATION MANAGEMENT     Patient Name:  Santosh KENNY Hjelmstad  Date:  8/21/2020    ASSESSMENT /SUBJECTIVE:    Today's INR result of 3.6 is supratherapeutic. Goal INR of 2.0-3.0      Warfarin dose taken: Warfarin taken as previously instructed    Diet: No new diet changes affecting INR    Medication changes/ interactions: No new medications/supplements affecting INR    Previous INR: Therapeutic     S/S of bleeding or thromboembolism: No    New injury or illness: No    Upcoming surgery, procedure or cardioversion: No    Additional findings: None      PLAN:    Spoke with Candida, pt's wife regarding INR result and instructed:     Warfarin Dosing Instructions: hold tonight  then continue your current warfarin dose of 2.5 mg MWF; 5 mg AOD    Instructed patient to follow up no later than: 1 week  Lab visit scheduled    Education provided: Importance of being seen prn any fall or sustained bleeding.      Candida verbalizes understanding and agrees to warfarin dosing plan.    Instructed to call the Anticoagulation Clinic for any changes, questions or concerns. (#[phone])        Ashely Soler RN      OBJECTIVE:  Recent labs: (last 7 days)     08/21/20  1112   INR 3.60*         No question data found.  Anticoagulation Summary  As of 8/21/2020    INR goal:   2.0-3.0   TTR:   81.9 % (1 y)   INR used for dosing:   3.60! (8/21/2020)   Warfarin maintenance plan:   2.5 mg (5 mg x 0.5) every Mon, Wed, Fri; 5 mg (5 mg x 1) all other days   Full warfarin instructions:   2.5 mg every Mon, Wed, Fri; 5 mg all other days   Weekly warfarin total:   27.5 mg   Plan last modified:   Nery Yang RN (7/26/2019)   Next INR check:      Target end date:   Indefinite    Indications    Chronic atrial fibrillation (H) [I48.20]  Chronic anticoagulation [Z79.01]             Anticoagulation Episode Summary     INR check location:       Preferred lab:       Send INR reminders to:   JOHN CALDERON    Comments:          Anticoagulation Care Providers     Provider Role Specialty Phone number    Lilly Zepeda MD Referring Internal Medicine [phone]

## 2020-08-28 ENCOUNTER — ANTICOAGULATION THERAPY VISIT (OUTPATIENT)
Dept: ANTICOAGULATION | Facility: CLINIC | Age: 85
End: 2020-08-28
Payer: MEDICARE

## 2020-08-28 DIAGNOSIS — I48.20 CHRONIC ATRIAL FIBRILLATION (H): ICD-10-CM

## 2020-08-28 DIAGNOSIS — Z79.01 CHRONIC ANTICOAGULATION: ICD-10-CM

## 2020-08-28 LAB
CAPILLARY BLOOD COLLECTION: NORMAL
INR PPP: 1.8 (ref 0.86–1.14)

## 2020-08-28 PROCEDURE — 85610 PROTHROMBIN TIME: CPT | Performed by: INTERNAL MEDICINE

## 2020-08-28 PROCEDURE — 36416 COLLJ CAPILLARY BLOOD SPEC: CPT | Performed by: INTERNAL MEDICINE

## 2020-08-28 PROCEDURE — 99207 ZZC NO CHARGE NURSE ONLY: CPT

## 2020-09-04 ENCOUNTER — DOCUMENTATION ONLY (OUTPATIENT)
Dept: CARDIOLOGY | Facility: CLINIC | Age: 85
End: 2020-09-04

## 2020-09-04 NOTE — PROGRESS NOTES
Wellness Screening Tool    Symptom Screening:    Do you have one of the following NEW symptoms:      Fever (subjective or >100.0)?  No    New cough? No    Shortness of breath? No    Chills? No    New loss of taste or smell? No    Generalized body aches? No    New persistent headache? No    New sore throat? No    Nausea, vomiting or diarrhea? No    Within the past 2 weeks, have you been exposed to someone with a known positive illness below?      COVID - 19 (known or suspected) No    Chicken pox?  No    Measles? No    Pertussis? No    Have you had a positive COVID-19 diagnostic test (nasal swab test) in the last 14 days or are you currently   on self-quarantine restrictions (i.e.travel restriction, exposure, etc?) No        Patient notified of visitor restriction: Yes  Patient informed to wear a mask: Yes    Patient's appointment status: Patient will be seen in clinic as scheduled on 9/8/20 @ 11:15am. Adonay JACKSON

## 2020-09-08 ENCOUNTER — ANCILLARY PROCEDURE (OUTPATIENT)
Dept: CARDIOLOGY | Facility: CLINIC | Age: 85
End: 2020-09-08
Attending: INTERNAL MEDICINE
Payer: MEDICARE

## 2020-09-08 DIAGNOSIS — Z95.0 CARDIAC PACEMAKER IN SITU: ICD-10-CM

## 2020-09-08 PROCEDURE — 93279 PRGRMG DEV EVAL PM/LDLS PM: CPT | Performed by: INTERNAL MEDICINE

## 2020-09-11 ENCOUNTER — ANTICOAGULATION THERAPY VISIT (OUTPATIENT)
Dept: ANTICOAGULATION | Facility: CLINIC | Age: 85
End: 2020-09-11
Payer: MEDICARE

## 2020-09-11 DIAGNOSIS — I48.20 CHRONIC ATRIAL FIBRILLATION (H): ICD-10-CM

## 2020-09-11 DIAGNOSIS — Z79.01 CHRONIC ANTICOAGULATION: ICD-10-CM

## 2020-09-11 LAB — INR PPP: 2.6 (ref 0.86–1.14)

## 2020-09-11 PROCEDURE — 99207 ZZC NO CHARGE NURSE ONLY: CPT

## 2020-09-11 PROCEDURE — 36416 COLLJ CAPILLARY BLOOD SPEC: CPT | Performed by: INTERNAL MEDICINE

## 2020-09-11 PROCEDURE — 85610 PROTHROMBIN TIME: CPT | Performed by: INTERNAL MEDICINE

## 2020-09-11 NOTE — PROGRESS NOTES
ANTICOAGULATION MANAGEMENT     Patient Name:  Santosh KENNY Hjelmstad  Date:  2020    ASSESSMENT /SUBJECTIVE:    Today's INR result of 2.6 is therapeutic. Goal INR of 2.0-3.0      Warfarin dose taken: Warfarin taken as previously instructed    Diet: No new diet changes affecting INR    Medication changes/ interactions: No new medications/supplements affecting INR    Previous INR: Subtherapeutic     S/S of bleeding or thromboembolism: No    New injury or illness: No    Upcoming surgery, procedure or cardioversion: No    Additional findings: He had been off his normal diet after his fall but is back on the normal routine diet again      PLAN:    Spoke with Candida regarding INR result and instructed:     Warfarin Dosing Instructions: Continue your current warfarin dose 2.5 mg m,w,f, and 5 mg row    Instructed patient to follow up no later than: 4 weeks  Lab visit scheduled    Education provided: None required      Elooise verbalizes understanding and agrees to warfarin dosing plan.    Instructed to call the Anticoagulation Clinic for any changes, questions or concerns. (#[phone])        Susannah Freeman RN      OBJECTIVE:  Recent labs: (last 7 days)     20  1040   INR 2.60*         No question data found.  Anticoagulation Summary  As of 2020    INR goal:   2.0-3.0   TTR:   81.9 % (1 y)   INR used for dosin.60 (2020)   Warfarin maintenance plan:   2.5 mg (5 mg x 0.5) every Mon, Wed, Fri; 5 mg (5 mg x 1) all other days   Full warfarin instructions:   2.5 mg every Mon, Wed, Fri; 5 mg all other days   Weekly warfarin total:   27.5 mg   No change documented:   Susannah Freeman RN   Plan last modified:   Nery Yang RN (2019)   Next INR check:      Priority:   High   Target end date:   Indefinite    Indications    Chronic atrial fibrillation (H) [I48.20]  Chronic anticoagulation [Z79.01]             Anticoagulation Episode Summary     INR check location:       Preferred lab:        Send INR reminders to:   JOHN CALDERON    Comments:         Anticoagulation Care Providers     Provider Role Specialty Phone number    Lilly Zepeda MD Referring Internal Medicine [phone]         ANTICOAGULATION FOLLOW-UP CLINIC VISIT    Patient Name:  Santosh Robledo  Date:  2020  Contact Type:  Telephone    SUBJECTIVE:         OBJECTIVE    Recent labs: (last 7 days)     20  1040   INR 2.60*       ASSESSMENT / PLAN  INR assessment THER    Recheck INR In: 4 WEEKS    INR Location Clinic      Anticoagulation Summary  As of 2020    INR goal:   2.0-3.0   TTR:   81.9 % (1 y)   INR used for dosin.60 (2020)   Warfarin maintenance plan:   2.5 mg (5 mg x 0.5) every Mon, Wed, Fri; 5 mg (5 mg x 1) all other days   Full warfarin instructions:   2.5 mg every Mon, Wed, Fri; 5 mg all other days   Weekly warfarin total:   27.5 mg   No change documented:   Susannah Freeman RN   Plan last modified:   Nery Yang RN (2019)   Next INR check:   10/9/2020   Priority:   High   Target end date:   Indefinite    Indications    Chronic atrial fibrillation (H) [I48.20]  Chronic anticoagulation [Z79.01]             Anticoagulation Episode Summary     INR check location:       Preferred lab:       Send INR reminders to:   JOHN CALDERON    Comments:         Anticoagulation Care Providers     Provider Role Specialty Phone number    Lilly Zepeda MD Referring Internal Medicine [phone]            See the Encounter Report to view Anticoagulation Flowsheet and Dosing Calendar (Go to Encounters tab in chart review, and find the Anticoagulation Therapy Visit)        Susannah Freeman, RN

## 2020-09-16 LAB
MDC_IDC_LEAD_IMPLANT_DT: NORMAL
MDC_IDC_LEAD_LOCATION: NORMAL
MDC_IDC_LEAD_LOCATION_DETAIL_1: NORMAL
MDC_IDC_LEAD_MFG: NORMAL
MDC_IDC_LEAD_MODEL: NORMAL
MDC_IDC_LEAD_POLARITY_TYPE: NORMAL
MDC_IDC_LEAD_SERIAL: NORMAL
MDC_IDC_MSMT_BATTERY_DTM: NORMAL
MDC_IDC_MSMT_BATTERY_REMAINING_LONGEVITY: 89 MO
MDC_IDC_MSMT_BATTERY_RRT_TRIGGER: 2.83
MDC_IDC_MSMT_BATTERY_STATUS: NORMAL
MDC_IDC_MSMT_BATTERY_VOLTAGE: 3.01 V
MDC_IDC_MSMT_LEADCHNL_RV_IMPEDANCE_VALUE: 570 OHM
MDC_IDC_MSMT_LEADCHNL_RV_IMPEDANCE_VALUE: 722 OHM
MDC_IDC_MSMT_LEADCHNL_RV_PACING_THRESHOLD_AMPLITUDE: 0.5 V
MDC_IDC_MSMT_LEADCHNL_RV_PACING_THRESHOLD_PULSEWIDTH: 0.4 MS
MDC_IDC_MSMT_LEADCHNL_RV_SENSING_INTR_AMPL: 11.38 MV
MDC_IDC_MSMT_LEADCHNL_RV_SENSING_INTR_AMPL: 9.38 MV
MDC_IDC_PG_IMPLANT_DTM: NORMAL
MDC_IDC_PG_MFG: NORMAL
MDC_IDC_PG_MODEL: NORMAL
MDC_IDC_PG_SERIAL: NORMAL
MDC_IDC_PG_TYPE: NORMAL
MDC_IDC_SESS_CLINIC_NAME: NORMAL
MDC_IDC_SESS_DTM: NORMAL
MDC_IDC_SESS_TYPE: NORMAL
MDC_IDC_SET_BRADY_HYSTRATE: NORMAL
MDC_IDC_SET_BRADY_LOWRATE: 60 {BEATS}/MIN
MDC_IDC_SET_BRADY_MAX_SENSOR_RATE: 130 {BEATS}/MIN
MDC_IDC_SET_BRADY_MODE: NORMAL
MDC_IDC_SET_LEADCHNL_RV_PACING_AMPLITUDE: 1.5 V
MDC_IDC_SET_LEADCHNL_RV_PACING_ANODE_ELECTRODE_1: NORMAL
MDC_IDC_SET_LEADCHNL_RV_PACING_ANODE_LOCATION_1: NORMAL
MDC_IDC_SET_LEADCHNL_RV_PACING_CAPTURE_MODE: NORMAL
MDC_IDC_SET_LEADCHNL_RV_PACING_CATHODE_ELECTRODE_1: NORMAL
MDC_IDC_SET_LEADCHNL_RV_PACING_CATHODE_LOCATION_1: NORMAL
MDC_IDC_SET_LEADCHNL_RV_PACING_POLARITY: NORMAL
MDC_IDC_SET_LEADCHNL_RV_PACING_PULSEWIDTH: 0.4 MS
MDC_IDC_SET_LEADCHNL_RV_SENSING_ANODE_ELECTRODE_1: NORMAL
MDC_IDC_SET_LEADCHNL_RV_SENSING_ANODE_LOCATION_1: NORMAL
MDC_IDC_SET_LEADCHNL_RV_SENSING_CATHODE_ELECTRODE_1: NORMAL
MDC_IDC_SET_LEADCHNL_RV_SENSING_CATHODE_LOCATION_1: NORMAL
MDC_IDC_SET_LEADCHNL_RV_SENSING_POLARITY: NORMAL
MDC_IDC_SET_LEADCHNL_RV_SENSING_SENSITIVITY: 0.9 MV
MDC_IDC_SET_ZONE_DETECTION_INTERVAL: 360 MS
MDC_IDC_SET_ZONE_TYPE: NORMAL
MDC_IDC_STAT_BRADY_DTM_END: NORMAL
MDC_IDC_STAT_BRADY_DTM_START: NORMAL
MDC_IDC_STAT_BRADY_RV_PERCENT_PACED: 81.59 %
MDC_IDC_STAT_EPISODE_RECENT_COUNT: 0
MDC_IDC_STAT_EPISODE_RECENT_COUNT: 2
MDC_IDC_STAT_EPISODE_RECENT_COUNT_DTM_END: NORMAL
MDC_IDC_STAT_EPISODE_RECENT_COUNT_DTM_END: NORMAL
MDC_IDC_STAT_EPISODE_RECENT_COUNT_DTM_START: NORMAL
MDC_IDC_STAT_EPISODE_RECENT_COUNT_DTM_START: NORMAL
MDC_IDC_STAT_EPISODE_TOTAL_COUNT: 0
MDC_IDC_STAT_EPISODE_TOTAL_COUNT: 6
MDC_IDC_STAT_EPISODE_TOTAL_COUNT_DTM_END: NORMAL
MDC_IDC_STAT_EPISODE_TOTAL_COUNT_DTM_END: NORMAL
MDC_IDC_STAT_EPISODE_TOTAL_COUNT_DTM_START: NORMAL
MDC_IDC_STAT_EPISODE_TOTAL_COUNT_DTM_START: NORMAL
MDC_IDC_STAT_EPISODE_TYPE: NORMAL

## 2020-09-20 NOTE — PROGRESS NOTES
HPI:  Santosh Robledo is a 89 year old male who presents for annual cardiology appointment.  He is a patient of Dr. Lynn.  He medical history includes     1. HTN  2. Hyperlipidemia  3. 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     Diagnostics:  Device check (9/2020) revealed : 82 %   Stable leads.  Battery life 7 years. Ventricular Arrhthymia: none      In 7/2020, had a fall with forehead laceration, CT scan did not detect an intracranial injury    Today he reports increase shortness of breath and increase respirations without associated symptoms of edema, chest pain, dizziness or lightheadedness worsening since his fall in 7/2020.  His blood pressure is elevated in clinic today but controlled at home with  mmHg.  He has decreased his lasix to 20 mg daily and is wondering if he can decrease his potassium as well.     These symptoms have worsened since his fall.   He states he takes his warfarin and denies bleeding, hematuria, hematochezia, epistaxis and signs/symptoms of stroke.       ASSESSMENT AND PLAN    Tachy-gaudencio syndrome     S/p permanent pacemaker     Normal device check    Stable leads    Follow with the device clinic on a quarterly basis.     HTN    Elevated in clinic despite taking furosemide 20 mg daily, lisinopril 20 mg daily, metoprolol Xl 50 mg daily.  Controlled at home but he has decreased his lasix from BID to daily and is wondering about his potassium chloride dosage  Will order BMP     Atrial fibrillation    For rate control he is taking metorpolol XL  mg 50 daily.  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 He has been sgfvheeofg52% of the time since 7/2020   Last hgb was 14 (7/2020)    Shortness of breath    Worsening since 7/2020.     Will check BMP and CBC today     Repeat ECHO now    See PCP    PLan:    BMP and CBC now and will  call with results    ECHO now and will call with results    See PCP     Follow up with Dr. Lynn in 1 year     Patient expresses understanding and agreement with the plan.     I appreciate the chance to help with Santosh Robledo Please let me know if you have any questions or concerns.    Candy Stephens, APRN, CNP    This note was completed in part using Dragon voice recognition software. Although reviewed after completion, some word and grammatical errors may occur.    Orders Placed This Encounter   Procedures     Basic metabolic panel     CBC with platelets     Follow-Up with Electrophysiologist     Echocardiogram Complete     No orders of the defined types were placed in this encounter.    There are no discontinued medications.      Encounter Diagnoses   Name Primary?     Shortness of breath Yes     AV node dysfunction        CURRENT MEDICATIONS:  Current Outpatient Medications   Medication Sig Dispense Refill     Cyanocobalamin (B-12) 1000 MCG CAPS Take 1 tablet by mouth daily 90 capsule 3     furosemide (LASIX) 40 MG tablet 20mg in the AM, 20mg in the  tablet 1     lisinopril (ZESTRIL) 20 MG tablet Take 1 tablet (20 mg) by mouth daily 180 tablet 3     metoprolol succinate ER (TOPROL XL) 50 MG 24 hr tablet Take 1 tablet (50 mg) by mouth At Bedtime 90 tablet 1     omeprazole (PRILOSEC) 20 MG DR capsule Take 1 capsule (20 mg) by mouth daily 90 capsule 3     order for DME Equipment being ordered: Walker Wheels () and Walker ()  Treatment Diagnosis: Difficulty ambulating 1 each 0     order for DME Equipment being ordered: Walker Wheels ()  Treatment Diagnosis:  Weakness,colitis. 1 Device 0     potassium chloride ER (KLOR-CON) 20 MEQ CR tablet Take 1 tablet (20 mEq) by mouth 2 times daily 180 tablet 3     sertraline (ZOLOFT) 50 MG tablet Take 1 tablet (50 mg) by mouth daily 90 tablet 3     Simethicone (GAS-X EXTRA STRENGTH) 125 MG CAPS Take 1 capsule by mouth 2 times daily 60 capsule 11      simvastatin (ZOCOR) 10 MG tablet Take 1 tablet (10 mg) by mouth At Bedtime 90 tablet 0     warfarin ANTICOAGULANT (COUMADIN) 5 MG tablet Take 1/2 tablet M/W/F and 1 tablet all other days,  AS DIRECTED BY YOUR INR CLINIC 72 tablet 1       ALLERGIES     Allergies   Allergen Reactions     Penicillins Rash       PAST MEDICAL HISTORY:  Past Medical History:   Diagnosis Date     AV node dysfunction      Chronic atrial fibrillation (H) 2004     GERD (gastroesophageal reflux disease)      Hyperlipidemia      Near syncope      MARILU (obstructive sleep apnea)        PAST SURGICAL HISTORY:  Past Surgical History:   Procedure Laterality Date     IMPLANT PACEMAKER  08/10/2015       FAMILY HISTORY:  Family History   Problem Relation Age of Onset     Influenza/Pneumonia Mother      Prostate Cancer Father        SOCIAL HISTORY:  Social History     Socioeconomic History     Marital status:      Spouse name: Not on file     Number of children: Not on file     Years of education: Not on file     Highest education level: Not on file   Occupational History     Not on file   Social Needs     Financial resource strain: Not on file     Food insecurity     Worry: Not on file     Inability: Not on file     Transportation needs     Medical: Not on file     Non-medical: Not on file   Tobacco Use     Smoking status: Never Smoker     Smokeless tobacco: Never Used   Substance and Sexual Activity     Alcohol use: No     Drug use: No     Sexual activity: Yes     Partners: Female   Lifestyle     Physical activity     Days per week: Not on file     Minutes per session: Not on file     Stress: Not on file   Relationships     Social connections     Talks on phone: Not on file     Gets together: Not on file     Attends [employer] service: Not on file     Active member of club or organization: Not on file     Attends meetings of clubs or organizations: Not on file     Relationship status: Not on file     Intimate partner violence     Fear of current  or ex partner: Not on file     Emotionally abused: Not on file     Physically abused: Not on file     Forced sexual activity: Not on file   Other Topics Concern     Parent/sibling w/ CABG, MI or angioplasty before 65F 55M? Not Asked   Social History Narrative     Not on file       Review of Systems:  Skin:  Negative     Eyes:  Positive for glasses  ENT:  Positive for postnasal drainage;vertigo  Respiratory:  Positive for dyspnea on exertion;shortness of breath;mucoid expectorant  Cardiovascular:  Negative Positive for;fatigue;dizziness  Gastroenterology: Positive for poor appetite  Genitourinary:  Negative    Musculoskeletal:  Negative    Neurologic:  Positive for numbness or tingling of feet  Psychiatric:  Negative    Heme/Lymph/Imm:  Negative    Endocrine:  Negative      Physical Exam:  Vitals: BP (!) 148/61   Pulse 66   Resp 30   Wt 78.2 kg (172 lb 8 oz)   SpO2 95%   BMI 22.15 kg/m      Constitutional:  cooperative thin      Skin:  warm and dry to the touch        Head:  normocephalic        Eyes:  pupils equal and round        ENT:  no pallor or cyanosis        Neck:           Chest:  clear to auscultation   tachpenea    Cardiac:   irregular rhythm;irregularly irregular rhythm                Abdomen:  abdomen soft        Vascular:                                        Extremities and Back:  no edema        Neurological:  no gross motor deficits          Recent Lab Results:  LIPID RESULTS:  Lab Results   Component Value Date    CHOL 161 06/28/2019    HDL 36 (L) 06/28/2019    LDL 88 06/28/2019    TRIG 186 (H) 06/28/2019       LIVER ENZYME RESULTS:  Lab Results   Component Value Date    AST 10 05/14/2018    ALT <5 (L) 06/28/2019       CBC RESULTS:  Lab Results   Component Value Date    WBC 11.1 (H) 07/21/2020    RBC 5.01 07/21/2020    HGB 14.0 07/21/2020    HCT 42.8 07/21/2020    MCV 85 07/21/2020    MCH 27.9 07/21/2020    MCHC 32.7 07/21/2020    RDW 13.9 07/21/2020     07/21/2020       BMP  RESULTS:  Lab Results   Component Value Date     07/21/2020    POTASSIUM 4.0 07/21/2020    CHLORIDE 110 (H) 07/21/2020    CO2 25 07/21/2020    ANIONGAP 5 07/21/2020     (H) 07/21/2020    BUN 18 07/21/2020    CR 0.97 07/21/2020    GFRESTIMATED 68 07/21/2020    GFRESTBLACK 79 07/21/2020    ANGELITA 8.7 07/21/2020        A1C RESULTS:  No results found for: A1C    INR RESULTS:  Lab Results   Component Value Date    INR 2.60 (H) 09/11/2020    INR 1.80 (H) 08/28/2020           CC  Lilly Zepeda MD  [address]  [place] [id number]

## 2020-09-21 ENCOUNTER — OFFICE VISIT (OUTPATIENT)
Dept: CARDIOLOGY | Facility: CLINIC | Age: 85
End: 2020-09-21
Payer: MEDICARE

## 2020-09-21 VITALS
SYSTOLIC BLOOD PRESSURE: 148 MMHG | OXYGEN SATURATION: 95 % | RESPIRATION RATE: 30 BRPM | DIASTOLIC BLOOD PRESSURE: 61 MMHG | WEIGHT: 172.5 LBS | BODY MASS INDEX: 22.15 KG/M2 | HEART RATE: 66 BPM

## 2020-09-21 DIAGNOSIS — R06.02 SHORTNESS OF BREATH: Primary | ICD-10-CM

## 2020-09-21 DIAGNOSIS — I45.89 AV NODE DYSFUNCTION: ICD-10-CM

## 2020-09-21 PROCEDURE — 99214 OFFICE O/P EST MOD 30 MIN: CPT | Performed by: NURSE PRACTITIONER

## 2020-09-21 NOTE — LETTER
9/21/2020    Lilly Zepeda MD  [address]  [facility] [address]    RE: Santosh Robledo       Dear Colleague,    I had the pleasure of seeing Santosh Robledo in the [facility].    HPI:  Santosh Robledo is a 89 year old male who presents for annual cardiology appointment.  He is a patient of Dr. Lynn.  He medical history includes     1. HTN  2. Hyperlipidemia  3. 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     Diagnostics:  Device check (9/2020) revealed : 82 %   Stable leads.  Battery life 7 years. Ventricular Arrhthymia: none      In 7/2020, had a fall with forehead laceration, CT scan did not detect an intracranial injury    Today he reports increase shortness of breath and increase respirations without associated symptoms of edema, chest pain, dizziness or lightheadedness worsening since his fall in 7/2020.  His blood pressure is elevated in clinic today but controlled at home with  mmHg.  He has decreased his lasix to 20 mg daily and is wondering if he can decrease his potassium as well.     These symptoms have worsened since his fall.   He states he takes his warfarin and denies bleeding, hematuria, hematochezia, epistaxis and signs/symptoms of stroke.       ASSESSMENT AND PLAN    Tachy-gaudencio syndrome     S/p permanent pacemaker     Normal device check    Stable leads    Follow with the device clinic on a quarterly basis.     HTN    Elevated in clinic despite taking furosemide 20 mg daily, lisinopril 20 mg daily, metoprolol Xl 50 mg daily.  Controlled at home but he has decreased his lasix from BID to daily and is wondering about his potassium chloride dosage  Will order BMP     Atrial fibrillation    For rate control he is taking metorpolol XL  mg 50 daily.  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 He  has been yannhlyziz42% of the time since 7/2020   Last hgb was 14 (7/2020)    Shortness of breath    Worsening since 7/2020.     Will check BMP and CBC today     Repeat ECHO now    See PCP    PLan:    BMP and CBC now and will call with results    ECHO now and will call with results    See PCP     Follow up with Dr. Lynn in 1 year     Patient expresses understanding and agreement with the plan.     I appreciate the chance to help with Santosh EFRAÍN Robledo Please let me know if you have any questions or concerns.    Candy Stephens, EUGENIO, CNP    This note was completed in part using Dragon voice recognition software. Although reviewed after completion, some word and grammatical errors may occur.    Orders Placed This Encounter   Procedures     Basic metabolic panel     CBC with platelets     Follow-Up with Electrophysiologist     Echocardiogram Complete     No orders of the defined types were placed in this encounter.    There are no discontinued medications.      Encounter Diagnoses   Name Primary?     Shortness of breath Yes     AV node dysfunction        CURRENT MEDICATIONS:  Current Outpatient Medications   Medication Sig Dispense Refill     Cyanocobalamin (B-12) 1000 MCG CAPS Take 1 tablet by mouth daily 90 capsule 3     furosemide (LASIX) 40 MG tablet 20mg in the AM, 20mg in the  tablet 1     lisinopril (ZESTRIL) 20 MG tablet Take 1 tablet (20 mg) by mouth daily 180 tablet 3     metoprolol succinate ER (TOPROL XL) 50 MG 24 hr tablet Take 1 tablet (50 mg) by mouth At Bedtime 90 tablet 1     omeprazole (PRILOSEC) 20 MG DR capsule Take 1 capsule (20 mg) by mouth daily 90 capsule 3     order for DME Equipment being ordered: Walker Wheels () and Walker ()  Treatment Diagnosis: Difficulty ambulating 1 each 0     order for DME Equipment being ordered: Walker Wheels ()  Treatment Diagnosis:  Weakness,colitis. 1 Device 0     potassium chloride ER (KLOR-CON) 20 MEQ CR tablet Take 1 tablet (20 mEq) by  mouth 2 times daily 180 tablet 3     sertraline (ZOLOFT) 50 MG tablet Take 1 tablet (50 mg) by mouth daily 90 tablet 3     Simethicone (GAS-X EXTRA STRENGTH) 125 MG CAPS Take 1 capsule by mouth 2 times daily 60 capsule 11     simvastatin (ZOCOR) 10 MG tablet Take 1 tablet (10 mg) by mouth At Bedtime 90 tablet 0     warfarin ANTICOAGULANT (COUMADIN) 5 MG tablet Take 1/2 tablet M/W/F and 1 tablet all other days,  AS DIRECTED BY YOUR INR CLINIC 72 tablet 1       ALLERGIES     Allergies   Allergen Reactions     Penicillins Rash       PAST MEDICAL HISTORY:  Past Medical History:   Diagnosis Date     AV node dysfunction      Chronic atrial fibrillation (H) 2004     GERD (gastroesophageal reflux disease)      Hyperlipidemia      Near syncope      MARILU (obstructive sleep apnea)        PAST SURGICAL HISTORY:  Past Surgical History:   Procedure Laterality Date     IMPLANT PACEMAKER  08/10/2015       FAMILY HISTORY:  Family History   Problem Relation Age of Onset     Influenza/Pneumonia Mother      Prostate Cancer Father        SOCIAL HISTORY:  Social History     Socioeconomic History     Marital status:      Spouse name: Not on file     Number of children: Not on file     Years of education: Not on file     Highest education level: Not on file   Occupational History     Not on file   Social Needs     Financial resource strain: Not on file     Food insecurity     Worry: Not on file     Inability: Not on file     Transportation needs     Medical: Not on file     Non-medical: Not on file   Tobacco Use     Smoking status: Never Smoker     Smokeless tobacco: Never Used   Substance and Sexual Activity     Alcohol use: No     Drug use: No     Sexual activity: Yes     Partners: Female   Lifestyle     Physical activity     Days per week: Not on file     Minutes per session: Not on file     Stress: Not on file   Relationships     Social connections     Talks on phone: Not on file     Gets together: Not on file     Attends  Confucianism service: Not on file     Active member of club or organization: Not on file     Attends meetings of clubs or organizations: Not on file     Relationship status: Not on file     Intimate partner violence     Fear of current or ex partner: Not on file     Emotionally abused: Not on file     Physically abused: Not on file     Forced sexual activity: Not on file   Other Topics Concern     Parent/sibling w/ CABG, MI or angioplasty before 65F 55M? Not Asked   Social History Narrative     Not on file       Review of Systems:  Skin:  Negative     Eyes:  Positive for glasses  ENT:  Positive for postnasal drainage;vertigo  Respiratory:  Positive for dyspnea on exertion;shortness of breath;mucoid expectorant  Cardiovascular:  Negative Positive for;fatigue;dizziness  Gastroenterology: Positive for poor appetite  Genitourinary:  Negative    Musculoskeletal:  Negative    Neurologic:  Positive for numbness or tingling of feet  Psychiatric:  Negative    Heme/Lymph/Imm:  Negative    Endocrine:  Negative      Physical Exam:  Vitals: BP (!) 148/61   Pulse 66   Resp 30   Wt 78.2 kg (172 lb 8 oz)   SpO2 95%   BMI 22.15 kg/m      Constitutional:  cooperative thin      Skin:  warm and dry to the touch        Head:  normocephalic        Eyes:  pupils equal and round        ENT:  no pallor or cyanosis        Neck:           Chest:  clear to auscultation   tachpenea    Cardiac:   irregular rhythm;irregularly irregular rhythm                Abdomen:  abdomen soft        Vascular:                                        Extremities and Back:  no edema        Neurological:  no gross motor deficits          Recent Lab Results:  LIPID RESULTS:  Lab Results   Component Value Date    CHOL 161 06/28/2019    HDL 36 (L) 06/28/2019    LDL 88 06/28/2019    TRIG 186 (H) 06/28/2019       LIVER ENZYME RESULTS:  Lab Results   Component Value Date    AST 10 05/14/2018    ALT <5 (L) 06/28/2019       CBC RESULTS:  Lab Results   Component Value  Date    WBC 11.1 (H) 07/21/2020    RBC 5.01 07/21/2020    HGB 14.0 07/21/2020    HCT 42.8 07/21/2020    MCV 85 07/21/2020    MCH 27.9 07/21/2020    MCHC 32.7 07/21/2020    RDW 13.9 07/21/2020     07/21/2020       BMP RESULTS:  Lab Results   Component Value Date     07/21/2020    POTASSIUM 4.0 07/21/2020    CHLORIDE 110 (H) 07/21/2020    CO2 25 07/21/2020    ANIONGAP 5 07/21/2020     (H) 07/21/2020    BUN 18 07/21/2020    CR 0.97 07/21/2020    GFRESTIMATED 68 07/21/2020    GFRESTBLACK 79 07/21/2020    ANGELITA 8.7 07/21/2020        A1C RESULTS:  No results found for: A1C    INR RESULTS:  Lab Results   Component Value Date    INR 2.60 (H) 09/11/2020    INR 1.80 (H) 08/28/2020         Thank you for allowing me to participate in the care of your patient.    Sincerely,     EUGENIO Hinton [facility]

## 2020-09-21 NOTE — LETTER
9/21/2020    Lilly Zepeda MD  [address]  [facility] [address]    RE: Santosh Robledo       Dear Colleague,    I had the pleasure of seeing Santosh Robledo in the [facility].    HPI:  Santosh Robledo is a 89 year old male who presents for annual cardiology appointment.  He is a patient of Dr. Lynn.  He medical history includes     1. HTN  2. Hyperlipidemia  3. 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     Diagnostics:  Device check (9/2020) revealed : 82 %   Stable leads.  Battery life 7 years. Ventricular Arrhthymia: none      In 7/2020, had a fall with forehead laceration, CT scan did not detect an intracranial injury    Today he reports increase shortness of breath and increase respirations without associated symptoms of edema, chest pain, dizziness or lightheadedness worsening since his fall in 7/2020.  His blood pressure is elevated in clinic today but controlled at home with  mmHg.  He has decreased his lasix to 20 mg daily and is wondering if he can decrease his potassium as well.     These symptoms have worsened since his fall.   He states he takes his warfarin and denies bleeding, hematuria, hematochezia, epistaxis and signs/symptoms of stroke.       ASSESSMENT AND PLAN    Tachy-gaudencio syndrome     S/p permanent pacemaker     Normal device check    Stable leads    Follow with the device clinic on a quarterly basis.     HTN    Elevated in clinic despite taking furosemide 20 mg daily, lisinopril 20 mg daily, metoprolol Xl 50 mg daily.  Controlled at home but he has decreased his lasix from BID to daily and is wondering about his potassium chloride dosage  Will order BMP     Atrial fibrillation    For rate control he is taking metorpolol XL  mg 50 daily.  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 He  has been tumfftqqbg96% of the time since 7/2020   Last hgb was 14 (7/2020)    Shortness of breath    Worsening since 7/2020.     Will check BMP and CBC today     Repeat ECHO now    See PCP    PLan:    BMP and CBC now and will call with results    ECHO now and will call with results    See PCP     Follow up with Dr. Lynn in 1 year     Patient expresses understanding and agreement with the plan.     I appreciate the chance to help with Santosh EFRAÍN Robledo Please let me know if you have any questions or concerns.    Candy Stephens, EUGENIO, CNP    This note was completed in part using Dragon voice recognition software. Although reviewed after completion, some word and grammatical errors may occur.    Orders Placed This Encounter   Procedures     Basic metabolic panel     CBC with platelets     Follow-Up with Electrophysiologist     Echocardiogram Complete     No orders of the defined types were placed in this encounter.    There are no discontinued medications.      Encounter Diagnoses   Name Primary?     Shortness of breath Yes     AV node dysfunction        CURRENT MEDICATIONS:  Current Outpatient Medications   Medication Sig Dispense Refill     Cyanocobalamin (B-12) 1000 MCG CAPS Take 1 tablet by mouth daily 90 capsule 3     furosemide (LASIX) 40 MG tablet 20mg in the AM, 20mg in the  tablet 1     lisinopril (ZESTRIL) 20 MG tablet Take 1 tablet (20 mg) by mouth daily 180 tablet 3     metoprolol succinate ER (TOPROL XL) 50 MG 24 hr tablet Take 1 tablet (50 mg) by mouth At Bedtime 90 tablet 1     omeprazole (PRILOSEC) 20 MG DR capsule Take 1 capsule (20 mg) by mouth daily 90 capsule 3     order for DME Equipment being ordered: Walker Wheels () and Walker ()  Treatment Diagnosis: Difficulty ambulating 1 each 0     order for DME Equipment being ordered: Walker Wheels ()  Treatment Diagnosis:  Weakness,colitis. 1 Device 0     potassium chloride ER (KLOR-CON) 20 MEQ CR tablet Take 1 tablet (20 mEq) by  mouth 2 times daily 180 tablet 3     sertraline (ZOLOFT) 50 MG tablet Take 1 tablet (50 mg) by mouth daily 90 tablet 3     Simethicone (GAS-X EXTRA STRENGTH) 125 MG CAPS Take 1 capsule by mouth 2 times daily 60 capsule 11     simvastatin (ZOCOR) 10 MG tablet Take 1 tablet (10 mg) by mouth At Bedtime 90 tablet 0     warfarin ANTICOAGULANT (COUMADIN) 5 MG tablet Take 1/2 tablet M/W/F and 1 tablet all other days,  AS DIRECTED BY YOUR INR CLINIC 72 tablet 1       ALLERGIES     Allergies   Allergen Reactions     Penicillins Rash       PAST MEDICAL HISTORY:  Past Medical History:   Diagnosis Date     AV node dysfunction      Chronic atrial fibrillation (H) 2004     GERD (gastroesophageal reflux disease)      Hyperlipidemia      Near syncope      MARILU (obstructive sleep apnea)        PAST SURGICAL HISTORY:  Past Surgical History:   Procedure Laterality Date     IMPLANT PACEMAKER  08/10/2015       FAMILY HISTORY:  Family History   Problem Relation Age of Onset     Influenza/Pneumonia Mother      Prostate Cancer Father        SOCIAL HISTORY:  Social History     Socioeconomic History     Marital status:      Spouse name: Not on file     Number of children: Not on file     Years of education: Not on file     Highest education level: Not on file   Occupational History     Not on file   Social Needs     Financial resource strain: Not on file     Food insecurity     Worry: Not on file     Inability: Not on file     Transportation needs     Medical: Not on file     Non-medical: Not on file   Tobacco Use     Smoking status: Never Smoker     Smokeless tobacco: Never Used   Substance and Sexual Activity     Alcohol use: No     Drug use: No     Sexual activity: Yes     Partners: Female   Lifestyle     Physical activity     Days per week: Not on file     Minutes per session: Not on file     Stress: Not on file   Relationships     Social connections     Talks on phone: Not on file     Gets together: Not on file     Attends  Pentecostal service: Not on file     Active member of club or organization: Not on file     Attends meetings of clubs or organizations: Not on file     Relationship status: Not on file     Intimate partner violence     Fear of current or ex partner: Not on file     Emotionally abused: Not on file     Physically abused: Not on file     Forced sexual activity: Not on file   Other Topics Concern     Parent/sibling w/ CABG, MI or angioplasty before 65F 55M? Not Asked   Social History Narrative     Not on file       Review of Systems:  Skin:  Negative     Eyes:  Positive for glasses  ENT:  Positive for postnasal drainage;vertigo  Respiratory:  Positive for dyspnea on exertion;shortness of breath;mucoid expectorant  Cardiovascular:  Negative Positive for;fatigue;dizziness  Gastroenterology: Positive for poor appetite  Genitourinary:  Negative    Musculoskeletal:  Negative    Neurologic:  Positive for numbness or tingling of feet  Psychiatric:  Negative    Heme/Lymph/Imm:  Negative    Endocrine:  Negative      Physical Exam:  Vitals: BP (!) 148/61   Pulse 66   Resp 30   Wt 78.2 kg (172 lb 8 oz)   SpO2 95%   BMI 22.15 kg/m      Constitutional:  cooperative thin      Skin:  warm and dry to the touch        Head:  normocephalic        Eyes:  pupils equal and round        ENT:  no pallor or cyanosis        Neck:           Chest:  clear to auscultation   tachpenea    Cardiac:   irregular rhythm;irregularly irregular rhythm                Abdomen:  abdomen soft        Vascular:                                        Extremities and Back:  no edema        Neurological:  no gross motor deficits          Recent Lab Results:  LIPID RESULTS:  Lab Results   Component Value Date    CHOL 161 06/28/2019    HDL 36 (L) 06/28/2019    LDL 88 06/28/2019    TRIG 186 (H) 06/28/2019       LIVER ENZYME RESULTS:  Lab Results   Component Value Date    AST 10 05/14/2018    ALT <5 (L) 06/28/2019       CBC RESULTS:  Lab Results   Component Value  Date    WBC 11.1 (H) 07/21/2020    RBC 5.01 07/21/2020    HGB 14.0 07/21/2020    HCT 42.8 07/21/2020    MCV 85 07/21/2020    MCH 27.9 07/21/2020    MCHC 32.7 07/21/2020    RDW 13.9 07/21/2020     07/21/2020       BMP RESULTS:  Lab Results   Component Value Date     07/21/2020    POTASSIUM 4.0 07/21/2020    CHLORIDE 110 (H) 07/21/2020    CO2 25 07/21/2020    ANIONGAP 5 07/21/2020     (H) 07/21/2020    BUN 18 07/21/2020    CR 0.97 07/21/2020    GFRESTIMATED 68 07/21/2020    GFRESTBLACK 79 07/21/2020    ANGELITA 8.7 07/21/2020        A1C RESULTS:  No results found for: A1C    INR RESULTS:  Lab Results   Component Value Date    INR 2.60 (H) 09/11/2020    INR 1.80 (H) 08/28/2020           CC  Lilly Zepeda MD  [address]  [place] [address]                  Thank you for allowing me to participate in the care of your patient.      Sincerely,     EUGENIO Hinton [facility]    cc:   Lilly Zepeda MD  [address]  [place] [address]

## 2020-09-21 NOTE — PATIENT INSTRUCTIONS
Get labs drawn today.   Don't take your potassium this evening or tomorrow morning and tomorrow someone will call you for an official order on potassium.

## 2020-09-22 ENCOUNTER — TELEPHONE (OUTPATIENT)
Dept: CARDIOLOGY | Facility: CLINIC | Age: 85
End: 2020-09-22

## 2020-09-22 NOTE — TELEPHONE ENCOUNTER
Wellness Screening Tool    Symptom Screening:    Do you have one of the following NEW symptoms:      Fever (subjective or >100.0)?  No    New cough? No    Shortness of breath? No    Chills? No    New loss of taste or smell? No    Generalized body aches? No    New persistent headache? No    New sore throat? No    Nausea, vomiting or diarrhea? No    Within the past 2 weeks, have you been exposed to someone with a known positive illness below?      COVID - 19 (known or suspected) No    Chicken pox?  No    Measles? No    Pertussis? No    Have you had a positive COVID-19 diagnostic test (nasal swab test) in the last 14 days or are you currently   on self-quarantine restrictions (i.e.travel restriction, exposure, etc?) No        Patient notified of visitor restriction: Yes  Patient informed to wear a mask: Yes    Patient's appointment status: Patient will be seen in clinic as scheduled on 9/23/20

## 2020-09-23 ENCOUNTER — HOSPITAL ENCOUNTER (OUTPATIENT)
Dept: CARDIOLOGY | Facility: CLINIC | Age: 85
Discharge: HOME | End: 2020-09-23
Attending: NURSE PRACTITIONER | Admitting: NURSE PRACTITIONER
Payer: MEDICARE

## 2020-09-23 DIAGNOSIS — R06.02 SHORTNESS OF BREATH: ICD-10-CM

## 2020-09-23 LAB
ANION GAP SERPL CALCULATED.3IONS-SCNC: 11.1 MMOL/L (ref 6–17)
BUN SERPL-MCNC: 17 MG/DL (ref 7–30)
CALCIUM SERPL-MCNC: 9.7 MG/DL (ref 8.5–10.5)
CHLORIDE SERPL-SCNC: 104 MMOL/L (ref 98–107)
CO2 SERPL-SCNC: 33 MMOL/L (ref 23–29)
CREAT SERPL-MCNC: 1.11 MG/DL (ref 0.7–1.3)
ERYTHROCYTE [DISTWIDTH] IN BLOOD BY AUTOMATED COUNT: 14.1 % (ref 10–15)
GFR SERPL CREATININE-BSD FRML MDRD: 62 ML/MIN/{1.73_M2}
GLUCOSE SERPL-MCNC: 105 MG/DL (ref 70–105)
HCT VFR BLD AUTO: 45.7 % (ref 40–53)
HGB BLD-MCNC: 14.5 G/DL (ref 13.3–17.7)
MCH RBC QN AUTO: 27.2 PG (ref 26.5–33)
MCHC RBC AUTO-ENTMCNC: 31.7 G/DL (ref 31.5–36.5)
MCV RBC AUTO: 86 FL (ref 78–100)
PLATELET # BLD AUTO: 357 10E9/L (ref 150–450)
POTASSIUM SERPL-SCNC: 4.1 MMOL/L (ref 3.5–5.1)
RBC # BLD AUTO: 5.33 10E12/L (ref 4.4–5.9)
SODIUM SERPL-SCNC: 144 MMOL/L (ref 136–145)
WBC # BLD AUTO: 9.2 10E9/L (ref 4–11)

## 2020-09-23 PROCEDURE — 80048 BASIC METABOLIC PNL TOTAL CA: CPT | Performed by: NURSE PRACTITIONER

## 2020-09-23 PROCEDURE — 85027 COMPLETE CBC AUTOMATED: CPT | Performed by: NURSE PRACTITIONER

## 2020-09-23 PROCEDURE — 36415 COLL VENOUS BLD VENIPUNCTURE: CPT | Performed by: NURSE PRACTITIONER

## 2020-09-23 PROCEDURE — 93306 TTE W/DOPPLER COMPLETE: CPT | Mod: 26 | Performed by: INTERNAL MEDICINE

## 2020-09-23 PROCEDURE — 93306 TTE W/DOPPLER COMPLETE: CPT

## 2020-10-09 ENCOUNTER — ANTICOAGULATION THERAPY VISIT (OUTPATIENT)
Dept: NURSING | Facility: CLINIC | Age: 85
End: 2020-10-09

## 2020-10-09 DIAGNOSIS — I48.20 CHRONIC ATRIAL FIBRILLATION (H): ICD-10-CM

## 2020-10-09 DIAGNOSIS — Z79.01 CHRONIC ANTICOAGULATION: ICD-10-CM

## 2020-10-09 LAB
CAPILLARY BLOOD COLLECTION: NORMAL
INR PPP: 3.1 (ref 0.86–1.14)

## 2020-10-09 PROCEDURE — 36416 COLLJ CAPILLARY BLOOD SPEC: CPT | Performed by: INTERNAL MEDICINE

## 2020-10-09 PROCEDURE — 99207 PR NO CHARGE NURSE ONLY: CPT | Performed by: INTERNAL MEDICINE

## 2020-10-09 PROCEDURE — 85610 PROTHROMBIN TIME: CPT | Performed by: INTERNAL MEDICINE

## 2020-10-09 NOTE — PROGRESS NOTES
ANTICOAGULATION FOLLOW-UP CLINIC VISIT    Patient Name:  Santosh Poonmstad  Date:  10/9/2020  Contact Type:  Telephone/ Candida    SUBJECTIVE:  Patient Findings     Positives:  Change in diet/appetite (possibly less greens)        Clinical Outcomes     Negatives:  Major bleeding event, Thromboembolic event, Anticoagulation-related hospital admission, Anticoagulation-related ED visit, Anticoagulation-related fatality           OBJECTIVE    Recent labs: (last 7 days)     10/09/20  1118   INR 3.10*       ASSESSMENT / PLAN  INR assessment SUPRA    Recheck INR In: 2 WEEKS    INR Location Clinic      Anticoagulation Summary  As of 10/9/2020    INR goal:  2.0-3.0   TTR:  80.4 % (1 y)   INR used for dosing:  3.10 (10/9/2020)   Warfarin maintenance plan:  2.5 mg (5 mg x 0.5) every Mon, Wed, Fri; 5 mg (5 mg x 1) all other days   Full warfarin instructions:  2.5 mg every Mon, Wed, Fri; 5 mg all other days   Weekly warfarin total:  27.5 mg   No change documented:  Migdalia Ponce RN   Plan last modified:  Nery Yang RN (7/26/2019)   Next INR check:  10/23/2020   Priority:  High   Target end date:  Indefinite    Indications    Chronic atrial fibrillation (H) [I48.20]  Chronic anticoagulation [Z79.01]             Anticoagulation Episode Summary     INR check location:      Preferred lab:      Send INR reminders to:  JOHN CALDERON    Comments:        Anticoagulation Care Providers     Provider Role Specialty Phone number    Lilly Zepeda MD Referring Internal Medicine [phone]            See the Encounter Report to view Anticoagulation Flowsheet and Dosing Calendar (Go to Encounters tab in chart review, and find the Anticoagulation Therapy Visit)    Dosage adjustment made based on physician directed care plan. Will continue the same dosing per protocol and recheck in two weeks. Encouraged to return to normal green intake.    Migdalia Ponce, RN

## 2020-10-11 DIAGNOSIS — I10 BENIGN ESSENTIAL HYPERTENSION: ICD-10-CM

## 2020-10-20 NOTE — TELEPHONE ENCOUNTER
"Dr. Zepeda,    Routing refill request to provider for review/approval because:  Labs not current:  Last LDL 6/28/2019.  Thanks,  Rizwana Helms RN    Last Written Prescription Date:  5/20/2020  Last Fill Quantity: 90,  # refills: 0   Last office visit: 7/29/2020 with prescribing provider:  PCPCallie visit 5/20/2020  Future Office Visit:      Requested Prescriptions   Pending Prescriptions Disp Refills     simvastatin (ZOCOR) 10 MG tablet [Pharmacy Med Name: SIMVASTATIN 10 MG TABLET] 90 tablet 0     Sig: TAKE 1 TABLET BY MOUTH EVERYDAY AT BEDTIME       Statins Protocol Failed - 10/19/2020  8:03 AM        Failed - LDL on file in past 12 months     Recent Labs   Lab Test 06/28/19  0959   LDL 88             Passed - No abnormal creatine kinase in past 12 months     No lab results found.             Passed - Recent (12 mo) or future (30 days) visit within the authorizing provider's specialty     Patient has had an office visit with the authorizing provider or a provider within the authorizing providers department within the previous 12 mos or has a future within next 30 days. See \"Patient Info\" tab in inbasket, or \"Choose Columns\" in Meds & Orders section of the refill encounter.              Passed - Medication is active on med list        Passed - Patient is age 18 or older                 "

## 2020-10-27 NOTE — PROGRESS NOTES
ANTICOAGULATION FOLLOW-UP CLINIC VISIT    Patient Name:  Santosh Spauldingelmstad  Date:  10/27/2020  Contact Type:  Telephone    SUBJECTIVE:  Patient Findings     Comments:  The patient was assessed for diet, medication, and activity level changes, missed or extra doses, bruising or bleeding, with no problem findings.          Clinical Outcomes     Comments:  The patient was assessed for diet, medication, and activity level changes, missed or extra doses, bruising or bleeding, with no problem findings.             OBJECTIVE    Recent labs: (last 7 days)     10/27/20  1310   INR 2.90*       ASSESSMENT / PLAN  INR assessment THER    Recheck INR In: 4 WEEKS    INR Location Clinic      Anticoagulation Summary  As of 10/27/2020    INR goal:  2.0-3.0   TTR:  77.9 % (1 y)   INR used for dosin.90 (10/27/2020)   Warfarin maintenance plan:  2.5 mg (5 mg x 0.5) every Mon, Wed, Fri; 5 mg (5 mg x 1) all other days   Full warfarin instructions:  2.5 mg every Mon, Wed, Fri; 5 mg all other days   Weekly warfarin total:  27.5 mg   Plan last modified:  Nery Yang RN (2019)   Next INR check:  2020   Priority:  High   Target end date:  Indefinite    Indications    Chronic atrial fibrillation (H) [I48.20]  Chronic anticoagulation [Z79.01]             Anticoagulation Episode Summary     INR check location:      Preferred lab:      Send INR reminders to:  JOHN CALDERON    Comments:        Anticoagulation Care Providers     Provider Role Specialty Phone number    Lilly Zepeda MD [place] Referring Internal Medicine [phone]            See the Encounter Report to view Anticoagulation Flowsheet and Dosing Calendar (Go to Encounters tab in chart review, and find the Anticoagulation Therapy Visit)        Susannah Freeman RN

## 2020-11-24 NOTE — PROGRESS NOTES
ANTICOAGULATION MANAGEMENT     Patient Name:  Santosh KENNY Hjelmstad  Date:  11/24/2020    ASSESSMENT /SUBJECTIVE:    Today's INR result of 3.0 is therapeutic. Goal INR of 2.0-3.0      Warfarin dose taken: Warfarin taken as instructed    Diet: No new diet changes affecting INR    Medication changes/ interactions: No new medications/supplements affecting INR    Previous INR: Therapeutic     S/S of bleeding or thromboembolism: No    New injury or illness: No    Upcoming surgery, procedure or cardioversion: No    Additional findings: None      PLAN:    Telephone call with  Candida regarding INR result and instructed:     Warfarin Dosing Instructions: Continue your current warfarin dose 2.5mg MWF and 5 mg AOD    Instructed patient to follow up no later than: 4 weeks  Lab visit scheduled    Education provided: None required      Candida verbalizes understanding and agrees to warfarin dosing plan.    Instructed to call the Anticoagulation Clinic for any changes, questions or concerns. (#[phone])        Sharonda Morales RN      OBJECTIVE:  Recent labs: (last 7 days)     11/24/20  1331   INR 3.00*         No question data found.  Anticoagulation Summary  As of 11/24/2020    INR goal:  2.0-3.0   TTR:  77.9 % (1 y)   INR used for dosing:  No new INR was available at the time of this encounter.   Warfarin maintenance plan:  2.5 mg (5 mg x 0.5) every Mon, Wed, Fri; 5 mg (5 mg x 1) all other days   Full warfarin instructions:  2.5 mg every Mon, Wed, Fri; 5 mg all other days   Weekly warfarin total:  27.5 mg   No change documented:  Sharonda Morales RN   Plan last modified:  Nery Yang RN (7/26/2019)   Next INR check:  12/22/2020   Priority:  Maintenance   Target end date:  Indefinite    Indications    Chronic atrial fibrillation (H) [I48.20]  Chronic anticoagulation [Z79.01]             Anticoagulation Episode Summary     INR check location:      Preferred lab:      Send INR reminders to:  JOHN CALDERON     Comments:        Anticoagulation Care Providers     Provider Role Specialty Phone number    Lilly Zepeda MD Referring Internal Medicine [phone]

## 2020-12-22 NOTE — PROGRESS NOTES
ANTICOAGULATION MANAGEMENT     Patient Name:  Santosh KENNY Hjelmstad  Date:  12/22/2020    ASSESSMENT /SUBJECTIVE:    Today's INR result of 2.5 is therapeutic. Goal INR of 2.0-3.0      Warfarin dose taken: Warfarin taken as instructed    Diet: No new diet changes affecting INR    Medication changes/ interactions: No new medications/supplements affecting INR    Previous INR: Therapeutic     S/S of bleeding or thromboembolism: No    New injury or illness: No    Upcoming surgery, procedure or cardioversion: No    Additional findings: None      PLAN:    Telephone call with Santosh regarding INR result and instructed:     Warfarin Dosing Instructions: Continue your current warfarin dose 2.5mg MWF and 5mg AOD    Instructed patient to follow up no later than: 4 weeks  Patient offered & declined to schedule next visit    Education provided: None required      Santosh verbalizes understanding and agrees to warfarin dosing plan.    Instructed to call the Anticoagulation Clinic for any changes, questions or concerns. (#[phone])        Sharonda Morales RN      OBJECTIVE:  Recent labs: (last 7 days)     12/22/20  1306   INR 2.50*         No question data found.  Anticoagulation Summary  As of 12/22/2020    INR goal:  2.0-3.0   TTR:  82.8 % (1 y)   INR used for dosing:  No new INR was available at the time of this encounter.   Warfarin maintenance plan:  2.5 mg (5 mg x 0.5) every Mon, Wed, Fri; 5 mg (5 mg x 1) all other days   Full warfarin instructions:  2.5 mg every Mon, Wed, Fri; 5 mg all other days   Weekly warfarin total:  27.5 mg   No change documented:  Sharonda Morales RN   Plan last modified:  Nery Yang RN (7/26/2019)   Next INR check:  1/19/2021   Priority:  Maintenance   Target end date:  Indefinite    Indications    Chronic atrial fibrillation (H) [I48.20]  Chronic anticoagulation [Z79.01]             Anticoagulation Episode Summary     INR check location:      Preferred lab:      Send INR reminders  to:  JOHN CALDERON    Comments:        Anticoagulation Care Providers     Provider Role Specialty Phone number    Lilly Zepeda MD Referring Internal Medicine [phone]

## 2021-01-01 ENCOUNTER — LAB (OUTPATIENT)
Dept: LAB | Facility: CLINIC | Age: 86
End: 2021-01-01
Payer: COMMERCIAL

## 2021-01-01 ENCOUNTER — ANTICOAGULATION THERAPY VISIT (OUTPATIENT)
Dept: ANTICOAGULATION | Facility: CLINIC | Age: 86
End: 2021-01-01

## 2021-01-01 ENCOUNTER — ANTICOAGULATION THERAPY VISIT (OUTPATIENT)
Dept: FAMILY MEDICINE | Facility: CLINIC | Age: 86
End: 2021-01-01

## 2021-01-01 ENCOUNTER — TELEPHONE (OUTPATIENT)
Dept: CARDIOLOGY | Facility: CLINIC | Age: 86
End: 2021-01-01

## 2021-01-01 ENCOUNTER — OFFICE VISIT (OUTPATIENT)
Dept: FAMILY MEDICINE | Facility: CLINIC | Age: 86
End: 2021-01-01
Payer: COMMERCIAL

## 2021-01-01 ENCOUNTER — PATIENT OUTREACH (OUTPATIENT)
Dept: CARE COORDINATION | Facility: CLINIC | Age: 86
End: 2021-01-01

## 2021-01-01 ENCOUNTER — APPOINTMENT (OUTPATIENT)
Dept: CARDIOLOGY | Facility: CLINIC | Age: 86
DRG: 280 | End: 2021-01-01
Attending: HOSPITALIST
Payer: COMMERCIAL

## 2021-01-01 ENCOUNTER — HOSPITAL ENCOUNTER (INPATIENT)
Facility: CLINIC | Age: 86
LOS: 4 days | Discharge: NURSING, REHAB OR LONG-TERM CARE | DRG: 280 | End: 2021-08-31
Attending: EMERGENCY MEDICINE | Admitting: HOSPITALIST
Payer: COMMERCIAL

## 2021-01-01 ENCOUNTER — TELEPHONE (OUTPATIENT)
Dept: FAMILY MEDICINE | Facility: CLINIC | Age: 86
End: 2021-01-01

## 2021-01-01 ENCOUNTER — HOSPITAL ENCOUNTER (EMERGENCY)
Facility: CLINIC | Age: 86
Discharge: HOME | End: 2021-08-14
Attending: EMERGENCY MEDICINE | Admitting: EMERGENCY MEDICINE
Payer: COMMERCIAL

## 2021-01-01 ENCOUNTER — NURSE TRIAGE (OUTPATIENT)
Dept: FAMILY MEDICINE | Facility: CLINIC | Age: 86
End: 2021-01-01

## 2021-01-01 ENCOUNTER — PATIENT OUTREACH (OUTPATIENT)
Dept: NURSING | Facility: CLINIC | Age: 86
End: 2021-01-01
Payer: COMMERCIAL

## 2021-01-01 ENCOUNTER — APPOINTMENT (OUTPATIENT)
Dept: OCCUPATIONAL THERAPY | Facility: CLINIC | Age: 86
DRG: 280 | End: 2021-01-01
Payer: COMMERCIAL

## 2021-01-01 ENCOUNTER — ANTICOAGULATION THERAPY VISIT (OUTPATIENT)
Dept: NURSING | Facility: CLINIC | Age: 86
End: 2021-01-01

## 2021-01-01 ENCOUNTER — ANCILLARY PROCEDURE (OUTPATIENT)
Dept: CARDIOLOGY | Facility: CLINIC | Age: 86
End: 2021-01-01
Attending: INTERNAL MEDICINE
Payer: COMMERCIAL

## 2021-01-01 ENCOUNTER — ANTICOAGULATION THERAPY VISIT (OUTPATIENT)
Dept: ANTICOAGULATION | Facility: CLINIC | Age: 86
End: 2021-01-01
Payer: COMMERCIAL

## 2021-01-01 ENCOUNTER — DOCUMENTATION ONLY (OUTPATIENT)
Dept: FAMILY MEDICINE | Facility: CLINIC | Age: 86
End: 2021-01-01

## 2021-01-01 ENCOUNTER — TRANSFERRED RECORDS (OUTPATIENT)
Dept: HEALTH INFORMATION MANAGEMENT | Facility: CLINIC | Age: 86
End: 2021-01-01

## 2021-01-01 ENCOUNTER — IMMUNIZATION (OUTPATIENT)
Dept: NURSING | Facility: CLINIC | Age: 86
End: 2021-01-01
Payer: COMMERCIAL

## 2021-01-01 ENCOUNTER — TRANSITIONAL CARE UNIT VISIT (OUTPATIENT)
Dept: GERIATRICS | Facility: CLINIC | Age: 86
End: 2021-01-01
Payer: COMMERCIAL

## 2021-01-01 ENCOUNTER — HEALTH MAINTENANCE LETTER (OUTPATIENT)
Age: 86
End: 2021-01-01

## 2021-01-01 ENCOUNTER — DISCHARGE SUMMARY NURSING HOME (OUTPATIENT)
Dept: GERIATRICS | Facility: CLINIC | Age: 86
End: 2021-01-01
Payer: COMMERCIAL

## 2021-01-01 ENCOUNTER — IMMUNIZATION (OUTPATIENT)
Dept: NURSING | Facility: CLINIC | Age: 86
End: 2021-01-01
Attending: INTERNAL MEDICINE
Payer: COMMERCIAL

## 2021-01-01 ENCOUNTER — APPOINTMENT (OUTPATIENT)
Dept: CT IMAGING | Facility: CLINIC | Age: 86
End: 2021-01-01
Attending: EMERGENCY MEDICINE
Payer: COMMERCIAL

## 2021-01-01 ENCOUNTER — APPOINTMENT (OUTPATIENT)
Dept: CT IMAGING | Facility: CLINIC | Age: 86
DRG: 280 | End: 2021-01-01
Attending: EMERGENCY MEDICINE
Payer: COMMERCIAL

## 2021-01-01 ENCOUNTER — APPOINTMENT (OUTPATIENT)
Dept: OCCUPATIONAL THERAPY | Facility: CLINIC | Age: 86
DRG: 280 | End: 2021-01-01
Attending: HOSPITALIST
Payer: COMMERCIAL

## 2021-01-01 ENCOUNTER — NURSING HOME VISIT (OUTPATIENT)
Dept: GERIATRICS | Facility: CLINIC | Age: 86
End: 2021-01-01
Payer: COMMERCIAL

## 2021-01-01 ENCOUNTER — OFFICE VISIT (OUTPATIENT)
Dept: CARDIOLOGY | Facility: CLINIC | Age: 86
End: 2021-01-01
Payer: COMMERCIAL

## 2021-01-01 ENCOUNTER — CARE COORDINATION (OUTPATIENT)
Dept: CARDIOLOGY | Facility: CLINIC | Age: 86
End: 2021-01-01

## 2021-01-01 ENCOUNTER — OFFICE VISIT (OUTPATIENT)
Dept: URGENT CARE | Facility: URGENT CARE | Age: 86
End: 2021-01-01
Payer: COMMERCIAL

## 2021-01-01 VITALS
BODY MASS INDEX: 21.06 KG/M2 | OXYGEN SATURATION: 95 % | DIASTOLIC BLOOD PRESSURE: 76 MMHG | WEIGHT: 164 LBS | SYSTOLIC BLOOD PRESSURE: 153 MMHG | HEART RATE: 75 BPM | TEMPERATURE: 97.5 F

## 2021-01-01 VITALS
OXYGEN SATURATION: 95 % | SYSTOLIC BLOOD PRESSURE: 177 MMHG | DIASTOLIC BLOOD PRESSURE: 69 MMHG | HEART RATE: 81 BPM | HEIGHT: 75 IN | BODY MASS INDEX: 21.88 KG/M2 | RESPIRATION RATE: 18 BRPM | TEMPERATURE: 96.8 F | WEIGHT: 176 LBS

## 2021-01-01 VITALS
HEIGHT: 74 IN | TEMPERATURE: 97.7 F | OXYGEN SATURATION: 98 % | DIASTOLIC BLOOD PRESSURE: 79 MMHG | WEIGHT: 168 LBS | RESPIRATION RATE: 16 BRPM | HEART RATE: 60 BPM | SYSTOLIC BLOOD PRESSURE: 168 MMHG | BODY MASS INDEX: 21.56 KG/M2

## 2021-01-01 VITALS
OXYGEN SATURATION: 98 % | SYSTOLIC BLOOD PRESSURE: 116 MMHG | TEMPERATURE: 97.5 F | RESPIRATION RATE: 20 BRPM | HEIGHT: 75 IN | BODY MASS INDEX: 22.24 KG/M2 | WEIGHT: 178.9 LBS | HEART RATE: 65 BPM | DIASTOLIC BLOOD PRESSURE: 71 MMHG

## 2021-01-01 VITALS
WEIGHT: 177.5 LBS | SYSTOLIC BLOOD PRESSURE: 127 MMHG | BODY MASS INDEX: 22.07 KG/M2 | HEIGHT: 75 IN | RESPIRATION RATE: 20 BRPM | TEMPERATURE: 97.5 F | OXYGEN SATURATION: 97 % | HEART RATE: 62 BPM | DIASTOLIC BLOOD PRESSURE: 66 MMHG

## 2021-01-01 VITALS
WEIGHT: 176 LBS | HEART RATE: 65 BPM | SYSTOLIC BLOOD PRESSURE: 115 MMHG | BODY MASS INDEX: 21.88 KG/M2 | HEIGHT: 75 IN | DIASTOLIC BLOOD PRESSURE: 72 MMHG

## 2021-01-01 VITALS
DIASTOLIC BLOOD PRESSURE: 73 MMHG | SYSTOLIC BLOOD PRESSURE: 151 MMHG | HEIGHT: 75 IN | BODY MASS INDEX: 22.14 KG/M2 | OXYGEN SATURATION: 96 % | HEART RATE: 60 BPM | WEIGHT: 178.1 LBS | TEMPERATURE: 97.2 F | RESPIRATION RATE: 18 BRPM

## 2021-01-01 VITALS
DIASTOLIC BLOOD PRESSURE: 70 MMHG | WEIGHT: 169.7 LBS | OXYGEN SATURATION: 96 % | RESPIRATION RATE: 18 BRPM | BODY MASS INDEX: 21.1 KG/M2 | HEIGHT: 75 IN | SYSTOLIC BLOOD PRESSURE: 158 MMHG | HEART RATE: 82 BPM | TEMPERATURE: 97.7 F

## 2021-01-01 VITALS
TEMPERATURE: 97.2 F | BODY MASS INDEX: 21.82 KG/M2 | WEIGHT: 170 LBS | HEIGHT: 74 IN | SYSTOLIC BLOOD PRESSURE: 170 MMHG | OXYGEN SATURATION: 96 % | DIASTOLIC BLOOD PRESSURE: 80 MMHG | HEART RATE: 70 BPM

## 2021-01-01 VITALS
TEMPERATURE: 97.8 F | SYSTOLIC BLOOD PRESSURE: 106 MMHG | HEART RATE: 66 BPM | DIASTOLIC BLOOD PRESSURE: 67 MMHG | RESPIRATION RATE: 18 BRPM

## 2021-01-01 DIAGNOSIS — F33.9 EPISODE OF RECURRENT MAJOR DEPRESSIVE DISORDER, UNSPECIFIED DEPRESSION EPISODE SEVERITY (H): ICD-10-CM

## 2021-01-01 DIAGNOSIS — R41.89 COGNITIVE IMPAIRMENT: ICD-10-CM

## 2021-01-01 DIAGNOSIS — E78.5 HYPERLIPIDEMIA LDL GOAL <100: ICD-10-CM

## 2021-01-01 DIAGNOSIS — D72.829 LEUKOCYTOSIS, UNSPECIFIED TYPE: ICD-10-CM

## 2021-01-01 DIAGNOSIS — R35.89 DIURESIS: ICD-10-CM

## 2021-01-01 DIAGNOSIS — I21.4 NSTEMI (NON-ST ELEVATED MYOCARDIAL INFARCTION) (H): Primary | ICD-10-CM

## 2021-01-01 DIAGNOSIS — R53.81 PHYSICAL DECONDITIONING: ICD-10-CM

## 2021-01-01 DIAGNOSIS — Z79.01 LONG TERM CURRENT USE OF ANTICOAGULANT THERAPY: ICD-10-CM

## 2021-01-01 DIAGNOSIS — I48.20 CHRONIC ATRIAL FIBRILLATION (H): ICD-10-CM

## 2021-01-01 DIAGNOSIS — R63.4 WEIGHT LOSS: ICD-10-CM

## 2021-01-01 DIAGNOSIS — I48.20 CHRONIC ATRIAL FIBRILLATION (H): Primary | ICD-10-CM

## 2021-01-01 DIAGNOSIS — I50.21 ACUTE SYSTOLIC CONGESTIVE HEART FAILURE (H): ICD-10-CM

## 2021-01-01 DIAGNOSIS — K59.09 OTHER CONSTIPATION: ICD-10-CM

## 2021-01-01 DIAGNOSIS — K44.9 HIATAL HERNIA: ICD-10-CM

## 2021-01-01 DIAGNOSIS — Z79.01 LONG TERM CURRENT USE OF ANTICOAGULANT THERAPY: Primary | ICD-10-CM

## 2021-01-01 DIAGNOSIS — I10 BENIGN ESSENTIAL HYPERTENSION: ICD-10-CM

## 2021-01-01 DIAGNOSIS — Z79.01 CHRONIC ANTICOAGULATION: ICD-10-CM

## 2021-01-01 DIAGNOSIS — I25.5 ISCHEMIC CARDIOMYOPATHY: ICD-10-CM

## 2021-01-01 DIAGNOSIS — K21.9 GASTROESOPHAGEAL REFLUX DISEASE: ICD-10-CM

## 2021-01-01 DIAGNOSIS — Z00.00 ENCOUNTER FOR MEDICARE ANNUAL WELLNESS EXAM: Primary | ICD-10-CM

## 2021-01-01 DIAGNOSIS — Z71.89 ADVANCED CARE PLANNING/COUNSELING DISCUSSION: ICD-10-CM

## 2021-01-01 DIAGNOSIS — Z95.0 STATUS CARDIAC PACEMAKER: ICD-10-CM

## 2021-01-01 DIAGNOSIS — Z95.0 CARDIAC PACEMAKER IN SITU: ICD-10-CM

## 2021-01-01 DIAGNOSIS — I50.32 CHRONIC DIASTOLIC (CONGESTIVE) HEART FAILURE (H): ICD-10-CM

## 2021-01-01 DIAGNOSIS — N39.0 ACUTE URINARY TRACT INFECTION: ICD-10-CM

## 2021-01-01 DIAGNOSIS — I21.4 NSTEMI (NON-ST ELEVATED MYOCARDIAL INFARCTION) (H): ICD-10-CM

## 2021-01-01 DIAGNOSIS — R10.10 PAIN OF UPPER ABDOMEN: Primary | ICD-10-CM

## 2021-01-01 DIAGNOSIS — I25.5 ISCHEMIC CARDIOMYOPATHY: Primary | ICD-10-CM

## 2021-01-01 DIAGNOSIS — N39.0 URINARY TRACT INFECTION WITHOUT HEMATURIA, SITE UNSPECIFIED: ICD-10-CM

## 2021-01-01 DIAGNOSIS — Z23 NEED FOR PROPHYLACTIC VACCINATION AND INOCULATION AGAINST INFLUENZA: ICD-10-CM

## 2021-01-01 DIAGNOSIS — R10.9 ABDOMINAL DISCOMFORT: ICD-10-CM

## 2021-01-01 LAB
ALBUMIN SERPL-MCNC: 2.4 G/DL (ref 3.4–5)
ALBUMIN SERPL-MCNC: 2.4 G/DL (ref 3.4–5)
ALBUMIN SERPL-MCNC: 2.6 G/DL (ref 3.4–5)
ALBUMIN SERPL-MCNC: 2.7 G/DL (ref 3.4–5)
ALBUMIN SERPL-MCNC: 2.9 G/DL (ref 3.4–5)
ALBUMIN SERPL-MCNC: 3.5 G/DL (ref 3.4–5)
ALBUMIN SERPL-MCNC: 3.6 G/DL (ref 3.4–5)
ALBUMIN UR-MCNC: 30 MG/DL
ALBUMIN UR-MCNC: 50 MG/DL
ALP SERPL-CCNC: 114 U/L (ref 40–150)
ALP SERPL-CCNC: 59 U/L (ref 40–150)
ALP SERPL-CCNC: 61 U/L (ref 40–150)
ALP SERPL-CCNC: 66 U/L (ref 40–150)
ALP SERPL-CCNC: 66 U/L (ref 40–150)
ALP SERPL-CCNC: 74 U/L (ref 40–150)
ALP SERPL-CCNC: 83 U/L (ref 40–150)
ALT SERPL W P-5'-P-CCNC: 15 U/L (ref 0–70)
ALT SERPL W P-5'-P-CCNC: 34 U/L (ref 0–70)
ALT SERPL W P-5'-P-CCNC: 38 U/L (ref 0–70)
ALT SERPL W P-5'-P-CCNC: 40 U/L (ref 0–70)
ALT SERPL W P-5'-P-CCNC: 41 U/L (ref 0–70)
ALT SERPL W P-5'-P-CCNC: 45 U/L (ref 0–70)
ALT SERPL W P-5'-P-CCNC: 46 U/L (ref 0–70)
AMORPH CRY #/AREA URNS HPF: ABNORMAL /HPF
ANION GAP SERPL CALC-SCNC: 7 MMOL/L (ref 7–16)
ANION GAP SERPL CALC-SCNC: 9 MMOL/L (ref 7–16)
ANION GAP SERPL CALC-SCNC: 9 MMOL/L (ref 7–16)
ANION GAP SERPL CALCULATED.3IONS-SCNC: 1 MMOL/L (ref 3–14)
ANION GAP SERPL CALCULATED.3IONS-SCNC: 4 MMOL/L (ref 3–14)
ANION GAP SERPL CALCULATED.3IONS-SCNC: 4 MMOL/L (ref 3–14)
ANION GAP SERPL CALCULATED.3IONS-SCNC: 5 MMOL/L (ref 3–14)
ANION GAP SERPL CALCULATED.3IONS-SCNC: 6 MMOL/L (ref 3–14)
APPEARANCE UR: CLEAR
APPEARANCE UR: CLEAR
AST SERPL W P-5'-P-CCNC: 15 U/L (ref 0–45)
AST SERPL W P-5'-P-CCNC: 28 U/L (ref 0–45)
AST SERPL W P-5'-P-CCNC: 35 U/L (ref 0–45)
AST SERPL W P-5'-P-CCNC: 35 U/L (ref 0–45)
AST SERPL W P-5'-P-CCNC: 38 U/L (ref 0–45)
AST SERPL W P-5'-P-CCNC: 38 U/L (ref 0–45)
AST SERPL W P-5'-P-CCNC: 39 U/L (ref 0–45)
ATRIAL RATE - MUSE: 340 BPM
ATRIAL RATE - MUSE: 63 BPM
BACTERIA UR CULT: NO GROWTH
BASOPHILS # BLD AUTO: 0 10E3/UL (ref 0–0.2)
BASOPHILS # BLD AUTO: 0.1 10E3/UL (ref 0–0.2)
BASOPHILS NFR BLD AUTO: 0 %
BASOPHILS NFR BLD AUTO: 1 %
BILIRUB SERPL-MCNC: 0.7 MG/DL (ref 0.2–1.3)
BILIRUB SERPL-MCNC: 0.8 MG/DL (ref 0.2–1.3)
BILIRUB SERPL-MCNC: 0.8 MG/DL (ref 0.2–1.3)
BILIRUB SERPL-MCNC: 1 MG/DL (ref 0.2–1.3)
BILIRUB SERPL-MCNC: 1.1 MG/DL (ref 0.2–1.3)
BILIRUB SERPL-MCNC: 1.2 MG/DL (ref 0.2–1.3)
BILIRUB SERPL-MCNC: 1.2 MG/DL (ref 0.2–1.3)
BILIRUB UR QL STRIP: NEGATIVE
BILIRUB UR QL STRIP: NEGATIVE
BUN SERPL-MCNC: 13 MG/DL (ref 7–30)
BUN SERPL-MCNC: 15 MG/DL (ref 7–26)
BUN SERPL-MCNC: 15 MG/DL (ref 7–26)
BUN SERPL-MCNC: 17 MG/DL (ref 7–26)
BUN SERPL-MCNC: 23 MG/DL (ref 7–30)
BUN SERPL-MCNC: 25 MG/DL (ref 7–30)
BUN SERPL-MCNC: 25 MG/DL (ref 7–30)
BUN SERPL-MCNC: 26 MG/DL (ref 7–30)
BUN SERPL-MCNC: 26 MG/DL (ref 7–30)
BUN SERPL-MCNC: 27 MG/DL (ref 7–30)
CALCIUM (EXTERNAL): 8.9 MG/DL (ref 8.4–10.4)
CALCIUM (EXTERNAL): 9.2 MG/DL (ref 8.4–10.4)
CALCIUM (EXTERNAL): 9.2 MG/DL (ref 8.4–10.4)
CALCIUM SERPL-MCNC: 8.4 MG/DL (ref 8.5–10.1)
CALCIUM SERPL-MCNC: 8.7 MG/DL (ref 8.5–10.1)
CALCIUM SERPL-MCNC: 8.9 MG/DL (ref 8.5–10.1)
CALCIUM SERPL-MCNC: 9 MG/DL (ref 8.5–10.1)
CALCIUM SERPL-MCNC: 9.1 MG/DL (ref 8.5–10.1)
CALCIUM SERPL-MCNC: 9.3 MG/DL (ref 8.5–10.1)
CALCIUM SERPL-MCNC: 9.9 MG/DL (ref 8.5–10.1)
CAPILLARY BLOOD COLLECTION: NORMAL
CHLORIDE (EXTERNAL): 109 MMOL/L (ref 98–109)
CHLORIDE (EXTERNAL): 109 MMOL/L (ref 98–109)
CHLORIDE (EXTERNAL): 111 MMOL/L (ref 98–109)
CHLORIDE BLD-SCNC: 107 MMOL/L (ref 94–109)
CHLORIDE BLD-SCNC: 108 MMOL/L (ref 94–109)
CHLORIDE BLD-SCNC: 109 MMOL/L (ref 94–109)
CHLORIDE BLD-SCNC: 109 MMOL/L (ref 94–109)
CHLORIDE BLD-SCNC: 112 MMOL/L (ref 94–109)
CHOLEST SERPL-MCNC: 107 MG/DL
CHOLEST SERPL-MCNC: 206 MG/DL
CO2 (EXTERNAL): 22 MMOL/L (ref 20–29)
CO2 (EXTERNAL): 24 MMOL/L (ref 20–29)
CO2 (EXTERNAL): 24 MMOL/L (ref 20–29)
CO2 SERPL-SCNC: 21 MMOL/L (ref 20–32)
CO2 SERPL-SCNC: 24 MMOL/L (ref 20–32)
CO2 SERPL-SCNC: 25 MMOL/L (ref 20–32)
CO2 SERPL-SCNC: 26 MMOL/L (ref 20–32)
CO2 SERPL-SCNC: 26 MMOL/L (ref 20–32)
CO2 SERPL-SCNC: 27 MMOL/L (ref 20–32)
CO2 SERPL-SCNC: 29 MMOL/L (ref 20–32)
COLOR UR AUTO: YELLOW
COLOR UR AUTO: YELLOW
CREAT SERPL-MCNC: 0.88 MG/DL (ref 0.66–1.25)
CREAT SERPL-MCNC: 0.96 MG/DL (ref 0.66–1.25)
CREAT SERPL-MCNC: 1.02 MG/DL (ref 0.66–1.25)
CREAT SERPL-MCNC: 1.03 MG/DL (ref 0.66–1.25)
CREAT SERPL-MCNC: 1.04 MG/DL (ref 0.66–1.25)
CREAT SERPL-MCNC: 1.04 MG/DL (ref 0.66–1.25)
CREAT SERPL-MCNC: 1.1 MG/DL (ref 0.66–1.25)
CREATININE (EXTERNAL): 0.79 MG/DL (ref 0.73–1.18)
DIASTOLIC BLOOD PRESSURE - MUSE: NORMAL MMHG
DIASTOLIC BLOOD PRESSURE - MUSE: NORMAL MMHG
EOSINOPHIL # BLD AUTO: 0 10E3/UL (ref 0–0.7)
EOSINOPHIL # BLD AUTO: 0.3 10E3/UL (ref 0–0.7)
EOSINOPHIL NFR BLD AUTO: 0 %
EOSINOPHIL NFR BLD AUTO: 3 %
ERYTHROCYTE [DISTWIDTH] IN BLOOD BY AUTOMATED COUNT: 14.4 % (ref 10–15)
ERYTHROCYTE [DISTWIDTH] IN BLOOD BY AUTOMATED COUNT: 14.5 % (ref 10–15)
ERYTHROCYTE [DISTWIDTH] IN BLOOD BY AUTOMATED COUNT: 14.6 % (ref 10–15)
ERYTHROCYTE [DISTWIDTH] IN BLOOD BY AUTOMATED COUNT: 14.6 % (ref 11.9–15.5)
ERYTHROCYTE [DISTWIDTH] IN BLOOD BY AUTOMATED COUNT: 14.7 % (ref 10–15)
ERYTHROCYTE [DISTWIDTH] IN BLOOD BY AUTOMATED COUNT: 14.8 % (ref 10–15)
FASTING STATUS PATIENT QL REPORTED: ABNORMAL
FASTING STATUS PATIENT QL REPORTED: YES
GFR ESTIMATED (EXTERNAL): >60 ML/MIN/1.73M2
GFR ESTIMATED (IF AFRICAN AMERICAN) (EXTERNAL): ABNORMAL ML/MIN/1.73M2
GFR SERPL CREATININE-BSD FRML MDRD: 58 ML/MIN/1.73M2
GFR SERPL CREATININE-BSD FRML MDRD: 62 ML/MIN/1.73M2
GFR SERPL CREATININE-BSD FRML MDRD: 62 ML/MIN/1.73M2
GFR SERPL CREATININE-BSD FRML MDRD: 63 ML/MIN/1.73M2
GFR SERPL CREATININE-BSD FRML MDRD: 64 ML/MIN/1.73M2
GFR SERPL CREATININE-BSD FRML MDRD: 68 ML/MIN/1.73M2
GFR SERPL CREATININE-BSD FRML MDRD: 75 ML/MIN/1.73M2
GLUCOSE (EXTERNAL): 106 MG/DL (ref 70–100)
GLUCOSE (EXTERNAL): 114 MG/DL (ref 70–100)
GLUCOSE (EXTERNAL): 114 MG/DL (ref 70–99)
GLUCOSE BLD-MCNC: 100 MG/DL (ref 70–99)
GLUCOSE BLD-MCNC: 100 MG/DL (ref 70–99)
GLUCOSE BLD-MCNC: 106 MG/DL (ref 70–99)
GLUCOSE BLD-MCNC: 107 MG/DL (ref 70–99)
GLUCOSE BLD-MCNC: 122 MG/DL (ref 70–99)
GLUCOSE BLD-MCNC: 172 MG/DL (ref 70–99)
GLUCOSE BLD-MCNC: 95 MG/DL (ref 70–99)
GLUCOSE UR STRIP-MCNC: NEGATIVE MG/DL
GLUCOSE UR STRIP-MCNC: NEGATIVE MG/DL
HCT VFR BLD AUTO: 36 % (ref 40–53)
HCT VFR BLD AUTO: 37.7 % (ref 40–53)
HCT VFR BLD AUTO: 40.2 % (ref 40–53)
HCT VFR BLD AUTO: 40.9 % (ref 40–53)
HCT VFR BLD AUTO: 42.9 % (ref 40–53)
HCT VFR BLD AUTO: 44.1 % (ref 40–53)
HCT VFR BLD AUTO: 45.8 % (ref 40–53)
HDLC SERPL-MCNC: 27 MG/DL
HDLC SERPL-MCNC: 36 MG/DL
HEMATOCRIT (EXTERNAL): 38.7 % (ref 38.8–50)
HEMOGLOBIN (EXTERNAL): 12.1 G/DL (ref 13.5–17.5)
HGB BLD-MCNC: 11.6 G/DL (ref 13.3–17.7)
HGB BLD-MCNC: 12.3 G/DL (ref 13.3–17.7)
HGB BLD-MCNC: 12.8 G/DL (ref 13.3–17.7)
HGB BLD-MCNC: 13.2 G/DL (ref 13.3–17.7)
HGB BLD-MCNC: 13.5 G/DL (ref 13.3–17.7)
HGB BLD-MCNC: 14 G/DL (ref 13.3–17.7)
HGB BLD-MCNC: 14.7 G/DL (ref 13.3–17.7)
HGB UR QL STRIP: NEGATIVE
HGB UR QL STRIP: NEGATIVE
HOLD SPECIMEN: NORMAL
IMM GRANULOCYTES # BLD: 0.1 10E3/UL
IMM GRANULOCYTES # BLD: 0.1 10E3/UL
IMM GRANULOCYTES NFR BLD: 1 %
IMM GRANULOCYTES NFR BLD: 1 %
INR (EXTERNAL): 2.4 (ref 2–3)
INR BLD: 1.7 (ref 0.9–1.1)
INR BLD: 2.3 (ref 0.9–1.1)
INR BLD: 4.4 (ref 0.9–1.1)
INR PPP: 1.99 (ref 0.85–1.15)
INR PPP: 2.01 (ref 0.85–1.15)
INR PPP: 2.5 (ref 0.86–1.14)
INR PPP: 2.54 (ref 0.85–1.15)
INR PPP: 2.57 (ref 0.85–1.15)
INR PPP: 2.7 (ref 0.86–1.14)
INR PPP: 2.77 (ref 0.85–1.15)
INR PPP: 2.8 (ref 0.86–1.14)
INR PPP: 2.9 (ref 0.86–1.14)
INR PPP: 3 (ref 0.86–1.14)
INR PPP: 3.22 (ref 0.85–1.15)
INR PPP: 3.34 (ref 0.85–1.15)
INR PPP: 3.6 (ref 0.86–1.14)
INR PPP: 3.6 (ref 0.86–1.14)
INTERPRETATION ECG - MUSE: NORMAL
INTERPRETATION ECG - MUSE: NORMAL
KETONES UR STRIP-MCNC: ABNORMAL MG/DL
KETONES UR STRIP-MCNC: ABNORMAL MG/DL
LACTATE SERPL-SCNC: 1.1 MMOL/L (ref 0.7–2)
LACTATE SERPL-SCNC: 2.3 MMOL/L (ref 0.7–2)
LACTATE SERPL-SCNC: 2.3 MMOL/L (ref 0.7–2)
LACTATE SERPL-SCNC: 2.7 MMOL/L (ref 0.7–2)
LDLC SERPL CALC-MCNC: 136 MG/DL
LDLC SERPL CALC-MCNC: 60 MG/DL
LEUKOCYTE ESTERASE UR QL STRIP: ABNORMAL
LEUKOCYTE ESTERASE UR QL STRIP: NEGATIVE
LIPASE SERPL-CCNC: 127 U/L (ref 73–393)
LIPASE SERPL-CCNC: 50 U/L (ref 73–393)
LVEF ECHO: NORMAL
LYMPHOCYTES # BLD AUTO: 0.4 10E3/UL (ref 0.8–5.3)
LYMPHOCYTES # BLD AUTO: 0.6 10E3/UL (ref 0.8–5.3)
LYMPHOCYTES NFR BLD AUTO: 2 %
LYMPHOCYTES NFR BLD AUTO: 6 %
MAGNESIUM SERPL-MCNC: 2.1 MG/DL (ref 1.6–2.3)
MAGNESIUM SERPL-MCNC: 2.1 MG/DL (ref 1.6–2.3)
MAGNESIUM SERPL-MCNC: 2.2 MG/DL (ref 1.6–2.3)
MCH RBC QN AUTO: 26.7 PG (ref 27.6–33.3)
MCH RBC QN AUTO: 26.8 PG (ref 26.5–33)
MCH RBC QN AUTO: 26.8 PG (ref 26.5–33)
MCH RBC QN AUTO: 26.9 PG (ref 26.5–33)
MCH RBC QN AUTO: 26.9 PG (ref 26.5–33)
MCH RBC QN AUTO: 27 PG (ref 26.5–33)
MCH RBC QN AUTO: 27.2 PG (ref 26.5–33)
MCH RBC QN AUTO: 27.3 PG (ref 26.5–33)
MCHC RBC AUTO-ENTMCNC: 31.3 G/DL (ref 31.5–35.2)
MCHC RBC AUTO-ENTMCNC: 31.5 G/DL (ref 31.5–36.5)
MCHC RBC AUTO-ENTMCNC: 31.7 G/DL (ref 31.5–36.5)
MCHC RBC AUTO-ENTMCNC: 31.8 G/DL (ref 31.5–36.5)
MCHC RBC AUTO-ENTMCNC: 32.1 G/DL (ref 31.5–36.5)
MCHC RBC AUTO-ENTMCNC: 32.2 G/DL (ref 31.5–36.5)
MCHC RBC AUTO-ENTMCNC: 32.3 G/DL (ref 31.5–36.5)
MCHC RBC AUTO-ENTMCNC: 32.6 G/DL (ref 31.5–36.5)
MCV RBC AUTO: 83 FL (ref 78–100)
MCV RBC AUTO: 83 FL (ref 78–100)
MCV RBC AUTO: 84 FL (ref 78–100)
MCV RBC AUTO: 85 FL (ref 78–100)
MCV RBC AUTO: 85.2 FL (ref 80–100)
MCV RBC AUTO: 86 FL (ref 78–100)
MDC_IDC_LEAD_IMPLANT_DT: NORMAL
MDC_IDC_LEAD_IMPLANT_DT: NORMAL
MDC_IDC_LEAD_LOCATION: NORMAL
MDC_IDC_LEAD_LOCATION: NORMAL
MDC_IDC_LEAD_LOCATION_DETAIL_1: NORMAL
MDC_IDC_LEAD_LOCATION_DETAIL_1: NORMAL
MDC_IDC_LEAD_MFG: NORMAL
MDC_IDC_LEAD_MFG: NORMAL
MDC_IDC_LEAD_MODEL: NORMAL
MDC_IDC_LEAD_MODEL: NORMAL
MDC_IDC_LEAD_POLARITY_TYPE: NORMAL
MDC_IDC_LEAD_POLARITY_TYPE: NORMAL
MDC_IDC_LEAD_SERIAL: NORMAL
MDC_IDC_LEAD_SERIAL: NORMAL
MDC_IDC_MSMT_BATTERY_DTM: NORMAL
MDC_IDC_MSMT_BATTERY_DTM: NORMAL
MDC_IDC_MSMT_BATTERY_REMAINING_LONGEVITY: 78 MO
MDC_IDC_MSMT_BATTERY_REMAINING_LONGEVITY: 80 MO
MDC_IDC_MSMT_BATTERY_RRT_TRIGGER: 2.83
MDC_IDC_MSMT_BATTERY_RRT_TRIGGER: 2.83
MDC_IDC_MSMT_BATTERY_STATUS: NORMAL
MDC_IDC_MSMT_BATTERY_STATUS: NORMAL
MDC_IDC_MSMT_BATTERY_VOLTAGE: 3.01 V
MDC_IDC_MSMT_BATTERY_VOLTAGE: 3.01 V
MDC_IDC_MSMT_LEADCHNL_RV_IMPEDANCE_VALUE: 551 OHM
MDC_IDC_MSMT_LEADCHNL_RV_IMPEDANCE_VALUE: 570 OHM
MDC_IDC_MSMT_LEADCHNL_RV_IMPEDANCE_VALUE: 646 OHM
MDC_IDC_MSMT_LEADCHNL_RV_IMPEDANCE_VALUE: 646 OHM
MDC_IDC_MSMT_LEADCHNL_RV_PACING_THRESHOLD_AMPLITUDE: 0.5 V
MDC_IDC_MSMT_LEADCHNL_RV_PACING_THRESHOLD_AMPLITUDE: 0.5 V
MDC_IDC_MSMT_LEADCHNL_RV_PACING_THRESHOLD_PULSEWIDTH: 0.4 MS
MDC_IDC_MSMT_LEADCHNL_RV_PACING_THRESHOLD_PULSEWIDTH: 0.4 MS
MDC_IDC_MSMT_LEADCHNL_RV_SENSING_INTR_AMPL: 10.25 MV
MDC_IDC_MSMT_LEADCHNL_RV_SENSING_INTR_AMPL: 10.25 MV
MDC_IDC_MSMT_LEADCHNL_RV_SENSING_INTR_AMPL: 9.88 MV
MDC_IDC_MSMT_LEADCHNL_RV_SENSING_INTR_AMPL: 9.88 MV
MDC_IDC_PG_IMPLANT_DTM: NORMAL
MDC_IDC_PG_IMPLANT_DTM: NORMAL
MDC_IDC_PG_MFG: NORMAL
MDC_IDC_PG_MFG: NORMAL
MDC_IDC_PG_MODEL: NORMAL
MDC_IDC_PG_MODEL: NORMAL
MDC_IDC_PG_SERIAL: NORMAL
MDC_IDC_PG_SERIAL: NORMAL
MDC_IDC_PG_TYPE: NORMAL
MDC_IDC_PG_TYPE: NORMAL
MDC_IDC_SESS_CLINIC_NAME: NORMAL
MDC_IDC_SESS_CLINIC_NAME: NORMAL
MDC_IDC_SESS_DTM: NORMAL
MDC_IDC_SESS_DTM: NORMAL
MDC_IDC_SESS_TYPE: NORMAL
MDC_IDC_SESS_TYPE: NORMAL
MDC_IDC_SET_BRADY_HYSTRATE: NORMAL
MDC_IDC_SET_BRADY_HYSTRATE: NORMAL
MDC_IDC_SET_BRADY_LOWRATE: 60 {BEATS}/MIN
MDC_IDC_SET_BRADY_LOWRATE: 60 {BEATS}/MIN
MDC_IDC_SET_BRADY_MAX_SENSOR_RATE: 130 {BEATS}/MIN
MDC_IDC_SET_BRADY_MAX_SENSOR_RATE: 130 {BEATS}/MIN
MDC_IDC_SET_BRADY_MODE: NORMAL
MDC_IDC_SET_BRADY_MODE: NORMAL
MDC_IDC_SET_LEADCHNL_RV_PACING_AMPLITUDE: 1.5 V
MDC_IDC_SET_LEADCHNL_RV_PACING_AMPLITUDE: 1.5 V
MDC_IDC_SET_LEADCHNL_RV_PACING_ANODE_ELECTRODE_1: NORMAL
MDC_IDC_SET_LEADCHNL_RV_PACING_ANODE_ELECTRODE_1: NORMAL
MDC_IDC_SET_LEADCHNL_RV_PACING_ANODE_LOCATION_1: NORMAL
MDC_IDC_SET_LEADCHNL_RV_PACING_ANODE_LOCATION_1: NORMAL
MDC_IDC_SET_LEADCHNL_RV_PACING_CAPTURE_MODE: NORMAL
MDC_IDC_SET_LEADCHNL_RV_PACING_CAPTURE_MODE: NORMAL
MDC_IDC_SET_LEADCHNL_RV_PACING_CATHODE_ELECTRODE_1: NORMAL
MDC_IDC_SET_LEADCHNL_RV_PACING_CATHODE_ELECTRODE_1: NORMAL
MDC_IDC_SET_LEADCHNL_RV_PACING_CATHODE_LOCATION_1: NORMAL
MDC_IDC_SET_LEADCHNL_RV_PACING_CATHODE_LOCATION_1: NORMAL
MDC_IDC_SET_LEADCHNL_RV_PACING_POLARITY: NORMAL
MDC_IDC_SET_LEADCHNL_RV_PACING_POLARITY: NORMAL
MDC_IDC_SET_LEADCHNL_RV_PACING_PULSEWIDTH: 0.4 MS
MDC_IDC_SET_LEADCHNL_RV_PACING_PULSEWIDTH: 0.4 MS
MDC_IDC_SET_LEADCHNL_RV_SENSING_ANODE_ELECTRODE_1: NORMAL
MDC_IDC_SET_LEADCHNL_RV_SENSING_ANODE_ELECTRODE_1: NORMAL
MDC_IDC_SET_LEADCHNL_RV_SENSING_ANODE_LOCATION_1: NORMAL
MDC_IDC_SET_LEADCHNL_RV_SENSING_ANODE_LOCATION_1: NORMAL
MDC_IDC_SET_LEADCHNL_RV_SENSING_CATHODE_ELECTRODE_1: NORMAL
MDC_IDC_SET_LEADCHNL_RV_SENSING_CATHODE_ELECTRODE_1: NORMAL
MDC_IDC_SET_LEADCHNL_RV_SENSING_CATHODE_LOCATION_1: NORMAL
MDC_IDC_SET_LEADCHNL_RV_SENSING_CATHODE_LOCATION_1: NORMAL
MDC_IDC_SET_LEADCHNL_RV_SENSING_POLARITY: NORMAL
MDC_IDC_SET_LEADCHNL_RV_SENSING_POLARITY: NORMAL
MDC_IDC_SET_LEADCHNL_RV_SENSING_SENSITIVITY: 0.9 MV
MDC_IDC_SET_LEADCHNL_RV_SENSING_SENSITIVITY: 0.9 MV
MDC_IDC_SET_ZONE_DETECTION_INTERVAL: 360 MS
MDC_IDC_SET_ZONE_DETECTION_INTERVAL: 360 MS
MDC_IDC_SET_ZONE_TYPE: NORMAL
MDC_IDC_STAT_BRADY_DTM_END: NORMAL
MDC_IDC_STAT_BRADY_DTM_END: NORMAL
MDC_IDC_STAT_BRADY_DTM_START: NORMAL
MDC_IDC_STAT_BRADY_DTM_START: NORMAL
MDC_IDC_STAT_BRADY_RV_PERCENT_PACED: 89.4 %
MDC_IDC_STAT_BRADY_RV_PERCENT_PACED: 94.61 %
MDC_IDC_STAT_EPISODE_RECENT_COUNT: 0
MDC_IDC_STAT_EPISODE_RECENT_COUNT_DTM_END: NORMAL
MDC_IDC_STAT_EPISODE_RECENT_COUNT_DTM_START: NORMAL
MDC_IDC_STAT_EPISODE_TOTAL_COUNT: 0
MDC_IDC_STAT_EPISODE_TOTAL_COUNT: 0
MDC_IDC_STAT_EPISODE_TOTAL_COUNT: 6
MDC_IDC_STAT_EPISODE_TOTAL_COUNT: 6
MDC_IDC_STAT_EPISODE_TOTAL_COUNT_DTM_END: NORMAL
MDC_IDC_STAT_EPISODE_TOTAL_COUNT_DTM_START: NORMAL
MDC_IDC_STAT_EPISODE_TYPE: NORMAL
MONOCYTES # BLD AUTO: 1 10E3/UL (ref 0–1.3)
MONOCYTES # BLD AUTO: 2 10E3/UL (ref 0–1.3)
MONOCYTES NFR BLD AUTO: 10 %
MONOCYTES NFR BLD AUTO: 10 %
MUCOUS THREADS #/AREA URNS LPF: PRESENT /LPF
MUCOUS THREADS #/AREA URNS LPF: PRESENT /LPF
NEUTROPHILS # BLD AUTO: 17.5 10E3/UL (ref 1.6–8.3)
NEUTROPHILS # BLD AUTO: 7.5 10E3/UL (ref 1.6–8.3)
NEUTROPHILS NFR BLD AUTO: 79 %
NEUTROPHILS NFR BLD AUTO: 87 %
NITRATE UR QL: NEGATIVE
NITRATE UR QL: NEGATIVE
NONHDLC SERPL-MCNC: 170 MG/DL
NONHDLC SERPL-MCNC: 80 MG/DL
NRBC # BLD AUTO: 0 10E3/UL
NRBC # BLD AUTO: 0 10E3/UL
NRBC BLD AUTO-RTO: 0 /100
NRBC BLD AUTO-RTO: 0 /100
P AXIS - MUSE: NORMAL DEGREES
P AXIS - MUSE: NORMAL DEGREES
PH UR STRIP: 6 [PH] (ref 5–7)
PH UR STRIP: 6 [PH] (ref 5–7)
PLATELET # BLD AUTO: 293 10E3/UL (ref 150–450)
PLATELET # BLD AUTO: 300 10E3/UL (ref 150–450)
PLATELET # BLD AUTO: 329 10E3/UL (ref 150–450)
PLATELET # BLD AUTO: 334 10E3/UL (ref 150–450)
PLATELET # BLD AUTO: 346 10E3/UL (ref 150–450)
PLATELET # BLD AUTO: 348 10E3/UL (ref 150–450)
PLATELET # BLD AUTO: 355 10E3/UL (ref 150–450)
PLATELET COUNT (EXTERNAL): 424 10E3/UL (ref 150–450)
POTASSIUM (EXTERNAL): 4.1 MMOL/L (ref 3.5–5.1)
POTASSIUM (EXTERNAL): 4.2 MMOL/L (ref 3.5–5.1)
POTASSIUM (EXTERNAL): 4.2 MMOL/L (ref 3.5–5.1)
POTASSIUM BLD-SCNC: 3.1 MMOL/L (ref 3.4–5.3)
POTASSIUM BLD-SCNC: 3.2 MMOL/L (ref 3.4–5.3)
POTASSIUM BLD-SCNC: 3.5 MMOL/L (ref 3.4–5.3)
POTASSIUM BLD-SCNC: 3.6 MMOL/L (ref 3.4–5.3)
POTASSIUM BLD-SCNC: 3.9 MMOL/L (ref 3.4–5.3)
POTASSIUM BLD-SCNC: 4 MMOL/L (ref 3.4–5.3)
POTASSIUM BLD-SCNC: 4.2 MMOL/L (ref 3.4–5.3)
POTASSIUM BLD-SCNC: 4.5 MMOL/L (ref 3.4–5.3)
POTASSIUM BLD-SCNC: 4.9 MMOL/L (ref 3.4–5.3)
PR INTERVAL - MUSE: NORMAL MS
PR INTERVAL - MUSE: NORMAL MS
PROCALCITONIN SERPL-MCNC: 0.12 NG/ML
PROT SERPL-MCNC: 6.4 G/DL (ref 6.8–8.8)
PROT SERPL-MCNC: 6.4 G/DL (ref 6.8–8.8)
PROT SERPL-MCNC: 6.6 G/DL (ref 6.8–8.8)
PROT SERPL-MCNC: 7.2 G/DL (ref 6.8–8.8)
PROT SERPL-MCNC: 7.3 G/DL (ref 6.8–8.8)
PROT SERPL-MCNC: 7.7 G/DL (ref 6.8–8.8)
PROT SERPL-MCNC: 8.4 G/DL (ref 6.8–8.8)
QRS DURATION - MUSE: 88 MS
QRS DURATION - MUSE: 88 MS
QT - MUSE: 454 MS
QT - MUSE: 482 MS
QTC - MUSE: 460 MS
QTC - MUSE: 493 MS
R AXIS - MUSE: 30 DEGREES
R AXIS - MUSE: 54 DEGREES
RBC # BLD AUTO: 4.25 10E6/UL (ref 4.4–5.9)
RBC # BLD AUTO: 4.53 10E6/UL (ref 4.4–5.9)
RBC # BLD AUTO: 4.54 10E6/UL (ref 4.32–5.72)
RBC # BLD AUTO: 4.76 10E6/UL (ref 4.4–5.9)
RBC # BLD AUTO: 4.92 10E6/UL (ref 4.4–5.9)
RBC # BLD AUTO: 5.01 10E6/UL (ref 4.4–5.9)
RBC # BLD AUTO: 5.22 10E6/UL (ref 4.4–5.9)
RBC # BLD AUTO: 5.44 10E6/UL (ref 4.4–5.9)
RBC URINE: 0 /HPF
RBC URINE: 2 /HPF
SARS-COV-2 RNA RESP QL NAA+PROBE: NEGATIVE
SODIUM (EXTERNAL): 140 MMOL/L (ref 136–145)
SODIUM (EXTERNAL): 142 MMOL/L (ref 136–145)
SODIUM (EXTERNAL): 142 MMOL/L (ref 136–145)
SODIUM SERPL-SCNC: 137 MMOL/L (ref 133–144)
SODIUM SERPL-SCNC: 138 MMOL/L (ref 133–144)
SODIUM SERPL-SCNC: 139 MMOL/L (ref 133–144)
SODIUM SERPL-SCNC: 139 MMOL/L (ref 133–144)
SODIUM SERPL-SCNC: 140 MMOL/L (ref 133–144)
SP GR UR STRIP: 1.01 (ref 1–1.03)
SP GR UR STRIP: 1.03 (ref 1–1.03)
SQUAMOUS EPITHELIAL: <1 /HPF
SQUAMOUS EPITHELIAL: <1 /HPF
SYSTOLIC BLOOD PRESSURE - MUSE: NORMAL MMHG
SYSTOLIC BLOOD PRESSURE - MUSE: NORMAL MMHG
T AXIS - MUSE: -21 DEGREES
T AXIS - MUSE: -70 DEGREES
TRIGL SERPL-MCNC: 102 MG/DL
TRIGL SERPL-MCNC: 172 MG/DL
TROPONIN I SERPL-MCNC: 2.92 UG/L (ref 0–0.04)
TROPONIN I SERPL-MCNC: 3.1 UG/L (ref 0–0.04)
TROPONIN I SERPL-MCNC: 3.11 UG/L (ref 0–0.04)
TROPONIN I SERPL-MCNC: 3.18 UG/L (ref 0–0.04)
UROBILINOGEN UR STRIP-MCNC: NORMAL MG/DL
UROBILINOGEN UR STRIP-MCNC: NORMAL MG/DL
VENTRICULAR RATE- MUSE: 62 BPM
VENTRICULAR RATE- MUSE: 63 BPM
WBC # BLD AUTO: 10.1 10E3/UL (ref 4–11)
WBC # BLD AUTO: 11.7 10E3/UL (ref 4–11)
WBC # BLD AUTO: 13 10E3/UL (ref 4–11)
WBC # BLD AUTO: 15.6 10E3/UL (ref 4–11)
WBC # BLD AUTO: 19.2 10E3/UL (ref 4–11)
WBC # BLD AUTO: 20 10E3/UL (ref 4–11)
WBC # BLD AUTO: 9.4 10E3/UL (ref 4–11)
WBC COUNT (EXTERNAL): 11.5 10E3/UL (ref 3.5–10.5)
WBC URINE: 0 /HPF
WBC URINE: 21 /HPF

## 2021-01-01 PROCEDURE — 87635 SARS-COV-2 COVID-19 AMP PRB: CPT | Performed by: EMERGENCY MEDICINE

## 2021-01-01 PROCEDURE — 99309 SBSQ NF CARE MODERATE MDM 30: CPT | Performed by: NURSE PRACTITIONER

## 2021-01-01 PROCEDURE — 99233 SBSQ HOSP IP/OBS HIGH 50: CPT | Performed by: INTERNAL MEDICINE

## 2021-01-01 PROCEDURE — 210N000002 HC R&B HEART CARE

## 2021-01-01 PROCEDURE — 99285 EMERGENCY DEPT VISIT HI MDM: CPT | Mod: 25

## 2021-01-01 PROCEDURE — 258N000003 HC RX IP 258 OP 636: Performed by: EMERGENCY MEDICINE

## 2021-01-01 PROCEDURE — 80061 LIPID PANEL: CPT | Performed by: INTERNAL MEDICINE

## 2021-01-01 PROCEDURE — 93306 TTE W/DOPPLER COMPLETE: CPT | Mod: 26 | Performed by: INTERNAL MEDICINE

## 2021-01-01 PROCEDURE — 81001 URINALYSIS AUTO W/SCOPE: CPT | Performed by: EMERGENCY MEDICINE

## 2021-01-01 PROCEDURE — 83690 ASSAY OF LIPASE: CPT | Performed by: EMERGENCY MEDICINE

## 2021-01-01 PROCEDURE — 250N000013 HC RX MED GY IP 250 OP 250 PS 637: Performed by: INTERNAL MEDICINE

## 2021-01-01 PROCEDURE — 36415 COLL VENOUS BLD VENIPUNCTURE: CPT

## 2021-01-01 PROCEDURE — 36415 COLL VENOUS BLD VENIPUNCTURE: CPT | Performed by: EMERGENCY MEDICINE

## 2021-01-01 PROCEDURE — 36416 COLLJ CAPILLARY BLOOD SPEC: CPT | Performed by: INTERNAL MEDICINE

## 2021-01-01 PROCEDURE — 250N000013 HC RX MED GY IP 250 OP 250 PS 637: Performed by: EMERGENCY MEDICINE

## 2021-01-01 PROCEDURE — 99214 OFFICE O/P EST MOD 30 MIN: CPT

## 2021-01-01 PROCEDURE — 93294 REM INTERROG EVL PM/LDLS PM: CPT | Performed by: INTERNAL MEDICINE

## 2021-01-01 PROCEDURE — 93005 ELECTROCARDIOGRAM TRACING: CPT

## 2021-01-01 PROCEDURE — 250N000013 HC RX MED GY IP 250 OP 250 PS 637: Performed by: HOSPITALIST

## 2021-01-01 PROCEDURE — 36415 COLL VENOUS BLD VENIPUNCTURE: CPT | Performed by: HOSPITALIST

## 2021-01-01 PROCEDURE — 99397 PER PM REEVAL EST PAT 65+ YR: CPT | Performed by: INTERNAL MEDICINE

## 2021-01-01 PROCEDURE — 91300 PR COVID VAC PFIZER DIL RECON 30 MCG/0.3 ML IM: CPT

## 2021-01-01 PROCEDURE — 80053 COMPREHEN METABOLIC PANEL: CPT | Performed by: EMERGENCY MEDICINE

## 2021-01-01 PROCEDURE — 51798 US URINE CAPACITY MEASURE: CPT

## 2021-01-01 PROCEDURE — 85025 COMPLETE CBC W/AUTO DIFF WBC: CPT | Performed by: EMERGENCY MEDICINE

## 2021-01-01 PROCEDURE — 250N000011 HC RX IP 250 OP 636: Performed by: EMERGENCY MEDICINE

## 2021-01-01 PROCEDURE — 97535 SELF CARE MNGMENT TRAINING: CPT | Mod: GO

## 2021-01-01 PROCEDURE — 83735 ASSAY OF MAGNESIUM: CPT | Performed by: INTERNAL MEDICINE

## 2021-01-01 PROCEDURE — 84132 ASSAY OF SERUM POTASSIUM: CPT | Performed by: INTERNAL MEDICINE

## 2021-01-01 PROCEDURE — 80053 COMPREHEN METABOLIC PANEL: CPT | Performed by: HOSPITALIST

## 2021-01-01 PROCEDURE — 99214 OFFICE O/P EST MOD 30 MIN: CPT | Performed by: PHYSICIAN ASSISTANT

## 2021-01-01 PROCEDURE — C9803 HOPD COVID-19 SPEC COLLECT: HCPCS

## 2021-01-01 PROCEDURE — 87086 URINE CULTURE/COLONY COUNT: CPT | Performed by: INTERNAL MEDICINE

## 2021-01-01 PROCEDURE — 85610 PROTHROMBIN TIME: CPT | Performed by: INTERNAL MEDICINE

## 2021-01-01 PROCEDURE — 99207 PR NO CHARGE NURSE ONLY: CPT

## 2021-01-01 PROCEDURE — 85027 COMPLETE CBC AUTOMATED: CPT | Performed by: HOSPITALIST

## 2021-01-01 PROCEDURE — 85610 PROTHROMBIN TIME: CPT

## 2021-01-01 PROCEDURE — 84484 ASSAY OF TROPONIN QUANT: CPT | Performed by: HOSPITALIST

## 2021-01-01 PROCEDURE — 99310 SBSQ NF CARE HIGH MDM 45: CPT | Performed by: NURSE PRACTITIONER

## 2021-01-01 PROCEDURE — 90662 IIV NO PRSV INCREASED AG IM: CPT | Performed by: INTERNAL MEDICINE

## 2021-01-01 PROCEDURE — 97165 OT EVAL LOW COMPLEX 30 MIN: CPT | Mod: GO

## 2021-01-01 PROCEDURE — 83605 ASSAY OF LACTIC ACID: CPT | Performed by: EMERGENCY MEDICINE

## 2021-01-01 PROCEDURE — 85610 PROTHROMBIN TIME: CPT | Performed by: HOSPITALIST

## 2021-01-01 PROCEDURE — 250N000013 HC RX MED GY IP 250 OP 250 PS 637

## 2021-01-01 PROCEDURE — 250N000009 HC RX 250: Performed by: EMERGENCY MEDICINE

## 2021-01-01 PROCEDURE — 97110 THERAPEUTIC EXERCISES: CPT | Mod: GO

## 2021-01-01 PROCEDURE — 36416 COLLJ CAPILLARY BLOOD SPEC: CPT

## 2021-01-01 PROCEDURE — 99305 1ST NF CARE MODERATE MDM 35: CPT | Performed by: INTERNAL MEDICINE

## 2021-01-01 PROCEDURE — 36415 COLL VENOUS BLD VENIPUNCTURE: CPT | Performed by: INTERNAL MEDICINE

## 2021-01-01 PROCEDURE — 93296 REM INTERROG EVL PM/IDS: CPT | Performed by: INTERNAL MEDICINE

## 2021-01-01 PROCEDURE — 250N000011 HC RX IP 250 OP 636: Performed by: INTERNAL MEDICINE

## 2021-01-01 PROCEDURE — 81001 URINALYSIS AUTO W/SCOPE: CPT | Performed by: INTERNAL MEDICINE

## 2021-01-01 PROCEDURE — 83735 ASSAY OF MAGNESIUM: CPT | Performed by: HOSPITALIST

## 2021-01-01 PROCEDURE — 99239 HOSP IP/OBS DSCHRG MGMT >30: CPT | Performed by: INTERNAL MEDICINE

## 2021-01-01 PROCEDURE — 74177 CT ABD & PELVIS W/CONTRAST: CPT

## 2021-01-01 PROCEDURE — 99213 OFFICE O/P EST LOW 20 MIN: CPT | Mod: 25 | Performed by: INTERNAL MEDICINE

## 2021-01-01 PROCEDURE — 255N000002 HC RX 255 OP 636: Performed by: HOSPITALIST

## 2021-01-01 PROCEDURE — 83605 ASSAY OF LACTIC ACID: CPT | Performed by: INTERNAL MEDICINE

## 2021-01-01 PROCEDURE — 999N000208 ECHOCARDIOGRAM COMPLETE

## 2021-01-01 PROCEDURE — 0002A PR COVID VAC PFIZER DIL RECON 30 MCG/0.3 ML IM: CPT

## 2021-01-01 PROCEDURE — 99207 PR MOONLIGHTING INDICATOR: CPT | Performed by: HOSPITALIST

## 2021-01-01 PROCEDURE — G0008 ADMIN INFLUENZA VIRUS VAC: HCPCS | Performed by: INTERNAL MEDICINE

## 2021-01-01 PROCEDURE — 85610 PROTHROMBIN TIME: CPT | Performed by: EMERGENCY MEDICINE

## 2021-01-01 PROCEDURE — 99221 1ST HOSP IP/OBS SF/LOW 40: CPT | Mod: 25 | Performed by: INTERNAL MEDICINE

## 2021-01-01 PROCEDURE — 84484 ASSAY OF TROPONIN QUANT: CPT | Performed by: EMERGENCY MEDICINE

## 2021-01-01 PROCEDURE — 99316 NF DSCHRG MGMT 30 MIN+: CPT | Performed by: NURSE PRACTITIONER

## 2021-01-01 PROCEDURE — 85041 AUTOMATED RBC COUNT: CPT | Performed by: HOSPITALIST

## 2021-01-01 PROCEDURE — 97530 THERAPEUTIC ACTIVITIES: CPT | Mod: GO

## 2021-01-01 PROCEDURE — 84145 PROCALCITONIN (PCT): CPT | Performed by: INTERNAL MEDICINE

## 2021-01-01 PROCEDURE — 99223 1ST HOSP IP/OBS HIGH 75: CPT | Mod: AI | Performed by: HOSPITALIST

## 2021-01-01 PROCEDURE — 0001A PR COVID VAC PFIZER DIL RECON 30 MCG/0.3 ML IM: CPT

## 2021-01-01 PROCEDURE — 99232 SBSQ HOSP IP/OBS MODERATE 35: CPT | Performed by: INTERNAL MEDICINE

## 2021-01-01 RX ORDER — CLOPIDOGREL BISULFATE 75 MG/1
75 TABLET ORAL DAILY
Status: DISCONTINUED | OUTPATIENT
Start: 2021-01-01 | End: 2021-01-01 | Stop reason: HOSPADM

## 2021-01-01 RX ORDER — IOPAMIDOL 755 MG/ML
82 INJECTION, SOLUTION INTRAVASCULAR ONCE
Status: COMPLETED | OUTPATIENT
Start: 2021-01-01 | End: 2021-01-01

## 2021-01-01 RX ORDER — ACETAMINOPHEN 325 MG/1
650 TABLET ORAL EVERY 6 HOURS PRN
DISCHARGE
Start: 2021-01-01

## 2021-01-01 RX ORDER — ACETAMINOPHEN 325 MG/1
650 TABLET ORAL EVERY 6 HOURS PRN
Status: DISCONTINUED | OUTPATIENT
Start: 2021-01-01 | End: 2021-01-01 | Stop reason: HOSPADM

## 2021-01-01 RX ORDER — WARFARIN SODIUM 2.5 MG/1
2.5 TABLET ORAL DAILY
DISCHARGE
Start: 2021-01-01 | End: 2021-01-01 | Stop reason: DRUGHIGH

## 2021-01-01 RX ORDER — WARFARIN SODIUM 5 MG/1
TABLET ORAL
Qty: 90 TABLET | Refills: 1 | Status: SHIPPED | OUTPATIENT
Start: 2021-01-01

## 2021-01-01 RX ORDER — FUROSEMIDE 40 MG
TABLET ORAL
Qty: 45 TABLET | Refills: 0 | Status: SHIPPED | OUTPATIENT
Start: 2021-01-01 | End: 2021-01-01

## 2021-01-01 RX ORDER — IOPAMIDOL 755 MG/ML
84 INJECTION, SOLUTION INTRAVASCULAR ONCE
Status: COMPLETED | OUTPATIENT
Start: 2021-01-01 | End: 2021-01-01

## 2021-01-01 RX ORDER — WARFARIN SODIUM 2.5 MG/1
2.5 TABLET ORAL
Status: COMPLETED | OUTPATIENT
Start: 2021-01-01 | End: 2021-01-01

## 2021-01-01 RX ORDER — FUROSEMIDE 40 MG
TABLET ORAL
Qty: 45 TABLET | Refills: 0 | Status: ON HOLD | OUTPATIENT
Start: 2021-01-01 | End: 2021-01-01

## 2021-01-01 RX ORDER — SIMVASTATIN 10 MG
TABLET ORAL
Qty: 90 TABLET | Refills: 3 | Status: ON HOLD | OUTPATIENT
Start: 2021-01-01 | End: 2021-01-01

## 2021-01-01 RX ORDER — WARFARIN SODIUM 5 MG/1
TABLET ORAL
Qty: 72 TABLET | Refills: 1 | Status: ON HOLD | COMMUNITY
Start: 2021-01-01 | End: 2021-01-01

## 2021-01-01 RX ORDER — CEFUROXIME AXETIL 500 MG/1
500 TABLET ORAL EVERY 12 HOURS
Qty: 6 TABLET | Refills: 0 | DISCHARGE
Start: 2021-01-01 | End: 2021-01-01

## 2021-01-01 RX ORDER — SENNOSIDES 8.6 MG
1-2 TABLET ORAL 2 TIMES DAILY PRN
DISCHARGE
Start: 2021-01-01

## 2021-01-01 RX ORDER — FUROSEMIDE 40 MG
TABLET ORAL
Qty: 135 TABLET | Refills: 1 | Status: CANCELLED | OUTPATIENT
Start: 2021-01-01

## 2021-01-01 RX ORDER — CLOPIDOGREL BISULFATE 75 MG/1
75 TABLET ORAL DAILY
DISCHARGE
Start: 2021-01-01

## 2021-01-01 RX ORDER — SERTRALINE HYDROCHLORIDE 100 MG/1
100 TABLET, FILM COATED ORAL DAILY
Qty: 90 TABLET | Refills: 3 | Status: SHIPPED | OUTPATIENT
Start: 2021-01-01

## 2021-01-01 RX ORDER — POTASSIUM CHLORIDE 1500 MG/1
20 TABLET, EXTENDED RELEASE ORAL 2 TIMES DAILY
Qty: 180 TABLET | Refills: 1 | Status: SHIPPED | OUTPATIENT
Start: 2021-01-01

## 2021-01-01 RX ORDER — SENNOSIDES 8.6 MG
1-2 TABLET ORAL 2 TIMES DAILY PRN
Status: DISCONTINUED | OUTPATIENT
Start: 2021-01-01 | End: 2021-01-01 | Stop reason: HOSPADM

## 2021-01-01 RX ORDER — CALCIUM CARBONATE 500 MG/1
500 TABLET, CHEWABLE ORAL 2 TIMES DAILY PRN
Status: DISCONTINUED | OUTPATIENT
Start: 2021-01-01 | End: 2021-01-01 | Stop reason: HOSPADM

## 2021-01-01 RX ORDER — FUROSEMIDE 20 MG
20 TABLET ORAL
Qty: 180 TABLET | Refills: 3 | Status: SHIPPED | OUTPATIENT
Start: 2021-01-01

## 2021-01-01 RX ORDER — PANTOPRAZOLE SODIUM 40 MG/1
40 TABLET, DELAYED RELEASE ORAL
Status: DISCONTINUED | OUTPATIENT
Start: 2021-01-01 | End: 2021-01-01 | Stop reason: HOSPADM

## 2021-01-01 RX ORDER — SIMVASTATIN 10 MG
TABLET ORAL
Qty: 90 TABLET | Refills: 0 | Status: SHIPPED | OUTPATIENT
Start: 2021-01-01 | End: 2021-01-01

## 2021-01-01 RX ORDER — SIMETHICONE 80 MG
80 TABLET,CHEWABLE ORAL EVERY 6 HOURS PRN
Status: DISCONTINUED | OUTPATIENT
Start: 2021-01-01 | End: 2021-01-01 | Stop reason: HOSPADM

## 2021-01-01 RX ORDER — FUROSEMIDE 20 MG
20 TABLET ORAL
Status: DISCONTINUED | OUTPATIENT
Start: 2021-01-01 | End: 2021-01-01 | Stop reason: HOSPADM

## 2021-01-01 RX ORDER — ASPIRIN 81 MG/1
324 TABLET, CHEWABLE ORAL ONCE
Status: COMPLETED | OUTPATIENT
Start: 2021-01-01 | End: 2021-01-01

## 2021-01-01 RX ORDER — CLOPIDOGREL 300 MG/1
600 TABLET, FILM COATED ORAL ONCE
Status: COMPLETED | OUTPATIENT
Start: 2021-01-01 | End: 2021-01-01

## 2021-01-01 RX ORDER — LISINOPRIL 20 MG/1
20 TABLET ORAL DAILY
Qty: 180 TABLET | Refills: 3 | Status: SHIPPED | OUTPATIENT
Start: 2021-01-01

## 2021-01-01 RX ORDER — POTASSIUM CHLORIDE 1500 MG/1
40 TABLET, EXTENDED RELEASE ORAL ONCE
Status: COMPLETED | OUTPATIENT
Start: 2021-01-01 | End: 2021-01-01

## 2021-01-01 RX ORDER — NITROGLYCERIN 20 MG/100ML
10-200 INJECTION INTRAVENOUS CONTINUOUS
Status: DISCONTINUED | OUTPATIENT
Start: 2021-01-01 | End: 2021-01-01

## 2021-01-01 RX ORDER — FUROSEMIDE 20 MG/1
10 TABLET ORAL
Status: DISCONTINUED | OUTPATIENT
Start: 2021-01-01 | End: 2021-01-01

## 2021-01-01 RX ORDER — ATORVASTATIN CALCIUM 40 MG/1
40 TABLET, FILM COATED ORAL EVERY EVENING
Status: DISCONTINUED | OUTPATIENT
Start: 2021-01-01 | End: 2021-01-01 | Stop reason: HOSPADM

## 2021-01-01 RX ORDER — LIDOCAINE 40 MG/G
CREAM TOPICAL
Status: DISCONTINUED | OUTPATIENT
Start: 2021-01-01 | End: 2021-01-01 | Stop reason: HOSPADM

## 2021-01-01 RX ORDER — FUROSEMIDE 10 MG/ML
40 INJECTION INTRAMUSCULAR; INTRAVENOUS EVERY 8 HOURS
Status: DISCONTINUED | OUTPATIENT
Start: 2021-01-01 | End: 2021-01-01

## 2021-01-01 RX ORDER — SERTRALINE HYDROCHLORIDE 100 MG/1
100 TABLET, FILM COATED ORAL DAILY
Status: DISCONTINUED | OUTPATIENT
Start: 2021-01-01 | End: 2021-01-01 | Stop reason: HOSPADM

## 2021-01-01 RX ORDER — NITROGLYCERIN 0.4 MG/1
TABLET SUBLINGUAL
DISCHARGE
Start: 2021-01-01

## 2021-01-01 RX ORDER — SODIUM CHLORIDE 9 MG/ML
INJECTION, SOLUTION INTRAVENOUS CONTINUOUS
Status: DISCONTINUED | OUTPATIENT
Start: 2021-01-01 | End: 2021-01-01

## 2021-01-01 RX ORDER — WARFARIN SODIUM 5 MG/1
TABLET ORAL
Qty: 72 TABLET | Refills: 1 | Status: SHIPPED | OUTPATIENT
Start: 2021-01-01 | End: 2021-01-01

## 2021-01-01 RX ORDER — POTASSIUM CHLORIDE 1500 MG/1
20 TABLET, EXTENDED RELEASE ORAL 2 TIMES DAILY
Status: DISCONTINUED | OUTPATIENT
Start: 2021-01-01 | End: 2021-01-01 | Stop reason: HOSPADM

## 2021-01-01 RX ORDER — ATORVASTATIN CALCIUM 40 MG/1
40 TABLET, FILM COATED ORAL EVERY EVENING
DISCHARGE
Start: 2021-01-01

## 2021-01-01 RX ORDER — LISINOPRIL 2.5 MG/1
2.5 TABLET ORAL DAILY
Status: DISCONTINUED | OUTPATIENT
Start: 2021-01-01 | End: 2021-01-01 | Stop reason: HOSPADM

## 2021-01-01 RX ORDER — WARFARIN SODIUM 2.5 MG/1
2.5 TABLET ORAL
Status: DISCONTINUED | OUTPATIENT
Start: 2021-01-01 | End: 2021-01-01 | Stop reason: HOSPADM

## 2021-01-01 RX ORDER — FUROSEMIDE 10 MG/ML
40 INJECTION INTRAMUSCULAR; INTRAVENOUS EVERY 8 HOURS
Status: COMPLETED | OUTPATIENT
Start: 2021-01-01 | End: 2021-01-01

## 2021-01-01 RX ORDER — POTASSIUM CHLORIDE 1500 MG/1
TABLET, EXTENDED RELEASE ORAL
Qty: 180 TABLET | Refills: 1 | Status: SHIPPED | OUTPATIENT
Start: 2021-01-01 | End: 2021-01-01

## 2021-01-01 RX ORDER — FUROSEMIDE 20 MG
20 TABLET ORAL
DISCHARGE
Start: 2021-01-01 | End: 2021-01-01

## 2021-01-01 RX ORDER — METOPROLOL SUCCINATE 50 MG/1
50 TABLET, EXTENDED RELEASE ORAL AT BEDTIME
Qty: 90 TABLET | Refills: 3 | Status: SHIPPED | OUTPATIENT
Start: 2021-01-01

## 2021-01-01 RX ORDER — CEFUROXIME AXETIL 500 MG/1
500 TABLET ORAL EVERY 12 HOURS SCHEDULED
Status: DISCONTINUED | OUTPATIENT
Start: 2021-01-01 | End: 2021-01-01 | Stop reason: HOSPADM

## 2021-01-01 RX ORDER — WARFARIN SODIUM 5 MG/1
TABLET ORAL
Qty: 90 TABLET | Refills: 0 | Status: SHIPPED | OUTPATIENT
Start: 2021-01-01 | End: 2021-01-01

## 2021-01-01 RX ORDER — ONDANSETRON 4 MG/1
4 TABLET, ORALLY DISINTEGRATING ORAL EVERY 6 HOURS PRN
Status: DISCONTINUED | OUTPATIENT
Start: 2021-01-01 | End: 2021-01-01 | Stop reason: HOSPADM

## 2021-01-01 RX ORDER — ONDANSETRON 2 MG/ML
4 INJECTION INTRAMUSCULAR; INTRAVENOUS EVERY 6 HOURS PRN
Status: DISCONTINUED | OUTPATIENT
Start: 2021-01-01 | End: 2021-01-01 | Stop reason: HOSPADM

## 2021-01-01 RX ORDER — ACETAMINOPHEN 650 MG/1
650 SUPPOSITORY RECTAL EVERY 6 HOURS PRN
Status: DISCONTINUED | OUTPATIENT
Start: 2021-01-01 | End: 2021-01-01 | Stop reason: HOSPADM

## 2021-01-01 RX ORDER — METOPROLOL SUCCINATE 25 MG/1
25 TABLET, EXTENDED RELEASE ORAL DAILY
Status: DISCONTINUED | OUTPATIENT
Start: 2021-01-01 | End: 2021-01-01 | Stop reason: HOSPADM

## 2021-01-01 RX ADMIN — FUROSEMIDE 40 MG: 10 INJECTION, SOLUTION INTRAVENOUS at 00:15

## 2021-01-01 RX ADMIN — OMEPRAZOLE 20 MG: 20 CAPSULE, DELAYED RELEASE ORAL at 09:54

## 2021-01-01 RX ADMIN — LISINOPRIL 2.5 MG: 2.5 TABLET ORAL at 10:02

## 2021-01-01 RX ADMIN — SODIUM CHLORIDE, PRESERVATIVE FREE: 5 INJECTION INTRAVENOUS at 13:49

## 2021-01-01 RX ADMIN — POTASSIUM CHLORIDE 20 MEQ: 1500 TABLET, EXTENDED RELEASE ORAL at 11:47

## 2021-01-01 RX ADMIN — SODIUM CHLORIDE 63 ML: 9 INJECTION, SOLUTION INTRAVENOUS at 00:34

## 2021-01-01 RX ADMIN — FUROSEMIDE 40 MG: 10 INJECTION, SOLUTION INTRAVENOUS at 10:04

## 2021-01-01 RX ADMIN — CLOPIDOGREL BISULFATE 75 MG: 75 TABLET ORAL at 09:37

## 2021-01-01 RX ADMIN — METOPROLOL SUCCINATE 25 MG: 25 TABLET, EXTENDED RELEASE ORAL at 10:04

## 2021-01-01 RX ADMIN — SERTRALINE HYDROCHLORIDE 100 MG: 100 TABLET ORAL at 09:54

## 2021-01-01 RX ADMIN — CLOPIDOGREL BISULFATE 75 MG: 75 TABLET ORAL at 08:42

## 2021-01-01 RX ADMIN — POTASSIUM CHLORIDE 40 MEQ: 1500 TABLET, EXTENDED RELEASE ORAL at 09:36

## 2021-01-01 RX ADMIN — FUROSEMIDE 40 MG: 10 INJECTION, SOLUTION INTRAVENOUS at 00:33

## 2021-01-01 RX ADMIN — CLOPIDOGREL BISULFATE 75 MG: 75 TABLET ORAL at 10:05

## 2021-01-01 RX ADMIN — IOPAMIDOL 82 ML: 755 INJECTION, SOLUTION INTRAVENOUS at 00:34

## 2021-01-01 RX ADMIN — SIMETHICONE 80 MG: 80 TABLET, CHEWABLE ORAL at 23:58

## 2021-01-01 RX ADMIN — ATORVASTATIN CALCIUM 40 MG: 40 TABLET, FILM COATED ORAL at 19:36

## 2021-01-01 RX ADMIN — SERTRALINE HYDROCHLORIDE 100 MG: 100 TABLET ORAL at 09:38

## 2021-01-01 RX ADMIN — SODIUM CHLORIDE, PRESERVATIVE FREE: 5 INJECTION INTRAVENOUS at 05:30

## 2021-01-01 RX ADMIN — SENNOSIDES 1 TABLET: 8.6 TABLET, FILM COATED ORAL at 21:02

## 2021-01-01 RX ADMIN — OMEPRAZOLE 20 MG: 20 CAPSULE, DELAYED RELEASE ORAL at 20:39

## 2021-01-01 RX ADMIN — OMEPRAZOLE 20 MG: 20 CAPSULE, DELAYED RELEASE ORAL at 21:07

## 2021-01-01 RX ADMIN — ACETAMINOPHEN 650 MG: 325 TABLET, FILM COATED ORAL at 19:35

## 2021-01-01 RX ADMIN — SERTRALINE HYDROCHLORIDE 100 MG: 100 TABLET ORAL at 08:42

## 2021-01-01 RX ADMIN — FUROSEMIDE 40 MG: 10 INJECTION, SOLUTION INTRAVENOUS at 17:38

## 2021-01-01 RX ADMIN — ATORVASTATIN CALCIUM 40 MG: 40 TABLET, FILM COATED ORAL at 21:07

## 2021-01-01 RX ADMIN — LISINOPRIL 2.5 MG: 2.5 TABLET ORAL at 09:37

## 2021-01-01 RX ADMIN — SERTRALINE HYDROCHLORIDE 100 MG: 100 TABLET ORAL at 10:04

## 2021-01-01 RX ADMIN — METOPROLOL SUCCINATE 25 MG: 25 TABLET, EXTENDED RELEASE ORAL at 08:43

## 2021-01-01 RX ADMIN — WARFARIN SODIUM 2.5 MG: 2.5 TABLET ORAL at 17:07

## 2021-01-01 RX ADMIN — CALCIUM CARBONATE (ANTACID) CHEW TAB 500 MG 500 MG: 500 CHEW TAB at 16:54

## 2021-01-01 RX ADMIN — SODIUM CHLORIDE, PRESERVATIVE FREE: 5 INJECTION INTRAVENOUS at 04:21

## 2021-01-01 RX ADMIN — LISINOPRIL 2.5 MG: 2.5 TABLET ORAL at 10:05

## 2021-01-01 RX ADMIN — SENNOSIDES 1 TABLET: 8.6 TABLET, FILM COATED ORAL at 10:05

## 2021-01-01 RX ADMIN — WARFARIN SODIUM 2.5 MG: 2.5 TABLET ORAL at 17:15

## 2021-01-01 RX ADMIN — POTASSIUM CHLORIDE 20 MEQ: 1500 TABLET, EXTENDED RELEASE ORAL at 19:36

## 2021-01-01 RX ADMIN — SODIUM CHLORIDE 1000 ML: 9 INJECTION, SOLUTION INTRAVENOUS at 23:06

## 2021-01-01 RX ADMIN — CEFUROXIME AXETIL 500 MG: 500 TABLET ORAL at 12:39

## 2021-01-01 RX ADMIN — SODIUM CHLORIDE, PRESERVATIVE FREE: 5 INJECTION INTRAVENOUS at 21:34

## 2021-01-01 RX ADMIN — OMEPRAZOLE 20 MG: 20 CAPSULE, DELAYED RELEASE ORAL at 21:02

## 2021-01-01 RX ADMIN — PANTOPRAZOLE SODIUM 40 MG: 40 TABLET, DELAYED RELEASE ORAL at 15:45

## 2021-01-01 RX ADMIN — FUROSEMIDE 40 MG: 10 INJECTION, SOLUTION INTRAVENOUS at 17:06

## 2021-01-01 RX ADMIN — METOPROLOL SUCCINATE 25 MG: 25 TABLET, EXTENDED RELEASE ORAL at 09:37

## 2021-01-01 RX ADMIN — POTASSIUM CHLORIDE 20 MEQ: 1500 TABLET, EXTENDED RELEASE ORAL at 10:05

## 2021-01-01 RX ADMIN — CLOPIDOGREL BISULFATE 600 MG: 300 TABLET, FILM COATED ORAL at 10:11

## 2021-01-01 RX ADMIN — SODIUM CHLORIDE 66 ML: 9 INJECTION, SOLUTION INTRAVENOUS at 13:14

## 2021-01-01 RX ADMIN — METOPROLOL SUCCINATE 25 MG: 25 TABLET, EXTENDED RELEASE ORAL at 10:03

## 2021-01-01 RX ADMIN — FUROSEMIDE 40 MG: 10 INJECTION, SOLUTION INTRAVENOUS at 08:42

## 2021-01-01 RX ADMIN — CALCIUM CARBONATE (ANTACID) CHEW TAB 500 MG 500 MG: 500 CHEW TAB at 18:14

## 2021-01-01 RX ADMIN — LISINOPRIL 2.5 MG: 2.5 TABLET ORAL at 11:37

## 2021-01-01 RX ADMIN — OMEPRAZOLE 20 MG: 20 CAPSULE, DELAYED RELEASE ORAL at 10:04

## 2021-01-01 RX ADMIN — POTASSIUM CHLORIDE 40 MEQ: 1500 TABLET, EXTENDED RELEASE ORAL at 10:04

## 2021-01-01 RX ADMIN — FUROSEMIDE 10 MG: 20 TABLET ORAL at 16:20

## 2021-01-01 RX ADMIN — PANTOPRAZOLE SODIUM 40 MG: 40 TABLET, DELAYED RELEASE ORAL at 10:04

## 2021-01-01 RX ADMIN — ATORVASTATIN CALCIUM 40 MG: 40 TABLET, FILM COATED ORAL at 20:39

## 2021-01-01 RX ADMIN — ATORVASTATIN CALCIUM 40 MG: 40 TABLET, FILM COATED ORAL at 21:02

## 2021-01-01 RX ADMIN — ASPIRIN 81 MG CHEWABLE TABLET 324 MG: 81 TABLET CHEWABLE at 00:23

## 2021-01-01 RX ADMIN — OMEPRAZOLE 20 MG: 20 CAPSULE, DELAYED RELEASE ORAL at 08:42

## 2021-01-01 RX ADMIN — HUMAN ALBUMIN MICROSPHERES AND PERFLUTREN 9 ML: 10; .22 INJECTION, SOLUTION INTRAVENOUS at 11:28

## 2021-01-01 RX ADMIN — OMEPRAZOLE 20 MG: 20 CAPSULE, DELAYED RELEASE ORAL at 09:38

## 2021-01-01 RX ADMIN — FUROSEMIDE 40 MG: 10 INJECTION, SOLUTION INTRAVENOUS at 11:39

## 2021-01-01 RX ADMIN — METOPROLOL SUCCINATE 25 MG: 25 TABLET, EXTENDED RELEASE ORAL at 09:53

## 2021-01-01 RX ADMIN — FUROSEMIDE 10 MG: 20 TABLET ORAL at 09:55

## 2021-01-01 RX ADMIN — IOPAMIDOL 84 ML: 755 INJECTION, SOLUTION INTRAVENOUS at 13:14

## 2021-01-01 ASSESSMENT — ACTIVITIES OF DAILY LIVING (ADL)
PREVIOUS_RESPONSIBILITIES: MEDICATION MANAGEMENT
ADLS_ACUITY_SCORE: 19
ADLS_ACUITY_SCORE: 19
ADLS_ACUITY_SCORE: 20
CURRENT_FUNCTION: PREPARING MEALS REQUIRES ASSISTANCE
ADLS_ACUITY_SCORE: 19
ADLS_ACUITY_SCORE: 20
ADLS_ACUITY_SCORE: 19
ADLS_ACUITY_SCORE: 20
ADLS_ACUITY_SCORE: 19
ADLS_ACUITY_SCORE: 20
ADLS_ACUITY_SCORE: 19
CURRENT_FUNCTION: TRANSPORTATION REQUIRES ASSISTANCE
ADLS_ACUITY_SCORE: 19
ADLS_ACUITY_SCORE: 20
ADLS_ACUITY_SCORE: 19
ADLS_ACUITY_SCORE: 20
ADLS_ACUITY_SCORE: 19
CURRENT_FUNCTION: SHOPPING REQUIRES ASSISTANCE
ADLS_ACUITY_SCORE: 19
ADLS_ACUITY_SCORE: 19

## 2021-01-01 ASSESSMENT — MIFFLIN-ST. JEOR
SCORE: 1543.49
SCORE: 1547.12
SCORE: 1479.53
SCORE: 1540.76
SCORE: 1481.79
SCORE: 1490.86
SCORE: 1538.95
SCORE: 1493.59
SCORE: 1537.14
SCORE: 1521.63
SCORE: 1252.74
SCORE: 1526.63
SCORE: 1533.96
SCORE: 1505.38
SCORE: 1496.77
SCORE: 1533.96
SCORE: 1502.21

## 2021-01-01 ASSESSMENT — ENCOUNTER SYMPTOMS
DIARRHEA: 0
PALPITATIONS: 0
CONSTIPATION: 0
HEARTBURN: 1
SORE THROAT: 0
WEAKNESS: 1
DIZZINESS: 0
EYE PAIN: 0
MYALGIAS: 0
HEADACHES: 0
CHILLS: 0
PARESTHESIAS: 0
FATIGUE: 1
NAUSEA: 0
NERVOUS/ANXIOUS: 1
ABDOMINAL PAIN: 1
SHORTNESS OF BREATH: 1
VOMITING: 0
ARTHRALGIAS: 0
FEVER: 0
WEAKNESS: 1
APPETITE CHANGE: 1
FREQUENCY: 1
JOINT SWELLING: 0
ABDOMINAL PAIN: 0
FEVER: 0
DYSURIA: 0
HEMATOCHEZIA: 0
HEMATURIA: 0
COUGH: 1

## 2021-01-01 ASSESSMENT — PATIENT HEALTH QUESTIONNAIRE - PHQ9
SUM OF ALL RESPONSES TO PHQ QUESTIONS 1-9: 9
SUM OF ALL RESPONSES TO PHQ QUESTIONS 1-9: 9
10. IF YOU CHECKED OFF ANY PROBLEMS, HOW DIFFICULT HAVE THESE PROBLEMS MADE IT FOR YOU TO DO YOUR WORK, TAKE CARE OF THINGS AT HOME, OR GET ALONG WITH OTHER PEOPLE: NOT DIFFICULT AT ALL

## 2021-02-02 NOTE — PROGRESS NOTES
ANTICOAGULATION FOLLOW-UP CLINIC VISIT    Patient Name:  Santosh Spauldingelmstad  Date:  2021  Contact Type:  Telephone    SUBJECTIVE:         OBJECTIVE    Recent labs: (last 7 days)     21  1339   INR 2.80*       ASSESSMENT / PLAN  INR assessment THER    Recheck INR In: 4 WEEKS    INR Location Clinic      Anticoagulation Summary  As of 2021    INR goal:  2.0-3.0   TTR:  82.8 % (1 y)   INR used for dosin.80 (2021)   Warfarin maintenance plan:  2.5 mg (5 mg x 0.5) every Mon, Wed, Fri; 5 mg (5 mg x 1) all other days   Full warfarin instructions:  2.5 mg every Mon, Wed, Fri; 5 mg all other days   Weekly warfarin total:  27.5 mg   Plan last modified:  Nery Yang RN (2019)   Next INR check:  3/2/2021   Priority:  Maintenance   Target end date:  Indefinite    Indications    Chronic atrial fibrillation (H) [I48.20]  Chronic anticoagulation [Z79.01]             Anticoagulation Episode Summary     INR check location:      Preferred lab:      Send INR reminders to:  JOHN CALDERON    Comments:        Anticoagulation Care Providers     Provider Role Specialty Phone number    DuaneLilly MD Referring Internal Medicine [phone]            See the Encounter Report to view Anticoagulation Flowsheet and Dosing Calendar (Go to Encounters tab in chart review, and find the Anticoagulation Therapy Visit)        Susannah Freeman, RN

## 2021-03-02 NOTE — PROGRESS NOTES
ANTICOAGULATION MANAGEMENT     Patient Name:  Santosh KENNY Hjelmstad  Date:  3/2/2021    ASSESSMENT /SUBJECTIVE:    Today's INR result of 3.0 is therapeutic. Goal INR of 2.0-3.0      Warfarin dose taken: Warfarin taken as instructed    Diet: No new diet changes affecting INR    Medication changes/ interactions: No new medications/supplements affecting INR    Previous INR: Therapeutic     S/S of bleeding or thromboembolism: No    New injury or illness: No    Upcoming surgery, procedure or cardioversion: No    Additional findings: None      PLAN:    Telephone call with Santosh regarding INR result and instructed:     Warfarin Dosing Instructions: Continue your current warfarin dose 2.5mg MWF and 5mg AOD    Instructed patient to follow up no later than: 4 weeks  Lab visit scheduled    Education provided: None required      Candida verbalizes understanding and agrees to warfarin dosing plan.    Instructed to call the Anticoagulation Clinic for any changes, questions or concerns. (#[phone])        Sharonda Morales RN      OBJECTIVE:  Recent labs: (last 7 days)     03/02/21  1137   INR 3.00*         No question data found.  Anticoagulation Summary  As of 3/2/2021    INR goal:  2.0-3.0   TTR:  82.8 % (1 y)   INR used for dosing:  No new INR was available at the time of this encounter.   Warfarin maintenance plan:  2.5 mg (5 mg x 0.5) every Mon, Wed, Fri; 5 mg (5 mg x 1) all other days   Full warfarin instructions:  2.5 mg every Mon, Wed, Fri; 5 mg all other days   Weekly warfarin total:  27.5 mg   No change documented:  Sharonda Morales RN   Plan last modified:  Nery Yang RN (7/26/2019)   Next INR check:  3/30/2021   Priority:  Maintenance   Target end date:  Indefinite    Indications    Chronic atrial fibrillation (H) [I48.20]  Chronic anticoagulation [Z79.01]             Anticoagulation Episode Summary     INR check location:      Preferred lab:      Send INR reminders to:  JOHN CALDERON    Comments:         Anticoagulation Care Providers     Provider Role Specialty Phone number    Lilly Zepeda MD Referring Internal Medicine [phone]

## 2021-03-30 NOTE — PROGRESS NOTES
ANTICOAGULATION MANAGEMENT     Patient Name:  Santosh KENNY Hjelmstad  Date:  3/30/2021    ASSESSMENT /SUBJECTIVE:    Today's INR result of 2.9 is therapeutic. Goal INR of 2.0-3.0      Warfarin dose taken: Warfarin taken as instructed    Diet: No new diet changes affecting INR    Medication changes/ interactions: No new medications/supplements affecting INR    Previous INR: Therapeutic     S/S of bleeding or thromboembolism: No    New injury or illness: No    Upcoming surgery, procedure or cardioversion: No    Additional findings: None      PLAN:    Telephone call with Santosh regarding INR result and instructed:     Warfarin Dosing Instructions: Continue your current warfarin dose 2.5MG MWF and 5mg AOD    Instructed patient to follow up no later than: 5 weeks  Lab visit scheduled    Education provided: None required      Candida verbalizes understanding and agrees to warfarin dosing plan.    Instructed to call the Anticoagulation Clinic for any changes, questions or concerns. (#[phone])        Sharonda Morales RN      OBJECTIVE:  Recent labs: (last 7 days)     03/30/21  1051   INR 2.90*         No question data found.  Anticoagulation Summary  As of 3/30/2021    INR goal:  2.0-3.0   TTR:  82.8 % (1 y)   INR used for dosing:  No new INR was available at the time of this encounter.   Warfarin maintenance plan:  2.5 mg (5 mg x 0.5) every Mon, Wed, Fri; 5 mg (5 mg x 1) all other days   Full warfarin instructions:  2.5 mg every Mon, Wed, Fri; 5 mg all other days   Weekly warfarin total:  27.5 mg   No change documented:  Sharonda Morales RN   Plan last modified:  Nery Yang RN (7/26/2019)   Next INR check:  5/4/2021   Priority:  Maintenance   Target end date:  Indefinite    Indications    Chronic atrial fibrillation (H) [I48.20]  Chronic anticoagulation [Z79.01]             Anticoagulation Episode Summary     INR check location:      Preferred lab:      Send INR reminders to:  JOHN CALDERON    Comments:         Anticoagulation Care Providers     Provider Role Specialty Phone number    Lilly Zepeda MD Referring Internal Medicine [phone]       Anticoagulation Management    Unable to reach Josh today.    Today's INR result of 2.9 is therapeutic (goal INR of 2.0-3.0).  Result received from: Clinic Lab    Follow up required to confirm warfarin dose taken and assess for changes    Left message to continue current dose of warfarin 5 mg tonight.      Anticoagulation clinic to follow up    Sharonda Morales RN

## 2021-05-07 NOTE — PROGRESS NOTES
ANTICOAGULATION MANAGEMENT     Patient Name:  Santosh KENNY Hjelmstad  Date:  5/7/2021    ASSESSMENT /SUBJECTIVE:    Today's INR result of 3.6 is supratherapeutic. Goal INR of 2.0-3.0      Warfarin dose taken: Warfarin taken as instructed    Diet: decreased appetite generally, does not anticipate this improving soon. He's not feeling ill, just not eating like he used to and doesn't have much appetite    Medication changes/ interactions: No new medications/supplements affecting INR    Previous INR: Therapeutic     S/S of bleeding or thromboembolism: No    New injury or illness: No    Upcoming surgery, procedure or cardioversion: No    Additional findings: None      PLAN:    Telephone call with  wife Candida regarding INR result and instructed:     Warfarin Dosing Instructions: hold today Fri 5/7 then change your warfarin dose to     5 mg every Tue, Thu, Sat; 2.5 mg all other days       . (9 % change)    Instructed patient to follow up no later than: 2 weeks  Lab visit scheduled    Education provided: Impact of vitamin K foods on INR, Target INR goal and significance of current INR result and Monitoring for bleeding signs and symptoms      Candida verbalizes understanding and agrees to warfarin dosing plan.    Instructed to call the Anticoagulation Clinic for any changes, questions or concerns. (#[phone])        Ivanna Green RN      OBJECTIVE:  Recent labs: (last 7 days)     05/07/21  1128   INR 3.60*         No question data found.  Anticoagulation Summary  As of 5/7/2021    INR goal:  2.0-3.0   TTR:  78.3 % (1 y)   INR used for dosing:  3.60 (5/7/2021)   Warfarin maintenance plan:  5 mg (5 mg x 1) every Tue, Thu, Sat; 2.5 mg (5 mg x 0.5) all other days   Full warfarin instructions:  5/7: Hold; Otherwise 5 mg every Tue, Thu, Sat; 2.5 mg all other days   Weekly warfarin total:  25 mg   Plan last modified:  Ivanna Green, RN (5/7/2021)   Next INR check:  5/21/2021   Priority:  High   Target end date:   Indefinite    Indications    Chronic atrial fibrillation (H) [I48.20]  Chronic anticoagulation [Z79.01]             Anticoagulation Episode Summary     INR check location:      Preferred lab:      Send INR reminders to:  JOHN CALDERON    Comments:        Anticoagulation Care Providers     Provider Role Specialty Phone number    Lilly Zepeda MD Referring Internal Medicine [phone]

## 2021-05-21 NOTE — TELEPHONE ENCOUNTER
Patient wife called requesting refill for patients simvastatin (ZOCOR) 10 MG tablet. Wife was informed Rx was approved 05/18/2021 to [facility] pharmacy.

## 2021-05-21 NOTE — PROGRESS NOTES
ANTICOAGULATION FOLLOW-UP CLINIC VISIT    Patient Name:  Santosh Poonmstad  Date:  2021  Contact Type:  Telephone/ Pt's wife Candida    SUBJECTIVE:  Patient Findings     Comments:  The patient was assessed for diet, medication, and activity level changes, missed or extra doses, bruising or bleeding, with no problem findings.        Clinical Outcomes     Negatives:  Major bleeding event, Thromboembolic event, Anticoagulation-related hospital admission, Anticoagulation-related ED visit, Anticoagulation-related fatality    Comments:  The patient was assessed for diet, medication, and activity level changes, missed or extra doses, bruising or bleeding, with no problem findings.           OBJECTIVE    Recent labs: (last 7 days)     21  1129   INR 2.70*       ASSESSMENT / PLAN  INR assessment THER    Recheck INR In: 3 WEEKS    INR Location Outside lab      Anticoagulation Summary  As of 2021    INR goal:  2.0-3.0   TTR:  76.9 % (1 y)   INR used for dosin.70 (2021)   Warfarin maintenance plan:  5 mg (5 mg x 1) every Tue, Thu, Sat; 2.5 mg (5 mg x 0.5) all other days   Full warfarin instructions:  5 mg every Tue, Thu, Sat; 2.5 mg all other days   Weekly warfarin total:  25 mg   No change documented:  Sigrid Brown RN   Plan last modified:  Ivanna Green RN (2021)   Next INR check:  2021   Priority:  High   Target end date:  Indefinite    Indications    Chronic atrial fibrillation (H) [I48.20]  Chronic anticoagulation [Z79.01]             Anticoagulation Episode Summary     INR check location:      Preferred lab:      Send INR reminders to:  JOHN CALDERON    Comments:        Anticoagulation Care Providers     Provider Role Specialty Phone number    Lilly Zepeda MD Referring Internal Medicine [phone]            See the Encounter Report to view Anticoagulation Flowsheet and Dosing Calendar (Go to Encounters tab in chart review, and find the Anticoagulation Therapy  Visit)    Pt INR is 2.7 today. Candida advised to continue having pt take warfarin 5 mg on Tues/Thurs/Sat and 2.5 mg all other days. Recheck INR in 3 weeks or sooner if he has a change in health. Pt scheduled for the next INR check on 6/11/21 in [place]. Josh and Candida aware if signs of clotting (pain, tenderness, swelling, color change in leg or arm, SOB) and bleeding occur (blood in stool, urine, large bruising, bleeding gums, nosebleeds) to have INR check sooner. If sx severe report to ER or concerned for stroke call 911. If general questions or concerns arise, call clinic.         Sigrid Brown RN

## 2021-05-21 NOTE — TELEPHONE ENCOUNTER
ANTICOAGULATION MANAGEMENT      Santosh Robledo due for annual renewal of referral to anticoagulation monitoring. Order pended for your review and signature.      ANTICOAGULATION SUMMARY      Warfarin indication(s)     Atrial fibrillation    Heart valve present?  NO       Current goal range   INR: 2.0-3.0     Goal appropriate for indication? Yes, INR 2-3 appropriate for hx of DVT, PE, hypercoagulable state, Afib, LVAD, or bileaflet AVR without risk factors     Current duration of therapy Indefinite/long term therapy   Time in Therapeutic Range (TTR)  (Goal > 60%) 76.9 %       Office visit with referring provider's group within last year yes on 7/29/20       Sigrid Brown RN

## 2021-05-24 PROBLEM — Z79.01 LONG TERM CURRENT USE OF ANTICOAGULANT THERAPY: Status: ACTIVE | Noted: 2021-01-01

## 2021-06-30 NOTE — TELEPHONE ENCOUNTER
Needs appointment next 3 mos for further refills. Message sent to pharmacy.    Bridgett Ordonez RN  [facility]

## 2021-07-02 NOTE — PROGRESS NOTES
ANTICOAGULATION FOLLOW-UP CLINIC VISIT    Patient Name:  Santosh Spauldingelmstad  Date:  7/2/2021  Contact Type:  Telephone    SUBJECTIVE:         OBJECTIVE    Recent labs: (last 7 days)     07/02/21  1139   INR 3.60*       ASSESSMENT / PLAN  INR assessment SUPRA    Recheck INR In: 2 WEEKS    INR Location Clinic      Anticoagulation Summary  As of 7/2/2021    INR goal:  2.0-3.0   TTR:  73.8 % (1 y)   INR used for dosing:  3.60 (7/2/2021)   Warfarin maintenance plan:  5 mg (5 mg x 1) every Tue, Thu, Sat; 2.5 mg (5 mg x 0.5) all other days   Full warfarin instructions:  7/2: Hold; 7/8: 2.5 mg; 7/15: 2.5 mg; Otherwise 5 mg every Tue, Thu, Sat; 2.5 mg all other days   Weekly warfarin total:  25 mg   Plan last modified:  Susannah Freeman RN (7/2/2021)   Next INR check:  7/16/2021   Priority:  Maintenance   Target end date:  Indefinite    Indications    Chronic atrial fibrillation (H) [I48.20]  Chronic anticoagulation [Z79.01]  Long term current use of anticoagulant therapy [Z79.01]             Anticoagulation Episode Summary     INR check location:      Preferred lab:      Send INR reminders to:  JOHN CALDERON    Comments:        Anticoagulation Care Providers     Provider Role Specialty Phone number    Duane Lilly Castaneda MD Referring Internal Medicine [phone]            See the Encounter Report to view Anticoagulation Flowsheet and Dosing Calendar (Go to Encounters tab in chart review, and find the Anticoagulation Therapy Visit)        Susannah Freeman RN

## 2021-07-02 NOTE — PROGRESS NOTES
ANTICOAGULATION MANAGEMENT     Santosh Poonmstad 89 year old male is on warfarin with supratherapeutic INR result. (Goal INR 2.0-3.0)    Recent labs: (last 7 days)     07/02/21  1139   INR 3.60*       ASSESSMENT     Source(s): Chart Review and Patient/Caregiver Call       Warfarin doses taken: Warfarin taken as instructed    Diet: No new diet changes identified    New illness, injury, or hospitalization: No    Medication/supplement changes: None noted    Signs or symptoms of bleeding or clotting: No    Previous INR: Therapeutic last 2(+) visits    Additional findings: None     PLAN     Recommended plan for no diet, medication or health factor changes affecting INR     Dosing Instructions: hold today and then lowered dose for 2 weeks by 10%. He has been in the higher end or supra for the last several months,  with next INR in 2 weeks       Summary  As of 7/2/2021    Full warfarin instructions:  7/2: Hold; 7/8: 2.5 mg; 7/15: 2.5 mg; Otherwise 5 mg every Tue, Thu, Sat; 2.5 mg all other days   Next INR check:  7/16/2021             Telephone call with  luna wife who verbalizes understanding and agrees to plan    Lab visit scheduled    Education provided: Importance of taking warfarin as instructed    Plan made per [facility] anticoagulation protocol    Susannah Freeman, RN  Anticoagulation Clinic  7/2/2021    _______________________________________________________________________     Anticoagulation Episode Summary     Current INR goal:  2.0-3.0   TTR:  73.8 % (1 y)   Target end date:  Indefinite   Send INR reminders to:  [facility]    Indications    Chronic atrial fibrillation (H) [I48.20]  Chronic anticoagulation [Z79.01]  Long term current use of anticoagulant therapy [Z79.01]           Comments:           Anticoagulation Care Providers     Provider Role Specialty Phone number    Lilly Zepeda MD Referring Internal Medicine [phone]

## 2021-07-14 NOTE — TELEPHONE ENCOUNTER
"Left message to discuss the lasix refill request. Dagoberto    Received a refill request from [facility] on [address] [place] for furosemide 40 mg tablet-1 tablet in the morning and 1/2 tablet in the evening     LOV 9/21/20 with Candy Stephens her notes state \"HTN-Elevated in clinic despite taking furosemide 20 mg daily, lisinopril 20 mg daily, metoprolol Xl 50 mg daily.  Controlled at home but he has decreased his lasix from BID to daily and is wondering about his potassium chloride dosage  Will order BMP\"     Current order on file per PCP is furosemide 20 mg in the a.m. and 20 mg in the p.m.     Please clarify furosemide dose and directions and send for refill     "

## 2021-07-15 NOTE — TELEPHONE ENCOUNTER
Reason for Call:  Medication or medication refill:    Do you use a [facility]?  Name of the pharmacy and phone number for the current request:       [facility] [address] [place] [facility], [place] - [address]    Name of the medication requested: furosemide (LASIX) 40 MG tablet [8865]    Other request:     Can we leave a detailed message on this number? YES    Phone number patient can be reached at: [phone]    Best Time: Any     Call taken on 7/15/2021 at 10:11 AM by Karen Gong

## 2021-07-16 NOTE — PROGRESS NOTES
ANTICOAGULATION MANAGEMENT     Santosh Robledo 89 year old male is on warfarin with therapeutic INR result. (Goal INR 2.0-3.0)    Recent labs: (last 7 days)     07/16/21  1133   INR 2.3*       ASSESSMENT     Source(s): Patient/Caregiver Call       Warfarin doses taken: Warfarin taken as instructed    Diet: No new diet changes identified    New illness, injury, or hospitalization: No    Medication/supplement changes: None noted    Signs or symptoms of bleeding or clotting: No    Previous INR: Supratherapeutic    Additional findings: None     PLAN     Recommended plan for no diet, medication or health factor changes affecting INR     Dosing Instructions: Continue your current warfarin dose  dose calendar changed to reflect dose patient was taking in past 2 wks  with next INR in 2 weeks       Summary  As of 7/16/2021    Full warfarin instructions:  5 mg every Tue, Sat; 2.5 mg all other days   Next INR check:  7/30/2021             Telephone call with  Candida who verbalizes understanding and agrees to plan and who agrees to plan and repeated back plan correctly    Lab visit scheduled    Education provided: Please call back if any changes to your diet, medications or how you've been taking warfarin and Contact [phone]  with any changes, questions or concerns.     Plan made per [facility] anticoagulation protocol    Sherice Jerez, RN  Anticoagulation Clinic  7/16/2021    _______________________________________________________________________     Anticoagulation Episode Summary     Current INR goal:  2.0-3.0   TTR:  72.0 % (1 y)   Target end date:  Indefinite   Send INR reminders to:  [facility]    Indications    Chronic atrial fibrillation (H) [I48.20]  Chronic anticoagulation [Z79.01]  Long term current use of anticoagulant therapy [Z79.01]           Comments:           Anticoagulation Care Providers     Provider Role Specialty Phone number    Lilly Zepeda MD Referring Internal Medicine [phone]

## 2021-07-16 NOTE — TELEPHONE ENCOUNTER
Routing refill request to provider for review/approval because:  Patient needs to be seen because it has been more than 1 year since last office visit.  Pt has future appt scheduled 07/23/2021. . Please advice on michael refill. Pt will be out of medication before appt.

## 2021-07-23 NOTE — PROGRESS NOTES
"SUBJECTIVE:   Santosh Robledo is a 89 year old male who presents for Preventive Visit.      Patient has been advised of split billing requirements and indicates understanding: Yes   Are you in the first 12 months of your Medicare coverage?  No    Healthy Habits:     In general, how would you rate your overall health?  Fair    Frequency of exercise:  None    Do you usually eat at least 4 servings of fruit and vegetables a day, include whole grains    & fiber and avoid regularly eating high fat or \"junk\" foods?  No    Taking medications regularly:  Yes    Medication side effects:  Other    Ability to successfully perform activities of daily living:  Transportation requires assistance, shopping requires assistance and preparing meals requires assistance    Home Safety:  No safety concerns identified    Hearing Impairment:  No hearing concerns    In the past 6 months, have you been bothered by leaking of urine? Yes    In general, how would you rate your overall mental or emotional health?  Fair      PHQ-2 Total Score: 4    Do you feel safe in your environment? Yes    Have you ever done Advance Care Planning? (For example, a Health Directive, POLST, or a discussion with a medical provider or your loved ones about your wishes): Yes, advance care planning is on file.       Fall risk  Fallen 2 or more times in the past year?: Yes  Any fall with injury in the past year?: No  Timed Up and Go Test (>13.5 is fall risk; contact physician) : 7.5    Cognitive Screening   1) Repeat 3 items (Leader, Season, Table)    2) Clock draw: NORMAL  3) 3 item recall: Recalls 1 object   Results: NORMAL clock, 1-2 items recalled: COGNITIVE IMPAIRMENT LESS LIKELY    Mini-CogTM Copyright JOSH Bess. Licensed by the author for use in [facility]; reprinted with permission (wendi@.[facility]). All rights reserved.      Do you have sleep apnea, excessive snoring or daytime drowsiness?: no    Reviewed and updated as needed this visit by " "clinical staff  Tobacco  Allergies  Meds   Med Hx  Surg Hx  Fam Hx  Soc Hx        Reviewed and updated as needed this visit by Provider                Social History     Tobacco Use     Smoking status: Never Smoker     Smokeless tobacco: Never Used   Substance Use Topics     Alcohol use: No     If you drink alcohol do you typically have >3 drinks per day or >7 drinks per week? No    Alcohol Use 7/23/2021   Prescreen: >3 drinks/day or >7 drinks/week? Not Applicable               Current providers sharing in care for this patient include:   Patient Care Team:  Lilly Zepeda MD as PCP - General (Internal Medicine)  Lilly Zepeda MD as Assigned PCP  Candy Stephens APRN CNP as Assigned Heart and Vascular Provider    The following health maintenance items are reviewed in Epic and correct as of today:  Health Maintenance Due   Topic Date Due     ANNUAL REVIEW OF HM ORDERS  Never done     MEDICARE ANNUAL WELLNESS VISIT  Never done     ZOSTER IMMUNIZATION (2 of 3) 02/26/2011     ALT  06/28/2020     LIPID  06/28/2020     PHQ-9  01/29/2021     BMP  03/23/2021     FALL RISK ASSESSMENT  07/29/2021           Review of Systems  Constitutional, HEENT, cardiovascular, pulmonary, GI, , musculoskeletal, neuro, skin, endocrine and psych systems are negative, except as otherwise noted.    OBJECTIVE:   BP (!) 170/80 (BP Location: Right arm, Patient Position: Sitting, Cuff Size: Adult Regular)   Pulse 70   Temp 97.2  F (36.2  C) (Temporal)   Ht 1.88 m (6' 2\")   Wt 77.1 kg (170 lb)   SpO2 96%   BMI 21.83 kg/m   Estimated body mass index is 22.15 kg/m  as calculated from the following:    Height as of 7/29/20: 1.88 m (6' 2\").    Weight as of 9/21/20: 78.2 kg (172 lb 8 oz).  Physical Exam  GENERAL: alert and no distress  EYES: Eyes grossly normal to inspection, PERRL and conjunctivae and sclerae normal  HENT: ear canals and TM's normal, nose and mouth without ulcers or lesions  NECK: no adenopathy, no " "asymmetry, masses, or scars and thyroid normal to palpation  RESP: lungs clear to auscultation - no rales, rhonchi or wheezes  CV: regular rate and rhythm, normal S1 S2, no S3 or S4, no murmur, click or rub, no peripheral edema and peripheral pulses strong  ABDOMEN: soft, nontender, no hepatosplenomegaly, no masses and bowel sounds normal  MS: no gross musculoskeletal defects noted, no edema  SKIN: no suspicious lesions or rashes  NEURO: Normal strength and tone, mentation intact and speech normal  PSYCH: mentation appears normal, affect normal/bright    Diagnostic Test Results:  Labs reviewed in Epic    ASSESSMENT / PLAN:   (Z00.00) Encounter for Medicare annual wellness exam  (primary encounter diagnosis)  Comment: yearly physical exam today  Plan: continue current therapy    Patient has been advised of split billing requirements and indicates understanding: Yes  COUNSELING:  Reviewed preventive health counseling, as reflected in patient instructions  Special attention given to:       Regular exercise       Healthy diet/nutrition    Estimated body mass index is 22.15 kg/m  as calculated from the following:    Height as of 7/29/20: 1.88 m (6' 2\").    Weight as of 9/21/20: 78.2 kg (172 lb 8 oz).        He reports that he has never smoked. He has never used smokeless tobacco.      Appropriate preventive services were discussed with this patient, including applicable screening as appropriate for cardiovascular disease, diabetes, osteopenia/osteoporosis, and glaucoma.  As appropriate for age/gender, discussed screening for colorectal cancer, prostate cancer, breast cancer, and cervical cancer. Checklist reviewing preventive services available has been given to the patient.    Reviewed patients plan of care and provided an AVS. The Basic Care Plan (routine screening as documented in Health Maintenance) for Santosh meets the Care Plan requirement. This Care Plan has been established and reviewed with the " Patient.    Counseling Resources:  ATP IV Guidelines  Pooled Cohorts Equation Calculator  Breast Cancer Risk Calculator  Breast Cancer: Medication to Reduce Risk  FRAX Risk Assessment  ICSI Preventive Guidelines  Dietary Guidelines for Americans, 2010  USDA's MyPlate  ASA Prophylaxis  Lung CA Screening    Lilly Zepeda MD  [facility]    Identified Health Risks:

## 2021-07-23 NOTE — PROGRESS NOTES
"    The patient was provided with suggestions to help him develop a healthy physical lifestyle.  He is at risk for lack of exercise and has been provided with information to increase physical activity for the benefit of his well-being.  The patient was counseled and encouraged to consider modifying their diet and eating habits. He was provided with information on recommended healthy diet options.  The patient reports that he has difficulty with activities of daily living. I have asked that the patient make a follow up appointment in 54 weeks where this issue will be further evaluated and addressed.  Information on urinary incontinence and treatment options given to patient.  The patient was provided with suggestions to help him develop a healthy emotional lifestyle.  The patient's PHQ-9 score is consistent with mild depression. He was provided with information regarding depression and was advised to schedule a follow up appointment in 54 weeks to further address this issue.  He is at risk for falling and has been provided with information to reduce the risk of falling at home.  Answers for HPI/ROS submitted by the patient on 7/23/2021  If you checked off any problems, how difficult have these problems made it for you to do your work, take care of things at home, or get along with other people?: Not difficult at all  PHQ9 TOTAL SCORE: 9  In general, how would you rate your overall physical health?: fair  Frequency of exercise:: None  Do you usually eat at least 4 servings of fruit and vegetables a day, include whole grains & fiber, and avoid regularly eating high fat or \"junk\" foods? : No  Taking medications regularly:: Yes  Medication side effects:: Other  Activities of Daily Living: transportation requires assistance, shopping requires assistance, preparing meals requires assistance  Home safety: no safety concerns identified  Hearing Impairment:: no hearing concerns  In the past 6 months, have you been bothered by " leaking of urine?: Yes  abdominal pain: No  Blood in stool: No  Blood in urine: No  chest pain: No  chills: No  congestion: Yes  constipation: No  cough: Yes  diarrhea: No  dizziness: No  ear pain: No  eye pain: No  nervous/anxious: Yes  fever: No  frequency: Yes  genital sores: No  headaches: No  hearing loss: No  heartburn: Yes  arthralgias: No  joint swelling: No  peripheral edema: No  mood changes: No  myalgias: No  nausea: No  dysuria: No  palpitations: No  Skin sensation changes: No  sore throat: No  urgency: Yes  rash: No  shortness of breath: Yes  visual disturbance: No  weakness: Yes  impotence: No  In general, how would you rate your overall mental or emotional health?: fair

## 2021-07-23 NOTE — PATIENT INSTRUCTIONS
Patient Education   Personalized Prevention Plan  You are due for the preventive services outlined below.  Your care team is available to assist you in scheduling these services.  If you have already completed any of these items, please share that information with your care team to update in your medical record.  Health Maintenance Due   Topic Date Due     ANNUAL REVIEW OF  ORDERS  Never done     Zoster (Shingles) Vaccine (2 of 3) 02/26/2011     Liver Monitoring Lab  06/28/2020     Cholesterol Lab  06/28/2020     Basic Metabolic Panel  03/23/2021     FALL RISK ASSESSMENT  07/29/2021     Your Health Risk Assessment indicates you feel you are not in good health    A healthy lifestyle helps keep the body fit and the mind alert. It helps protect you from disease, helps you fight disease, and helps prevent chronic disease (disease that doesn't go away) from getting worse. This is important as you get older and begin to notice twinges in muscles and joints and a decline in the strength and stamina you once took for granted. A healthy lifestyle includes good healthcare, good nutrition, weight control, recreation, and regular exercise. Avoid harmful substances and do what you can to keep safe. Another part of a healthy lifestyle is stay mentally active and socially involved.    Good healthcare     Have a wellness visit every year.     If you have new symptoms, let us know right away. Don't wait until the next checkup.     Take medicines exactly as prescribed and keep your medicines in a safe place. Tell us if your medicine causes problems.   Healthy diet and weight control     Eat 3 or 4 small, nutritious, low-fat, high-fiber meals a day. Include a variety of fruits, vegetables, and whole-grain foods.     Make sure you get enough calcium in your diet. Calcium, vitamin D, and exercise help prevent osteoporosis (bone thinning).     If you live alone, try eating with others when you can. That way you get a good meal and  have company while you eat it.     Try to keep a healthy weight. If you eat more calories than your body uses for energy, it will be stored as fat and you will gain weight.     Recreation   Recreation is not limited to sports and team events. It includes any activity that provides relaxation, interest, enjoyment, and exercise. Recreation provides an outlet for physical, mental, and social energy. It can give a sense of worth and achievement. It can help you stay healthy.    Mental Exercise and Social Involvement  Mental and emotional health is as important as physical health. Keep in touch with friends and family. Stay as active as possible. Continue to learn and challenge yourself.   Things you can do to stay mentally active are:    Learn something new, like a foreign language or musical instrument.     Play SCRABBLE or do crossword puzzles. If you cannot find people to play these games with you at home, you can play them with others on your computer through the Internet.     Join a games club--anything from card games to chess or checkers or lawn bowling.     Start a new hobby.     Go back to school.     Volunteer.     Read.   Keep up with world events.    Exercise for a Healthier Heart  You may wonder how you can improve the health of your heart. If you re thinking about exercise, you re on the right track. You don t need to become an athlete. But you do need a certain amount of brisk exercise to help strengthen your heart. If you have been diagnosed with a heart condition, your healthcare provider may advise exercise to help stabilize your condition. To help make exercise a habit, choose safe, fun activities.      Exercise with a friend. When activity is fun, you're more likely to stick with it.   Before you start  Check with your healthcare provider before starting an exercise program. This is especially important if you have not been active for a while. It's also important if you have a long-term (chronic)  health problem such as heart disease, diabetes, or obesity. Or if you are at high risk for having these problems.   Why exercise?  Exercising regularly offers many healthy rewards. It can help you do all of the following:     Improve your blood cholesterol level to help prevent further heart trouble    Lower your blood pressure to help prevent a stroke or heart attack    Control diabetes, or reduce your risk of getting this disease    Improve your heart and lung function    Reach and stay at a healthy weight    Make your muscles stronger so you can stay active    Prevent falls and fractures by slowing the loss of bone mass (osteoporosis)    Manage stress better    Reduce your blood pressure    Improve your sense of self and your body image  Exercise tips      Ease into your routine. Set small goals. Then build on them. If you are not sure what your activity level should be, talk with your healthcare provider first before starting an exercise routine.    Exercise on most days. Aim for a total of 150 minutes (2 hours and 30 minutes) or more of moderate-intensity aerobic activity each week. Or 75 minutes (1 hour and 15 minutes) or more of vigorous-intensity aerobic activity each week. Or try for a combination of both. Moderate activity means that you breathe heavier and your heart rate increases but you can still talk. Think about doing 40 minutes of moderate exercise, 3 to 4 times a week. For best results, activity should last for about 40 minutes to lower blood pressure and cholesterol. It's OK to work up to the 40-minute period over time. Examples of moderate-intensity activity are walking 1 mile in 15 minutes. Or doing 30 to 45 minutes of yard work.    Step up your daily activity level.  Along with your exercise program, try being more active the whole day. Walk instead of drive. Or park further away so that you take more steps each day. Do more household tasks or yard work. You may not be able to meet the advised  mount of physical activity. But doing some moderate- or vigorous-intensity aerobic activity can help reduce your risk for heart disease. Your healthcare provider can help you figure out what is best for you.    Choose 1 or more activities you enjoy.  Walking is one of the easiest things you can do. You can also try swimming, riding a bike, dancing, or taking an exercise class.    When to call your healthcare provider  Call your healthcare provider if you have any of these:     Chest pain or feel dizzy or lightheaded    Burning, tightness, pressure, or heaviness in your chest, neck, shoulders, back, or arms    Abnormal shortness of breath    More joint or muscle pain    A very fast or irregular heartbeat (palpitations)  [employer] last reviewed this educational content on 7/1/2019 2000-2021 [employer]. All rights reserved. This information is not intended as a substitute for professional medical care. Always follow your healthcare professional's instructions.          Understanding USDA MyPlate  The USDA has guidelines to help you make healthy food choices. These are called MyPlate. MyPlate shows the food groups that make up healthy meals using the image of a place setting. Before you eat, think about the healthiest choices for what to put on your plate or in your cup or bowl. To learn more about building a healthy plate, visit www.choosemyplate.gov.    The food groups    Fruits. Any fruit or 100% fruit juice counts as part of the Fruit Group. Fruits may be fresh, canned, frozen, or dried, and may be whole, cut-up, or pureed. Make 1/2 of your plate fruits and vegetables.    Vegetables. Any vegetable or 100% vegetable juice counts as a member of the Vegetable Group. Vegetables may be fresh, frozen, canned, or dried. They can be served raw or cooked and may be whole, cut-up, or mashed. Make 1/2 of your plate fruits and vegetables.    Grains. All foods made from grains are part of the Grains Group. These  include wheat, rice, oats, cornmeal, and barley. Grains are often used to make foods such as bread, pasta, oatmeal, cereal, tortillas, and grits. Grains should be no more than 1/4 of your plate. At least half of your grains should be whole grains.    Protein. This group includes meat, poultry, seafood, beans and peas, eggs, processed soy products (such as tofu), nuts (including nut butters), and seeds. Make protein choices no more than 1/4 of your plate. Meat and poultry choices should be lean or low fat.    Dairy. The Dairy Group includes all fluid milk products and foods made from milk that contain calcium, such as yogurt and cheese. (Foods that have little calcium, such as cream, butter, and cream cheese, are not part of this group.) Most dairy choices should be low-fat or fat-free.    Oils. Oils aren't a food group, but they do contain essential nutrients. However it's important to watch your intake of oils. These are fats that are liquid at room temperature. They include canola, corn, olive, soybean, vegetable, and sunflower oil. Foods that are mainly oil include mayonnaise, certain salad dressings, and soft margarines. You likely already get your daily oil allowance from the foods you eat.  Things to limit  Eating healthy also means limiting these things in your diet:       Salt (sodium). Many processed foods have a lot of sodium. To keep sodium intake down, eat fresh vegetables, meats, poultry, and seafood when possible. Purchase low-sodium, reduced-sodium, or no-salt-added food products at the store. And don't add salt to your meals at home. Instead, season them with herbs and spices such as dill, oregano, cumin, and paprika. Or try adding flavor with lemon or lime zest and juice.    Saturated fat. Saturated fats are most often found in animal products such as beef, pork, and chicken. They are often solid at room temperature, such as butter. To reduce your saturated fat intake, choose leaner cuts of meat and  poultry. And try healthier cooking methods such as grilling, broiling, roasting, or baking. For a simple lower-fat swap, use plain nonfat yogurt instead of mayonnaise when making potato salad or macaroni salad.    Added sugars. These are sugars added to foods. They are in foods such as ice cream, candy, soda, fruit drinks, sports drinks, energy drinks, cookies, pastries, jams, and syrups. Cut down on added sugars by sharing sweet treats with a family member or friend. You can also choose fruit for dessert, and drink water or other unsweetened beverages.     [employer] last reviewed this educational content on 6/1/2020 2000-2021 [employer]. All rights reserved. This information is not intended as a substitute for professional medical care. Always follow your healthcare professional's instructions.        Activities of Daily Living    Your Health Risk Assessment indicates you have difficulties with activities of daily living such as housework, bathing, preparing meals, taking medication, etc. Please make a follow up appointment for us to address this issue in more detail.    Urinary Incontinence (Male)    Urinary incontinence means not being able to control the release of urine from the bladder.   Causes  Common causes of urinary incontinence in men include:    Infection    Certain medicines    Aging    Poor pelvic muscle tone    Bladder spasms    Obesity    Trouble urinating and fully emptying the bladder (urinary retention)  Other things that can cause incontinence are:     Nervous system diseases    Diabetes    Sleep apnea    Urinary tract infections    Prostate surgery    Pelvic injury  Constipation and smoking have also been identified as risk factors.   Symptoms    Urge incontinence (overactive bladder). This is a sudden urge to urinate. It occurs even though there may not be much urine in the bladder. The need to urinate often during the night is common. It's due to bladder spasms.    Stress  incontinence. This is urine leakage that you can't control. It can occur with sneezing, coughing, and other actions that put stress on the bladder.    Treatment  Treatment depends on what is causing the condition. Bladder infections are treated with antibiotics. Urinary retention is treated with a bladder catheter.   Home care  Follow these guidelines when caring for yourself at home:    Don't have any foods and drinks that may irritate the bladder. This includes:  ? Chocolate  ? Alcohol  ? Caffeine  ? Carbonated drinks  ? Acidic fruits and juices    Limit fluids to 6 to 8 cups a day.    Lose weight if you are overweight. This will reduce your symptoms.    If advised, do regular pelvic muscle-strengthening exercises such as Kegel exercises.    If needed, wear absorbent pads to catch urine. Change the pads often. This is for good hygiene and to prevent skin and bladder infections.    Bathe daily for good hygiene.    If an antibiotic was prescribed to treat a bladder infection, take it until it's finished. Keep taking it even if you are feeling better. This is to make sure your infection has cleared.    If a catheter was left in place, keep bacteria from getting into the collection bag. Don't disconnect the catheter from the collection bag.    Use a leg band to secure the catheter drainage tube, so it does not pull on the catheter. Drain the collection bag when it becomes full. To do this, use the drain spout at the bottom of the bag. Don't disconnect the bag from the catheter.    Don't pull on or try to remove a catheter. The catheter must be removed by a healthcare provider.    If you smoke, stop. Ask your provider for help if you can't do this on your own.  Follow-up care  Follow up with your healthcare provider, or as advised.  When to get medical advice  Call your healthcare provider right away if any of these occur:    Fever over 100.4 F (38 C), or as directed by your provider    Bladder pain or  fullness    Belly swelling, nausea, or vomiting    Back pain    Weakness, dizziness, or fainting    If a catheter was left in place, return if:  ? The catheter falls out  ? The catheter stops draining for 6 hours  ? Your urine gets cloudy or smells bad  [employer] last reviewed this educational content on 1/1/2020 2000-2021 [employer]. All rights reserved. This information is not intended as a substitute for professional medical care. Always follow your healthcare professional's instructions.        Your Health Risk Assessment indicates you feel you are not in good emotional health.    Recreation   Recreation is not limited to sports and team events. It includes any activity that provides relaxation, interest, enjoyment, and exercise. Recreation provides an outlet for physical, mental, and social energy. It can give a sense of worth and achievement. It can help you stay healthy.    Mental Exercise and Social Involvement  Mental and emotional health is as important as physical health. Keep in touch with friends and family. Stay as active as possible. Continue to learn and challenge yourself.   Things you can do to stay mentally active are:    Learn something new, like a foreign language or musical instrument.     Play SCRABBLE or do crossword puzzles. If you cannot find people to play these games with you at home, you can play them with others on your computer through the Internet.     Join a games club--anything from card games to chess or checkers or lawn bowling.     Start a new hobby.     Go back to school.     Volunteer.     Read.   Keep up with world events.    Depression and Suicide in Older Adults    Nearly 2 million older Americans have some type of depression. Some of them even take their own lives. Yet depression among older adults is often ignored. Learn the warning signs. You may help spare a loved one needless pain. You may also save a life.   What is depression?  Depression is a common  "and serious illness that affects the way you think and feel. It is not a normal part of aging, nor is it a sign of weakness, a character flaw, or something you can snap out of. Most people with depression need treatment to get better. The most common symptom is a feeling of deep sadness. People who are depressed also may seem tired and listless. And nothing seems to give them pleasure. It s normal to grieve or be sad sometimes. But sadness lessens or passes with time. Depression rarely goes away or improves on its own. A person with clinical depression can't \"snap out of it.\" Other symptoms of depression are:     Sleeping more or less than normal    Eating more or less than normal    Having headaches, stomachaches, or other pains that don t go away    Feeling nervous,  empty,  or worthless    Crying a great deal    Thinking or talking about suicide or death    Loss of interest in activities previously enjoyed    Social isolation    Feeling confused or forgetful  What causes it?  The causes of depression aren t fully known. But it is thought to result from a complex blend of these factors:     Biochemistry. Certain chemicals in the brain play a role.    Genes. Depression does run in families.    Life stress. Life stresses can also trigger depression in some people. Older adults often face many stressors, such as death of friends or a spouse, health problems, and financial concerns.    Chronic conditions. This includes conditions such as diabetes, heart disease, or cancer. These can cause symptoms of depression. Medicine side effects can cause changes in thoughts and behaviors.  How you can help  Often, depressed people may not want to ask for help. When they do, they may be ignored. Or, they may receive the wrong treatment. You can help by showing parents and older friends love and support. If they seem depressed, don t lecture the person, ignore the symptoms, or discount the symptoms as a  normal  part of aging " -which they are not. Get involved, listen, and show interest and support.   Help them understand that depression is a treatable illness. Tell them you can help them find the right treatment. Offer to go to their healthcare provider's appointment with them for support when the symptoms are discussed. With their approval, contact a local mental health center, social service agency, or hospital about services.   You can be an advocate for him or her at healthcare appointments. Many older adults have chronic illnesses that can cause symptoms of depression. Medicine side effects can change thoughts and behaviors. You can help make sure that the healthcare provider looks at all of these factors. He or she should refer your family member or friend to a mental healthcare provider when needed. in some cases, untreated depression can lead to a misdiagnosis. A person may be diagnosed with a brain disorder such as dementia. If the healthcare provider does not take the issue of depression seriously, help your family member or friend to find another provider.   Don't be afraid to ask  If you think an older person you care about could be suicidal, ask,  Have you thought about suicide?  Most people will tell you the truth. If they say  yes,  they may already have a plan for how and when they will attempt it. Find out as much as you can. The more detailed the plan, and the easier it is to carry out, the more danger the person is in right now. Tell the person you are there for them and do not want them to harm him or herself. Don't wait to get help for the person. Call the person's healthcare provider, local hospital, or emergency services.   To learn more    National Suicide Prevention Lifeline (crisis hotline) 058-565-QGRU (345-038-6051)    National [place] of Mental Zdwsfu711-691-2308bok.[facility].nih.gov    National [place] on Mental Tfnzkud743-131-0003ynh.te.org    Mental Health Rsaarwq148-557-5613tjy.nmha.org    National  Suicide Bqezamn002-BUUKFBS (449-917-7759)    Call 911  Never leave the person alone. A person who is actively suicidal needs psychiatric care right away. They will need constant supervision. Never leave the person out of sight. Call 911 or the national 24-hour suicide crisis hotline at 932-219-YPTU (011-031-0743). You can also take the person to the closest emergency room.   [employer] last reviewed this educational content on 5/1/2020 2000-2021 [employer]. All rights reserved. This information is not intended as a substitute for professional medical care. Always follow your healthcare professional's instructions.          Preventing Falls at Home  A person can fall for many reasons. Older adults may fall because reaction time slows down as we age. Your muscles and joints may get stiff, weak, or less flexible because of illness, medicines, or a physical condition.   Other health problems that make falls more likely include:     Arthritis    Dizziness or lightheadedness when you stand up (orthostatic hypotension)    History of a stroke    Dizziness    Anemia    Certain medicines taken for mental illness or to control blood pressure.    Problems with balance or gait    Bladder or urinary problems    History of falling    Changes in vision (vision impairment)    Changes in thinking skills and memory (cognitive impairment)  Injuries from a fall can include serious injuries such as broken bones, dislocated joints, internal bleeding and cuts. Injuries like these can limit your independence.   Prevention tips  To help prevent falls and fall-related injuries, follow the tips below.    Floors  To make floors safer:     Put nonskid pads under area rugs.    Remove small rugs.    Replace worn floor coverings.    Tack carpets firmly to each step on carpeted stairs. Put nonskid strips on the edges of uncarpeted stairs.    Keep floors and stairs free of clutter and cords.    Arrange furniture so there are clear  pathways.    Clean up any spills right away.  Bathrooms    To make bathrooms safer:     Install grab bars in the tub or shower.    Apply nonskid strips or put a nonskid rubber mat in the tub or shower.    Sit on a bath chair to bathe.    Use bathmats with nonskid backing.  Lighting  To improve visibility in your home:      Keep a flashlight in each room. Or put a lamp next to the bed within easy reach.    Put nightlights in the bedrooms, hallways, kitchen, and bathrooms.    Make sure all stairways have good lighting.    Take your time when going up and down stairs.    Put handrails on both sides of stairs and in walkways for more support. To prevent injury to your wrist or arm, don t use handrails to pull yourself up.    Install grab bars to pull yourself up.    Move or rearrange items that you use often. This will make them easier to find or reach.    Look at your home to find any safety hazards. Especially look at doorways, walkways, and the driveway. Remove or repair any safety problems that you find.  Other changes to make    Look around to find any safety hazards. Look closely at doorways, walkways, and the driveway. Remove or repair any safety problems that you find.    Wear shoes that fit well.    Take your time when going up and down stairs.    Put handrails on both sides of stairs and in walkways for more support. To prevent injury to your wrist or arm, don t use handrails to pull yourself up.    Install grab bars wherever needed to pull yourself up.    Arrange items that you use often. This will make them easier to find or reach.    [employer] last reviewed this educational content on 3/1/2020    1763-7633 [employer]. All rights reserved. This information is not intended as a substitute for professional medical care. Always follow your healthcare professional's instructions.

## 2021-07-30 NOTE — PROGRESS NOTES
ANTICOAGULATION MANAGEMENT     Santosh KENNY Hjelmstad 89 year old male is on warfarin with supratherapeutic INR result. (Goal INR 2.0-3.0)    Recent labs: (last 7 days)     07/30/21  1133   INR 4.4*       ASSESSMENT     Source(s): Chart Review and Patient/Caregiver Call       Warfarin doses taken: More warfarin taken than planned which may be affecting INR  Wife stated she is not currently home so she is not sure if patient took any additional doses besides the one yesterday.    Diet: Decreased greens/vitamin K in diet; ongoing change and decrease in appetite    New illness, injury, or hospitalization: No    Medication/supplement changes: None noted    Signs or symptoms of bleeding or clotting: No    Previous INR: Therapeutic last visit; previously outside of goal range    Additional findings: Patient wanted to make sure he was not low so he took an extra 2.5 mg dose yesterday     PLAN     Recommended plan for no diet, medication or health factor changes affecting INR     Dosing Instructions: Hold dose then Decrease your warfarin dose (11.1% change) with next INR in 1 week       Summary  As of 7/30/2021    Full warfarin instructions:  7/30: Hold; Otherwise 5 mg every Sat; 2.5 mg all other days   Next INR check:  8/6/2021             Telephone call with  Flo who agrees to plan and repeated back plan correctly    Lab visit scheduled    Education provided: Target INR goal and significance of current INR result, Importance of therapeutic range, Importance of following up for INR monitoring at instructed interval, Importance of taking warfarin as instructed, Monitoring for bleeding signs and symptoms and When to seek medical attention/emergency care    Plan made per [facility] anticoagulation protocol    Flower Varner, RN  Anticoagulation Clinic  7/30/2021    _______________________________________________________________________     Anticoagulation Episode Summary     Current INR goal:  2.0-3.0   TTR:  69.5 % (1 y)    Target end date:  Indefinite   Send INR reminders to:  JOHN CALDERON    Indications    Chronic atrial fibrillation (H) [I48.20]  Chronic anticoagulation [Z79.01]  Long term current use of anticoagulant therapy [Z79.01]           Comments:           Anticoagulation Care Providers     Provider Role Specialty Phone number    Lilly Zepeda MD Referring Internal Medicine [phone]

## 2021-08-06 NOTE — PROGRESS NOTES
ANTICOAGULATION MANAGEMENT     Santosh KENNY Hjelmstad 89 year old male is on warfarin with subtherapeutic INR result. (Goal INR 2.0-3.0)    Recent labs: (last 7 days)     08/06/21  1130   INR 1.7*       ASSESSMENT     Source(s): Chart Review and Patient/Caregiver Call       Warfarin doses taken: Warfarin taken as instructed    Diet: Increased greens/vitamin K in diet; plans to resume previous intake    New illness, injury, or hospitalization: No    Medication/supplement changes: None noted    Signs or symptoms of bleeding or clotting: No    Previous INR: Supratherapeutic    Additional findings: None     PLAN     Recommended plan for temporary change(s) affecting INR     Dosing Instructions: Booster dose then continue your current warfarin dose with next INR in 2 weeks       Summary  As of 8/6/2021    Full warfarin instructions:  5 mg every Sat; 2.5 mg all other days   Next INR check:  8/13/2021             Telephone call with  Candida and pt who verbalizes understanding and agrees to plan    Lab visit scheduled    Education provided: Please call back if any changes to your diet, medications or how you've been taking warfarin and Monitoring for clotting signs and symptoms    Plan made per [facility] anticoagulation protocol    Siri Vazquez RN  Anticoagulation Clinic  8/6/2021    _______________________________________________________________________     Anticoagulation Episode Summary     Current INR goal:  2.0-3.0   TTR:  69.8 % (1 y)   Target end date:  Indefinite   Send INR reminders to:  JOHN CALDERON    Indications    Chronic atrial fibrillation (H) [I48.20]  Chronic anticoagulation [Z79.01]  Long term current use of anticoagulant therapy [Z79.01]           Comments:           Anticoagulation Care Providers     Provider Role Specialty Phone number    Lilly Zepeda MD Referring Internal Medicine [phone]

## 2021-08-14 NOTE — DISCHARGE INSTRUCTIONS
Discharge Instructions  Abdominal Pain    I recommend that you increase your omeprazole dose to 20 mg twice daily, and follow-up with your primary care doctor and a gastroenterologist for further assessment.    Abdominal pain (belly pain) can be caused by many things. Your evaluation today does not show the exact cause for your pain. Your provider today has decided that it is unlikely your pain is due to a life threatening problem, or a problem requiring surgery or hospital admission. Sometimes those problems cannot be found right away, so it is very important that you follow up as directed.  Sometimes only the changes which occur over time allow the cause of your pain to be found.    Generally, every Emergency Department visit should have a follow-up clinic visit with either a primary or a specialty clinic/provider. Please follow-up as instructed by your emergency provider today. With abdominal pain, we often recommend very close follow-up, such as the following day.    ADULTS:  Return to the Emergency Department right away if:    You get an oral temperature above 102oF or as directed by your provider.  You have blood in your stools. This may be bright red or appear as black, tarry stools.    You keep vomiting (throwing up) or cannot drink liquids.  You see blood when you vomit.   You cannot have a bowel movement or you cannot pass gas.  Your stomach gets bloated or bigger.  Your skin or the whites of your eyes look yellow.  You faint.  You have bloody, frequent or painful urination (peeing).  You have new symptoms or anything that worries you.    CHILDREN:  Return to the Emergency Department right away if your child has any of the above-listed symptoms or the following:    Pushes your hand away or screams/cries when his/her belly is touched.  You notice your child is very fussy or weak.  Your child is very tired and is too tired to eat or drink.  Your child is dehydrated.  Signs of dehydration can be:  Significant  change in the amount of wet diapers/urine.  Your infant or child starts to have dry mouth and lips, or no saliva (spit) or tears.    PREGNANT WOMEN:  Return to the Emergency Department right away if you have any of the above-listed symptoms or the following:    You have bleeding, leaking fluid or passing tissue from the vagina.  You have worse pain or cramping, or pain in your shoulder or back.  You have vomiting that will not stop.  You have a temperature of 100oF or more.  Your baby is not moving as much as usual.  You faint.  You get a bad headache with or without eye problems and abdominal pain.  You have a seizure.  You have unusual discharge from your vagina and abdominal pain.    Abdominal pain is pretty common during pregnancy.  Your pain may or may not be related to your pregnancy. You should follow-up closely with your OB provider so they can evaluate you and your baby.  Until you follow-up with your regular provider, do the following:     Avoid sex and do not put anything in your vagina.  Drink clear fluids.  Only take medications approved by your provider.    MORE INFORMATION:    Appendicitis:  A possible cause of abdominal pain in any person who still has their appendix is acute appendicitis. Appendicitis is often hard to diagnose.  Testing does not always rule out early appendicitis or other causes of abdominal pain. Close follow-up with your provider and re-evaluations may be needed to figure out the reason for your abdominal pain.    Follow-up:  It is very important that you make an appointment with your clinic and go to the appointment.  If you do not follow-up with your primary provider, it may result in missing an important development which could result in permanent injury or disability and/or lasting pain.  If there is any problem keeping your appointment, call your provider or return to the Emergency Department.    Medications:  Take your medications as directed by your provider today.  Before  "using over-the-counter medications, ask your provider and make sure to take the medications as directed.  If you have any questions about medications, ask your provider.    Diet:  Resume your normal diet as much as possible, but do not eat fried, fatty or spicy foods while you have pain.  Do not drink alcohol or have caffeine.  Do not smoke tobacco.    Probiotics: If you have been given an antibiotic, you may want to also take a probiotic pill or eat yogurt with live cultures. Probiotics have \"good bacteria\" to help your intestines stay healthy. Studies have shown that probiotics help prevent diarrhea (loose stools) and other intestine problems (including C. diff infection) when you take antibiotics. You can buy these without a prescription in the pharmacy section of the store.     If you were given a prescription for medicine here today, be sure to read all of the information (including the package insert) that comes with your prescription.  This will include important information about the medicine, its side effects, and any warnings that you need to know about.  The pharmacist who fills the prescription can provide more information and answer questions you may have about the medicine.  If you have questions or concerns that the pharmacist cannot address, please call or return to the Emergency Department.       Remember that you can always come back to the Emergency Department if you are not able to see your regular provider in the amount of time listed above, if you get any new symptoms, or if there is anything that worries you.    "

## 2021-08-14 NOTE — ED PROVIDER NOTES
History     Chief Complaint:  Abdominal Pain    History provided by: supplemented by wife. History limited by: loreta historian.      Santosh Robledo is a 89 year old male with a history of remote cholecystectomy and GERD who presents with abdominal pain. The patient reports that he has had abdominal pain for the last three weeks. He states that he feels as if the pain is from gas. The pain most notably occurs at night , and has issues with burping. He also has some difficulty breathing but this is not new. He has had a lack of appetite and has lost some weight as well. He has no vomiting or fever. He has no urinary symptoms, no diarrhea.  Pain is generalized.  No chest pain.. He denies the use of alcohol.  He takes omeprazole 20 mg daily and also occasional Rolaids.  He went to urgent care today and was referred here for a CT of the abdomen.    Review of Systems   Reason unable to perform ROS: loreta germanian.     Allergies:  Penicillins  Citalopram     Medications:      furosemide  lisinopril   metoprolol succinate ER  omeprazole   potassium chloride ER  sertraline  Simethicone   simvastatin   warfarin   hydrochlorothiazide  Lisinopril        Past Medical History:    AV node dysfunction  Chronic atrial fibrillation  GERD  Hyperlipidemia  MARILU  Anticoagulant therapy  Cheyne-rodriguez breathing  Chronic CHF  Cognitive impairment  pneumonia  C. Difficile colitis  Oral thrush  Physical deconditioning  Leukocytosis  Vitamin B 12 deficiency  Third degree heart block    Past Surgical History:    Pacemaker implant    Family History:    Mother: Influenza/Pneumonia  Father: Prostate Cancer    Social History:  Patient presents to ED with wife  Patient lives at home together with wife  Lives at [address] [place] [address]    Physical Exam     Patient Vitals for the past 24 hrs:   BP Temp Temp src Pulse Resp SpO2 Height Weight   08/14/21 1530 -- -- -- -- -- 98 % -- --   08/14/21 1515 -- -- -- 60 -- 99 % -- --   08/14/21 1500 (!) 168/79 -- -- 60  "-- 95 % -- --   08/14/21 1445 -- -- -- 60 -- 98 % -- --   08/14/21 1430 -- -- -- 65 -- 94 % -- --   08/14/21 1415 -- -- -- 60 -- 93 % -- --   08/14/21 1400 (!) 140/65 -- -- 60 -- 97 % -- --   08/14/21 1345 -- -- -- 60 -- 98 % -- --   08/14/21 1300 (!) 152/72 -- -- 59 -- 95 % -- --   08/14/21 1245 (!) 160/72 -- -- -- -- -- -- --   08/14/21 1205 135/63 97.7  F (36.5  C) Oral 71 16 96 % 1.88 m (6' 2\") 76.2 kg (168 lb)     Physical Exam  General: Nontoxic-appearing male recumbent in room 30, wife at bedside  HENT: mucous membranes moist   CV: rate as above, left-sided cardiac device palpable, slightly irregular rhythm, no lower extremity edema  Resp: normal effort, speaks in full phrases, no stridor, no cough observed  GI: Abdomen soft, mild diffuse tenderness, no discrete masses palpable, no guarding, negative Jackson sign  MSK: no bony tenderness, no CVAT  Skin: appropriately warm and dry, no abdominal rash, no jaundice  Neuro: alert, clear speech, oriented though somewhat poor historian,  equal, lifts both legs off bed, no nuchal rigidity  Psych: cooperative    Emergency Department Course   ECG:  ECG taken at 1210, ECG read at 1318  Left ventricular hypertrophy with repolarization abnormality   Rate 62 bpm. GA interval * ms. QRS duration 88 ms. QT/QTc 454/460 ms. P-R-T axes * 54 -70.     Imaging:  CT Chest/Abdomen/Pelvis w Contrast  Preliminary Result  IMPRESSION: The exam is limited by motion/respiratory artifact,  ________ this limitation, there is;  1. No CT findings to explain patient's symptoms of weight loss.  2. Moderate size hiatal hernia.  3. Cardiomegaly and moderate atherosclerotic vascular calcification of  the coronary arteries and thoracoabdominal aorta. Extensive pleural  calcification, could be related to prior asbestos exposure.  5. Biapical scarring.  6. 4 mm left kidney stone. No right kidney stone. No ureteral stone or  hydronephrosis in either kidney.  7. " Prostatomegaly.      Laboratory:  CBC: WBC 9.4, HGB 14.0,   CMP:  Anion Gap 1 L, Glucose 100 H o/w WNL (Creatinine 1.03)  Lipase:127  INR/Prothrombin Time: 2.57 H      Emergency Department Course:    Reviewed:  I reviewed nursing notes, vitals, past history and care everywhere    Assessments:  1259 I obtained history and examined the patient as noted above.   1511 I rechecked the patient and explained findings.       Disposition:  The patient was discharged to home.    Impression & Plan    Medical Decision Making:  Fortunately, there are no dangerous causes identified of his subacute abdominal pain and poor appetite.  Differential included neoplasm, obstruction, internal hemorrhage, infection, and many others.  No evidence of dangerous thoracic conditions either.  He is tolerating oral intake and ambulatory.  He and his wife are very comfortable with the plan for discharge home with close outpatient follow-up.  In the meantime, I recommended that he double his omeprazole dose.  GI referral information was given.  He should return here for sudden worsening at any time.    Diagnosis:    ICD-10-CM    1. Abdominal discomfort  R10.9    2. Weight loss  R63.4    3. Hiatal hernia  K44.9        Scribe Disclosure:  I, Nicolas Hired, am serving as a scribe at 12:58 PM on 8/14/2021 to document services personally performed by Liban Masterson MD based on my observations and the provider's statements to me.     This note was completed in part using Dragon voice recognition software. Although reviewed after completion, some word and grammatical errors may occur.       Liban Masterson MD  08/14/21 [id number]

## 2021-08-16 NOTE — PROGRESS NOTES
ANTICOAGULATION  MANAGEMENT: Discharge Review    Santosh KENNY Jassonstephanie chart reviewed for anticoagulation continuity of care    Emergency room visit on 8/14 for Abd discomfort and weight loss.    Discharge disposition: Home    Results:    Recent labs: (last 7 days)     08/14/21  1209   INR 2.57*     Anticoagulation inpatient management:     home regimen continued    Anticoagulation discharge instructions:     Warfarin dosing: home regimen continued   Bridging: No   INR goal change: No      Medication changes affecting anticoagulation: No    Additional factors affecting anticoagulation: No    Plan     No adjustment to anticoagulation plan needed    Spoke with wife Candida    No adjustment to Anticoagulation Calendar was required    Susannah Freeman RN

## 2021-08-17 NOTE — PROGRESS NOTES
ANTICOAGULATION MANAGEMENT     Santosh KENNY Hjelmstad 89 year old male is on warfarin with therapeutic INR result. (Goal INR 2.0-3.0)    Recent labs: (last 7 days)     08/14/21  1209   INR 2.57*       ASSESSMENT     Source(s): Chart Review and Patient/Caregiver Call       Warfarin doses taken: Warfarin taken as instructed    Diet: Diet has improved since increase in Omeprazole    New illness, injury, or hospitalization: Yes: 8/14 ED for Abd discomfort    Medication/supplement changes: ER is having Josh double his omeprazole dose    Signs or symptoms of bleeding or clotting: No    Previous INR: Subtherapeutic    Additional findings: None     PLAN     Recommended plan for no diet, medication or health factor changes affecting INR     Dosing Instructions: Continue your current warfarin dose with next INR in 2 weeks       Summary  As of 8/16/2021    Full warfarin instructions:  5 mg every Sat; 2.5 mg all other days   Next INR check:  8/27/2021             Telephone call with Santosh who verbalizes understanding and agrees to plan    Lab visit scheduled    Education provided: Please call back if any changes to your diet, medications or how you've been taking warfarin    Plan made per [facility] anticoagulation protocol    Susannah Freeman RN  Anticoagulation Clinic  8/17/2021    _______________________________________________________________________     Anticoagulation Episode Summary     Current INR goal:  2.0-3.0   TTR:  71.8 % (1 y)   Target end date:  Indefinite   Send INR reminders to:  JOHN CALDERON    Indications    Chronic atrial fibrillation (H) [I48.20]  Chronic anticoagulation [Z79.01]  Long term current use of anticoagulant therapy [Z79.01]           Comments:           Anticoagulation Care Providers     Provider Role Specialty Phone number    Lilly Zepeda MD Referring Internal Medicine [phone]

## 2021-08-25 NOTE — TELEPHONE ENCOUNTER
Routing refill request to provider for review/approval because:  Drug not on the [facility] refill protocol ,blood pressure under 140/90 in past 12 months    Deborah Alvarez RN  [facility] Triage

## 2021-08-26 NOTE — TELEPHONE ENCOUNTER
ADRIANA Castaneda  has spoken to the patient .  He was suggesting the patient  go to the ER due to dehydration and constipation concerns.     Burping continues and this was evaluated in the ER one week ago.  ER visit  felt it was due to hiatal hernia. CT ordered.     Patient does not want to go to the ER now, he will go in if symptoms become worst.     Patient told you do not have to have a bowel movement everyday. He shows no signs of constipation having gas and  BM yesterday. Patient denies weakness , is orientated x 3. No fever according to his wife.   Appetite is okay and is drinking fluids.     Appointment scheduled with Dr. Zepeda for next week.     No protocal for consistent burping or belching available.     Deborah Alvarez RN  -[facility]

## 2021-08-26 NOTE — ED TRIAGE NOTES
Pt and wife reports pt is having mid abdominal pain, increased weakness and fatigue that has increased over the past week. Was seen recently but nothing serious was found. Wife also feels he is dehydrated since he is not eating/drinking well.

## 2021-08-26 NOTE — TELEPHONE ENCOUNTER
Lilly Zepeda MD  [facility] Triage 2 hours ago (11:34 AM)   ANITA Cain, schedule is full, recommend UC or ER evaluation     Message text

## 2021-08-26 NOTE — TELEPHONE ENCOUNTER
"S-(situation): Pt wife calling to report symptoms    B-(background): Pt was seen in ER a few weeks ago for stomach pain and vomiting. Pt get treatment, fluids felt better. Was suppopse to f/u with GI but did not. Pt noted he has been feeling very tired, weak, not eating or drinking.    A-(assessment): Writer questions intake:Ensure this morning, tried yogurt but could not eat. Having belching, stomach pain. Water consumed, 16 ounces last 24 hour. Last Urination: minutes ago, dark color. BM: BM yesterday, \"piece mail\", small melba    R-(recommendations): PT advised to be seen in ER d/t possible SBO, dehydration, nutrition deficiency. Pt wife noted and accepted information. Pt declined wanting to go, wife stated she will talk to him.      Reason for Disposition    Constant abdominal pain lasting > 2 hours    Patient sounds very sick or weak to the triager    Additional Information    Nursing judgment    Answer Assessment - Initial Assessment Questions  1. LOCATION: \"Where does it hurt?\"       stomach  2. RADIATION: \"Does the pain shoot anywhere else?\" (e.g., chest, back)      no  3. ONSET: \"When did the pain begin?\" (e.g., minutes, hours or days ago)       Weeks ago  4. SUDDEN: \"Gradual or sudden onset?\"      gradual  5. PATTERN \"Does the pain come and go, or is it constant?\"     - If constant: \"Is it getting better, staying the same, or worsening?\"       (Note: Constant means the pain never goes away completely; most serious pain is constant and it progresses)      - If intermittent: \"How long does it last?\" \"Do you have pain now?\"      (Note: Intermittent means the pain goes away completely between bouts)      constant  6. SEVERITY: \"How bad is the pain?\"  (e.g., Scale 1-10; mild, moderate, or severe)     - MILD (1-3): doesn't interfere with normal activities, abdomen soft and not tender to touch      - MODERATE (4-7): interferes with normal activities or awakens from sleep, tender to touch      - SEVERE (8-10): " "excruciating pain, doubled over, unable to do any normal activities        moderate  7. RECURRENT SYMPTOM: \"Have you ever had this type of abdominal pain before?\" If so, ask: \"When was the last time?\" and \"What happened that time?\"       yes  8. AGGRAVATING FACTORS: \"Does anything seem to cause this pain?\" (e.g., foods, stress, alcohol)      food  9. CARDIAC SYMPTOMS: \"Do you have any of the following symptoms: chest pain, difficulty breathing, sweating, nausea?\"      none  10. OTHER SYMPTOMS: \"Do you have any other symptoms?\" (e.g., fever, vomiting, diarrhea)        Belching, constipation  11. PREGNANCY: \"Is there any chance you are pregnant?\" \"When was your last menstrual period?\"        no    Answer Assessment - Initial Assessment Questions  1. REASON FOR CALL: \"What is your main concern right now?\"      Fatigue, upset stomach, unable to eat or drink  2. ONSET: \"When did the symptoms start?\"      Over a week ago   3. SEVERITY: \"How bad is the symptoms?\"      Moderate to severe  4. FEVER: \"Do you have a fever?\"      no  5. OTHER SYMPTOMS: \"Do you have any other new symptoms?\"      Some constipation,   6. INTERVENTIONS AND RESPONSE: \"What have you done so far to try to make this better? What medications have you used?\"      Been to ER, did get relief, now getting worse  7. PREGNANCY: \"Is there any chance you are pregnant?\"      No    Protocols used: ABDOMINAL PAIN - UPPER-A-OH, NO PROTOCOL AVAILABLE - SICK ADULT-A-[place]    Leonel ELIZONDO RN   [facility] Triage    "

## 2021-08-26 NOTE — TELEPHONE ENCOUNTER
Reason for Call:  Other appointment    Detailed comments: Candida calling to see if Dr. Zepeda could possibly see Josh today or tomorrow for an Urgent Care follow up. He had an MRI done after concerns of him burping constantly. She said he is not doing good today at all and is hoping they can get him in asap    Phone Number Patient can be reached at: Cell number on file:    Telephone Information:   Mobile [phone]       Best Time: anytime    Can we leave a detailed message on this number? YES    Call taken on 8/26/2021 at 10:35 AM by Gail Epperson

## 2021-08-27 PROBLEM — I21.4 NSTEMI (NON-ST ELEVATED MYOCARDIAL INFARCTION) (H): Status: ACTIVE | Noted: 2021-01-01

## 2021-08-27 NOTE — CONSULTS
"CLINICAL NUTRITION SERVICES  -  ASSESSMENT NOTE    Recommendations Ordered by Registered Dietitian (RD):   - Vanilla Ensure @ 2 pm    Malnutrition:   % Weight Loss:  > 5% in 1 month (severe malnutrition)  % Intake:  </= 75% for >/= 1 month (severe malnutrition)  Subcutaneous Fat Loss:  Orbital region mild-moderate depletion, Upper arm region mild-moderate depletion and Thoracic region mild-moderate depletion  Muscle Loss:  Temporal region mild-moderate depletion, Clavicle bone region moderate depletion and Dorsal hand region mild-moderate depletion  Fluid Retention:  None noted    Malnutrition Diagnosis: Severe malnutrition  In Context of:  Acute illness or injury     REASON FOR ASSESSMENT  Santosh Robledo is a 89 year old male seen by Registered Dietitian for Nutrition Admission Risk Screen Received -   Have you recently lost weight without trying in the last 6 months ? - \"yes 2-13 pounds\"  Have you been eating poorly due to a decreased appetite ?- \"yes\"    NUTRITION HISTORY  - Information obtained from chart, patient, and spouse.   - ~2 weeks reduced appetite and oral intakes due to abdominal pain.   - Josh has been eating less lately, due to reduced appetite.   - In the morning he has some berries in half&half.   - In the afternoon, he drinks an ensure.   - for dinner he likes eggs, meatballs, yogurt.   - Spouse reports at least 10# lost recently.   - NKFA    CURRENT NUTRITION ORDERS  Diet Order:     Regular     Current Intake/Tolerance:  Assitsted pt in ordering dinner, and an afternoon Ensure. Tolerated some oatmeal for breakfast.     NUTRITION FOCUSED PHYSICAL ASSESSMENT FOR DIAGNOSING MALNUTRITION)  Yes         Observed:    Muscle wasting (refer to documentation in Malnutrition section) and Subcutaneous fat loss (refer to documentation in Malnutrition section)    Obtained from Chart/Interdisciplinary Team:  Last BM 8/25  Fernando - Nutrition 2; Total 16    ANTHROPOMETRICS  Height: 6' 3\"  Weight: 169 lbs 0 " oz (76.7 kg)   Body mass index is 21.12 kg/m .  Weight Status:  Normal BMI  IBW: 89 kg   % IBW: 86%  Weight History: 10# loss reported. Appears pt has gone from ~180 --> ~169# in 1 month (6%).   Wt Readings from Last 10 Encounters:   08/27/21 76.7 kg (169 lb)   08/14/21 76.2 kg (168 lb)   08/14/21 74.4 kg (164 lb)   07/23/21 77.1 kg (170 lb)   09/21/20 78.2 kg (172 lb 8 oz)   07/29/20 77.6 kg (171 lb)   07/21/20 81.6 kg (180 lb)   09/26/19 82.1 kg (181 lb)   09/06/19 83 kg (183 lb)   07/02/19 80.3 kg (177 lb)       LABS  Labs reviewed    MEDICATIONS  Medications reviewed  Lasix 10 mg BID  NaCl IVF @ 125 mL/hr     ASSESSED NUTRITION NEEDS PER APPROVED PRACTICE GUIDELINES:  Dosing Weight 76.7 kg   Estimated Energy Needs: 1998-0067 kcals (25-30 Kcal/Kg)  Justification: maintenance  Estimated Protein Needs:  grams protein (1.2-1.5 g pro/Kg)  Justification: CKD  Estimated Fluid Needs: 1 mL/kcal  Justification: maintenance    MALNUTRITION:  % Weight Loss:  > 5% in 1 month (severe malnutrition)  % Intake:  </= 75% for >/= 1 month (severe malnutrition)  Subcutaneous Fat Loss:  Orbital region mild-moderate depletion, Upper arm region mild-moderate depletion and Thoracic region mild-moderate depletion  Muscle Loss:  Temporal region mild-moderate depletion, Clavicle bone region moderate depletion and Dorsal hand region mild-moderate depletion  Fluid Retention:  None noted    Malnutrition Diagnosis: Severe malnutrition  In Context of:  Acute illness or injury    NUTRITION DIAGNOSIS:  Unintended weight loss related to reduced appetite as evidenced by 6% wt loss in 1 month, reported poor oral intakes, fat and muscle losses on exam.       NUTRITION INTERVENTIONS  Recommendations / Nutrition Prescription  Regular diet  Ensure @ 2 Pm     Implementation  Nutrition education: Provided education on ordering process, supplements sent automatically.   Medical Food Supplement: as above      Nutrition Goals  Intake of at least 50%  meals TID + 1 supplement daily.       MONITORING AND EVALUATION:  Progress towards goals will be monitored and evaluated per protocol and Practice Guidelines    Valery Ochoa RD, LD  [facility] [place], [address], [facility], [facility]   Pager: [phone]  Weekend Pager: [phone]

## 2021-08-27 NOTE — CONSULTS
[facility]    Cardiology Consultation     Date of Admission:  8/26/2021    Assessment & Plan   Santosh Robledo is a 89 year old male who was admitted on 8/26/2021.    A very pleasant 89 gentleman with history of atrial fibrillation on Coumadin for stroke prophylaxis, tachybradycardia syndrome s/p pacemaker implantation, history of hypertension who is admitted with about 2 weeks of abdominal discomfort.  On presentation he was found to have elevated troponin with inferior leads Q waves which is new compared to EKG 2 weeks ago.  Most likely this represent recent myocardial infarction.  His main symptoms are abdominal discomfort.  Presently he is pain-free.  I had a long discussion with patient regarding his clinical presentation and also with his wife at bedside.  We discussed in detail options of medical management versus medical management plus coronary angiogram.  Risk benefits of each options were discussed in detail.  Both patient and his wife tell me that they are strongly leaning towards only conservative medical management but would also like to talk to the rest of the family that children.  His INR was elevated more than 3 appropriately heparin drip has been withheld.  He is on aspirin.  I recommend adding metoprolol 25 mg daily to begin with, Lipitor 40 mg daily and load with Plavix 60 mg daily and 75 mg from tomorrow.  Echocardiogram which is pending.    1.  Acute coronary syndrome with recent subacute inferior myocardial infarction.  Symptoms of abdominal discomfort.  Now inferior Q waves.  Presently asymptomatic.  2.  Chronic atrial fibrillation on Coumadin, INR at presentation 3.34, Coumadin has been withheld.  3.  Tachybradycardia syndrome s/p pacemaker and plantation.  Last device check showing 89% ventricular pacing with normal sensing pacing threshold and impedance and 6-1/2 years of battery longevity.    Recommendations  Discussed in detail about medical management  versus medical management plus coronary angiogram.  Both patient and his wife are very strongly leaning towards conservative medical management but would like to discuss with the rest of the family.  For now we will continue medical management.  Continue aspirin  Add Lipitor 40 mg daily  Add Toprol-XL 25 mg daily  Plavix 600 mg once  Plavix 75 mg from tomorrow  Echocardiogram is pending.    Thank you for involving the care of Mr Robledo    Had a long discussion with patient and his wife at bedside and also Dr. Severt (radiologist at [facility] who is patient's son-in-law, phone #[phone]).  We discussed at length his clinical presentation.  This is a subacute inferior infarct likely completed.  We discussed options of medical management versus medical management plus coronary angiogram and risk-benefit of each strategy.  After long discussion weighing pros and cons of each strategy we all made a mutually shared decision to continue medical management at this time.  Continue aspirin, Plavix, statin, beta-blocker.  Awaiting echocardiogram.  If there is evidence of ischemic cardiomyopathy would recommend ACE inhibitor's.  Given INR is still elevated risk benefits do not favor adding heparin at this time.  If patient has recurrent anginal symptoms or arrhythmias would recommend revisiting option of coronary angiogram with patient and family.      Madi Bolivar MD    Primary Care Physician   Lilly Zepeda    Reason for Consult   Reason for consult: I was asked by Dr. Franklin to evaluate this patient for non-ST elevation myocardial infarction.    History of Present Illness   Santosh Robledo is a 89 year old male who presents with abdominal discomfort for the last 2 weeks.  Upon presentation he was found to have elevated troponin downtrending and EKG showing inferior Q waves and nonspecific lateral precordial leads ST changes, EKG 2 weeks ago did not show any inferior Q waves.  Patient is presently asymptomatic.  He has  history of chronic atrial fibrillation on Coumadin.  Prophylaxis tachybradycardia syndrome s/p pacemaker plantation and hypertension.  Patient denies any chest discomfort or particular shortness of breath.    Patient Active Problem List   Diagnosis     Gastroesophageal reflux disease, esophagitis presence not specified     Chronic atrial fibrillation (H)     Status cardiac pacemaker     Chronic anticoagulation     Benign essential hypertension     Hyperlipidemia LDL goal <100     Vitamin B12 deficiency (non anemic)     Colitis     Advance Care Planning     Near syncope     AV node dysfunction     Hyperlipidemia     MARILU (obstructive sleep apnea)     Acute exacerbation of CHF (congestive heart failure) (H)     Pneumonia     C. difficile colitis     Oral thrush     Physical deconditioning     Leukocytosis     Encounter for monitoring Coumadin therapy     Chronic diastolic (congestive) heart failure (H)     Cognitive impairment     Cheyne-Love breathing     Gross hematuria     Impaired fasting glucose     Long term current use of anticoagulant therapy     NSTEMI (non-ST elevated myocardial infarction) (H)       Past Medical History   I have reviewed this patient's medical history and updated it with pertinent information if needed.   Past Medical History:   Diagnosis Date     AV node dysfunction      Chronic atrial fibrillation (H) 2004     GERD (gastroesophageal reflux disease)      Hyperlipidemia      Near syncope      MARILU (obstructive sleep apnea)        Past Surgical History   I have reviewed this patient's surgical history and updated it with pertinent information if needed.  Past Surgical History:   Procedure Laterality Date     IMPLANT PACEMAKER  08/10/2015       Prior to Admission Medications   Prior to Admission Medications   Prescriptions Last Dose Informant Patient Reported? Taking?   [employer] VITAMIN B12 1000 MCG tablet 8/26/2021 at Unknown time Self No Yes   Sig: TAKE 1 TABLET BY MOUTH EVERY DAY   Ca  Carbonate-Mag Hydroxide (ROLAIDS PO)  Self Yes No   Sig: Take by mouth 3 times daily as needed    Simethicone (GAS-X EXTRA STRENGTH) 125 MG CAPS 8/26/2021 at Unknown time Self No Yes   Sig: Take 1 capsule by mouth 2 times daily   furosemide (LASIX) 40 MG tablet 8/26/2021 at Unknown time Self No Yes   Sig: TAKE HALF TAB (20MG) BY MOUTH EVERY MORNING AND HALF TAB (20 MG) BY MOUTH EVERY EVENING   lisinopril (ZESTRIL) 20 MG tablet 8/26/2021 at Unknown time Self No Yes   Sig: Take 1 tablet (20 mg) by mouth daily   metoprolol succinate ER (TOPROL XL) 50 MG 24 hr tablet 8/26/2021 at Unknown time Self No Yes   Sig: Take 1 tablet (50 mg) by mouth At Bedtime   omeprazole (PRILOSEC) 20 MG DR capsule 8/26/2021 at Unknown time Self No Yes   Sig: TAKE 1 CAPSULE BY MOUTH EVERY DAY   Patient taking differently: Take 20 mg by mouth 2 times daily    order for DME  Self No No   Sig: Equipment being ordered: Walker Wheels () and Walker ()  Treatment Diagnosis: Difficulty ambulating   order for DME  Self No No   Sig: Equipment being ordered: Walker Wheels ()  Treatment Diagnosis:  Weakness,colitis.   potassium chloride ER (KLOR-CON) 20 MEQ CR tablet 8/26/2021 at Unknown time Self No Yes   Sig: Take 1 tablet (20 mEq) by mouth 2 times daily   sertraline (ZOLOFT) 50 MG tablet 8/26/2021 at Unknown time Self No Yes   Sig: Take 1 tablet (50 mg) by mouth daily   Patient taking differently: Take 100 mg by mouth daily    simvastatin (ZOCOR) 10 MG tablet 8/26/2021 at Unknown time Self No Yes   Sig: TAKE 1 TABLET BY MOUTH EVERYDAY AT BEDTIME   warfarin ANTICOAGULANT (COUMADIN) 5 MG tablet 8/26/2021 at Unknown time Self Yes Yes   Sig: Take 5 mg daily      Facility-Administered Medications: None     Current Facility-Administered Medications   Medication Dose Route Frequency     atorvastatin  40 mg Oral QPM     clopidogrel  600 mg Oral Once     [START ON 8/28/2021] clopidogrel  75 mg Oral Daily     metoprolol succinate ER  25 mg Oral  "Daily     sodium chloride (PF)  3 mL Intracatheter Q8H     Current Facility-Administered Medications   Medication Last Rate     sodium chloride 125 mL/hr at 08/27/21 0421     Allergies   Allergies   Allergen Reactions     Penicillins Rash       Social History    reports that he has never smoked. He has never used smokeless tobacco. He reports that he does not drink alcohol and does not use drugs.    Family History   Family History   Problem Relation Age of Onset     Influenza/Pneumonia Mother      Prostate Cancer Father        Review of Systems   The comprehensive 10 point Review of Systems is negative other than noted in the HPI or here.     Physical Exam   Vital Signs with Ranges  Temp:  [97  F (36.1  C)-98  F (36.7  C)] 97.8  F (36.6  C)  Pulse:  [59-73] 73  Resp:  [18-46] 24  BP: ()/(48-79) 123/79  SpO2:  [95 %-100 %] 100 %  Wt Readings from Last 4 Encounters:   08/27/21 76.7 kg (169 lb)   08/14/21 76.2 kg (168 lb)   08/14/21 74.4 kg (164 lb)   07/23/21 77.1 kg (170 lb)     No intake/output data recorded.      Vitals: /79 (BP Location: Right arm)   Pulse 73   Temp 97.8  F (36.6  C) (Oral)   Resp 24   Ht 1.905 m (6' 3\")   Wt 76.7 kg (169 lb)   SpO2 100%   BMI 21.12 kg/m    General patient appears comfortable  Neck normal JVP   cardiovascular system irregular normal rate no murmur rub or gallop   Respiratory system clear to auscultation    extremities no edema   Neurological alert, oriented    No lab results found in last 7 days.    Invalid input(s): TROPONINIES    Recent Labs   Lab 08/27/21  0534 08/26/21  1905   WBC 19.2* 20.0*   HGB 13.5 14.7   MCV 86 84    355   INR 3.34* 2.77*    138   POTASSIUM 4.5 4.9   CHLORIDE 109 108   CO2 24 25   BUN 26 23   CR 1.10 1.02   GFRESTIMATED 58* 64   ANIONGAP 5 5   ANGELITA 8.9 9.9   * 172*   ALBUMIN 2.9* 3.6   PROTTOTAL 7.2 8.4   BILITOTAL 1.2 1.2   ALKPHOS 59 66   ALT 38 34   AST 39 38   LIPASE  --  50*     Recent Labs   Lab Test " 07/23/21  1131 06/28/19  0959   CHOL 206* 161   HDL 36* 36*   * 88   TRIG 172* 186*     Recent Labs   Lab 08/27/21  0534 08/26/21  1905   WBC 19.2* 20.0*   HGB 13.5 14.7   HCT 42.9 45.8   MCV 86 84    355     No results for input(s): PH, PHV, PO2, PO2V, SAT, PCO2, PCO2V, HCO3, HCO3V in the last 168 hours.  No results for input(s): NTBNPI, NTBNP in the last 168 hours.  No results for input(s): DD in the last 168 hours.  No results for input(s): SED, CRP in the last 168 hours.  Recent Labs   Lab 08/27/21  0534 08/26/21  1905    355     No results for input(s): TSH in the last 168 hours.  No results for input(s): COLOR, APPEARANCE, URINEGLC, URINEBILI, URINEKETONE, SG, UBLD, URINEPH, PROTEIN, UROBILINOGEN, NITRITE, LEUKEST, RBCU, WBCU in the last 168 hours.    Imaging:  Recent Results (from the past 48 hour(s))   CT Abdomen Pelvis w Contrast    Narrative    EXAM: CT ABDOMEN PELVIS W CONTRAST  LOCATION: [facility]  DATE/TIME: 8/27/2021 12:49 AM    INDICATION: Abdominal pain, acute, nonlocalized  COMPARISON: 8/14/2021  TECHNIQUE: CT scan of the abdomen and pelvis was performed following injection of IV contrast. Multiplanar reformats were obtained. Dose reduction techniques were used.  CONTRAST: 82mL Isovue-370    FINDINGS:   LOWER CHEST: No change in extensive calcific pleural plaques and moderate hiatal hernia.    HEPATOBILIARY: Fatty infiltration of liver. Prior cholecystectomy.    PANCREAS: Normal.    SPLEEN: Normal.    ADRENAL GLANDS: Normal.    KIDNEYS/BLADDER: Kidneys negative, no stones or hydronephrosis.  Mild diffuse bladder wall thickening.    BOWEL: Diverticulosis of the colon. No acute inflammatory change. No obstruction. Appendix normal.    LYMPH NODES: Normal.    VASCULATURE: Diffuse atherosclerotic plaque.    PELVIC ORGANS: Moderate prostatic enlargement. No ascites.    MUSCULOSKELETAL: Unremarkable.      Impression    IMPRESSION:   1.  No etiology for  symptoms evident. There is mild diverticulosis but no active inflammation involving bowel.  2.  Hiatal hernia and extensive pleural plaques. Fatty infiltration of liver.       Echo:  No results found for this or any previous visit (from the past 4320 hour(s)).

## 2021-08-27 NOTE — PROGRESS NOTES
[facility]    Hospitalist Progress Note    Interval History   - Patient denies any pain or discomfort today. Somewhat forgetful but A&Ox4.  - I discussed Dr. Bolivar' recent discussion with wife Candida. She actually does not remember in detail the discussion and wasn't sure why she decided against an angiogram, so I explained it to her. I then called the daughter Thelma, whose  is a radiologist at [employer], to ensure that Dr. Bolivar' discussions were made clear to the family. Thelma states that her  may call back later today to clarify the discussion of angiogram vs no angiogram with Dr. Bolivar.    Assessment & Plan   Summary: Santosh Robledo is a 89 year old male with PMH tachy-gaudencio syndrome s/p pacemaker, atrial fibrillation on warfarin, who was admitted on 8/26/2021 with NSTEMI.    Inferior NSTEMI: Patient reports 1-2 weeks of abdominal pain. EKG notes inferior Q waves, troponin is stably elevated around 3. Evidence points to a recent MI.   - Echo pending  - Appreciate Cardiology consult   - Plavix and metoprolol initiated   - Currently planning for medical management, but probably will need repeat discussion with son-in-law who is a radiologist at [facility] [place]  - Holding warfarin due to elevated INR, consider resuming warfarin in 1-2 days if no angiogram, otherwise if proceeding with angiogram then starting heparin drip when INR < 2  - Trend troponin  - Continue metoprolol    Hypertension: /79 here  - Holding lisinopril    Lactic acidosis: Improved with IVF    Leukocytosis: CT chest C/A/P no obvious pathology, UA also fairly bland, this is suspected to be due to recent MI above    GERD: Resume omeprazole    Tachybrady syndrome s/p pacemaker  - See above for warfarin management    Hyperlipidemia: Statin per cardiology    DVT Prophylaxis: Warfarin  Code Status: No CPR- Do NOT Intubate  PT/OT: not yet  Diet: Regular Diet Adult      Disposition: Expected discharge 1-3 days pending  further cardiac management    - I spent 35 minutes, with greater than 50% spent in counseling and coordination of care with the patient and wife and daughter, regarding NSTEMI.    Malik Moore MD  Text Page  (7am to 6pm)  -Data reviewed today: I reviewed all new labs and imaging results over the last 24 hours.    Physical Exam   Temp: 97.8  F (36.6  C) Temp src: Oral BP: 123/79 Pulse: 73   Resp: 24 SpO2: 100 % O2 Device: None (Room air)    Vitals:    08/26/21 1857 08/27/21 0345 08/27/21 0524   Weight: 74.4 kg (164 lb) 75.8 kg (167 lb 1.6 oz) 76.7 kg (169 lb)     Vital Signs with Ranges  Temp:  [97  F (36.1  C)-98  F (36.7  C)] 97.8  F (36.6  C)  Pulse:  [59-73] 73  Resp:  [18-46] 24  BP: ()/(48-79) 123/79  SpO2:  [95 %-100 %] 100 %  No intake/output data recorded.  O2 requirements: non    Constitutional: Elderly male in NAD  HEENT: Eyes nonicteric, oral mucosa moist  Cardiovascular: RRR, normal S1/2, no m/r/g  Respiratory: CTAB, no wheezing or crackles  Vascular: No LE pitting edema  GI: Normoactive bowel sounds, nontender  Skin/Integumen: No rashes  Neuro/Psych: Appropriate affect and mood. A&Ox3, moves all extremities    Medications     sodium chloride 125 mL/hr at 08/27/21 0421       atorvastatin  40 mg Oral QPM     clopidogrel  600 mg Oral Once     [START ON 8/28/2021] clopidogrel  75 mg Oral Daily     furosemide  10 mg Oral BID     metoprolol succinate ER  25 mg Oral Daily     omeprazole  20 mg Oral BID     sertraline  100 mg Oral Daily     sodium chloride (PF)  3 mL Intracatheter Q8H       Data   Recent Labs   Lab 08/27/21  0534 08/26/21  1905   WBC 19.2* 20.0*   HGB 13.5 14.7   MCV 86 84    355   INR 3.34* 2.77*    138   POTASSIUM 4.5 4.9   CHLORIDE 109 108   CO2 24 25   BUN 26 23   CR 1.10 1.02   ANIONGAP 5 5   ANGELITA 8.9 9.9   * 172*   ALBUMIN 2.9* 3.6   PROTTOTAL 7.2 8.4   BILITOTAL 1.2 1.2   ALKPHOS 59 66   ALT 38 34   AST 39 38   LIPASE  --  50*   TROPONIN 3.101* 3.177*        Imaging:   Recent Results (from the past 24 hour(s))   CT Abdomen Pelvis w Contrast    Narrative    EXAM: CT ABDOMEN PELVIS W CONTRAST  LOCATION: [facility]  DATE/TIME: 8/27/2021 12:49 AM    INDICATION: Abdominal pain, acute, nonlocalized  COMPARISON: 8/14/2021  TECHNIQUE: CT scan of the abdomen and pelvis was performed following injection of IV contrast. Multiplanar reformats were obtained. Dose reduction techniques were used.  CONTRAST: 82mL Isovue-370    FINDINGS:   LOWER CHEST: No change in extensive calcific pleural plaques and moderate hiatal hernia.    HEPATOBILIARY: Fatty infiltration of liver. Prior cholecystectomy.    PANCREAS: Normal.    SPLEEN: Normal.    ADRENAL GLANDS: Normal.    KIDNEYS/BLADDER: Kidneys negative, no stones or hydronephrosis.  Mild diffuse bladder wall thickening.    BOWEL: Diverticulosis of the colon. No acute inflammatory change. No obstruction. Appendix normal.    LYMPH NODES: Normal.    VASCULATURE: Diffuse atherosclerotic plaque.    PELVIC ORGANS: Moderate prostatic enlargement. No ascites.    MUSCULOSKELETAL: Unremarkable.      Impression    IMPRESSION:   1.  No etiology for symptoms evident. There is mild diverticulosis but no active inflammation involving bowel.  2.  Hiatal hernia and extensive pleural plaques. Fatty infiltration of liver.

## 2021-08-27 NOTE — PROGRESS NOTES
Received call from the ED regarding this 89 yo patient with a fib (on AC) who presents with abdominal pain. No h/o known CAD. W/u showed troponin of >3, Q waves inferiorly (but no ST elevation) which are new compared to ekg few days ago. LVH with repol.  PT is not sure if he wants intervention. Would like to discuss with his family in the morning. CT chest few days ago showed sever calcification in the RCA distribution for a similar presentation. Trops not checked at that time.  Hemodynamically stable. Likely subacute inferior MI.     Plan:  1. Hold Coumadine.   2. Once INR is <2, start heparin drip. Give 325 mg po ASA x1   3. Start nitroglycerin drip to see if pain improves.   4. Echocardiogram to assess WMA.   5. Continue to trend troponin.  6. Full cardiology consult to follow.     MD Bogdan  Cardiology

## 2021-08-27 NOTE — H&P
[facility]    History and Physical - Hospitalist Service       Date of Admission:  8/26/2021    Assessment & Plan      Santosh Robledo is a 89 year old male admitted on 8/26/2021. He has a PMH most remarkable for tachy-gaudencio syndrome (with pacemaker), atiral fibrillation (on anticoagulation), who presented with 1 week of abdominal / chest pain and weakness and was admitted for an NSTEMI.    1. NSTEMI. Now pain free (abdominal pain). Likely happened a few days ago per patient's symptoms, which is probably the reason for low appetite and elevated white count and q-waves on ECG. Patient isn't sure he would want to proceed with invasive testing, but will think about it and family will discuss overnight.  - Echo; cardiology consult  - Heparin withheld right now per cardiology recs, will recheck INR in the AM and if < 2, initiate heparin infusion  - NPO  - Had aspirin in the ER  - Trend troponin    2. Elevated Lactate / Leukocytosis. Probably secondary to above.  - Got 1L of fluids in the ER with improved his lactate from 2.7 to 2.3  - CT CAP non-remarkable    Chronic Issues will await pharmacy med rec prior to ordering home medications  1. Atrial Fibrillation on Anticoagulation.  Allow INR to drift down, initiate heparin infusion when it becomes below 2  2. GERD  3. MARILU  4. Tachy-Gaudencio Syndrome Status post Dual Chamber Pacer  5. HLD     Diet:  NPO  DVT Prophylaxis: Warfarin  Ni Catheter: Not present  Central Lines: None  Code Status:   DNR/DNI confirmed with patient on admission, which was consistent with previous code statuses    Clinically Significant Risk Factors Present on Admission             # Coagulation Defect: home medication list includes an anticoagulant medication       Disposition Plan   Expected discharge: Patient is being admitted with acute coronary syndrome. I anticipate he will need 2-3 days of admission and will likely discharge to a TCU; however, will need PT/OT to  assess patient and make a formal recommendation.     The patient's care was discussed with the Bedside Nurse, Patient and Patient's Family.    Angel Franklin MD PhD  [facility]  Securely message with the Vocera Web Console (learn more here)  Text page via [facility] Paging/Directory      ______________________________________________________________________    Chief Complaint   Chest / Abdominal Pain    History is obtained from the patient    History of Present Illness   Santosh Robledo is a 89 year old male who has a PMH most remarkable for tachy-gaudencio syndrome (with pacemaker), atiral fibrillation (on anticoagulation), who presented with 1 week of abdominal / chest pain and weakness and was admitted for an NSTEMI.    Patient presented to the emergency department initially on August 14 with abdominal pain.  He was evaluated and no obvious source of his abdominal pain was found, so he was discharged with instructions to increase his Prilosec dose.  This resolved the symptoms and because they went away he did not follow-up with GI as instructed.    Now over the past 5 to 7 days, patient has had increased weakness, fatigue, and abdominal pain.  He has not moved much nor has he eaten or drink much.  He states that the abdominal pain got much worse this morning and he describes it in his epigastric area.  It does not radiate, and it does not come on with activity.    Patient denies other symptoms including fevers, chills, chest pain, shortness of breath.  No dizziness, lightheadedness, syncope, presyncope.  No history of myocardial infarction.    Review of Systems    The 10 point Review of Systems is negative other than noted in the HPI or here.    Past Medical History    I have reviewed this patient's medical history and updated it with pertinent information if needed.   Past Medical History:   Diagnosis Date     AV node dysfunction      Chronic atrial fibrillation (H) 2004     GERD  (gastroesophageal reflux disease)      Hyperlipidemia      Near syncope      MARILU (obstructive sleep apnea)        Past Surgical History   I have reviewed this patient's surgical history and updated it with pertinent information if needed.  Past Surgical History:   Procedure Laterality Date     IMPLANT PACEMAKER  08/10/2015       Social History   I have reviewed this patient's social history and updated it with pertinent information if needed.  Social History     Tobacco Use     Smoking status: Never Smoker     Smokeless tobacco: Never Used   Substance Use Topics     Alcohol use: No     Drug use: No       Family History   I have reviewed this patient's family history and updated it with pertinent information if needed.  Family History   Problem Relation Age of Onset     Influenza/Pneumonia Mother      Prostate Cancer Father        Prior to Admission Medications   Prior to Admission Medications   Prescriptions Last Dose Informant Patient Reported? Taking?   [employer] VITAMIN B12 1000 MCG tablet   No No   Sig: TAKE 1 TABLET BY MOUTH EVERY DAY   Ca Carbonate-Mag Hydroxide (ROLAIDS PO)   Yes No   Simethicone (GAS-X EXTRA STRENGTH) 125 MG CAPS   No No   Sig: Take 1 capsule by mouth 2 times daily   furosemide (LASIX) 40 MG tablet   No No   Sig: TAKE HALF TAB (20MG) BY MOUTH EVERY MORNING AND HALF TAB (20 MG) BY MOUTH EVERY EVENING   lisinopril (ZESTRIL) 20 MG tablet   No No   Sig: Take 1 tablet (20 mg) by mouth daily   metoprolol succinate ER (TOPROL XL) 50 MG 24 hr tablet   No No   Sig: Take 1 tablet (50 mg) by mouth At Bedtime   omeprazole (PRILOSEC) 20 MG DR capsule   No No   Sig: TAKE 1 CAPSULE BY MOUTH EVERY DAY   order for DME   No No   Sig: Equipment being ordered: Walker Wheels () and Walker ()  Treatment Diagnosis: Difficulty ambulating   order for DME   No No   Sig: Equipment being ordered: Walker Wheels ()  Treatment Diagnosis:  Weakness,colitis.   potassium chloride ER (KLOR-CON) 20 MEQ CR tablet   No  No   Sig: Take 1 tablet (20 mEq) by mouth 2 times daily   sertraline (ZOLOFT) 50 MG tablet   No No   Sig: Take 1 tablet (50 mg) by mouth daily   simvastatin (ZOCOR) 10 MG tablet   No No   Sig: TAKE 1 TABLET BY MOUTH EVERYDAY AT BEDTIME   warfarin ANTICOAGULANT (COUMADIN) 5 MG tablet   Yes No   Sig: Take 5 mg every Sat; 2.5 mg all other days or As directed by Anticoagulation clinic      Facility-Administered Medications: None     Allergies   Allergies   Allergen Reactions     Penicillins Rash       Physical Exam   Vital Signs: Temp: 97.3  F (36.3  C) Temp src: Temporal BP: 118/72 Pulse: 59   Resp: 18 SpO2: 95 % O2 Device: None (Room air)    Weight: 164 lbs 0 oz    General Appearance: Elderly, no distress  Eyes: GRIFFIN, EOMI  HEENT: NC, AT. MMM.   Respiratory: CTAB, normal work of breathing  Cardiovascular: Regular Rate and Rhythm, normal S1, S2. No murmurs, rubs, gallops  GI: Soft, non-tender, non-distended  Lymph/Hematologic: No asymetric swelling, edema. No bruising.  Genitourinary: Deferred  Skin: No rashes, lesions, wounds.  Musculoskeletal: Warm, well perfused  Neurologic: AOx4, CNII-XII intact.  Psychiatric: Euthymic. Mood appropriate.     Data   Data reviewed today: I reviewed all medications, new labs and imaging results over the last 24 hours. I personally reviewed the EKG tracing showing intermittently paced ECG with Q-waves.    Recent Labs   Lab 08/26/21  1905   WBC 20.0*   HGB 14.7   MCV 84      INR 2.77*      POTASSIUM 4.9   CHLORIDE 108   CO2 25   BUN 23   CR 1.02   ANIONGAP 5   ANGELITA 9.9   *   ALBUMIN 3.6   PROTTOTAL 8.4   BILITOTAL 1.2   ALKPHOS 66   ALT 34   AST 38   LIPASE 50*   TROPONIN 3.177*

## 2021-08-27 NOTE — PROGRESS NOTES
08/27/21 1430   Quick Adds   Type of Visit Initial Occupational Therapy Evaluation   Living Environment   People in home spouse   Current Living Arrangements apartment   Home Accessibility no concerns   Self-Care   Usual Activity Tolerance moderate   Current Activity Tolerance fair   Equipment Currently Used at Home raised toilet seat;shower chair;walker, rolling   Activity/Exercise/Self-Care Comment At baseline is independent in self-cares. Uses 4WW for community mobility. Has step in shower   Instrumental Activities of Daily Living (IADL)   Previous Responsibilities medication management   IADL Comments Does not drive. Reports spouse does most cooking, cleaning, laundry   Disability/Function   Fall history within last six months no   Change in Functional Status Since Onset of Current Illness/Injury yes   General Information   Onset of Illness/Injury or Date of Surgery 08/26/21   Referring Physician Angel Franklin MD   Patient/Family Therapy Goal Statement (OT) Improve strength and breathing   Additional Occupational Profile Info/Pertinent History of Current Problem 89 gentleman with history of atrial fibrillation on Coumadin for stroke prophylaxis, tachybradycardia syndrome s/p pacemaker implantation, history of hypertension who is admitted with about 2 weeks of abdominal discomfort.  On presentation he was found to have elevated troponin with inferior leads Q waves which is new compared to EKG 2 weeks ago.  Most likely this represent recent myocardial infarction.  His main symptoms are abdominal discomfort. Currently leaning towards no angio and conservative management.    Existing Precautions/Restrictions fall;cardiac   Heart Disease Risk Factors Medical history;Gender;Age;Lack of physical activity   Cognitive Status Examination   Affect/Mental Status (Cognitive) WFL   Follows Commands follows two-step commands;75-90% accuracy   Memory Deficit minimal deficit   Cognitive Status Comments continue to  monitor   Visual Perception   Visual Impairment/Limitations corrective lenses full-time   Impact of Vision Impairment on Function (Vision) pt bumping into objects on L side with walker   Sensory   Sensory Comments pt denies BUE/BLE numbness or tingling   Pain Assessment   Patient Currently in Pain No   Posture   Posture protracted shoulders   Strength Comprehensive (MMT)   Comment, General Manual Muscle Testing (MMT) Assessment Generalized weakness   Bed Mobility   Bed Mobility supine-sit   Supine-Sit Stewart (Bed Mobility) supervision   Transfers   Transfers sit-stand transfer;toilet transfer   Sit-Stand Transfer   Sit-Stand Stewart (Transfers) contact guard   Toilet Transfer   Type (Toilet Transfer) stand-sit;sit-stand   Activities of Daily Living   BADL Assessment lower body dressing;grooming   Lower Body Dressing Assessment   Stewart Level (Lower Body Dressing) minimum assist (75% patient effort)   Grooming Assessment   Stewart Level (Grooming) supervision   Clinical Impression   Criteria for Skilled Therapeutic Interventions Met (OT) yes;meets criteria;skilled treatment is necessary   OT Diagnosis decline function   OT Problem List-Impairments impacting ADL activity tolerance impaired;balance;cognition;mobility;other (see comments)  (post MI precautions)   Assessment of Occupational Performance 5 or more Performance Deficits   Identified Performance Deficits LB dressing, toileting, bathing, grooming, functional mobility   Planned Therapy Interventions (OT) ADL retraining;transfer training;home program guidelines;progressive activity/exercise;risk factor education   Clinical Decision Making Complexity (OT) low complexity   Therapy Frequency (OT) Daily   Predicted Duration of Therapy 5 days   Risk & Benefits of therapy have been explained evaluation/treatment results reviewed;care plan/treatment goals reviewed;risks/benefits reviewed;current/potential barriers reviewed;participants voiced  agreement with care plan;participants included;patient   OT Discharge Planning    OT Discharge Recommendation (DC Rec) Transitional Care Facility   OT Rationale for DC Rec Pt currently a high fall risk and requiring Charles for LB dressing, bathing, toileting. Recommend continued skilled OT while IP and in TCU to maximize safety and independence   Total Evaluation Time (Minutes)   Total Evaluation Time (Minutes) 7

## 2021-08-27 NOTE — ED PROVIDER NOTES
History   Chief Complaint:  Abdominal Pain and Generalized Weakness       HPI   Santosh Robledo is a 89 year old male with history of GERD, C diff colitis, and chronic atrial fibrillation with pacemaker on Coumadin who presents with abdominal pain and weakness. Per chart review, the patient was seen on 8/14 for abdominal pain. There was no dangerous cause of his pain identified after having blood work and CT scan. They were discharged with a referral to GI and instructions to double his Prilosec dose. The patient presents to the ED today due to abdominal pain and generalized weakness. The patient's wife says that the patient's symptoms had improved after his last visit to the ED, so they did not see the GI specialist. The patient says that over the past week he has been having increased weakness and fatigue due to not eating or drinking well. The patient endorses some mid abdominal pain which apparently started today and says that it does not radiate elsewhere. He denies any fever or vomiting.  He denies any chest pain or worsening shortness of breath.    Review of Systems   Constitutional: Positive for appetite change and fatigue. Negative for fever.   Gastrointestinal: Positive for abdominal pain. Negative for vomiting.   Neurological: Positive for weakness.   All other systems reviewed and are negative.        Allergies:  Penicillins    Medications:  Coumadin  Zocor  Gas-X  Zoloft  Klor-con  Prilosec  Toprol  Lisinopril  Lasix     Past Medical History:    AV node dysfunction  Chronic atrial fibrillation    GERD  Hyperlipidemia   MARILU   Cheyne-Love breathing   Cognitive impairment   Pneumonia  C diff colitis  Oral thrush  Leukocytosis   Colitis  GERD  Cardiac pacemaker in place      Past Surgical History:    Implant pacemaker      Family History:    Prostate cancer     Social History:  Patient presents with family.    Physical Exam     Patient Vitals for the past 24 hrs:   BP Temp Temp src Pulse Resp SpO2  "Height Weight   08/27/21 0000 99/62 -- -- 60 (!) 46 96 % -- --   08/26/21 2330 118/72 97.3  F (36.3  C) Temporal 59 18 95 % -- --   08/26/21 1857 131/48 98  F (36.7  C) Temporal 68 18 96 % 1.905 m (6' 3\") 74.4 kg (164 lb)       Physical Exam  Nursing note and vitals reviewed.  Constitutional:  Oriented to person, place, and time. Cooperative.   HENT:   Nose:    Nose normal.   Mouth/Throat:   Facemask in place.   Eyes:    Conjunctivae normal and EOM are normal.      Pupils are equal, round, and reactive to light.   Neck:    Trachea normal.   Cardiovascular:  Normal rate, irregularly irregular rhythm, normal heart sounds and normal pulses. No murmur heard.  Pulmonary/Chest:  Effort normal and breath sounds normal.   Abdominal:   Soft. Normal appearance and bowel sounds are normal.      There is mild tenderness to palpation in the mid abdomen.      There is no rebound and no CVA tenderness.   Musculoskeletal:  Extremities atraumatic x 4.   Lymphadenopathy:  No cervical adenopathy.   Neurological:   Alert and oriented to person, place, and time. Normal strength.      No cranial nerve deficit or sensory deficit. GCS eye subscore is 4. GCS verbal subscore is 5. GCS motor subscore is 6.   Skin:    Skin is intact. No rash noted.   Psychiatric:   Normal mood and affect.      Emergency Department Course     ECG  ECG taken at 2349, ECG read at 2350  atrial fibrillation with frequent ventricular-paced complexes  Inferior-posterior infarct, age undetermined  Abnormal ECG   Rate 63 bpm. NC interval * ms. QRS duration 88 ms. QT/QTc 482/493 ms. P-R-T axes * 30 -21.          Imaging:  CT Abdomen Pelvis w Contrast  1.  No etiology for symptoms evident. There is mild diverticulosis but no active inflammation involving bowel.  2.  Hiatal hernia and extensive pleural plaques. Fatty infiltration of liver.  Reading per radiology     Laboratory:    CBC: WBC: 20.0 (H), HGB: 14.7, PLT: 355  CMP: Glucose 172 (H) o/w WNL (Creatinine: " 1.02)  Troponin (Collected 1905): 3.177 (HH)  Lactic acid (Resulted 2311): 2.7 (H)  Lactic Acid: pending   Lipase: 50 (L)  INR: 2.77 (H)     UA with micro: pending    Asymptomatic COVID19 Virus PCR by nasopharyngeal swab pending      Procedures    Emergency Department Course:    Reviewed:  I reviewed nursing notes, vitals, past medical history and care everywhere       Assessments:  2250 I performed an exam of the patient as documented above.  2345 Patient rechecked and updated.       Consults:   0003 I spoke with Dr. Mcfadden of the cardiology service regarding patient's presentation, findings, and plan of care.   0027 I spoke with Dr. Franklin of the hospitalist service regarding patient's presentation, findings, and plan of care.     Interventions:  2306 NS 1 L IV  0023 Aspirin 324 mg Oral     Disposition:  The patient was admitted to the hospital under the care of Dr. Franklin.       Impression & Plan   Medical Decision Making:  Santosh Robledo is a 89 year old male who presents to the emergency department today for evaluation of abdominal pain and not wanting to eat or drink, as well as increasing weakness and fatigue.  He had some basic labs obtained prior to my seeing him, and his WBC was quite a bit higher than it was when he was seen a couple of weeks ago.  Therefore I wanted to check a few other things including lactic acid, INR, troponin, EKG, and CT scan of his abdomen and pelvis again.  I also wanted to check a urine to see if he might have any signs of a UTI.  His troponin came back quite elevated, and his EKG is concerning, as he has inferior changes and specifically Q waves that were not present previously.  I subsequently spoke with the on-call cardiologist, Dr. Mcfadden, who recommended holding his Coumadin and rechecking his INR in the morning.  He indicated that if his INR drops below 2, that heparin should be started at that point.  He also initially recommended nitroglycerin to see if it would help  the patient's pain, however when I went to see the patient and discussed this, his blood pressure was about 100 systolic.  Therefore I am not comfortable providing him any nitroglycerin at this point.  He was provided more IV fluids.  He was also provided oral aspirin.  He and his wife are unsure at this point if they want to proceed with an angiogram, as they want more information as well as the ability to discuss things further with their children.  His CT scan does not show anything acute, and therefore I suspect that his abdominal discomfort is actually his anginal equivalent.  I subsequently spoke with Dr. Franklin, who will be admitting him.      Diagnosis:    ICD-10-CM    1. NSTEMI (non-ST elevated myocardial infarction) (H)  I21.4        Scribe Disclosure:  I, Nacho Conde, am serving as a scribe at 10:48 PM on 8/26/2021 to document services personally performed by Ryan Adler MD based on my observations and the provider's statements to me.          Ryan Adler MD  08/27/21 0142

## 2021-08-27 NOTE — PROGRESS NOTES
RECEIVING UNIT ED HANDOFF REVIEW    ED Nurse Handoff Report was reviewed by: Katherine Floyd RN on August 27, 2021 at 2:32 AM

## 2021-08-27 NOTE — PROVIDER NOTIFICATION
MD Notification    Notified Person: MD    Notified Person Name: Oscar    Notification Date/Time: 8/27/21 845    Notification Interaction: text page    Purpose of Notification: Pt is asking for antiacids and PTA meds are not ordered. Cards also said pt can et from their standpoint - need to clarify if pt can eat.     Orders Received:v    Comments:

## 2021-08-27 NOTE — PROVIDER NOTIFICATION
MD Notification    Notified Person: MD    Notified Person Name: Malik Moore MD    Notification Date/Time:8/27/2021 17:25    Notification Interaction: Page     Purpose of Notification: Pt is requesting TUMS    Orders Received:    Comments:

## 2021-08-27 NOTE — ED NOTES
"[facility]  ED Nurse Handoff Report    ED Chief complaint: Abdominal Pain and Generalized Weakness      ED Diagnosis:   Final diagnoses:   None       Code Status: see admitting MD    Allergies:   Allergies   Allergen Reactions     Penicillins Rash       Patient Story: 89 year old male presents to the ED due to generalized weakness and lack of appetite.  Focused Assessment:  Per his wife, she can not get her  to eat or drink hardly anything due to his lack of appetite.    Treatments and/or interventions provided: NS 2L , labs, CT, EKG  Patient's response to treatments and/or interventions: stable    To be done/followed up on inpatient unit:  monitor    Does this patient have any cognitive concerns?: none    Activity level - Baseline/Home:  Independent  Activity Level - Current:   Stand with Assist    Patient's Preferred language: English   Needed?: No    Isolation: None  Infection: Not Applicable  Patient tested for COVID 19 prior to admission: YES  Bariatric?: No    Vital Signs:   Vitals:    08/26/21 1857 08/26/21 2330   BP: 131/48 118/72   Pulse: 68 59   Resp: 18 18   Temp: 98  F (36.7  C) 97.3  F (36.3  C)   TempSrc: Temporal Temporal   SpO2: 96% 95%   Weight: 74.4 kg (164 lb)    Height: 1.905 m (6' 3\")        Cardiac Rhythm:     Was the PSS-3 completed:   Yes  What interventions are required if any?               Family Comments: wife at bedside  OBS brochure/video discussed/provided to patient/family: N/A              Name of person given brochure if not patient: na              Relationship to patient: na    For the majority of the shift this patient's behavior was Green.   Behavioral interventions performed were? None, pt is very pleasent    ED NURSE PHONE NUMBER: [phone]       "

## 2021-08-27 NOTE — PLAN OF CARE
[id number]. A&O x 4 - forgetful at times. Patient denies pain. VSS, on RA - pt experiences tachypnea (RR 22-26 - pt denies any discomfort; O2 sats stable). Up with Ax1-2 w/ gb & walker. Tele: A fib occasionally V paced. NS at 125/hr. Trop trending down (2.9). INR 3.34, holding Coumadin. Pt has been upright in chair throughout my shift. Plan for medical management of NSTEMI at this time. Continue to Monitor.

## 2021-08-27 NOTE — PHARMACY-ADMISSION MEDICATION HISTORY
Pharmacy Medication History  Admission medication history interview status for the 8/26/2021  admission is complete. See EPIC admission navigator for prior to admission medications     Location of Interview: Patient room  Medication history sources: Patient and Surescripts    Significant changes made to the medication list:  Discontinued:  New:  Changed: increase omeprazole to bid and sertraline 2tab daily         In the past week, patient estimated taking medication this percent of the time: greater than 90%    Additional medication history information:       Medication reconciliation completed by provider prior to medication history? No    Time spent in this activity: 15min     Prior to Admission medications    Medication Sig Last Dose Taking? Auth Provider   [employer] VITAMIN B12 1000 MCG tablet TAKE 1 TABLET BY MOUTH EVERY DAY 8/26/2021 at Unknown time Yes Lilly Zepeda MD   furosemide (LASIX) 40 MG tablet TAKE HALF TAB (20MG) BY MOUTH EVERY MORNING AND HALF TAB (20 MG) BY MOUTH EVERY EVENING 8/26/2021 at Unknown time Yes Lilly Zepeda MD   lisinopril (ZESTRIL) 20 MG tablet Take 1 tablet (20 mg) by mouth daily 8/26/2021 at Unknown time Yes Lilly Zepeda MD   metoprolol succinate ER (TOPROL XL) 50 MG 24 hr tablet Take 1 tablet (50 mg) by mouth At Bedtime 8/26/2021 at Unknown time Yes Candy Stephens APRN CNP   omeprazole (PRILOSEC) 20 MG DR capsule TAKE 1 CAPSULE BY MOUTH EVERY DAY  Patient taking differently: Take 20 mg by mouth 2 times daily  8/26/2021 at Unknown time Yes Lilly Zepeda MD   potassium chloride ER (KLOR-CON) 20 MEQ CR tablet Take 1 tablet (20 mEq) by mouth 2 times daily 8/26/2021 at Unknown time Yes Lilly Zepeda MD   sertraline (ZOLOFT) 50 MG tablet Take 1 tablet (50 mg) by mouth daily  Patient taking differently: Take 100 mg by mouth daily  8/26/2021 at Unknown time Yes Lilly Zepeda MD   Simethicone (GAS-X EXTRA STRENGTH) 125 MG CAPS Take 1 capsule by  mouth 2 times daily 8/26/2021 at Unknown time Yes Lilly Zepeda MD   simvastatin (ZOCOR) 10 MG tablet TAKE 1 TABLET BY MOUTH EVERYDAY AT BEDTIME 8/26/2021 at Unknown time Yes Lilly Zepeda MD   warfarin ANTICOAGULANT (COUMADIN) 5 MG tablet Take 5 mg daily 8/26/2021 at Unknown time Yes Lilly Zepeda MD   Ca Carbonate-Mag Hydroxide (ROLAIDS PO) Take by mouth 3 times daily as needed    Reported, Patient   order for DME Equipment being ordered: Walker Wheels () and Walker ()  Treatment Diagnosis: Difficulty ambulating   Kasie Merchant MD   order for DME Equipment being ordered: Walker Wheels ()  Treatment Diagnosis:  Weakness,colitis.   Kasie Merchant MD       The information provided in this note is only as accurate as the sources available at the time of update(s)   Belle ReevesD

## 2021-08-27 NOTE — PLAN OF CARE
[id number]:  A&O. VSS. Regular diet, no appetite. Up with assist of 1 and walker. Denies pain. Pt scoring green on the Aggression Stop Light Tool.

## 2021-08-27 NOTE — PLAN OF CARE
PT: Order received; per chart review and conversation with evaluating OT, CR and mobility needs able to be addressed by OT during hospital stay; anticipate LOS will be short; will defer PT to next level of care (TCU). Order completed.

## 2021-08-27 NOTE — PLAN OF CARE
[facility] Nursing Note    Patient Information  Name: Santosh Robledo  Age: 89 year old    Assessment  Orientation/Neuro: A&O x 4  Cardiac/Tele: Afib with frequent V paced.   Resp: WDL - except dyspnea on exertion   GI/: GI - hx c-diff,  - due to void. LR running at 30 ml  Mobility: A1 walker  Pain: denies  Diet: Orders Placed This Encounter      Regular Diet Adult    Vital Signs  B/P: 123/79, T: 97.8, P: 73, R: 24, O2: 95% on RA    Plan  Plan/Other: Cards consult - med management vs angiogram. Pt and family leaning towards med management.     Katherine Floyd RN

## 2021-08-28 PROBLEM — I25.5 ISCHEMIC CARDIOMYOPATHY: Status: ACTIVE | Noted: 2021-01-01

## 2021-08-28 PROBLEM — I50.21 ACUTE SYSTOLIC CONGESTIVE HEART FAILURE (H): Status: ACTIVE | Noted: 2021-01-01

## 2021-08-28 NOTE — PROVIDER NOTIFICATION
MD Notification    Notified Person: MD    Notified Person Name: Dr. VAN Aleman    Notification Date/Time: Today; 1530    Notification Interaction: Text page     Purpose of Notification:     Pt triggered the sepsis protocol and Lactic Acid came back at 2.3.  Pls advise if needed. Thanks.

## 2021-08-28 NOTE — PROGRESS NOTES
"[facility] Progress Note     Madi Bolivar MD  08/28/2021         Interval History:      Patient denies any chest discomfort or abdominal discomfort, echocardiogram yesterday showed LVEF of 35 to 40% with inferior inferoseptal wall motion abnormalities, moderate reduced RV systolic function and dilated IVC, patient has gained weight.  Troponin trending down.       Assessment and Plan:      1.  Acute coronary syndrome with recent subacute inferior myocardial infarction with likely completed infarct.  Symptoms of abdominal discomfort.  Now inferior Q waves.  Presently asymptomatic.  Echocardiogram showing LVEF of 35 to 40% with inferior inferoseptal wall motion normality and moderately decreased RV systolic function.  Discussed in detail with patient and his family.  Conservative medical management option has been adopted.  He and his wife again today confirm that they are looking for  medical management.  2.  Ischemic cardiomyopathy with inferior inferoseptal wall motion normality and RV systolic dysfunction all consistent with recent inferior MI.  Does appear volume overload on examination with some crackles and elevated JVP.  3.  Chronic atrial fibrillation on Coumadin, INR at presentation 3.34, Coumadin has been withheld.  4.  Tachybradycardia syndrome s/p pacemaker and plantation.  Last device check showing 89% ventricular pacing with normal sensing pacing threshold and impedance and 6-1/2 years of battery longevity.    Recommendations  Agree with IV Lasix  Add lisinopril 2.5 mg daily  Continue aspirin, Plavix, statin, beta-blocker    Discussed in detail with patient, his wife at bedside and patient's son-in-law Dr. Severt on phone.       Physical Exam:       , Blood pressure 126/67, pulse 94, temperature 97.7  F (36.5  C), temperature source Axillary, resp. rate 22, height 1.905 m (6' 3\"), weight 80.3 kg (177 lb 1.6 oz), SpO2 95 %.  Vitals:    08/27/21 0524 08/28/21 0539 08/28/21 0828   Weight: 76.7 kg (169 lb) " 80.2 kg (176 lb 11.2 oz) 80.3 kg (177 lb 1.6 oz)     Vital Signs with Ranges  Temp:  [97.3  F (36.3  C)-97.9  F (36.6  C)] 97.7  F (36.5  C)  Pulse:  [62-94] 94  Resp:  [20-22] 22  BP: ()/(58-74) 126/67  SpO2:  [94 %-98 %] 95 %  I/O's Last 24 hours  I/O last 3 completed shifts:  In: 1721.25 [P.O.:640; I.V.:1081.25]  Out: 645 [Urine:645]  General looks comfortable  Neck JVP elevated to around 12 cm  Cardiovascular system irregular, no murmur rub or gallop   Respiratory system crackles at the base  Extremities no edema             Medications:          atorvastatin  40 mg Oral QPM     clopidogrel  75 mg Oral Daily     furosemide  40 mg Intravenous Q8H     lisinopril  2.5 mg Oral Daily     metoprolol succinate ER  25 mg Oral Daily     omeprazole  20 mg Oral BID     sertraline  100 mg Oral Daily     sodium chloride (PF)  3 mL Intracatheter Q8H     PRN Meds: acetaminophen **OR** acetaminophen, calcium carbonate, lidocaine 4%, lidocaine (buffered or not buffered), melatonin, ondansetron **OR** ondansetron, sodium chloride (PF)         Data:      All new lab and imaging data was reviewed.   Recent Labs   Lab Test 08/28/21  0545 08/27/21  0534 08/26/21  1905   WBC 15.6* 19.2* 20.0*   HGB 12.8* 13.5 14.7   MCV 85 86 84    329 355   INR 3.22* 3.34* 2.77*      Recent Labs   Lab Test 08/28/21  0545 08/27/21  0534 08/26/21  1905    138 138   POTASSIUM 3.9 4.5 4.9   CHLORIDE 112* 109 108   CO2 21 24 25   BUN 25 26 23   CR 1.04 1.10 1.02   ANIONGAP 6 5 5   ANGELITA 9.0 8.9 9.9   * 122* 172*     Recent Labs   Lab Test 08/27/21  1414 08/27/21  1000 08/27/21  0534 05/23/18  1415 05/23/18  0944   TROPI  --   --   --  <0.015 0.017   TROPONIN 2.916* 3.108* 3.101*  --   --         Madi Bolivar MD  8/28/2021  Pager:  [phone]

## 2021-08-28 NOTE — PROGRESS NOTES
Alert and oriented x4, forgetful at times. Vital signs stable on RA, tachypnea at times, pt denies SOB or discomfort. Assist of 1, GB/W. Tolerating regular diet. Lung sounds clear, equal bilaterally. Passing flatus, no BM this shift. Adequate urine output. Skin CDI. Denies pain and nausea. Tele Afib occasionally V paced.

## 2021-08-28 NOTE — PROGRESS NOTES
[facility]    Hospitalist Progress Note    Assessment & Plan     Summary: Santosh Robledo is a 89 year old male with PMH tachy-gaudencio syndrome s/p pacemaker, atrial fibrillation on warfarin, who was admitted on 8/26/2021 with NSTEMI.     Inferior NSTEMI:   Ischemic CM  Acute systolic chf    -Patient reports 1-2 weeks of abdominal pain. EKG notes inferior Q waves, troponin is stably elevated around 3. Evidence points to a recent MI.   -CT abd negative.   - Echo:  The visual ejection fraction is 35-40%.  There is severe hypokinesis of the inferoseptal and inferior/ inferolateral wall of the LV.  The right ventricle is mild to moderately dilated. Moderately decreased right ventricular systolic function  There is a pacemaker lead in the right ventricle. Intermittent pacing.  The left atrium is moderately dilated. The right atrium is moderate to  severely dilated.  There is mild to moderate (1-2+) tricuspid regurgitation.  IVC is dilated, but not well visualized. Likely elevated RA pressures.    -Trop series: 3.1 admission---> 3.1--> 2.9.     - Appreciate Cardiology consult  -subacute inferior infarct, likely completed. Cardiology spoke with pt and family regarding care plan in detail. Plan is for medical management.   -Echo with ICM.   -on coumadin, InR in therapeutic range since admisison. 3.2 today. No heparin per cardiology  -wt is up 74kg---> 80 kg. Today ? Accurate. Wt 76 kg yest  -normally on lasix 20mg bid with kdur  - currently on lasix 10mg bid  -had 1L NS bolus yest and 125cc/hr NS fluids since  -tachypneic, elevated jvp, bibasilar crackles.   On main fluids for lactate elevation on admission      Plan:   -stop fluids.   -start lasix 40mg IV q8 hours X 3. Then reeval in am. Hold po lasix. Am lytes/bmp reeval after 2 doses, may need only 2 doses   - Holding warfarin due to elevated INR, consider resuming warfarin in 1-2 days if no angiogram, otherwise if proceeding with angiogram  then starting heparin drip when INR < 2  - cont Toprol XL 25mg every day,   -Lipitor 40mg every day  -Plavix load 600mg given---> 75mg every day  -ASA  Daily .   - am lipid panel  -holding heparin as pt on coumadin  -given low EF, likely restart ace inh today  - I/o, daily wt  -lasix 10mg bid, consider increase dose     Hypertension: /79 here  Normotensive overnight.   Nl bmp  - Holding lisinopril     Lactic acidosis: Improved with IVF---> chf, stop fliudbrennan dudley     Leukocytosis:   -CT chest C/A/P no obvious pathology, UA also fairly bland, this is suspected to be due to recent MI above  -wbc 20 admission---> 15.6 today. follow     GERD: Resume omeprazole     Tachybrady syndrome s/p pacemaker  - See above for warfarin management     Hyperlipidemia: Statin per cardiology     DVT Prophylaxis: Warfarin, daily inr  Code Status: No CPR- Do NOT Intubate  PT/OT: not yet  Diet: Regular Diet Adult       Disposition: Expected discharge 1-3 days pending further cardiac management  Seen by PT, OT, TCU rec'd  SW consult for placement     Bang Aleman MD  Text Page  (7am to 6pm)  Interval History   Stable night. Wbc down  On fluids  Low ef on echo  Some belching  No cp or abd pain  Says breathing is ok    -Data reviewed today: I reviewed all new labs and imaging results over the last 24 hours. I personally reviewed labs and imaging last 24 hours.     Physical Exam   Temp: 97.7  F (36.5  C) Temp src: Axillary BP: 126/67 Pulse: 94   Resp: 22 SpO2: 95 % O2 Device: None (Room air)    Vitals:    08/27/21 0345 08/27/21 0524 08/28/21 0539   Weight: 75.8 kg (167 lb 1.6 oz) 76.7 kg (169 lb) 80.2 kg (176 lb 11.2 oz)     Vital Signs with Ranges  Temp:  [97.3  F (36.3  C)-97.9  F (36.6  C)] 97.7  F (36.5  C)  Pulse:  [62-94] 94  Resp:  [20-24] 22  BP: ()/(58-79) 126/67  SpO2:  [94 %-100 %] 95 %  I/O last 3 completed shifts:  In: 1721.25 [P.O.:640; I.V.:1081.25]  Out: 645 [Urine:645]    Constitutional: Alert, up in  bed  Neck: elevated jvp  Respiratory: Bibasilar crackles, mild tachypnea  Cardiovascular: RRR no r/g/m  GI: soft, nt, nd  Skin/Integumen: no rash or edema  Neuro: nl speech, oriented, forgetful. Alert, nonfocal grossly      Medications       atorvastatin  40 mg Oral QPM     clopidogrel  75 mg Oral Daily     furosemide  10 mg Oral BID     metoprolol succinate ER  25 mg Oral Daily     omeprazole  20 mg Oral BID     sertraline  100 mg Oral Daily     sodium chloride (PF)  3 mL Intracatheter Q8H       Data   Recent Labs   Lab 21  0545 21  1414 21  1000 21  0534 21  1905   WBC 15.6*  --   --  19.2* 20.0*   HGB 12.8*  --   --  13.5 14.7   MCV 85  --   --  86 84     --   --  329 355   INR 3.22*  --   --  3.34* 2.77*     --   --  138 138   POTASSIUM 3.9  --   --  4.5 4.9   CHLORIDE 112*  --   --  109 108   CO2 21  --   --  24 25   BUN 25  --   --  26 23   CR 1.04  --   --  1.10 1.02   ANIONGAP 6  --   --  5 5   ANGELITA 9.0  --   --  8.9 9.9   *  --   --  122* 172*   ALBUMIN 2.6*  --   --  2.9* 3.6   PROTTOTAL 6.6*  --   --  7.2 8.4   BILITOTAL 1.1  --   --  1.2 1.2   ALKPHOS 66  --   --  59 66   ALT 41  --   --  38 34   AST 38  --   --  39 38   LIPASE  --   --   --   --  50*   TROPONIN  --  2.916* 3.108* 3.101* 3.177*       Imaging:   Recent Results (from the past 24 hour(s))   Echocardiogram Complete   Result Value    LVEF  35-40%    Narrative    [id number]  [id number]  [id number]  [id number]^BETTIE^PHILLIP^[facility]  Echocardiography Laboratory  [address] [place] [id number]     Name: TRUE MATTHEWS  MRN: [id number]  : 1929  Study Date: 2021 10:53 AM  Age: 89 yrs  Gender: Male  Patient [facility]  Reason For Study: MI - Acute  Ordering Physician: PHILLIP ALEXANDRE  Referring Physician: Lilly Zepeda  Performed By: Arya Castillo     BSA: 2.0 m2  Height: 75 in  Weight: 167 lb  HR: 77  BP: 103/64  mmHg  ______________________________________________________________________________  Procedure  Complete Portable Echo Adult. Optison (NDC #[id number]) given intravenously.  ______________________________________________________________________________  Interpretation Summary     The visual ejection fraction is 35-40%.  There is severe hypokinesis of the inferoseptal and inferior/ inferolateral  wall of the LV.  The right ventricle is mild to moderately dilated. Moderately decreased right  ventricular systolic function  There is a pacemaker lead in the right ventricle. Intermittent pacing.  The left atrium is moderately dilated. The right atrium is moderate to  severely dilated.  There is mild to moderate (1-2+) tricuspid regurgitation.  IVC is dilated, but not well visualized. Likely elevated RA pressures.  ______________________________________________________________________________  Left Ventricle  The left ventricle is normal in size. There is mild concentric left  ventricular hypertrophy. The visual ejection fraction is 35-40%. Diastolic  Doppler findings (E/E' ratio and/or other parameters) suggest left ventricular  filling pressures are increased. There is severe hypokinesis of the  inferoseptal and inferior/ inferolateral wall of the LV.     Right Ventricle  The right ventricle is mild to moderately dilated. Moderately decreased right  ventricular systolic function. There is a pacemaker lead in the right  ventricle.     Atria  The left atrium is moderately dilated. The right atrium is moderate to  severely dilated. Pacer wire in right atrium.     Mitral Valve  There is moderate mitral annular calcification. The mitral valve leaflets  appear thickened, but open well. There is mild (1+) mitral regurgitation.     Tricuspid Valve  There is mild to moderate (1-2+) tricuspid regurgitation. The right  ventricular systolic pressure is approximated at 15.6 mmHg plus the right  atrial pressure.     Aortic Valve  The  aortic valve is trileaflet with aortic valve sclerosis. There is trace to  mild aortic regurgitation. No hemodynamically significant valvular aortic  stenosis.     Pulmonic Valve  The pulmonic valve is not well visualized. There is trace pulmonic valvular  regurgitation.     Vessels  The aortic root is normal size. Normal size ascending aorta. Dilation of the  inferior vena cava is present with abnormal respiratory variation in diameter.  Unable to assess mean RA pressure due to technically difficult study.     Pericardium  There is no pericardial effusion.     ______________________________________________________________________________  MMode/2D Measurements & Calculations  IVSd: 1.2 cm  LVIDd: 4.9 cm  LVIDs: 4.9 cm  LVPWd: 1.0 cm  FS: 0.61 %  LV mass(C)d: 209.4 grams  LV mass(C)dI: 103.0 grams/m2     Ao root diam: 3.8 cm  LA dimension: 3.3 cm  asc Aorta Diam: 3.6 cm  LA/Ao: 0.85  LVOT diam: 2.0 cm  LVOT area: 3.3 cm2  LA Volume (BP): 80.0 ml  LA Volume Index (BP): 39.4 ml/m2  RWT: 0.43     Doppler Measurements & Calculations  MV E max heidy: 104.0 cm/sec  MV dec slope: 503.5 cm/sec2     AI P1/2t: 410.6 msec  PA acc time: 0.06 sec  TR max heidy: 197.3 cm/sec  TR max PG: 15.6 mmHg  E/E' av.0  Lateral E/e': 12.7  Medial E/e': 21.2     ______________________________________________________________________________  Report approved by: hSy Gonzalez 2021 12:16 PM            Speaking Coherently

## 2021-08-29 NOTE — PHARMACY-ANTICOAGULATION SERVICE
Clinical Pharmacy - Warfarin Dosing Consult     Pharmacy has been consulted to manage this patient s warfarin therapy.  Indication: Atrial Fibrillation  Therapy Goal: INR 2-3  Provider/Team: Bang Aleman MD  Warfarin Prior to Admission: Yes  Warfarin PTA Regimen: 5mg daily  Recent documented change in oral intake/nutrition: No    INR   Date Value Ref Range Status   08/29/2021 2.54 (H) 0.85 - 1.15 Final     Comment:     Effective 7/11/2021, the reference range for this assay has changed.   08/28/2021 3.22 (H) 0.85 - 1.15 Final     Comment:     Effective 7/11/2021, the reference range for this assay has changed.       Recommend warfarin 2.5 mg today.  Pharmacy will monitor Santosh Robledo daily and order warfarin doses to achieve specified goal.      Please contact pharmacy as soon as possible if the warfarin needs to be held for a procedure or if the warfarin goals change.

## 2021-08-29 NOTE — PROGRESS NOTES
[facility] Progress Note     Madi Bolivar MD  08/29/2021         Interval History:      Feels well, denies any abdominal discomfort or shortness of breath, reasonable response to diuresis yesterday.       Assessment and Plan:      1.  Acute coronary syndrome with recent subacute inferior myocardial infarction with likely completed infarct.  Symptoms of abdominal discomfort which was ongoing for about 2 weeks prior to diagnosis.  Now inferior Q waves.  Presently asymptomatic.  Echocardiogram showing LVEF of 35 to 40% with inferior inferoseptal wall motion abnormality and moderately decreased RV systolic function.  Patient, his wife Candida ([phone]) and patients son in law (Dr Severt, Radiologist [facility], [phone]) have been extensively involved in discussing options of medical management vs medical management plus coronary angiogram-pros and cons of each strategy were discussed in detail.  We all made a mutually shared decision at this time to continue medical management.  Patient and his wife are strongly focused on comfort.    He has not experienced any recurrent angina or any malignant arrhythmia since admission.  He does have both left and right-sided congestive heart failure but is feeling better with diuresis and cardiomyopathy treatment.  2.  Ischemic cardiomyopathy with inferior inferoseptal wall motion normality and RV systolic dysfunction all consistent with recent inferior MI.  Does appear volume overload on examination with some crackles and elevated JVP-started on Lasix 08/28/2021, appears better with less crackles on examination and JVD better.  3.  Chronic atrial fibrillation on Coumadin, INR at presentation 3.34, Coumadin has been withheld.  INR slowly drifting down.  There may be some element of passive venous liver congestion that may be contributing to slower decline in INR.  4.  Tachybradycardia syndrome s/p pacemaker and plantation.  Last device check showing 89% ventricular pacing with  "normal sensing pacing threshold and impedance and 6-1/2 years of battery longevity.  5.  DNR/DNI.    Recommendations  Agree with decreasing IV Lasix to 40 mg twice daily and likely switch to oral tomorrow.  Continue aspirin, Plavix, statin, beta-blocker, ACE inhibitor.  Discussed with patient and also called his wife-we will continue medical management.  I discussed option of stress cardiac MRI in near future (at least a few weeks from now to avoid doing stress in the setting of recent MI) for risk stratification, to evaluate the size of the infarct, assess viability and also to assess for any inducible ischemia.  Patient and his wife are agreeable.  Can be done as an outpatient.  Can likely switch diuretics to oral tomorrow.     40 minutes total spent spent.           Physical Exam:       , Blood pressure 102/49, pulse 71, temperature 97.5  F (36.4  C), temperature source Axillary, resp. rate 18, height 1.905 m (6' 3\"), weight 78.6 kg (173 lb 4.5 oz), SpO2 94 %.  Vitals:    08/28/21 0828 08/29/21 0500 08/29/21 0912   Weight: 80.3 kg (177 lb 1.6 oz) 79.1 kg (174 lb 6.1 oz) 78.6 kg (173 lb 4.5 oz)     Vital Signs with Ranges  Temp:  [96.4  F (35.8  C)-98.2  F (36.8  C)] 97.5  F (36.4  C)  Pulse:  [65-96] 71  Resp:  [18-24] 18  BP: ()/(49-83) 102/49  SpO2:  [93 %-97 %] 94 %  I/O's Last 24 hours  I/O last 3 completed shifts:  In: 760 [P.O.:760]  Out: 2110 [Urine:2110]  General patient appears comfortable  Neck JVP elevated to around 10 cm, positive hepatojugular reflux  Cardiovascular system irregular, no murmur rub or gallop  Respiratory system a few crackles at the bases improved from yesterday  Extremities no edema             Medications:          atorvastatin  40 mg Oral QPM     clopidogrel  75 mg Oral Daily     furosemide  40 mg Intravenous Q8H     lisinopril  2.5 mg Oral Daily     metoprolol succinate ER  25 mg Oral Daily     omeprazole  20 mg Oral BID     sertraline  100 mg Oral Daily     sodium chloride " (PF)  3 mL Intracatheter Q8H     warfarin ANTICOAGULANT  2.5 mg Oral ONCE at 18:00     PRN Meds: acetaminophen **OR** acetaminophen, calcium carbonate, lidocaine 4%, lidocaine (buffered or not buffered), melatonin, ondansetron **OR** ondansetron, sodium chloride (PF), Warfarin Therapy Reminder         Data:      All new lab and imaging data was reviewed.   Recent Labs   Lab Test 08/29/21  0540 08/28/21  0545 08/27/21  0534   WBC 11.7* 15.6* 19.2*   HGB 11.6* 12.8* 13.5   MCV 85 85 86    334 329   INR 2.54* 3.22* 3.34*      Recent Labs   Lab Test 08/29/21  0540 08/28/21  0545 08/27/21  0534    139 138   POTASSIUM 3.1* 3.9 4.5   CHLORIDE 109 112* 109   CO2 26 21 24   BUN 26 25 26   CR 1.04 1.04 1.10   ANIONGAP 5 6 5   ANGELITA 8.4* 9.0 8.9   * 106* 122*     Recent Labs   Lab Test 08/27/21  1414 08/27/21  1000 08/27/21  0534 05/23/18  1415 05/23/18  0944   TROPI  --   --   --  <0.015 0.017   TROPONIN 2.916* 3.108* 3.101*  --   --         Madi Bolivar MD  8/29/2021  Pager:  [phone]

## 2021-08-29 NOTE — CONSULTS
Care Management Initial Consult    General Information  Assessment completed with: Spouse or significant other, Candida  Type of CM/SW Visit: Initial Assessment    Primary Care Provider verified and updated as needed: No   Readmission within the last 30 days:        Reason for Consult: discharge planning  Advance Care Planning: Advance Care Planning Reviewed: present on chart          Communication Assessment  Patient's communication style: spoken language (English or Bilingual)    Hearing Difficulty or Deaf: no   Wear Glasses or Blind: yes    Cognitive  Cognitive/Neuro/Behavioral: WDL                      Living Environment:   People in home: spouse   (Candida)  Current living Arrangements: independent living facility ([address] [place] [address])      Able to return to prior arrangements: no       Family/Social Support:  Care provided by: self, spouse/significant other  Provides care for: no one  Marital Status:   Wife, Children   (Candida)       Description of Support System: Supportive, Involved    Support Assessment: Adequate family and caregiver support, Adequate social supports    Current Resources:   Patient receiving home care services: No     Community Resources: None  Equipment currently used at home: walker, rolling  Supplies currently used at home: None    Employment/Financial:  Employment Status: retired        Financial Concerns: No concerns identified           Lifestyle & Psychosocial Needs:  Social Determinants of Health     Tobacco Use: Low Risk      Smoking Tobacco Use: Never Smoker     Smokeless Tobacco Use: Never Used   Alcohol Use:      Frequency of Alcohol Consumption:      Average Number of Drinks:      Frequency of Binge Drinking:    Financial Resource Strain:      Difficulty of Paying Living Expenses:    Food Insecurity:      Worried About Running Out of Food in the Last Year:      Ran Out of Food in the Last Year:    Transportation Needs:      Lack of Transportation (Medical):      Lack of  Transportation (Non-Medical):    Physical Activity:      Days of Exercise per Week:      Minutes of Exercise per Session:    Stress:      Feeling of Stress :    Social Connections:      Frequency of Communication with Friends and Family:      Frequency of Social Gatherings with Friends and Family:      Attends Shinto Services:      Active Member of Clubs or Organizations:      Attends Club or Organization Meetings:      Marital Status:    Intimate Partner Violence:      Fear of Current or Ex-Partner:      Emotionally Abused:      Physically Abused:      Sexually Abused:    Depression: At risk     PHQ-2 Score: 4   Housing Stability:      Unable to Pay for Housing in the Last Year:      Number of Places Lived in the Last Year:      Unstable Housing in the Last Year:        Functional Status:  Prior to admission patient needed assistance:              Mental Health Status:          Chemical Dependency Status:                Values/Beliefs:  Spiritual, Cultural Beliefs, Shinto Practices, Values that affect care: no               Additional Information:  Per care management/social work consult for discharge planning and TCU placement on discharge.  Patient was admitted on 8-26-21 with a n stemi.  The tentative date of discharge is 8-30-21.  Reviewed chart and call placed to patient's wife to discuss discharge plans.  Per patient's wife's report, they live in a senior co-op with no stairs and an elevator.  Patient uses a rolling walker for community ambulation.  Patient has a raised toilet seat and a shower chair in the bathroom.  Patient is independent with self cares and medication management.  Patient's wife manages the cooking, cleaning, laundry, and driving.  Reviewed the therapy discharge recommendations of TCU placement on discharge with patient's wife and she is in agreement.  Patient's wife states that patient has been to [facility] in the past and this would be the first choice.  Other possible options include  [facility] and [facility].  Referrals sent, via discharge on the double, to check bed availability.    Will continue to follow.      TRENT Bowden, [facility]    [phone]  [facility]

## 2021-08-29 NOTE — PROGRESS NOTES
[facility]    Hospitalist Progress Note    Assessment & Plan     Summary: Santosh Robledo is a 89 year old male with PMH tachy-gaudencio syndrome s/p pacemaker, atrial fibrillation on warfarin, who was admitted on 8/26/2021 with NSTEMI.     Inferior NSTEMI:   Ischemic CM  Acute systolic chf    -Patient reports 1-2 weeks of abdominal pain. EKG notes inferior Q waves, troponin is stably elevated around 3. Evidence points to a recent MI.   -CT abd negative.   - Echo:  The visual ejection fraction is 35-40%.  There is severe hypokinesis of the inferoseptal and inferior/ inferolateral wall of the LV.  The right ventricle is mild to moderately dilated. Moderately decreased right ventricular systolic function  There is a pacemaker lead in the right ventricle. Intermittent pacing.  The left atrium is moderately dilated. The right atrium is moderate to  severely dilated.  There is mild to moderate (1-2+) tricuspid regurgitation.  IVC is dilated, but not well visualized. Likely elevated RA pressures.    -Trop series: 3.1 admission---> 3.1--> 2.9.     - Appreciate Cardiology consult  -subacute inferior infarct, likely completed. Cardiology spoke with pt and family regarding care plan in detail. Plan is for medical management.   -Echo with ICM.   -on coumadin, InR in therapeutic range since admisison. 3.2 today. No heparin per cardiology  -wt is up 74kg---> 80 kg. Today ? Accurate. Wt 76 kg yest  -normally on lasix 20mg bid with kdur  - currently on lasix 10mg bid  -had 1L NS bolus yest and 125cc/hr NS fluids since  -tachypneic, elevated jvp, bibasilar crackles 8/28. Initiated on iv lasix    I/O:-393. 1.2 L out yest and 1L out today.   Wt: admission 74-75kg---> 80kg with ivfs---> diuresis--->     Today. Lungs better. Still with elevated jvp.   Some crackles. Nl vitals. Nl sats  Lasix 40mg iv x 3 yest. Last dose midnight.     Plan:   -will give IV lasix today as wt not likely at baseline/admission. Wt  pending. Will give lasix 40mg IV bid today then change to po tomorrow likely. Await cards in put.   -await wt and accurate I/o  - cont Toprol XL 25mg every day,   -Lipitor 40mg every day  -Plavix load 600mg given---> 75mg every day  -no Aspirin.   - continue coumadin   -discussed antiplatelet plan and coumadin plan with dr. Bolivar, cardiology  - follow up [facility] in several weeks. Likely outpatient stress cardiac MRI to be determined at follow up visit  - am lipid panel  -started on lisinopril 2.5mg every day given low EF  - I/o, daily wt     Hypertension: /79 here  Normotensive   Nl bmp  - restarted lisinopril 2.5mg every day.   -follow bmp     Lactic acidosis: Improved with IVF---> chf, stop fliuds, diureses---> 2.3 8/28. Follow prn     Leukocytosis:   -CT chest C/A/P no obvious pathology, UA also fairly bland, this is suspected to be due to recent MI above  -wbc 20 admission---> 15.6 ---> 11 today. Am cbc  Afebrile. No infectious signs/sxs     GERD: Resume omeprazole     Tachybrady syndrome s/p pacemaker  - See above for warfarin management  -coumadin, goal inr 2-3, dosing per pharmd  -INR withint goal range, daily inr     Hyperlipidemia: Statin per cardiology     DVT Prophylaxis: Warfarin, daily inr  Code Status: No CPR- Do NOT Intubate  PT/OT: not yet  Diet: Regular Diet Adult       Disposition: Expected discharge tomorrow possible. 1-2 days pending further cardiac management, diuresis  Seen by PT, OT, TCU rec'd  SW consult for placement  Discussed care plan with rn, pt, pt's family and SW today       Bang Aleman MD  Text Page  (7am to 6pm)  Interval History   Up in night freq with urination  No cp or sob. No abd pain.       -Data reviewed today: I reviewed all new labs and imaging results over the last 24 hours. I personally reviewed labs and imaging last 24 hours.     Physical Exam   Temp: 98.2  F (36.8  C) Temp src: Oral BP: 100/65 Pulse: 65   Resp: 18 SpO2: 93 % O2 Device: None  (Room air)    Vitals:    08/28/21 0539 08/28/21 0828 08/29/21 0500   Weight: 80.2 kg (176 lb 11.2 oz) 80.3 kg (177 lb 1.6 oz) 79.1 kg (174 lb 6.1 oz)     Vital Signs with Ranges  Temp:  [96.4  F (35.8  C)-98.2  F (36.8  C)] 98.2  F (36.8  C)  Pulse:  [65-89] 65  Resp:  [18-24] 18  BP: ()/(59-76) 100/65  SpO2:  [93 %-97 %] 93 %  I/O last 3 completed shifts:  In: 640 [P.O.:640]  Out: 1885 [Urine:1885]    Constitutional: Alert, up in bed  Neck: elevated jvp  Respiratory: Bibasilar crackles-better today. no tachypnea  Cardiovascular: RRR no r/g/m  GI: soft, nt, nd  Skin/Integumen: no rash or edema  Neuro: nl speech,Alert, nonfocal grossly      Medications       atorvastatin  40 mg Oral QPM     clopidogrel  75 mg Oral Daily     lisinopril  2.5 mg Oral Daily     metoprolol succinate ER  25 mg Oral Daily     omeprazole  20 mg Oral BID     potassium chloride  40 mEq Oral Once     sertraline  100 mg Oral Daily     sodium chloride (PF)  3 mL Intracatheter Q8H       Data   Recent Labs   Lab 08/29/21  0540 08/28/21  0545 08/27/21  1414 08/27/21  1000 08/27/21  0534 08/26/21  1905   WBC 11.7* 15.6*  --   --  19.2* 20.0*   HGB 11.6* 12.8*  --   --  13.5 14.7   MCV 85 85  --   --  86 84    334  --   --  329 355   INR 2.54* 3.22*  --   --  3.34* 2.77*    139  --   --  138 138   POTASSIUM 3.1* 3.9  --   --  4.5 4.9   CHLORIDE 109 112*  --   --  109 108   CO2 26 21  --   --  24 25   BUN 26 25  --   --  26 23   CR 1.04 1.04  --   --  1.10 1.02   ANIONGAP 5 6  --   --  5 5   ANGELITA 8.4* 9.0  --   --  8.9 9.9   * 106*  --   --  122* 172*   ALBUMIN 2.4* 2.6*  --   --  2.9* 3.6   PROTTOTAL 6.4* 6.6*  --   --  7.2 8.4   BILITOTAL 0.8 1.1  --   --  1.2 1.2   ALKPHOS 74 66  --   --  59 66   ALT 46 41  --   --  38 34   AST 35 38  --   --  39 38   LIPASE  --   --   --   --   --  50*   TROPONIN  --   --  2.916* 3.108* 3.101* 3.177*       Imaging:   No results found for this or any previous visit (from the past 24  hour(s)).

## 2021-08-29 NOTE — PLAN OF CARE
Forgetful, easily re-oriented. VSS on RA. Tele V paced w/ underlying Afib+CVR. LS diminished fine crackles. Gave scheduled iv lasix overnight-diuresing well. Denies SOB/Pain. Up with 1A/GB/W. Plans for discharge pending improvement. Will continue to monitor.

## 2021-08-30 NOTE — PROGRESS NOTES
[facility]    Hospitalist Progress Note    Assessment & Plan     Summary: Santosh Robledo is a 89 year old male with PMH tachy-gaudencio syndrome s/p pacemaker, atrial fibrillation on warfarin, who was admitted on 8/26/2021 with NSTEMI.     Inferior NSTEMI:   Ischemic CM  Acute systolic chf    -Patient reports 1-2 weeks of abdominal pain. EKG notes inferior Q waves, troponin is stably elevated around 3. Evidence points to a recent MI.   -CT abd negative.   - Echo:  The visual ejection fraction is 35-40%.  There is severe hypokinesis of the inferoseptal and inferior/ inferolateral wall of the LV.  The right ventricle is mild to moderately dilated. Moderately decreased right ventricular systolic function  There is a pacemaker lead in the right ventricle. Intermittent pacing.  The left atrium is moderately dilated. The right atrium is moderate to  severely dilated.  There is mild to moderate (1-2+) tricuspid regurgitation.  IVC is dilated, but not well visualized. Likely elevated RA pressures.    -Trop series: 3.1 admission---> 3.1--> 2.9.     - Appreciate Cardiology consult  -subacute inferior infarct, likely completed. Cardiology spoke with pt and family regarding care plan in detail. Plan is for medical management.   -Echo with ICM.   -on coumadin, InR in therapeutic range since admisison. 3.2 today. No heparin per cardiology  -wt is up 74kg---> 80 kg. Today ? Accurate. Wt 76 kg yest  -normally on lasix 20mg bid with kdur  - currently on lasix 10mg bid  -had 1L NS bolus yest and 125cc/hr NS fluids since  -tachypneic, elevated jvp, bibasilar crackles 8/28. Initiated on iv lasix    I/O:-1.1 L net out  Some incontinence  Wt: admission 74-75kg---> 80kg with ivfs---> diuresis---> 76 kg.     Wt down.   Breathing better. Less crackles bases- may have some chronic crackles.       Plan:   -will talk with pt and family today regarding future prognosis, palliative care consult to be considerred.    -stop iv lasix  -restart PtA lasix 20mg bid.   - cont Toprol XL 25mg every day,   -Lipitor 40mg every day  -Plavix load 600mg given---> 75mg every day  -no Aspirin.   - continue coumadin   -discussed antiplatelet plan and coumadin plan with dr. Bolivar, cardiology  - follow up [facility] in several weeks. Likely outpatient stress cardiac MRI to be determined at follow up visit  - am lipid panel  -started on lisinopril 2.5mg every day given low EF  - I/o, daily wt  -[facility] cardiology follow up one week.      Hypertension: /79 here  Normotensive   Nl bmp  - restarted lisinopril 2.5mg every day.   -follow bmp at tcu  -toprol xl increase to PtA dosing of 50mg every day tomorrow     Lactic acidosis: Improved with IVF---> chf, stop fliuds, diureses---> 2.3 8/28. Follow prn     Leukocytosis: resolved.   -CT chest C/A/P no obvious pathology, UA also fairly bland, this is suspected to be due to recent MI above  -wbc 20 admission---> 15.6 ---> 10 today.  Afebrile. No infectious signs/sxs  Follow cbc prn     GERD: Resume omeprazole     Tachybrady syndrome s/p pacemaker  - See above for warfarin management  -coumadin, goal inr 2-3, dosing per pharmd  -INR withint goal range, daily inr  -follow INR at tcu.        Hyperlipidemia: Statin per cardiology     DVT Prophylaxis: Warfarin, daily inr  Code Status: No CPR- Do NOT Intubate  PT/OT: not yet  Diet: Regular Diet Adult       Disposition: Expected discharge tomorrow   Seen by PT, OT, TCU rec'd  SW consult for placement    Discharge tomorrow to TCU, bed avialable [facility] tomorrow pre SW       Bang Aleman MD  Text Page  (7am to 6pm)  Interval History   Wt down.   No sob. No complaints. No cp or abd pain      -Data reviewed today: I reviewed all new labs and imaging results over the last 24 hours. I personally reviewed labs and imaging last 24 hours.     Physical Exam   Temp: 97.5  F (36.4  C) Temp src: Axillary BP: 118/78 Pulse: 77   Resp: 18 SpO2:  97 % O2 Device: None (Room air)    Vitals:    08/29/21 0500 08/29/21 0912 08/30/21 0147   Weight: 79.1 kg (174 lb 6.1 oz) 78.6 kg (173 lb 4.5 oz) 76.1 kg (167 lb 12.8 oz)     Vital Signs with Ranges  Temp:  [97.5  F (36.4  C)-97.8  F (36.6  C)] 97.5  F (36.4  C)  Pulse:  [67-77] 77  Resp:  [18-20] 18  BP: (102-123)/(49-78) 118/78  SpO2:  [94 %-98 %] 97 %  I/O last 3 completed shifts:  In: 300 [P.O.:300]  Out: 1075 [Urine:1075]    Constitutional: Alert, up in bed  Neck: elevated jvp  Respiratory: Bibasilar crackles-better today. no tachypnea  Cardiovascular: RRR no r/g/m  GI: soft, nt, nd  Skin/Integumen: no rash or edema  Neuro: nl speech,Alert, nonfocal grossly      Medications     Warfarin Therapy Reminder         atorvastatin  40 mg Oral QPM     clopidogrel  75 mg Oral Daily     [START ON 8/31/2021] furosemide  20 mg Oral BID     lisinopril  2.5 mg Oral Daily     metoprolol succinate ER  25 mg Oral Daily     omeprazole  20 mg Oral BID     potassium chloride ER  20 mEq Oral BID     sertraline  100 mg Oral Daily     sodium chloride (PF)  3 mL Intracatheter Q8H       Data   Recent Labs   Lab 08/30/21  0536 08/29/21  1507 08/29/21  0540 08/28/21  0545 08/27/21  1414 08/27/21  1000 08/27/21  0534 08/26/21  1905   WBC 10.1  --  11.7* 15.6*  --   --  19.2* 20.0*   HGB 12.3*  --  11.6* 12.8*  --   --  13.5 14.7   MCV 83  --  85 85  --   --  86 84     --  300 334  --   --  329 355   INR 1.99*  --  2.54* 3.22*  --   --  3.34* 2.77*     --  140 139  --   --  138 138   POTASSIUM 3.2* 3.6 3.1* 3.9  --   --  4.5 4.9   CHLORIDE 108  --  109 112*  --   --  109 108   CO2 27  --  26 21  --   --  24 25   BUN 27  --  26 25  --   --  26 23   CR 0.96  --  1.04 1.04  --   --  1.10 1.02   ANIONGAP 4  --  5 6  --   --  5 5   ANGELITA 8.7  --  8.4* 9.0  --   --  8.9 9.9   GLC 95  --  100* 106*  --   --  122* 172*   ALBUMIN 2.4*  --  2.4* 2.6*  --   --  2.9* 3.6   PROTTOTAL 6.4*  --  6.4* 6.6*  --   --  7.2 8.4   BILITOTAL 0.8  --   0.8 1.1  --   --  1.2 1.2   ALKPHOS 83  --  74 66  --   --  59 66   ALT 40  --  46 41  --   --  38 34   AST 28  --  35 38  --   --  39 38   LIPASE  --   --   --   --   --   --   --  50*   TROPONIN  --   --   --   --  2.916* 3.108* 3.101* 3.177*       Imaging:   No results found for this or any previous visit (from the past 24 hour(s)).

## 2021-08-30 NOTE — PROGRESS NOTES
Care Management Follow Up    Length of Stay (days): 3    Expected Discharge Date: 08/29/2021     Concerns to be Addressed: discharge planning     Patient plan of care discussed at interdisciplinary rounds: Yes    Anticipated Discharge Disposition: Transitional Care     Anticipated Discharge Services: Other (see comment) (therapy)  Anticipated Discharge DME:      Patient/family educated on Medicare website which has current facility and service quality ratings: no  Education Provided on the Discharge Plan:    Patient/Family in Agreement with the Plan: yes    Referrals Placed by CM/SW: Post Acute Facilities  Private pay costs discussed: private room/amenity fees and transportation costs    Additional Information:  Writer spoke with Anum at [facility] who confirmed that they can accept patient tomorrow. Writer spoke with patient's spouse regarding placement options. Writer explained the visitation policy with [facility] and informed that [facility] could accept tomorrow. Patient's wife chose [facility]. Writer and spouse discussed private room fee, patient's spouse prefers a shared room. Writer and spouse discussed transport and is in agreement to a W/C ride and understands the private pay cost of $80 flat fee and $5 per mile. Writer informed Anum of spouse choice of facility and that they prefer a shared room. Anum stated she would inform writer if a bed does open up for today.  Writer completed Pas and faxed to [facility]. Writer placed PAS on front of chart. Anum stated the room would be ready 8/31 on 12 or after and writer stated she would set up a ride for noon. Writer set up a ride for tomorrow with  [employer] and set ride up for 1200 on 8/31. Writer spoke to patient's spouse of transport time.  Writer will continue to follow for safe discharge planning.      PAS-RR    D: Per DHS regulation, ISA completed and submitted PAS-RR to [place] [employer] Direct Connect via the [employer].  PAS-RR confirmation # is :  [id number]    I: SW spoke with patient's spouse and they are aware a PAS-RR has been submitted.  SW reviewed with patient spouse that they may be contacted for a follow up appointment within 10 days of hospital discharge if their SNF stay is < 30 days.  Contact information for [employer] was also provided.    A: Patient's spouse verbalized understanding.    P: Further questions may be directed to [employer] at #[phone], option #4 for PAS-RR staff.    TRENT De Leon, LGSW   Social Work   [facility]

## 2021-08-30 NOTE — PROGRESS NOTES
[facility] Progress Note     Deric Kendrick MD  08/30/2021         Interval History:      Patient lying comfortable in bed but has intermittent episodes very gets anxious and is breathing with somewhat of a rapid rate.  No chest pain.         Assessment and Plan:      1.  Recent subacute inferior ST elevation myocardial infarction,likely completed    Patient remains chest pain-free.  Has moderate LV dysfunction with EF 35 to 40% and RV dysfunction.  After extensive conversation with his family and his son-in-law, conservative management was agreed upon is eloquently noted in my colleague's note from yesterday.    He appears to have improvement in his fluid overload and therefore agree with converting IV Lasix to p.o. Lasix today.  I discussed the plan at length with Dr. Aleman who is his hospitalist.  Peak trop 3.1  Patient and family want to focus on comfort.  He will be transferred to TCU today or tomorrow.  I suggested that we should obtain palliative care consult to discuss goals of care much more formally.    2.    Acute diastolic dysfunction, congestive heart failure, improved with IV lasix  Secondary to recent myocardial infarction    3. chronic atrial fibrillation on Coumadin  Sick sinus syndrome with underlying pacemaker implantation  Since patient on Coumadin, would only treat him with Plavix for his recent myocardial infarction.      3.  Tachybradycardia syndrome s/p pacemaker and plantation.  Last device check showing 89% ventricular pacing with normal sensing pacing threshold and impedance and 6-1/2 years of battery longevity.    4.  DNR/DNI.    Recommendations  We will switch to p.o. Lasix.  Continue p.o. metoprolol and lisinopril.  On Plavix and Coumadin.  We will sign off.  Recall if questions.  Transfer to TCU once bed available.    Clinically Significant Risk Factors Present on Admission           # Severe Malnutrition, POA: based on Weight loss;Reduced intake;Subcutaneous fat loss;Muscle  "loss (08/27/21 1300)    Cardiovascular: Cardiac Arrhythmia: Atrial fibrillation: Persistent  Inferior STEMI    Hematology/Oncology: Thrombocytopenia Including Purpuric, HIT, & Other Platelet Defects: Acquired coagulation factor deficiency    Nephrology: CKD POA List: Stage 3a (GFR 45-59)   GFR 58 - 8/27    Neurology: Dementia: Vascular dementia without behavioral disturbance  Patient has history of cognitive impairment and previous CT from 2020 had revealed lacunar infarcts and brain volume loss.     Systemic: Chronic Fatigue and Other Debilities: Age-related physical debility  Chronic fatigue, unspecified            Physical Exam:       , Blood pressure 118/78, pulse 77, temperature 97.5  F (36.4  C), temperature source Axillary, resp. rate 18, height 1.905 m (6' 3\"), weight 76.1 kg (167 lb 12.8 oz), SpO2 97 %.  Vitals:    08/29/21 0500 08/29/21 0912 08/30/21 0147   Weight: 79.1 kg (174 lb 6.1 oz) 78.6 kg (173 lb 4.5 oz) 76.1 kg (167 lb 12.8 oz)     Vital Signs with Ranges  Temp:  [97.5  F (36.4  C)-97.8  F (36.6  C)] 97.5  F (36.4  C)  Pulse:  [67-77] 77  Resp:  [18-20] 18  BP: (102-123)/(49-78) 118/78  SpO2:  [94 %-98 %] 97 %  I/O's Last 24 hours  I/O last 3 completed shifts:  In: 300 [P.O.:300]  Out: 1075 [Urine:1075]  General patient appears comfortable  Neck JVP elevated to around 10 cm, positive hepatojugular reflux  Cardiovascular system irregular, no murmur rub or gallop  Respiratory system a few crackles at the bases improved from yesterday  Extremities no edema             Medications:          atorvastatin  40 mg Oral QPM     clopidogrel  75 mg Oral Daily     furosemide  40 mg Intravenous Q8H     lisinopril  2.5 mg Oral Daily     metoprolol succinate ER  25 mg Oral Daily     omeprazole  20 mg Oral BID     sertraline  100 mg Oral Daily     sodium chloride (PF)  3 mL Intracatheter Q8H     PRN Meds: acetaminophen **OR** acetaminophen, calcium carbonate, lidocaine 4%, lidocaine (buffered or not buffered), " melatonin, ondansetron **OR** ondansetron, sennosides, simethicone, sodium chloride (PF), Warfarin Therapy Reminder         Data:      All new lab and imaging data was reviewed.   Recent Labs   Lab Test 08/30/21  0536 08/29/21  0540 08/28/21  0545   WBC 10.1 11.7* 15.6*   HGB 12.3* 11.6* 12.8*   MCV 83 85 85    300 334   INR 1.99* 2.54* 3.22*      Recent Labs   Lab Test 08/30/21  0536 08/29/21  1507 08/29/21  0540 08/28/21  0545     --  140 139   POTASSIUM 3.2* 3.6 3.1* 3.9   CHLORIDE 108  --  109 112*   CO2 27  --  26 21   BUN 27  --  26 25   CR 0.96  --  1.04 1.04   ANIONGAP 4  --  5 6   ANGELITA 8.7  --  8.4* 9.0   GLC 95  --  100* 106*     Recent Labs   Lab Test 08/27/21  1414 08/27/21  1000 08/27/21  0534 05/23/18  1415 05/23/18  0944   TROPI  --   --   --  <0.015 0.017   TROPONIN 2.916* 3.108* 3.101*  --   --         Deric Kendrick MD  8/29/2021  Pager:  [phone]

## 2021-08-30 NOTE — PROGRESS NOTES
[facility]    Hospitalist Progress Note    Assessment & Plan     Summary: Santosh Robledo is a 89 year old male with PMH tachy-gaudencio syndrome s/p pacemaker, atrial fibrillation on warfarin, who was admitted on 8/26/2021 with NSTEMI.     Inferior NSTEMI:   Ischemic CM  Acute systolic chf    -Patient reports 1-2 weeks of abdominal pain. EKG notes inferior Q waves, troponin is stably elevated around 3. Evidence points to a recent MI.   -CT abd negative.   - Echo:  The visual ejection fraction is 35-40%.  There is severe hypokinesis of the inferoseptal and inferior/ inferolateral wall of the LV.  The right ventricle is mild to moderately dilated. Moderately decreased right ventricular systolic function  There is a pacemaker lead in the right ventricle. Intermittent pacing.  The left atrium is moderately dilated. The right atrium is moderate to  severely dilated.  There is mild to moderate (1-2+) tricuspid regurgitation.  IVC is dilated, but not well visualized. Likely elevated RA pressures.    -Trop series: 3.1 admission---> 3.1--> 2.9.     - Appreciate Cardiology consult  -subacute inferior infarct, likely completed. Cardiology spoke with pt and family regarding care plan in detail. Plan is for medical management.   -Echo with ICM.   -on coumadin, InR in therapeutic range since admisison. 3.2 today. No heparin per cardiology  -wt is up 74kg---> 80 kg. Today ? Accurate. Wt 76 kg yest  -normally on lasix 20mg bid with kdur  - currently on lasix 10mg bid  -had 1L NS bolus yest and 125cc/hr NS fluids since  -tachypneic, elevated jvp, bibasilar crackles 8/28. Initiated on iv lasix    I/O:-1.1 L net out  Some incontinence  Wt: admission 74-75kg---> 80kg with ivfs---> diuresis---> 76 kg.     Wt down.   Breathing better. Less crackles bases- may have some chronic crackles.       Plan:   -will talk with pt and family today regarding future prognosis, palliative care consult to be considerred.  Outpatient referral.   -stop iv lasix  -restart PtA lasix 20mg bid.   - cont Toprol XL 25mg every day,   -Lipitor 40mg every day  -Plavix load 600mg given---> 75mg every day  -no Aspirin.   - continue coumadin   -discussed antiplatelet plan and coumadin plan with dr. Bolivar, cardiology  - follow up [facility] in several weeks. Likely outpatient stress cardiac MRI to be determined at follow up visit  - am lipid panel  -started on lisinopril 2.5mg every day given low EF  - I/o, daily wt  -[facility] cardiology follow up one week.      Hypertension: /79 here  Normotensive   Nl bmp  - restarted lisinopril 2.5mg every day.   -follow bmp at tcu  -toprol xl increase to PtA dosing of 50mg every day tomorrow     Lactic acidosis: Improved with IVF---> chf, stop fliuds, diureses---> 2.3 8/28. Follow prn     Leukocytosis: resolved.   -CT chest C/A/P no obvious pathology, UA also fairly bland, this is suspected to be due to recent MI above  -wbc 20 admission---> 15.6 ---> 10 today.  Afebrile. No infectious signs/sxs  Follow cbc prn     GERD: Resume omeprazole     Tachybrady syndrome s/p pacemaker  - See above for warfarin management  -coumadin, goal inr 2-3, dosing per pharmd  -INR withint goal range, daily inr  -follow INR at tcu.        Hyperlipidemia: Statin per cardiology     DVT Prophylaxis: Warfarin, daily inr  Code Status: No CPR- Do NOT Intubate  PT/OT: not yet  Diet: Regular Diet Adult       Disposition: Expected discharge tomorrow   Seen by PT, OT, TCU rec'd  SW consult for placement    Discharge tomorrow to TCU, bed avialable [facility] tomorrow pre SW    Discussed care plan with pt, rn, cardiology       Bang Aleman MD  Text Page  (7am to 6pm)  Interval History   Wt down.   No sob. No complaints. No cp or abd pain      -Data reviewed today: I reviewed all new labs and imaging results over the last 24 hours. I personally reviewed labs and imaging last 24 hours.     Physical Exam   Temp: 97.5  F  (36.4  C) Temp src: Axillary BP: 118/78 Pulse: 77   Resp: 18 SpO2: 97 % O2 Device: None (Room air)    Vitals:    08/29/21 0500 08/29/21 0912 08/30/21 0147   Weight: 79.1 kg (174 lb 6.1 oz) 78.6 kg (173 lb 4.5 oz) 76.1 kg (167 lb 12.8 oz)     Vital Signs with Ranges  Temp:  [97.5  F (36.4  C)-97.8  F (36.6  C)] 97.5  F (36.4  C)  Pulse:  [67-77] 77  Resp:  [18-20] 18  BP: (102-123)/(49-78) 118/78  SpO2:  [94 %-98 %] 97 %  I/O last 3 completed shifts:  In: 300 [P.O.:300]  Out: 1075 [Urine:1075]    Constitutional: Alert, up in bed  Neck: elevated jvp  Respiratory: Bibasilar crackles-better today. no tachypnea  Cardiovascular: RRR no r/g/m  GI: soft, nt, nd  Skin/Integumen: no rash or edema  Neuro: nl speech,Alert, nonfocal grossly      Medications     Warfarin Therapy Reminder         atorvastatin  40 mg Oral QPM     clopidogrel  75 mg Oral Daily     [START ON 8/31/2021] furosemide  20 mg Oral BID     lisinopril  2.5 mg Oral Daily     metoprolol succinate ER  25 mg Oral Daily     omeprazole  20 mg Oral BID     potassium chloride ER  20 mEq Oral BID     sertraline  100 mg Oral Daily     sodium chloride (PF)  3 mL Intracatheter Q8H       Data   Recent Labs   Lab 08/30/21  0536 08/29/21  1507 08/29/21  0540 08/28/21  0545 08/27/21  1414 08/27/21  1000 08/27/21  0534 08/26/21  1905   WBC 10.1  --  11.7* 15.6*  --   --  19.2* 20.0*   HGB 12.3*  --  11.6* 12.8*  --   --  13.5 14.7   MCV 83  --  85 85  --   --  86 84     --  300 334  --   --  329 355   INR 1.99*  --  2.54* 3.22*  --   --  3.34* 2.77*     --  140 139  --   --  138 138   POTASSIUM 3.2* 3.6 3.1* 3.9  --   --  4.5 4.9   CHLORIDE 108  --  109 112*  --   --  109 108   CO2 27  --  26 21  --   --  24 25   BUN 27  --  26 25  --   --  26 23   CR 0.96  --  1.04 1.04  --   --  1.10 1.02   ANIONGAP 4  --  5 6  --   --  5 5   ANGELITA 8.7  --  8.4* 9.0  --   --  8.9 9.9   GLC 95  --  100* 106*  --   --  122* 172*   ALBUMIN 2.4*  --  2.4* 2.6*  --   --  2.9* 3.6    PROTTOTAL 6.4*  --  6.4* 6.6*  --   --  7.2 8.4   BILITOTAL 0.8  --  0.8 1.1  --   --  1.2 1.2   ALKPHOS 83  --  74 66  --   --  59 66   ALT 40  --  46 41  --   --  38 34   AST 28  --  35 38  --   --  39 38   LIPASE  --   --   --   --   --   --   --  50*   TROPONIN  --   --   --   --  2.916* 3.108* 3.101* 3.177*       Imaging:   No results found for this or any previous visit (from the past 24 hour(s)).

## 2021-08-30 NOTE — PLAN OF CARE
A/Ox3-4 but forgetful. VSS on RA. Up 1-A w/ GB and walker.  Voiding per urinal. Denies pain, no SOB noted. On tele 100% V paced.

## 2021-08-30 NOTE — PLAN OF CARE
Shift Summary [id number]    Admitting Diagnosis: NSTEMI (non-ST elevated myocardial infarction) (H) [I21.4]   Vitals WDL   Pain Denies pain. Taking Tylenol PRN - not given overnight  A&Ox4, but appears forgetful and is slow to respond at times  Voiding via urinal. Hard stools.  Mobility - Up with 1A and WW  Tele V-Paced with freq PVCs   CMS baseline numbness to BLE  Lung Sounds dim in bases  Regular diet   Continue to monitor Mag and K+ levels    Plan: TCU placement today?      Problem: Adult Inpatient Plan of Care  Goal: Plan of Care Review  8/30/2021 0135 by Brinda Cloud RN  Flowsheets (Taken 8/30/2021 0135)  Outcome Summary: Plan of care reviewed with patient  8/30/2021 0135 by Brinda Cloud RN  Outcome: Improving  Flowsheets (Taken 8/30/2021 0135)  Outcome Summary: Plan of care reviewed with patient  Goal: Patient-Specific Goal (Individualized)  Outcome: Improving  Goal: Absence of Hospital-Acquired Illness or Injury  Outcome: Improving  Intervention: Identify and Manage Fall Risk  Recent Flowsheet Documentation  Taken 8/30/2021 0040 by Brinda Cloud RN  Safety Promotion/Fall Prevention: activity supervised  Intervention: Prevent Skin Injury  Recent Flowsheet Documentation  Taken 8/30/2021 0040 by Brinda Cloud RN  Body Position: supine  Intervention: Prevent and Manage VTE (Venous Thromboembolism) Risk  Recent Flowsheet Documentation  Taken 8/30/2021 0040 by Brinda Cloud RN  VTE Prevention/Management:    fluids promoted    ambulation promoted    dorsiflexion/plantar flexion performed  Goal: Optimal Comfort and Wellbeing  Outcome: Improving  Goal: Readiness for Transition of Care  Outcome: Improving     Problem: Risk for Delirium  Goal: Optimal Coping  Outcome: Improving  Goal: Improved Behavioral Control  Outcome: Improving  Goal: Improved Attention and Thought Clarity  Outcome: Improving  Goal: Improved Sleep  Outcome: Improving     Problem: OT General Care Plan  Goal: Toilet Transfer/Toileting  (OT)  Description: Toilet Transfer/Toileting (OT)  Outcome: Improving     Problem: Discharge Planning  Goal: Discharge Planning (Adult, OB, Behavioral, Peds)  Outcome: Improving

## 2021-08-30 NOTE — PLAN OF CARE
Pt here with NSTEMI/HF. A&Ox4, forgetful. CALDWELL. VSS. Tele on demand V-pacing. Regular diet, poor appetite. Up with A1, GB, W. Denies pain. Large loose BM. Potassium replaced. Voiding small amounts - urinary retention. Pt scoring green on the Aggression Stop Light Tool. Discharge to TCU tomorrow at 12, ride set up.

## 2021-08-30 NOTE — PROGRESS NOTES
Care Management Follow Up    Length of Stay (days): 3    Expected Discharge Date: 08/29/2021     Concerns to be Addressed: discharge planning     Patient plan of care discussed at interdisciplinary rounds: Yes    Anticipated Discharge Disposition: Transitional Care     Anticipated Discharge Services: Other (see comment) (therapy)  Anticipated Discharge DME:      Patient/family educated on Medicare website which has current facility and service quality ratings: no  Education Provided on the Discharge Plan:    Patient/Family in Agreement with the Plan: yes    Referrals Placed by CM/SW: Post Acute Facilities  Private pay costs discussed: Not applicable    Additional Information:  Writer spoke with Faby in Admissions at [facility] who stated she will review the referral. Writer spoke to Maura at [facility] in admissions stating that she is also currently reviewing file.  Writer spoke with admissions at [facility] who stated that they can accept patient, however they are not allowing visitors for another week and a half. Writer will wait to hear from [facility] and [facility] and proceed to discuss placement options with  patient/family.     Li Guy, TRENT, LGSW   Social Work   [facility]

## 2021-08-31 NOTE — PLAN OF CARE
A&O, forgetful. VSS, room air. Tele: V-paced, HR 60's. Denies CP and SOB. CALDWELL. LS clear. UA collected d/t elevated WBC count and fever last evening. UA resulted abnormal, pt started on oral ABX prior to discharge to continue at TCU. Up w/ 1 and walker/GB. Plan to discharge to TCU today with WC ride. All belongings have been collected and sent with patient. All questions have been answered, pt family agreeable with plan. IV removed.

## 2021-08-31 NOTE — DISCHARGE SUMMARY
[facility] [place]    Discharge Summary  Hospitalist    Date of Admission:  8/26/2021  Date of Discharge:  8/31/2021  Discharging Provider: Bang Aleman MD    Discharge Diagnoses     Inferior NSTEMI:   Ischemic CM  Acute systolic chf  Hx chronic Atrial fibrillation. Tachybrady syndrome, hx of pacemaker placement    History of Present Illness   Santosh Robledo is a 89 year old male who has a PMH most remarkable for tachy-gaudencio syndrome (with pacemaker), atiral fibrillation (on anticoagulation), who presented with 1 week of abdominal / chest pain and weakness and was admitted for an NSTEMI.     Patient presented to the emergency department initially on August 14 with abdominal pain.  He was evaluated and no obvious source of his abdominal pain was found, so he was discharged with instructions to increase his Prilosec dose.  This resolved the symptoms and because they went away he did not follow-up with GI as instructed.     Now over the past 5 to 7 days, patient has had increased weakness, fatigue, and abdominal pain.  He has not moved much nor has he eaten or drink much.  He states that the abdominal pain got much worse this morning and he describes it in his epigastric area.  It does not radiate, and it does not come on with activity.     Patient denies other symptoms including fevers, chills, chest pain, shortness of breath.  No dizziness, lightheadedness, syncope, presyncope.  No history of myocardial infarction.          Hospital Course   Santosh Robledo was admitted on 8/26/2021.  The following problems were addressed during his hospitalization:    Active Problems:    NSTEMI (non-ST elevated myocardial infarction) (H)    Ischemic cardiomyopathy    Acute systolic congestive heart failure (H)  Summary: Santosh Robledo is a 89 year old male with PMH tachy-gaudencio syndrome s/p pacemaker, atrial fibrillation on warfarin, who was admitted on 8/26/2021 with NSTEMI.     Inferior  NSTEMI:   Ischemic CM  Acute systolic chf     -Patient reports 1-2 weeks of abdominal pain. EKG notes inferior Q waves, troponin is stably elevated around 3. Evidence points to a recent MI.   -CT abd negative.   - Echo:  The visual ejection fraction is 35-40%.  There is severe hypokinesis of the inferoseptal and inferior/ inferolateral wall of the LV.  The right ventricle is mild to moderately dilated. Moderately decreased right ventricular systolic function  There is a pacemaker lead in the right ventricle. Intermittent pacing.  The left atrium is moderately dilated. The right atrium is moderate to  severely dilated.  There is mild to moderate (1-2+) tricuspid regurgitation.  IVC is dilated, but not well visualized. Likely elevated RA pressures.     -Trop series: 3.1 admission---> 3.1--> 2.9.      - Appreciate Cardiology consult  -subacute inferior infarct, likely completed. Cardiology spoke with pt and family regarding care plan in detail. Plan is for medical management.   -Echo with ICM.   -on coumadin, InR in therapeutic range since admisison. 3.2 today. No heparin per cardiology  -wt is up 74kg---> 80 kg. Today ? Accurate. Wt 76 kg yest  -normally on lasix 20mg bid with kdur  - currently on lasix 10mg bid  -had 1L NS bolus yest and 125cc/hr NS fluids since  -tachypneic, elevated jvp, bibasilar crackles 8/28. Initiated on iv lasix     I/O:-1.1 L net out  Some incontinence  Wt: admission 74-75kg---> 80kg with ivfs---> diuresis---> 76-77kg day of discharge.   Lungs now completely clear and nl jvp day of discharge.   After resumption of po lasix  Pt appeared well day of discharge. Taking po. No complaints.           Plan at discharge:   -palliative care referral placed [facility]. Discussed with pt and pt's wife prior to discharge. Goals of care discusion  -restarted PtA lasix 20mg bid.   --started on lisinopril 2.5mg every day given low EF  - cont Toprol XL 25mg every day--->increased  to PTA 50mg every day dose on discharge  -Lipitor 40mg every day  -Plavix load 600mg given---> 75mg every day (new)  -no Aspirin discussed with cardiology  - continue coumadin dosing per INR   -discussed antiplatelet plan and coumadin plan with dr. Bolivar, cardiology  - follow up [facility] in one week-ordered. Likely outpatient stress cardiac MRI to be determined at follow up visit    - I/o, daily wt at TCU  - follow bmp at tcu       Hypertension: /79 here  Normotensive   Nl bmp  - restarted lisinopril 2.5mg every day.   -follow bmp at tcu  -toprol xl increase to PtA dosing of 50mg every day      Lactic acidosis:   Resolved with diuresis. 1.1 on 8/29.         Leukocytosis: resolved.   -CT chest C/A/P no obvious pathology, UA also fairly bland, this is suspected to be due to recent MI above  -wbc 20 admission---> 15.6 ---> 10 ---> 13 on day of discharge  Pt had low grade fever of 100.3 day prior to discharge but no recurrence.   procal wnl. Pt without focal infectious sxs on complete ROS. He actually looks the best he has since admission.   Follow cbc in 2 days at tcu  Iv site looks fine. Lungs are now completely clear.   UA obtained. Doubt pneumonia or UTI.     Addendum; UA showed 21 wbc. Small LE. No nitrites. No squamos epi cells. ucx pending.   Will treat with 3 days of Ceftin 500mg bid. First dose in hospital prior to discharge. No clear sxs.        GERD: Resume omeprazole     Tachybrady syndrome s/p pacemaker  - See above for warfarin management  -coumadin, goal inr 2-3, dosing per pharmd  -INR withint goal range, daily inr  -follow INR at tcu.   - daily inr at tcu. Reduced dose of coumadin to 2.5mg every day for now, pt was taking 5mg every day PTA. Discussed with pharmD     Hypokalemia  Treated with potassium per protocol. Resolved.      Hyperlipidemia: Statin per cardiology     DVT Prophylaxis: Warfarin,  Code Status: No CPR- Do NOT Intubate  PT/OT: not yet  Diet: Regular Diet  Adult       Disposition: discharge to tCU. [facility]     Bang Aleman MD, MD    Significant Results and Procedures   See hospital course    Pending Results   none  Unresulted Labs Ordered in the Past 30 Days of this Admission     No orders found from 7/27/2021 to 8/27/2021.          Code Status   DNR / DNI       Primary Care Physician   Lilly MONTSEEd Zepeda    Physical Exam   Temp: 97.7  F (36.5  C) Temp src: Axillary BP: (!) 158/70 Pulse: 82   Resp: 18 SpO2: 96 % O2 Device: None (Room air)    Vitals:    08/29/21 0912 08/30/21 0147 08/31/21 0610   Weight: 78.6 kg (173 lb 4.5 oz) 76.1 kg (167 lb 12.8 oz) 77 kg (169 lb 11.2 oz)     Vital Signs with Ranges  Temp:  [97.5  F (36.4  C)-100.3  F (37.9  C)] 97.7  F (36.5  C)  Pulse:  [72-98] 82  Resp:  [18-20] 18  BP: (130-158)/(61-92) 158/70  SpO2:  [93 %-99 %] 96 %  I/O last 3 completed shifts:  In: 1620 [P.O.:1620]  Out: 820 [Urine:820]    Constitutional: up in chair, appears well. Looks comfortable.   Respiratory: breathing easily, now CTAB. Improved from yesterday  Cardiovascular: RRR no r/g/m  GI: soft, nt, nd  Skin: no rash or edema. Iv site looks fine  Musculoskeletal: no focal jt swelling or redness  Neurologic: nl speech, alert, fully oriented, sligthtly forgetful  Neuropsychiatric: nl affect.     Discharge Disposition   Discharged to short-term care facility  Condition at discharge: Good    Consultations This Hospital Stay   CARDIOLOGY IP CONSULT  PHYSICAL THERAPY ADULT IP CONSULT  OCCUPATIONAL THERAPY ADULT IP CONSULT  CARE MANAGEMENT / SOCIAL WORK IP CONSULT  PHARMACY TO DOSE WARFARIN  OCCUPATIONAL THERAPY ADULT IP CONSULT  PHYSICAL THERAPY ADULT IP CONSULT    Time Spent on this Encounter   IBang MD, personally saw the patient today and spent greater than 30 minutes discharging this patient.    Discharge Orders      Discharge Order: F/U with Cardiac  NADEGE      Palliative Care Referral      General info for SNF    Length of Stay Estimate: Short Term  Care: Estimated # of Days <30  Condition at Discharge: Improving  Level of care:skilled   Rehabilitation Potential: Good  Admission H&P remains valid and up-to-date: Yes  Recent Chemotherapy: N/A  Use Nursing Home Standing Orders: Yes     Mantoux instructions    Give two-step Mantoux (PPD) Per Facility Policy Yes     Reason for your hospital stay    Inferior NSTEMI:   Ischemic Cardiomyopathy  Acute systolic chf     Intake and output    Every shift     Daily weights    Call Provider for weight gain of more than 2 pounds per day or 5 pounds per week.     Activity - Up with nursing assistance     Follow Up and recommended labs and tests    1. Follow up with [facility] in several weeks to follow up CHF, NSTEMI, ICM  2. Follow up with pcp 1-2 weeks after tCU discharge  3. BMP in 2 days then weekly.  Daily INR for now. Goal INR 2-3. CBC with platelet count in 2 days     No CPR- Do NOT Intubate     Occupational Therapy Adult Consult    Evaluate and treat as clinically indicated.    Reason: cognitive impairment. Cognitive evaluation. Safety evaluation, deconditioned     Physical Therapy Adult Consult    Evaluate and treat as clinically indicated.    Reason:  deconditioned     Fall precautions     Advance Diet as Tolerated    Follow this diet upon discharge: Orders Placed This Encounter      Snacks/Supplements Adult: Ensure Enlive; Between Meals      Regular Diet Adult     Discharge Medications   Current Discharge Medication List      START taking these medications    Details   acetaminophen (TYLENOL) 325 MG tablet Take 2 tablets (650 mg) by mouth every 6 hours as needed for mild pain or other (and adjunct with moderate or severe pain or per patient request)    Associated Diagnoses: Ischemic cardiomyopathy      atorvastatin (LIPITOR) 40 MG tablet Take 1 tablet (40 mg) by mouth every evening    Associated Diagnoses: Ischemic cardiomyopathy      clopidogrel (PLAVIX) 75 MG tablet Take 1 tablet (75 mg) by mouth  daily    Associated Diagnoses: Ischemic cardiomyopathy      nitroGLYcerin (NITROSTAT) 0.4 MG sublingual tablet For chest pain place 1 tablet under the tongue every 5 minutes for 3 doses. If symptoms persist 5 minutes after 1st dose call 911.    Associated Diagnoses: NSTEMI (non-ST elevated myocardial infarction) (H)      sennosides (SENOKOT) 8.6 MG tablet Take 1-2 tablets by mouth 2 times daily as needed for constipation    Associated Diagnoses: Other constipation         CONTINUE these medications which have CHANGED    Details   furosemide (LASIX) 20 MG tablet Take 1 tablet (20 mg) by mouth 2 times daily    Associated Diagnoses: Acute systolic congestive heart failure (H)      warfarin ANTICOAGULANT (COUMADIN) 2.5 MG tablet Take 1 tablet (2.5 mg) by mouth daily    Associated Diagnoses: Chronic atrial fibrillation (H)         CONTINUE these medications which have NOT CHANGED    Details   [employer] VITAMIN B12 1000 MCG tablet TAKE 1 TABLET BY MOUTH EVERY DAY  Qty: 200 tablet, Refills: 1    Associated Diagnoses: Vitamin B12 deficiency (non anemic)      lisinopril (ZESTRIL) 20 MG tablet Take 1 tablet (20 mg) by mouth daily  Qty: 180 tablet, Refills: 3    Associated Diagnoses: Benign essential hypertension      melatonin 1 MG TABS tablet Take 1 tablet (1 mg) by mouth every evening    Associated Diagnoses: Ischemic cardiomyopathy      metoprolol succinate ER (TOPROL XL) 50 MG 24 hr tablet Take 1 tablet (50 mg) by mouth At Bedtime  Qty: 90 tablet, Refills: 3    Associated Diagnoses: Chronic diastolic (congestive) heart failure (H)      omeprazole (PRILOSEC) 20 MG DR capsule TAKE 1 CAPSULE BY MOUTH EVERY DAY  Qty: 90 capsule, Refills: 1    Comments: INFORM PATIENT HE NEEDS APPOINTMENT WITH DR. GRIFFIN FOR FURTHER REFILLS.  Associated Diagnoses: Gastroesophageal reflux disease      potassium chloride ER (KLOR-CON) 20 MEQ CR tablet Take 1 tablet (20 mEq) by mouth 2 times daily  Qty: 180 tablet, Refills: 1    Associated Diagnoses:  Benign essential hypertension      sertraline (ZOLOFT) 50 MG tablet Take 1 tablet (50 mg) by mouth daily  Qty: 90 tablet, Refills: 3    Associated Diagnoses: Episode of recurrent major depressive disorder, unspecified depression episode severity (H)      Simethicone (GAS-X EXTRA STRENGTH) 125 MG CAPS Take 1 capsule by mouth 2 times daily  Qty: 60 capsule, Refills: 11    Associated Diagnoses: Flatulence, eructation and gas pain      Ca Carbonate-Mag Hydroxide (ROLAIDS PO) Take by mouth 3 times daily as needed       !! order for DME Equipment being ordered: Walker Wheels () and Walker ()  Treatment Diagnosis: Difficulty ambulating  Qty: 1 each, Refills: 0    Associated Diagnoses: Colitis      !! order for DME Equipment being ordered: Walker Wheels ()  Treatment Diagnosis:  Weakness,colitis.  Qty: 1 Device, Refills: 0    Associated Diagnoses: Colitis       !! - Potential duplicate medications found. Please discuss with provider.      STOP taking these medications       simvastatin (ZOCOR) 10 MG tablet Comments:   Reason for Stopping:             Allergies   Allergies   Allergen Reactions     Penicillins Rash     Data   Most Recent 3 CBC's:Recent Labs   Lab Test 08/31/21  0552 08/30/21  0536 08/29/21  0540   WBC 13.0* 10.1 11.7*   HGB 13.2* 12.3* 11.6*   MCV 83 83 85    293 300      Most Recent 3 BMP's:  Recent Labs   Lab Test 08/31/21  0552 08/30/21  1530 08/30/21  0536 08/29/21  0540     --  139 140   POTASSIUM 4.0 3.5 3.2* 3.1*   CHLORIDE 108  --  108 109   CO2 26  --  27 26   BUN 25  --  27 26   CR 0.88  --  0.96 1.04   ANIONGAP 4  --  4 5   ANGELITA 9.1  --  8.7 8.4*   *  --  95 100*     Most Recent 2 LFT's:  Recent Labs   Lab Test 08/31/21  0552 08/30/21  0536   AST 35 28   ALT 45 40   ALKPHOS 114 83   BILITOTAL 1.0 0.8     Most Recent INR's and Anticoagulation Dosing History:  Anticoagulation Dose History     Recent Dosing and Labs Latest Ref Rng & Units 8/14/2021 8/26/2021 8/27/2021  8/28/2021 8/29/2021 8/30/2021 8/31/2021    Warfarin 2.5 mg - - - - - 2.5 mg 2.5 mg -    INR 0.85 - 1.15 2.57(H) 2.77(H) 3.34(H) 3.22(H) 2.54(H) 1.99(H) 2.01(H)        Most Recent 3 Troponin's:  Recent Labs   Lab Test 08/27/21  1414 08/27/21  1000 08/27/21  0534 05/23/18  1415 05/23/18  0944   TROPI  --   --   --  <0.015 0.017   TROPONIN 2.916* 3.108* 3.101*  --   --      Most Recent Cholesterol Panel:  Recent Labs   Lab Test 08/29/21  0540   CHOL 107   LDL 60   HDL 27*   TRIG 102     Most Recent 6 Bacteria Isolates From Any Culture (See EPIC Reports for Culture Details):No lab results found.  Most Recent TSH, T4 and A1c Labs:  Recent Labs   Lab Test 03/04/19  0906   TSH 2.20     Results for orders placed or performed during the hospital encounter of 08/26/21   CT Abdomen Pelvis w Contrast    Narrative    EXAM: CT ABDOMEN PELVIS W CONTRAST  LOCATION: [facility]  DATE/TIME: 8/27/2021 12:49 AM    INDICATION: Abdominal pain, acute, nonlocalized  COMPARISON: 8/14/2021  TECHNIQUE: CT scan of the abdomen and pelvis was performed following injection of IV contrast. Multiplanar reformats were obtained. Dose reduction techniques were used.  CONTRAST: 82mL Isovue-370    FINDINGS:   LOWER CHEST: No change in extensive calcific pleural plaques and moderate hiatal hernia.    HEPATOBILIARY: Fatty infiltration of liver. Prior cholecystectomy.    PANCREAS: Normal.    SPLEEN: Normal.    ADRENAL GLANDS: Normal.    KIDNEYS/BLADDER: Kidneys negative, no stones or hydronephrosis.  Mild diffuse bladder wall thickening.    BOWEL: Diverticulosis of the colon. No acute inflammatory change. No obstruction. Appendix normal.    LYMPH NODES: Normal.    VASCULATURE: Diffuse atherosclerotic plaque.    PELVIC ORGANS: Moderate prostatic enlargement. No ascites.    MUSCULOSKELETAL: Unremarkable.      Impression    IMPRESSION:   1.  No etiology for symptoms evident. There is mild diverticulosis but no active inflammation  involving bowel.  2.  Hiatal hernia and extensive pleural plaques. Fatty infiltration of liver.   Echocardiogram Complete     Value    LVEF  35-40%    [facility]    [id number]  [id number]  [id number]  [id number]^BETTIE^PHILLIP^CHANDRIKA     [facility]  Echocardiography Laboratory  [address] [place] [id number]     Name: TRUE MATTHEWS  MRN: [id number]  : 1929  Study Date: 2021 10:53 AM  Age: 89 yrs  Gender: Male  Patient Location: [facility]  Reason For Study: MI - Acute  Ordering Physician: PHILLIP ALEXANDRE  Referring Physician: Lilly Zepeda  Performed By: Arya Castillo     BSA: 2.0 m2  Height: 75 in  Weight: 167 lb  HR: 77  BP: 103/64 mmHg  ______________________________________________________________________________  Procedure  Complete Portable Echo Adult. Optison (NDC #5285-8120) given intravenously.  ______________________________________________________________________________  Interpretation Summary     The visual ejection fraction is 35-40%.  There is severe hypokinesis of the inferoseptal and inferior/ inferolateral  wall of the LV.  The right ventricle is mild to moderately dilated. Moderately decreased right  ventricular systolic function  There is a pacemaker lead in the right ventricle. Intermittent pacing.  The left atrium is moderately dilated. The right atrium is moderate to  severely dilated.  There is mild to moderate (1-2+) tricuspid regurgitation.  IVC is dilated, but not well visualized. Likely elevated RA pressures.  ______________________________________________________________________________  Left Ventricle  The left ventricle is normal in size. There is mild concentric left  ventricular hypertrophy. The visual ejection fraction is 35-40%. Diastolic  Doppler findings (E/E' ratio and/or other parameters) suggest left ventricular  filling pressures are increased. There is severe hypokinesis of the  inferoseptal and inferior/ inferolateral wall of the  LV.     Right Ventricle  The right ventricle is mild to moderately dilated. Moderately decreased right  ventricular systolic function. There is a pacemaker lead in the right  ventricle.     Atria  The left atrium is moderately dilated. The right atrium is moderate to  severely dilated. Pacer wire in right atrium.     Mitral Valve  There is moderate mitral annular calcification. The mitral valve leaflets  appear thickened, but open well. There is mild (1+) mitral regurgitation.     Tricuspid Valve  There is mild to moderate (1-2+) tricuspid regurgitation. The right  ventricular systolic pressure is approximated at 15.6 mmHg plus the right  atrial pressure.     Aortic Valve  The aortic valve is trileaflet with aortic valve sclerosis. There is trace to  mild aortic regurgitation. No hemodynamically significant valvular aortic  stenosis.     Pulmonic Valve  The pulmonic valve is not well visualized. There is trace pulmonic valvular  regurgitation.     Vessels  The aortic root is normal size. Normal size ascending aorta. Dilation of the  inferior vena cava is present with abnormal respiratory variation in diameter.  Unable to assess mean RA pressure due to technically difficult study.     Pericardium  There is no pericardial effusion.     ______________________________________________________________________________  MMode/2D Measurements & Calculations  IVSd: 1.2 cm  LVIDd: 4.9 cm  LVIDs: 4.9 cm  LVPWd: 1.0 cm  FS: 0.61 %  LV mass(C)d: 209.4 grams  LV mass(C)dI: 103.0 grams/m2     Ao root diam: 3.8 cm  LA dimension: 3.3 cm  asc Aorta Diam: 3.6 cm  LA/Ao: 0.85  LVOT diam: 2.0 cm  LVOT area: 3.3 cm2  LA Volume (BP): 80.0 ml  LA Volume Index (BP): 39.4 ml/m2  RWT: 0.43     Doppler Measurements & Calculations  MV E max heidy: 104.0 cm/sec  MV dec slope: 503.5 cm/sec2     AI P1/2t: 410.6 msec  PA acc time: 0.06 sec  TR max heidy: 197.3 cm/sec  TR max PG: 15.6 mmHg  E/E' av.0  Lateral E/e': 12.7  Medial E/e': 21.2      ______________________________________________________________________________  Report approved by: Shy Gonzalez 08/27/2021 12:16 PM

## 2021-08-31 NOTE — PROGRESS NOTES
[facility] GERIATRIC SERVICES  PRIMARY CARE PROVIDER AND CLINIC:  Lilly Zepeda MD, [address] [facility] [address] / [place] [address]  Chief Complaint   Patient presents with     Hospital F/U      [facility] Medical Record Number:  [id number]  Place of Service where encounter took place:  [facility] ([facility]) [25]    Santosh Robledo  is a 89 year old  (7/20/1929), admitted to the above facility from  [facility]. Hospital stay 8/26/21 through 8/31/21..   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 Lasix PTA 20mg BID, resumed by discharge, started on lisinopril 2.5mg QD given low EF, continue PTA toprol increased to 50mg QD, lipitor 40mg QD, coumadin, started on plavix daily, no ASA ordered  Leukocytosis: WBC 20--> 10--> 13, low grade fever, UA 21wbc, Ucx pending.   On exam today : patient is sitting up in w/c, alert, pleasant, note some CALDWELL his is getting dressed as I walked in, denies SOB at night or SOB at rest, SaO2 95% on room air, denies CP, palpitations, cough, congestion, N/V/D, constipation, fever, chills, dysuria, pain or discomfort.   CODE STATUS/ADVANCE DIRECTIVES DISCUSSION:  No CPR- Do NOT Intubate  DNR / DNI  ALLERGIES:   Allergies   Allergen Reactions     Penicillins Rash      PAST MEDICAL HISTORY:   Past Medical History:   Diagnosis Date     AV node dysfunction      Chronic atrial fibrillation (H) 2004     GERD (gastroesophageal reflux disease)      Hyperlipidemia      Near syncope      MARILU (obstructive sleep apnea)       PAST SURGICAL HISTORY:   has a past surgical history that includes Implant pacemaker (08/10/2015).  FAMILY HISTORY: family history includes Influenza/Pneumonia in his mother; Prostate Cancer in his father.  SOCIAL HISTORY:   reports  that he has never smoked. He has never used smokeless tobacco. He reports that he does not drink alcohol and does not use drugs.  Patient's living condition: lives with spouse    Post Discharge Medication Reconciliation Status: discharge medications reconciled, continue medications without change  Current Outpatient Medications   Medication Sig     acetaminophen (TYLENOL) 325 MG tablet Take 2 tablets (650 mg) by mouth every 6 hours as needed for mild pain or other (and adjunct with moderate or severe pain or per patient request)     atorvastatin (LIPITOR) 40 MG tablet Take 1 tablet (40 mg) by mouth every evening     clopidogrel (PLAVIX) 75 MG tablet Take 1 tablet (75 mg) by mouth daily     [employer] VITAMIN B12 1000 MCG tablet TAKE 1 TABLET BY MOUTH EVERY DAY     furosemide (LASIX) 20 MG tablet Take 1 tablet (20 mg) by mouth 2 times daily     lisinopril (ZESTRIL) 20 MG tablet Take 1 tablet (20 mg) by mouth daily     melatonin 1 MG TABS tablet Take 1 tablet (1 mg) by mouth every evening     metoprolol succinate ER (TOPROL XL) 50 MG 24 hr tablet Take 1 tablet (50 mg) by mouth At Bedtime     nitroGLYcerin (NITROSTAT) 0.4 MG sublingual tablet For chest pain place 1 tablet under the tongue every 5 minutes for 3 doses. If symptoms persist 5 minutes after 1st dose call 911.     omeprazole (PRILOSEC) 20 MG DR capsule TAKE 1 CAPSULE BY MOUTH EVERY DAY (Patient taking differently: Take 20 mg by mouth 2 times daily )     order for DME Equipment being ordered: Walker Wheels () and Walker ()  Treatment Diagnosis: Difficulty ambulating     order for DME Equipment being ordered: Walker Wheels ()  Treatment Diagnosis:  Weakness,colitis.     potassium chloride ER (KLOR-CON) 20 MEQ CR tablet Take 1 tablet (20 mEq) by mouth 2 times daily     sennosides (SENOKOT) 8.6 MG tablet Take 1-2 tablets by mouth 2 times daily as needed for constipation     sertraline (ZOLOFT) 50 MG tablet Take 1 tablet (50 mg) by mouth daily (Patient  taking differently: Take 100 mg by mouth daily )     Simethicone (GAS-X EXTRA STRENGTH) 125 MG CAPS Take 1 capsule by mouth 2 times daily     Ca Carbonate-Mag Hydroxide (ROLAIDS PO) Take by mouth 3 times daily as needed      cefuroxime (CEFTIN) 500 MG tablet Take 1 tablet (500 mg) by mouth every 12 hours for 3 days     warfarin ANTICOAGULANT (COUMADIN) 2.5 MG tablet Take 1 tablet (2.5 mg) by mouth daily     No current facility-administered medications for this visit.       ROS:  10 point ROS of systems including Constitutional, Eyes, Respiratory, Cardiovascular, Gastroenterology, Genitourinary, Integumentary, Musculoskeletal, Psychiatric were all negative except for pertinent positives noted in my HPI.    Vitals:  /67   Pulse 66   Temp 97.8  F (36.6  C)   Resp 18   Exam:  GENERAL APPEARANCE:  Alert, in no distress  ENT:  Mouth and posterior oropharynx normal, moist mucous membranes, [place]  EYES:  EOM, conjunctivae, lids, pupils and irises normal, PERRL  RESP:  respiratory effort and palpation of chest normal, lungs clear to auscultation , no respiratory distress  CV:  Palpation and auscultation of heart done , regular rate and rhythm, no murmur, rub, or gallop, peripheral edema trace+ in LE bilaterally  ABDOMEN:  normal bowel sounds, soft, nontender, no hepatosplenomegaly or other masses  M/S:   patient sitting up in w/c able to move all 4 extremities  SKIN:  Inspection of skin and subcutaneous tissue baseline  NEURO:   speech WNL  PSYCH:  affect and mood normal    Lab/Diagnostic data:    Most Recent 3 CBC's:Recent Labs   Lab Test 08/31/21  0552 08/30/21  0536 08/29/21  0540   WBC 13.0* 10.1 11.7*   HGB 13.2* 12.3* 11.6*   MCV 83 83 85    293 300     Most Recent 3 BMP's:Recent Labs   Lab Test 08/31/21  0552 08/30/21  1530 08/30/21  0536 08/29/21  0540     --  139 140   POTASSIUM 4.0 3.5 3.2* 3.1*   CHLORIDE 108  --  108 109   CO2 26  --  27 26   BUN 25  --  27 26   CR 0.88  --  0.96 1.04    ANIONGAP 4  --  4 5   ANGELITA 9.1  --  8.7 8.4*   *  --  95 100*       ASSESSMENT/PLAN:  (I21.4) NSTEMI (non-ST elevated myocardial infarction) (H)  (primary encounter diagnosis)  (I25.5) Ischemic cardiomyopathy  (I50.21) Acute systolic congestive heart failure (H)  (R53.81) Physical deconditioning  Comment: acute/ongoing  Plan: medical management of NSTEMI EF 35-40%, severe hypokinesis LV  PT and OT, daily weights, continue lipitor 40mg qhs, coumadin as directed INR today is 2.1, plavix 75mg QD (new) , Toprol xl 50mg QD (increase from PTA), lisinopril 20mg QD (new), lasix 20mg BID, KCL 20meq BID    (D72.829) Leukocytosis, unspecified type  (N39.0) Urinary tract infection without hematuria, site unspecified  Comment: acute/ongoing  Plan: CBC on 9/7/21, ceftin 500mg BID for 3 days, UC pending    (R41.89) Cognitive impairment  Comment: ongoing  Plan:  PT and OT    Orders:  CBC and BMP on Tuesday  ceftin 500mg BID for 3 days     Total time spent with patient visit at the skilled nursing facility was 35 min including patient visit and review of past records. Greater than 50% of total time spent with counseling and coordinating care due to discussed patient medications and hospitalization, will check labs early next week, UC pending will continue ceftin until UC back, discussed therapy and POC> .     Electronically signed by:  Tonya Lynn Haase, APRN CNP

## 2021-08-31 NOTE — PLAN OF CARE
Occupational Therapy Discharge Summary    Reason for therapy discharge:    Discharged to transitional care facility.    Progress towards therapy goal(s). See [facility] for goal details.  Goals not met.  Barriers to achieving goals:   discharge from facility.    Therapy recommendation(s):    Continued therapy is recommended.  Rationale/Recommendations:  Skilled OT in TCU setting to address safety and independence in I/ADLs.

## 2021-08-31 NOTE — PROGRESS NOTES
Care Management Discharge Note    Discharge Date: 08/31/2021  Expected Time of Departure: 12:00    Discharge Disposition: Transitional Care    Discharge Services: Other (see comment) (therapy)    Discharge DME:  N/A    Discharge Transportation: agency ( [facility] at noon)    Private pay costs discussed: Not applicable    PAS Confirmation Code:  [id number]  Patient/family educated on Medicare website which has current facility and service quality ratings: no    Education Provided on the Discharge Plan:  yes  Persons Notified of Discharge Plans: patient's wife  Patient/Family in Agreement with the Plan: yes    Handoff Referral Completed: Yes    Additional Information:  Received discharge orders for patient.  Bed available at [facility] for today.   [facility] set up for 12:00 today.  Patient informed of the plan and in agreement to the plan.  Call placed to update patient's wife to update her and she is in agreement.  Call placed to update [facility] and faxed the orders.      TRENT Bowden, [facility]    [phone]  [facility]

## 2021-08-31 NOTE — PLAN OF CARE
..Neuro- AxO 4 forgetful  Tele/Cardiac- V-Paced w/A-fib   Resp- RA  Activity- 1AGW  Pain- Denies  Drips- None  Drains/Tubes- PIV x1  Skin- Redness blanchable on Butt ox    GI/- WDL Some urinary retention   Aggression Color- Green  COVID status- Neg  Plan- Discharge [facility] TCU 8/31

## 2021-09-01 NOTE — PROGRESS NOTES
Clinic Care Coordination Contact  Care Coordination Transition Communication    Referral Source: IP Handoff    Clinical Data: [facility]     Discharge Summary  Hospitalist     Date of Admission:  8/26/2021  Date of Discharge:  8/31/2021  Discharging Provider: Bang Aleman MD     Discharge Diagnoses     Inferior NSTEMI:   Ischemic CM  Acute systolic chf  Hx chronic Atrial fibrillation. Tachybrady syndrome, hx of pacemaker placement    Transition to Facility:              Facility Name: [facility]              Contact name and phone number/fax: [phone]/ [phone]    Plan: RN/SW Care Coordinator will await notification from facility staff informing RN/SW Care Coordinator of patient's discharge plans/needs. RN/SW Care Coordinator will review chart and outreach to facility staff every 4 weeks and as needed. Fax sent to TCU with my contact information requesting notification upon discharge.      Linsey Ramsey [facility]  Clinic Care Coordinator  [facility] Marybel [place]  [facility]  [phone]  [email or web address]

## 2021-09-01 NOTE — LETTER
9/1/2021        RE: Santosh Robledo  [address]  [address]  [place] [address]        [facility] GERIATRIC SERVICES  PRIMARY CARE PROVIDER AND CLINIC:  Lilly Zepeda MD, [address] / [place] [address]  Chief Complaint   Patient presents with     Hospital F/U      [facility] Medical Record Number:  [id number]  Place of Service where encounter took place:  [facility] ([facility]) [25]    Santosh Robledo  is a 89 year old  (7/20/1929), admitted to the above facility from  [facility]. Hospital stay 8/26/21 through 8/31/21..   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 Lasix PTA 20mg BID, resumed by discharge, started on lisinopril 2.5mg QD given low EF, continue PTA toprol increased to 50mg QD, lipitor 40mg QD, coumadin, started on plavix daily, no ASA ordered  Leukocytosis: WBC 20--> 10--> 13, low grade fever, UA 21wbc, Ucx pending.   On exam today : patient is sitting up in w/c, alert, pleasant, note some CALDWELL his is getting dressed as I walked in, denies SOB at night or SOB at rest, SaO2 95% on room air, denies CP, palpitations, cough, congestion, N/V/D, constipation, fever, chills, dysuria, pain or discomfort.   CODE STATUS/ADVANCE DIRECTIVES DISCUSSION:  No CPR- Do NOT Intubate  DNR / DNI  ALLERGIES:   Allergies   Allergen Reactions     Penicillins Rash      PAST MEDICAL HISTORY:   Past Medical History:   Diagnosis Date     AV node dysfunction      Chronic atrial fibrillation (H) 2004     GERD (gastroesophageal reflux disease)      Hyperlipidemia      Near syncope      MARILU (obstructive sleep apnea)       PAST SURGICAL HISTORY:   has a past surgical history that includes Implant pacemaker (08/10/2015).  FAMILY HISTORY: family history includes  Influenza/Pneumonia in his mother; Prostate Cancer in his father.  SOCIAL HISTORY:   reports that he has never smoked. He has never used smokeless tobacco. He reports that he does not drink alcohol and does not use drugs.  Patient's living condition: lives with spouse    Post Discharge Medication Reconciliation Status: discharge medications reconciled, continue medications without change  Current Outpatient Medications   Medication Sig     acetaminophen (TYLENOL) 325 MG tablet Take 2 tablets (650 mg) by mouth every 6 hours as needed for mild pain or other (and adjunct with moderate or severe pain or per patient request)     atorvastatin (LIPITOR) 40 MG tablet Take 1 tablet (40 mg) by mouth every evening     clopidogrel (PLAVIX) 75 MG tablet Take 1 tablet (75 mg) by mouth daily     [employer]S VITAMIN B12 1000 MCG tablet TAKE 1 TABLET BY MOUTH EVERY DAY     furosemide (LASIX) 20 MG tablet Take 1 tablet (20 mg) by mouth 2 times daily     lisinopril (ZESTRIL) 20 MG tablet Take 1 tablet (20 mg) by mouth daily     melatonin 1 MG TABS tablet Take 1 tablet (1 mg) by mouth every evening     metoprolol succinate ER (TOPROL XL) 50 MG 24 hr tablet Take 1 tablet (50 mg) by mouth At Bedtime     nitroGLYcerin (NITROSTAT) 0.4 MG sublingual tablet For chest pain place 1 tablet under the tongue every 5 minutes for 3 doses. If symptoms persist 5 minutes after 1st dose call 911.     omeprazole (PRILOSEC) 20 MG DR capsule TAKE 1 CAPSULE BY MOUTH EVERY DAY (Patient taking differently: Take 20 mg by mouth 2 times daily )     order for DME Equipment being ordered: Walker Wheels () and Walker ()  Treatment Diagnosis: Difficulty ambulating     order for DME Equipment being ordered: Walker Wheels ()  Treatment Diagnosis:  Weakness,colitis.     potassium chloride ER (KLOR-CON) 20 MEQ CR tablet Take 1 tablet (20 mEq) by mouth 2 times daily     sennosides (SENOKOT) 8.6 MG tablet Take 1-2 tablets by mouth 2 times daily as needed for  constipation     sertraline (ZOLOFT) 50 MG tablet Take 1 tablet (50 mg) by mouth daily (Patient taking differently: Take 100 mg by mouth daily )     Simethicone (GAS-X EXTRA STRENGTH) 125 MG CAPS Take 1 capsule by mouth 2 times daily     Ca Carbonate-Mag Hydroxide (ROLAIDS PO) Take by mouth 3 times daily as needed      cefuroxime (CEFTIN) 500 MG tablet Take 1 tablet (500 mg) by mouth every 12 hours for 3 days     warfarin ANTICOAGULANT (COUMADIN) 2.5 MG tablet Take 1 tablet (2.5 mg) by mouth daily     No current facility-administered medications for this visit.       ROS:  10 point ROS of systems including Constitutional, Eyes, Respiratory, Cardiovascular, Gastroenterology, Genitourinary, Integumentary, Musculoskeletal, Psychiatric were all negative except for pertinent positives noted in my HPI.    Vitals:  /67   Pulse 66   Temp 97.8  F (36.6  C)   Resp 18   Exam:  GENERAL APPEARANCE:  Alert, in no distress  ENT:  Mouth and posterior oropharynx normal, moist mucous membranes, [place]  EYES:  EOM, conjunctivae, lids, pupils and irises normal, PERRL  RESP:  respiratory effort and palpation of chest normal, lungs clear to auscultation , no respiratory distress  CV:  Palpation and auscultation of heart done , regular rate and rhythm, no murmur, rub, or gallop, peripheral edema trace+ in LE bilaterally  ABDOMEN:  normal bowel sounds, soft, nontender, no hepatosplenomegaly or other masses  M/S:   patient sitting up in w/c able to move all 4 extremities  SKIN:  Inspection of skin and subcutaneous tissue baseline  NEURO:   speech WNL  PSYCH:  affect and mood normal    Lab/Diagnostic data:    Most Recent 3 CBC's:Recent Labs   Lab Test 08/31/21  0552 08/30/21  0536 08/29/21  0540   WBC 13.0* 10.1 11.7*   HGB 13.2* 12.3* 11.6*   MCV 83 83 85    293 300     Most Recent 3 BMP's:Recent Labs   Lab Test 08/31/21  0552 08/30/21  1530 08/30/21  0536 08/29/21  0540     --  139 140   POTASSIUM 4.0 3.5 3.2* 3.1*    CHLORIDE 108  --  108 109   CO2 26  --  27 26   BUN 25  --  27 26   CR 0.88  --  0.96 1.04   ANIONGAP 4  --  4 5   ANGELITA 9.1  --  8.7 8.4*   *  --  95 100*       ASSESSMENT/PLAN:  (I21.4) NSTEMI (non-ST elevated myocardial infarction) (H)  (primary encounter diagnosis)  (I25.5) Ischemic cardiomyopathy  (I50.21) Acute systolic congestive heart failure (H)  (R53.81) Physical deconditioning  Comment: acute/ongoing  Plan: medical management of NSTEMI EF 35-40%, severe hypokinesis LV  PT and OT, daily weights, continue lipitor 40mg qhs, coumadin as directed INR today is 2.1, plavix 75mg QD (new) , Toprol xl 50mg QD (increase from PTA), lisinopril 20mg QD (new), lasix 20mg BID, KCL 20meq BID    (D72.829) Leukocytosis, unspecified type  (N39.0) Urinary tract infection without hematuria, site unspecified  Comment: acute/ongoing  Plan: CBC on 9/7/21, ceftin 500mg BID for 3 days, UC pending    (R41.89) Cognitive impairment  Comment: ongoing  Plan:  PT and OT    Orders:  CBC and BMP on Tuesday  ceftin 500mg BID for 3 days     Total time spent with patient visit at the skilled nursing facility was 35 min including patient visit and review of past records. Greater than 50% of total time spent with counseling and coordinating care due to discussed patient medications and hospitalization, will check labs early next week, UC pending will continue ceftin until UC back, discussed therapy and POC> .     Electronically signed by:  Tonya Lynn Haase, APRN CNP                     Sincerely,        Tonya Lynn Haase, APRN CNP

## 2021-09-01 NOTE — LETTER
[facility]   To:   [facility]          Please give to facility    From:   ISA Stephenson Care Coordinator [facility]     Patient Name:  Santosh Robledo  YOB: 1929    Admit date: 8/31/2021      *Information Needed:  Please contact me when the patient will discharge (or if they will move to long term care)- include the discharge date, disposition, and main diagnosis   - If the patient is discharged with home care services, please provide the name of the agency    Phone, Fax or Email with information       Thank You,   MIRACLE Stephenson, MSW  Clinic Care Coordinator  [facility] [place], [place], and [place]  Ph: [phone]  [email or web address]

## 2021-09-01 NOTE — PROGRESS NOTES
ANTICOAGULATION  MANAGEMENT: Discharge Review    Santosh Robledo chart reviewed for anticoagulation continuity of care    Hospital Admission on 8/26 for NSTEMI.    Discharge disposition: TCU    Results:    Recent labs: (last 7 days)     08/26/21  1905 08/27/21  0534 08/28/21  0545 08/29/21  0540 08/30/21  0536 08/31/21  0552   INR 2.77* 3.34* 3.22* 2.54* 1.99* 2.01*     Anticoagulation inpatient management:     less warfarin administered than maintenance regimen    Anticoagulation discharge instructions:     Warfarin dosing: Next INR and dosing to be managed by the TCU.  [facility] will resume management once discharged from TCU    Bridging: No   INR goal change: No      Medication changes affecting anticoagulation: No    Additional factors affecting anticoagulation: No    Plan     No adjustment to anticoagulation plan needed    Patient not contacted  ACC will resume monitoring upon discharge from TCU    No adjustment to Anticoagulation Calendar was required    Sharonda Morales RN

## 2021-09-06 NOTE — PROGRESS NOTES
"[facility] GERIATRIC SERVICES  PRIMARY CARE PROVIDER AND CLINIC:  Lilly Zepeda MD, [address] / [place] [address]      Pt was seen by Dr Denton on 9/4/21 at the [facility] for an initial TCU visit.        Santosh Robledo  is a 89 year old  (7/20/1929), admitted to the above facility from  [facility]. Hospital stay 8/26/21 through 8/31/21..     Hospital course was reviewed by me, is as per the hospital discharge summary and NP note.      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  Pt was continued on lasix, started on low dose lisinopril, started on increased dose of Toprol.  Remains on warfarin.    Pt was found to have a low grade T, elevated WBC, was started on a short course of Ceftin for possible UTI (UC neg), CT abd/pelvis revealed no acute process. + extensive pleural plaques.  Recent CT chest (8/14) revealed extensive pleural calcifications, extensive scarring.    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\"  He denies fevers, chills, chest pain, fevers, chills, dysuria  Appetite is fair    Vitals have been stable since admission  02 sats 90s RA        CODE STATUS/ADVANCE DIRECTIVES DISCUSSION:  No CPR- Do NOT Intubate  DNR / DNI  ALLERGIES:   Allergies   Allergen Reactions     Penicillins Rash      PAST MEDICAL HISTORY:   Past Medical History:   Diagnosis Date     AV node dysfunction      Chronic atrial fibrillation (H) 2004     GERD (gastroesophageal reflux disease)      Hyperlipidemia      Near syncope      MARILU (obstructive sleep apnea)       PAST SURGICAL HISTORY:   has a past surgical history that includes Implant pacemaker (08/10/2015).  FAMILY HISTORY: family history includes " Influenza/Pneumonia in his mother; Prostate Cancer in his father.  SOCIAL HISTORY:   reports that he has never smoked. He has never used smokeless tobacco. He reports that he does not drink alcohol and does not use drugs.  Patient's living condition: lives with spouse          Current Outpatient Medications   Medication Sig     acetaminophen (TYLENOL) 325 MG tablet Take 2 tablets (650 mg) by mouth every 6 hours as needed for mild pain or other (and adjunct with moderate or severe pain or per patient request)     atorvastatin (LIPITOR) 40 MG tablet Take 1 tablet (40 mg) by mouth every evening     Ca Carbonate-Mag Hydroxide (ROLAIDS PO) Take by mouth 3 times daily as needed      clopidogrel (PLAVIX) 75 MG tablet Take 1 tablet (75 mg) by mouth daily     [employer] VITAMIN B12 1000 MCG tablet TAKE 1 TABLET BY MOUTH EVERY DAY     furosemide (LASIX) 20 MG tablet Take 1 tablet (20 mg) by mouth 2 times daily     lisinopril (ZESTRIL) 20 MG tablet Take 1 tablet (20 mg) by mouth daily     melatonin 1 MG TABS tablet Take 1 tablet (1 mg) by mouth every evening     metoprolol succinate ER (TOPROL XL) 50 MG 24 hr tablet Take 1 tablet (50 mg) by mouth At Bedtime     nitroGLYcerin (NITROSTAT) 0.4 MG sublingual tablet For chest pain place 1 tablet under the tongue every 5 minutes for 3 doses. If symptoms persist 5 minutes after 1st dose call 911.     omeprazole (PRILOSEC) 20 MG DR capsule TAKE 1 CAPSULE BY MOUTH EVERY DAY (Patient taking differently: Take 20 mg by mouth 2 times daily )     order for DME Equipment being ordered: Walker Wheels () and Walker ()  Treatment Diagnosis: Difficulty ambulating     order for DME Equipment being ordered: Walker Wheels ()  Treatment Diagnosis:  Weakness,colitis.     potassium chloride ER (KLOR-CON) 20 MEQ CR tablet Take 1 tablet (20 mEq) by mouth 2 times daily     sennosides (SENOKOT) 8.6 MG tablet Take 1-2 tablets by mouth 2 times daily as needed for constipation     sertraline (ZOLOFT)  50 MG tablet Take 1 tablet (50 mg) by mouth daily (Patient taking differently: Take 100 mg by mouth daily )     Simethicone (GAS-X EXTRA STRENGTH) 125 MG CAPS Take 1 capsule by mouth 2 times daily     warfarin ANTICOAGULANT (COUMADIN) 2.5 MG tablet Take 1 tablet (2.5 mg) by mouth daily     No current facility-administered medications for this visit.       ROS:  10 point ROS of systems including Constitutional, Eyes, Respiratory, Cardiovascular, Gastroenterology, Genitourinary, Integumentary, Musculoskeletal, Psychiatric were all negative except for pertinent positives noted in my HPI.        Exam:  GENERAL APPEARANCE:  Alert, in no distress, sitting up in bed, RR 20, shallow appearing  ENT: oral mucosa moist  EYES:  No eye redness or drainage  RESP:  RR 20, lungs clear  CV: RRR  ABDOMEN: soft, non-distended, non-tender  M/S: No LE edema  SKIN: No rash  NEURO:  Alert, fully oriented, CN intact  No focal weakness  PSYCH:  affect and mood normal    Lab/Diagnostic data:    Most Recent 3 CBC's:  Recent Labs   Lab Test 08/31/21  0552 08/30/21  0536 08/29/21  0540   WBC 13.0* 10.1 11.7*   HGB 13.2* 12.3* 11.6*   MCV 83 83 85    293 300     Most Recent 3 BMP's:  Recent Labs   Lab Test 08/31/21  0552 08/30/21  1530 08/30/21  0536 08/29/21  0540     --  139 140   POTASSIUM 4.0 3.5 3.2* 3.1*   CHLORIDE 108  --  108 109   CO2 26  --  27 26   BUN 25  --  27 26   CR 0.88  --  0.96 1.04   ANIONGAP 4  --  4 5   ANGELITA 9.1  --  8.7 8.4*   *  --  95 100*       ASSESSMENT/PLAN:    (I21.4) NSTEMI (non-ST elevated myocardial infarction) (H)  (primary encounter diagnosis)  (I25.5) Ischemic cardiomyopathy  (I50.21) Acute systolic congestive heart failure (H)  History of afib, s/p PM placement  (R53.81) Physical deconditioning  Pt appears mildly SOB at rest, ? Baseline. Unclear if secondary to asbestosis with underlying chronic lung disease, less likely CHF  Plan: continue current medications, including lasix, toprol,  statin,  lisinopril, plavix.  Monitor vitals, wt, BMP  Repeat CXR if persistent SOB  Monitor GI status, in view of abd discomfort, poor appetite prior to admission to hospital        (D72.829) Leukocytosis, unspecified type  No clear source  UC neg  Plan monitor clinical status        Alessio Denton MD

## 2021-09-07 NOTE — PATIENT INSTRUCTIONS
"Thank you for your [facility] visit today. Your provider has recommended the following:    Medication Changes:  No medication changes today.  Recommendations:  Labs in one week to recheck kidney function  Call us with any chest pain, worsening shortness of breath, or other concerns  Follow-up:  See Dr. Lynn for cardiology follow up in 2 - 3months.    Reminder:  Please bring in all current medications, over the counter supplements and vitamin bottles to your next appointment.  Important \"[facility]\" telephone numbers for your reference:  Cardiology Scheduling - [phone]  Cardiology Clinic RN-  [phone]     [facility]    "

## 2021-09-07 NOTE — PROGRESS NOTES
"[facility] GERIATRIC SERVICES    Chief Complaint   Patient presents with     Nursing Home Acute     HPI:  Santosh Robledo is a 89 year old  (7/20/1929), who is being seen today for an episodic care visit at: [facility] ([facility]) [25]. Today's concern is:   NSTEMI/cardiomyopathy/CHF: BP as follows: 116/71, 138/69, 119/61 with HR 60-70 range, admit weight 176.5lbs and current weight 176.4lbs, on exam today patient denies CP, palpitations, SOB  Leukocytosis: WBC on 9/2 12.5  Deconditioning: walking up to 190 feet using a 4ww with CGA and w/c follow, need cues for safety  Cognitive impairement: BIMS 12/15 and SBT 9/28    Allergies, and PMH/PSH reviewed in [facility] today.  REVIEW OF SYSTEMS:  10 point ROS of systems including Constitutional, Eyes, Respiratory, Cardiovascular, Gastroenterology, Genitourinary, Integumentary, Musculoskeletal, Psychiatric were all negative except for pertinent positives noted in my HPI.    Objective:   /71   Pulse 65   Temp 97.5  F (36.4  C)   Resp 20   Ht 1.905 m (6' 3\")   Wt 81.1 kg (178 lb 14.4 oz)   SpO2 98%   BMI 22.36 kg/m    GENERAL APPEARANCE:  Alert, in no distress  ENT:  Mouth and posterior oropharynx normal, moist mucous membranes, [place]  EYES:  EOM, conjunctivae, lids, pupils and irises normal, PERRL  RESP:  respiratory effort and palpation of chest normal, lungs clear to auscultation , no respiratory distress  CV:  Palpation and auscultation of heart done , regular rate and rhythm, no murmur, rub, or gallop, no edema  ABDOMEN:  normal bowel sounds, soft, nontender, no hepatosplenomegaly or other masses  M/S:   patient sitting up in w/c able to move all 4 extremities  SKIN:  Inspection of skin and subcutaneous tissue baseline  NEURO:   speech WNL      Most Recent 3 CBC's:Recent Labs   Lab Test 08/31/21  0552 08/30/21  0536 08/29/21  0540   WBC 13.0* 10.1 11.7*   HGB 13.2* 12.3* 11.6*   MCV 83 83 85    293 300     Most Recent 3 BMP's:Recent Labs   Lab Test " 08/31/21  0552 08/30/21  1530 08/30/21  0536 08/29/21  0540     --  139 140   POTASSIUM 4.0 3.5 3.2* 3.1*   CHLORIDE 108  --  108 109   CO2 26  --  27 26   BUN 25  --  27 26   CR 0.88  --  0.96 1.04   ANIONGAP 4  --  4 5   ANGELITA 9.1  --  8.7 8.4*   *  --  95 100*       Assessment/Plan:  I21.4) NSTEMI (non-ST elevated myocardial infarction) (H)  (primary encounter diagnosis)  (I25.5) Ischemic cardiomyopathy  (I50.21) Acute systolic congestive heart failure (H)  (R53.81) Physical deconditioning  Comment: acute/ongoing  Plan: medical management of NSTEMI EF 35-40%, severe hypokinesis LV  PT and OT, daily weights, continue lipitor 40mg qhs, coumadin as directed INR today is 2.1, plavix 75mg QD (new) , Toprol xl 50mg QD (increase from PTA), lisinopril 20mg QD (new), lasix 20mg BID, KCL 20meq BID     (D72.829) Leukocytosis, unspecified type  (N39.0) Urinary tract infection without hematuria, site unspecified  Comment: acute/ongoing  Plan: CBC in AM,  from hospital shows no growth, no further Abx     (R41.89) Cognitive impairment  Comment: ongoing  Plan:  PT and OT       Orders:  CBC in AM      Electronically signed by: Tonya Lynn Haase, APRN CNP

## 2021-09-07 NOTE — PROGRESS NOTES
"  Cardiology Progress Note    Patient seen today in follow up of: post hospital follow up  Primary cardiologist: Dr. 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    He was recently admitted to [facility] on 8/26/2021. He presented with abdominal pain which have been ongoing for 2 weeks. On admission, he was found to have an elevated troponin with inferior Q waves which were new compared to his EKG done 2 weeks ago. This was felt to be representative of a recent MI. He was chest pain-free on admission. He was seen in consultation by Dr. Bolivar. He and the family had a long discussion regarding medical management versus coronary angiography. Ultimately, the agreed to defer coronary angiography and medically manage. He is on warfarin and thus Plavix was added. He was started on metoprolol as well as Lipitor 40 mg daily.    An echocardiogram showed an LVEF of 35 to 40% with inferior and inferoseptal wall motion abnormalities. His RV function was moderately decreased. Again, he remained free of any anginal symptoms during his stay. He did have some issues with volume overload and was treated with IV Lasix. He was transitioned to oral Lasix at discharge. He was also started on lisinopril given his LV dysfunction. An outpatient palliative care consult was recommended. He ultimately was discharged to a TCU on 8/20.    Gonzalo is back today for post hospital follow-up. He presents with his daughter, Thelma, today. He seems to be doing okay. He has not had any further abdominal or chest discomfort. He denies any increasing shortness of breath at times. His daughter notes at time he will \"pant.\" This has been going on for some time. Weight has been stable around 176 lbs. " He plans to return home where he lives with his wife.     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 He has an ischemic cardiomyopathy with echo showing a LVEF of 35 - 40% and inferior/inferoseptal wall motion abnormalities. After extensive discussion in the hospital, medical management was pursued.    At this point, he appears to be stable from a cardiovascular perspective. He denies anginal symptoms. He is on appropriate therapy including plavix in addition to his warfarin. He is also on Metoprolol XL 50 mg daily and lisinopril 20 mg daily which are appropriate given his cardiomyopathy. LDL on 8/29/21 was 60. He is on Lipitor 40 mg daily.     He does not appear grossly volume overloaded. He is on lasix 20 mg twice daily and supplemental potassium. It does not appear he has had a basic metabolic panel thus I suggested checking one when able at his facility to ensure his electrolytes and renal function are stable on his diuretics.     I made no changes to his medications today.  It would be reasonable to repeat an echocardiogram in a few months as well to re-evaluate his LV function.He has an order for an outpatient palliative care consult but it seems he and his family would primarily like to focus on his comfort. I will have him return to see Dr. Lynn in 2 - 3 months. In the meantime, if they have any questions or concerns I asked them to contact us. It was a pleasure meeting Josh today.     Orders this Visit:  Orders Placed This Encounter   Procedures     Follow-Up with Cardiologist     No orders of the defined types were placed in this encounter.    There are no discontinued medications.    CURRENT MEDICATIONS:  Current Outpatient Medications   Medication Sig Dispense  Refill     acetaminophen (TYLENOL) 325 MG tablet Take 2 tablets (650 mg) by mouth every 6 hours as needed for mild pain or other (and adjunct with moderate or severe pain or per patient request)       atorvastatin (LIPITOR) 40 MG tablet Take 1 tablet (40 mg) by mouth every evening       clopidogrel (PLAVIX) 75 MG tablet Take 1 tablet (75 mg) by mouth daily       CVS VITAMIN B12 1000 MCG tablet TAKE 1 TABLET BY MOUTH EVERY  tablet 1     furosemide (LASIX) 20 MG tablet Take 1 tablet (20 mg) by mouth 2 times daily       lisinopril (ZESTRIL) 20 MG tablet Take 1 tablet (20 mg) by mouth daily 180 tablet 3     melatonin 1 MG TABS tablet Take 1 tablet (1 mg) by mouth every evening       metoprolol succinate ER (TOPROL XL) 50 MG 24 hr tablet Take 1 tablet (50 mg) by mouth At Bedtime 90 tablet 3     nitroGLYcerin (NITROSTAT) 0.4 MG sublingual tablet For chest pain place 1 tablet under the tongue every 5 minutes for 3 doses. If symptoms persist 5 minutes after 1st dose call 911.       order for DME Equipment being ordered: Walker Wheels () and Walker ()  Treatment Diagnosis: Difficulty ambulating 1 each 0     order for DME Equipment being ordered: Walker Wheels ()  Treatment Diagnosis:  Weakness,colitis. 1 Device 0     potassium chloride ER (KLOR-CON) 20 MEQ CR tablet Take 1 tablet (20 mEq) by mouth 2 times daily 180 tablet 1     sennosides (SENOKOT) 8.6 MG tablet Take 1-2 tablets by mouth 2 times daily as needed for constipation       sertraline (ZOLOFT) 50 MG tablet Take 1 tablet (50 mg) by mouth daily (Patient taking differently: Take 100 mg by mouth daily ) 90 tablet 3     Simethicone (GAS-X EXTRA STRENGTH) 125 MG CAPS Take 1 capsule by mouth 2 times daily 60 capsule 11     Warfarin Sodium (COUMADIN PO) Give 1.25 mg po in evening every Thursday for A-fib until 9/16/21.       Warfarin Sodium (COUMADIN PO) Give 2.5 mg po in evening every Wed for A fib until 9/16/21.       ALLERGIES  Allergies   Allergen  Reactions     Penicillins Rash     PAST MEDICAL HISTORY:  Past Medical History:   Diagnosis Date     AV node dysfunction      Benign essential hypertension 04/30/2018     Chronic anticoagulation 04/30/2018     Chronic atrial fibrillation (H) 2004     Cognitive impairment 06/13/2018     Gastroesophageal reflux disease, esophagitis presence not specified 04/30/2018     IMO Regulatory Load OCT 2020     Hyperlipidemia      Ischemic cardiomyopathy 08/28/2021     Near syncope      NSTEMI (non-ST elevated myocardial infarction) (H) 08/27/2021     MARILU (obstructive sleep apnea)      Physical deconditioning 05/30/2018     Status cardiac pacemaker 04/30/2018     PAST SURGICAL HISTORY:  Past Surgical History:   Procedure Laterality Date     IMPLANT PACEMAKER  08/10/2015     FAMILY HISTORY:  Family History   Problem Relation Age of Onset     Influenza/Pneumonia Mother      Prostate Cancer Father      SOCIAL HISTORY:  Social History     Socioeconomic History     Marital status:      Spouse name: Not on file     Number of children: Not on file     Years of education: Not on file     Highest education level: Not on file   Occupational History     Not on file   Tobacco Use     Smoking status: Never Smoker     Smokeless tobacco: Never Used   Substance and Sexual Activity     Alcohol use: No     Drug use: No     Sexual activity: Yes     Partners: Female   Other Topics Concern     Parent/sibling w/ CABG, MI or angioplasty before 65F 55M? Not Asked   Social History Narrative     Not on file     Social Determinants of Health     Financial Resource Strain:      Difficulty of Paying Living Expenses:    Food Insecurity:      Worried About Running Out of Food in the Last Year:      Ran Out of Food in the Last Year:    Transportation Needs:      Lack of Transportation (Medical):      Lack of Transportation (Non-Medical):    Physical Activity:      Days of Exercise per Week:      Minutes of Exercise per Session:    Stress:      Feeling  "of Stress :    Social Connections:      Frequency of Communication with Friends and Family:      Frequency of Social Gatherings with Friends and Family:      Attends Zoroastrianism Services:      Active Member of Clubs or Organizations:      Attends Club or Organization Meetings:      Marital Status:    Intimate Partner Violence:      Fear of Current or Ex-Partner:      Emotionally Abused:      Physically Abused:      Sexually Abused:      Review of Systems:  Skin:  Negative     Eyes:  Positive for glasses  ENT:  Positive for postnasal drainage;vertigo  Respiratory:  Positive for dyspnea on exertion;shortness of breath;mucoid expectorant  Cardiovascular:  Negative Positive for;fatigue;dizziness  Gastroenterology: Positive for poor appetite  Genitourinary:  Negative    Musculoskeletal:  Negative    Neurologic:  Positive for numbness or tingling of feet  Psychiatric:  Negative    Heme/Lymph/Imm:  Negative    Endocrine:  Negative       Physical Exam:  Vitals: /72   Pulse 65   Ht 1.905 m (6' 3\")   Wt 79.8 kg (176 lb)   BMI 22.00 kg/m     Wt Readings from Last 4 Encounters:   09/15/21 80.8 kg (178 lb 1.6 oz)   09/09/21 80.5 kg (177 lb 8 oz)   09/07/21 81.1 kg (178 lb 14.4 oz)   09/07/21 79.8 kg (176 lb)     GEN: well nourished, in no acute distress.  HEENT:  Pupils equal, round. Sclerae nonicteric.   C/V:  Regular rate and rhythm  RESP: Respirations are unlabored.   GI: Abdomen soft, nontender.  NEURO: Alert and oriented, cooperative.  SKIN: Warm and dry.     Recent Lab Results:  LIPID RESULTS:  Lab Results   Component Value Date    CHOL 107 08/29/2021    CHOL 161 06/28/2019    HDL 27 (L) 08/29/2021    HDL 36 (L) 06/28/2019    LDL 60 08/29/2021    LDL 88 06/28/2019    TRIG 102 08/29/2021    TRIG 186 (H) 06/28/2019     LIVER ENZYME RESULTS:  Lab Results   Component Value Date    AST 35 08/31/2021    AST 10 05/14/2018    ALT 45 08/31/2021    ALT <5 (L) 06/28/2019     CBC RESULTS:  Lab Results   Component Value Date    " WBC 13.0 (H) 08/31/2021    WBC 9.2 09/23/2020    RBC 4.92 08/31/2021    RBC 5.33 09/23/2020    HGB 13.2 (L) 08/31/2021    HGB 14.5 09/23/2020    HCT 40.9 08/31/2021    HCT 45.7 09/23/2020    MCV 83 08/31/2021    MCV 86 09/23/2020    MCH 26.8 08/31/2021    MCH 27.2 09/23/2020    MCHC 32.3 08/31/2021    MCHC 31.7 09/23/2020    RDW 14.6 08/31/2021    RDW 14.1 09/23/2020     08/31/2021     09/23/2020     BMP RESULTS:  Lab Results   Component Value Date     08/31/2021     09/23/2020    POTASSIUM 4.0 08/31/2021    POTASSIUM 4.1 09/23/2020    CHLORIDE 108 08/31/2021    CHLORIDE 104 09/23/2020    CO2 26 08/31/2021    CO2 33 (H) 09/23/2020    ANIONGAP 4 08/31/2021    ANIONGAP 11.1 09/23/2020     (H) 08/31/2021     09/23/2020    BUN 25 08/31/2021    BUN 17 09/23/2020    CR 0.88 08/31/2021    CR 1.11 09/23/2020    GFRESTIMATED 75 08/31/2021    GFRESTIMATED 62 09/23/2020    GFRESTBLACK 75 09/23/2020    ANGELITA 9.1 08/31/2021    ANGELITA 9.7 09/23/2020      A1C RESULTS:  No results found for: A1C  INR RESULTS:  Lab Results   Component Value Date    INR 2.4 09/22/2021    INR 2.01 (H) 08/31/2021    INR 1.99 (H) 08/30/2021    INR 3.60 (H) 07/02/2021    INR 2.50 (H) 06/11/2021       Rea Dinero PA-C  [facility]

## 2021-09-07 NOTE — LETTER
9/7/2021    Lilly Zepeda MD  [address]  [facility] [address]    RE: Santosh Robledo       Dear Colleague,    I had the pleasure of seeing Santosh Robledo in the [facility].      Cardiology Progress Note    Patient seen today in follow up of: post hospital follow up  Primary cardiologist: Dr. 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    He was recently admitted to [facility] on 8/26/2021. He presented with abdominal pain which have been ongoing for 2 weeks. On admission, he was found to have an elevated troponin with inferior Q waves which were new compared to his EKG done 2 weeks ago. This was felt to be representative of a recent MI. He was chest pain-free on admission. He was seen in consultation by Dr. Bolivar. He and the family had a long discussion regarding medical management versus coronary angiography. Ultimately, the agreed to defer coronary angiography and medically manage. He is on warfarin and thus Plavix was added. He was started on metoprolol as well as Lipitor 40 mg daily.    An echocardiogram showed an LVEF of 35 to 40% with inferior and inferoseptal wall motion abnormalities. His RV function was moderately decreased. Again, he remained free of any anginal symptoms during his stay. He did have some issues with volume overload and was treated with IV Lasix. He was transitioned to oral Lasix at discharge. He was also started on lisinopril given his LV dysfunction. An outpatient palliative care consult was recommended. He ultimately was discharged to a TCU on 8/20.    Gonzalo is back today for post hospital follow-up. He presents with his daughter, Thelma, today. He seems  "to be doing okay. He has not had any further abdominal or chest discomfort. He denies any increasing shortness of breath at times. His daughter notes at time he will \"pant.\" This has been going on for some time. Weight has been stable around 176 lbs. He plans to return home where he lives with his wife.     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 He has an ischemic cardiomyopathy with echo showing a LVEF of 35 - 40% and inferior/inferoseptal wall motion abnormalities. After extensive discussion in the hospital, medical management was pursued.    At this point, he appears to be stable from a cardiovascular perspective. He denies anginal symptoms. He is on appropriate therapy including plavix in addition to his warfarin. He is also on Metoprolol XL 50 mg daily and lisinopril 20 mg daily which are appropriate given his cardiomyopathy. LDL on 8/29/21 was 60. He is on Lipitor 40 mg daily.     He does not appear grossly volume overloaded. He is on lasix 20 mg twice daily and supplemental potassium. It does not appear he has had a basic metabolic panel thus I suggested checking one when able at his facility to ensure his electrolytes and renal function are stable on his diuretics.     I made no changes to his medications today.  It would be reasonable to repeat an echocardiogram in a few months as well to re-evaluate his LV function.He has an order for an outpatient palliative care consult but it seems he and his family would primarily like to focus on his comfort. I will have him return to see Dr. Lynn in 2 - 3 months. In the meantime, if they have any questions or concerns I asked them to contact us. It was a pleasure meeting Josh today.     Orders this Visit:  Orders " Placed This Encounter   Procedures     Follow-Up with Cardiologist     No orders of the defined types were placed in this encounter.    There are no discontinued medications.    CURRENT MEDICATIONS:  Current Outpatient Medications   Medication Sig Dispense Refill     acetaminophen (TYLENOL) 325 MG tablet Take 2 tablets (650 mg) by mouth every 6 hours as needed for mild pain or other (and adjunct with moderate or severe pain or per patient request)       atorvastatin (LIPITOR) 40 MG tablet Take 1 tablet (40 mg) by mouth every evening       clopidogrel (PLAVIX) 75 MG tablet Take 1 tablet (75 mg) by mouth daily       [employer] VITAMIN B12 1000 MCG tablet TAKE 1 TABLET BY MOUTH EVERY  tablet 1     furosemide (LASIX) 20 MG tablet Take 1 tablet (20 mg) by mouth 2 times daily       lisinopril (ZESTRIL) 20 MG tablet Take 1 tablet (20 mg) by mouth daily 180 tablet 3     melatonin 1 MG TABS tablet Take 1 tablet (1 mg) by mouth every evening       metoprolol succinate ER (TOPROL XL) 50 MG 24 hr tablet Take 1 tablet (50 mg) by mouth At Bedtime 90 tablet 3     nitroGLYcerin (NITROSTAT) 0.4 MG sublingual tablet For chest pain place 1 tablet under the tongue every 5 minutes for 3 doses. If symptoms persist 5 minutes after 1st dose call 911.       order for DME Equipment being ordered: Walker Wheels () and Walker ()  Treatment Diagnosis: Difficulty ambulating 1 each 0     order for DME Equipment being ordered: Walker Wheels ()  Treatment Diagnosis:  Weakness,colitis. 1 Device 0     potassium chloride ER (KLOR-CON) 20 MEQ CR tablet Take 1 tablet (20 mEq) by mouth 2 times daily 180 tablet 1     sennosides (SENOKOT) 8.6 MG tablet Take 1-2 tablets by mouth 2 times daily as needed for constipation       sertraline (ZOLOFT) 50 MG tablet Take 1 tablet (50 mg) by mouth daily (Patient taking differently: Take 100 mg by mouth daily ) 90 tablet 3     Simethicone (GAS-X EXTRA STRENGTH) 125 MG CAPS Take 1 capsule by mouth 2  times daily 60 capsule 11     Warfarin Sodium (COUMADIN PO) Give 1.25 mg po in evening every Thursday for A-fib until 9/16/21.       Warfarin Sodium (COUMADIN PO) Give 2.5 mg po in evening every Wed for A fib until 9/16/21.       ALLERGIES  Allergies   Allergen Reactions     Penicillins Rash     PAST MEDICAL HISTORY:  Past Medical History:   Diagnosis Date     AV node dysfunction      Benign essential hypertension 04/30/2018     Chronic anticoagulation 04/30/2018     Chronic atrial fibrillation (H) 2004     Cognitive impairment 06/13/2018     Gastroesophageal reflux disease, esophagitis presence not specified 04/30/2018     IMO Regulatory Load OCT 2020     Hyperlipidemia      Ischemic cardiomyopathy 08/28/2021     Near syncope      NSTEMI (non-ST elevated myocardial infarction) (H) 08/27/2021     MARILU (obstructive sleep apnea)      Physical deconditioning 05/30/2018     Status cardiac pacemaker 04/30/2018     PAST SURGICAL HISTORY:  Past Surgical History:   Procedure Laterality Date     IMPLANT PACEMAKER  08/10/2015     FAMILY HISTORY:  Family History   Problem Relation Age of Onset     Influenza/Pneumonia Mother      Prostate Cancer Father      SOCIAL HISTORY:  Social History     Socioeconomic History     Marital status:      Spouse name: Not on file     Number of children: Not on file     Years of education: Not on file     Highest education level: Not on file   Occupational History     Not on file   Tobacco Use     Smoking status: Never Smoker     Smokeless tobacco: Never Used   Substance and Sexual Activity     Alcohol use: No     Drug use: No     Sexual activity: Yes     Partners: Female   Other Topics Concern     Parent/sibling w/ CABG, MI or angioplasty before 65F 55M? Not Asked   Social History Narrative     Not on file     Social Determinants of Health     Financial Resource Strain:      Difficulty of Paying Living Expenses:    Food Insecurity:      Worried About Running Out of Food in the Last Year:   "    Ran Out of Food in the Last Year:    Transportation Needs:      Lack of Transportation (Medical):      Lack of Transportation (Non-Medical):    Physical Activity:      Days of Exercise per Week:      Minutes of Exercise per Session:    Stress:      Feeling of Stress :    Social Connections:      Frequency of Communication with Friends and Family:      Frequency of Social Gatherings with Friends and Family:      Attends Holiness Services:      Active Member of Clubs or Organizations:      Attends Club or Organization Meetings:      Marital Status:    Intimate Partner Violence:      Fear of Current or Ex-Partner:      Emotionally Abused:      Physically Abused:      Sexually Abused:      Review of Systems:  Skin:  Negative     Eyes:  Positive for glasses  ENT:  Positive for postnasal drainage;vertigo  Respiratory:  Positive for dyspnea on exertion;shortness of breath;mucoid expectorant  Cardiovascular:  Negative Positive for;fatigue;dizziness  Gastroenterology: Positive for poor appetite  Genitourinary:  Negative    Musculoskeletal:  Negative    Neurologic:  Positive for numbness or tingling of feet  Psychiatric:  Negative    Heme/Lymph/Imm:  Negative    Endocrine:  Negative       Physical Exam:  Vitals: /72   Pulse 65   Ht 1.905 m (6' 3\")   Wt 79.8 kg (176 lb)   BMI 22.00 kg/m     Wt Readings from Last 4 Encounters:   09/15/21 80.8 kg (178 lb 1.6 oz)   09/09/21 80.5 kg (177 lb 8 oz)   09/07/21 81.1 kg (178 lb 14.4 oz)   09/07/21 79.8 kg (176 lb)     GEN: well nourished, in no acute distress.  HEENT:  Pupils equal, round. Sclerae nonicteric.   C/V:  Regular rate and rhythm  RESP: Respirations are unlabored.   GI: Abdomen soft, nontender.  NEURO: Alert and oriented, cooperative.  SKIN: Warm and dry.     Recent Lab Results:  LIPID RESULTS:  Lab Results   Component Value Date    CHOL 107 08/29/2021    CHOL 161 06/28/2019    HDL 27 (L) 08/29/2021    HDL 36 (L) 06/28/2019    LDL 60 08/29/2021    LDL 88 " 06/28/2019    TRIG 102 08/29/2021    TRIG 186 (H) 06/28/2019     LIVER ENZYME RESULTS:  Lab Results   Component Value Date    AST 35 08/31/2021    AST 10 05/14/2018    ALT 45 08/31/2021    ALT <5 (L) 06/28/2019     CBC RESULTS:  Lab Results   Component Value Date    WBC 13.0 (H) 08/31/2021    WBC 9.2 09/23/2020    RBC 4.92 08/31/2021    RBC 5.33 09/23/2020    HGB 13.2 (L) 08/31/2021    HGB 14.5 09/23/2020    HCT 40.9 08/31/2021    HCT 45.7 09/23/2020    MCV 83 08/31/2021    MCV 86 09/23/2020    MCH 26.8 08/31/2021    MCH 27.2 09/23/2020    MCHC 32.3 08/31/2021    MCHC 31.7 09/23/2020    RDW 14.6 08/31/2021    RDW 14.1 09/23/2020     08/31/2021     09/23/2020     BMP RESULTS:  Lab Results   Component Value Date     08/31/2021     09/23/2020    POTASSIUM 4.0 08/31/2021    POTASSIUM 4.1 09/23/2020    CHLORIDE 108 08/31/2021    CHLORIDE 104 09/23/2020    CO2 26 08/31/2021    CO2 33 (H) 09/23/2020    ANIONGAP 4 08/31/2021    ANIONGAP 11.1 09/23/2020     (H) 08/31/2021     09/23/2020    BUN 25 08/31/2021    BUN 17 09/23/2020    CR 0.88 08/31/2021    CR 1.11 09/23/2020    GFRESTIMATED 75 08/31/2021    GFRESTIMATED 62 09/23/2020    GFRESTBLACK 75 09/23/2020    ANGELITA 9.1 08/31/2021    ANGELITA 9.7 09/23/2020      A1C RESULTS:  No results found for: A1C  INR RESULTS:  Lab Results   Component Value Date    INR 2.4 09/22/2021    INR 2.01 (H) 08/31/2021    INR 1.99 (H) 08/30/2021    INR 3.60 (H) 07/02/2021    INR 2.50 (H) 06/11/2021       Rea Dinero PA-C  [facility]      Thank you for allowing me to participate in the care of your patient.      Sincerely,     Rea Dinero PA-C     [facility]  cc:   Bang Aleman MD  [address] [id number]

## 2021-09-08 NOTE — LETTER
"    9/8/2021        RE: Santosh Robledo  [address]  [address]  [facility] [address]        [facility]    Chief Complaint   Patient presents with     Nursing Home Acute     HPI:  Santosh Robledo is a 89 year old  (7/20/1929), who is being seen today for an episodic care visit at: [facility] ([facility]) [25]. Today's concern is:   NSTEMI/cardiomyopathy/CHF: BP as follows: 116/71, 138/69, 119/61 with HR 60-70 range, admit weight 176.5lbs and current weight 176.4lbs, on exam today patient denies CP, palpitations, SOB  Leukocytosis: WBC on 9/2 12.5  Deconditioning: walking up to 190 feet using a 4ww with CGA and w/c follow, need cues for safety  Cognitive impairement: BIMS 12/15 and SBT 9/28    Allergies, and PMH/PSH reviewed in [facility] today.  REVIEW OF SYSTEMS:  10 point ROS of systems including Constitutional, Eyes, Respiratory, Cardiovascular, Gastroenterology, Genitourinary, Integumentary, Musculoskeletal, Psychiatric were all negative except for pertinent positives noted in my HPI.    Objective:   /71   Pulse 65   Temp 97.5  F (36.4  C)   Resp 20   Ht 1.905 m (6' 3\")   Wt 81.1 kg (178 lb 14.4 oz)   SpO2 98%   BMI 22.36 kg/m    GENERAL APPEARANCE:  Alert, in no distress  ENT:  Mouth and posterior oropharynx normal, moist mucous membranes, [place]  EYES:  EOM, conjunctivae, lids, pupils and irises normal, PERRL  RESP:  respiratory effort and palpation of chest normal, lungs clear to auscultation , no respiratory distress  CV:  Palpation and auscultation of heart done , regular rate and rhythm, no murmur, rub, or gallop, no edema  ABDOMEN:  normal bowel sounds, soft, nontender, no hepatosplenomegaly or other masses  M/S:   patient sitting up in w/c able to move all 4 extremities  SKIN:  Inspection of skin and subcutaneous tissue baseline  NEURO:   speech WNL      Most Recent 3 CBC's:Recent Labs   Lab Test 08/31/21  0552 08/30/21  0536 08/29/21  0540   WBC 13.0* 10.1 11.7*   HGB 13.2* " 12.3* 11.6*   MCV 83 83 85    293 300     Most Recent 3 BMP's:Recent Labs   Lab Test 08/31/21  0552 08/30/21  1530 08/30/21  0536 08/29/21  0540     --  139 140   POTASSIUM 4.0 3.5 3.2* 3.1*   CHLORIDE 108  --  108 109   CO2 26  --  27 26   BUN 25  --  27 26   CR 0.88  --  0.96 1.04   ANIONGAP 4  --  4 5   ANGELITA 9.1  --  8.7 8.4*   *  --  95 100*       Assessment/Plan:  I21.4) NSTEMI (non-ST elevated myocardial infarction) (H)  (primary encounter diagnosis)  (I25.5) Ischemic cardiomyopathy  (I50.21) Acute systolic congestive heart failure (H)  (R53.81) Physical deconditioning  Comment: acute/ongoing  Plan: medical management of NSTEMI EF 35-40%, severe hypokinesis LV  PT and OT, daily weights, continue lipitor 40mg qhs, coumadin as directed INR today is 2.1, plavix 75mg QD (new) , Toprol xl 50mg QD (increase from PTA), lisinopril 20mg QD (new), lasix 20mg BID, KCL 20meq BID     (D72.829) Leukocytosis, unspecified type  (N39.0) Urinary tract infection without hematuria, site unspecified  Comment: acute/ongoing  Plan: CBC in AM, UC from hospital shows no growth, no further Abx     (R41.89) Cognitive impairment  Comment: ongoing  Plan:  PT and OT       Orders:  CBC in AM      Electronically signed by: Tonya Lynn Haase, APRN CNP           Sincerely,        Tonya Lynn Haase, APRN CNP

## 2021-09-10 NOTE — PROGRESS NOTES
"[facility] GERIATRIC [facility]    Chief Complaint   Patient presents with     Nursing Home Acute     HPI:  Santosh Robledo is a 89 year old  (7/20/1929), who is being seen today for an episodic care visit at: [facility] ([facility]) [25]. Today's concern is:   NSTEMI/cardiomyopathy/CHF: BP as follows: 120/64, 127/66, 125/66  with HR 60-70 range, admit weight 176.5lbs and current weight 17lb7.5s, on exam today patient denies CP, palpitations, SOB  Leukocytosis: WBC on 9/7 was 11.5  Deconditioning: walking up to 250 feet using a 4ww with SBA , need cues for safety  Cognitive impairement: BIMS 12/15 and SBT 9/28       Allergies, and PMH/PSH reviewed in [facility] today.  REVIEW OF SYSTEMS:  10 point ROS of systems including Constitutional, Eyes, Respiratory, Cardiovascular, Gastroenterology, Genitourinary, Integumentary, Musculoskeletal, Psychiatric were all negative except for pertinent positives noted in my HPI.    Objective:   /66   Pulse 62   Temp 97.5  F (36.4  C)   Resp 20   Ht 1.905 m (6' 3\")   Wt 80.5 kg (177 lb 8 oz)   SpO2 97%   BMI 22.19 kg/m    GENERAL APPEARANCE:  Alert, in no distress  ENT:  Mouth and posterior oropharynx normal, moist mucous membranes, [place]  EYES:  EOM, conjunctivae, lids, pupils and irises normal, PERRL  RESP:  respiratory effort and palpation of chest normal, lungs clear to auscultation , no respiratory distress  CV:  Palpation and auscultation of heart done , regular rate and rhythm, no murmur, rub, or gallop, peripheral edema trace+ in ankles  ABDOMEN:  normal bowel sounds, soft, nontender, no hepatosplenomegaly or other masses  M/S:   patient sitting up in chair at bedside, able to move all 4 extremities  SKIN:  Inspection of skin and subcutaneous tissue baseline  NEURO:   speech WNL      Most Recent 3 CBC's:Recent Labs   Lab Test 08/31/21  0552 08/30/21  0536 08/29/21  0540   WBC 13.0* 10.1 11.7*   HGB 13.2* 12.3* 11.6*   MCV 83 83 85    293 300     Most Recent 3 " BMP's:Recent Labs   Lab Test 08/31/21  0552 08/30/21  1530 08/30/21  0536 08/29/21  0540     --  139 140   POTASSIUM 4.0 3.5 3.2* 3.1*   CHLORIDE 108  --  108 109   CO2 26  --  27 26   BUN 25  --  27 26   CR 0.88  --  0.96 1.04   ANIONGAP 4  --  4 5   ANGELITA 9.1  --  8.7 8.4*   *  --  95 100*       Assessment/Plan:  I21.4) NSTEMI (non-ST elevated myocardial infarction) (H)  (primary encounter diagnosis)  (I25.5) Ischemic cardiomyopathy  (I50.21) Acute systolic congestive heart failure (H)  (R53.81) Physical deconditioning  Comment: acute/ongoing  Plan: medical management of NSTEMI EF 35-40%, severe hypokinesis LV  PT and OT, daily weights, continue lipitor 40mg qhs, coumadin as directed INR today is 2.1, plavix 75mg QD (new) , Toprol xl 50mg QD (increase from PTA), lisinopril 20mg QD (new), lasix 20mg BID, KCL 20meq BID  BMP on Tuesday     (D72.829) Leukocytosis, unspecified type  (N39.0) Urinary tract infection without hematuria, site unspecified  Comment: acute/ongoing  Plan: CBC on Tuesday, UC from hospital shows no growth, no further Abx     (R41.89) Cognitive impairment  Comment: ongoing  Plan:  PT and OT          Orders:  CBC and BMP on tuesday      Electronically signed by: Tonya Lynn Haase, APRN CNP

## 2021-09-10 NOTE — LETTER
"    9/10/2021        RE: Santosh Robledo  [address]  [address]  [facility] [address]        [facility]    Chief Complaint   Patient presents with     Nursing Home Acute     HPI:  Santosh Robledo is a 89 year old  (7/20/1929), who is being seen today for an episodic care visit at: [facility] ([facility]) [25]. Today's concern is:   NSTEMI/cardiomyopathy/CHF: BP as follows: 120/64, 127/66, 125/66  with HR 60-70 range, admit weight 176.5lbs and current weight 17lb7.5s, on exam today patient denies CP, palpitations, SOB  Leukocytosis: WBC on 9/7 was 11.5  Deconditioning: walking up to 250 feet using a 4ww with SBA , need cues for safety  Cognitive impairement: BIMS 12/15 and SBT 9/28       Allergies, and PMH/PSH reviewed in [facility] today.  REVIEW OF SYSTEMS:  10 point ROS of systems including Constitutional, Eyes, Respiratory, Cardiovascular, Gastroenterology, Genitourinary, Integumentary, Musculoskeletal, Psychiatric were all negative except for pertinent positives noted in my HPI.    Objective:   /66   Pulse 62   Temp 97.5  F (36.4  C)   Resp 20   Ht 1.905 m (6' 3\")   Wt 80.5 kg (177 lb 8 oz)   SpO2 97%   BMI 22.19 kg/m    GENERAL APPEARANCE:  Alert, in no distress  ENT:  Mouth and posterior oropharynx normal, moist mucous membranes, [place]  EYES:  EOM, conjunctivae, lids, pupils and irises normal, PERRL  RESP:  respiratory effort and palpation of chest normal, lungs clear to auscultation , no respiratory distress  CV:  Palpation and auscultation of heart done , regular rate and rhythm, no murmur, rub, or gallop, peripheral edema trace+ in ankles  ABDOMEN:  normal bowel sounds, soft, nontender, no hepatosplenomegaly or other masses  M/S:   patient sitting up in chair at bedside, able to move all 4 extremities  SKIN:  Inspection of skin and subcutaneous tissue baseline  NEURO:   speech WNL      Most Recent 3 CBC's:Recent Labs   Lab Test 08/31/21  0552 08/30/21  0536 08/29/21  0540   WBC " 13.0* 10.1 11.7*   HGB 13.2* 12.3* 11.6*   MCV 83 83 85    293 300     Most Recent 3 BMP's:Recent Labs   Lab Test 08/31/21  0552 08/30/21  1530 08/30/21  0536 08/29/21  0540     --  139 140   POTASSIUM 4.0 3.5 3.2* 3.1*   CHLORIDE 108  --  108 109   CO2 26  --  27 26   BUN 25  --  27 26   CR 0.88  --  0.96 1.04   ANIONGAP 4  --  4 5   ANGELITA 9.1  --  8.7 8.4*   *  --  95 100*       Assessment/Plan:  I21.4) NSTEMI (non-ST elevated myocardial infarction) (H)  (primary encounter diagnosis)  (I25.5) Ischemic cardiomyopathy  (I50.21) Acute systolic congestive heart failure (H)  (R53.81) Physical deconditioning  Comment: acute/ongoing  Plan: medical management of NSTEMI EF 35-40%, severe hypokinesis LV  PT and OT, daily weights, continue lipitor 40mg qhs, coumadin as directed INR today is 2.1, plavix 75mg QD (new) , Toprol xl 50mg QD (increase from PTA), lisinopril 20mg QD (new), lasix 20mg BID, KCL 20meq BID  BMP on Tuesday     (D72.829) Leukocytosis, unspecified type  (N39.0) Urinary tract infection without hematuria, site unspecified  Comment: acute/ongoing  Plan: CBC on Tuesday, UC from hospital shows no growth, no further Abx     (R41.89) Cognitive impairment  Comment: ongoing  Plan:  PT and OT          Orders:  CBC and BMP on tuesday      Electronically signed by: Tonya Lynn Haase, APRN CNP           Sincerely,        Tonya Lynn Haase, APRN CNP

## 2021-09-15 NOTE — PROGRESS NOTES
[facility]     Incoming fax w/BMP results as requested by Omayra Dinero to reassess renal function.  Results reflect current GDMT:   Lisinopril 20mg/d   toprol XL 50mg/d  Diuretic regimen:   Lasix 20mg bid   (+KCL 20meq bid)    Component      Latest Ref Rng & Units 9/14/2021   Sodium (External)      136 - 145 mmol/L 142   Potassium (External)      3.5 - 5.1 mmol/L 4.2   Chloride (External)      98 - 109 mmol/L 109   CO2 (External)      20 - 29 mmol/L 24   Anion Gap (External)      7 - 16 mmol/L 9   Glucose (External)      70 - 99 mg/dL 114 (H)   Urea Nitrogen (External)      7 - 26 mg/dL 15   Creatinine (External)      0.73 - 1.18 mg/dL 0.79   Calcium (External)      8.4 - 10.4 mg/dL 9.2   GFR Estimated (External)      >60 mL/min/1.73m2 >60   GFR Estimated (if ) (External)      mL/min/1.73m2 -   Will forward to Omayra for review.  Sigrid Sarabia RN on 9/15/2021 at 9:07 AM

## 2021-09-15 NOTE — PROGRESS NOTES
[facility] GERIATRIC SERVICES DISCHARGE SUMMARY  PATIENT'S NAME: Santosh Robledo  YOB: 1929  MEDICAL RECORD NUMBER:  [id number]  Place of Service where encounter took place:  [facility] (U) [25]    PRIMARY CARE PROVIDER AND CLINIC RESPONSIBLE AFTER TRANSFER:   Lilly Zepeda MD, [address] / [place] [address]    [facility] Provider     Transferring providers: Tonya Lynn Haase, APRN CNP, Dr. Alessio Denton MD  Recent Hospitalization/ED:  [facility] stay 8/26/21 to 8/31/21.  Date of SNF Admission: August 31, 2021  Date of SNF (anticipated) Discharge: September 20, 2021  Discharged to: previous independent home  Cognitive Scores: BIMS: 12/15 and Short blessed: 9/28  Physical Function: Ambulating 130 ft with 4ww  DME: Walker    CODE STATUS/ADVANCE DIRECTIVES DISCUSSION:  No CPR- Do NOT Intubate   ALLERGIES: Penicillins    NURSING FACILITY COURSE   Medication Changes/Rationale:     There were no medication changes made during TCU stay    Summary of nursing facility stay:   Patient progressed to walking up to 130 feet using a 4ww with SBA, needing assist with ADL's and toileting, will DC to [address] [place] [address] with wife who assist with patient care, will have home PT, RN and HHA through [facility].     Discharge Medications:    Current Outpatient Medications   Medication Sig Dispense Refill     acetaminophen (TYLENOL) 325 MG tablet Take 2 tablets (650 mg) by mouth every 6 hours as needed for mild pain or other (and adjunct with moderate or severe pain or per patient request)       atorvastatin (LIPITOR) 40 MG tablet Take 1 tablet (40 mg) by mouth every evening       clopidogrel (PLAVIX) 75 MG tablet Take 1 tablet (75 mg) by mouth daily       CVS VITAMIN B12 1000 MCG tablet TAKE 1 TABLET BY MOUTH EVERY  tablet 1     furosemide (LASIX) 20 MG tablet Take 1 tablet (20 mg) by mouth 2 times daily       lisinopril (ZESTRIL) 20 MG tablet Take 1 tablet (20 mg) by mouth daily 180  tablet 3     melatonin 1 MG TABS tablet Take 1 tablet (1 mg) by mouth every evening       metoprolol succinate ER (TOPROL XL) 50 MG 24 hr tablet Take 1 tablet (50 mg) by mouth At Bedtime 90 tablet 3     nitroGLYcerin (NITROSTAT) 0.4 MG sublingual tablet For chest pain place 1 tablet under the tongue every 5 minutes for 3 doses. If symptoms persist 5 minutes after 1st dose call 911.       omeprazole (PRILOSEC) 20 MG DR capsule TAKE 1 CAPSULE BY MOUTH EVERY DAY (Patient taking differently: Take 20 mg by mouth 2 times daily ) 90 capsule 1     order for DME Equipment being ordered: Walker Wheels () and Walker ()  Treatment Diagnosis: Difficulty ambulating 1 each 0     order for DME Equipment being ordered: Walker Wheels ()  Treatment Diagnosis:  Weakness,colitis. 1 Device 0     potassium chloride ER (KLOR-CON) 20 MEQ CR tablet Take 1 tablet (20 mEq) by mouth 2 times daily 180 tablet 1     sennosides (SENOKOT) 8.6 MG tablet Take 1-2 tablets by mouth 2 times daily as needed for constipation       sertraline (ZOLOFT) 50 MG tablet Take 1 tablet (50 mg) by mouth daily (Patient taking differently: Take 100 mg by mouth daily ) 90 tablet 3     Simethicone (GAS-X EXTRA STRENGTH) 125 MG CAPS Take 1 capsule by mouth 2 times daily 60 capsule 11     Warfarin Sodium (COUMADIN PO) Give 1.25 mg po in evening every Thursday for A-fib until 9/16/21.       Warfarin Sodium (COUMADIN PO) Give 2.5 mg po in evening every Wed for A fib until 9/16/21.       Ca Carbonate-Mag Hydroxide (ROLAIDS PO) Take by mouth 3 times daily as needed  (Patient not taking: Reported on 9/16/2021)       warfarin ANTICOAGULANT (COUMADIN) 2.5 MG tablet Take 1 tablet (2.5 mg) by mouth daily (Patient not taking: Reported on 9/16/2021)            Controlled medications:   not applicable/none     Past Medical History:   Past Medical History:   Diagnosis Date     AV node dysfunction      Benign essential hypertension 04/30/2018     Chronic anticoagulation  "04/30/2018     Chronic atrial fibrillation (H) 2004     Cognitive impairment 06/13/2018     Gastroesophageal reflux disease, esophagitis presence not specified 04/30/2018     IMO Regulatory Load OCT 2020     Hyperlipidemia      Ischemic cardiomyopathy 08/28/2021     Near syncope      NSTEMI (non-ST elevated myocardial infarction) (H) 08/27/2021     MARILU (obstructive sleep apnea)      Physical deconditioning 05/30/2018     Status cardiac pacemaker 04/30/2018     Physical Exam:   Vitals: BP (!) 151/73   Pulse 60   Temp 97.2  F (36.2  C)   Resp 18   Ht 1.905 m (6' 3\")   Wt 80.8 kg (178 lb 1.6 oz)   SpO2 96%   BMI 22.26 kg/m    BMI= Body mass index is 22.26 kg/m .  GENERAL APPEARANCE:  Alert, in no distress  ENT:  Mouth and posterior oropharynx normal, moist mucous membranes, [place]  EYES:  EOM, conjunctivae, lids, pupils and irises normal, PERRL  RESP:  respiratory effort and palpation of chest normal, lungs clear to auscultation , no respiratory distress  CV:  Palpation and auscultation of heart done , regular rate and rhythm, no murmur, rub, or gallop, peripheral edema trace+ in LE bilaterally  ABDOMEN:  normal bowel sounds, soft, nontender, no hepatosplenomegaly or other masses  M/S:   patient sitting in chair at bedside, able to move all 4 extremities  SKIN:  Inspection of skin and subcutaneous tissue baseline  NEURO:   speech WNL  PSYCH:  affect and mood normal     SNF labs:   Most Recent 3 CBC's:Recent Labs   Lab Test 08/31/21  0552 08/30/21  0536 08/29/21  0540   WBC 13.0* 10.1 11.7*   HGB 13.2* 12.3* 11.6*   MCV 83 83 85    293 300     I21.4) NSTEMI (non-ST elevated myocardial infarction) (H)  (primary encounter diagnosis)  (I25.5) Ischemic cardiomyopathy  (I50.21) Acute systolic congestive heart failure (H)  (R53.81) Physical deconditioning  Comment: acute/ongoing  Plan: medical management of NSTEMI EF 35-40%, severe hypokinesis LV  DC to [address] [place] [address] apt to live with wife with home PT, RN and HHA " through [facility]  continue daily weights, continue lipitor 40mg qhs, coumadin as directed INR today is 2.1, plavix 75mg QD (new) , Toprol xl 50mg QD (increase from PTA), lisinopril 20mg QD (new), lasix 20mg BID, KCL 20meq BID  Cardiology appt on 10/25/21     (D72.829) Leukocytosis, unspecified type  (N39.0) Urinary tract infection without hematuria, site unspecified  Comment: acute/ongoing  Plan: f/u with PCP, last WBC counts on 9/16/21 was 10.5     (R41.89) Cognitive impairment  Comment: ongoing  Plan:  DC home to live with wife with home PT, RN and HHA through [facility]    DISCHARGE PLAN:    Follow up labs: No labs orders/due    Medical Follow Up:      Follow up with primary care provider in 1 weeks    [facility] scheduled appointments:  Next 5 appointments (look out 90 days)    Sep 27, 2021 10:00 AM  Office Visit with Lilly Zepeda MD  [facility] ([facility] ) [address] [place] [address], [address]  [facility] [address]  [phone]         Future Appointments   Date Time Provider Department Center   9/27/2021 10:00 AM Lilly Zepeda MD [facility] CS   10/5/2021  3:15 PM London Lynn MD [facility] PSA CLIN   10/25/2021 11:15 AM NAYELY CHICAS [facility] PSA CLIN   10/28/2021 10:00 AM Elli Mendoza DO [facility]         Discharge Services: Home Care:  Physical Therapy, Registered Nurse and Home Health Aide    Discharge Instructions Verbalized to Patient at Discharge:     None    TOTAL DISCHARGE TIME:   Greater than 30 minutes  Electronically signed by:  Tonya Lynn Haase, APRN CNP

## 2021-09-16 NOTE — LETTER
9/16/2021        RE: Santosh Robledo  [address]  [address]  [place] [address]        [facility] GERIATRIC SERVICES DISCHARGE SUMMARY  PATIENT'S NAME: Santosh Robledo  YOB: 1929  MEDICAL RECORD NUMBER:  [id number]  Place of Service where encounter took place:  [facility] ([facility]) [25]    PRIMARY CARE PROVIDER AND CLINIC RESPONSIBLE AFTER TRANSFER:   Lilly Zepeda MD, [address] / [place] [address]    [facility] Provider     Transferring providers: Tonya Lynn Haase, APRN CNP, Dr. Alessio Denton MD  Recent Hospitalization/ED:  [facility] stay 8/26/21 to 8/31/21.  Date of SNF Admission: August 31, 2021  Date of SNF (anticipated) Discharge: September 20, 2021  Discharged to: previous independent home  Cognitive Scores: BIMS: 12/15 and Short blessed: 9/28  Physical Function: Ambulating 130 ft with 4ww  DME: Walker    CODE STATUS/ADVANCE DIRECTIVES DISCUSSION:  No CPR- Do NOT Intubate   ALLERGIES: Penicillins    NURSING FACILITY COURSE   Medication Changes/Rationale:     There were no medication changes made during TCU stay    Summary of nursing facility stay:   Patient progressed to walking up to 130 feet using a 4ww with SBA, needing assist with ADL's and toileting, will DC to [address] with wife who assist with patient care, will have home PT, RN and HHA through [facility].     Discharge Medications:    Current Outpatient Medications   Medication Sig Dispense Refill     acetaminophen (TYLENOL) 325 MG tablet Take 2 tablets (650 mg) by mouth every 6 hours as needed for mild pain or other (and adjunct with moderate or severe pain or per patient request)       atorvastatin (LIPITOR) 40 MG tablet Take 1 tablet (40 mg) by mouth every evening       clopidogrel (PLAVIX) 75 MG tablet Take 1 tablet (75 mg) by mouth daily       CVS VITAMIN B12 1000 MCG tablet TAKE 1 TABLET BY MOUTH EVERY  tablet 1     furosemide (LASIX) 20 MG tablet Take 1 tablet (20 mg) by mouth 2  times daily       lisinopril (ZESTRIL) 20 MG tablet Take 1 tablet (20 mg) by mouth daily 180 tablet 3     melatonin 1 MG TABS tablet Take 1 tablet (1 mg) by mouth every evening       metoprolol succinate ER (TOPROL XL) 50 MG 24 hr tablet Take 1 tablet (50 mg) by mouth At Bedtime 90 tablet 3     nitroGLYcerin (NITROSTAT) 0.4 MG sublingual tablet For chest pain place 1 tablet under the tongue every 5 minutes for 3 doses. If symptoms persist 5 minutes after 1st dose call 911.       omeprazole (PRILOSEC) 20 MG DR capsule TAKE 1 CAPSULE BY MOUTH EVERY DAY (Patient taking differently: Take 20 mg by mouth 2 times daily ) 90 capsule 1     order for DME Equipment being ordered: Walker Wheels () and Walker ()  Treatment Diagnosis: Difficulty ambulating 1 each 0     order for DME Equipment being ordered: Walker Wheels ()  Treatment Diagnosis:  Weakness,colitis. 1 Device 0     potassium chloride ER (KLOR-CON) 20 MEQ CR tablet Take 1 tablet (20 mEq) by mouth 2 times daily 180 tablet 1     sennosides (SENOKOT) 8.6 MG tablet Take 1-2 tablets by mouth 2 times daily as needed for constipation       sertraline (ZOLOFT) 50 MG tablet Take 1 tablet (50 mg) by mouth daily (Patient taking differently: Take 100 mg by mouth daily ) 90 tablet 3     Simethicone (GAS-X EXTRA STRENGTH) 125 MG CAPS Take 1 capsule by mouth 2 times daily 60 capsule 11     Warfarin Sodium (COUMADIN PO) Give 1.25 mg po in evening every Thursday for A-fib until 9/16/21.       Warfarin Sodium (COUMADIN PO) Give 2.5 mg po in evening every Wed for A fib until 9/16/21.       Ca Carbonate-Mag Hydroxide (ROLAIDS PO) Take by mouth 3 times daily as needed  (Patient not taking: Reported on 9/16/2021)       warfarin ANTICOAGULANT (COUMADIN) 2.5 MG tablet Take 1 tablet (2.5 mg) by mouth daily (Patient not taking: Reported on 9/16/2021)            Controlled medications:   not applicable/none     Past Medical History:   Past Medical History:   Diagnosis Date      "AV node dysfunction      Benign essential hypertension 04/30/2018     Chronic anticoagulation 04/30/2018     Chronic atrial fibrillation (H) 2004     Cognitive impairment 06/13/2018     Gastroesophageal reflux disease, esophagitis presence not specified 04/30/2018     IMO Regulatory Load OCT 2020     Hyperlipidemia      Ischemic cardiomyopathy 08/28/2021     Near syncope      NSTEMI (non-ST elevated myocardial infarction) (H) 08/27/2021     MARILU (obstructive sleep apnea)      Physical deconditioning 05/30/2018     Status cardiac pacemaker 04/30/2018     Physical Exam:   Vitals: BP (!) 151/73   Pulse 60   Temp 97.2  F (36.2  C)   Resp 18   Ht 1.905 m (6' 3\")   Wt 80.8 kg (178 lb 1.6 oz)   SpO2 96%   BMI 22.26 kg/m    BMI= Body mass index is 22.26 kg/m .  GENERAL APPEARANCE:  Alert, in no distress  ENT:  Mouth and posterior oropharynx normal, moist mucous membranes, [place]  EYES:  EOM, conjunctivae, lids, pupils and irises normal, PERRL  RESP:  respiratory effort and palpation of chest normal, lungs clear to auscultation , no respiratory distress  CV:  Palpation and auscultation of heart done , regular rate and rhythm, no murmur, rub, or gallop, peripheral edema trace+ in LE bilaterally  ABDOMEN:  normal bowel sounds, soft, nontender, no hepatosplenomegaly or other masses  M/S:   patient sitting in chair at bedside, able to move all 4 extremities  SKIN:  Inspection of skin and subcutaneous tissue baseline  NEURO:   speech WNL  PSYCH:  affect and mood normal     SNF labs:   Most Recent 3 CBC's:Recent Labs   Lab Test 08/31/21  0552 08/30/21  0536 08/29/21  0540   WBC 13.0* 10.1 11.7*   HGB 13.2* 12.3* 11.6*   MCV 83 83 85    293 300     I21.4) NSTEMI (non-ST elevated myocardial infarction) (H)  (primary encounter diagnosis)  (I25.5) Ischemic cardiomyopathy  (I50.21) Acute systolic congestive heart failure (H)  (R53.81) Physical deconditioning  Comment: acute/ongoing  Plan: medical management of NSTEMI EF " 35-40%, severe hypokinesis LV  DC to [address] [place] [address] apt to live with wife with home PT, RN and HHA through [facility]  continue daily weights, continue lipitor 40mg qhs, coumadin as directed INR today is 2.1, plavix 75mg QD (new) , Toprol xl 50mg QD (increase from PTA), lisinopril 20mg QD (new), lasix 20mg BID, KCL 20meq BID  Cardiology appt on 10/25/21     (D72.829) Leukocytosis, unspecified type  (N39.0) Urinary tract infection without hematuria, site unspecified  Comment: acute/ongoing  Plan: f/u with PCP, last WBC counts on 9/16/21 was 10.5     (R41.89) Cognitive impairment  Comment: ongoing  Plan:  DC home to live with wife with home PT, RN and HHA through [facility]    DISCHARGE PLAN:    Follow up labs: No labs orders/due    Medical Follow Up:      Follow up with primary care provider in 1 weeks    [facility] scheduled appointments:  Next 5 appointments (look out 90 days)    Sep 27, 2021 10:00 AM  Office Visit with Lilly Zepeda MD  [facility] ([facility] ) [address], [address]  [facility] [address] [phone]         Future Appointments   Date Time Provider Department Center   9/27/2021 10:00 AM Lilly Zepeda MD [facility] CS   10/5/2021  3:15 PM London Lynn MD [facility] PSA CLIN   10/25/2021 11:15 AM NAYELY CHICAS [facility] PSA CLIN   10/28/2021 10:00 AM Elli Mendoza DO [facility]         Discharge Services: Home Care:  Physical Therapy, Registered Nurse and Home Health Aide    Discharge Instructions Verbalized to Patient at Discharge:     None    TOTAL DISCHARGE TIME:   Greater than 30 minutes  Electronically signed by:  Tonya Lynn Haase, APRN CNP                     Sincerely,        Tonya Lynn Haase, APRN CNP

## 2021-09-17 NOTE — PROGRESS NOTES
[facility]    Called patient, spoke w/his wife. Relayed normal results. She verbalized understanding.  Sigrid Sarabia RN on 9/17/2021 at 9:50 AM

## 2021-09-22 NOTE — PROGRESS NOTES
ANTICOAGULATION MANAGEMENT     Santosh Robledo 89 year old male is on warfarin with therapeutic INR result. (Goal INR 2.0-3.0)    Recent labs: (last 7 days)     09/22/21  1304   INR 2.4       ASSESSMENT     Source(s): Chart Review and Home Care/Facility Nurse       Warfarin doses taken: Less warfarin taken then planned     Diet: No new diet changes identified    New illness, injury, or hospitalization: Yes: 8/26-8/31 Hospital then TCU 8/31-9/20 for NSTEMI & rehab    Medication/supplement changes: started plavix daily     Signs or symptoms of bleeding or clotting: No    Previous INR: Therapeutic last visit; previously outside of goal range    Additional findings: home care twice weekly      PLAN     Recommended plan for no diet, medication or health factor changes affecting INR     Dosing Instructions: continue 2.5mg daily as advised at discharge from TCU with next INR in 6 days       Summary  As of 9/22/2021    Full warfarin instructions:  9/25: 2.5 mg; Otherwise 5 mg every Sat; 2.5 mg all other days   Next INR check:  9/29/2021             Telephone call with home care nurse Cheri who agrees to plan and repeated back plan correctly    Orders given to  Homecare nurse/facility to recheck    Education provided: Contact [phone]  with any changes, questions or concerns.     Plan made per [facility] anticoagulation protocol    Mindy Terrell RN  Anticoagulation Clinic  9/22/2021    _______________________________________________________________________     Anticoagulation Episode Summary     Current INR goal:  2.0-3.0   TTR:  73.1 % (11.3 mo)   Target end date:  Indefinite   Send INR reminders to:  ANT[facility]AG [place]    Indications    Chronic atrial fibrillation (H) [I48.20]  Chronic anticoagulation [Z79.01]  Long term current use of anticoagulant therapy [Z79.01]           Comments:           Anticoagulation Care Providers     Provider Role Specialty Phone number    Lilly Zepeda MD Referring Internal Medicine  [phone]          Mindy Guzmán, RN, BSN, PHN  Anticoagulation Nurse  [phone]

## 2021-09-22 NOTE — PROGRESS NOTES
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    Pt was at [facility] until 9/20. He is now at independent home with wife. Home Care RN met with pt today to enroll in home care. Pt's feet are swollen, which RN took note of. Pt has no appetite, he has eaten some oatmeal and has 1 ensure. He plans to have another ensure today.     No safety concerns for pt at home. He is able to safely navigate his walker and his home has grab bars to assist.    ASSESSMENT      Enrollment  Primary Care Care Coordination Status: Not a Candidate    Discharge Assessment  How are you doing now that you are home?: Not doing very well. Pt has no appetite, is minimally eating and drinking. He is able to use his walker with no concerns.  How are your symptoms? (Red Flag symptoms escalate to triage hotline per guidelines): Improved  Do you feel your condition is stable enough to be safe at home until your provider visit?: Yes  Does the patient have their discharge instructions? : Yes  Does the patient have questions regarding their discharge instructions? : No  Were you started on any new medications or were there changes to any of your previous medications? : Yes  Does the patient have all of their medications?: Yes  Do you have questions regarding any of your medications? : No  Do you have all of your needed medical supplies or equipment (DME)?  (i.e.  oxygen tank, CPAP, cane, etc.): Yes  Discharge follow-up appointment scheduled within 14 calendar days? : Yes  Discharge Follow Up Appointment Date: 09/27/21  Discharge Follow Up Appointment Scheduled with?: Primary Care Provider         Post-op (Clinicians Only)  Did the patient have surgery or a procedure: No  Fever: No  Chills: No  Eating & Drinking:  (No appetite, eating some oatmeal and drinking ensure)  PO Intake: regular diet      PLAN                                                      Outpatient Plan:  Follow up with PCP and Cardiology.    Future Appointments   Date Time Provider Department Center   9/27/2021 10:00 AM Lilly Zepeda MD [facility] CS   10/5/2021  3:15 PM [facility] MD [facility] CL[facility]   10/25/2021 11:15 AM NAYELY CHICAS [facility] PSA CLIN   10/28/2021 10:00 AM Elli Mendoza DO [facility]         For any urgent concerns, please contact our 24 hour nurse triage line: [phone] ([phone])       CALLUM Stephenson  Clinic Care Coordinator  [facility] Marybel Tipton  [facility]  [phone]  [email or web address]

## 2021-09-22 NOTE — LETTER
[facility] CARE COORDINATION  [address] [place] [address]    September 22, 2021    Santosh Hjelmstad  [address]    [facility] [address]      Dear Candida,    I am a clinic care coordinator who works with Lilly Zepeda MD at [facility]. I wanted to thank you for spending the time to talk with me.  Below is a description of clinic care coordination and how I can further assist you.      The clinic care coordination team is made up of a registered nurse,  and community health worker who understand the health care system. The goal of clinic care coordination is to help you manage your health and improve access to the health care system in the most efficient manner. The team can assist you in meeting your health care goals by providing education, coordinating services, strengthening the communication among your providers and supporting you with any resource needs.    Please feel free to contact me at [phone] with any questions or concerns. We are focused on providing you with the highest-quality healthcare experience possible and that all starts with you.     Sincerely,     KAYLEN Stephenson

## 2021-09-22 NOTE — TELEPHONE ENCOUNTER
Verbal approval given for the homecare request below. Homecare/Hospice agency to fax orders for provider signature.    Skilled nursing visit 2x per week for two weeks.   One visit per week for 4 weeks and one every other week.   PT evaluation and home health aid 1 x per week for 4 weeks.     Deborah Alvarez RN  -[facility]

## 2021-09-27 NOTE — PROGRESS NOTES
Melquiades Moreno is a 89 year old who presents for the following health issues     HPI     Advanced care planning  Home hospice referral    HPI:   Patient Santosh Robledo is a very pleasant 89 year old male with history of hypertension, atrial fibrillation s/p pacemaker implantation, CHF, dementia who presents to Internal Medicine clinic today brought in by his wife and daughter from home for follow up of multiple concerns including advanced care planning and home hospice referral. Regarding the patient's chronic atrial fibrillation s/p cardiac pacemaker implantation, the patient denies any chest pain, he is followed by the [facility]iology clinic in [place] on a routine basis. Regarding the patient's hypertension, the patient's BP is well controlled. No chest pain, headaches, fever or chills.      Current Medications:     Current Outpatient Medications   Medication Sig Dispense Refill     acetaminophen (TYLENOL) 325 MG tablet Take 2 tablets (650 mg) by mouth every 6 hours as needed for mild pain or other (and adjunct with moderate or severe pain or per patient request)       atorvastatin (LIPITOR) 40 MG tablet Take 1 tablet (40 mg) by mouth every evening       clopidogrel (PLAVIX) 75 MG tablet Take 1 tablet (75 mg) by mouth daily       [employer] VITAMIN B12 1000 MCG tablet TAKE 1 TABLET BY MOUTH EVERY  tablet 1     furosemide (LASIX) 20 MG tablet Take 1 tablet (20 mg) by mouth 2 times daily 180 tablet 3     lisinopril (ZESTRIL) 20 MG tablet Take 1 tablet (20 mg) by mouth daily 180 tablet 3     melatonin 1 MG TABS tablet Take 1 tablet (1 mg) by mouth every evening       metoprolol succinate ER (TOPROL XL) 50 MG 24 hr tablet Take 1 tablet (50 mg) by mouth At Bedtime 90 tablet 3     nitroGLYcerin (NITROSTAT) 0.4 MG sublingual tablet For chest pain place 1 tablet under the tongue every 5 minutes for 3 doses. If symptoms persist 5 minutes after 1st dose call 911.       order for DME Equipment being ordered: Walker  Wheels () and Walker ()  Treatment Diagnosis: Difficulty ambulating 1 each 0     order for DME Equipment being ordered: Walker Wheels ()  Treatment Diagnosis:  Weakness,colitis. 1 Device 0     potassium chloride ER (KLOR-CON) 20 MEQ CR tablet Take 1 tablet (20 mEq) by mouth 2 times daily 180 tablet 1     sennosides (SENOKOT) 8.6 MG tablet Take 1-2 tablets by mouth 2 times daily as needed for constipation       sertraline (ZOLOFT) 100 MG tablet Take 1 tablet (100 mg) by mouth daily 90 tablet 3     Simethicone (GAS-X EXTRA STRENGTH) 125 MG CAPS Take 1 capsule by mouth 2 times daily 60 capsule 11     Warfarin Sodium (COUMADIN PO) Give 1.25 mg po in evening every Thursday for A-fib until 9/16/21.       Warfarin Sodium (COUMADIN PO) Give 2.5 mg po in evening every Wed for A fib until 9/16/21.           Allergies:      Allergies   Allergen Reactions     Penicillins Rash            Past Medical History:     Past Medical History:   Diagnosis Date     AV node dysfunction      Benign essential hypertension 04/30/2018     Chronic anticoagulation 04/30/2018     Chronic atrial fibrillation (H) 2004     Cognitive impairment 06/13/2018     Gastroesophageal reflux disease, esophagitis presence not specified 04/30/2018     IMO Regulatory Load OCT 2020     Hyperlipidemia      Ischemic cardiomyopathy 08/28/2021     Near syncope      NSTEMI (non-ST elevated myocardial infarction) (H) 08/27/2021     MARILU (obstructive sleep apnea)      Physical deconditioning 05/30/2018     Status cardiac pacemaker 04/30/2018         Past Surgical History:     Past Surgical History:   Procedure Laterality Date     IMPLANT PACEMAKER  08/10/2015         Family Medical History:     Family History   Problem Relation Age of Onset     Influenza/Pneumonia Mother      Prostate Cancer Father          Social History:     Social History     Socioeconomic History     Marital status:      Spouse name: Not on file     Number of children: Not on  file     Years of education: Not on file     Highest education level: Not on file   Occupational History     Not on file   Tobacco Use     Smoking status: Never Smoker     Smokeless tobacco: Never Used   Substance and Sexual Activity     Alcohol use: No     Drug use: No     Sexual activity: Yes     Partners: Female   Other Topics Concern     Parent/sibling w/ CABG, MI or angioplasty before 65F 55M? Not Asked   Social History Narrative     Not on file     Social Determinants of Health     Financial Resource Strain:      Difficulty of Paying Living Expenses:    Food Insecurity:      Worried About Running Out of Food in the Last Year:      Ran Out of Food in the Last Year:    Transportation Needs:      Lack of Transportation (Medical):      Lack of Transportation (Non-Medical):    Physical Activity:      Days of Exercise per Week:      Minutes of Exercise per Session:    Stress:      Feeling of Stress :    Social Connections:      Frequency of Communication with Friends and Family:      Frequency of Social Gatherings with Friends and Family:      Attends Latter day Services:      Active Member of Clubs or Organizations:      Attends Club or Organization Meetings:      Marital Status:    Intimate Partner Violence:      Fear of Current or Ex-Partner:      Emotionally Abused:      Physically Abused:      Sexually Abused:            Review of System:     Constitutional: Negative for fever or chills  Skin: Negative for rashes  HEENT: positive for recent fall related laceration wound of the left forehead  Respiratory: No shortness of breath, dyspnea on exertion, cough, or hemoptysis  Cardiovascular: Negative for chest pain  Gastrointestinal: Negative for nausea, vomiting  Genitourinary: Negative for dysuria, hematuria  Musculoskeletal: Negative for myalgias, positive for chronic diffuse weakness  Neurologic: Negative for headaches, positive for cognitive impairement  Psychiatric: Negative for depression,  "anxiety  Hematologic/Lymphatic/Immunologic: Negative  Endocrine: Negative  Behavioral: Negative for tobacco use       Physical Exam:   BP (!) 177/69 (BP Location: Right arm, Patient Position: Sitting, Cuff Size: Adult Regular)   Pulse 81   Temp 96.8  F (36  C) (Temporal)   Resp 18   Ht 1.905 m (6' 3\")   Wt 79.8 kg (176 lb)   SpO2 95%   BMI 22.00 kg/m      GENERAL: chronically ill appearing elderly male, alert and no distress  EYES: eyes grossly normal to inspection, and conjunctivae and sclerae normal  HENT: Normocephalic atraumatic. Nose and mouth without ulcers or lesions, left forehead laceration wound noted, healed with sutures in place. No signs of acute bleeding or infection.  NECK: supple  RESP: lungs clear to auscultation   CV: cardiac pacemaker present over the left upper anterior chest wall  LYMPH: no peripheral edema   ABDOMEN: nondistended  MS: diffuse weakness noted, patient is utilizing a wheelchair to help with transportation in clinic today  SKIN: no rashes  NEURO: patient is alert  PSYCH: memory loss symptoms noted, flat affect        Diagnostic Test Results:     Diagnostic Test Results:  Labs reviewed in Epic    ASSESSMENT/PLAN:     Santosh was seen today for hospital f/u and imm/inj.    Diagnoses and all orders for this visit:    Chronic atrial fibrillation (H)  -     Hospice Referral; Future    Episode of recurrent major depressive disorder, unspecified depression episode severity (H)  -     Hospice Referral; Future  -     sertraline (ZOLOFT) 100 MG tablet; Take 1 tablet (100 mg) by mouth daily    Cognitive impairment  -     Hospice Referral; Future    Hyperlipidemia LDL goal <100  -     Hospice Referral; Future    Chronic diastolic (congestive) heart failure (H)  -     Hospice Referral; Future  -     furosemide (LASIX) 20 MG tablet; Take 1 tablet (20 mg) by mouth 2 times daily    NSTEMI (non-ST elevated myocardial infarction) (H)  -     Hospice Referral; Future    Status cardiac " pacemaker  -     Hospice Referral; Future    Acute systolic congestive heart failure (H)    Need for prophylactic vaccination and inoculation against influenza  -     INFLUENZA, QUAD, HIGH DOSE, PF, 65YR + (FLUZONE HD)    Advanced care planning/counseling discussion            Follow Up Plan:     Patient is instructed to return to Internal Medicine clinic for follow-up visit in 1 month.        Lilly Zepeda MD  Internal Medicine  [facility]

## 2021-09-28 NOTE — TELEPHONE ENCOUNTER
FYI PCP:     Helena with [facility] Called     Admitted to Hospice     They have you listed as the provider following patient     Maura SARGENT RN

## 2021-10-06 NOTE — PROGRESS NOTES
ANTICOAGULATION     Santosh Robledo is overdue for INR check.      Spoke with Bridgett at [facility] who sent a message to the  to return call to [facility] to update on INR status. patient was admitted to hospice, need to determine if hospice provider is dosing or if warfarin was discontinued.    Ema Landon RN

## 2021-10-11 NOTE — TELEPHONE ENCOUNTER
Prescription approved per [facility] Refill Protocol.    Sharonda Morales RN  [facility]

## 2021-10-20 ENCOUNTER — MEDICAL CORRESPONDENCE (OUTPATIENT)
Dept: HEALTH INFORMATION MANAGEMENT | Facility: CLINIC | Age: 86
End: 2021-10-20
Payer: COMMERCIAL

## 2022-02-17 PROBLEM — K21.9 GASTROESOPHAGEAL REFLUX DISEASE: Status: ACTIVE | Noted: 2018-04-30

## 2022-06-08 ENCOUNTER — DOCUMENTATION ONLY (OUTPATIENT)
Dept: ANTICOAGULATION | Facility: CLINIC | Age: 87
End: 2022-06-08
Payer: COMMERCIAL

## 2022-06-08 DIAGNOSIS — Z79.01 LONG TERM CURRENT USE OF ANTICOAGULANT THERAPY: ICD-10-CM

## 2022-06-08 DIAGNOSIS — I48.20 CHRONIC ATRIAL FIBRILLATION (H): Primary | ICD-10-CM

## 2022-06-08 DIAGNOSIS — Z79.01 CHRONIC ANTICOAGULATION: ICD-10-CM

## 2022-06-08 NOTE — PROGRESS NOTES
ANTICOAGULATION  MANAGEMENT    Santosh Robledo is being discharged from the [facility] Anticoagulation Management Program ([facility]).    Reason for discharge:     Anticoagulation episode resolved, [facility] referral closed and Standing order discontinued        Flower Varner RN

## 2022-08-16 NOTE — PROGRESS NOTES
"Clinic Care Coordination Contact    Clinic Care Coordination Contact  OUTREACH    Referral Information:  Referral Source: IP Handoff    Primary Diagnosis: GI Disorders (C. Difficile )    Chief Complaint   Patient presents with     Clinic Care Coordination - Post Hospital     Discharged from [facility] on 5/15         [place] Utilization:   Clinic Utilization: Follow up appointment scheduled for 5/18/18.   Difficulty keeping appointments:: No  Utilization    Last refreshed: 5/16/2018  1:22 PM:  No Show Count (past year) 0       Last refreshed: 5/16/2018  1:22 PM:  ED visits 0       Last refreshed: 5/16/2018  1:22 PM:  Hospital admissions 1          Current as of: 5/16/2018  1:22 PM             Clinical Concerns:  Current Medical Concerns: Patient was recently hospitalized from 5/13-5/15 after having intermittent diarrhea. Patient stated that he suffered daily and severe diarrhea for a week prior. Patient had a CT of the abdomen completed where it revealed left-sided colitis. Stool C. Diff toxin was positive in the ED and the patient was started on oral vancomycin and admitted for further evaluation. Patient was discharged home with a prescription to continue vancomycin for 12 days. Spoke with the patient today who stated that he is feeling \"better\" but still have stools. Patient denied any pain. Patient is trying to eat more since he lost some weight. No other concerns from the patient.   Current Behavioral Concerns: Patient was alert and orientated. No concerns at this time.     Education Provided to patient: Care coordination Education.    Health Maintenance Reviewed: Up to date      Medication Management:  Patient manages his own medication independently. Patient continuing to take his medication as prescribed. No concerns with his medication at this time.     Functional Status:  Dependent ADLs:: Ambulation-cane  Bed or wheelchair confined:: No  Mobility Status: Independent w/Device    Living Situation:  Current living " arrangement:: I live in a private home with spouse  Type of residence:: Private home - stairs       Transportation:  Transportation concerns (GOAL):: No  Transportation means:: Family     Psychosocial:  Nondenominational or spiritual beliefs that impact treatment:: No  Mental health DX:: No  Mental health management concern (GOAL):: No  Informal Support system:: Family, Spouse     Financial/Insurance: BCBS Platinum Blue   Financial/Insurance concerns (GOAL):: No       Resources and Interventions:  Equipment Currently Used at Home: cane, straight    Advance Care Plan/Directive  Advanced Care Plans/Directives on file:: Yes  Type Advanced Care Plans/Directives: Advanced Directive - On File  Advanced Care Plan/Directive Status: In Process      Patient/Caregiver understanding: Patient understood the role of care coordinator. Patient denied any concerns during the phone call.        Future Appointments              In 2 days Lilly Zepeda MD [facility], CS    In 2 days CS ANTICOAGULATION CLINIC [facility]    In 1 week Francis Negron MD [facility] PSA CLIN    In 1 week ADLER DCRN [facility] PSA CLIN    In 2 months Lilly Zepeda MD [facility],           Plan:   No further outreaches will be made at this time. Patient will attend his follow up appointment on 5/18/18. RN CC will mail out contact information and will be available in the future should another referral be made.     Carmenza Khan RN  Clinic Care Coordinator  [phone]  [email or web address]      24

## 2022-09-03 NOTE — PROGRESS NOTES
Anticoagulation Management    Unable to reach Josh or Candida today.    Today's INR result of 2.3 is therapeutic (goal INR of 2.0-3.0).  Result received from: Clinic Lab    Follow up required to confirm warfarin dose taken and assess for changes    left message to call nurse back today      Anticoagulation clinic to follow up    Sherice Jerez RN     ambulatory

## 2022-09-10 NOTE — LETTER
[facility]  [address] [facility]  [address]  [place]  [address]  Tel: [phone]    July 9, 2018    Santosh EFRAÍN Robledo  [address]   [facility] [address]        Dear Mr. Robledo,    The results of your recent labs were within normal limits.      Resulted Orders   Potassium   Result Value Ref Range    Potassium 4.7 3.4 - 5.3 mmol/L   Creatinine   Result Value Ref Range    Creatinine 1.02 0.66 - 1.25 mg/dL    GFR Estimate 69 >60 mL/min/1.7m2      Comment:      Non  GFR Calc    GFR Estimate If Black 83 >60 mL/min/1.7m2      Comment:       GFR Calc          If you have any further questions or problems, please contact our office.      Sincerely,    Leonel Zepeda MD / leo           Adequate

## 2024-02-28 NOTE — LETTER
Patient push call lights frequently to request dilaudid every 1.5 hour, pain level always at 9/10 except one hour after Dilaudid administration could be 5/10. Refused alternative oral analgesic, stated dilaudid is the only med helps the pain but only last 1.5 hour. Willing try the Oxycodone 5mg between Dilaudid administration but no effect at all. Patient is able to inform the staff when feels like to have a fever T: 102.2, came down to 98.1 with PRN acetaminophen 650mg within 30 minutes. Dr. Buck infomed the condition.    [facility]  [address] [place] [address]        Dear Santosh,      We are contacting you because our records show you were due for an INR on 4/3/20.?   ?  We understand that this is a difficult time. In response to the COVID-19 [facility] has made several changes to help keep you safe. Some examples include limiting in person office visits to essential appointments only, wearing masks, and calling patients the day before appointments to screen for symptoms of illness.?   ?  INR testing is considered essential care during this time. There are potentially serious risks when taking warfarin without careful monitoring, and we want to make sure you stay safe. To minimize your time in clinic, we are now using lab only appointments for your INR test. Your anticoagulation nurse will now be calling you to discuss your result and dosing after you return home.  ?  Please call the INR clinic at [phone] to discuss your warfarin care, and to set up an appointment. If there has been a change in your medications or provider, please let us know so we can update our records.  ?  Sincerely,  ?  [facility]

## 2024-06-17 PROBLEM — Z71.89 ADVANCED DIRECTIVES, COUNSELING/DISCUSSION: Status: RESOLVED | Noted: 2018-05-16 | Resolved: 2024-06-17

## 2024-12-06 NOTE — DISCHARGE SUMMARY
Admit Date:     05/13/2018   Discharge Date:           PRIMARY CARE PROVIDER:  Lilly Zepeda MD      DISCHARGE DIAGNOSIS:  Clostridium difficile colitis.      PAST MEDICAL HISTORY:   1.  Hypertension.   2.  Hyperlipidemia.   3.  Paroxysmal Afib, on chronic anticoagulation with Coumadin.   4.  History of small bowel obstruction.   5.  GERD.      PAST SURGICAL HISTORY:     1.  Pacemaker in situ, placed in 08/2015.   2.  Cholecystectomy.   3.  Prostate surgery.      DISCHARGE MEDICATIONS:   1.  Vancomycin oral suspension 125 mg p.o. q.i.d. x 12 more days.   2.  Hydrochlorothiazide 25 mg p.o. daily.   3.  Vitamin B12 1 tablet p.o. daily.   4.  Lisinopril 20 mg p.o. b.i.d.   5.  Omeprazole 20 mg p.o. daily.   6.  Sertraline 100 mg p.o. daily.   7.  Zocor 10 mg p.o. daily.   8.  Simethicone 1 capsule p.o. b.i.d. p.r.n.   9.  Coumadin 5 mg p.o. on Tuesday, Thursday, Friday, and Sunday; 2.5 mg p.o. on Monday, Wednesday, and Saturday.        DIAGNOSTIC STUDIES:  CT abdomen and pelvis with contrast 05/13/2018 at 5:59 p.m.  IMPRESSION:  Acute uncomplicated left colitis.  Infectious or inflammatory etiology are favored.      HOSPITAL COURSE:   Santosh Robledo is an 88-year-old gentleman with history of HTN, HLD, PAF, and GERD.  He received antibiotic for sinusitis in 02/2018.  He was also recently evaluated at [facility] ED for diarrhea.  CT scan was apparently obtained and patient was treated with antibiotics.  The patient does not recall the name of the antibiotics, but the patient's wife thinks it was ciprofloxacin.  He finished a course of antibiotics, but his diarrhea persisted.  He had decreased appetite and oral intake.  He apparently lost about 10 pounds over the last month PTA.  He presented to [facility] ED on the night of 05/13/2018 with intermittent diarrhea, but was severe and daily for about a week prior his admission.  CT abd/pelvis with IV contrast in the ED revealed left-sided colitis.  Stool C. diff  Detail Level: Detailed toxin was positive in the ED.  Diagnosed with C. difficile colitis, initiated on oral vancomycin, and was subsequently admitted.      1.   Clostridium difficile colitis:   Has a history of antibiotic exposure twice over the last 3 months.  He presented to [facility] ED with a 1-week history of diarrhea.  Stool C. diff toxin was positive in the ED.  CT abd/pelvis showed left-sided colitis, likely due to C. difficile infection.  Responded well to oral vancomycin.  He had only 1 BM today.  He has received vancomycin 125 mg p.o. q.i.d. x 2 days.  I will discharge him to home with oral vancomycin x 12 more days.     2.   Leukocytosis:   Due to C. difficile colitis.  Admit WBC was 29.8.  Came down to 26.5 on the day of this discharge.  Still too far from normal range.  He will need followup CBC at PCP clinic in 1 week.    3.   Hypertension:   Controlled.  Continue with lisinopril, and HCTZ.     4.   PAF:   Pacemaker in situ.  In normal sinus rhythm during this hospitalization.  Continue Coumadin for stroke prophylaxis.  INR therapeutic on the day of this discharge at 2.72.      DISCHARGE PHYSICAL EXAMINATION:   VITAL SIGNS:  Temperature 97.5, /69, heart rate 94, respirations 16, 94% saturation on room air.   GENERAL:  Alert and oriented x 3, no apparent distress.   HEENT:  Normocephalic, atraumatic.  Pupils equal, round, reactive to light.   NECK:  Supple, oropharynx is clear, mucous membranes moist.  No thyromegaly.   CARDIOVASCULAR:  Normal S1, S2.  No murmurs, rubs, or gallops.   LUNGS:  Clear to auscultation bilaterally.   ABDOMEN:  Soft, good bowel sounds, nontender, no rebound or guarding.   EXTREMITIES:  No cyanosis or clubbing.   LYMPHATICS:  No edema.   NEUROLOGIC:  Nonfocal.   SKIN:  No rash.   MUSCULOSKELETAL:  No calf tenderness to palpation.   PSYCHIATRY:  Mood and affect are appropriate.      DISCHARGE INSTRUCTIONS:   1.  Cardiac diet.   2.  Vancomycin 125 mg p.o. q.i.d. times 12 more days.   3.  Activity as  tolerated.   4.  CBC at PCP clinic in 1 week.      TOTAL TIME:  31 minutes.         MONTSERRAT AVENDANO MD             D: 05/15/2018   T: 05/15/2018   MT: YONATAN      Name:     TRUE MATTHEWS   MRN:      [id number]        Account:        [id number]   :      [id number]           Admit Date:     2018                                  Discharge Date:       Document: [id number]       cc: Lilly Zepeda MD

## 2025-04-14 NOTE — NURSING NOTE
"Chief Complaint   Patient presents with     Sleep Problem       Initial /57  Pulse 65  Resp 16  Ht 1.905 m (6' 3\")  Wt 77.9 kg (171 lb 12.8 oz)  SpO2 95%  BMI 21.47 kg/m2 Estimated body mass index is 21.47 kg/(m^2) as calculated from the following:    Height as of this encounter: 1.905 m (6' 3\").    Weight as of this encounter: 77.9 kg (171 lb 12.8 oz).    Medication Reconciliation: complete     ESS   Neck 35cm  Dora Hudson        " Malignant ascites